# Patient Record
Sex: FEMALE | Race: BLACK OR AFRICAN AMERICAN | NOT HISPANIC OR LATINO | Employment: OTHER | URBAN - METROPOLITAN AREA
[De-identification: names, ages, dates, MRNs, and addresses within clinical notes are randomized per-mention and may not be internally consistent; named-entity substitution may affect disease eponyms.]

---

## 2017-02-07 ENCOUNTER — PATIENT MESSAGE (OUTPATIENT)
Dept: INTERNAL MEDICINE | Facility: CLINIC | Age: 62
End: 2017-02-07

## 2017-02-08 RX ORDER — PROMETHAZINE HYDROCHLORIDE AND DEXTROMETHORPHAN HYDROBROMIDE 6.25; 15 MG/5ML; MG/5ML
5 SYRUP ORAL EVERY 4 HOURS PRN
Qty: 240 ML | Refills: 0 | Status: SHIPPED | OUTPATIENT
Start: 2017-02-08 | End: 2017-02-18

## 2017-02-08 RX ORDER — DOXYCYCLINE 100 MG/1
100 CAPSULE ORAL EVERY 12 HOURS
Qty: 20 CAPSULE | Refills: 0 | Status: SHIPPED | OUTPATIENT
Start: 2017-02-08 | End: 2017-03-14

## 2017-03-02 ENCOUNTER — PATIENT MESSAGE (OUTPATIENT)
Dept: INTERNAL MEDICINE | Facility: CLINIC | Age: 62
End: 2017-03-02

## 2017-03-03 RX ORDER — LEVOFLOXACIN 500 MG/1
500 TABLET, FILM COATED ORAL DAILY
Qty: 10 TABLET | Refills: 0 | Status: SHIPPED | OUTPATIENT
Start: 2017-03-03 | End: 2017-03-13

## 2017-03-03 RX ORDER — METHYLPREDNISOLONE 4 MG/1
TABLET ORAL
Qty: 1 PACKAGE | Refills: 0 | Status: SHIPPED | OUTPATIENT
Start: 2017-03-03 | End: 2017-03-14 | Stop reason: ALTCHOICE

## 2017-03-03 RX ORDER — DEXTROMETHORPHAN HBR. AND GUAIFENESIN 10; 100 MG/5ML; MG/5ML
5 SOLUTION ORAL EVERY 6 HOURS PRN
Qty: 240 ML | Refills: 0 | Status: SHIPPED | OUTPATIENT
Start: 2017-03-03 | End: 2017-03-13

## 2017-03-14 ENCOUNTER — HOSPITAL ENCOUNTER (OUTPATIENT)
Dept: RADIOLOGY | Facility: HOSPITAL | Age: 62
Discharge: HOME | End: 2017-03-14
Attending: INTERNAL MEDICINE
Payer: COMMERCIAL

## 2017-03-14 ENCOUNTER — OFFICE VISIT (OUTPATIENT)
Dept: INTERNAL MEDICINE | Facility: CLINIC | Age: 62
End: 2017-03-14
Payer: COMMERCIAL

## 2017-03-14 ENCOUNTER — TELEPHONE (OUTPATIENT)
Dept: INTERNAL MEDICINE | Facility: CLINIC | Age: 62
End: 2017-03-14

## 2017-03-14 VITALS
DIASTOLIC BLOOD PRESSURE: 80 MMHG | HEIGHT: 66 IN | WEIGHT: 194.69 LBS | BODY MASS INDEX: 31.29 KG/M2 | SYSTOLIC BLOOD PRESSURE: 128 MMHG | HEART RATE: 65 BPM | RESPIRATION RATE: 18 BRPM | TEMPERATURE: 98 F

## 2017-03-14 DIAGNOSIS — J40 BRONCHITIS: ICD-10-CM

## 2017-03-14 DIAGNOSIS — J40 BRONCHITIS: Primary | ICD-10-CM

## 2017-03-14 PROCEDURE — 99214 OFFICE O/P EST MOD 30 MIN: CPT | Mod: 25,S$GLB,, | Performed by: INTERNAL MEDICINE

## 2017-03-14 PROCEDURE — 71020 XR CHEST PA AND LATERAL: CPT | Mod: TC,PO

## 2017-03-14 PROCEDURE — 3074F SYST BP LT 130 MM HG: CPT | Mod: S$GLB,,, | Performed by: INTERNAL MEDICINE

## 2017-03-14 PROCEDURE — 1160F RVW MEDS BY RX/DR IN RCRD: CPT | Mod: S$GLB,,, | Performed by: INTERNAL MEDICINE

## 2017-03-14 PROCEDURE — 94640 AIRWAY INHALATION TREATMENT: CPT | Mod: S$GLB,,, | Performed by: INTERNAL MEDICINE

## 2017-03-14 PROCEDURE — 3079F DIAST BP 80-89 MM HG: CPT | Mod: S$GLB,,, | Performed by: INTERNAL MEDICINE

## 2017-03-14 PROCEDURE — 99999 PR PBB SHADOW E&M-EST. PATIENT-LVL III: CPT | Mod: PBBFAC,,, | Performed by: INTERNAL MEDICINE

## 2017-03-14 PROCEDURE — 71020 XR CHEST PA AND LATERAL: CPT | Mod: 26,,, | Performed by: RADIOLOGY

## 2017-03-14 RX ORDER — IPRATROPIUM BROMIDE AND ALBUTEROL SULFATE 2.5; .5 MG/3ML; MG/3ML
3 SOLUTION RESPIRATORY (INHALATION)
Status: COMPLETED | OUTPATIENT
Start: 2017-03-14 | End: 2017-03-14

## 2017-03-14 RX ORDER — AMOXICILLIN AND CLAVULANATE POTASSIUM 875; 125 MG/1; MG/1
TABLET, FILM COATED ORAL
Refills: 0 | COMMUNITY
Start: 2017-03-03 | End: 2017-03-14 | Stop reason: ALTCHOICE

## 2017-03-14 RX ORDER — HYDROCODONE BITARTRATE AND HOMATROPINE METHYLBROMIDE ORAL SOLUTION 5; 1.5 MG/5ML; MG/5ML
5 LIQUID ORAL NIGHTLY PRN
Qty: 120 ML | Refills: 0 | Status: SHIPPED | OUTPATIENT
Start: 2017-03-14 | End: 2017-06-12

## 2017-03-14 RX ORDER — BENZONATATE 200 MG/1
CAPSULE ORAL
Refills: 0 | COMMUNITY
Start: 2017-03-03 | End: 2017-03-14 | Stop reason: ALTCHOICE

## 2017-03-14 RX ORDER — ALBUTEROL SULFATE 90 UG/1
2 AEROSOL, METERED RESPIRATORY (INHALATION) EVERY 6 HOURS PRN
Qty: 18 G | Refills: 0 | Status: SHIPPED | OUTPATIENT
Start: 2017-03-14 | End: 2017-05-11 | Stop reason: SDUPTHER

## 2017-03-14 RX ORDER — LEVOFLOXACIN 750 MG/1
750 TABLET ORAL DAILY
Qty: 10 TABLET | Refills: 0 | Status: SHIPPED | OUTPATIENT
Start: 2017-03-14 | End: 2017-03-24

## 2017-03-14 RX ADMIN — IPRATROPIUM BROMIDE AND ALBUTEROL SULFATE 3 ML: 2.5; .5 SOLUTION RESPIRATORY (INHALATION) at 02:03

## 2017-03-14 NOTE — TELEPHONE ENCOUNTER
----- Message from Minal Toribio MD sent at 3/14/2017  2:53 PM CDT -----  Please let patient know that her xray does not show pneumonia. She likely has a bronchitis but she should still follow up with us on Friday. Thanks!

## 2017-03-14 NOTE — PROGRESS NOTES
Subjective:       Patient ID: Diana Montenegro is a 61 y.o. female.    Chief Complaint: Cough (feels like it might be bronchitis; went to urgent care and had meds )    HPI     Patient is a 61 year old female here today for cough. She says her sx have been going on for about one month. She said initially she had a productive cough, called her PCP who sent her a Rx for Doxycycline but it never got better. She then went to  last week when coughing was very productive, reportedly CXR was normal and she was told she had bronchitis and given augmentin BID x 10 days, ended yesterday. Cough is worse now, intermittently productive, SOB, wheezing. No fever/chills. No sinus congestion, rhinorrhea, sore throat, PND. No upset stomach or diarrhea. She is a former smoking. Reportedly no asthma or COPD.     Review of Systems   Constitutional: Negative for chills, fatigue and fever.   HENT: Negative for congestion, ear pain, postnasal drip, rhinorrhea, sinus pressure and sore throat.    Eyes: Negative for itching and visual disturbance.   Respiratory: Positive for cough, shortness of breath and wheezing.    Cardiovascular: Negative for chest pain, palpitations and leg swelling.   Gastrointestinal: Negative for abdominal pain and nausea.   Genitourinary: Negative for dysuria.   Musculoskeletal: Negative for arthralgias and myalgias.   Skin: Negative for rash.   Neurological: Negative for weakness, light-headedness and headaches.       Objective:      Physical Exam   Constitutional: She is oriented to person, place, and time. She appears well-developed and well-nourished. No distress.   HENT:   Head: Normocephalic and atraumatic.   Right Ear: External ear normal.   Left Ear: External ear normal.     Posterior oropharyngeal erythema  No tonsillar exudate   Eyes: Conjunctivae and EOM are normal. Pupils are equal, round, and reactive to light.   Neck: Normal range of motion. Neck supple. No thyromegaly present.   Cardiovascular:  Normal rate, regular rhythm, normal heart sounds and intact distal pulses.    No murmur heard.  Pulmonary/Chest: She has wheezes.   Diffuse expiratory wheezing bilateral upper and lower lung zones  No use of accessory muscles of respiration    Musculoskeletal: She exhibits no edema.   Lymphadenopathy:     She has no cervical adenopathy.   Neurological: She is alert and oriented to person, place, and time.   Skin: Skin is warm and dry. She is not diaphoretic.   Nursing note and vitals reviewed.      Assessment:       1. Bronchitis        Plan:       She has bilateral wheezing upper and lower lung zones  Check Pulse ox in clinic today  Pulse ox now  duoneb treatment x 1 today  She received steroid injection last week  CBC, CMP, CXR today to rule out evidence of PNA  Rx for Levaquin 750 mg daily x 10 days  RTC on Friday for follow up  Rx for hycodan cough syrup nightly prn cough   Rx for albuterol inhaler Q6H prn wheezing/sob

## 2017-03-17 ENCOUNTER — OFFICE VISIT (OUTPATIENT)
Dept: INTERNAL MEDICINE | Facility: CLINIC | Age: 62
End: 2017-03-17
Payer: COMMERCIAL

## 2017-03-17 VITALS
SYSTOLIC BLOOD PRESSURE: 140 MMHG | TEMPERATURE: 98 F | WEIGHT: 196.19 LBS | HEIGHT: 66 IN | BODY MASS INDEX: 31.53 KG/M2 | HEART RATE: 68 BPM | DIASTOLIC BLOOD PRESSURE: 72 MMHG

## 2017-03-17 DIAGNOSIS — R06.2 WHEEZING: ICD-10-CM

## 2017-03-17 DIAGNOSIS — J40 BRONCHITIS: Primary | ICD-10-CM

## 2017-03-17 PROCEDURE — 3078F DIAST BP <80 MM HG: CPT | Mod: S$GLB,,, | Performed by: INTERNAL MEDICINE

## 2017-03-17 PROCEDURE — 1160F RVW MEDS BY RX/DR IN RCRD: CPT | Mod: S$GLB,,, | Performed by: INTERNAL MEDICINE

## 2017-03-17 PROCEDURE — 99999 PR PBB SHADOW E&M-EST. PATIENT-LVL III: CPT | Mod: PBBFAC,,, | Performed by: INTERNAL MEDICINE

## 2017-03-17 PROCEDURE — 3077F SYST BP >= 140 MM HG: CPT | Mod: S$GLB,,, | Performed by: INTERNAL MEDICINE

## 2017-03-17 PROCEDURE — 99213 OFFICE O/P EST LOW 20 MIN: CPT | Mod: S$GLB,,, | Performed by: INTERNAL MEDICINE

## 2017-03-17 RX ORDER — METHYLPREDNISOLONE 4 MG/1
TABLET ORAL
Qty: 1 PACKAGE | Refills: 0 | Status: SHIPPED | OUTPATIENT
Start: 2017-03-17 | End: 2017-04-07

## 2017-03-17 NOTE — PROGRESS NOTES
Subjective:       Patient ID: Diana Montenegro is a 61 y.o. female.    Chief Complaint: Cough (follow up bronchitis)    HPI     Patient is a 61 year old female here today for follow up of Bronchitis. Still coughing but feeling better. More energy. Less SOB. Still with wheezing. Still productive, at times has trouble getting it up. On Levaquin and has albuterol inhaler for coughing spells and it helps. No fever/chills. No diarrhea.     Review of Systems   Constitutional: Negative for chills, fatigue and fever.   HENT: Negative for congestion, ear pain, postnasal drip, rhinorrhea, sinus pressure and sore throat.    Eyes: Negative for itching and visual disturbance.   Respiratory: Positive for cough and wheezing. Negative for shortness of breath.    Cardiovascular: Negative for chest pain, palpitations and leg swelling.   Gastrointestinal: Negative for abdominal pain and nausea.   Genitourinary: Negative for dysuria.   Musculoskeletal: Negative for arthralgias and myalgias.   Skin: Negative for rash.   Neurological: Negative for weakness, light-headedness and headaches.       Objective:      Physical Exam   Constitutional: She is oriented to person, place, and time. She appears well-developed and well-nourished. No distress.   HENT:   Head: Normocephalic and atraumatic.   Right Ear: External ear normal.   Left Ear: External ear normal.     Posterior oropharyngeal erythema  No tonsillar exudate   Eyes: Conjunctivae and EOM are normal. Pupils are equal, round, and reactive to light.   Neck: Normal range of motion. Neck supple. No thyromegaly present.   Cardiovascular: Normal rate, regular rhythm, normal heart sounds and intact distal pulses.    No murmur heard.  Pulmonary/Chest: Effort normal.   Expiratory wheezing BL diffuse     Musculoskeletal: She exhibits no edema.   Lymphadenopathy:     She has no cervical adenopathy.   Neurological: She is alert and oriented to person, place, and time.   Skin: Skin is warm and  dry. She is not diaphoretic.   Nursing note and vitals reviewed.      Assessment:       1. Bronchitis    2. Wheezing        Plan:       Still with wheezing on exam today and significant rales  She looks better today, no use of accessory muscles  Continue Levaquin  Start Medrol Dose Pack  Please call us if sx do not continue to improve

## 2017-03-21 ENCOUNTER — NURSE TRIAGE (OUTPATIENT)
Dept: ADMINISTRATIVE | Facility: CLINIC | Age: 62
End: 2017-03-21

## 2017-03-21 NOTE — TELEPHONE ENCOUNTER
Reason for Disposition   Age > 60 years    Protocols used: ST HEART RATE AND HEARTBEAT [id number]  pt states she has a hx of a-fib/ states her HR has been fluctuating and having episodes daily of increased HR and elevated BP/ this afternoon  / now /77 w/ HR 91/ is being treated for bronchitis @ the time/ denies CP, SOB, weakness, dizziness, or feeling lightheaded/ states she feels fine overall/ is on eliquis and has noticed a bruise on her chest/ states her normal HR is 50-60's/ is very congested--wet sounding cough    Unable to access any appts in IM-after hours  Advised pt to seek care @ [facility] clinic in her area/ also advised pt that based on assessment she may be sent to ER--pt verbalized understanding    Nicole Trivedi RN

## 2017-03-22 ENCOUNTER — TELEPHONE (OUTPATIENT)
Dept: INTERNAL MEDICINE | Facility: CLINIC | Age: 62
End: 2017-03-22

## 2017-03-22 NOTE — TELEPHONE ENCOUNTER
Spoke with pt-she said that she saw a UC  Last pm,and they said she was having a anxiety attack.   She feels fine now.

## 2017-03-22 NOTE — TELEPHONE ENCOUNTER
----- Message from Kerline Ofelia sent at 3/21/2017  4:22 PM CDT -----  Contact: pt [phone]  Pt saw the Dr on 3- and was given steroids,the pt thinks they are making her heart rate go fast(91 beat),also she said her blood pressure is 167-76@ 4:20pm today,please advise pt on what to do.

## 2017-03-28 ENCOUNTER — TELEPHONE (OUTPATIENT)
Dept: ELECTROPHYSIOLOGY | Facility: CLINIC | Age: 62
End: 2017-03-28

## 2017-03-28 ENCOUNTER — TELEPHONE (OUTPATIENT)
Dept: CARDIOLOGY | Facility: CLINIC | Age: 62
End: 2017-03-28

## 2017-03-28 DIAGNOSIS — I48.0 PAF (PAROXYSMAL ATRIAL FIBRILLATION): Primary | ICD-10-CM

## 2017-03-28 DIAGNOSIS — R00.2 PALPITATION: Primary | ICD-10-CM

## 2017-03-28 NOTE — TELEPHONE ENCOUNTER
----- Message from Daniela Huizar sent at 3/28/2017 10:19 AM CDT -----  Contact: pt called  Pt called, requesting to speak with the M.A in regards to obtaining a yearly appointment and to discuss recalls. Ph [phone]. Thank you

## 2017-03-28 NOTE — TELEPHONE ENCOUNTER
----- Message from Daniela Huizar sent at 3/28/2017 10:22 AM CDT -----  Contact: pt called  Please add an EKG order. Thank you

## 2017-03-29 DIAGNOSIS — Z98.84 S/P LAPAROSCOPIC SLEEVE GASTRECTOMY: ICD-10-CM

## 2017-03-30 RX ORDER — APIXABAN 5 MG/1
TABLET, FILM COATED ORAL
Qty: 180 TABLET | Refills: 0 | Status: SHIPPED | OUTPATIENT
Start: 2017-03-30 | End: 2017-04-11 | Stop reason: SDUPTHER

## 2017-03-30 RX ORDER — OMEPRAZOLE 40 MG/1
CAPSULE, DELAYED RELEASE ORAL
Qty: 30 CAPSULE | Refills: 0 | Status: SHIPPED | OUTPATIENT
Start: 2017-03-30 | End: 2017-05-11 | Stop reason: SDUPTHER

## 2017-03-30 RX ORDER — LEVOTHYROXINE SODIUM 125 UG/1
125 TABLET ORAL DAILY
Qty: 30 TABLET | Refills: 2 | Status: SHIPPED | OUTPATIENT
Start: 2017-03-30 | End: 2017-05-11 | Stop reason: SDUPTHER

## 2017-03-30 RX ORDER — LORAZEPAM 1 MG/1
1 TABLET ORAL EVERY 12 HOURS PRN
Qty: 60 TABLET | Refills: 2 | Status: SHIPPED | OUTPATIENT
Start: 2017-03-30 | End: 2017-11-21 | Stop reason: SDUPTHER

## 2017-03-30 RX ORDER — GLIMEPIRIDE 4 MG/1
4 TABLET ORAL 2 TIMES DAILY
Qty: 60 TABLET | Refills: 2 | Status: SHIPPED | OUTPATIENT
Start: 2017-03-30 | End: 2017-05-11 | Stop reason: SDUPTHER

## 2017-03-30 RX ORDER — LEVOTHYROXINE SODIUM 125 UG/1
TABLET ORAL
Qty: 30 TABLET | Refills: 0 | Status: SHIPPED | OUTPATIENT
Start: 2017-03-30 | End: 2017-04-11 | Stop reason: SDUPTHER

## 2017-03-30 RX ORDER — LORAZEPAM 1 MG/1
TABLET ORAL
Qty: 60 TABLET | Refills: 0 | Status: SHIPPED | OUTPATIENT
Start: 2017-03-30 | End: 2017-04-11 | Stop reason: SDUPTHER

## 2017-03-30 RX ORDER — GLIMEPIRIDE 4 MG/1
TABLET ORAL
Qty: 60 TABLET | Refills: 0 | Status: SHIPPED | OUTPATIENT
Start: 2017-03-30 | End: 2017-04-11 | Stop reason: SDUPTHER

## 2017-03-30 RX ORDER — METFORMIN HYDROCHLORIDE 1000 MG/1
TABLET ORAL
Qty: 60 TABLET | Refills: 0 | Status: SHIPPED | OUTPATIENT
Start: 2017-03-30 | End: 2017-04-11 | Stop reason: SDUPTHER

## 2017-03-30 RX ORDER — OMEPRAZOLE 40 MG/1
40 CAPSULE, DELAYED RELEASE ORAL EVERY MORNING
Qty: 30 CAPSULE | Refills: 2 | Status: SHIPPED | OUTPATIENT
Start: 2017-03-30 | End: 2017-04-11 | Stop reason: SDUPTHER

## 2017-03-30 RX ORDER — METFORMIN HYDROCHLORIDE 1000 MG/1
1000 TABLET ORAL 2 TIMES DAILY WITH MEALS
Qty: 60 TABLET | Refills: 2 | Status: SHIPPED | OUTPATIENT
Start: 2017-03-30 | End: 2017-05-11 | Stop reason: SDUPTHER

## 2017-03-30 RX ORDER — INSULIN PUMP SYRINGE, 3 ML
EACH MISCELLANEOUS
Qty: 1 EACH | Refills: 0 | Status: SHIPPED | OUTPATIENT
Start: 2017-03-30 | End: 2021-11-02 | Stop reason: SDUPTHER

## 2017-03-31 ENCOUNTER — TELEPHONE (OUTPATIENT)
Dept: INTERNAL MEDICINE | Facility: CLINIC | Age: 62
End: 2017-03-31

## 2017-03-31 NOTE — TELEPHONE ENCOUNTER
----- Message from Nirmala Norwood sent at 3/30/2017  3:39 PM CDT -----  Contact: Vi/[facility]/[phone]  Calling in regards to VM left on pharmacy VM.Please advise.

## 2017-04-05 ENCOUNTER — OFFICE VISIT (OUTPATIENT)
Dept: VASCULAR SURGERY | Facility: CLINIC | Age: 62
End: 2017-04-05
Payer: COMMERCIAL

## 2017-04-05 ENCOUNTER — HOSPITAL ENCOUNTER (OUTPATIENT)
Dept: VASCULAR SURGERY | Facility: CLINIC | Age: 62
Discharge: HOME | End: 2017-04-05
Attending: SURGERY
Payer: COMMERCIAL

## 2017-04-05 VITALS
DIASTOLIC BLOOD PRESSURE: 59 MMHG | TEMPERATURE: 99 F | HEIGHT: 67 IN | BODY MASS INDEX: 30.45 KG/M2 | WEIGHT: 194 LBS | SYSTOLIC BLOOD PRESSURE: 108 MMHG | HEART RATE: 59 BPM

## 2017-04-05 DIAGNOSIS — I77.9 BILATERAL CAROTID ARTERY DISEASE: Primary | ICD-10-CM

## 2017-04-05 DIAGNOSIS — E78.5 HYPERLIPIDEMIA, UNSPECIFIED HYPERLIPIDEMIA TYPE: ICD-10-CM

## 2017-04-05 DIAGNOSIS — I65.21 STENOSIS OF RIGHT CAROTID ARTERY: Primary | ICD-10-CM

## 2017-04-05 DIAGNOSIS — I48.0 PAROXYSMAL ATRIAL FIBRILLATION: Chronic | ICD-10-CM

## 2017-04-05 DIAGNOSIS — I77.9 BILATERAL CAROTID ARTERY DISEASE: ICD-10-CM

## 2017-04-05 DIAGNOSIS — I10 ESSENTIAL HYPERTENSION: Chronic | ICD-10-CM

## 2017-04-05 PROCEDURE — 3078F DIAST BP <80 MM HG: CPT | Mod: S$GLB,,, | Performed by: SURGERY

## 2017-04-05 PROCEDURE — 93880 EXTRACRANIAL BILAT STUDY: CPT | Mod: S$GLB,,, | Performed by: SURGERY

## 2017-04-05 PROCEDURE — 99999 PR PBB SHADOW E&M-EST. PATIENT-LVL III: CPT | Mod: PBBFAC,,, | Performed by: SURGERY

## 2017-04-05 PROCEDURE — 99214 OFFICE O/P EST MOD 30 MIN: CPT | Mod: S$GLB,,, | Performed by: SURGERY

## 2017-04-05 PROCEDURE — 3074F SYST BP LT 130 MM HG: CPT | Mod: S$GLB,,, | Performed by: SURGERY

## 2017-04-05 PROCEDURE — 1160F RVW MEDS BY RX/DR IN RCRD: CPT | Mod: S$GLB,,, | Performed by: SURGERY

## 2017-04-05 NOTE — PROGRESS NOTES
Patient ID: Diana Montenegro is a 61 y.o. female.    I. HISTORY     Chief Complaint: Follow-up      HPI   Diana Montenegro is a 61 y.o.  With HTN, HLD, DM, Afib (on Eliquis), R CVA (July 2014 manifested by facial droop and slurred speech) who is here for f/u eval of right carotid artery stenosis. She is a former smoker with 30 pk-yr history.  She has recently quit.  She walks with a walker due to arthritic pain but otherwise is independent. She denies changes in vision, facial droop, difficulty speaking, or loss of motor function.     Review of Systems   Constitution: Negative for chills and fever.   HENT: Negative for congestion and ear pain.    Eyes: Negative for discharge and pain.   Cardiovascular: Negative for chest pain, claudication and dyspnea on exertion.   Respiratory: Negative for cough and shortness of breath.    Skin: Negative for dry skin and itching.   Musculoskeletal: Positive for arthritis. Negative for back pain and falls.   Gastrointestinal: Negative for bloating, abdominal pain and anorexia.   Genitourinary: Negative for dysuria, hematuria and hesitancy.   Neurological: Negative for aphonia and dizziness.   Psychiatric/Behavioral: Negative for hallucinations and hypervigilance.       II. PHYSICAL EXAM     Physical Exam   Constitutional: She is oriented to person, place, and time. She appears well-developed and well-nourished. No distress.   HENT:   Head: Normocephalic and atraumatic.   Neck: Normal range of motion. Neck supple.   Cardiovascular: Normal rate, regular rhythm, normal heart sounds and intact distal pulses.    Pulses:       Radial pulses are 2+ on the right side, and 2+ on the left side.   No carotid bruits   Pulmonary/Chest: Effort normal and breath sounds normal. No respiratory distress.   Abdominal: Soft. Bowel sounds are normal. She exhibits no distension.   Neurological: She is alert and oriented to person, place, and time.   Skin: Skin is warm and dry.   Psychiatric:  She has a normal mood and affect. Her behavior is normal. Thought content normal.   Vitals reviewed.      III. ASSESSMENT & PLAN (MEDICAL DECISION MAKING)     1. Stenosis of right carotid artery    2. Hyperlipidemia, unspecified hyperlipidemia type    3. Paroxysmal atrial fibrillation    4. Essential hypertension        Imaging Results:  Carotid Duplex:   R L   60-79%  Ratio: 5.3 1-39%     Assessment/Diagnosis and Plan:  58 y/o female with asymptomatic carotid stenosis.   Continue conservative management with asa, eliquis, and statin.     RADHA Rushing, APRN, FNP-BC  Nurse Practitioner  Vascular and Endovascular Surgery      Vascular Surgery Staff  I have seen and examined the patient and reviewed the residents note. I agree with their assessment and plan.  Diana Montenegro is a 61 y.o. female with stroke in July 2014. No symptoms since. She has right ICA stenosis ~ 70%.      Continue yearly follow up with repeat duplex.      Ofelia Downs MD FACS [facility]  Vascular & Endovascular Surgery

## 2017-04-06 RX ORDER — LORAZEPAM 1 MG/1
TABLET ORAL
Qty: 60 TABLET | Refills: 0 | Status: SHIPPED | OUTPATIENT
Start: 2017-04-06 | End: 2017-04-11 | Stop reason: SDUPTHER

## 2017-04-07 ENCOUNTER — PATIENT MESSAGE (OUTPATIENT)
Dept: INTERNAL MEDICINE | Facility: CLINIC | Age: 62
End: 2017-04-07

## 2017-04-10 RX ORDER — TEMAZEPAM 15 MG/1
15 CAPSULE ORAL NIGHTLY PRN
Qty: 30 CAPSULE | Refills: 2 | Status: SHIPPED | OUTPATIENT
Start: 2017-04-10 | End: 2017-05-10

## 2017-04-11 ENCOUNTER — HOSPITAL ENCOUNTER (OUTPATIENT)
Dept: CARDIOLOGY | Facility: CLINIC | Age: 62
Discharge: HOME | End: 2017-04-11
Payer: COMMERCIAL

## 2017-04-11 ENCOUNTER — OFFICE VISIT (OUTPATIENT)
Dept: ELECTROPHYSIOLOGY | Facility: CLINIC | Age: 62
End: 2017-04-11
Payer: COMMERCIAL

## 2017-04-11 ENCOUNTER — CLINICAL SUPPORT (OUTPATIENT)
Dept: ELECTROPHYSIOLOGY | Facility: CLINIC | Age: 62
End: 2017-04-11
Payer: COMMERCIAL

## 2017-04-11 VITALS
HEART RATE: 64 BPM | WEIGHT: 191.81 LBS | DIASTOLIC BLOOD PRESSURE: 70 MMHG | HEIGHT: 66 IN | BODY MASS INDEX: 30.82 KG/M2 | SYSTOLIC BLOOD PRESSURE: 120 MMHG

## 2017-04-11 DIAGNOSIS — G89.29 CHRONIC BILATERAL LOW BACK PAIN, WITH SCIATICA PRESENCE UNSPECIFIED: ICD-10-CM

## 2017-04-11 DIAGNOSIS — E11.40 TYPE 2 DIABETES MELLITUS WITH DIABETIC NEUROPATHY, WITHOUT LONG-TERM CURRENT USE OF INSULIN: ICD-10-CM

## 2017-04-11 DIAGNOSIS — I10 ESSENTIAL HYPERTENSION: Chronic | ICD-10-CM

## 2017-04-11 DIAGNOSIS — I48.0 PAF (PAROXYSMAL ATRIAL FIBRILLATION): ICD-10-CM

## 2017-04-11 DIAGNOSIS — Z79.01 CURRENT USE OF LONG TERM ANTICOAGULATION: ICD-10-CM

## 2017-04-11 DIAGNOSIS — M54.5 CHRONIC BILATERAL LOW BACK PAIN, WITH SCIATICA PRESENCE UNSPECIFIED: ICD-10-CM

## 2017-04-11 DIAGNOSIS — Z95.818 OTHER SPECIFIED CARDIAC DEVICE IN SITU: Primary | ICD-10-CM

## 2017-04-11 DIAGNOSIS — E03.9 HYPOTHYROIDISM, UNSPECIFIED TYPE: ICD-10-CM

## 2017-04-11 DIAGNOSIS — I87.2 VENOUS INSUFFICIENCY (CHRONIC) (PERIPHERAL): ICD-10-CM

## 2017-04-11 DIAGNOSIS — E66.9 OBESITY, CLASS I, BMI 30-34.9: ICD-10-CM

## 2017-04-11 DIAGNOSIS — Z95.818 STATUS POST PLACEMENT OF IMPLANTABLE LOOP RECORDER: ICD-10-CM

## 2017-04-11 DIAGNOSIS — I48.0 PAROXYSMAL ATRIAL FIBRILLATION: Chronic | ICD-10-CM

## 2017-04-11 DIAGNOSIS — E78.5 HYPERLIPIDEMIA, UNSPECIFIED HYPERLIPIDEMIA TYPE: ICD-10-CM

## 2017-04-11 DIAGNOSIS — I63.239 SYMPTOMATIC CAROTID ARTERY STENOSIS WITH INFARCTION: Primary | ICD-10-CM

## 2017-04-11 DIAGNOSIS — Z95.818 OTHER SPECIFIED CARDIAC DEVICE IN SITU: ICD-10-CM

## 2017-04-11 PROCEDURE — 99999 PR PBB SHADOW E&M-EST. PATIENT-LVL III: CPT | Mod: PBBFAC,,, | Performed by: NURSE PRACTITIONER

## 2017-04-11 PROCEDURE — 93285 PRGRMG DEV EVAL SCRMS IP: CPT | Mod: S$GLB,,, | Performed by: INTERNAL MEDICINE

## 2017-04-11 PROCEDURE — 3078F DIAST BP <80 MM HG: CPT | Mod: S$GLB,,, | Performed by: NURSE PRACTITIONER

## 2017-04-11 PROCEDURE — 3060F POS MICROALBUMINURIA REV: CPT | Mod: S$GLB,,, | Performed by: NURSE PRACTITIONER

## 2017-04-11 PROCEDURE — 99214 OFFICE O/P EST MOD 30 MIN: CPT | Mod: S$GLB,,, | Performed by: NURSE PRACTITIONER

## 2017-04-11 PROCEDURE — 93000 ELECTROCARDIOGRAM COMPLETE: CPT | Mod: S$GLB,,, | Performed by: INTERNAL MEDICINE

## 2017-04-11 PROCEDURE — 3045F PR MOST RECENT HEMOGLOBIN A1C LEVEL 7.0-9.0%: CPT | Mod: S$GLB,,, | Performed by: NURSE PRACTITIONER

## 2017-04-11 PROCEDURE — 3074F SYST BP LT 130 MM HG: CPT | Mod: S$GLB,,, | Performed by: NURSE PRACTITIONER

## 2017-04-11 PROCEDURE — 1160F RVW MEDS BY RX/DR IN RCRD: CPT | Mod: S$GLB,,, | Performed by: NURSE PRACTITIONER

## 2017-04-11 RX ORDER — METHYLPREDNISOLONE 4 MG/1
4 TABLET ORAL DAILY
COMMUNITY
End: 2017-04-12

## 2017-04-11 NOTE — MR AVS SNAPSHOT
[facility] - Arrhythmia  [address]  [facility] [address]  Phone: [phone]  Fax: [phone]                  Diana Montenegro   2017 2:30 PM   Office Visit    Description:  Female : 1955   Provider:  YAMILETH Mccoy   Department:  [facility] - Arrhythmia           Reason for Visit     Tachycardia     Shortness of Breath     Fatigue                To Do List           Future Appointments        Provider Department Dept Phone    5/3/2017 11:00 AM LAB, SAME DAY [facility] [phone]    2017 2:00 PM Rad Johnston MD [facility] - Internal Medicine [phone]    2017 2:30 PM EKG, APPT [facility] - [phone]    2017 3:30 PM Kaushik Mae MD [facility] - Cardiology [phone]    7/10/2017 12:00 PM Gladys Petersen PA-C [facility] - Bariatric Surgery [phone]      Goals (5 Years of Data)     None      Follow-Up and Disposition     Return in about 6 months (around 10/11/2017).      [facility]     [facility] Nurse Care [facility] -  Assistance  Unless otherwise directed by your provider, please contact [facility], our nurse care line that is available for  assistance.     Registered nurses in the [facility] provide: appointment scheduling, clinical advisement, health education, and other advisory services.  Call: [phone] (toll free)               Medications           Message regarding Medications     Verify the changes and/or additions to your medication regime listed below are the same as discussed with your clinician today.  If any of these changes or additions are incorrect, please notify your healthcare provider.             Verify that the below list of medications is an accurate representation of the medications you are currently taking.  If none reported, the list may be blank. If incorrect, please contact your healthcare provider. Carry this list with you in case of emergency.            Current Medications     albuterol 90 mcg/actuation inhaler Inhale 2 puffs into the lungs every 6 (six) hours as needed for Wheezing. Rescue    apixaban (ELIQUIS) 5 mg Tab Take 1 tablet (5 mg total) by mouth 2 (two) times daily.    aspirin (ECOTRIN) 81 MG EC tablet Take 1 tablet (81 mg total) by mouth once daily.    b complex vitamins tablet Take 1 tablet by mouth once daily.    CALCIUM CITRATE/VITAMIN D3 (CALCIUM CITRATE + D ORAL) Take 2 tablets by mouth 2 (two) times daily.    cyanocobalamin, vitamin B-12, 500 mcg Subl Place 1 tablet under the tongue once daily.    glimepiride (AMARYL) 4 MG tablet Take 1 tablet (4 mg total) by mouth 2 (two) times daily.    hydrocodone-homatropine 5-1.5 mg/5 ml (HYCODAN) 5-1.5 mg/5 mL Syrp Take 5 mLs by mouth nightly as needed (cough).    levothyroxine (SYNTHROID) 125 MCG tablet Take 1 tablet (125 mcg total) by mouth once daily.    lorazepam (ATIVAN) 1 MG tablet Take 1 tablet (1 mg total) by mouth every 12 (twelve) hours as needed for Anxiety.    metformin (GLUCOPHAGE) 1000 MG tablet Take 1 tablet (1,000 mg total) by mouth 2 (two) times daily with meals.    multivitamin capsule Take 1 capsule by mouth once daily.    omeprazole (PRILOSEC) 40 MG capsule TAKE ONE CAPSULE BY MOUTH IN THE MORNING    rosuvastatin (CRESTOR) 40 MG Tab Take 1 tablet (40 mg total) by mouth once daily.    RUTIN/HESP/BIOFLAV/C/HERB#196 (BIOFLEX ORAL) Take 2 tablets by mouth once daily.    senna (SENNA) 8.6 mg tablet Take 2 tablets by mouth nightly as needed for Constipation.    temazepam (RESTORIL) 15 mg Cap Take 1 capsule (15 mg total) by mouth nightly as needed.    urea (CARMOL) 40 % Crea Apply topically 2 (two) times daily.    blood pressure test kit-large Kit Use as directed    blood-glucose meter (ONETOUCH ULTRA SYSTEM KIT) kit Use as instructed    lancets (ONE TOUCH DELICA LANCETS) 33 gauge Misc 1 lancet by Misc.(Non-Drug; Combo Route) route 4 (four) times daily.    methylPREDNISolone (MEDROL  "DOSEPACK) 4 mg tablet Take 4 mg by mouth Daily. use as directed    ONETOUCH ULTRA TEST Strp USE ONE STRIP TO CHECK GLUCOSE 4 TIMES DAILY AS DIRECTED           Clinical Reference Information           Your Vitals Were     BP Pulse Height Weight Last Period BMI    120/70 64 5' 6" (1.676 m) 87 kg (191 lb 12.8 oz) (LMP Unknown) 30.96 kg/m2      Blood Pressure          Most Recent Value    BP  120/70      Allergies as of 4/11/2017     Medrol [Methylprednisolone]      Immunizations Administered on Date of Encounter - 4/11/2017     None      Language Assistance Services     ATTENTION: Language assistance services are available, free of charge. Please call [phone].      ATENCIÓN: Si habla radha, tiene a hanson disposición servicios gratuitos de asistencia lingüística. Llame al [phone].     CATRINA Ý: N?u b?n nói Ti?ng Vi?t, có các d?ch v? h? tr? ngôn ng? mi?n phí dành cho b?n. G?i s? [phone].         [facility] complies with applicable Federal civil rights laws and does not discriminate on the basis of race, color, national origin, age, disability, or sex.        "

## 2017-04-11 NOTE — PROGRESS NOTES
Subjective:    Patient ID:  Diana Montenegro is a 61 y.o. female who presents for follow-up of ILR Check.     Ms. Montenegro is a patient of Dr. Randolph.     HPI     Referring MD: Dr. Herman Quinones     Ms. Montenegro is a 61 y.o. female with symptomatic carotid artery stenosis with infarction (MCA stroke; 07/2014) s/p ILR, pAF, venous insufficiency, HTN, HLD, DM, hypothyroidism, obesity s/p recent gastric sleeve surgery, and chronic lower-back pain w/sciatica. Ms. Montenegro had been in her usual state of health until July of 2014 when she developed left-sided facial weakness. She was diagnosed with an acute right-sided MCA stroke; work-up included carotid dopplers showing 60% right ICA stenosis. An Echo at the time showed normal biventricular function with a mildly enlarged LA. She also underwent a NST that showed no ischemia. During her hospitalization, she had no AF on telemetry and underwent successful ILR placement (07/18/14) without complication.     Ms. Montenegro underwent gastric sleeve surgery (07/23/15) without complication. Despite this, on post-op day 2, she was noted to have paroxysms of AF on an ILR transmission (max episode duration 50 minutes); asymptomatic. In addition, Ms. Montenegro was noted to have sinus bradycardia with rates around ~40; asymptomatic.    Since her last office visit, Ms. Montenegro reports experiencing bronchitis, for which she was prescribed a medrol dose pack. Towards the end of the pack, she began to note palpitations as well as 1 short-lived episode of dizziness on the last day of the dose-pack. Since stopping the steroids, Ms. Montenegro reports feeling better without recurrence of palpitations or dizziness. Currently she reports occasional KING, wheezing, and sputum production secondary to bronchitis; she denies fever, chest pain, overt SOB, dizziness, palpitations, or syncope.     Recent cardiac studies:  Echo (07/17/14) revealed an EF of 60-65%, and mild LAE (MICHAEL measuring 32.29 cc/m2).   ILR  Interrogations (08/03/15 - 03/2017) reveal no AF; SR and occasional SB (30s-40s; during sleep times); asymptomatic.     I reviewed today's ECG which demonstrated NSR at 78 bpm; , , and QTc 556.    Review of Systems   Constitution: Positive for malaise/fatigue. Negative for diaphoresis.   HENT: Negative for headaches and nosebleeds.    Eyes: Negative for double vision.   Cardiovascular: Positive for dyspnea on exertion and palpitations. Negative for chest pain, irregular heartbeat, leg swelling, near-syncope and syncope.   Respiratory: Positive for cough, shortness of breath, sputum production and wheezing.    Skin: Negative.    Musculoskeletal: Positive for stiffness.   Gastrointestinal: Negative for abdominal pain, hematemesis and hematochezia.   Genitourinary: Negative for hematuria.   Neurological: Positive for dizziness and light-headedness.   Psychiatric/Behavioral: Negative for altered mental status.        Objective:    Physical Exam   Constitutional: She is oriented to person, place, and time. She appears well-developed and well-nourished.   HENT:   Head: Normocephalic and atraumatic.   Eyes: Pupils are equal, round, and reactive to light.   Neck: Normal range of motion.   Cardiovascular: Normal rate, regular rhythm, normal heart sounds and intact distal pulses.    Pulmonary/Chest: Effort normal. She has wheezes.   Abdominal: Soft.   Musculoskeletal:   Antalgic gait noted.    Neurological: She is alert and oriented to person, place, and time.   Skin: She is not diaphoretic.   Vitals reviewed.        Assessment:       1. Symptomatic carotid artery stenosis with infarction    2. Status post placement of implantable loop recorder    3. Paroxysmal atrial fibrillation    4. Current use of long term anticoagulation    5. Venous insufficiency (chronic) (peripheral)    6. Essential hypertension    7. Hyperlipidemia, unspecified hyperlipidemia type    8. Type 2 diabetes mellitus with diabetic neuropathy,  without long-term current use of insulin    9. Hypothyroidism, unspecified type    10. Obesity, Class I, BMI 30-34.9    11. Chronic bilateral low back pain, with sciatica presence unspecified         Plan:       In summary, Ms. Montenegro is a 61 y.o. female with symptomatic carotid artery stenosis with infarction (MCA stroke; 07/2014) s/p ILR, pAF, venous insufficiency, HTN, HLD, DM, hypothyroidism, obesity, and chronic lower-back pain w/sciatica. Ms. Montenegro is doing well from a rhythm perspective with stable lead and device function without arrhythmia noted; SB, per baseline (30s-40s; sleep hours; asymptomatic); anticoagulated on Eliquis.     Continue current medication regimen and device settings.   Follow up in device clinic as scheduled.   Follow up in EP clinic in 6 months w/Dr. Randolph, per pt request, sooner as needed.     RADHA Mccoy, APRN, FNP-C        (A copy of today's note was sent to Dr. Randolph).

## 2017-04-12 ENCOUNTER — TELEPHONE (OUTPATIENT)
Dept: INTERNAL MEDICINE | Facility: CLINIC | Age: 62
End: 2017-04-12

## 2017-04-12 ENCOUNTER — HOSPITAL ENCOUNTER (OUTPATIENT)
Dept: RADIOLOGY | Facility: HOSPITAL | Age: 62
Discharge: HOME | End: 2017-04-12
Attending: INTERNAL MEDICINE
Payer: COMMERCIAL

## 2017-04-12 ENCOUNTER — OFFICE VISIT (OUTPATIENT)
Dept: INTERNAL MEDICINE | Facility: CLINIC | Age: 62
End: 2017-04-12
Payer: COMMERCIAL

## 2017-04-12 VITALS
WEIGHT: 193.56 LBS | RESPIRATION RATE: 14 BRPM | SYSTOLIC BLOOD PRESSURE: 130 MMHG | HEIGHT: 66 IN | DIASTOLIC BLOOD PRESSURE: 80 MMHG | BODY MASS INDEX: 31.11 KG/M2 | HEART RATE: 60 BPM | TEMPERATURE: 98 F

## 2017-04-12 DIAGNOSIS — J01.41 ACUTE RECURRENT PANSINUSITIS: ICD-10-CM

## 2017-04-12 DIAGNOSIS — J42 CHRONIC BRONCHITIS, UNSPECIFIED CHRONIC BRONCHITIS TYPE: ICD-10-CM

## 2017-04-12 DIAGNOSIS — J42 CHRONIC BRONCHITIS, UNSPECIFIED CHRONIC BRONCHITIS TYPE: Primary | ICD-10-CM

## 2017-04-12 PROCEDURE — 71020 XR CHEST PA AND LATERAL: CPT | Mod: 26,,, | Performed by: RADIOLOGY

## 2017-04-12 PROCEDURE — 3075F SYST BP GE 130 - 139MM HG: CPT | Mod: S$GLB,,, | Performed by: INTERNAL MEDICINE

## 2017-04-12 PROCEDURE — 1160F RVW MEDS BY RX/DR IN RCRD: CPT | Mod: S$GLB,,, | Performed by: INTERNAL MEDICINE

## 2017-04-12 PROCEDURE — 99999 PR PBB SHADOW E&M-EST. PATIENT-LVL III: CPT | Mod: PBBFAC,,, | Performed by: INTERNAL MEDICINE

## 2017-04-12 PROCEDURE — 71020 XR CHEST PA AND LATERAL: CPT | Mod: TC,PO

## 2017-04-12 PROCEDURE — 99214 OFFICE O/P EST MOD 30 MIN: CPT | Mod: S$GLB,,, | Performed by: INTERNAL MEDICINE

## 2017-04-12 PROCEDURE — 3079F DIAST BP 80-89 MM HG: CPT | Mod: S$GLB,,, | Performed by: INTERNAL MEDICINE

## 2017-04-12 PROCEDURE — 94640 AIRWAY INHALATION TREATMENT: CPT | Mod: S$GLB,,, | Performed by: INTERNAL MEDICINE

## 2017-04-12 RX ORDER — IPRATROPIUM BROMIDE AND ALBUTEROL SULFATE 2.5; .5 MG/3ML; MG/3ML
3 SOLUTION RESPIRATORY (INHALATION) EVERY 6 HOURS PRN
Qty: 3 VIAL | Refills: 3 | Status: SHIPPED | OUTPATIENT
Start: 2017-04-12 | End: 2023-09-26

## 2017-04-12 RX ORDER — IPRATROPIUM BROMIDE AND ALBUTEROL SULFATE 2.5; .5 MG/3ML; MG/3ML
3 SOLUTION RESPIRATORY (INHALATION)
Status: COMPLETED | OUTPATIENT
Start: 2017-04-12 | End: 2017-04-12

## 2017-04-12 RX ORDER — FLUTICASONE PROPIONATE 50 MCG
1 SPRAY, SUSPENSION (ML) NASAL DAILY
Qty: 1 BOTTLE | Refills: 0 | Status: SHIPPED | OUTPATIENT
Start: 2017-04-12 | End: 2017-04-17 | Stop reason: SDUPTHER

## 2017-04-12 RX ADMIN — IPRATROPIUM BROMIDE AND ALBUTEROL SULFATE 3 ML: 2.5; .5 SOLUTION RESPIRATORY (INHALATION) at 11:04

## 2017-04-12 NOTE — TELEPHONE ENCOUNTER
----- Message from Minal Toribio MD sent at 4/12/2017  3:23 PM CDT -----  Please let patient know that her xray did not show any evidence of pneumonia or fluid collection in the lungs.

## 2017-04-12 NOTE — PROGRESS NOTES
"Subjective:       Patient ID: Diana Montenegro is a 61 y.o. female.    Chief Complaint: URI and Cough    HPI     Patient is a 61 year old female here today for cough and congestion. Sx began 4 days ago with clear sputum now becoming darker. Says most of her sx are in the nasal sinuses, feels congested, voice sounds different, can't breathe well out of her nose. No fever/chills. Also complaining of PND, intermittently productive cough. Says that she always has a cough, smoked for a long time and her lungs always sounded like "rattling". No sob but having wheezing. No hemoptysis.     Review of Systems   Constitutional: Negative for chills, fatigue and fever.   HENT: Positive for congestion, postnasal drip, rhinorrhea and sinus pressure. Negative for ear pain and sore throat.    Eyes: Negative for itching and visual disturbance.   Respiratory: Positive for cough and wheezing. Negative for shortness of breath.    Cardiovascular: Negative for chest pain, palpitations and leg swelling.   Gastrointestinal: Negative for abdominal pain and nausea.   Genitourinary: Negative for dysuria.   Musculoskeletal: Negative for arthralgias and myalgias.   Skin: Negative for rash.   Neurological: Negative for weakness, light-headedness and headaches.       Objective:      Physical Exam   Constitutional: She is oriented to person, place, and time. She appears well-developed and well-nourished. No distress.   HENT:   Head: Normocephalic and atraumatic.   Right Ear: External ear normal.   Left Ear: External ear normal.     Posterior oropharyngeal erythema  No tonsillar exudate   Eyes: Conjunctivae and EOM are normal. Pupils are equal, round, and reactive to light.   Neck: Normal range of motion. Neck supple. No thyromegaly present.   Cardiovascular: Normal rate, regular rhythm, normal heart sounds and intact distal pulses.    No murmur heard.  Pulmonary/Chest: Effort normal. No respiratory distress. She has wheezes. She has no rales. "   Diffuse wheezing and rales bilateral bases     Musculoskeletal: She exhibits no edema.   Lymphadenopathy:     She has no cervical adenopathy.   Neurological: She is alert and oriented to person, place, and time.   Skin: Skin is warm and dry. She is not diaphoretic.   Nursing note and vitals reviewed.      Assessment:       1. Chronic bronchitis, unspecified chronic bronchitis type    2. Acute recurrent pansinusitis        Plan:       Suspect URI at this time  CXR today  Does not tolerate steroids secondary to palpitations   ENT referral for chronic sinus congestion  She smoked for a long time, wheezing on exam today  Check PFT testing  Rx for nebulizer with solution for home use  Rx for Flonase ordered

## 2017-04-13 ENCOUNTER — TELEPHONE (OUTPATIENT)
Dept: INTERNAL MEDICINE | Facility: CLINIC | Age: 62
End: 2017-04-13

## 2017-04-13 ENCOUNTER — HOSPITAL ENCOUNTER (OUTPATIENT)
Dept: PULMONOLOGY | Facility: CLINIC | Age: 62
Discharge: HOME | End: 2017-04-13
Payer: COMMERCIAL

## 2017-04-13 DIAGNOSIS — J42 CHRONIC BRONCHITIS, UNSPECIFIED CHRONIC BRONCHITIS TYPE: ICD-10-CM

## 2017-04-13 LAB
POST FEV1 FVC: 0.71
POST FEV1: 2.12
POST FVC: 2.97
PRE FEV1 FVC: 70
PRE FEV1: 2.01
PRE FVC: 2.86
PREDICTED FEV1 FVC: 80
PREDICTED FEV1: 2.5
PREDICTED FVC: 3.13

## 2017-04-13 PROCEDURE — 94729 DIFFUSING CAPACITY: CPT | Mod: S$GLB,,, | Performed by: INTERNAL MEDICINE

## 2017-04-13 PROCEDURE — 94060 EVALUATION OF WHEEZING: CPT | Mod: S$GLB,,, | Performed by: INTERNAL MEDICINE

## 2017-04-13 NOTE — TELEPHONE ENCOUNTER
----- Message from Nirmala Norwood sent at 4/13/2017  9:45 AM CDT -----  Contact: Renee/[facility]/[phone]  Pharmacy is calling to clarify an RX.  RX name:  albuterol-ipratropium 2.5mg-0.5mg/3mL (DUO-NEB) 0.5 mg-3 mg(2.5 mg base)/3 mL nebulizer solution  What do they need to clarify:  quantity of script  Comments:

## 2017-04-13 NOTE — TELEPHONE ENCOUNTER
Spoke to Renee in the pharmacy. I clarified that the quantity for the duo-neb rx should be dispensed for a 30 day supply.

## 2017-04-16 ENCOUNTER — PATIENT MESSAGE (OUTPATIENT)
Dept: CARDIOLOGY | Facility: CLINIC | Age: 62
End: 2017-04-16

## 2017-04-17 ENCOUNTER — OFFICE VISIT (OUTPATIENT)
Dept: OTOLARYNGOLOGY | Facility: CLINIC | Age: 62
End: 2017-04-17
Payer: COMMERCIAL

## 2017-04-17 ENCOUNTER — HOSPITAL ENCOUNTER (OUTPATIENT)
Dept: RADIOLOGY | Facility: HOSPITAL | Age: 62
Discharge: HOME | End: 2017-04-17
Attending: OTOLARYNGOLOGY
Payer: COMMERCIAL

## 2017-04-17 VITALS
WEIGHT: 174.38 LBS | HEART RATE: 62 BPM | BODY MASS INDEX: 29.05 KG/M2 | DIASTOLIC BLOOD PRESSURE: 72 MMHG | SYSTOLIC BLOOD PRESSURE: 131 MMHG | HEIGHT: 65 IN

## 2017-04-17 DIAGNOSIS — J01.40 SUBACUTE PANSINUSITIS: Primary | ICD-10-CM

## 2017-04-17 DIAGNOSIS — J30.89 NON-SEASONAL ALLERGIC RHINITIS, UNSPECIFIED ALLERGIC RHINITIS TRIGGER: ICD-10-CM

## 2017-04-17 DIAGNOSIS — J34.89 NASAL OBSTRUCTION: ICD-10-CM

## 2017-04-17 DIAGNOSIS — J01.40 SUBACUTE PANSINUSITIS: ICD-10-CM

## 2017-04-17 PROCEDURE — 70486 CT MAXILLOFACIAL W/O DYE: CPT | Mod: TC

## 2017-04-17 PROCEDURE — 99244 OFF/OP CNSLTJ NEW/EST MOD 40: CPT | Mod: S$GLB,,, | Performed by: OTOLARYNGOLOGY

## 2017-04-17 PROCEDURE — 3075F SYST BP GE 130 - 139MM HG: CPT | Mod: S$GLB,,, | Performed by: OTOLARYNGOLOGY

## 2017-04-17 PROCEDURE — 3078F DIAST BP <80 MM HG: CPT | Mod: S$GLB,,, | Performed by: OTOLARYNGOLOGY

## 2017-04-17 PROCEDURE — 99999 PR PBB SHADOW E&M-EST. PATIENT-LVL III: CPT | Mod: PBBFAC,,, | Performed by: OTOLARYNGOLOGY

## 2017-04-17 PROCEDURE — 1160F RVW MEDS BY RX/DR IN RCRD: CPT | Mod: S$GLB,,, | Performed by: OTOLARYNGOLOGY

## 2017-04-17 PROCEDURE — 70486 CT MAXILLOFACIAL W/O DYE: CPT | Mod: 26,,, | Performed by: RADIOLOGY

## 2017-04-17 RX ORDER — FLUTICASONE PROPIONATE 50 MCG
2 SPRAY, SUSPENSION (ML) NASAL DAILY
Qty: 1 BOTTLE | Refills: 0 | Status: SHIPPED | OUTPATIENT
Start: 2017-04-17 | End: 2017-10-13 | Stop reason: SDUPTHER

## 2017-04-17 RX ORDER — LEVOCETIRIZINE DIHYDROCHLORIDE 5 MG/1
5 TABLET, FILM COATED ORAL NIGHTLY
Qty: 30 TABLET | Refills: 11 | Status: SHIPPED | OUTPATIENT
Start: 2017-04-17 | End: 2017-11-10 | Stop reason: SDUPTHER

## 2017-04-17 RX ORDER — AMOXICILLIN AND CLAVULANATE POTASSIUM 875; 125 MG/1; MG/1
1 TABLET, FILM COATED ORAL 2 TIMES DAILY
Qty: 20 TABLET | Refills: 0 | Status: SHIPPED | OUTPATIENT
Start: 2017-04-17 | End: 2017-04-27

## 2017-04-17 NOTE — LETTER
April 17, 2017      Minal Toribio MD  [address] [facility] [address]           [facility]  [address], [address]  [facility] [address]  Phone: [phone]  Fax: [phone]          Patient: Diana Montenegro   MR Number: [id number]   YOB: 1955   Date of Visit: 4/17/2017       Dear Dr. Minal Toribio:    Thank you for referring Diana Montenegro to me for evaluation. Attached you will find relevant portions of my assessment and plan of care.    If you have questions, please do not hesitate to call me. I look forward to following Diana Montenegro along with you.    Sincerely,    Elin Walden MD    Enclosure  CC:  No Recipients    If you would like to receive this communication electronically, please contact [email or web address] or [phone] to request more information on [employer] Link access.    For providers and/or their staff who would like to refer a patient to [facility], please contact us through our one-stop-shop provider referral line, [facility], at [phone].    If you feel you have received this communication in error or would no longer like to receive these types of communications, please e-mail [email or web address]

## 2017-04-17 NOTE — PATIENT INSTRUCTIONS
Get Juan Med Sinus Rinse from the pharmacy.  Use it twice daily according to the instructions.  It will help to cut down the inflammation in your nose and sinuses.

## 2017-04-17 NOTE — PROGRESS NOTES
Chief Complaint   Patient presents with    Sinusitis    Sinus Problem   .    HPI:     Diana Montenegro is a 61 y.o. female who is referred by Dr. Toribio for evaluation of acute recurrent sinusitis. She describes difficulty breathing at night. There is bilateral nasal obstruction. She does use sinus rinses or nasal sprays- Flonase. She denies midface pain and pressure.  She admits to rhinorrhea and postnasal drip. There is not maxillary tooth pain. She  denies headaches.  She has not had sinus or nasal surgery. There is no history of sinonasal trauma. She has been on 3 rounds of antibiotics including Levaquin x2 and Medrol dose pack.         Past Medical History:   Diagnosis Date    Arthritis     Atrial fibrillation, unspecified     hx of a fib prior to stroke.    Chronic back pain     CVA (cerebral infarction) 7/16/14    Degenerative disc disease     cervical; lumbar    Diabetes mellitus type II     Hyperlipidemia     Hypertension     Hypothyroidism     Obesity     Stroke july 2014    Venous insufficiency (chronic) (peripheral)      Social History     Social History    Marital status: Single     Spouse name: N/A    Number of children: N/A    Years of education: N/A     Occupational History    MOS  [employer]     Social History Main Topics    Smoking status: Former Smoker     Packs/day: 1.00     Years: 30.00     Types: Cigarettes     Quit date: 7/16/2014    Smokeless tobacco: Never Used    Alcohol use 0.0 oz/week     0 Standard drinks or equivalent per week      Comment: rarely    Drug use: No      Comment: thc at young age    Sexual activity: Not Currently     Partners: Male     Other Topics Concern    Not on file     Social History Narrative     Past Surgical History:   Procedure Laterality Date    GASTRECTOMY      HERNIA REPAIR      TONSILLECTOMY      TUBAL LIGATION       Family History   Problem Relation Age of Onset    No Known Problems Mother      Heart disease Father     Diabetes Maternal Grandfather     Obesity Maternal Grandfather     No Known Problems Sister     No Known Problems Sister     No Known Problems Maternal Grandmother     Stroke Paternal Grandmother     No Known Problems Paternal Grandfather     Heart attack Neg Hx            Review of Systems  General: negative for chills, fever or weight loss  Psychological: negative for mood changes or depression  Ophthalmic: negative for blurry vision, photophobia or eye pain  ENT: see HPI  Respiratory: no cough, shortness of breath, or wheezing  Cardiovascular: no chest pain or dyspnea on exertion  Gastrointestinal: no abdominal pain, change in bowel habits, or black/ bloody stools  Musculoskeletal: negative for gait disturbance or muscular weakness  Neurological: no syncope or seizures; no ataxia  Dermatological: negative for puritis,  rash and jaundice  Hematologic/lymphatic: no easy bruising, no new lumps or bumps      Physical Exam:    Vitals:    04/17/17 1125   BP: 131/72   Pulse: 62       Constitutional: Well appearing / communicating without difficutly.  NAD.  Eyes: EOM I Bilaterally  Head/Face: Normocephalic.  Negative paranasal sinus pressure/tenderness.  Salivary glands WNL.  House Brackmann I Bilaterally.    Right Ear: Auricle normal appearance. External Auditory Canal within normal limits,TM w/o masses/lesions/perforations. TM mobility noted.   Left Ear: Auricle normal appearance. External Auditory Canal WNL,TM w/o masses/lesions/perforations. TM mobility noted.  Nose: No gross nasal septal deviation. Inferior Turbinates 3+ bilaterally. No septal perforation. No masses/lesions. External nasal skin appears normal without masses/lesions.  Oral Cavity: Gingiva/lips within normal limits.  Dentition/gingiva healthy appearing. Mucus membranes moist. Floor of mouth soft, no masses palpated. Oral Tongue mobile. Hard Palate appears normal.    Oropharynx: Base of tongue appears normal. No  masses/lesions noted. Tonsillar fossa/pharyngeal wall without lesions. Posterior oropharynx WNL.  Soft palate without masses. Midline uvula.   Neck/Lymphatic: No LAD I-VI bilaterally.  No thyromegaly.  No masses noted on exam.    Mirror laryngoscopy/nasopharyngoscopy: Active gag reflex.  Unable to perform.    Neuro/Psychiatric: AOx3.  Normal mood and affect.   Cardiovascular: Normal carotid pulses bilaterally, no increasing jugular venous distention noted at cervical region bilaterally.    Respiratory: Normal respiratory effort, no stridor, no retractions noted.            Assessment:    ICD-10-CM ICD-9-CM    1. Acute bacterial sinusitis J01.90 461.9     B96.89     2. Non-seasonal allergic rhinitis, unspecified allergic rhinitis trigger J30.89 477.8    3. Nasal obstruction J34.89 478.19      The primary encounter diagnosis was Acute bacterial sinusitis. Diagnoses of Non-seasonal allergic rhinitis, unspecified allergic rhinitis trigger and Nasal obstruction were also pertinent to this visit.      Plan:    At this point, the patient has been on multiple rounds of antibiotics including Azithromycin and levaquin without significant or sustained improvement in their symptoms.  I would recommend a CT of the sinuses for further evaluation.  If the scan shows persistent sinus disease, she will then need a longer course of antibiotics, likely three to four weeks of Augmentin.  If the scan does not show persistent infection, the patient's symptoms are likely due to underlying allergies and would then maximize allergy therapy, possibly considering skin testing if needed.  The patient understands this treatment plan, and will follow up CT  results when available.    Elin Walden MD

## 2017-04-17 NOTE — MR AVS SNAPSHOT
[facility] [place] [facility]  [address] [place] [address], [address]  [place] [address]  Phone: [phone]  Fax: [phone]                  Diana Montenegro   2017 11:20 AM   Office Visit    Description:  Female : 1955   Provider:  Elin Waldne MD   Department:  [place] - Otorhinolaryngology           Reason for Visit     Sinusitis     Sinus Problem           Diagnoses this Visit        Comments    Subacute pansinusitis    -  Primary     Non-seasonal allergic rhinitis, unspecified allergic rhinitis trigger         Nasal obstruction                To Do List           Future Appointments        Provider Department Dept Phone    2017 11:00 AM [facility] CT2 LIMIT 400 LBS [facility] [phone]    2017 10:40 AM Elin Walden MD [facility] [place] [facility] [phone]    5/3/2017 8:00 AM HOME MONITOR DEVICE CHECK, [facility] - Arrhythmia [phone]    5/3/2017 11:00 AM LAB, [facility] DAY [facility] [phone]    2017 2:00 PM Rad Johnston MD [place] - Internal Medicine [phone]      Goals (5 Years of Data)     None      Follow-Up and Disposition     Return in about 2 weeks (around 2017).    Follow-up and Disposition History       These Medications        Disp Refills Start End    levocetirizine (XYZAL) 5 MG tablet 30 tablet 11 2017    Take 1 tablet (5 mg total) by mouth every evening. - Oral    Pharmacy: [facility] [address] [place] [address] [place] - [address] Ph #: [phone]       amoxicillin-clavulanate 875-125mg (AUGMENTIN) 875-125 mg per tablet 20 tablet 0 2017    Take 1 tablet by mouth 2 (two) times daily. - Oral    Pharmacy: [facility] [address] [place] [address] [place] - [address] Ph #: [phone]       fluticasone (FLONASE) 50 mcg/actuation nasal spray 1 Bottle 0 2017     2 sprays by Each Nare route once daily. - Each Nare    Pharmacy: [facility]  [address] [address] Ph #: [phone]         [facility]     [facility] Nurse Care Line - 24/7 Assistance  Unless otherwise directed by your provider, please contact [facility], our nurse care line that is available for 24/7 assistance.     Registered nurses in the [facility] Center provide: appointment scheduling, clinical advisement, health education, and other advisory services.  Call: [phone] (toll free)               Medications           Message regarding Medications     Verify the changes and/or additions to your medication regime listed below are the same as discussed with your clinician today.  If any of these changes or additions are incorrect, please notify your healthcare provider.        START taking these NEW medications        Refills    levocetirizine (XYZAL) 5 MG tablet 11    Sig: Take 1 tablet (5 mg total) by mouth every evening.    Class: Normal    Route: Oral    amoxicillin-clavulanate 875-125mg (AUGMENTIN) 875-125 mg per tablet 0    Sig: Take 1 tablet by mouth 2 (two) times daily.    Class: Normal    Route: Oral      CHANGE how you are taking these medications     Start Taking Instead of    fluticasone (FLONASE) 50 mcg/actuation nasal spray fluticasone (FLONASE) 50 mcg/actuation nasal spray    Dosage:  2 sprays by Each Nare route once daily. Dosage:  1 spray by Each Nare route once daily.    Reason for Change:  Reorder            Verify that the below list of medications is an accurate representation of the medications you are currently taking.  If none reported, the list may be blank. If incorrect, please contact your healthcare provider. Carry this list with you in case of emergency.           Current Medications     albuterol 90 mcg/actuation inhaler Inhale 2 puffs into the lungs every 6 (six) hours as needed for Wheezing. Rescue    albuterol-ipratropium 2.5mg-0.5mg/3mL (DUO-NEB) 0.5 mg-3 mg(2.5 mg base)/3 mL nebulizer solution Take 3 mLs by  nebulization every 6 (six) hours as needed for Wheezing. Rescue    amoxicillin-clavulanate 875-125mg (AUGMENTIN) 875-125 mg per tablet Take 1 tablet by mouth 2 (two) times daily.    apixaban (ELIQUIS) 5 mg Tab Take 1 tablet (5 mg total) by mouth 2 (two) times daily.    aspirin (ECOTRIN) 81 MG EC tablet Take 1 tablet (81 mg total) by mouth once daily.    b complex vitamins tablet Take 1 tablet by mouth once daily.    blood pressure test kit-large Kit Use as directed    blood-glucose meter (ONETOUCH ULTRA SYSTEM KIT) kit Use as instructed    CALCIUM CITRATE/VITAMIN D3 (CALCIUM CITRATE + D ORAL) Take 2 tablets by mouth 2 (two) times daily.    cyanocobalamin, vitamin B-12, 500 mcg Subl Place 1 tablet under the tongue once daily.    fluticasone (FLONASE) 50 mcg/actuation nasal spray 2 sprays by Each Nare route once daily.    glimepiride (AMARYL) 4 MG tablet Take 1 tablet (4 mg total) by mouth 2 (two) times daily.    hydrocodone-homatropine 5-1.5 mg/5 ml (HYCODAN) 5-1.5 mg/5 mL Syrp Take 5 mLs by mouth nightly as needed (cough).    lancets (ONE TOUCH DELICA LANCETS) 33 gauge Misc 1 lancet by Misc.(Non-Drug; Combo Route) route 4 (four) times daily.    levocetirizine (XYZAL) 5 MG tablet Take 1 tablet (5 mg total) by mouth every evening.    levothyroxine (SYNTHROID) 125 MCG tablet Take 1 tablet (125 mcg total) by mouth once daily.    lorazepam (ATIVAN) 1 MG tablet Take 1 tablet (1 mg total) by mouth every 12 (twelve) hours as needed for Anxiety.    metformin (GLUCOPHAGE) 1000 MG tablet Take 1 tablet (1,000 mg total) by mouth 2 (two) times daily with meals.    multivitamin capsule Take 1 capsule by mouth once daily.    omeprazole (PRILOSEC) 40 MG capsule TAKE ONE CAPSULE BY MOUTH IN THE MORNING    ONETOUCH ULTRA TEST Strp USE ONE STRIP TO CHECK GLUCOSE 4 TIMES DAILY AS DIRECTED    rosuvastatin (CRESTOR) 40 MG Tab Take 1 tablet (40 mg total) by mouth once daily.    RUTIN/HESP/BIOFLAV/C/HERB#196 (BIOFLEX ORAL) Take 2 tablets by  "mouth once daily.    senna (SENNA) 8.6 mg tablet Take 2 tablets by mouth nightly as needed for Constipation.    temazepam (RESTORIL) 15 mg Cap Take 1 capsule (15 mg total) by mouth nightly as needed.    urea (CARMOL) 40 % Crea Apply topically 2 (two) times daily.           Clinical Reference Information           Your Vitals Were     BP Pulse Height Weight Last Period BMI    131/72 (BP Location: Left arm, Patient Position: Sitting, BP Method: Automatic) 62 5' 5" (1.651 m) 79.1 kg (174 lb 6.1 oz) (LMP Unknown) 29.02 kg/m2      Blood Pressure          Most Recent Value    BP  131/72      Allergies as of 4/17/2017     Medrol [Methylprednisolone]      Immunizations Administered on Date of Encounter - 4/17/2017     None      Orders Placed During Today's Visit     Future Labs/Procedures Expected by Expires    CT [employer] Sinuses without  4/17/2017 4/17/2018      Instructions    Get Juan Med Sinus Rinse from the pharmacy.  Use it twice daily according to the instructions.  It will help to cut down the inflammation in your nose and sinuses.       Language Assistance Services     ATTENTION: Language assistance services are available, free of charge. Please call [phone].      ATENCIÓN: Si hervela radha, tiene a hanson disposición servicios gratuitos de asistencia lingüística. Llame al [phone].     CATRINA Ý: N?u b?n nói Ti?ng Vi?t, có các d?ch v? h? tr? ngôn ng? mi?n phí dành cho b?n. G?i s? [phone].         [facility] Otorhinol[facility] complies with applicable Federal civil rights laws and does not discriminate on the basis of race, color, national origin, age, disability, or sex.        "

## 2017-04-18 ENCOUNTER — TELEPHONE (OUTPATIENT)
Dept: INTERNAL MEDICINE | Facility: CLINIC | Age: 62
End: 2017-04-18

## 2017-04-18 ENCOUNTER — TELEPHONE (OUTPATIENT)
Dept: OTOLARYNGOLOGY | Facility: CLINIC | Age: 62
End: 2017-04-18

## 2017-04-18 NOTE — TELEPHONE ENCOUNTER
----- Message from Elin Walden MD sent at 4/18/2017  7:58 AM CDT -----  Please notify patient that her CT scan shows an active sinus infection. Please complete the antibiotics I have prescribed and follow up in 2 weeks. I have left a message for her in my chart as well.

## 2017-04-18 NOTE — TELEPHONE ENCOUNTER
----- Message from Minal Toribio MD sent at 4/18/2017  8:16 AM CDT -----  Please let patient know that her lung function test was normal.

## 2017-05-01 ENCOUNTER — OFFICE VISIT (OUTPATIENT)
Dept: OTOLARYNGOLOGY | Facility: CLINIC | Age: 62
End: 2017-05-01
Payer: COMMERCIAL

## 2017-05-01 VITALS
DIASTOLIC BLOOD PRESSURE: 61 MMHG | BODY MASS INDEX: 33.37 KG/M2 | WEIGHT: 200.5 LBS | TEMPERATURE: 98 F | SYSTOLIC BLOOD PRESSURE: 125 MMHG | HEART RATE: 61 BPM

## 2017-05-01 DIAGNOSIS — J34.89 NASAL OBSTRUCTION: ICD-10-CM

## 2017-05-01 DIAGNOSIS — J30.89 NON-SEASONAL ALLERGIC RHINITIS, UNSPECIFIED ALLERGIC RHINITIS TRIGGER: ICD-10-CM

## 2017-05-01 DIAGNOSIS — J32.4 CHRONIC PANSINUSITIS: Primary | ICD-10-CM

## 2017-05-01 PROCEDURE — 3078F DIAST BP <80 MM HG: CPT | Mod: S$GLB,,, | Performed by: OTOLARYNGOLOGY

## 2017-05-01 PROCEDURE — 1160F RVW MEDS BY RX/DR IN RCRD: CPT | Mod: S$GLB,,, | Performed by: OTOLARYNGOLOGY

## 2017-05-01 PROCEDURE — 99999 PR PBB SHADOW E&M-EST. PATIENT-LVL III: CPT | Mod: PBBFAC,,, | Performed by: OTOLARYNGOLOGY

## 2017-05-01 PROCEDURE — 3074F SYST BP LT 130 MM HG: CPT | Mod: S$GLB,,, | Performed by: OTOLARYNGOLOGY

## 2017-05-01 PROCEDURE — 99214 OFFICE O/P EST MOD 30 MIN: CPT | Mod: S$GLB,,, | Performed by: OTOLARYNGOLOGY

## 2017-05-01 NOTE — MR AVS SNAPSHOT
[facility] Otorhinolaryngology  [address] [place] [address], [address]  [place] [address]  Phone: [phone]  Fax: [phone]                  Diana Montenegro   2017 11:40 AM   Office Visit    Description:  Female : 1955   Provider:  Elin Walden MD   Department:  [place] - Otorhinolaryngology           Reason for Visit     Follow-up           Diagnoses this Visit        Comments    Chronic pansinusitis    -  Primary     Non-seasonal allergic rhinitis, unspecified allergic rhinitis trigger         Nasal obstruction                To Do List           Future Appointments        Provider Department Dept Phone    5/3/2017 8:00 AM HOME MONITOR DEVICE CHECK, [facility] - Arrhythmia [phone]    5/3/2017 11:00 AM LAB, SAME DAY [facility] [phone]    2017 2:00 PM Rad Johnston MD [place] - Internal Medicine [phone]    2017 11:20 AM Elin Walden MD [facility] Otorhinolaryngology [phone]    2017 3:30 PM Kaushik Mae MD [facility] - Cardiology [phone]      Goals (5 Years of Data)     None      Follow-Up and Disposition     Return in about 4 weeks (around 2017).    Follow-up and Disposition History      [facility]     [facility] Nurse Care Line - 24/ Assistance  Unless otherwise directed by your provider, please contact [facility], our nurse care line that is available for / assistance.     Registered nurses in the [facility] provide: appointment scheduling, clinical advisement, health education, and other advisory services.  Call: [phone] (toll free)               Medications           Message regarding Medications     Verify the changes and/or additions to your medication regime listed below are the same as discussed with your clinician today.  If any of these changes or additions are incorrect, please notify your healthcare provider.             Verify that the below list  of medications is an accurate representation of the medications you are currently taking.  If none reported, the list may be blank. If incorrect, please contact your healthcare provider. Carry this list with you in case of emergency.           Current Medications     albuterol 90 mcg/actuation inhaler Inhale 2 puffs into the lungs every 6 (six) hours as needed for Wheezing. Rescue    albuterol-ipratropium 2.5mg-0.5mg/3mL (DUO-NEB) 0.5 mg-3 mg(2.5 mg base)/3 mL nebulizer solution Take 3 mLs by nebulization every 6 (six) hours as needed for Wheezing. Rescue    apixaban (ELIQUIS) 5 mg Tab Take 1 tablet (5 mg total) by mouth 2 (two) times daily.    aspirin (ECOTRIN) 81 MG EC tablet Take 1 tablet (81 mg total) by mouth once daily.    b complex vitamins tablet Take 1 tablet by mouth once daily.    blood pressure test kit-large Kit Use as directed    blood-glucose meter (ONETOUCH ULTRA SYSTEM KIT) kit Use as instructed    CALCIUM CITRATE/VITAMIN D3 (CALCIUM CITRATE + D ORAL) Take 2 tablets by mouth 2 (two) times daily.    cyanocobalamin, vitamin B-12, 500 mcg Subl Place 1 tablet under the tongue once daily.    fluticasone (FLONASE) 50 mcg/actuation nasal spray 2 sprays by Each Nare route once daily.    glimepiride (AMARYL) 4 MG tablet Take 1 tablet (4 mg total) by mouth 2 (two) times daily.    hydrocodone-homatropine 5-1.5 mg/5 ml (HYCODAN) 5-1.5 mg/5 mL Syrp Take 5 mLs by mouth nightly as needed (cough).    lancets (ONE TOUCH DELICA LANCETS) 33 gauge Misc 1 lancet by Misc.(Non-Drug; Combo Route) route 4 (four) times daily.    levocetirizine (XYZAL) 5 MG tablet Take 1 tablet (5 mg total) by mouth every evening.    levothyroxine (SYNTHROID) 125 MCG tablet Take 1 tablet (125 mcg total) by mouth once daily.    lorazepam (ATIVAN) 1 MG tablet Take 1 tablet (1 mg total) by mouth every 12 (twelve) hours as needed for Anxiety.    metformin (GLUCOPHAGE) 1000 MG tablet Take 1 tablet (1,000 mg total) by mouth 2 (two) times daily with  meals.    multivitamin capsule Take 1 capsule by mouth once daily.    omeprazole (PRILOSEC) 40 MG capsule TAKE ONE CAPSULE BY MOUTH IN THE MORNING    ONETOUCH ULTRA TEST Strp USE ONE STRIP TO CHECK GLUCOSE 4 TIMES DAILY AS DIRECTED    rosuvastatin (CRESTOR) 40 MG Tab Take 1 tablet (40 mg total) by mouth once daily.    RUTIN/HESP/BIOFLAV/C/HERB#196 (BIOFLEX ORAL) Take 2 tablets by mouth once daily.    senna (SENNA) 8.6 mg tablet Take 2 tablets by mouth nightly as needed for Constipation.    temazepam (RESTORIL) 15 mg Cap Take 1 capsule (15 mg total) by mouth nightly as needed.    urea (CARMOL) 40 % Crea Apply topically 2 (two) times daily.           Clinical Reference Information           Your Vitals Were     BP Pulse Temp Weight Last Period BMI    125/61 (BP Location: Right arm, Patient Position: Sitting) 61 98.1 °F (36.7 °C) 90.9 kg (200 lb 8.1 oz) (LMP Unknown) 33.37 kg/m2      Blood Pressure          Most Recent Value    BP  125/61      Allergies as of 5/1/2017     Medrol [Methylprednisolone]      Immunizations Administered on Date of Encounter - 5/1/2017     None      Instructions    Get Juan Med Sinus Rinse from the pharmacy.  Use it twice daily according to the instructions.  It will help to cut down the inflammation in your nose and sinuses.       Language Assistance Services     ATTENTION: Language assistance services are available, free of charge. Please call [phone].      ATENCIÓN: Si habla radha, tiene a hanson disposición servicios gratuitos de asistencia lingüística. Llame al [phone].     [facility] Ý: N?u b?n nói Ti?ng Vi?t, có các d?ch v? h? tr? ngôn ng? mi?n phí dành cho b?n. G?i s? [phone].         [facility] - Otorhinolaryngology complies with applicable Federal civil rights laws and does not discriminate on the basis of race, color, national origin, age, disability, or sex.

## 2017-05-02 ENCOUNTER — PATIENT MESSAGE (OUTPATIENT)
Dept: OTOLARYNGOLOGY | Facility: CLINIC | Age: 62
End: 2017-05-02

## 2017-05-03 ENCOUNTER — LAB VISIT (OUTPATIENT)
Dept: LAB | Facility: HOSPITAL | Age: 62
End: 2017-05-03
Attending: INTERNAL MEDICINE
Payer: COMMERCIAL

## 2017-05-03 DIAGNOSIS — E11.40 TYPE 2 DIABETES MELLITUS WITH DIABETIC NEUROPATHY, WITHOUT LONG-TERM CURRENT USE OF INSULIN: ICD-10-CM

## 2017-05-03 DIAGNOSIS — E78.5 HYPERLIPIDEMIA, UNSPECIFIED HYPERLIPIDEMIA TYPE: ICD-10-CM

## 2017-05-03 LAB
ALBUMIN SERPL BCP-MCNC: 3.6 G/DL
ALP SERPL-CCNC: 86 U/L
ALT SERPL W/O P-5'-P-CCNC: 42 U/L
ANION GAP SERPL CALC-SCNC: 9 MMOL/L
AST SERPL-CCNC: 30 U/L
BILIRUB SERPL-MCNC: 0.4 MG/DL
BUN SERPL-MCNC: 34 MG/DL
CALCIUM SERPL-MCNC: 10 MG/DL
CHLORIDE SERPL-SCNC: 106 MMOL/L
CHOLEST/HDLC SERPL: 2.9 {RATIO}
CO2 SERPL-SCNC: 26 MMOL/L
CREAT SERPL-MCNC: 0.9 MG/DL
EST. GFR  (AFRICAN AMERICAN): >60 ML/MIN/1.73 M^2
EST. GFR  (NON AFRICAN AMERICAN): >60 ML/MIN/1.73 M^2
ESTIMATED AVG GLUCOSE: 140 MG/DL
GLUCOSE SERPL-MCNC: 80 MG/DL
HBA1C MFR BLD HPLC: 6.5 %
HDL/CHOLESTEROL RATIO: 34.5 %
HDLC SERPL-MCNC: 168 MG/DL
HDLC SERPL-MCNC: 58 MG/DL
LDLC SERPL CALC-MCNC: 99 MG/DL
NONHDLC SERPL-MCNC: 110 MG/DL
POTASSIUM SERPL-SCNC: 4.1 MMOL/L
PROT SERPL-MCNC: 7.4 G/DL
SODIUM SERPL-SCNC: 141 MMOL/L
TRIGL SERPL-MCNC: 55 MG/DL

## 2017-05-03 PROCEDURE — 80061 LIPID PANEL: CPT

## 2017-05-03 PROCEDURE — 83036 HEMOGLOBIN GLYCOSYLATED A1C: CPT

## 2017-05-03 PROCEDURE — 36415 COLL VENOUS BLD VENIPUNCTURE: CPT

## 2017-05-03 PROCEDURE — 80053 COMPREHEN METABOLIC PANEL: CPT

## 2017-05-11 ENCOUNTER — OFFICE VISIT (OUTPATIENT)
Dept: INTERNAL MEDICINE | Facility: CLINIC | Age: 62
End: 2017-05-11
Payer: COMMERCIAL

## 2017-05-11 VITALS
TEMPERATURE: 99 F | HEIGHT: 66 IN | SYSTOLIC BLOOD PRESSURE: 120 MMHG | WEIGHT: 196 LBS | DIASTOLIC BLOOD PRESSURE: 62 MMHG | BODY MASS INDEX: 31.5 KG/M2

## 2017-05-11 DIAGNOSIS — E78.5 HYPERLIPIDEMIA, UNSPECIFIED HYPERLIPIDEMIA TYPE: ICD-10-CM

## 2017-05-11 DIAGNOSIS — Z98.84 S/P LAPAROSCOPIC SLEEVE GASTRECTOMY: ICD-10-CM

## 2017-05-11 DIAGNOSIS — I48.0 PAROXYSMAL ATRIAL FIBRILLATION: Chronic | ICD-10-CM

## 2017-05-11 DIAGNOSIS — E03.9 HYPOTHYROIDISM, UNSPECIFIED TYPE: ICD-10-CM

## 2017-05-11 DIAGNOSIS — I10 ESSENTIAL HYPERTENSION: Chronic | ICD-10-CM

## 2017-05-11 DIAGNOSIS — E11.40 TYPE 2 DIABETES MELLITUS WITH DIABETIC NEUROPATHY, WITHOUT LONG-TERM CURRENT USE OF INSULIN: Primary | ICD-10-CM

## 2017-05-11 DIAGNOSIS — J32.9 CHRONIC SINUSITIS, UNSPECIFIED LOCATION: ICD-10-CM

## 2017-05-11 DIAGNOSIS — Z79.01 CURRENT USE OF LONG TERM ANTICOAGULATION: ICD-10-CM

## 2017-05-11 PROCEDURE — 3078F DIAST BP <80 MM HG: CPT | Mod: S$GLB,,, | Performed by: INTERNAL MEDICINE

## 2017-05-11 PROCEDURE — 99999 PR PBB SHADOW E&M-EST. PATIENT-LVL III: CPT | Mod: PBBFAC,,, | Performed by: INTERNAL MEDICINE

## 2017-05-11 PROCEDURE — 3060F POS MICROALBUMINURIA REV: CPT | Mod: 8P,S$GLB,, | Performed by: INTERNAL MEDICINE

## 2017-05-11 PROCEDURE — 3044F HG A1C LEVEL LT 7.0%: CPT | Mod: S$GLB,,, | Performed by: INTERNAL MEDICINE

## 2017-05-11 PROCEDURE — 1160F RVW MEDS BY RX/DR IN RCRD: CPT | Mod: S$GLB,,, | Performed by: INTERNAL MEDICINE

## 2017-05-11 PROCEDURE — 99214 OFFICE O/P EST MOD 30 MIN: CPT | Mod: S$GLB,,, | Performed by: INTERNAL MEDICINE

## 2017-05-11 PROCEDURE — 3074F SYST BP LT 130 MM HG: CPT | Mod: S$GLB,,, | Performed by: INTERNAL MEDICINE

## 2017-05-11 RX ORDER — METFORMIN HYDROCHLORIDE 1000 MG/1
1000 TABLET ORAL 2 TIMES DAILY WITH MEALS
Qty: 60 TABLET | Refills: 11 | Status: SHIPPED | OUTPATIENT
Start: 2017-05-11 | End: 2017-11-21 | Stop reason: SDUPTHER

## 2017-05-11 RX ORDER — ALBUTEROL SULFATE 90 UG/1
2 AEROSOL, METERED RESPIRATORY (INHALATION) EVERY 6 HOURS PRN
Qty: 18 G | Refills: 2 | Status: SHIPPED | OUTPATIENT
Start: 2017-05-11 | End: 2018-11-21 | Stop reason: SDUPTHER

## 2017-05-11 RX ORDER — LEVOTHYROXINE SODIUM 125 UG/1
125 TABLET ORAL DAILY
Qty: 30 TABLET | Refills: 11 | Status: SHIPPED | OUTPATIENT
Start: 2017-05-11 | End: 2017-11-21 | Stop reason: SDUPTHER

## 2017-05-11 RX ORDER — GLIMEPIRIDE 4 MG/1
4 TABLET ORAL 2 TIMES DAILY
Qty: 60 TABLET | Refills: 11 | Status: SHIPPED | OUTPATIENT
Start: 2017-05-11 | End: 2017-11-21 | Stop reason: SDUPTHER

## 2017-05-11 RX ORDER — OMEPRAZOLE 40 MG/1
40 CAPSULE, DELAYED RELEASE ORAL EVERY MORNING
Qty: 30 CAPSULE | Refills: 6 | Status: SHIPPED | OUTPATIENT
Start: 2017-05-11 | End: 2017-11-21 | Stop reason: SDUPTHER

## 2017-05-20 NOTE — PROGRESS NOTES
Subjective:       Patient ID: Diana Montenegro is a 61 y.o. female.    Chief Complaint: Follow-up (4 month follow up)    HPI   The patient presents for follow-up of medical conditions.  She has type 2 diabetes mellitus, hypertension, hyperlipidemia, paroxysmal atrial fibrillation, hypothyroidism, chronic peripheral venous insufficiency, chronic back pain, arthritis.  She remains on long-term anticoagulation therapy.  She is now feeling better.  Bronchitis symptoms have been present for several months.  The symptoms have finally improved.  She is seeing an ENT specialist for management of chronic sinusitis.  She has chronic sinus headaches.  She has not been able to use her CPAP machine due to sinusitis and bronchitis symptoms.  She has been experiencing some anxiety at times.  Her blood sugar levels have ranged from  fasting.  She is currently using glimepiride and metformin for management of her blood sugar.  She rarely notes hypoglycemic blood sugar values.  Her vision is stable.  No lower extremity paresthesias are noted.  Review of Systems   Constitutional: Negative for activity change, appetite change and unexpected weight change.   HENT: Positive for congestion, postnasal drip and sinus pressure.    Eyes: Negative for visual disturbance.   Respiratory: Positive for cough. Negative for shortness of breath and wheezing.    Cardiovascular: Negative for chest pain, palpitations and leg swelling.   Gastrointestinal: Negative for abdominal pain, blood in stool and diarrhea.   Genitourinary: Negative for dysuria, frequency, hematuria and urgency.   Musculoskeletal: Positive for arthralgias and back pain. Negative for joint swelling and neck pain.   Neurological: Negative for weakness, numbness and headaches.   Psychiatric/Behavioral: Negative for sleep disturbance.       Objective:      Physical Exam   Constitutional: She is oriented to person, place, and time. She appears well-developed and  well-nourished. No distress.   The patient's weight has remained stable since 10/24/16.   HENT:   Head: Normocephalic and atraumatic.   No frontal sinus tenderness is present at this time.   Eyes: Conjunctivae and EOM are normal. Pupils are equal, round, and reactive to light. No scleral icterus.   Neck: Normal range of motion. Neck supple. No JVD present. No thyromegaly present.   Cardiovascular: Normal rate, regular rhythm, normal heart sounds and intact distal pulses.  Exam reveals no gallop and no friction rub.    No murmur heard.  Pulmonary/Chest: Effort normal and breath sounds normal. No respiratory distress. She has no wheezes. She has no rales.   Abdominal: Soft. Bowel sounds are normal. She exhibits no mass. There is no tenderness.   Musculoskeletal: Normal range of motion. She exhibits no edema or tenderness.   Lymphadenopathy:     She has no cervical adenopathy.   Neurological: She is alert and oriented to person, place, and time. No cranial nerve deficit.   Sensory exam is intact in both feet on monofilament and vibration testing.   Skin: Skin is warm and dry. No rash noted.   No foot lesions are present.   Psychiatric: She has a normal mood and affect. Her behavior is normal.   Nursing note and vitals reviewed.      Results for orders placed or performed in visit on 05/03/17   Comprehensive metabolic panel   Result Value Ref Range    Sodium 141 136 - 145 mmol/L    Potassium 4.1 3.5 - 5.1 mmol/L    Chloride 106 95 - 110 mmol/L    CO2 26 23 - 29 mmol/L    Glucose 80 70 - 110 mg/dL    BUN, Bld 34 (H) 6 - 20 mg/dL    Creatinine 0.9 0.5 - 1.4 mg/dL    Calcium 10.0 8.7 - 10.5 mg/dL    Total Protein 7.4 6.0 - 8.4 g/dL    Albumin 3.6 3.5 - 5.2 g/dL    Total Bilirubin 0.4 0.1 - 1.0 mg/dL    Alkaline Phosphatase 86 55 - 135 U/L    AST 30 10 - 40 U/L    ALT 42 10 - 44 U/L    Anion Gap 9 8 - 16 mmol/L    eGFR if African American >60.0 >60 mL/min/1.73 m^2    eGFR if non African American >60.0 >60 mL/min/1.73 m^2    Lipid panel   Result Value Ref Range    Cholesterol 168 120 - 199 mg/dL    Triglycerides 55 30 - 150 mg/dL    HDL 58 40 - 75 mg/dL    LDL Cholesterol 99.0 63.0 - 159.0 mg/dL    HDL/Chol Ratio 34.5 20.0 - 50.0 %    Total Cholesterol/HDL Ratio 2.9 2.0 - 5.0    Non-HDL Cholesterol 110 mg/dL   Hemoglobin A1c   Result Value Ref Range    Hemoglobin A1C 6.5 (H) 4.5 - 6.2 %    Estimated Avg Glucose 140 (H) 68 - 131 mg/dL       Assessment:       1. Type 2 diabetes mellitus with diabetic neuropathy, without long-term current use of insulin    2. S/P laparoscopic sleeve gastrectomy    3. Essential hypertension    4. Paroxysmal atrial fibrillation    5. Hyperlipidemia, unspecified hyperlipidemia type    6. Hypothyroidism, unspecified type    7. Current use of long term anticoagulation    8. Chronic sinusitis, unspecified location        Plan:           Diana was seen today for follow-up.  Fasting blood tests will be repeated in 4 months along with a follow-up visit.  Current medication will be continued.    Diagnoses and all orders for this visit:    Type 2 diabetes mellitus with diabetic neuropathy, without long-term current use of insulin  -     Hemoglobin A1c; Future    S/P laparoscopic sleeve gastrectomy  -     omeprazole (PRILOSEC) 40 MG capsule; Take 1 capsule (40 mg total) by mouth every morning.    Essential hypertension  -     CBC auto differential; Future    Paroxysmal atrial fibrillation    Hyperlipidemia, unspecified hyperlipidemia type  -     Comprehensive metabolic panel; Future  -     Lipid panel; Future    Hypothyroidism, unspecified type    Current use of long term anticoagulation    Chronic sinusitis, unspecified location    Other orders  -     albuterol 90 mcg/actuation inhaler; Inhale 2 puffs into the lungs every 6 (six) hours as needed for Wheezing. Rescue  -     glimepiride (AMARYL) 4 MG tablet; Take 1 tablet (4 mg total) by mouth 2 (two) times daily.  -     levothyroxine (SYNTHROID) 125 MCG tablet;  Take 1 tablet (125 mcg total) by mouth once daily.  -     metformin (GLUCOPHAGE) 1000 MG tablet; Take 1 tablet (1,000 mg total) by mouth 2 (two) times daily with meals.  -     apixaban (ELIQUIS) 5 mg Tab; Take 1 tablet (5 mg total) by mouth 2 (two) times daily.

## 2017-05-30 ENCOUNTER — OFFICE VISIT (OUTPATIENT)
Dept: OTOLARYNGOLOGY | Facility: CLINIC | Age: 62
End: 2017-05-30
Payer: COMMERCIAL

## 2017-05-30 VITALS
BODY MASS INDEX: 32.24 KG/M2 | WEIGHT: 200.63 LBS | DIASTOLIC BLOOD PRESSURE: 74 MMHG | HEART RATE: 56 BPM | SYSTOLIC BLOOD PRESSURE: 121 MMHG | HEIGHT: 66 IN

## 2017-05-30 DIAGNOSIS — J30.89 NON-SEASONAL ALLERGIC RHINITIS, UNSPECIFIED ALLERGIC RHINITIS TRIGGER: ICD-10-CM

## 2017-05-30 DIAGNOSIS — J32.4 CHRONIC PANSINUSITIS: Primary | ICD-10-CM

## 2017-05-30 DIAGNOSIS — J34.89 NASAL OBSTRUCTION: ICD-10-CM

## 2017-05-30 PROCEDURE — 99999 PR PBB SHADOW E&M-EST. PATIENT-LVL III: CPT | Mod: PBBFAC,,, | Performed by: OTOLARYNGOLOGY

## 2017-05-30 PROCEDURE — 99213 OFFICE O/P EST LOW 20 MIN: CPT | Mod: S$GLB,,, | Performed by: OTOLARYNGOLOGY

## 2017-05-30 NOTE — PROGRESS NOTES
Chief Complaint   Patient presents with    Follow-up   .     HPI: Diana Montenegro is a 61 y.o. female who is referred by Dr. Toribio for evaluation of acute recurrent sinusitis. She describes difficulty breathing at night. There is bilateral nasal obstruction. She does use sinus rinses or nasal sprays- Flonase. She denies midface pain and pressure. She admits to rhinorrhea and postnasal drip. There is not maxillary tooth pain. She denies headaches. She has not had sinus or nasal surgery. There is no history of sinonasal trauma. She has been on 3 rounds of antibiotics including Levaquin x2 and Medrol dose pack.     Interval History: She feels she has improved with her current medication regimen since last visit. She denies maxillary facial pain and tenderness. She reports headaches in the frontal region.  She has been using flonase and xyzal intermittently. She has noted that she has had a mild productive cough that is improved with her duo-neb.      Past medical history, surgical history, family history, social history reviewed and unchanged from last visit.         Review of Systems  General: negative for chills, fever or weight loss  Psychological: negative for mood changes or depression  Ophthalmic: negative for blurry vision, photophobia or eye pain  ENT: see HPI  Respiratory: no cough, shortness of breath, or wheezing  Cardiovascular: no chest pain or dyspnea on exertion  Gastrointestinal: no abdominal pain, change in bowel habits, or black/ bloody stools  Musculoskeletal: negative for gait disturbance or muscular weakness  Neurological: no syncope or seizures; no ataxia  Dermatological: negative for puritis,  rash and jaundice  Hematologic/lymphatic: no easy bruising, no new lumps or bumps      Physical Exam:    Vitals:    05/30/17 1137   BP: 121/74   Pulse: (!) 56       Constitutional: Well appearing / communicating without difficutly.  NAD.  Eyes: EOM I Bilaterally  Head/Face: Normocephalic.  Negative  paranasal sinus pressure/tenderness.  Salivary glands WNL.  House Brackmann I Bilaterally.    Right Ear: Auricle normal appearance. External Auditory Canal within normal limits no lesions or masses,TM w/o masses/lesions/perforations. TM mobility noted.   Left Ear: Auricle normal appearance. External Auditory Canal within normal limits no lesions or masses,TM w/o masses/lesions/perforations. TM mobility noted.  Nose: No gross nasal septal deviation. Inferior Turbinates 3+ bilaterally. No septal perforation. No masses/lesions. External nasal skin appears normal without masses/lesions.  Oral Cavity: Gingiva/lips within normal limits.  Dentition/gingiva healthy appearing. Mucus membranes moist. Floor of mouth soft, no masses palpated. Oral Tongue mobile. Hard Palate appears normal.    Oropharynx: Base of tongue appears normal. No masses/lesions noted. Tonsillar fossa/pharyngeal wall without lesions. Posterior oropharynx WNL.  Soft palate without masses. Midline uvula.   Neck/Lymphatic: No LAD I-VI bilaterally.  No thyromegaly.  No masses noted on exam.    Mirror laryngoscopy/nasopharyngoscopy: Active gag reflex.  Unable to perform.    Neuro/Psychiatric: AOx3.  Normal mood and affect.   Cardiovascular: Normal carotid pulses bilaterally, no increasing jugular venous distention noted at cervical region bilaterally.    Respiratory: Normal respiratory effort, no stridor, no retractions noted.      CT scan findings were reviewed by me personally and discussed in detail with the patient.   Findings:  Compared with CT brain and MRI studies dating back 2014.  New sinusitis change with partial opacification ethmoid sinuses particularly posteriorly left.  New mucosal hypertrophy, air-fluid levels both sphenoid sinuses, new air-fluid level right maxillary sinus, hypertrophy maxillary sinus mucosa, appears in the interim.      Assessment:    ICD-10-CM ICD-9-CM    1. Chronic pansinusitis J32.4 473.8    2. Non-seasonal allergic  rhinitis, unspecified allergic rhinitis trigger J30.89 477.8    3. Nasal obstruction J34.89 478.19      The primary encounter diagnosis was Chronic pansinusitis. Diagnoses of Non-seasonal allergic rhinitis, unspecified allergic rhinitis trigger and Nasal obstruction were also pertinent to this visit.      Plan:  Overall, she has had a great response to medical management. I have asked her to continue her Flonase and Xyzal. We will continue to monitor periodically with semi-annual visits. She is aware that she should notify me sooner if problems.      Elin Walden MD

## 2017-06-12 ENCOUNTER — OFFICE VISIT (OUTPATIENT)
Dept: CARDIOLOGY | Facility: CLINIC | Age: 62
End: 2017-06-12
Payer: COMMERCIAL

## 2017-06-12 VITALS
WEIGHT: 198.88 LBS | HEART RATE: 54 BPM | HEIGHT: 66 IN | BODY MASS INDEX: 31.96 KG/M2 | DIASTOLIC BLOOD PRESSURE: 60 MMHG | SYSTOLIC BLOOD PRESSURE: 127 MMHG

## 2017-06-12 DIAGNOSIS — I63.239 SYMPTOMATIC CAROTID ARTERY STENOSIS WITH INFARCTION: ICD-10-CM

## 2017-06-12 DIAGNOSIS — E78.5 HYPERLIPIDEMIA, UNSPECIFIED HYPERLIPIDEMIA TYPE: ICD-10-CM

## 2017-06-12 DIAGNOSIS — I87.2 VENOUS INSUFFICIENCY (CHRONIC) (PERIPHERAL): ICD-10-CM

## 2017-06-12 DIAGNOSIS — I10 ESSENTIAL HYPERTENSION: Primary | Chronic | ICD-10-CM

## 2017-06-12 DIAGNOSIS — I77.9 BILATERAL CAROTID ARTERY DISEASE: ICD-10-CM

## 2017-06-12 DIAGNOSIS — I48.0 PAROXYSMAL ATRIAL FIBRILLATION: Chronic | ICD-10-CM

## 2017-06-12 DIAGNOSIS — E11.40 TYPE 2 DIABETES MELLITUS WITH DIABETIC NEUROPATHY, WITHOUT LONG-TERM CURRENT USE OF INSULIN: ICD-10-CM

## 2017-06-12 PROCEDURE — 3044F HG A1C LEVEL LT 7.0%: CPT | Mod: S$GLB,,, | Performed by: NURSE PRACTITIONER

## 2017-06-12 PROCEDURE — 99999 PR PBB SHADOW E&M-EST. PATIENT-LVL III: CPT | Mod: PBBFAC,,, | Performed by: NURSE PRACTITIONER

## 2017-06-12 PROCEDURE — 99214 OFFICE O/P EST MOD 30 MIN: CPT | Mod: S$GLB,,, | Performed by: NURSE PRACTITIONER

## 2017-06-12 NOTE — PROGRESS NOTES
Ms. Montenegro is a patient of Dr. Mae and was last seen in [facility] 5/12/2015.      Subjective:   Patient ID:  Diana Montenegro is a 61 y.o. female who presents for follow-up of Tachycardia  .   HPI:    Ms. Montenegro is a 60yo female with a PMHx of HTN, HLD, DM II, CVA (2104), paroxysmal atrial fibrillation (has ILR), bilateral carotid artery disease, and former smoker (quit 2014). Today she is recovering from bronchitis, which started in February. She had gastric sleeve surgery on 7/2015 and since the surgery has been off HTN meds and off insulin. She is down around 60lbs since last visit to the cardiology clinic. She is currently taking eliquis 5mg BID and ASA 81mg. She denies bleeding episodes. She is also on rosuvastatin 40mg. LDL is 99 on 5/3/2017. Prior to having bronchitis, she would go to the gym and use the elliptical for 30 minutes with no limitations in activity tolerance. Ms. Montenegro denies chest pain with exertion or at rest, palpitations, KING, dizziness, syncope, leg edema, claudication, PND, or orthopnea.     Recent Cardiac Tests:    EKG (4/11/2017):  Normal sinus rhythm  Left axis deviation    Carotid U/S (4/5/2017):  RIGHT SIDE:   60 - 79% right ICA stenosis.   Heterogeneous plaque in the right internal carotid artery.   No calcification on the right common carotid artery.   Antegrade flow in the right vertebral artery.   LEFT SIDE:  1 - 39% left ICA stenosis.   Heterogeneous plaque in the left internal carotid artery.   No calcification on the left common carotid artery.   Antegrade flow in the left vertebral artery.     Current Outpatient Prescriptions   Medication Sig    albuterol 90 mcg/actuation inhaler Inhale 2 puffs into the lungs every 6 (six) hours as needed for Wheezing. Rescue    albuterol-ipratropium 2.5mg-0.5mg/3mL (DUO-NEB) 0.5 mg-3 mg(2.5 mg base)/3 mL nebulizer solution Take 3 mLs by nebulization every 6 (six) hours as needed for Wheezing. Rescue    apixaban (ELIQUIS) 5 mg  Tab Take 1 tablet (5 mg total) by mouth 2 (two) times daily.    aspirin (ECOTRIN) 81 MG EC tablet Take 1 tablet (81 mg total) by mouth once daily.    b complex vitamins tablet Take 1 tablet by mouth once daily.    blood pressure test kit-large Kit Use as directed    blood-glucose meter (ONETOUCH ULTRA SYSTEM KIT) kit Use as instructed    CALCIUM CITRATE/VITAMIN D3 (CALCIUM CITRATE + D ORAL) Take 2 tablets by mouth 2 (two) times daily.    cyanocobalamin, vitamin B-12, 500 mcg Subl Place 1 tablet under the tongue once daily.    fluticasone (FLONASE) 50 mcg/actuation nasal spray 2 sprays by Each Nare route once daily.    glimepiride (AMARYL) 4 MG tablet Take 1 tablet (4 mg total) by mouth 2 (two) times daily.    lancets (ONE TOUCH DELICA LANCETS) 33 gauge Misc 1 lancet by Misc.(Non-Drug; Combo Route) route 4 (four) times daily.    levocetirizine (XYZAL) 5 MG tablet Take 1 tablet (5 mg total) by mouth every evening.    levothyroxine (SYNTHROID) 125 MCG tablet Take 1 tablet (125 mcg total) by mouth once daily.    lorazepam (ATIVAN) 1 MG tablet Take 1 tablet (1 mg total) by mouth every 12 (twelve) hours as needed for Anxiety.    metformin (GLUCOPHAGE) 1000 MG tablet Take 1 tablet (1,000 mg total) by mouth 2 (two) times daily with meals.    multivitamin capsule Take 1 capsule by mouth once daily.    omeprazole (PRILOSEC) 40 MG capsule Take 1 capsule (40 mg total) by mouth every morning.    ONETOUCH ULTRA TEST Strp USE ONE STRIP TO CHECK GLUCOSE 4 TIMES DAILY AS DIRECTED    rosuvastatin (CRESTOR) 40 MG Tab Take 1 tablet (40 mg total) by mouth once daily.    RUTIN/HESP/BIOFLAV/C/HERB#196 (BIOFLEX ORAL) Take 2 tablets by mouth once daily.    senna (SENNA) 8.6 mg tablet Take 2 tablets by mouth nightly as needed for Constipation.    urea (CARMOL) 40 % Crea Apply topically 2 (two) times daily.     No current facility-administered medications for this visit.      Review of Systems   Constitution: Negative for  "malaise/fatigue.   HENT: Negative for headaches.    Eyes: Negative for blurred vision.   Cardiovascular: Negative for chest pain, claudication, dyspnea on exertion, irregular heartbeat, leg swelling, orthopnea, palpitations, paroxysmal nocturnal dyspnea and syncope.   Respiratory: Positive for cough (bronchitis) and shortness of breath.    Hematologic/Lymphatic: Negative for bleeding problem.   Skin: Negative for rash.   Musculoskeletal: Positive for back pain. Negative for myalgias.        Uses walker for back spasms   Gastrointestinal: Negative for abdominal pain, constipation, diarrhea and nausea.   Genitourinary: Negative for hematuria.   Neurological: Negative for loss of balance and numbness.   Psychiatric/Behavioral: Negative for altered mental status.   Allergic/Immunologic: Negative for persistent infections.     Objective:     Right Arm BP - Sittin/60 (17 1604)  Left Arm BP - Sittin/60 (17 1604)    /60 (BP Location: Left arm, Patient Position: Sitting, BP Method: Automatic)   Pulse (!) 54   Ht 5' 6" (1.676 m)   Wt 90.2 kg (198 lb 13.7 oz)   LMP  (LMP Unknown)   BMI 32.10 kg/m²     Physical Exam   Constitutional: She is oriented to person, place, and time. She appears well-developed and well-nourished.   HENT:   Head: Normocephalic.   Nose: Nose normal.   Eyes: Pupils are equal, round, and reactive to light.   Neck: No JVD present. Carotid bruit is not present.   Cardiovascular: Regular rhythm, S1 normal and S2 normal.  Bradycardia present.  Exam reveals no gallop.    No murmur heard.  Pulses:       Carotid pulses are 2+ on the right side, and 2+ on the left side.       Radial pulses are 2+ on the right side, and 2+ on the left side.        Dorsalis pedis pulses are 2+ on the right side, and 2+ on the left side.   Pulmonary/Chest: No respiratory distress. She has wheezes.   Abdominal: Soft. Bowel sounds are normal. She exhibits no distension. There is no tenderness. "   Musculoskeletal: Normal range of motion. She exhibits no edema.   Neurological: She is alert and oriented to person, place, and time.   Skin: Skin is warm and dry. No erythema.   LLE stasis changes noted   Psychiatric: She has a normal mood and affect. Her speech is normal and behavior is normal.   Nursing note and vitals reviewed.    Lab Results   Component Value Date     05/03/2017    K 4.1 05/03/2017    MG 1.9 04/08/2016     05/03/2017    CO2 26 05/03/2017    BUN 34 (H) 05/03/2017    CREATININE 0.9 05/03/2017    GLU 80 05/03/2017    HGBA1C 6.5 (H) 05/03/2017    AST 30 05/03/2017    ALT 42 05/03/2017    ALBUMIN 3.6 05/03/2017    PROT 7.4 05/03/2017    BILITOT 0.4 05/03/2017    WBC 7.14 03/14/2017    HGB 12.5 03/14/2017    HCT 37.6 03/14/2017    MCV 89 03/14/2017     (L) 03/14/2017    TSH 1.121 04/07/2016    CHOL 168 05/03/2017    HDL 58 05/03/2017    LDLCALC 99.0 05/03/2017    TRIG 55 05/03/2017              Test(s) Reviewed  I have reviewed the following in detail:  [] Stress test   [] Angiography   [] Echocardiogram   [x] Labs   [] Other:         Assessment:         1. Essential hypertension. Controlled.      2. Hyperlipidemia, unspecified hyperlipidemia type. LDL 99 on high intensity statin.     3. Paroxysmal atrial fibrillation. Anticoagulated on eliquis. Loop recorder in place. Managed by EP.     4. Bilateral carotid artery disease. Ultrasound in 4/2017 demonstrates 60 - 79% right ICA stenosis and  1 - 39% left ICA stenosis.  On ASA and statin.     5. Type 2 diabetes mellitus with diabetic neuropathy, without long-term current use of insulin. No longer using insulin. HA1C 6.5.     6. Venous insufficiency (chronic) (peripheral).      7. Symptomatic carotid artery stenosis with infarction. CVA 2014. On ASA and statin.     Plan:     Diana was seen today for tachycardia.    Diagnoses and all orders for this visit:    Essential hypertension    Hyperlipidemia, unspecified hyperlipidemia  type    Paroxysmal atrial fibrillation    Bilateral carotid artery disease    Type 2 diabetes mellitus with diabetic neuropathy, without long-term current use of insulin    Venous insufficiency (chronic) (peripheral)    Symptomatic carotid artery stenosis with infarction      Continue current medications.  Patient has been encouraged to exercise a minimum of 30 minutes daily, 5 times per week.   Patient advised to consume a low salt, heart healthy diet. Mediterranean diet recommended.  Return to clinic in one year.    Return in about 1 year (around 6/12/2018).    ------------------------------------------------------------------    CHELITA Fuentes, NP-C  Consult Cardiology

## 2017-06-12 NOTE — PATIENT INSTRUCTIONS
Continue current medications.  Patient has been encouraged to exercise a minimum of 30 minutes daily, 5 times per week.   Patient advised to consume a low salt, heart healthy diet. Mediterranean diet recommended.  Return to clinic in one year.

## 2017-07-10 ENCOUNTER — OFFICE VISIT (OUTPATIENT)
Dept: INTERNAL MEDICINE | Facility: CLINIC | Age: 62
End: 2017-07-10
Payer: COMMERCIAL

## 2017-07-10 ENCOUNTER — OFFICE VISIT (OUTPATIENT)
Dept: PULMONOLOGY | Facility: CLINIC | Age: 62
End: 2017-07-10
Payer: COMMERCIAL

## 2017-07-10 ENCOUNTER — PATIENT MESSAGE (OUTPATIENT)
Dept: INTERNAL MEDICINE | Facility: CLINIC | Age: 62
End: 2017-07-10

## 2017-07-10 ENCOUNTER — HOSPITAL ENCOUNTER (OUTPATIENT)
Dept: RADIOLOGY | Facility: HOSPITAL | Age: 62
Discharge: HOME | End: 2017-07-10
Attending: INTERNAL MEDICINE
Payer: COMMERCIAL

## 2017-07-10 VITALS
SYSTOLIC BLOOD PRESSURE: 138 MMHG | OXYGEN SATURATION: 96 % | BODY MASS INDEX: 33.06 KG/M2 | DIASTOLIC BLOOD PRESSURE: 78 MMHG | HEIGHT: 66 IN | HEART RATE: 54 BPM | WEIGHT: 205.69 LBS

## 2017-07-10 VITALS
WEIGHT: 205.69 LBS | SYSTOLIC BLOOD PRESSURE: 150 MMHG | HEART RATE: 60 BPM | RESPIRATION RATE: 6 BRPM | TEMPERATURE: 99 F | DIASTOLIC BLOOD PRESSURE: 68 MMHG | BODY MASS INDEX: 33.06 KG/M2 | HEIGHT: 66 IN

## 2017-07-10 DIAGNOSIS — J42 CHRONIC BRONCHITIS, UNSPECIFIED CHRONIC BRONCHITIS TYPE: Primary | ICD-10-CM

## 2017-07-10 DIAGNOSIS — J30.9 ALLERGIC SINUSITIS: ICD-10-CM

## 2017-07-10 DIAGNOSIS — R05.9 COUGH: Primary | ICD-10-CM

## 2017-07-10 DIAGNOSIS — R09.82 POST-NASAL DRIP: ICD-10-CM

## 2017-07-10 DIAGNOSIS — J42 CHRONIC BRONCHITIS, UNSPECIFIED CHRONIC BRONCHITIS TYPE: ICD-10-CM

## 2017-07-10 DIAGNOSIS — B00.9 HSV-1 (HERPES SIMPLEX VIRUS 1) INFECTION: ICD-10-CM

## 2017-07-10 PROCEDURE — 99999 PR PBB SHADOW E&M-EST. PATIENT-LVL IV: CPT | Mod: PBBFAC,,, | Performed by: INTERNAL MEDICINE

## 2017-07-10 PROCEDURE — 71020 XR CHEST PA AND LATERAL: CPT | Mod: TC,PO

## 2017-07-10 PROCEDURE — 99244 OFF/OP CNSLTJ NEW/EST MOD 40: CPT | Mod: S$GLB,,, | Performed by: INTERNAL MEDICINE

## 2017-07-10 PROCEDURE — 99999 PR PBB SHADOW E&M-EST. PATIENT-LVL III: CPT | Mod: PBBFAC,,, | Performed by: INTERNAL MEDICINE

## 2017-07-10 PROCEDURE — 99214 OFFICE O/P EST MOD 30 MIN: CPT | Mod: S$GLB,,, | Performed by: INTERNAL MEDICINE

## 2017-07-10 PROCEDURE — 71020 XR CHEST PA AND LATERAL: CPT | Mod: 26,,, | Performed by: RADIOLOGY

## 2017-07-10 RX ORDER — ACYCLOVIR 400 MG/1
400 TABLET ORAL 2 TIMES DAILY
Qty: 20 TABLET | Refills: 0 | Status: SHIPPED | OUTPATIENT
Start: 2017-07-10 | End: 2017-10-10

## 2017-07-10 RX ORDER — DOCUSATE SODIUM 100 MG/1
100 CAPSULE, LIQUID FILLED ORAL DAILY
COMMUNITY

## 2017-07-10 RX ORDER — CODEINE PHOSPHATE AND GUAIFENESIN 10; 100 MG/5ML; MG/5ML
5 SOLUTION ORAL 3 TIMES DAILY PRN
Qty: 236 ML | Refills: 0 | Status: SHIPPED | OUTPATIENT
Start: 2017-07-10 | End: 2017-07-20

## 2017-07-10 NOTE — LETTER
July 12, 2017      Lemuel Christiansen, DO  [address] [facility] [place] [address]           [facility]  [address]  [place] [address]  Phone: [phone]          Patient: Diana Montenegro   MR Number: [id number]   YOB: 1955   Date of Visit: 7/10/2017       Dear Dr. Lemuel Christiansen:    Thank you for referring Diana Montenegro to me for evaluation. Attached you will find relevant portions of my assessment and plan of care.    If you have questions, please do not hesitate to call me. I look forward to following Diana Montenegro along with you.    Sincerely,    Luz Bernal MD    Enclosure  CC:  No Recipients    If you would like to receive this communication electronically, please contact [email or web address] or [phone] to request more information on [facility] access.    For providers and/or their staff who would like to refer a patient to [facility], please contact us through our one-stop-shop provider referral line, [facility], at [phone].    If you feel you have received this communication in error or would no longer like to receive these types of communications, please e-mail [email or web address]

## 2017-07-10 NOTE — PROGRESS NOTES
Subjective:       Patient ID: Diana Montenegro is a 62 y.o. female.  Consult placed by Dr. Johnston  Reason for consult: Cough  Chief Complaint: Cough    62 year female with A. Fib who presents with cough since February. Patient states that she has had a non productive cough and multiple bouts of sinusitis and bronchitis since February. Patient is currently on flonase and albuterol nebs PRN. She also has GERD and is currently on omeprazole. Patient denies hemoptysis, weight loss, fever or chills.   Patient is a former smoker who quit a couple of years ago. (30 pack/years).  Patient had normal PFTs in April with some bronchodilator but not significant improvement.       Review of Systems   Constitutional: Negative for fever, activity change and fatigue.   HENT: Positive for postnasal drip, rhinorrhea and congestion.    Respiratory: Positive for apnea, cough and dyspnea on extertion. Negative for sputum production, shortness of breath and wheezing.    Cardiovascular: Negative for chest pain.   Endocrine: Negative for cold intolerance and heat intolerance.    Musculoskeletal: Negative for arthralgias and myalgias.   Skin: Negative for rash.   Gastrointestinal: Negative for nausea and vomiting.   Neurological: Negative for syncope and weakness.   Psychiatric/Behavioral: Negative for confusion. The patient is not nervous/anxious.        Past Medical History:   Diagnosis Date    Arthritis     Atrial fibrillation, unspecified     hx of a fib prior to stroke.    Chronic back pain     CVA (cerebral infarction) 7/16/14    Degenerative disc disease     cervical; lumbar    Diabetes mellitus type II     Hyperlipidemia     Hypertension     Hypothyroidism     Obesity     Stroke july 2014    Venous insufficiency (chronic) (peripheral)      Past Surgical History:   Procedure Laterality Date    GASTRECTOMY      HERNIA REPAIR      TONSILLECTOMY      TUBAL LIGATION       Social History     Social History    Marital  "status: Single     Spouse name: N/A    Number of children: N/A    Years of education: N/A     Occupational History    MOS  [email or web address]     Social History Main Topics    Smoking status: Former Smoker     Packs/day: 1.00     Years: 30.00     Types: Cigarettes     Quit date: 7/16/2014    Smokeless tobacco: Never Used    Alcohol use 0.0 oz/week      Comment: rarely    Drug use: No      Comment: thc at young age    Sexual activity: Not Currently     Partners: Male     Other Topics Concern    Not on file     Social History Narrative    No narrative on file     Family History   Problem Relation Age of Onset    No Known Problems Mother     Heart disease Father     Diabetes Maternal Grandfather     Obesity Maternal Grandfather     No Known Problems Sister     No Known Problems Sister     No Known Problems Maternal Grandmother     Stroke Paternal Grandmother     No Known Problems Paternal Grandfather     Heart attack Neg Hx      Review of patient's allergies indicates:   Allergen Reactions    Medrol [methylprednisolone] Palpitations         Objective:       Vitals:    07/10/17 1535   BP: 138/78   Pulse: (!) 54   SpO2: 96%   Weight: 93.3 kg (205 lb 11 oz)   Height: 5' 6" (1.676 m)   PainSc: 0-No pain       Physical Exam   Constitutional: She is oriented to person, place, and time. She appears well-developed and well-nourished. No distress.   HENT:   Head: Normocephalic.   Nose: Nose normal.   Neck: Normal range of motion. Neck supple.   Cardiovascular: Normal rate.    No murmur heard.  Pulmonary/Chest: Normal expansion, effort normal and breath sounds normal. She has no wheezes. She has no rhonchi.   Abdominal: Soft. Bowel sounds are normal.   Musculoskeletal: Normal range of motion. She exhibits no edema.   Neurological: She is alert and oriented to person, place, and time.   Skin: Skin is warm and dry. No rash noted. She is not diaphoretic. No erythema.   Psychiatric: She has a " normal mood and affect. Her behavior is normal.   Nursing note and vitals reviewed.    Personal Diagnostic Review  CT of chest performed on 7/11/2017 without contrast revealed small micronodules.  No flowsheet data found.      Assessment:       1. Cough        Outpatient Encounter Prescriptions as of 7/10/2017   Medication Sig Dispense Refill    acyclovir (ZOVIRAX) 400 MG tablet Take 1 tablet (400 mg total) by mouth 2 (two) times daily. 20 tablet 0    albuterol 90 mcg/actuation inhaler Inhale 2 puffs into the lungs every 6 (six) hours as needed for Wheezing. Rescue 18 g 2    albuterol-ipratropium 2.5mg-0.5mg/3mL (DUO-NEB) 0.5 mg-3 mg(2.5 mg base)/3 mL nebulizer solution Take 3 mLs by nebulization every 6 (six) hours as needed for Wheezing. Rescue 3 vial 3    apixaban (ELIQUIS) 5 mg Tab Take 1 tablet (5 mg total) by mouth 2 (two) times daily. 60 tablet 6    aspirin (ECOTRIN) 81 MG EC tablet Take 1 tablet (81 mg total) by mouth once daily.      b complex vitamins tablet Take 1 tablet by mouth once daily.      blood pressure test kit-large Kit Use as directed 1 each 0    blood-glucose meter (ONETOUCH ULTRA SYSTEM KIT) kit Use as instructed 1 each 0    CALCIUM CITRATE/VITAMIN D3 (CALCIUM CITRATE + D ORAL) Take 2 tablets by mouth 2 (two) times daily.      cyanocobalamin, vitamin B-12, 500 mcg Subl Place 1 tablet under the tongue once daily.      docusate sodium (COLACE) 100 MG capsule Take 100 mg by mouth 2 (two) times daily.      fluticasone (FLONASE) 50 mcg/actuation nasal spray 2 sprays by Each Nare route once daily. 1 Bottle 0    glimepiride (AMARYL) 4 MG tablet Take 1 tablet (4 mg total) by mouth 2 (two) times daily. 60 tablet 11    lancets (ONE TOUCH DELICA LANCETS) 33 gauge Misc 1 lancet by Misc.(Non-Drug; Combo Route) route 4 (four) times daily. 150 each 8    levocetirizine (XYZAL) 5 MG tablet Take 1 tablet (5 mg total) by mouth every evening. 30 tablet 11    levothyroxine (SYNTHROID) 125 MCG  tablet Take 1 tablet (125 mcg total) by mouth once daily. 30 tablet 11    metformin (GLUCOPHAGE) 1000 MG tablet Take 1 tablet (1,000 mg total) by mouth 2 (two) times daily with meals. 60 tablet 11    multivitamin capsule Take 1 capsule by mouth once daily.      omeprazole (PRILOSEC) 40 MG capsule Take 1 capsule (40 mg total) by mouth every morning. 30 capsule 6    ONETOUCH ULTRA TEST Strp USE ONE STRIP TO CHECK GLUCOSE 4 TIMES DAILY AS DIRECTED 150 strip 0    rosuvastatin (CRESTOR) 40 MG Tab Take 1 tablet (40 mg total) by mouth once daily. 30 tablet 11    RUTIN/HESP/BIOFLAV/C/HERB#196 (BIOFLEX ORAL) Take 2 tablets by mouth once daily.      senna (SENNA) 8.6 mg tablet Take 2 tablets by mouth nightly as needed for Constipation. 100 tablet 3    urea (CARMOL) 40 % Crea Apply topically 2 (two) times daily. 85 g 2    guaifenesin-codeine 100-10 mg/5 ml (CHERATUSSIN AC)  mg/5 mL syrup Take 5 mLs by mouth 3 (three) times daily as needed for Cough or Congestion. 236 mL 0    lorazepam (ATIVAN) 1 MG tablet Take 1 tablet (1 mg total) by mouth every 12 (twelve) hours as needed for Anxiety. 60 tablet 2     No facility-administered encounter medications on file as of 7/10/2017.      Orders Placed This Encounter   Procedures    CT Chest Without Contrast     Standing Status:   Future     Number of Occurrences:   1     Standing Expiration Date:   7/10/2018     Order Specific Question:   May the Radiologist modify the order per protocol to meet the clinical needs of the patient?     Answer:   Yes     Plan:       62 year old female with     Cough- Patient has repeated issues with post nasal drip/sinusitis. I believe this is the most likely cause of patient's cough. She may have some reactive airways disease secondary to her sinus issues. CT scan was unrevealing and PFTs were essentially normal.   -Continue sinus treatment  -Albuterol PRN  -ENT evaluation of sinuses recommended.     Thank you for allowing me to  participate in Ms. Montenegro's care.    Follow up PRN.     Luz Bernal MD

## 2017-07-10 NOTE — PROGRESS NOTES
Subjective:       Patient ID: Diana Montenegro is a 62 y.o. female.    Chief Complaint: Cough    HPI   Pt here for evaluation of recurrent dry cough since February. Pt has had multiple rounds of Abx and steroids which helped temporarily. Associated symptoms of sinus congestion, post nasal drip. Some relief with Xyzal, Mucinex and Flonase.   Review of Systems   Constitutional: Negative for activity change, appetite change, chills, diaphoresis, fatigue, fever and unexpected weight change.   HENT: Positive for congestion, postnasal drip and sinus pressure. Negative for sneezing, sore throat, trouble swallowing and voice change.    Respiratory: Positive for cough. Negative for choking, shortness of breath and wheezing.    Cardiovascular: Negative for chest pain, palpitations and leg swelling.   Gastrointestinal: Negative for abdominal pain, blood in stool, constipation, diarrhea, nausea and vomiting.   Genitourinary: Negative for dysuria.   Musculoskeletal: Negative for arthralgias and myalgias.   Skin: Negative for rash and wound.   Allergic/Immunologic: Negative for environmental allergies and food allergies.   Hematological: Negative for adenopathy. Does not bruise/bleed easily.       Objective:      Physical Exam   Constitutional: She is oriented to person, place, and time. She appears well-developed and well-nourished. No distress.   HENT:   Head: Normocephalic and atraumatic.   Right Ear: External ear normal.   Left Ear: External ear normal.   Nose: Mucosal edema and rhinorrhea present.   Mouth/Throat: Oropharynx is clear and moist. No oropharyngeal exudate.   Eyes: Conjunctivae and EOM are normal. Pupils are equal, round, and reactive to light. Right eye exhibits no discharge. Left eye exhibits no discharge. No scleral icterus.   Neck: Neck supple. No JVD present.   Cardiovascular: Normal rate, regular rhythm, normal heart sounds and intact distal pulses.    Pulmonary/Chest: Effort normal and breath sounds  normal. No respiratory distress. She has no wheezes. She has no rales.   Abdominal: Soft. Bowel sounds are normal. There is no tenderness.   Musculoskeletal: She exhibits no edema.   Lymphadenopathy:     She has no cervical adenopathy.   Neurological: She is alert and oriented to person, place, and time.   Skin: Skin is warm and dry. No rash noted. She is not diaphoretic. No pallor.       Assessment:       1. Chronic bronchitis, unspecified chronic bronchitis type    2. Allergic sinusitis    3. Post-nasal drip    4. HSV-1 (herpes simplex virus 1) infection        Plan:    1. CXR, referral to Pulm        Rx Cheratussin AC TID PRN   2/3. Continue Xyzal/Flonase   4. Rx Acyclovir

## 2017-07-11 ENCOUNTER — HOSPITAL ENCOUNTER (OUTPATIENT)
Dept: RADIOLOGY | Facility: HOSPITAL | Age: 62
Discharge: HOME | End: 2017-07-11
Attending: INTERNAL MEDICINE
Payer: COMMERCIAL

## 2017-07-11 DIAGNOSIS — R05.9 COUGH: ICD-10-CM

## 2017-07-11 PROCEDURE — 71250 CT THORAX DX C-: CPT | Mod: 26,,, | Performed by: RADIOLOGY

## 2017-07-11 PROCEDURE — 71250 CT THORAX DX C-: CPT | Mod: TC

## 2017-07-11 RX ORDER — ACYCLOVIR 50 MG/G
OINTMENT TOPICAL
Qty: 5 G | Refills: 1 | Status: SHIPPED | OUTPATIENT
Start: 2017-07-11 | End: 2020-05-26 | Stop reason: SDUPTHER

## 2017-07-12 ENCOUNTER — CLINICAL SUPPORT (OUTPATIENT)
Dept: ELECTROPHYSIOLOGY | Facility: CLINIC | Age: 62
End: 2017-07-12
Payer: COMMERCIAL

## 2017-07-12 DIAGNOSIS — I63.9 CRYPTOGENIC STROKE: ICD-10-CM

## 2017-07-12 DIAGNOSIS — Z95.818 STATUS POST PLACEMENT OF IMPLANTABLE LOOP RECORDER: ICD-10-CM

## 2017-07-12 PROCEDURE — 93299 LOOP RECORDER REMOTE: CPT | Mod: S$GLB,,, | Performed by: INTERNAL MEDICINE

## 2017-07-12 PROCEDURE — 93297 REM INTERROG DEV EVAL ICPMS: CPT | Mod: S$GLB,,, | Performed by: INTERNAL MEDICINE

## 2017-07-22 ENCOUNTER — PATIENT MESSAGE (OUTPATIENT)
Dept: INTERNAL MEDICINE | Facility: CLINIC | Age: 62
End: 2017-07-22

## 2017-08-16 RX ORDER — BLOOD SUGAR DIAGNOSTIC
STRIP MISCELLANEOUS
Qty: 150 STRIP | Refills: 4 | Status: SHIPPED | OUTPATIENT
Start: 2017-08-16 | End: 2019-09-05

## 2017-08-28 ENCOUNTER — OFFICE VISIT (OUTPATIENT)
Dept: BARIATRICS | Facility: CLINIC | Age: 62
End: 2017-08-28
Payer: COMMERCIAL

## 2017-08-28 ENCOUNTER — DOCUMENTATION ONLY (OUTPATIENT)
Dept: BARIATRICS | Facility: CLINIC | Age: 62
End: 2017-08-28

## 2017-08-28 ENCOUNTER — LAB VISIT (OUTPATIENT)
Dept: LAB | Facility: HOSPITAL | Age: 62
End: 2017-08-28
Attending: PHYSICIAN ASSISTANT
Payer: COMMERCIAL

## 2017-08-28 VITALS
WEIGHT: 207 LBS | BODY MASS INDEX: 33.27 KG/M2 | DIASTOLIC BLOOD PRESSURE: 62 MMHG | HEART RATE: 66 BPM | SYSTOLIC BLOOD PRESSURE: 120 MMHG | HEIGHT: 66 IN

## 2017-08-28 DIAGNOSIS — E66.9 OBESITY, CLASS I, BMI 30-34.9: ICD-10-CM

## 2017-08-28 DIAGNOSIS — E11.40 TYPE 2 DIABETES MELLITUS WITH DIABETIC NEUROPATHY, WITHOUT LONG-TERM CURRENT USE OF INSULIN: ICD-10-CM

## 2017-08-28 DIAGNOSIS — E66.9 OBESITY (BMI 30-39.9): Primary | ICD-10-CM

## 2017-08-28 DIAGNOSIS — E78.5 HYPERLIPIDEMIA, UNSPECIFIED HYPERLIPIDEMIA TYPE: ICD-10-CM

## 2017-08-28 DIAGNOSIS — I48.0 PAROXYSMAL ATRIAL FIBRILLATION: Chronic | ICD-10-CM

## 2017-08-28 DIAGNOSIS — E66.9 OBESITY (BMI 30-39.9): ICD-10-CM

## 2017-08-28 LAB
25(OH)D3+25(OH)D2 SERPL-MCNC: 45 NG/ML
IRON SERPL-MCNC: 70 UG/DL
SATURATED IRON: 17 %
TOTAL IRON BINDING CAPACITY: 410 UG/DL
TRANSFERRIN SERPL-MCNC: 277 MG/DL
VIT B12 SERPL-MCNC: >2000 PG/ML

## 2017-08-28 PROCEDURE — 3074F SYST BP LT 130 MM HG: CPT | Mod: S$GLB,,, | Performed by: PHYSICIAN ASSISTANT

## 2017-08-28 PROCEDURE — 3044F HG A1C LEVEL LT 7.0%: CPT | Mod: S$GLB,,, | Performed by: PHYSICIAN ASSISTANT

## 2017-08-28 PROCEDURE — 82306 VITAMIN D 25 HYDROXY: CPT

## 2017-08-28 PROCEDURE — 3078F DIAST BP <80 MM HG: CPT | Mod: S$GLB,,, | Performed by: PHYSICIAN ASSISTANT

## 2017-08-28 PROCEDURE — 82607 VITAMIN B-12: CPT

## 2017-08-28 PROCEDURE — 84425 ASSAY OF VITAMIN B-1: CPT

## 2017-08-28 PROCEDURE — 99999 PR PBB SHADOW E&M-EST. PATIENT-LVL V: CPT | Mod: PBBFAC,,, | Performed by: PHYSICIAN ASSISTANT

## 2017-08-28 PROCEDURE — 99214 OFFICE O/P EST MOD 30 MIN: CPT | Mod: S$GLB,,, | Performed by: PHYSICIAN ASSISTANT

## 2017-08-28 PROCEDURE — 3008F BODY MASS INDEX DOCD: CPT | Mod: S$GLB,,, | Performed by: PHYSICIAN ASSISTANT

## 2017-08-28 PROCEDURE — 83540 ASSAY OF IRON: CPT

## 2017-08-28 NOTE — PROGRESS NOTES
BARIATRIC POST-OPERATIVE FOLLOW UP:    HISTORY OF PRESENT ILLNESS:  62 y.o. female  presents for a 2 year follow up s/p Sleeve Gastrectomy with Dr. Stacy on 7/23/15.  She is doing well and tolerating the diet without difficulty.  She has regained weight and is eating a lot of starch/sugar and her protein is low.  She is still off her blood pressure medications.  She is off all oral diabetes medications and only takes insulin as needed.  She has no complaints.  She has lost 58 lbs, approximately 55% of her excess weight.  She is still losing weight and wants to continue.    Denies: nausea, vomiting, abdominal pain, changes in bowel movement pattern, fever, chills, dysphagia, chest pain, and shortness of breath.    Review of Systems   Constitutional: Negative for chills, fever and malaise/fatigue.   Respiratory: Negative for cough, hemoptysis and shortness of breath.    Cardiovascular: Negative for chest pain, palpitations and leg swelling.   Gastrointestinal: Negative for abdominal pain, blood in stool, constipation, diarrhea, heartburn, melena, nausea and vomiting.   Genitourinary: Negative for dysuria and hematuria.   Skin: Negative for rash.   Neurological: Negative for weakness and headaches.   Psychiatric/Behavioral: Negative for depression.       EXERCISE & VITAMINS:  See Bariatric Assessment    MEDICATIONS/ALLERGIES:  Have been reviewed.    DIET:  Regular Bariatric Diet.  Diet Recall.  Br:  Yogurt & cereal & raisins(15 g), Juany:  Chicken salad with cucumber (15 g), Di:  Broccoli and meat (15 g), Sn: 2 shakes (60 g), ~90 grams daily protein.      Physical Exam   Constitutional: She is oriented to person, place, and time. She appears well-developed and well-nourished.   Ambulates with walker   HENT:   Head: Normocephalic and atraumatic.   Cardiovascular: Normal rate and regular rhythm.    Pulmonary/Chest: Effort normal and breath sounds normal.   Abdominal: Soft. Bowel sounds are normal. She exhibits no  distension and no mass. There is no tenderness. There is no rebound and no guarding. No hernia.   WHSS   Musculoskeletal: She exhibits no edema.   Neurological: She is alert and oriented to person, place, and time.   Skin: Skin is warm and dry. No rash noted. No erythema. No pallor.   Psychiatric: She has a normal mood and affect. Thought content normal.   Nursing note and vitals reviewed.      ASSESSMENT:  - Obesity s/p sleeve gastrectomy on 7/23/15.  - Co-morbidities: HTN (resolved off medications), HLD (stable on medication), DM2 (on reduced mediations), OA (stable).  - Good Weight loss, 45 lbs, 43% EWL  - Good Exercise regimen  - Good Vitamin Regimen  - Fair Diet, high sugar/starch, low protein  - Not at risk for fall or abuse    PLAN:  - Emphasized the importance of regular exercise and adherence to bariatric diet to achieve maximum weight loss.  - Encouraged patient to start regular exercise.  - Follow-up with dietician to reinforce diet.  - Continue daily vitamins and medications.  - Anti-Acid medication, Prilosec daily.  - Miralax daily for constipation, no fiber.  - No NSAIDs, Tylenol for pain.  - Can swallow whole pills.  - RTC in 1 year or sooner if needed.  - Call the office for any issues.  - Check labs today.    25 minute visit, over 50% of time spent counseling patient face to face on diet, exercise, and weight loss.

## 2017-08-28 NOTE — PROGRESS NOTES
Nutrition Note    Diet Recall:   B: turkey sausage + egg  Sn: quarter of a cup of fiber one cereal + raisins  L: chicken salad or shrimp salad + greens  Sn: vegetables  D: grilled chicken or steak or porkchop + spinach  Sn: prunes before bed    Patient reports difficulties with constipation and has been increasing her fiber intake. Discussed strategies to increase fiber intake without starch. Provided patient with snack ideas and meal options. Patient to call office for further questions. Patient verbalized understanding.

## 2017-08-28 NOTE — PATIENT INSTRUCTIONS
- Stick with current diet, switch from raisins to some berries  - get in fiber other than high sugar fruits and starches

## 2017-08-29 ENCOUNTER — CLINICAL SUPPORT (OUTPATIENT)
Dept: ELECTROPHYSIOLOGY | Facility: CLINIC | Age: 62
End: 2017-08-29
Payer: COMMERCIAL

## 2017-08-29 ENCOUNTER — TELEPHONE (OUTPATIENT)
Dept: ELECTROPHYSIOLOGY | Facility: CLINIC | Age: 62
End: 2017-08-29

## 2017-08-29 DIAGNOSIS — Z95.818 STATUS POST PLACEMENT OF IMPLANTABLE LOOP RECORDER: ICD-10-CM

## 2017-08-29 DIAGNOSIS — I63.9 CRYPTOGENIC STROKE: ICD-10-CM

## 2017-08-29 PROCEDURE — 93297 REM INTERROG DEV EVAL ICPMS: CPT | Mod: S$GLB,,, | Performed by: INTERNAL MEDICINE

## 2017-08-29 PROCEDURE — 93299 LOOP RECORDER REMOTE: CPT | Mod: S$GLB,,, | Performed by: INTERNAL MEDICINE

## 2017-08-29 NOTE — TELEPHONE ENCOUNTER
Called pt to send ILR transmission, pt stated she was out of town recently. Pt attempted to send transmission but kept getting an error message, we attempted to trouble shoot unsuccessfully. Pt states this is always happening. Directed pt to [facility] hotline for assistance.

## 2017-08-30 LAB — VIT B1 SERPL-MCNC: 93 UG/L (ref 38–122)

## 2017-10-02 ENCOUNTER — CLINICAL SUPPORT (OUTPATIENT)
Dept: ELECTROPHYSIOLOGY | Facility: CLINIC | Age: 62
End: 2017-10-02
Payer: COMMERCIAL

## 2017-10-02 DIAGNOSIS — Z95.818 STATUS POST PLACEMENT OF IMPLANTABLE LOOP RECORDER: ICD-10-CM

## 2017-10-02 DIAGNOSIS — I63.9 CRYPTOGENIC STROKE: ICD-10-CM

## 2017-10-02 PROCEDURE — 93298 REM INTERROG DEV EVAL SCRMS: CPT | Mod: S$GLB,,, | Performed by: INTERNAL MEDICINE

## 2017-10-02 PROCEDURE — 93299 LOOP RECORDER REMOTE: CPT | Mod: S$GLB,,, | Performed by: INTERNAL MEDICINE

## 2017-10-03 ENCOUNTER — LAB VISIT (OUTPATIENT)
Dept: LAB | Facility: HOSPITAL | Age: 62
End: 2017-10-03
Attending: INTERNAL MEDICINE
Payer: COMMERCIAL

## 2017-10-03 ENCOUNTER — DOCUMENTATION ONLY (OUTPATIENT)
Dept: ELECTROPHYSIOLOGY | Facility: CLINIC | Age: 62
End: 2017-10-03

## 2017-10-03 DIAGNOSIS — E11.40 TYPE 2 DIABETES MELLITUS WITH DIABETIC NEUROPATHY, WITHOUT LONG-TERM CURRENT USE OF INSULIN: ICD-10-CM

## 2017-10-03 DIAGNOSIS — E78.5 HYPERLIPIDEMIA, UNSPECIFIED HYPERLIPIDEMIA TYPE: ICD-10-CM

## 2017-10-03 DIAGNOSIS — I10 ESSENTIAL HYPERTENSION: Chronic | ICD-10-CM

## 2017-10-03 LAB
ALBUMIN SERPL BCP-MCNC: 3.4 G/DL
ALP SERPL-CCNC: 86 U/L
ALT SERPL W/O P-5'-P-CCNC: 28 U/L
ANION GAP SERPL CALC-SCNC: 8 MMOL/L
AST SERPL-CCNC: 26 U/L
BASOPHILS # BLD AUTO: 0.03 K/UL
BASOPHILS NFR BLD: 0.6 %
BILIRUB SERPL-MCNC: 0.3 MG/DL
BUN SERPL-MCNC: 37 MG/DL
CALCIUM SERPL-MCNC: 9.6 MG/DL
CHLORIDE SERPL-SCNC: 106 MMOL/L
CHOLEST SERPL-MCNC: 142 MG/DL
CHOLEST/HDLC SERPL: 2.7 {RATIO}
CO2 SERPL-SCNC: 27 MMOL/L
CREAT SERPL-MCNC: 0.9 MG/DL
DIFFERENTIAL METHOD: ABNORMAL
EOSINOPHIL # BLD AUTO: 0.1 K/UL
EOSINOPHIL NFR BLD: 2.1 %
ERYTHROCYTE [DISTWIDTH] IN BLOOD BY AUTOMATED COUNT: 13.5 %
EST. GFR  (AFRICAN AMERICAN): >60 ML/MIN/1.73 M^2
EST. GFR  (NON AFRICAN AMERICAN): >60 ML/MIN/1.73 M^2
ESTIMATED AVG GLUCOSE: 143 MG/DL
GLUCOSE SERPL-MCNC: 79 MG/DL
HBA1C MFR BLD HPLC: 6.6 %
HCT VFR BLD AUTO: 36.1 %
HDLC SERPL-MCNC: 53 MG/DL
HDLC SERPL: 37.3 %
HGB BLD-MCNC: 11.7 G/DL
LDLC SERPL CALC-MCNC: 78.8 MG/DL
LYMPHOCYTES # BLD AUTO: 1.2 K/UL
LYMPHOCYTES NFR BLD: 26.3 %
MCH RBC QN AUTO: 28.1 PG
MCHC RBC AUTO-ENTMCNC: 32.4 G/DL
MCV RBC AUTO: 87 FL
MONOCYTES # BLD AUTO: 0.3 K/UL
MONOCYTES NFR BLD: 6.1 %
NEUTROPHILS # BLD AUTO: 3.1 K/UL
NEUTROPHILS NFR BLD: 64.9 %
NONHDLC SERPL-MCNC: 89 MG/DL
PLATELET # BLD AUTO: 131 K/UL
PMV BLD AUTO: 9.7 FL
POTASSIUM SERPL-SCNC: 4.4 MMOL/L
PROT SERPL-MCNC: 7.1 G/DL
RBC # BLD AUTO: 4.17 M/UL
SODIUM SERPL-SCNC: 141 MMOL/L
TRIGL SERPL-MCNC: 51 MG/DL
WBC # BLD AUTO: 4.72 K/UL

## 2017-10-03 PROCEDURE — 85025 COMPLETE CBC W/AUTO DIFF WBC: CPT

## 2017-10-03 PROCEDURE — 83036 HEMOGLOBIN GLYCOSYLATED A1C: CPT

## 2017-10-03 PROCEDURE — 80053 COMPREHEN METABOLIC PANEL: CPT

## 2017-10-03 PROCEDURE — 36415 COLL VENOUS BLD VENIPUNCTURE: CPT

## 2017-10-03 PROCEDURE — 80061 LIPID PANEL: CPT

## 2017-10-03 NOTE — PROGRESS NOTES
FW: [facility] Event Report   Received: Today   Message Contents     Dory Thomas MA   10/3/17 7:47 AM          Could you please schedule patient to see either Dr. Randolph or Rosalie?  Thanks!     Dory    Previous Messages      ----- Message -----   From: Thomas Randolph MD   Sent: 10/2/2017   3:12 PM   To: Dory Murphy   Subject: RE: Reveal LINQ Event Report                     Please arrange for follow-up with myself or Rosalie.   Thanks   ----- Message -----   From: Dory Murphy   Sent: 10/2/2017   2:48 PM   To: Thomas Randolph MD, Katey Franklin   Subject: [facility]al [facility] Event Report                         Dr. Randolph,     ILR for Cryptogenic Stroke.     TACHY and AF AUTO trigger events, both c/w AF.  Longest 54 mins with median V rate 176 bpm (9/25/17).  Patient on Eliquis.  Hx of PAF last reported 7/2015 (max duration 50 mins).  Will place strips in your door to view.     Thanks,   Dory

## 2017-10-10 ENCOUNTER — LAB VISIT (OUTPATIENT)
Dept: LAB | Facility: HOSPITAL | Age: 62
End: 2017-10-10
Attending: INTERNAL MEDICINE
Payer: COMMERCIAL

## 2017-10-10 ENCOUNTER — OFFICE VISIT (OUTPATIENT)
Dept: INTERNAL MEDICINE | Facility: CLINIC | Age: 62
End: 2017-10-10
Payer: COMMERCIAL

## 2017-10-10 VITALS
HEIGHT: 67 IN | SYSTOLIC BLOOD PRESSURE: 110 MMHG | HEART RATE: 72 BPM | TEMPERATURE: 99 F | DIASTOLIC BLOOD PRESSURE: 68 MMHG | WEIGHT: 201.94 LBS | BODY MASS INDEX: 31.7 KG/M2

## 2017-10-10 DIAGNOSIS — M19.90 ARTHRITIS: ICD-10-CM

## 2017-10-10 DIAGNOSIS — E11.40 TYPE 2 DIABETES MELLITUS WITH DIABETIC NEUROPATHY, WITHOUT LONG-TERM CURRENT USE OF INSULIN: ICD-10-CM

## 2017-10-10 DIAGNOSIS — G89.29 CHRONIC BILATERAL LOW BACK PAIN, WITH SCIATICA PRESENCE UNSPECIFIED: ICD-10-CM

## 2017-10-10 DIAGNOSIS — Z13.820 SCREENING FOR OSTEOPOROSIS: ICD-10-CM

## 2017-10-10 DIAGNOSIS — E03.9 HYPOTHYROIDISM, UNSPECIFIED TYPE: ICD-10-CM

## 2017-10-10 DIAGNOSIS — I63.239 SYMPTOMATIC CAROTID ARTERY STENOSIS WITH INFARCTION: ICD-10-CM

## 2017-10-10 DIAGNOSIS — E78.5 HYPERLIPIDEMIA, UNSPECIFIED HYPERLIPIDEMIA TYPE: ICD-10-CM

## 2017-10-10 DIAGNOSIS — Z79.01 CURRENT USE OF LONG TERM ANTICOAGULATION: ICD-10-CM

## 2017-10-10 DIAGNOSIS — I48.0 PAROXYSMAL ATRIAL FIBRILLATION: Chronic | ICD-10-CM

## 2017-10-10 DIAGNOSIS — E11.40 TYPE 2 DIABETES MELLITUS WITH DIABETIC NEUROPATHY, WITHOUT LONG-TERM CURRENT USE OF INSULIN: Primary | ICD-10-CM

## 2017-10-10 DIAGNOSIS — M54.5 CHRONIC BILATERAL LOW BACK PAIN, WITH SCIATICA PRESENCE UNSPECIFIED: ICD-10-CM

## 2017-10-10 LAB

## 2017-10-10 PROCEDURE — 82570 ASSAY OF URINE CREATININE: CPT

## 2017-10-10 PROCEDURE — 99214 OFFICE O/P EST MOD 30 MIN: CPT | Mod: 25,S$GLB,, | Performed by: INTERNAL MEDICINE

## 2017-10-10 PROCEDURE — 81003 URINALYSIS AUTO W/O SCOPE: CPT

## 2017-10-10 PROCEDURE — 90471 IMMUNIZATION ADMIN: CPT | Mod: S$GLB,,, | Performed by: INTERNAL MEDICINE

## 2017-10-10 PROCEDURE — 99999 PR PBB SHADOW E&M-EST. PATIENT-LVL III: CPT | Mod: PBBFAC,,, | Performed by: INTERNAL MEDICINE

## 2017-10-10 PROCEDURE — 90686 IIV4 VACC NO PRSV 0.5 ML IM: CPT | Mod: S$GLB,,, | Performed by: INTERNAL MEDICINE

## 2017-10-10 NOTE — PROGRESS NOTES
Subjective:       Patient ID: Diana Montenegro is a 62 y.o. female.    Chief Complaint: Follow-up    HPI   The patient presents for follow-up of diabetes and other medical conditions.  She does not check her blood sugars regularly.  She recently returned from a trip to [place].  She plans to travel to [place] soon.  With prolonged walking she will use her walker for assisted ambulation.  Her vision is stable.  Chronic lower back pain is exacerbated by prolonged standing or walking.  She does not experience any pain while supine or seated.  There is no bowel or bladder sphincter dysfunction noted.   Review of Systems   Constitutional: Negative for activity change, appetite change and unexpected weight change.   Eyes: Negative for visual disturbance.   Respiratory: Negative for shortness of breath.    Cardiovascular: Negative for chest pain, palpitations and leg swelling.   Gastrointestinal: Negative for abdominal pain, blood in stool and diarrhea.   Genitourinary: Negative for dysuria, frequency, hematuria and urgency.   Musculoskeletal: Positive for back pain.   Neurological: Positive for numbness (intermittent foot pains noted.). Negative for weakness and headaches.   Psychiatric/Behavioral: Negative for sleep disturbance.       Objective:      Physical Exam   Constitutional: She is oriented to person, place, and time. She appears well-developed and well-nourished. No distress.   HENT:   Head: Normocephalic and atraumatic.   Eyes: Conjunctivae and EOM are normal. Pupils are equal, round, and reactive to light. No scleral icterus.   Neck: Normal range of motion. Neck supple. No JVD present. No thyromegaly present.   Cardiovascular: Normal rate, regular rhythm, normal heart sounds and intact distal pulses.  Exam reveals no gallop and no friction rub.    No murmur heard.  Pulmonary/Chest: Effort normal and breath sounds normal. No respiratory distress. She has no wheezes. She has no rales.   Abdominal: Soft.  Bowel sounds are normal. She exhibits no mass. There is no tenderness. A hernia (lower ventral hernia noted.) is present.   Musculoskeletal: Normal range of motion. She exhibits no edema or tenderness.   Lymphadenopathy:     She has no cervical adenopathy.   Neurological: She is alert and oriented to person, place, and time. No cranial nerve deficit.   Sensory exam is intact in both feet on monofilament and vibration testing.   Skin: Skin is warm and dry. No rash noted.   No foot lesions are present.   Psychiatric: She has a normal mood and affect. Her behavior is normal.   Nursing note and vitals reviewed.      Results for orders placed or performed in visit on 10/03/17   CBC auto differential   Result Value Ref Range    WBC 4.72 3.90 - 12.70 K/uL    RBC 4.17 4.00 - 5.40 M/uL    Hemoglobin 11.7 (L) 12.0 - 16.0 g/dL    Hematocrit 36.1 (L) 37.0 - 48.5 %    MCV 87 82 - 98 fL    MCH 28.1 27.0 - 31.0 pg    MCHC 32.4 32.0 - 36.0 g/dL    RDW 13.5 11.5 - 14.5 %    Platelets 131 (L) 150 - 350 K/uL    MPV 9.7 9.2 - 12.9 fL    Gran # 3.1 1.8 - 7.7 K/uL    Lymph # 1.2 1.0 - 4.8 K/uL    Mono # 0.3 0.3 - 1.0 K/uL    Eos # 0.1 0.0 - 0.5 K/uL    Baso # 0.03 0.00 - 0.20 K/uL    Gran% 64.9 38.0 - 73.0 %    Lymph% 26.3 18.0 - 48.0 %    Mono% 6.1 4.0 - 15.0 %    Eosinophil% 2.1 0.0 - 8.0 %    Basophil% 0.6 0.0 - 1.9 %    Differential Method Automated    Comprehensive metabolic panel   Result Value Ref Range    Sodium 141 136 - 145 mmol/L    Potassium 4.4 3.5 - 5.1 mmol/L    Chloride 106 95 - 110 mmol/L    CO2 27 23 - 29 mmol/L    Glucose 79 70 - 110 mg/dL    BUN, Bld 37 (H) 8 - 23 mg/dL    Creatinine 0.9 0.5 - 1.4 mg/dL    Calcium 9.6 8.7 - 10.5 mg/dL    Total Protein 7.1 6.0 - 8.4 g/dL    Albumin 3.4 (L) 3.5 - 5.2 g/dL    Total Bilirubin 0.3 0.1 - 1.0 mg/dL    Alkaline Phosphatase 86 55 - 135 U/L    AST 26 10 - 40 U/L    ALT 28 10 - 44 U/L    Anion Gap 8 8 - 16 mmol/L    eGFR if African American >60.0 >60 mL/min/1.73 m^2    eGFR if  non African American >60.0 >60 mL/min/1.73 m^2   Lipid panel   Result Value Ref Range    Cholesterol 142 120 - 199 mg/dL    Triglycerides 51 30 - 150 mg/dL    HDL 53 40 - 75 mg/dL    LDL Cholesterol 78.8 63.0 - 159.0 mg/dL    HDL/Chol Ratio 37.3 20.0 - 50.0 %    Total Cholesterol/HDL Ratio 2.7 2.0 - 5.0    Non-HDL Cholesterol 89 mg/dL   Hemoglobin A1c   Result Value Ref Range    Hemoglobin A1C 6.6 (H) 4.0 - 5.6 %    Estimated Avg Glucose 143 (H) 68 - 131 mg/dL       Assessment:       1. Type 2 diabetes mellitus with diabetic neuropathy, without long-term current use of insulin    2. Paroxysmal atrial fibrillation    3. Hyperlipidemia, unspecified hyperlipidemia type    4. Hypothyroidism, unspecified type    5. Current use of long term anticoagulation    6. Symptomatic carotid artery stenosis with infarction    7. Arthritis    8. Chronic bilateral low back pain, with sciatica presence unspecified    9. Screening for osteoporosis        Plan:           Diana was seen today for follow-up.  Influenza vaccine will be administered today.  The bone density study will be ordered.  Urine microalbumin and urinalysis will be obtained today.  Current therapy will be continued.  Fasting blood tests and a follow-up visit in 4 months will be obtained.    Diagnoses and all orders for this visit:    Type 2 diabetes mellitus with diabetic neuropathy, without long-term current use of insulin    Paroxysmal atrial fibrillation    Hyperlipidemia, unspecified hyperlipidemia type    Hypothyroidism, unspecified type    Current use of long term anticoagulation    Symptomatic carotid artery stenosis with infarction    Arthritis    Chronic bilateral low back pain, with sciatica presence unspecified

## 2017-10-11 LAB
CREAT UR-MCNC: 36 MG/DL
MICROALBUMIN UR DL<=1MG/L-MCNC: 51 UG/ML
MICROALBUMIN/CREATININE RATIO: 141.7 UG/MG

## 2017-10-12 RX ORDER — TEMAZEPAM 15 MG/1
CAPSULE ORAL
Qty: 30 CAPSULE | Refills: 1 | Status: SHIPPED | OUTPATIENT
Start: 2017-10-12 | End: 2018-10-08 | Stop reason: ALTCHOICE

## 2017-10-13 RX ORDER — FLUTICASONE PROPIONATE 50 MCG
2 SPRAY, SUSPENSION (ML) NASAL DAILY
Qty: 1 BOTTLE | Refills: 0 | Status: SHIPPED | OUTPATIENT
Start: 2017-10-13 | End: 2017-11-10 | Stop reason: SDUPTHER

## 2017-10-18 ENCOUNTER — TELEPHONE (OUTPATIENT)
Dept: ELECTROPHYSIOLOGY | Facility: CLINIC | Age: 62
End: 2017-10-18

## 2017-10-18 ENCOUNTER — HOSPITAL ENCOUNTER (OUTPATIENT)
Dept: CARDIOLOGY | Facility: CLINIC | Age: 62
Discharge: HOME | End: 2017-10-18
Payer: COMMERCIAL

## 2017-10-18 ENCOUNTER — OFFICE VISIT (OUTPATIENT)
Dept: ELECTROPHYSIOLOGY | Facility: CLINIC | Age: 62
End: 2017-10-18
Payer: COMMERCIAL

## 2017-10-18 VITALS
DIASTOLIC BLOOD PRESSURE: 70 MMHG | HEIGHT: 66 IN | HEART RATE: 60 BPM | BODY MASS INDEX: 32.85 KG/M2 | SYSTOLIC BLOOD PRESSURE: 144 MMHG | WEIGHT: 204.38 LBS

## 2017-10-18 DIAGNOSIS — I48.0 PAROXYSMAL ATRIAL FIBRILLATION: Primary | Chronic | ICD-10-CM

## 2017-10-18 DIAGNOSIS — E66.9 OBESITY, CLASS I, BMI 30-34.9: ICD-10-CM

## 2017-10-18 DIAGNOSIS — I48.0 PAF (PAROXYSMAL ATRIAL FIBRILLATION): ICD-10-CM

## 2017-10-18 DIAGNOSIS — E11.40 TYPE 2 DIABETES MELLITUS WITH DIABETIC NEUROPATHY, WITHOUT LONG-TERM CURRENT USE OF INSULIN: ICD-10-CM

## 2017-10-18 DIAGNOSIS — R00.1 BRADYCARDIA: ICD-10-CM

## 2017-10-18 DIAGNOSIS — E03.9 HYPOTHYROIDISM, UNSPECIFIED TYPE: ICD-10-CM

## 2017-10-18 DIAGNOSIS — I63.419 CEREBRAL INFARCTION DUE TO EMBOLISM OF MIDDLE CEREBRAL ARTERY, UNSPECIFIED BLOOD VESSEL LATERALITY: ICD-10-CM

## 2017-10-18 DIAGNOSIS — I10 ESSENTIAL HYPERTENSION: Chronic | ICD-10-CM

## 2017-10-18 PROCEDURE — 99999 PR PBB SHADOW E&M-EST. PATIENT-LVL III: CPT | Mod: PBBFAC,,, | Performed by: INTERNAL MEDICINE

## 2017-10-18 PROCEDURE — 93000 ELECTROCARDIOGRAM COMPLETE: CPT | Mod: S$GLB,,, | Performed by: INTERNAL MEDICINE

## 2017-10-18 PROCEDURE — 99214 OFFICE O/P EST MOD 30 MIN: CPT | Mod: S$GLB,,, | Performed by: INTERNAL MEDICINE

## 2017-10-18 NOTE — PROGRESS NOTES
Subjective:    Patient ID:  Diana Montenegro is a 62 y.o. female who presents for follow-up of Atrial Fibrillation      HPI 63 yo woman with  hypertension, hyperlipidemia, diabetes mellitus, obesity s/p gastric sleeve surgery, right MCA infarction July of 2014 s/p ILR implant, atrial fibrillation, and bradycardia.   7/14 had rt MCA stroke.   She was outside of the treatment window for thrombolytics and recovered completely.  Work-up included carotid dopplers showing 60% right ICA stenosis.  ILR implanted.  ILR monitoring has revealed rare paroxysmal atrial fibrillation, with episodes up to just less than an hour in duration, asymptomatic.  Eliquis 5 mg BID initiated.  Feeling well overall.  Denies syncope, palpitations, dizziness, shortness of breath.        Review of Systems   Constitution: Negative. Negative for weakness and malaise/fatigue.   Cardiovascular: Negative for chest pain, dyspnea on exertion, irregular heartbeat, leg swelling, near-syncope, orthopnea, palpitations, paroxysmal nocturnal dyspnea and syncope.   Respiratory: Negative for cough and shortness of breath.    Neurological: Negative for dizziness and light-headedness.        Objective:    Physical Exam   Constitutional: She is oriented to person, place, and time. She appears well-developed and well-nourished.   Eyes: Conjunctivae are normal. No scleral icterus.   Neck: No JVD present. No tracheal deviation present.   Cardiovascular: Normal rate and regular rhythm.  PMI is not displaced.    Pulmonary/Chest: Effort normal and breath sounds normal. No respiratory distress.   Abdominal: Soft. There is no hepatosplenomegaly. There is no tenderness.   Musculoskeletal: She exhibits no edema or tenderness.   Neurological: She is alert and oriented to person, place, and time.   Skin: Skin is warm and dry. No rash noted.   Psychiatric: She has a normal mood and affect. Her behavior is normal.         Assessment:       1. Paroxysmal atrial  fibrillation    2. Cerebral infarction due to embolism of middle cerebral artery, unspecified blood vessel laterality    3. Type 2 diabetes mellitus with diabetic neuropathy, without long-term current use of insulin    4. Hypothyroidism, unspecified type    5. Essential hypertension    6. Obesity, Class I, BMI 30-34.9         Plan:           Paroxysmal asymptomatic atrial fibrillation.  Doing well, minimal AF.  We discussed that her battery will likely reach TERESA prior to her next visit.  Options are replace, remove, or leave in-situ.  We have detected AF, and she is on anticoagulation.  Thus, it would be reasonable to defer replacement.  She prefers to leave in situ for now when it reaches TERESA.  F/u in one year with me.

## 2017-10-19 ENCOUNTER — APPOINTMENT (OUTPATIENT)
Dept: RADIOLOGY | Facility: CLINIC | Age: 62
End: 2017-10-19
Attending: INTERNAL MEDICINE
Payer: COMMERCIAL

## 2017-10-19 DIAGNOSIS — Z13.820 SCREENING FOR OSTEOPOROSIS: ICD-10-CM

## 2017-10-19 PROCEDURE — 77080 DXA BONE DENSITY AXIAL: CPT | Mod: TC

## 2017-10-19 PROCEDURE — 77080 DXA BONE DENSITY AXIAL: CPT | Mod: 26,,, | Performed by: INTERNAL MEDICINE

## 2017-10-31 ENCOUNTER — TELEPHONE (OUTPATIENT)
Dept: BARIATRICS | Facility: CLINIC | Age: 62
End: 2017-10-31

## 2017-11-06 ENCOUNTER — CLINICAL SUPPORT (OUTPATIENT)
Dept: ELECTROPHYSIOLOGY | Facility: CLINIC | Age: 62
End: 2017-11-06
Payer: COMMERCIAL

## 2017-11-06 DIAGNOSIS — I63.9 CRYPTOGENIC STROKE: ICD-10-CM

## 2017-11-06 DIAGNOSIS — Z95.818 STATUS POST PLACEMENT OF IMPLANTABLE LOOP RECORDER: ICD-10-CM

## 2017-11-06 PROCEDURE — 93298 REM INTERROG DEV EVAL SCRMS: CPT | Mod: S$GLB,,, | Performed by: INTERNAL MEDICINE

## 2017-11-06 PROCEDURE — 93299 LOOP RECORDER REMOTE: CPT | Mod: S$GLB,,, | Performed by: INTERNAL MEDICINE

## 2017-11-09 ENCOUNTER — PATIENT MESSAGE (OUTPATIENT)
Dept: INTERNAL MEDICINE | Facility: CLINIC | Age: 62
End: 2017-11-09

## 2017-11-10 ENCOUNTER — OFFICE VISIT (OUTPATIENT)
Dept: OTOLARYNGOLOGY | Facility: CLINIC | Age: 62
End: 2017-11-10
Payer: COMMERCIAL

## 2017-11-10 VITALS
SYSTOLIC BLOOD PRESSURE: 129 MMHG | HEIGHT: 66 IN | HEART RATE: 73 BPM | TEMPERATURE: 97 F | BODY MASS INDEX: 34.05 KG/M2 | WEIGHT: 211.88 LBS | DIASTOLIC BLOOD PRESSURE: 71 MMHG

## 2017-11-10 DIAGNOSIS — J34.89 NASAL OBSTRUCTION: ICD-10-CM

## 2017-11-10 DIAGNOSIS — J32.4 CHRONIC PANSINUSITIS: Primary | ICD-10-CM

## 2017-11-10 DIAGNOSIS — J30.89 CHRONIC NON-SEASONAL ALLERGIC RHINITIS, UNSPECIFIED TRIGGER: ICD-10-CM

## 2017-11-10 PROCEDURE — 99214 OFFICE O/P EST MOD 30 MIN: CPT | Mod: S$GLB,,, | Performed by: OTOLARYNGOLOGY

## 2017-11-10 PROCEDURE — 99999 PR PBB SHADOW E&M-EST. PATIENT-LVL IV: CPT | Mod: PBBFAC,,, | Performed by: OTOLARYNGOLOGY

## 2017-11-10 RX ORDER — LEVOCETIRIZINE DIHYDROCHLORIDE 5 MG/1
5 TABLET, FILM COATED ORAL NIGHTLY
Qty: 30 TABLET | Refills: 11 | Status: SHIPPED | OUTPATIENT
Start: 2017-11-10 | End: 2022-09-15

## 2017-11-10 RX ORDER — FLUTICASONE PROPIONATE 50 MCG
2 SPRAY, SUSPENSION (ML) NASAL DAILY
Qty: 1 BOTTLE | Refills: 0 | Status: SHIPPED | OUTPATIENT
Start: 2017-11-10 | End: 2018-11-21 | Stop reason: SDUPTHER

## 2017-11-10 NOTE — PROGRESS NOTES
Chief Complaint   Patient presents with    Follow-up     6 month follow-up   .     HPI: Diana Montenegro is a 61 y.o. female who is referred by Dr. Toribio for evaluation of acute recurrent sinusitis. She describes difficulty breathing at night. There is bilateral nasal obstruction. She does use sinus rinses or nasal sprays- Flonase. She denies midface pain and pressure. She admits to rhinorrhea and postnasal drip. There is not maxillary tooth pain. She denies headaches. She has not had sinus or nasal surgery. There is no history of sinonasal trauma. She has been on 3 rounds of antibiotics including Levaquin x2 and Medrol dose pack.     Interval History: She reports that she has had some nasal congestion since last visit. She states that she has been on the Xyzal and Flonase and feels this is helpful to her symptoms.  She states that she has not had any sinus infections since last visit.  She is requesting an allergy test to assess her allergy sensitivities.      Past medical history, surgical history, family history, social history reviewed and unchanged from last visit.         Review of Systems  General: negative for chills, fever or weight loss  Psychological: negative for mood changes or depression  Ophthalmic: negative for blurry vision, photophobia or eye pain  ENT: see HPI  Respiratory: no cough, shortness of breath, or wheezing  Cardiovascular: no chest pain or dyspnea on exertion  Gastrointestinal: no abdominal pain, change in bowel habits, or black/ bloody stools  Musculoskeletal: negative for gait disturbance or muscular weakness  Neurological: no syncope or seizures; no ataxia  Dermatological: negative for puritis,  rash and jaundice  Hematologic/lymphatic: no easy bruising, no new lumps or bumps      Physical Exam:    Vitals:    11/10/17 1040   BP: 129/71   Pulse: 73   Temp: 97.3 °F (36.3 °C)       Constitutional: Well appearing / communicating without difficutly.  NAD.  Eyes: EOM I  Bilaterally  Head/Face: Normocephalic.  Negative paranasal sinus pressure/tenderness.  Salivary glands WNL.  House Brackmann I Bilaterally.    Right Ear: Auricle normal appearance. External Auditory Canal within normal limits no lesions or masses,TM w/o masses/lesions/perforations. TM mobility noted.   Left Ear: Auricle normal appearance. External Auditory Canal within normal limits no lesions or masses,TM w/o masses/lesions/perforations. TM mobility noted.  Nose: No gross nasal septal deviation. Inferior Turbinates 3+ bilaterally. No septal perforation. No masses/lesions. External nasal skin appears normal without masses/lesions.  Oral Cavity: Gingiva/lips within normal limits.  Dentition/gingiva healthy appearing. Mucus membranes moist. Floor of mouth soft, no masses palpated. Oral Tongue mobile. Hard Palate appears normal.    Oropharynx: Base of tongue appears normal. No masses/lesions noted. Tonsillar fossa/pharyngeal wall without lesions. Posterior oropharynx WNL.  Soft palate without masses. Midline uvula.   Neck/Lymphatic: No LAD I-VI bilaterally.  No thyromegaly.  No masses noted on exam.    Mirror laryngoscopy/nasopharyngoscopy: Active gag reflex.  Unable to perform.    Neuro/Psychiatric: AOx3.  Normal mood and affect.   Cardiovascular: Normal carotid pulses bilaterally, no increasing jugular venous distention noted at cervical region bilaterally.    Respiratory: Normal respiratory effort, no stridor, no retractions noted.      CT scan findings were reviewed by me personally and discussed in detail with the patient.   Findings:  Compared with CT brain and MRI studies dating back 2014.  New sinusitis change with partial opacification ethmoid sinuses particularly posteriorly left.  New mucosal hypertrophy, air-fluid levels both sphenoid sinuses, new air-fluid level right maxillary sinus, hypertrophy maxillary sinus mucosa, appears in the interim.      Assessment:    ICD-10-CM ICD-9-CM    1. Chronic  pansinusitis J32.4 473.8    2. Nasal obstruction J34.89 478.19    3. Chronic non-seasonal allergic rhinitis, unspecified trigger J30.89 477.9      The primary encounter diagnosis was Chronic pansinusitis. Diagnoses of Nasal obstruction and Chronic non-seasonal allergic rhinitis, unspecified trigger were also pertinent to this visit.      Plan:  Overall, she is doing well on her current medication regimen. I have asked her to continue her Flonase and Xyzal. We will order a ENT RAST panel to further assess her allergies. We will continue to monitor periodically with semi-annual visits. She is aware that she should notify me sooner if problems.      Elin Walden MD

## 2017-11-13 ENCOUNTER — LAB VISIT (OUTPATIENT)
Dept: LAB | Facility: HOSPITAL | Age: 62
End: 2017-11-13
Attending: OTOLARYNGOLOGY
Payer: COMMERCIAL

## 2017-11-13 DIAGNOSIS — J30.89 CHRONIC NON-SEASONAL ALLERGIC RHINITIS, UNSPECIFIED TRIGGER: ICD-10-CM

## 2017-11-13 PROCEDURE — 86003 ALLG SPEC IGE CRUDE XTRC EA: CPT

## 2017-11-13 PROCEDURE — 86003 ALLG SPEC IGE CRUDE XTRC EA: CPT | Mod: 59

## 2017-11-13 PROCEDURE — 36415 COLL VENOUS BLD VENIPUNCTURE: CPT

## 2017-11-14 ENCOUNTER — TELEPHONE (OUTPATIENT)
Dept: ELECTROPHYSIOLOGY | Facility: CLINIC | Age: 62
End: 2017-11-14

## 2017-11-14 NOTE — TELEPHONE ENCOUNTER
Called patient in relation to [employer] LOOP recorder not being connected to the home monitor, no answer, left voicemail.

## 2017-11-15 LAB
A ALTERNATA IGE QN: <0.35 KU/L
A FUMIGATUS IGE QN: <0.35 KU/L
ALLERGEN BOXELDER MAPLE TREE IGE: <0.35 KU/L
ALLERGEN MAPLE (BOX ELDER) CLASS: NORMAL
ALLERGEN PENICILLIUM IGE: <0.35 KU/L
BALD CYPRESS IGE QN: <0.35 KU/L
BERMUDA GRASS IGE QN: <0.35 KU/L
C HERBARUM IGE QN: <0.35 KU/L
CAT DANDER IGE QN: <0.35 KU/L
CEDAR IGE QN: <0.35 KU/L
CMN PIGWEED IGE QN: <0.35 KU/L
COMMON RAGWEED IGE QN: <0.35 KU/L
D FARINAE IGE QN: <0.35 KU/L
D PTERONYSS IGE QN: <0.35 KU/L
DEPRECATED A ALTERNATA IGE RAST QL: NORMAL
DEPRECATED A FUMIGATUS IGE RAST QL: NORMAL
DEPRECATED BALD CYPRESS IGE RAST QL: NORMAL
DEPRECATED BERMUDA GRASS IGE RAST QL: NORMAL
DEPRECATED C HERBARUM IGE RAST QL: NORMAL
DEPRECATED CAT DANDER IGE RAST QL: NORMAL
DEPRECATED CEDAR IGE RAST QL: NORMAL
DEPRECATED COMMON PIGWEED IGE RAST QL: NORMAL
DEPRECATED COMMON RAGWEED IGE RAST QL: NORMAL
DEPRECATED D FARINAE IGE RAST QL: NORMAL
DEPRECATED D PTERONYSS IGE RAST QL: NORMAL
DEPRECATED DOG DANDER IGE RAST QL: NORMAL
DEPRECATED JOHNSON GRASS IGE RAST QL: NORMAL
DEPRECATED M RACEMOSUS IGE RAST QL: NORMAL
DEPRECATED MARSH ELDER IGE RAST QL: NORMAL
DEPRECATED ROACH IGE RAST QL: NORMAL
DEPRECATED TIMOTHY IGE RAST QL: NORMAL
DEPRECATED WHITE OAK IGE RAST QL: NORMAL
DOG DANDER IGE QN: <0.35 KU/L
ELM CEDAR CLASS: NORMAL
ELM CEDAR, IGE: <0.35 KU/L
JOHNSON GRASS IGE QN: <0.35 KU/L
M RACEMOSUS IGE QN: <0.35 KU/L
MARSH ELDER IGE QN: <0.35 KU/L
PENICILLIUM CLASS: NORMAL
ROACH IGE QN: <0.35 KU/L
TIMOTHY IGE QN: <0.35 KU/L
WHITE OAK IGE QN: <0.35 KU/L

## 2017-11-17 ENCOUNTER — PATIENT MESSAGE (OUTPATIENT)
Dept: OTOLARYNGOLOGY | Facility: CLINIC | Age: 62
End: 2017-11-17

## 2017-11-17 ENCOUNTER — TELEPHONE (OUTPATIENT)
Dept: ELECTROPHYSIOLOGY | Facility: CLINIC | Age: 62
End: 2017-11-17

## 2017-11-17 NOTE — TELEPHONE ENCOUNTER
Called patient in relation to [employer] LOOP recorder not being connected to the home monitor, patient states she is [place] until 1/5/18.

## 2017-11-21 DIAGNOSIS — Z98.84 S/P LAPAROSCOPIC SLEEVE GASTRECTOMY: ICD-10-CM

## 2017-11-21 DIAGNOSIS — E78.5 HYPERLIPIDEMIA, UNSPECIFIED HYPERLIPIDEMIA TYPE: ICD-10-CM

## 2017-11-21 RX ORDER — OMEPRAZOLE 40 MG/1
40 CAPSULE, DELAYED RELEASE ORAL EVERY MORNING
Qty: 30 CAPSULE | Refills: 6 | Status: SHIPPED | OUTPATIENT
Start: 2017-11-21 | End: 2018-04-12 | Stop reason: SDUPTHER

## 2017-11-21 RX ORDER — GLIMEPIRIDE 4 MG/1
4 TABLET ORAL 2 TIMES DAILY
Qty: 60 TABLET | Refills: 11 | Status: SHIPPED | OUTPATIENT
Start: 2017-11-21 | End: 2019-01-23 | Stop reason: SDUPTHER

## 2017-11-21 RX ORDER — ROSUVASTATIN CALCIUM 40 MG/1
40 TABLET, COATED ORAL DAILY
Qty: 30 TABLET | Refills: 11 | Status: SHIPPED | OUTPATIENT
Start: 2017-11-21 | End: 2018-07-17 | Stop reason: SDUPTHER

## 2017-11-21 RX ORDER — LORAZEPAM 1 MG/1
1 TABLET ORAL EVERY 12 HOURS PRN
Qty: 60 TABLET | Refills: 2 | Status: SHIPPED | OUTPATIENT
Start: 2017-11-21 | End: 2017-12-05 | Stop reason: SDUPTHER

## 2017-11-21 RX ORDER — METFORMIN HYDROCHLORIDE 1000 MG/1
1000 TABLET ORAL 2 TIMES DAILY WITH MEALS
Qty: 60 TABLET | Refills: 11 | Status: SHIPPED | OUTPATIENT
Start: 2017-11-21 | End: 2018-07-17 | Stop reason: SDUPTHER

## 2017-11-21 RX ORDER — LEVOTHYROXINE SODIUM 125 UG/1
125 TABLET ORAL DAILY
Qty: 30 TABLET | Refills: 11 | Status: SHIPPED | OUTPATIENT
Start: 2017-11-21 | End: 2018-07-17 | Stop reason: SDUPTHER

## 2017-11-28 DIAGNOSIS — Z95.818 STATUS POST PLACEMENT OF IMPLANTABLE LOOP RECORDER: Primary | ICD-10-CM

## 2017-11-28 DIAGNOSIS — I63.9 CRYPTOGENIC STROKE: ICD-10-CM

## 2017-12-05 RX ORDER — LEVOTHYROXINE SODIUM 125 UG/1
125 TABLET ORAL DAILY
Qty: 30 TABLET | Refills: 11 | OUTPATIENT
Start: 2017-12-05 | End: 2018-12-05

## 2017-12-05 RX ORDER — LORAZEPAM 1 MG/1
1 TABLET ORAL EVERY 12 HOURS PRN
Qty: 60 TABLET | Refills: 2 | Status: SHIPPED | OUTPATIENT
Start: 2017-12-05 | End: 2018-11-21 | Stop reason: SDUPTHER

## 2018-01-08 ENCOUNTER — CLINICAL SUPPORT (OUTPATIENT)
Dept: ELECTROPHYSIOLOGY | Facility: CLINIC | Age: 63
End: 2018-01-08
Attending: INTERNAL MEDICINE
Payer: COMMERCIAL

## 2018-01-08 DIAGNOSIS — I63.9 CRYPTOGENIC STROKE: ICD-10-CM

## 2018-01-08 DIAGNOSIS — Z95.818 STATUS POST PLACEMENT OF IMPLANTABLE LOOP RECORDER: ICD-10-CM

## 2018-02-09 ENCOUNTER — PATIENT MESSAGE (OUTPATIENT)
Dept: INTERNAL MEDICINE | Facility: CLINIC | Age: 63
End: 2018-02-09

## 2018-02-17 ENCOUNTER — TELEPHONE (OUTPATIENT)
Dept: ELECTROPHYSIOLOGY | Facility: CLINIC | Age: 63
End: 2018-02-17

## 2018-02-18 NOTE — TELEPHONE ENCOUNTER
LM ot PT VM. Spoke to Rashida Montenegro. She does not know if Ms Ortiz is back in town. I adv we have been unable to receive loop reports since October and have been trying to reach er to see if she wants us to monitor her moving forward. Rashida verbalized understanding

## 2018-02-23 DIAGNOSIS — E78.5 HYPERLIPIDEMIA, UNSPECIFIED HYPERLIPIDEMIA TYPE: ICD-10-CM

## 2018-02-23 RX ORDER — ROSUVASTATIN CALCIUM 40 MG/1
TABLET, COATED ORAL
Qty: 30 TABLET | Refills: 11 | Status: SHIPPED | OUTPATIENT
Start: 2018-02-23 | End: 2019-04-22

## 2018-03-06 ENCOUNTER — PATIENT MESSAGE (OUTPATIENT)
Dept: INTERNAL MEDICINE | Facility: CLINIC | Age: 63
End: 2018-03-06

## 2018-03-28 ENCOUNTER — TELEPHONE (OUTPATIENT)
Dept: ELECTROPHYSIOLOGY | Facility: CLINIC | Age: 63
End: 2018-03-28

## 2018-03-28 ENCOUNTER — CLINICAL SUPPORT (OUTPATIENT)
Dept: ELECTROPHYSIOLOGY | Facility: CLINIC | Age: 63
End: 2018-03-28
Attending: INTERNAL MEDICINE
Payer: COMMERCIAL

## 2018-03-28 DIAGNOSIS — I63.9 CRYPTOGENIC STROKE: ICD-10-CM

## 2018-03-28 DIAGNOSIS — Z95.818 STATUS POST PLACEMENT OF IMPLANTABLE LOOP RECORDER: ICD-10-CM

## 2018-03-28 PROCEDURE — 93299 LOOP RECORDER REMOTE: CPT | Mod: S$GLB,,, | Performed by: INTERNAL MEDICINE

## 2018-03-28 PROCEDURE — 93298 REM INTERROG DEV EVAL SCRMS: CPT | Mod: S$GLB,,, | Performed by: INTERNAL MEDICINE

## 2018-03-28 NOTE — TELEPHONE ENCOUNTER
Spoke with patient. She is aware that her ILR is nearing end of battery and wants to leave it in situ for now. If she decides to have it removed, she will call me to schedule removal. She understands that we will not be getting any information from the device once the battery is depleted.

## 2018-03-28 NOTE — TELEPHONE ENCOUNTER
----- Message from Thomas Randolph MD sent at 3/28/2018  2:33 PM CDT -----  We had discussed in clinic regarding options when she reached TERESA.  I did not recommend replacement.  She had indicated in clinic she would leave in-situ as opposed to remove.  Please confirm  ----- Message -----  From: Katey Franklin  Sent: 3/28/2018   1:57 PM  To: Thomas Randolph MD, Xiao Perdomo RN    Hi,  Ms Montenegro's loop battery is at EOS. Patient was out of town several months shortly after her last clinic visit with you. I reached her today and walked her through the manual transmission. The report and strips are in EPIC and CVIS for you to view.   Thanks  Garland

## 2018-04-11 ENCOUNTER — HOSPITAL ENCOUNTER (OUTPATIENT)
Dept: VASCULAR SURGERY | Facility: CLINIC | Age: 63
Discharge: HOME | End: 2018-04-11
Payer: COMMERCIAL

## 2018-04-11 ENCOUNTER — OFFICE VISIT (OUTPATIENT)
Dept: VASCULAR SURGERY | Facility: CLINIC | Age: 63
End: 2018-04-11
Payer: COMMERCIAL

## 2018-04-11 VITALS
SYSTOLIC BLOOD PRESSURE: 123 MMHG | DIASTOLIC BLOOD PRESSURE: 61 MMHG | BODY MASS INDEX: 36.49 KG/M2 | HEART RATE: 65 BPM | HEIGHT: 66 IN | TEMPERATURE: 98 F | WEIGHT: 227.06 LBS

## 2018-04-11 DIAGNOSIS — I65.29 STENOSIS OF CAROTID ARTERY, UNSPECIFIED LATERALITY: Primary | ICD-10-CM

## 2018-04-11 DIAGNOSIS — I65.23 CAROTID OCCLUSION, BILATERAL: ICD-10-CM

## 2018-04-11 DIAGNOSIS — I65.29 STENOSIS OF CAROTID ARTERY, UNSPECIFIED LATERALITY: ICD-10-CM

## 2018-04-11 DIAGNOSIS — I77.9 BILATERAL CAROTID ARTERY DISEASE: Primary | ICD-10-CM

## 2018-04-11 PROCEDURE — 99213 OFFICE O/P EST LOW 20 MIN: CPT | Mod: S$GLB,,, | Performed by: SURGERY

## 2018-04-11 PROCEDURE — 93880 EXTRACRANIAL BILAT STUDY: CPT | Mod: S$GLB,,, | Performed by: SURGERY

## 2018-04-11 PROCEDURE — 3078F DIAST BP <80 MM HG: CPT | Mod: CPTII,S$GLB,, | Performed by: SURGERY

## 2018-04-11 PROCEDURE — 3074F SYST BP LT 130 MM HG: CPT | Mod: CPTII,S$GLB,, | Performed by: SURGERY

## 2018-04-11 PROCEDURE — 99999 PR PBB SHADOW E&M-EST. PATIENT-LVL III: CPT | Mod: PBBFAC,,, | Performed by: SURGERY

## 2018-04-11 RX ORDER — ACETAMINOPHEN 500 MG
TABLET ORAL
Status: ON HOLD | COMMUNITY
Start: 2017-12-15 | End: 2018-08-30 | Stop reason: HOSPADM

## 2018-04-12 DIAGNOSIS — Z98.84 S/P LAPAROSCOPIC SLEEVE GASTRECTOMY: ICD-10-CM

## 2018-04-12 RX ORDER — OMEPRAZOLE 40 MG/1
40 CAPSULE, DELAYED RELEASE ORAL EVERY MORNING
Qty: 30 CAPSULE | Refills: 6 | Status: SHIPPED | OUTPATIENT
Start: 2018-04-12 | End: 2019-04-22

## 2018-04-12 NOTE — PROGRESS NOTES
Patient ID: Diana Montenegro is a 62 y.o. female.    I. HISTORY     Chief Complaint: Follow-up      HPI   Diana Montenegro is a 62 y.o.  With HTN, HLD, DM, Afib (on Eliquis), R CVA (2014 manifested by facial droop and slurred speech) who is here for f/u eval of right carotid artery stenosis. She is a former smoker with 30 pk-yr history.  She has recently quit.  She walks with a walker due to arthritic pain but otherwise is independent. She denies changes in vision, facial droop, difficulty speaking, or loss of motor function.     Presents for 1 year follow up of asymptomatic carotid stenosis. Denies any recent of arm or leg numbness or weakness, facial droop, vision changes or slurred speech.   No changes in medical history, hospitalizations or surgeries since our visit last year. No significant complaints today.    Review of Systems   Constitution: Negative for chills and fever.   HENT: Negative for congestion and ear pain.    Eyes: Negative for discharge and pain.   Cardiovascular: Negative for chest pain, claudication and dyspnea on exertion.   Respiratory: Negative for cough and shortness of breath.    Skin: Negative for dry skin and itching.   Musculoskeletal: Positive for arthritis. Negative for back pain and falls.   Gastrointestinal: Negative for bloating, abdominal pain and anorexia.   Genitourinary: Negative for dysuria, hematuria and hesitancy.   Neurological: Negative for aphonia and dizziness.   Psychiatric/Behavioral: Negative for hallucinations and hypervigilance.       II. PHYSICAL EXAM   Right Arm BP - Sittin/61 (18 1409)  Left Arm BP - Sittin/61 (18 1409)  Pulse: 65  Temp: 98 °F (36.7 °C)  Body mass index is 36.65 kg/m².      Physical Exam   Constitutional: She is oriented to person, place, and time. She appears well-developed and well-nourished. No distress.   HENT:   Head: Normocephalic and atraumatic.   Neck: Normal range of motion. Neck supple.    Cardiovascular: Normal rate, regular rhythm, normal heart sounds and intact distal pulses.    Pulses:       Radial pulses are 2+ on the right side, and 2+ on the left side.   No carotid bruits   Pulmonary/Chest: Effort normal and breath sounds normal. No respiratory distress.   Abdominal: Soft. Bowel sounds are normal. She exhibits no distension.   Neurological: She is alert and oriented to person, place, and time.   Skin: Skin is warm and dry.   Psychiatric: She has a normal mood and affect. Her behavior is normal. Thought content normal.   Vitals reviewed.      III. ASSESSMENT & PLAN (MEDICAL DECISION MAKING)     1. Bilateral carotid artery disease        Imaging Results:  Carotid Duplex:   R L   60-79%  Ratio: 5.3 1-39%     Assessment/Diagnosis and Plan:  Diana Montenegro is a 62 y.o. female with stroke in July 2014. No symptoms since. She has right ICA stenosis ~ 70%. Stable from last years visit.  No intervention indicated. Not severe enough for entry into CREST.    Continue yearly follow up with repeat duplex.  Medical therapy with Aspirin and Statin    Ofelia Downs MD FACS [facility]  Vascular & Endovascular Surgery

## 2018-07-02 RX ORDER — APIXABAN 5 MG/1
TABLET, FILM COATED ORAL
Qty: 60 TABLET | Refills: 6 | Status: ON HOLD | OUTPATIENT
Start: 2018-07-02 | End: 2018-08-30 | Stop reason: HOSPADM

## 2018-07-02 RX ORDER — METFORMIN HYDROCHLORIDE 1000 MG/1
TABLET ORAL
Qty: 60 TABLET | Refills: 11 | Status: SHIPPED | OUTPATIENT
Start: 2018-07-02 | End: 2019-04-22

## 2018-07-02 RX ORDER — LEVOTHYROXINE SODIUM 125 UG/1
TABLET ORAL
Qty: 30 TABLET | Refills: 11 | Status: SHIPPED | OUTPATIENT
Start: 2018-07-02 | End: 2019-04-22

## 2018-07-02 RX ORDER — GLIMEPIRIDE 4 MG/1
TABLET ORAL
Qty: 60 TABLET | Refills: 11 | Status: SHIPPED | OUTPATIENT
Start: 2018-07-02 | End: 2019-04-22

## 2018-07-10 ENCOUNTER — LAB VISIT (OUTPATIENT)
Dept: LAB | Facility: HOSPITAL | Age: 63
End: 2018-07-10
Attending: INTERNAL MEDICINE
Payer: COMMERCIAL

## 2018-07-10 ENCOUNTER — OFFICE VISIT (OUTPATIENT)
Dept: CARDIOLOGY | Facility: CLINIC | Age: 63
End: 2018-07-10
Payer: COMMERCIAL

## 2018-07-10 VITALS
HEART RATE: 63 BPM | BODY MASS INDEX: 37.38 KG/M2 | DIASTOLIC BLOOD PRESSURE: 79 MMHG | HEIGHT: 66 IN | WEIGHT: 232.56 LBS | SYSTOLIC BLOOD PRESSURE: 132 MMHG

## 2018-07-10 DIAGNOSIS — E78.5 HYPERLIPIDEMIA, UNSPECIFIED HYPERLIPIDEMIA TYPE: ICD-10-CM

## 2018-07-10 DIAGNOSIS — E03.9 HYPOTHYROIDISM, UNSPECIFIED TYPE: ICD-10-CM

## 2018-07-10 DIAGNOSIS — Z95.818 STATUS POST PLACEMENT OF IMPLANTABLE LOOP RECORDER: ICD-10-CM

## 2018-07-10 DIAGNOSIS — D69.6 THROMBOCYTOPENIA: ICD-10-CM

## 2018-07-10 DIAGNOSIS — I10 ESSENTIAL HYPERTENSION: Chronic | ICD-10-CM

## 2018-07-10 DIAGNOSIS — G47.33 OSA (OBSTRUCTIVE SLEEP APNEA): ICD-10-CM

## 2018-07-10 DIAGNOSIS — E11.40 TYPE 2 DIABETES MELLITUS WITH DIABETIC NEUROPATHY, WITHOUT LONG-TERM CURRENT USE OF INSULIN: ICD-10-CM

## 2018-07-10 DIAGNOSIS — I48.0 PAROXYSMAL ATRIAL FIBRILLATION: Primary | Chronic | ICD-10-CM

## 2018-07-10 DIAGNOSIS — I48.0 PAROXYSMAL ATRIAL FIBRILLATION: Chronic | ICD-10-CM

## 2018-07-10 DIAGNOSIS — Z79.01 CURRENT USE OF LONG TERM ANTICOAGULATION: ICD-10-CM

## 2018-07-10 DIAGNOSIS — E66.01 SEVERE OBESITY: ICD-10-CM

## 2018-07-10 LAB
ALBUMIN SERPL BCP-MCNC: 3.6 G/DL
ALP SERPL-CCNC: 107 U/L
ALT SERPL W/O P-5'-P-CCNC: 31 U/L
ANION GAP SERPL CALC-SCNC: 7 MMOL/L
AST SERPL-CCNC: 22 U/L
BILIRUB SERPL-MCNC: 0.3 MG/DL
BUN SERPL-MCNC: 27 MG/DL
CALCIUM SERPL-MCNC: 10.2 MG/DL
CHLORIDE SERPL-SCNC: 106 MMOL/L
CHOLEST SERPL-MCNC: 149 MG/DL
CHOLEST/HDLC SERPL: 2.9 {RATIO}
CO2 SERPL-SCNC: 27 MMOL/L
CREAT SERPL-MCNC: 0.9 MG/DL
EST. GFR  (AFRICAN AMERICAN): >60 ML/MIN/1.73 M^2
EST. GFR  (NON AFRICAN AMERICAN): >60 ML/MIN/1.73 M^2
ESTIMATED AVG GLUCOSE: 209 MG/DL
GLUCOSE SERPL-MCNC: 102 MG/DL
HBA1C MFR BLD HPLC: 8.9 %
HDLC SERPL-MCNC: 52 MG/DL
HDLC SERPL: 34.9 %
LDLC SERPL CALC-MCNC: 80 MG/DL
MAGNESIUM SERPL-MCNC: 1.6 MG/DL
NONHDLC SERPL-MCNC: 97 MG/DL
POTASSIUM SERPL-SCNC: 5 MMOL/L
PROT SERPL-MCNC: 7.1 G/DL
SODIUM SERPL-SCNC: 140 MMOL/L
TRIGL SERPL-MCNC: 85 MG/DL

## 2018-07-10 PROCEDURE — 80061 LIPID PANEL: CPT

## 2018-07-10 PROCEDURE — 80053 COMPREHEN METABOLIC PANEL: CPT

## 2018-07-10 PROCEDURE — 36415 COLL VENOUS BLD VENIPUNCTURE: CPT

## 2018-07-10 PROCEDURE — 3008F BODY MASS INDEX DOCD: CPT | Mod: CPTII,S$GLB,, | Performed by: NURSE PRACTITIONER

## 2018-07-10 PROCEDURE — 3078F DIAST BP <80 MM HG: CPT | Mod: CPTII,S$GLB,, | Performed by: NURSE PRACTITIONER

## 2018-07-10 PROCEDURE — 99999 PR PBB SHADOW E&M-EST. PATIENT-LVL III: CPT | Mod: PBBFAC,,, | Performed by: NURSE PRACTITIONER

## 2018-07-10 PROCEDURE — 83036 HEMOGLOBIN GLYCOSYLATED A1C: CPT

## 2018-07-10 PROCEDURE — 3044F HG A1C LEVEL LT 7.0%: CPT | Mod: CPTII,S$GLB,, | Performed by: NURSE PRACTITIONER

## 2018-07-10 PROCEDURE — 3075F SYST BP GE 130 - 139MM HG: CPT | Mod: CPTII,S$GLB,, | Performed by: NURSE PRACTITIONER

## 2018-07-10 PROCEDURE — 99214 OFFICE O/P EST MOD 30 MIN: CPT | Mod: S$GLB,,, | Performed by: NURSE PRACTITIONER

## 2018-07-10 PROCEDURE — 83735 ASSAY OF MAGNESIUM: CPT

## 2018-07-10 NOTE — PROGRESS NOTES
Ms. Montenegro is a patient of Dr. Mae and was last seen in [facility] Visit 6/12/17.    Subjective:   Patient ID:  Diana Montenegro is a 63 y.o. female who presents for follow-up of Hypertension (1 yr f/u )    Problems:  Right MCA infarction July of 2014 s/p ILR implant (Dr. Randolph)  Paroxysmal atrial fibrillation  HTN  HLD  Hypothyroidism  Obesity  COURTNEY, untreated  Venous insuffiency  30 pack year h/o smoking, quit in 2014    HPI  Ms. Montenegro is in clinic today for routine follow up.  Patient denies chest pain with exertion or at rest, palpitations, SOB, KING, dizziness, syncope, edema, orthopnea, PND, or claudication.  Reports routine exercise, water exercise and sit down elliptical.  Patient is taking apixaban 5mg BID for thromboembolic prophylaxis.  Denies bleeding gums, epistaxis, hematuria, and hematochezia.  Reports following a low salt diet. Home blood pressure readings are  120-130s.  She is treated with low dose ASA and high intensity statin.  Patient is taking rosuvastatin 40mg and LDL is 79.  Not using cpap because it makes her have a chronic cough.      Review of Systems   Constitution: Negative for decreased appetite, diaphoresis, weakness, malaise/fatigue, weight gain and weight loss.   Eyes: Negative for visual disturbance.   Cardiovascular: Negative for chest pain, claudication, dyspnea on exertion, irregular heartbeat, leg swelling, near-syncope, orthopnea, palpitations, paroxysmal nocturnal dyspnea and syncope.        Denies chest pressure   Respiratory: Negative for cough, hemoptysis, shortness of breath, sleep disturbances due to breathing and snoring.    Endocrine: Negative for cold intolerance and heat intolerance.   Hematologic/Lymphatic: Negative for bleeding problem. Does not bruise/bleed easily.   Musculoskeletal: Negative for myalgias.   Gastrointestinal: Negative for bloating, abdominal pain, anorexia, change in bowel habit, constipation, diarrhea, nausea and vomiting.    Neurological: Negative for difficulty with concentration, disturbances in coordination, excessive daytime sleepiness, dizziness, headaches, light-headedness, loss of balance and numbness.   Psychiatric/Behavioral: The patient does not have insomnia.        Allergies and current medications updated and reviewed:  Review of patient's allergies indicates:   Allergen Reactions    Medrol [methylprednisolone] Palpitations     Current Outpatient Prescriptions   Medication Sig    acetaminophen (TYLENOL) 500 MG tablet Take by mouth.    acyclovir 5% (ZOVIRAX) 5 % ointment Apply topically 6 (six) times daily.    albuterol 90 mcg/actuation inhaler Inhale 2 puffs into the lungs every 6 (six) hours as needed for Wheezing. Rescue    albuterol-ipratropium 2.5mg-0.5mg/3mL (DUO-NEB) 0.5 mg-3 mg(2.5 mg base)/3 mL nebulizer solution Take 3 mLs by nebulization every 6 (six) hours as needed for Wheezing. Rescue    apixaban (ELIQUIS) 5 mg Tab Take 1 tablet (5 mg total) by mouth 2 (two) times daily.    aspirin (ECOTRIN) 81 MG EC tablet Take 1 tablet (81 mg total) by mouth once daily.    b complex vitamins tablet Take 1 tablet by mouth once daily.    blood pressure test kit-large Kit Use as directed    blood-glucose meter (ONETOUCH ULTRA SYSTEM KIT) kit Use as instructed    CALCIUM CITRATE/VITAMIN D3 (CALCIUM CITRATE + D ORAL) Take 2 tablets by mouth 2 (two) times daily.    cyanocobalamin, vitamin B-12, 500 mcg Subl Place 1 tablet under the tongue once daily.    docusate sodium (COLACE) 100 MG capsule Take 100 mg by mouth 2 (two) times daily.    ELIQUIS 5 mg Tab TAKE ONE TABLET BY MOUTH TWICE DAILY    fluticasone (FLONASE) 50 mcg/actuation nasal spray 2 sprays by Each Nare route once daily.    glimepiride (AMARYL) 4 MG tablet Take 1 tablet (4 mg total) by mouth 2 (two) times daily.    glimepiride (AMARYL) 4 MG tablet TAKE ONE TABLET BY MOUTH TWICE DAILY    lancets (ONE TOUCH DELICA LANCETS) 33 gauge Misc 1 lancet by  "Misc.(Non-Drug; Combo Route) route 4 (four) times daily.    levocetirizine (XYZAL) 5 MG tablet Take 1 tablet (5 mg total) by mouth every evening.    levothyroxine (SYNTHROID) 125 MCG tablet Take 1 tablet (125 mcg total) by mouth once daily.    levothyroxine (SYNTHROID) 125 MCG tablet TAKE ONE TABLET BY MOUTH ONCE DAILY    LORazepam (ATIVAN) 1 MG tablet Take 1 tablet (1 mg total) by mouth every 12 (twelve) hours as needed for Anxiety.    metFORMIN (GLUCOPHAGE) 1000 MG tablet Take 1 tablet (1,000 mg total) by mouth 2 (two) times daily with meals.    metFORMIN (GLUCOPHAGE) 1000 MG tablet TAKE ONE TABLET BY MOUTH TWICE DAILY WITH MEALS    multivitamin capsule Take 1 capsule by mouth once daily.    omeprazole (PRILOSEC) 40 MG capsule Take 1 capsule (40 mg total) by mouth every morning.    ONETOUCH ULTRA TEST Strp USE ONE  STRIP TO CHECK GLUCOSE 4 TIMES DAILY AS DIRECTED    rosuvastatin (CRESTOR) 40 MG Tab Take 1 tablet (40 mg total) by mouth once daily.    rosuvastatin (CRESTOR) 40 MG Tab TAKE ONE TABLET BY MOUTH ONCE DAILY    RUTIN/HESP/BIOFLAV/C/HERB#196 (BIOFLEX ORAL) Take 2 tablets by mouth once daily.    senna (SENNA) 8.6 mg tablet Take 2 tablets by mouth nightly as needed for Constipation.    temazepam (RESTORIL) 15 mg Cap TAKE ONE CAPSULE BY MOUTH AT BEDTIME AS NEEDED    walker (ULTRA-LIGHT ROLLATOR) Misc Use walker w/ seat daily as directed.     No current facility-administered medications for this visit.        Objective:     Right Arm BP - Sittin/69 (07/10/18 1007)  Left Arm BP - Sittin/78 (07/10/18 1007)    /79 (BP Location: Left arm, Patient Position: Sitting, BP Method: Large (Automatic))   Pulse 63   Ht 5' 6" (1.676 m)   Wt 105.5 kg (232 lb 9.4 oz)   LMP  (LMP Unknown)   BMI 37.54 kg/m²     Physical Exam   Constitutional: She is oriented to person, place, and time. Vital signs are normal. She appears well-developed and well-nourished. She is active. No distress.   HENT: "   Head: Normocephalic and atraumatic.   Eyes: Conjunctivae and lids are normal. No scleral icterus.   Neck: Neck supple. Normal carotid pulses, no hepatojugular reflux and no JVD present. Carotid bruit is not present.   Cardiovascular: Normal rate, regular rhythm, S1 normal, S2 normal and intact distal pulses.  PMI is not displaced.  Exam reveals no gallop and no friction rub.    No murmur heard.  Pulses:       Carotid pulses are 2+ on the right side, and 2+ on the left side.       Radial pulses are 2+ on the right side, and 2+ on the left side.        Dorsalis pedis pulses are 2+ on the right side, and 2+ on the left side.        Posterior tibial pulses are 1+ on the right side, and 1+ on the left side.   Pulmonary/Chest: Effort normal and breath sounds normal. No respiratory distress. She has no decreased breath sounds. She has no wheezes. She has no rhonchi. She has no rales. She exhibits no tenderness.   Abdominal: Soft. Normal appearance and bowel sounds are normal. She exhibits no distension, no fluid wave, no abdominal bruit, no ascites and no pulsatile midline mass. There is no hepatosplenomegaly. There is no tenderness.   Musculoskeletal: She exhibits no edema.   Neurological: She is alert and oriented to person, place, and time. Gait normal.   Skin: Skin is warm, dry and intact. No rash noted. She is not diaphoretic. Nails show no clubbing.   Psychiatric: She has a normal mood and affect. Her speech is normal and behavior is normal. Judgment and thought content normal. Cognition and memory are normal.   Nursing note and vitals reviewed.      Chemistry        Component Value Date/Time     10/03/2017 1053    K 4.4 10/03/2017 1053     10/03/2017 1053    CO2 27 10/03/2017 1053    BUN 37 (H) 10/03/2017 1053    CREATININE 0.9 10/03/2017 1053    GLU 79 10/03/2017 1053        Component Value Date/Time    CALCIUM 9.6 10/03/2017 1053    ALKPHOS 86 10/03/2017 1053    AST 26 10/03/2017 1053    ALT 28  10/03/2017 1053    BILITOT 0.3 10/03/2017 1053    ESTGFRAFRICA >60.0 10/03/2017 1053    EGFRNONAA >60.0 10/03/2017 1053        Lab Results   Component Value Date    HGBA1C 6.6 (H) 10/03/2017       Recent Labs  Lab 04/07/16  1953  10/03/17  1053   WHITE BLOOD CELL COUNT 4.08  < > 4.72   HEMOGLOBIN 10.4 L  < > 11.7 L   HEMATOCRIT 33.9 L  < > 36.1 L   MCV 88  < > 87   PLATELETS 110 L  < > 131 L   TSH 1.121  --   --    CHOLESTEROL  --   < > 142   HDL  --   < > 53   LDL CHOLESTEROL  --   < > 78.8   TRIGLYCERIDES  --   < > 51   HDL/CHOLESTEROL RATIO  --   < > 37.3   < > = values in this interval not displayed.           Test(s) Reviewed  I have reviewed the following in detail:  [] Stress test   [] Angiography   [x] Echocardiogram   [x] Labs   [] Other:         Assessment/Plan:     Paroxysmal atrial fibrillation  Comments:  Asymptomatic. RRR. Continue anticoagulation   Orders:  -     Magnesium; Future; Expected date: 07/10/2018    Current use of long term anticoagulation  Comments:  Denies bleeding. Discussed warning sx to seek immediate medical attention. Continue apixaban 5mg BID    Status post placement of implantable loop recorder  Comments:  Interested in having ILR removed. Will contact Dr. Randolph    Essential hypertension  Comments:  BP at goal <130/80. No changes to current regimen.     Hyperlipidemia, unspecified hyperlipidemia type  Comments:  LDL at goal <100. No changes.  Orders:  -     Lipid panel; Future; Expected date: 07/10/2018    Severe obesity  Comments:  BMI 37.5 Encouraged CV exercise 30 minutes a day for 5 days a week.     Hypothyroidism, unspecified type  Comments:  Last TSH wnl in 2016. F/U with Dr. Johnston as planned.    Type 2 diabetes mellitus with diabetic neuropathy, without long-term current use of insulin  Comments:  A1C at goal <7. Missed labs on 7/3 for Dr. Johnston. Will have patient get blood drawn today.     Thrombocytopenia  Comments:  ,000    COURTNEY (obstructive sleep  apnea)  Comments:  Intermittent use of cpap. Reports fatigue and napping. Discussed importance of using cpap nightly. Encouraged f/u with sleep clinic for machine and mask check  Orders:  -     Ambulatory consult to Sleep Disorders        Follow-up in about 1 year (around 7/10/2019).

## 2018-07-10 NOTE — Clinical Note
Gabe Randolph, MsMercedes Lan would like to have her ILR removed.  She is not d/t see you until 10/2018.  Would you please facilitate her getting this removed? Thank you, Winifred

## 2018-07-12 ENCOUNTER — PATIENT MESSAGE (OUTPATIENT)
Dept: CARDIOLOGY | Facility: CLINIC | Age: 63
End: 2018-07-12

## 2018-07-12 ENCOUNTER — OFFICE VISIT (OUTPATIENT)
Dept: OBSTETRICS AND GYNECOLOGY | Facility: CLINIC | Age: 63
End: 2018-07-12
Payer: COMMERCIAL

## 2018-07-12 VITALS
WEIGHT: 233 LBS | DIASTOLIC BLOOD PRESSURE: 70 MMHG | HEIGHT: 66 IN | BODY MASS INDEX: 37.45 KG/M2 | SYSTOLIC BLOOD PRESSURE: 167 MMHG

## 2018-07-12 DIAGNOSIS — Z12.39 BREAST CANCER SCREENING: ICD-10-CM

## 2018-07-12 DIAGNOSIS — N84.1 CERVICAL POLYP: ICD-10-CM

## 2018-07-12 DIAGNOSIS — Z01.419 ENCOUNTER FOR GYNECOLOGICAL EXAMINATION WITHOUT ABNORMAL FINDING: Primary | ICD-10-CM

## 2018-07-12 PROCEDURE — 88305 TISSUE EXAM BY PATHOLOGIST: CPT | Performed by: PATHOLOGY

## 2018-07-12 PROCEDURE — 99386 PREV VISIT NEW AGE 40-64: CPT | Mod: 25,S$GLB,, | Performed by: OBSTETRICS & GYNECOLOGY

## 2018-07-12 PROCEDURE — 3078F DIAST BP <80 MM HG: CPT | Mod: CPTII,S$GLB,, | Performed by: OBSTETRICS & GYNECOLOGY

## 2018-07-12 PROCEDURE — 99999 PR PBB SHADOW E&M-EST. PATIENT-LVL IV: CPT | Mod: PBBFAC,,, | Performed by: OBSTETRICS & GYNECOLOGY

## 2018-07-12 PROCEDURE — 88175 CYTOPATH C/V AUTO FLUID REDO: CPT

## 2018-07-12 PROCEDURE — 3077F SYST BP >= 140 MM HG: CPT | Mod: CPTII,S$GLB,, | Performed by: OBSTETRICS & GYNECOLOGY

## 2018-07-12 PROCEDURE — 57500 BIOPSY OF CERVIX: CPT | Mod: S$GLB,,, | Performed by: OBSTETRICS & GYNECOLOGY

## 2018-07-12 NOTE — PROGRESS NOTES
"CC: Well woman exam    Diana Montenegro is a 63 y.o. female  presents for a well woman exam.  LMP: No LMP recorded (lmp unknown). Patient is postmenopausal..  No issues, problems, or complaints.  No PMB    Past Medical History:   Diagnosis Date    Arthritis     Atrial fibrillation, unspecified     hx of a fib prior to stroke.    Chronic back pain     CVA (cerebral infarction) 14    Degenerative disc disease     cervical; lumbar    Diabetes mellitus type II     Hyperlipidemia     Hypertension     Hypothyroidism     Obesity     Stroke 2014    Venous insufficiency (chronic) (peripheral)      Past Surgical History:   Procedure Laterality Date    GASTRECTOMY      HERNIA REPAIR      TONSILLECTOMY      TUBAL LIGATION       Social History     Social History    Marital status: Single     Spouse name: N/A    Number of children: N/A    Years of education: N/A     Occupational History    MOS  [employer]     Social History Main Topics    Smoking status: Former Smoker     Packs/day: 1.00     Years: 30.00     Types: Cigarettes     Quit date: 2014    Smokeless tobacco: Never Used    Alcohol use 0.0 oz/week      Comment: rarely    Drug use: No      Comment: thc at young age    Sexual activity: Not Currently     Partners: Male     Other Topics Concern    None     Social History Narrative    None     Family History   Problem Relation Age of Onset    No Known Problems Mother     Heart disease Father     Diabetes Maternal Grandfather     Obesity Maternal Grandfather     No Known Problems Sister     No Known Problems Sister     No Known Problems Maternal Grandmother     Stroke Paternal Grandmother     No Known Problems Paternal Grandfather     Heart attack Neg Hx      OB History      Para Term  AB Living    2 2       2    SAB TAB Ectopic Multiple Live Births            2          BP (!) 167/70   Ht 5' 6" (1.676 m)   Wt 105.7 kg (233 lb 0.4 " oz)   LMP  (LMP Unknown)   BMI 37.61 kg/m²       ROS:    ROS:  GENERAL: Denies weight gain or weight loss. Feeling well overall.   SKIN: Denies rash or lesions.   HEAD: Denies head injury or headache.   NODES: Denies enlarged lymph nodes.   CHEST: Denies chest pain or shortness of breath.   CARDIOVASCULAR: Denies palpitations or left sided chest pain.   ABDOMEN: No abdominal pain, constipation, diarrhea, nausea, vomiting or rectal bleeding.   URINARY: No frequency, dysuria, hematuria, or burning on urination.  REPRODUCTIVE: See HPI.   BREASTS: The patient performs breast self-examination and denies pain, lumps, or nipple discharge.   HEMATOLOGIC: No easy bruisability or excessive bleeding.   MUSCULOSKELETAL: Denies joint pain or swelling.   NEUROLOGIC: Denies syncope or weakness.   PSYCHIATRIC: Denies depression, anxiety or mood swings.    PHYSICAL EXAM:    APPEARANCE: Well nourished, well developed, in no acute distress.  AFFECT: WNL, alert and oriented x 3  SKIN: No acne or hirsutism  NECK: Neck symmetric without masses or thyromegaly  NODES: No inguinal, cervical, axillary, or femoral lymph node enlargement  CHEST: Good respiratory effect  ABDOMEN: Soft.  No tenderness or masses.  No hepatosplenomegaly.  No hernias.  BREASTS: Symmetrical, no skin changes or visible lesions.  No palpable masses, nipple discharge bilaterally.  PELVIC: Normal external genitalia without lesions.  Normal hair distribution.  Adequate perineal body, normal urethral meatus.  Vagina moist and well rugated without lesions or discharge.  Cervix pink, ENDOCERVICAL POLYP was present at the os, RING FORCEPS used to remove the polyp,  without lesions, discharge or tenderness.  No significant cystocele or rectocele.  Bimanual exam shows uterus to be normal size, regular, mobile and nontender.  Adnexa without masses or tenderness.    RECTAL: Rectovaginal exam confirms above with normal sphincter tone, no masses.  EXTREMITIES: No edema.       ICD-10-CM ICD-9-CM    1. Encounter for gynecological examination without abnormal finding Z01.419 V72.31 Liquid-based pap smear, screening   2. Breast cancer screening Z12.31 V76.10 Mammo Digital Screening Bilat with Tomos   3. Cervical polyp N84.1 622.7 Tissue Specimen To Pathology, Obstetrics/Gynecology      US Pelvis Comp with Transvag NON-OB (xpd       Follow up on the cervical polyps for possible extension into the uterine cavity    Patient was counseled today on A.C.S. Pap guidelines and recommendations for yearly pelvic exams, mammograms and monthly self breast exams; to see her PCP for other health maintenance.     Follow-up in about 1 year (around 7/12/2019), or if symptoms worsen or fail to improve.

## 2018-07-13 ENCOUNTER — HOSPITAL ENCOUNTER (OUTPATIENT)
Dept: RADIOLOGY | Facility: HOSPITAL | Age: 63
Discharge: HOME | End: 2018-07-13
Attending: OBSTETRICS & GYNECOLOGY
Payer: COMMERCIAL

## 2018-07-13 DIAGNOSIS — Z12.31 VISIT FOR SCREENING MAMMOGRAM: ICD-10-CM

## 2018-07-13 DIAGNOSIS — N84.1 CERVICAL POLYP: ICD-10-CM

## 2018-07-13 PROCEDURE — 77067 SCR MAMMO BI INCL CAD: CPT | Mod: 26,,, | Performed by: RADIOLOGY

## 2018-07-13 PROCEDURE — 77067 SCR MAMMO BI INCL CAD: CPT | Mod: TC

## 2018-07-13 PROCEDURE — 76856 US EXAM PELVIC COMPLETE: CPT | Mod: 26,,, | Performed by: RADIOLOGY

## 2018-07-13 PROCEDURE — 76856 US EXAM PELVIC COMPLETE: CPT | Mod: TC

## 2018-07-13 PROCEDURE — 77063 BREAST TOMOSYNTHESIS BI: CPT | Mod: 26,,, | Performed by: RADIOLOGY

## 2018-07-13 PROCEDURE — 76830 TRANSVAGINAL US NON-OB: CPT | Mod: 26,,, | Performed by: RADIOLOGY

## 2018-07-17 ENCOUNTER — OFFICE VISIT (OUTPATIENT)
Dept: INTERNAL MEDICINE | Facility: CLINIC | Age: 63
End: 2018-07-17
Payer: COMMERCIAL

## 2018-07-17 VITALS
RESPIRATION RATE: 16 BRPM | WEIGHT: 236.31 LBS | TEMPERATURE: 99 F | SYSTOLIC BLOOD PRESSURE: 142 MMHG | BODY MASS INDEX: 38.15 KG/M2 | DIASTOLIC BLOOD PRESSURE: 74 MMHG | HEART RATE: 72 BPM

## 2018-07-17 DIAGNOSIS — E11.40 TYPE 2 DIABETES MELLITUS WITH DIABETIC NEUROPATHY, WITHOUT LONG-TERM CURRENT USE OF INSULIN: Primary | ICD-10-CM

## 2018-07-17 DIAGNOSIS — M19.90 ARTHRITIS: ICD-10-CM

## 2018-07-17 DIAGNOSIS — I48.0 PAROXYSMAL ATRIAL FIBRILLATION: Chronic | ICD-10-CM

## 2018-07-17 DIAGNOSIS — Z12.11 ENCOUNTER FOR SCREENING COLONOSCOPY: ICD-10-CM

## 2018-07-17 DIAGNOSIS — E78.5 HYPERLIPIDEMIA, UNSPECIFIED HYPERLIPIDEMIA TYPE: ICD-10-CM

## 2018-07-17 DIAGNOSIS — K63.5 POLYP OF COLON, UNSPECIFIED PART OF COLON, UNSPECIFIED TYPE: ICD-10-CM

## 2018-07-17 DIAGNOSIS — E03.9 HYPOTHYROIDISM, UNSPECIFIED TYPE: ICD-10-CM

## 2018-07-17 DIAGNOSIS — I10 ESSENTIAL HYPERTENSION: Chronic | ICD-10-CM

## 2018-07-17 PROCEDURE — 3077F SYST BP >= 140 MM HG: CPT | Mod: CPTII,S$GLB,, | Performed by: INTERNAL MEDICINE

## 2018-07-17 PROCEDURE — 3008F BODY MASS INDEX DOCD: CPT | Mod: CPTII,S$GLB,, | Performed by: INTERNAL MEDICINE

## 2018-07-17 PROCEDURE — 99214 OFFICE O/P EST MOD 30 MIN: CPT | Mod: S$GLB,,, | Performed by: INTERNAL MEDICINE

## 2018-07-17 PROCEDURE — 3078F DIAST BP <80 MM HG: CPT | Mod: CPTII,S$GLB,, | Performed by: INTERNAL MEDICINE

## 2018-07-17 PROCEDURE — 99999 PR PBB SHADOW E&M-EST. PATIENT-LVL III: CPT | Mod: PBBFAC,,, | Performed by: INTERNAL MEDICINE

## 2018-07-17 PROCEDURE — 3045F PR MOST RECENT HEMOGLOBIN A1C LEVEL 7.0-9.0%: CPT | Mod: CPTII,S$GLB,, | Performed by: INTERNAL MEDICINE

## 2018-07-24 ENCOUNTER — TELEPHONE (OUTPATIENT)
Dept: ENDOSCOPY | Facility: HOSPITAL | Age: 63
End: 2018-07-24

## 2018-07-24 NOTE — TELEPHONE ENCOUNTER
Pt needs to be scheduled for a colonoscopy, not booked yet. She is on Eliquis and we need to know if she can hold med 2 days prior to procedure. Please message back with response for documentation.

## 2018-07-24 NOTE — TELEPHONE ENCOUNTER
Eliquis may be held starting 2 days prior to the colonoscopy.  It may be resumed 48 hr after the procedure.

## 2018-07-26 DIAGNOSIS — Z86.010 ENCOUNTER FOR COLONOSCOPY DUE TO HISTORY OF COLONIC POLYP: Primary | ICD-10-CM

## 2018-07-26 DIAGNOSIS — Z12.11 ENCOUNTER FOR COLONOSCOPY DUE TO HISTORY OF COLONIC POLYP: Primary | ICD-10-CM

## 2018-07-26 RX ORDER — SODIUM, POTASSIUM,MAG SULFATES 17.5-3.13G
1 SOLUTION, RECONSTITUTED, ORAL ORAL DAILY
Qty: 354 ML | Refills: 0 | Status: SHIPPED | OUTPATIENT
Start: 2018-07-26 | End: 2018-07-28

## 2018-07-26 NOTE — PROGRESS NOTES
Subjective:       Patient ID: Diana Montenegro is a 63 y.o. female.    Chief Complaint: Follow-up; Hypertension; and Hyperlipidemia    HPI    the patient presents for follow-up of diabetes, hypertension, and hyperlipidemia.  She has not been monitoring her blood sugar levels.  Dietary compliance has been fair.  She has been out of town for several months helping to care for her father who was ill.  She found it hard to follow her diet while out of town in [place].  Recently she has been performing pool exercises.  She saw her eye doctor 2 weeks ago; no acute eye changes were noted.  Review of Systems   Constitutional: Positive for activity change. Negative for appetite change and unexpected weight change.   HENT: Negative for hearing loss, rhinorrhea and trouble swallowing.    Eyes: Negative for discharge and visual disturbance.   Respiratory: Negative for shortness of breath and wheezing.    Cardiovascular: Negative for chest pain, palpitations and leg swelling.   Gastrointestinal: Negative for abdominal pain, blood in stool, constipation, diarrhea and vomiting.   Endocrine: Negative for polydipsia and polyuria.   Genitourinary: Negative for difficulty urinating, dysuria, frequency, hematuria, menstrual problem and urgency.   Musculoskeletal: Positive for arthralgias. Negative for neck pain.   Neurological: Negative for weakness, numbness and headaches.   Psychiatric/Behavioral: Negative for confusion, dysphoric mood and sleep disturbance.       Objective:      Physical Exam   Constitutional: She is oriented to person, place, and time. She appears well-developed and well-nourished. No distress.   The patient has gained 35 lb since 10/10/2017.    HENT:   Head: Normocephalic and atraumatic.   Eyes: Conjunctivae and EOM are normal. Pupils are equal, round, and reactive to light. No scleral icterus.   Neck: Normal range of motion. Neck supple. No JVD present. No thyromegaly present.   Cardiovascular: Normal rate,  regular rhythm, normal heart sounds and intact distal pulses.  Exam reveals no gallop and no friction rub.    No murmur heard.  Pulmonary/Chest: Effort normal and breath sounds normal. No respiratory distress. She has no wheezes. She has no rales.   Abdominal: Soft. Bowel sounds are normal. She exhibits no mass. There is no tenderness.   Musculoskeletal: Normal range of motion. She exhibits no edema or tenderness.   Lymphadenopathy:     She has no cervical adenopathy.   Neurological: She is alert and oriented to person, place, and time. No cranial nerve deficit.   Sensory exam is intact in both feet on monofilament and vibration testing.   Skin: Skin is warm and dry. No rash noted.   No foot lesions are present.   Psychiatric: She has a normal mood and affect. Her behavior is normal.   Nursing note and vitals reviewed.      Results for orders placed or performed in visit on 07/10/18   Comprehensive metabolic panel   Result Value Ref Range    Sodium 140 136 - 145 mmol/L    Potassium 5.0 3.5 - 5.1 mmol/L    Chloride 106 95 - 110 mmol/L    CO2 27 23 - 29 mmol/L    Glucose 102 70 - 110 mg/dL    BUN, Bld 27 (H) 8 - 23 mg/dL    Creatinine 0.9 0.5 - 1.4 mg/dL    Calcium 10.2 8.7 - 10.5 mg/dL    Total Protein 7.1 6.0 - 8.4 g/dL    Albumin 3.6 3.5 - 5.2 g/dL    Total Bilirubin 0.3 0.1 - 1.0 mg/dL    Alkaline Phosphatase 107 55 - 135 U/L    AST 22 10 - 40 U/L    ALT 31 10 - 44 U/L    Anion Gap 7 (L) 8 - 16 mmol/L    eGFR if African American >60.0 >60 mL/min/1.73 m^2    eGFR if non African American >60.0 >60 mL/min/1.73 m^2   Hemoglobin A1c   Result Value Ref Range    Hemoglobin A1C 8.9 (H) 4.0 - 5.6 %    Estimated Avg Glucose 209 (H) 68 - 131 mg/dL   Magnesium   Result Value Ref Range    Magnesium 1.6 1.6 - 2.6 mg/dL   Lipid panel   Result Value Ref Range    Cholesterol 149 120 - 199 mg/dL    Triglycerides 85 30 - 150 mg/dL    HDL 52 40 - 75 mg/dL    LDL Cholesterol 80.0 63.0 - 159.0 mg/dL    HDL/Chol Ratio 34.9 20.0 - 50.0 %     Total Cholesterol/HDL Ratio 2.9 2.0 - 5.0    Non-HDL Cholesterol 97 mg/dL        Assessment:       1. Type 2 diabetes mellitus with diabetic neuropathy, without long-term current use of insulin    2. Essential hypertension    3. Paroxysmal atrial fibrillation    4. Hyperlipidemia, unspecified hyperlipidemia type    5. Hypothyroidism, unspecified type    6. Arthritis    7. Polyp of colon, unspecified part of colon, unspecified type    8. Encounter for screening colonoscopy        Plan:           Diana was seen today for follow-up. Current therapy will be continued.  Screening colonoscopy will be ordered.  Fasting blood tests and follow-up visit in 4 months will be obtained.    Diagnoses and all orders for this visit:    Type 2 diabetes mellitus with diabetic neuropathy, without long-term current use of insulin  -     Hemoglobin A1c; Future    Essential hypertension    Paroxysmal atrial fibrillation    Hyperlipidemia, unspecified hyperlipidemia type  -     Comprehensive metabolic panel; Future    Hypothyroidism, unspecified type    Arthritis    Polyp of colon, unspecified part of colon, unspecified type  -     Case request GI: COLONOSCOPY    Encounter for screening colonoscopy  -     Case request GI: COLONOSCOPY

## 2018-08-08 ENCOUNTER — TELEPHONE (OUTPATIENT)
Dept: ELECTROPHYSIOLOGY | Facility: CLINIC | Age: 63
End: 2018-08-08

## 2018-08-08 DIAGNOSIS — I48.0 PAROXYSMAL ATRIAL FIBRILLATION: Primary | ICD-10-CM

## 2018-08-09 ENCOUNTER — TELEPHONE (OUTPATIENT)
Dept: ELECTROPHYSIOLOGY | Facility: CLINIC | Age: 63
End: 2018-08-09

## 2018-08-09 NOTE — TELEPHONE ENCOUNTER
----- Message from Thomas Randolph MD sent at 8/9/2018  6:31 AM CDT -----  2 days  ----- Message -----  From: Xiao Perdomo, RN  Sent: 8/8/2018   9:04 AM  To: Thomas Randolph MD    I scheduled ILR removal for 9/17/18. She is on Eliquis (Cryptogenic CVA). Do you want to hold? How long?  ----- Message -----  From: Thomas Randolph MD  Sent: 7/10/2018  10:58 PM  To: Winifred Bowman NP, Xiao Perdomo, RN    Thanks Winifred.  We had asked her before and she wanted to leave in place.  We will contact her to schedule removal.  ----- Message -----  From: Winifred Bowman NP  Sent: 7/10/2018  11:40 AM  To: Thomas Randolph MD    Hi Dr. Randolph,  Ms. Montenegro would like to have her ILR removed.  She is not d/t see you until 10/2018.  Would you please facilitate her getting this removed?  Thank you,  Winifred

## 2018-08-21 ENCOUNTER — PATIENT MESSAGE (OUTPATIENT)
Dept: ELECTROPHYSIOLOGY | Facility: CLINIC | Age: 63
End: 2018-08-21

## 2018-08-22 NOTE — PROGRESS NOTES
IMPLANTABLE LOOP RECORDER REMOVAL EDUCATION CHECKLIST    MEDICATIONS:  · HOLD Eliquis for 2 days prior to procedure. Last dose will be 9/14/2018  · HOLD Metformin and Glimepiride on the morning of procedure.  YOU MAY TAKE YOUR OTHER NORMAL MORNING MEDICATIONS WITH A SIP OF WATER      9/10/18 - Pre Op Lab Work @ 8:50am- [facility]  You do not need to fast for this lab work.     9/17/18 @ 9:30 AM  REPORT TO THE CARDIOLOGY WAITING AREA ON 3RD FLOOR OF THE HOSPITAL     WASH WITH HIBICLENS ANTIBACTERIAL SOAP (SUCH AS DIAL) ON THE NIGHT BEFORE AND THE MORNING OF YOUR PROCEDURE PRIOR TO ARRIVAL    DO NOT EAT OR DRINK ANYTHING AFTER 12MN THE NIGHT BEFORE THE PROCEDURE      DIRECTIONS TO CARDIOLOGY WAITING AREA  If you park in the Parking Garage:  Take elevators to the 2nd floor  Walk up ramp and turn right by Gold Elevators  Take elevator to the 3rd floor  Upon exiting the elevator, turn away from the clinic areas  Walk long pringle around to front of hospital to area with windows overlooking [facility]  Check in at Reception Desk  OR  If family is dropping you off:  Have them drop you off at the front of the Hospital  (Near the ER, where all the flags are hung).  Take the E elevators to the 3rd floor.  Check in at the Reception Desk in the waiting room.        YOU WILL GO HOME AFTER YOUR PROCEDURE.  YOU WILL NEED A RIDE HOME AFTER YOUR PROCEDURE.     YOUR PAIN WILL ME MONITORED BY THE SEDATION NURSE DURING YOUR PROCEDURE    THE ABOVE INSTRUCTIONS WERE GIVEN TO THE PATIENT VERBALLY AND THEY VERBALIZED UNDERSTANDING. THEY DO NOT REQUIRE ANY SPECIAL NEEDS AND DO NOT HAVE ANY LEARNING BARRIERS.     Any need to reschedule or cancel procedures, or any questions regarding your procedures should be addressed directly with the Arrhythmia Department Nurses at the following phone number: [phone]

## 2018-08-30 ENCOUNTER — ANESTHESIA (OUTPATIENT)
Dept: ENDOSCOPY | Facility: HOSPITAL | Age: 63
End: 2018-08-30
Payer: COMMERCIAL

## 2018-08-30 ENCOUNTER — ANESTHESIA EVENT (OUTPATIENT)
Dept: ENDOSCOPY | Facility: HOSPITAL | Age: 63
End: 2018-08-30
Payer: COMMERCIAL

## 2018-08-30 ENCOUNTER — HOSPITAL ENCOUNTER (OUTPATIENT)
Facility: HOSPITAL | Age: 63
Discharge: HOME | End: 2018-08-30
Attending: COLON & RECTAL SURGERY | Admitting: COLON & RECTAL SURGERY
Payer: COMMERCIAL

## 2018-08-30 VITALS
WEIGHT: 231 LBS | TEMPERATURE: 99 F | HEIGHT: 66 IN | HEART RATE: 52 BPM | RESPIRATION RATE: 18 BRPM | SYSTOLIC BLOOD PRESSURE: 139 MMHG | BODY MASS INDEX: 37.12 KG/M2 | OXYGEN SATURATION: 98 % | DIASTOLIC BLOOD PRESSURE: 69 MMHG

## 2018-08-30 DIAGNOSIS — Z86.010 HISTORY OF COLON POLYPS: Primary | ICD-10-CM

## 2018-08-30 DIAGNOSIS — Z12.11 SCREENING FOR COLON CANCER: ICD-10-CM

## 2018-08-30 LAB — POCT GLUCOSE: 136 MG/DL (ref 70–110)

## 2018-08-30 PROCEDURE — 37000009 HC ANESTHESIA EA ADD 15 MINS: Performed by: COLON & RECTAL SURGERY

## 2018-08-30 PROCEDURE — E9220 PRA ENDO ANESTHESIA: HCPCS | Mod: ,,, | Performed by: NURSE ANESTHETIST, CERTIFIED REGISTERED

## 2018-08-30 PROCEDURE — 25000003 PHARM REV CODE 250: Performed by: NURSE PRACTITIONER

## 2018-08-30 PROCEDURE — G0105 COLORECTAL SCRN; HI RISK IND: HCPCS | Performed by: COLON & RECTAL SURGERY

## 2018-08-30 PROCEDURE — 63600175 PHARM REV CODE 636 W HCPCS: Performed by: NURSE ANESTHETIST, CERTIFIED REGISTERED

## 2018-08-30 PROCEDURE — 82962 GLUCOSE BLOOD TEST: CPT | Performed by: COLON & RECTAL SURGERY

## 2018-08-30 PROCEDURE — 37000008 HC ANESTHESIA 1ST 15 MINUTES: Performed by: COLON & RECTAL SURGERY

## 2018-08-30 PROCEDURE — G0105 COLORECTAL SCRN; HI RISK IND: HCPCS | Mod: ,,, | Performed by: COLON & RECTAL SURGERY

## 2018-08-30 RX ORDER — PROPOFOL 10 MG/ML
VIAL (ML) INTRAVENOUS
Status: DISCONTINUED | OUTPATIENT
Start: 2018-08-30 | End: 2018-08-30

## 2018-08-30 RX ORDER — SODIUM CHLORIDE 0.9 % (FLUSH) 0.9 %
3 SYRINGE (ML) INJECTION
Status: DISCONTINUED | OUTPATIENT
Start: 2018-08-30 | End: 2018-08-30 | Stop reason: HOSPADM

## 2018-08-30 RX ORDER — SODIUM CHLORIDE 9 MG/ML
INJECTION, SOLUTION INTRAVENOUS CONTINUOUS
Status: DISCONTINUED | OUTPATIENT
Start: 2018-08-30 | End: 2018-08-30 | Stop reason: HOSPADM

## 2018-08-30 RX ORDER — LIDOCAINE HCL/PF 100 MG/5ML
SYRINGE (ML) INTRAVENOUS
Status: DISCONTINUED | OUTPATIENT
Start: 2018-08-30 | End: 2018-08-30

## 2018-08-30 RX ORDER — PROPOFOL 10 MG/ML
VIAL (ML) INTRAVENOUS CONTINUOUS PRN
Status: DISCONTINUED | OUTPATIENT
Start: 2018-08-30 | End: 2018-08-30

## 2018-08-30 RX ORDER — SODIUM CHLORIDE 0.9 % (FLUSH) 0.9 %
3 SYRINGE (ML) INJECTION
Status: CANCELLED | OUTPATIENT
Start: 2018-08-30

## 2018-08-30 RX ORDER — ONDANSETRON 2 MG/ML
4 INJECTION INTRAMUSCULAR; INTRAVENOUS ONCE AS NEEDED
Status: CANCELLED | OUTPATIENT
Start: 2018-08-30 | End: 2018-08-30

## 2018-08-30 RX ADMIN — SODIUM CHLORIDE: 0.9 INJECTION, SOLUTION INTRAVENOUS at 12:08

## 2018-08-30 RX ADMIN — PROPOFOL 150 MCG/KG/MIN: 10 INJECTION, EMULSION INTRAVENOUS at 12:08

## 2018-08-30 RX ADMIN — LIDOCAINE HYDROCHLORIDE 50 MG: 20 INJECTION, SOLUTION INTRAVENOUS at 12:08

## 2018-08-30 RX ADMIN — PROPOFOL 60 MG: 10 INJECTION, EMULSION INTRAVENOUS at 12:08

## 2018-08-30 NOTE — ANESTHESIA POSTPROCEDURE EVALUATION
"Anesthesia Post Evaluation    Patient: Diana Montenegro    Procedure(s) Performed: Procedure(s) (LRB):  COLONOSCOPY (N/A)    Final Anesthesia Type: general  Patient location during evaluation: PACU  Patient participation: Yes- Able to Participate  Level of consciousness: awake and alert and oriented  Post-procedure vital signs: reviewed and stable  Pain management: adequate  Airway patency: patent  PONV status at discharge: No PONV  Anesthetic complications: no      Cardiovascular status: blood pressure returned to baseline and hemodynamically stable  Respiratory status: unassisted, room air and spontaneous ventilation  Hydration status: euvolemic  Follow-up not needed.        Visit Vitals  /69 (BP Location: Left arm, Patient Position: Lying)   Pulse (!) 52   Temp 37 °C (98.6 °F) (Temporal)   Resp 18   Ht 5' 6" (1.676 m)   Wt 104.8 kg (231 lb)   LMP  (LMP Unknown)   SpO2 98%   Breastfeeding? No   BMI 37.28 kg/m²       Pain/Roger Score: Pain Assessment Performed: Yes (8/30/2018 12:40 PM)  Presence of Pain: denies (8/30/2018 12:40 PM)  Roger Score: 10 (8/30/2018 12:40 PM)        "

## 2018-08-30 NOTE — H&P
Endoscopy H&P    Procedure : Colonoscopy      asymptomatic screening exam, 5 yr follow-up, 2nd c-scope, reports no polyps or current symptoms, last dose eliquis on Monday evening       Past Medical History:   Diagnosis Date    Arthritis     Atrial fibrillation, unspecified     hx of a fib prior to stroke.    Chronic back pain     Colon polyp     CVA (cerebral infarction) 14    Degenerative disc disease     cervical; lumbar    Diabetes mellitus type II     Hyperlipidemia     Hypertension     Hypothyroidism     Obesity     Stroke 2014    Venous insufficiency (chronic) (peripheral)      Sedation Problems: NO  Family History   Problem Relation Age of Onset    No Known Problems Mother     Heart disease Father     Diabetes Maternal Grandfather     Obesity Maternal Grandfather     No Known Problems Sister     No Known Problems Sister     No Known Problems Maternal Grandmother     Stroke Paternal Grandmother     No Known Problems Paternal Grandfather     Heart attack Neg Hx      Fam Hx of Sedation Problems: NO  Social History     Socioeconomic History    Marital status: Single     Spouse name: Not on file    Number of children: Not on file    Years of education: Not on file    Highest education level: Not on file   Social Needs    Financial resource strain: Not on file    Food insecurity - worry: Not on file    Food insecurity - inability: Not on file    Transportation needs - medical: Not on file    Transportation needs - non-medical: Not on file   Occupational History    Occupation: MOS      Employer: [employer]   Tobacco Use    Smoking status: Former Smoker     Packs/day: 1.00     Years: 30.00     Pack years: 30.00     Types: Cigarettes     Last attempt to quit: 2014     Years since quittin.1    Smokeless tobacco: Never Used   Substance and Sexual Activity    Alcohol use:  Yes     Alcohol/week: 0.0 oz     Comment: rarely    Drug use: No     Comment: thc at young age    Sexual activity: Not Currently     Partners: Male   Other Topics Concern    Not on file   Social History Narrative    Not on file       Review of Systems - Negative     Respiratory ROS: negative  Cardiovascular ROS: negative  Gastrointestinal ROS: negative  Musculoskeletal ROS: negative  Neurological ROS: negative        Physical Exam:  General: no distress  Head: normocephalic  Airway:  normal oropharynx, airway normal  Neck: supple, symmetrical, trachea midline  Lungs:  normal respiratory effort  Heart: HDS  Abdomen: SNTND  Extremities: no cyanosis or edema, or clubbing       Deep Sedation: Mallampati Score per anesthesia     SedationPlan :Choice     ASA : III

## 2018-08-30 NOTE — ANESTHESIA PREPROCEDURE EVALUATION
08/30/2018  Diana Montenegro is a 63 y.o., female.  Past Medical History:   Diagnosis Date    Arthritis     Atrial fibrillation, unspecified     hx of a fib prior to stroke.    Chronic back pain     Colon polyp     CVA (cerebral infarction) 7/16/14    Degenerative disc disease     cervical; lumbar    Diabetes mellitus type II     Hyperlipidemia     Hypertension     Hypothyroidism     Obesity     Stroke july 2014    Venous insufficiency (chronic) (peripheral)      Past Surgical History:   Procedure Laterality Date    COLONOSCOPY W/ POLYPECTOMY      GASTRECTOMY      HERNIA REPAIR      TONSILLECTOMY      TUBAL LIGATION         Anesthesia Evaluation    I have reviewed the Patient Summary Reports.     I have reviewed the Medications.     Review of Systems  Anesthesia Hx:  Denies Hx of Anesthetic complications  Neg history of prior surgery. Denies Family Hx of Anesthesia complications.   Denies Personal Hx of Anesthesia complications.   Cardiovascular:   Exercise tolerance: good        Physical Exam  General:  Obesity    Airway/Jaw/Neck:  Airway Findings: Mouth Opening: Normal Tongue: Normal  General Airway Assessment: Adult  Mallampati: II  TM Distance: Normal, at least 6 cm         Dental:  DENTAL FINDINGS: Normal   Chest/Lungs:  Chest/Lungs Clear    Heart/Vascular:  Heart Findings: Normal       Mental Status:  Mental Status Findings:  Alert and Oriented         Anesthesia Plan  Type of Anesthesia, risks & benefits discussed:  Anesthesia Type:  general  Patient's Preference:   Intra-op Monitoring Plan: standard ASA monitors  Intra-op Monitoring Plan Comments:   Post Op Pain Control Plan:   Post Op Pain Control Plan Comments:   Induction:   IV  Beta Blocker:  Patient is not currently on a Beta-Blocker (No further documentation required).       Informed Consent: Patient understands risks and  agrees with Anesthesia plan.  Questions answered. Anesthesia consent signed with patient.  ASA Score: 3     Day of Surgery Review of History & Physical:    H&P update referred to the provider.         Ready For Surgery From Anesthesia Perspective.

## 2018-08-30 NOTE — [employer] PATIENT INSTRUCTIONS
Discharge Summary/Instructions after an Endoscopic Procedure  Patient Name: Diana Montenegro  Patient MRN: [id number]  Patient YOB: 1955 Thursday, August 30, 2018  William Clement MD  RESTRICTIONS:  During your procedure today, you received medications for sedation.  These   medications may affect your judgment, balance and coordination.  Therefore,   for 24 hours, you have the following restrictions:   - DO NOT drive a car, operate machinery, make legal/financial decisions,   sign important papers or drink alcohol.    ACTIVITY:  Today: no heavy lifting, straining or running due to procedural   sedation/anesthesia.  The following day: return to full activity including work.  DIET:  Eat and drink normally unless instructed otherwise.     TREATMENT FOR COMMON SIDE EFFECTS:  - Mild abdominal pain, nausea, belching, bloating or excessive gas:  rest,   eat lightly and use a heating pad.  - Sore Throat: treat with throat lozenges and/or gargle with warm salt   water.  - Because air was used during the procedure, expelling large amounts of air   from your rectum or belching is normal.  - If a bowel prep was taken, you may not have a bowel movement for 1-3 days.    This is normal.  SYMPTOMS TO WATCH FOR AND REPORT TO YOUR PHYSICIAN:  1. Abdominal pain or bloating, other than gas cramps.  2. Chest pain.  3. Back pain.  4. Signs of infection such as: chills or fever occurring within 24 hours   after the procedure.  5. Rectal bleeding, which would show as bright red, maroon, or black stools.   (A tablespoon of blood from the rectum is not serious, especially if   hemorrhoids are present.)  6. Vomiting.  7. Weakness or dizziness.  GO DIRECTLY TO THE NEAREST EMERGENCY ROOM IF YOU HAVE ANY OF THE FOLLOWING:      Difficulty breathing              Chills and/or fever over 101 F   Persistent vomiting and/or vomiting blood   Severe abdominal pain   Severe chest pain   Black, tarry stools   Bleeding- more than one  tablespoon   Any other symptom or condition that you feel may need urgent attention  Your doctor recommends these additional instructions:  If any biopsies were taken, your doctors clinic will contact you in 1 to 2   weeks with any results.  - Discharge patient to home (ambulatory).   - Repeat colonoscopy in 5 years for surveillance.   - Restart Elaquis at normal dose tommorrow  For questions, problems or results please call your physician - William Clement MD at Work:  [phone].  [facility], EMERGENCY ROOM PHONE NUMBER: [phone]  IF A COMPLICATION OR EMERGENCY SITUATION ARISES AND YOU ARE UNABLE TO REACH   YOUR PHYSICIAN - GO DIRECTLY TO THE EMERGENCY ROOM.  William Clement MD  8/30/2018 12:22:53 PM  This report has been verified and signed electronically.  [employer]

## 2018-08-30 NOTE — TRANSFER OF CARE
"Anesthesia Transfer of Care Note    Patient: Diana Montenegro    Procedure(s) Performed: Procedure(s) (LRB):  COLONOSCOPY (N/A)    Patient location: GI    Anesthesia Type: general    Transport from OR: Transported from OR on room air with adequate spontaneous ventilation    Post pain: adequate analgesia    Post assessment: no apparent anesthetic complications    Post vital signs: stable    Level of consciousness: awake    Nausea/Vomiting: no nausea/vomiting    Complications: none    Transfer of care protocol was followed      Last vitals:   Visit Vitals  BP (!) 174/73 (BP Location: Left arm, Patient Position: Lying)   Pulse (!) 55   Temp 36.4 °C (97.5 °F) (Temporal)   Resp 20   Ht 5' 6" (1.676 m)   Wt 104.8 kg (231 lb)   LMP  (LMP Unknown)   SpO2 97%   Breastfeeding? No   BMI 37.28 kg/m²     "

## 2018-09-06 ENCOUNTER — TELEPHONE (OUTPATIENT)
Dept: ENDOSCOPY | Facility: HOSPITAL | Age: 63
End: 2018-09-06

## 2018-09-10 ENCOUNTER — LAB VISIT (OUTPATIENT)
Dept: LAB | Facility: HOSPITAL | Age: 63
End: 2018-09-10
Attending: INTERNAL MEDICINE
Payer: COMMERCIAL

## 2018-09-10 DIAGNOSIS — I48.0 PAROXYSMAL ATRIAL FIBRILLATION: ICD-10-CM

## 2018-09-10 LAB
ANION GAP SERPL CALC-SCNC: 9 MMOL/L
APTT BLDCRRT: 29.2 SEC
BASOPHILS # BLD AUTO: 0.03 K/UL
BASOPHILS NFR BLD: 0.5 %
BUN SERPL-MCNC: 24 MG/DL
CALCIUM SERPL-MCNC: 9.6 MG/DL
CHLORIDE SERPL-SCNC: 106 MMOL/L
CO2 SERPL-SCNC: 26 MMOL/L
CREAT SERPL-MCNC: 0.8 MG/DL
DIFFERENTIAL METHOD: ABNORMAL
EOSINOPHIL # BLD AUTO: 0.1 K/UL
EOSINOPHIL NFR BLD: 2.4 %
ERYTHROCYTE [DISTWIDTH] IN BLOOD BY AUTOMATED COUNT: 13.4 %
EST. GFR  (AFRICAN AMERICAN): >60 ML/MIN/1.73 M^2
EST. GFR  (NON AFRICAN AMERICAN): >60 ML/MIN/1.73 M^2
GLUCOSE SERPL-MCNC: 81 MG/DL
HCT VFR BLD AUTO: 35.5 %
HGB BLD-MCNC: 10.9 G/DL
INR PPP: 1
LYMPHOCYTES # BLD AUTO: 1.6 K/UL
LYMPHOCYTES NFR BLD: 27.7 %
MCH RBC QN AUTO: 27.2 PG
MCHC RBC AUTO-ENTMCNC: 30.7 G/DL
MCV RBC AUTO: 89 FL
MONOCYTES # BLD AUTO: 0.4 K/UL
MONOCYTES NFR BLD: 6.2 %
NEUTROPHILS # BLD AUTO: 3.7 K/UL
NEUTROPHILS NFR BLD: 63 %
PLATELET # BLD AUTO: 146 K/UL
PMV BLD AUTO: 10.4 FL
POTASSIUM SERPL-SCNC: 4.2 MMOL/L
PROTHROMBIN TIME: 10.4 SEC
RBC # BLD AUTO: 4.01 M/UL
SODIUM SERPL-SCNC: 141 MMOL/L
WBC # BLD AUTO: 5.81 K/UL

## 2018-09-10 PROCEDURE — 85610 PROTHROMBIN TIME: CPT

## 2018-09-10 PROCEDURE — 85025 COMPLETE CBC W/AUTO DIFF WBC: CPT

## 2018-09-10 PROCEDURE — 36415 COLL VENOUS BLD VENIPUNCTURE: CPT

## 2018-09-10 PROCEDURE — 85730 THROMBOPLASTIN TIME PARTIAL: CPT

## 2018-09-10 PROCEDURE — 80048 BASIC METABOLIC PNL TOTAL CA: CPT

## 2018-09-14 ENCOUNTER — TELEPHONE (OUTPATIENT)
Dept: ELECTROPHYSIOLOGY | Facility: CLINIC | Age: 63
End: 2018-09-14

## 2018-09-14 NOTE — TELEPHONE ENCOUNTER
Spoke to patient      CONFIRMED procedure arrival time of 9am, 3rd Floor SSCU  Reiterated instructions including:  -Directions to check in desk  -NPO after midnight night prior to procedure  -High importance of HOLDING Eliquis for 2 days. Last dose will be her PM dose today.   -Instructed to hold diabetic meds on Monday morning  -Do not wear mascara day of procedure  -Bathe night prior and morning prior to procedure with Hibiclens solution or an antibacterial soap       Pt verbalizes understanding of above and appreciates call. She is aware that she cannot drive herself home due to potential sedation. She will call a cab.

## 2018-09-14 NOTE — TELEPHONE ENCOUNTER
Left message for patient to return call. Call back number left. Need to review pre-op instructions for ILR removal on 9/17/2018. Left message reminding her to hold Eliquis on Saturday and Sunday along with request for her to return call and confirm.

## 2018-09-14 NOTE — TELEPHONE ENCOUNTER
----- Message from America Willis MA sent at 9/14/2018 11:32 AM CDT -----  Contact: patient      ----- Message -----  From: Clair Camacho  Sent: 9/14/2018  11:13 AM  To: Tomasa Guzman Staff    The Pt is returing a call. Please call her back @ [phone]. Thanks, Clair

## 2018-09-17 ENCOUNTER — HOSPITAL ENCOUNTER (OUTPATIENT)
Facility: HOSPITAL | Age: 63
Discharge: HOME | End: 2018-09-17
Attending: INTERNAL MEDICINE | Admitting: INTERNAL MEDICINE
Payer: COMMERCIAL

## 2018-09-17 VITALS
RESPIRATION RATE: 18 BRPM | SYSTOLIC BLOOD PRESSURE: 154 MMHG | HEART RATE: 57 BPM | DIASTOLIC BLOOD PRESSURE: 67 MMHG | TEMPERATURE: 97 F | BODY MASS INDEX: 37.12 KG/M2 | WEIGHT: 231 LBS | HEIGHT: 66 IN | OXYGEN SATURATION: 99 %

## 2018-09-17 DIAGNOSIS — I63.9 CEREBRAL INFARCTION: ICD-10-CM

## 2018-09-17 DIAGNOSIS — Z45.09 ENCOUNTER FOR LOOP RECORDER AT END OF BATTERY LIFE: Primary | ICD-10-CM

## 2018-09-17 LAB — POCT GLUCOSE: 97 MG/DL (ref 70–110)

## 2018-09-17 PROCEDURE — 25000003 PHARM REV CODE 250: Performed by: NURSE PRACTITIONER

## 2018-09-17 PROCEDURE — 93010 ELECTROCARDIOGRAM REPORT: CPT | Mod: ,,, | Performed by: INTERNAL MEDICINE

## 2018-09-17 PROCEDURE — 33284 EP LAB PROCEDURE: CPT

## 2018-09-17 PROCEDURE — 93005 ELECTROCARDIOGRAM TRACING: CPT

## 2018-09-17 PROCEDURE — 33284 EP LAB PROCEDURE: CPT | Mod: ,,, | Performed by: INTERNAL MEDICINE

## 2018-09-17 PROCEDURE — 82962 GLUCOSE BLOOD TEST: CPT

## 2018-09-17 PROCEDURE — 63600175 PHARM REV CODE 636 W HCPCS

## 2018-09-17 PROCEDURE — 25000003 PHARM REV CODE 250

## 2018-09-17 RX ORDER — CEFAZOLIN SODIUM 1 G/3ML
2 INJECTION, POWDER, FOR SOLUTION INTRAMUSCULAR; INTRAVENOUS
Status: DISCONTINUED | OUTPATIENT
Start: 2018-09-17 | End: 2023-10-02

## 2018-09-17 RX ORDER — AMOXICILLIN 500 MG
1 CAPSULE ORAL 2 TIMES DAILY
COMMUNITY

## 2018-09-17 RX ORDER — SODIUM CHLORIDE 9 MG/ML
INJECTION, SOLUTION INTRAVENOUS CONTINUOUS
Status: ACTIVE | OUTPATIENT
Start: 2018-09-17

## 2018-09-17 RX ADMIN — SODIUM CHLORIDE 1000 ML: 0.9 INJECTION, SOLUTION INTRAVENOUS at 09:09

## 2018-09-17 NOTE — SUBJECTIVE & OBJECTIVE
Past Medical History:   Diagnosis Date    Arthritis     Atrial fibrillation, unspecified     hx of a fib prior to stroke.    Chronic back pain     Colon polyp     CVA (cerebral infarction) 7/16/14    Degenerative disc disease     cervical; lumbar    Diabetes mellitus type II     Hyperlipidemia     Hypertension     Hypothyroidism     Obesity     Sleep apnea     Stroke july 2014    Venous insufficiency (chronic) (peripheral)        Past Surgical History:   Procedure Laterality Date    COLONOSCOPY N/A 8/30/2018    Procedure: COLONOSCOPY;  Surgeon: William Clement MD;  Location: [facility] (4TH FLR);  Service: Endoscopy;  Laterality: N/A;  Tito/Dr Rad Johnston/OK to hold 2 days prior to colon/see telephone encounter dated 7/24/18/pt has loop recorder/svn    COLONOSCOPY N/A 8/30/2018    Performed by William Clement MD at [facility] (4TH FLR)    COLONOSCOPY N/A 5/24/2013    Performed by Gordon Brown MD at [facility] (4TH FLR)    COLONOSCOPY W/ POLYPECTOMY      GASTRECTOMY      GASTRECTOMY-SLEEVE-LAPAROSCOPIC-33269 with intraop EGD and hiatel hernia repair N/A 7/23/2015    Performed by Burak Stacy Jr., MD at [facility] OR 2ND FLR    HERNIA REPAIR      TONSILLECTOMY      TUBAL LIGATION         Review of patient's allergies indicates:   Allergen Reactions    Medrol [methylprednisolone] Palpitations       No current facility-administered medications on file prior to encounter.      Current Outpatient Medications on File Prior to Encounter   Medication Sig    aspirin (ECOTRIN) 81 MG EC tablet Take 1 tablet (81 mg total) by mouth once daily.    b complex vitamins tablet Take 1 tablet by mouth once daily.    CALCIUM CITRATE/VITAMIN D3 (CALCIUM CITRATE + D ORAL) Take 2 tablets by mouth 2 (two) times daily.    cinnamon bark (CINNAMON ORAL) Take by mouth 2 (two) times daily.    cyanocobalamin, vitamin B-12, 500 mcg Subl Place 1 tablet under the tongue once daily.    docusate sodium (COLACE) 100 MG  capsule Take 100 mg by mouth once daily.     fish oil-omega-3 fatty acids 300-1,000 mg capsule Take by mouth 2 (two) times daily.    fluticasone (FLONASE) 50 mcg/actuation nasal spray 2 sprays by Each Nare route once daily.    glimepiride (AMARYL) 4 MG tablet Take 1 tablet (4 mg total) by mouth 2 (two) times daily.    levocetirizine (XYZAL) 5 MG tablet Take 1 tablet (5 mg total) by mouth every evening.    levothyroxine (SYNTHROID) 125 MCG tablet TAKE ONE TABLET BY MOUTH ONCE DAILY    metFORMIN (GLUCOPHAGE) 1000 MG tablet TAKE ONE TABLET BY MOUTH TWICE DAILY WITH MEALS    multivitamin capsule Take 1 capsule by mouth once daily.    omeprazole (PRILOSEC) 40 MG capsule Take 1 capsule (40 mg total) by mouth every morning.    rosuvastatin (CRESTOR) 40 MG Tab TAKE ONE TABLET BY MOUTH ONCE DAILY    RUTIN/HESP/BIOFLAV/C/HERB#196 (BIOFLEX ORAL) Take 2 tablets by mouth once daily.    temazepam (RESTORIL) 15 mg Cap TAKE ONE CAPSULE BY MOUTH AT BEDTIME AS NEEDED    acyclovir 5% (ZOVIRAX) 5 % ointment Apply topically 6 (six) times daily.    albuterol 90 mcg/actuation inhaler Inhale 2 puffs into the lungs every 6 (six) hours as needed for Wheezing. Rescue    albuterol-ipratropium 2.5mg-0.5mg/3mL (DUO-NEB) 0.5 mg-3 mg(2.5 mg base)/3 mL nebulizer solution Take 3 mLs by nebulization every 6 (six) hours as needed for Wheezing. Rescue    apixaban (ELIQUIS) 5 mg Tab Take 1 tablet (5 mg total) by mouth 2 (two) times daily.    blood pressure test kit-large Kit Use as directed    blood-glucose meter (ONETOUCH ULTRA SYSTEM KIT) kit Use as instructed    glimepiride (AMARYL) 4 MG tablet TAKE ONE TABLET BY MOUTH TWICE DAILY    lancets (ONE TOUCH DELICA LANCETS) 33 gauge Misc 1 lancet by Misc.(Non-Drug; Combo Route) route 4 (four) times daily.    LORazepam (ATIVAN) 1 MG tablet Take 1 tablet (1 mg total) by mouth every 12 (twelve) hours as needed for Anxiety.    ONETOUCH ULTRA TEST Strp USE ONE  STRIP TO CHECK GLUCOSE 4  TIMES DAILY AS DIRECTED    senna (SENNA) 8.6 mg tablet Take 2 tablets by mouth nightly as needed for Constipation.    walker (ULTRA-LIGHT ROLLATOR) Misc Use walker w/ seat daily as directed.     Family History     Problem Relation (Age of Onset)    Diabetes Maternal Grandfather    Heart disease Father    No Known Problems Mother, Sister, Sister, Maternal Grandmother, Paternal Grandfather    Obesity Maternal Grandfather    Stroke Paternal Grandmother        Tobacco Use    Smoking status: Former Smoker     Packs/day: 1.00     Years: 30.00     Pack years: 30.00     Types: Cigarettes     Last attempt to quit: 2014     Years since quittin.1    Smokeless tobacco: Never Used   Substance and Sexual Activity    Alcohol use: Yes     Alcohol/week: 0.0 oz     Comment: rarely    Drug use: No     Comment: thc at young age    Sexual activity: Not Currently     Partners: Male     ROS     Constitution: Negative for fevers/chills.   Positive for easily gets    weak   HENT: Negative for ear pain and tinnitus.   Eyes: Negative for blurred vision.   Cardiovascular: Positive for occasional  palpitations. Negative for chest pain,  near-syncope, and syncope.   Respiratory:  Negative  for shortness of breath   Endocrine:  Negative for polyuria.   Hematologic/Lymphatic: Negative for bruise/bleed easily.   Skin:  Negative for rash.   Musculoskeletal: positive  for joint pain and muscle weakness. ( chronic )  Extremity: Negative for swelling in the lower extremities.   Gastrointestinal:  Negative for abdominal pain and change in bowel habit.   Genitourinary: Negative for frequency.   Neurological:  Negative for dizziness.   Psychiatric/Behavioral:  Negative for depression, anxiety     Objective:     Vital Signs (Most Recent):  Temp: 97.3 °F (36.3 °C) (18)  Pulse: 60 (18)  Resp: 18 (18)  BP: 130/62 (18 0947)  SpO2: 97 % (18) Vital Signs (24h Range):  Temp:  [97.3 °F (36.3 °C)]  97.3 °F (36.3 °C)  Pulse:  [60] 60  Resp:  [18] 18  SpO2:  [97 %] 97 %  BP: (121-130)/(58-62) 130/62       Weight: 104.8 kg (231 lb)  Body mass index is 37.28 kg/m².    SpO2: 97 %  O2 Device (Oxygen Therapy): room air    Physical Exam  General: Well developed, well nourished, No distress on room air   HEENT: Head is normocephalic, atraumatic;  Lungs: Clear to auscultation bilaterally.  No wheezing. Normal respiratory effort.  Cardiovascular:  S1 S2 Normal rate, regular rhythm and normal heart sounds.    Extremities: No LE edema. Pulses 2+ and symmetric.   Abdomen: Abdomen is soft, non-tender non-distended with normal bowel sounds.  Skin: Skin color, texture, turgor normal. No rashes.  Musculoskeletal: normal range of motion   Neurologic: Normal strength and tone. No focal numbness or weakness.   Psychiatric: normal mood and affect.  behavior is normal. Alert and oriented X 3.  Labs reviewed         Significant Imaging:  Chart reviewed

## 2018-09-17 NOTE — PLAN OF CARE
Problem: Patient Care Overview  Goal: Plan of Care Review  Outcome: Ongoing (interventions implemented as appropriate)  Report received from JAS Evans. Patient s/p loop removal. Incision cdi. No bleeding or hematoma noted. Vss. Call light in reach. No complaints from patient. Will monitor.

## 2018-09-17 NOTE — DISCHARGE SUMMARY
[facility]  Cardiac Electrophysiology  Discharge Summary      Patient Name: Diana Montenegro  MRN: [id number]  Admission Date: 9/17/2018  Hospital Length of Stay: 0 days  Discharge Date and Time:  09/17/2018 1:24 PM  Attending Physician: Thomas Randolph MD    Discharging Provider: Rosie Hughes NP  Primary Care Physician: Rad Johnston MD    HPI: 63 year old female with PMH hypertension, hyperlipidemia, diabetes mellitus, obesity s/p gastric sleeve surgery, right MCA infarction July of 2014 s/p ILR implant, atrial fibrillation, and bradycardia.   7/14 had rt MCA stroke.   She was outside of the treatment window for thrombolytics and recovered completely.  Work-up included carotid dopplers showing 60% right ICA stenosis.  ILR implanted and showed  paroxysmal atrial fibrillation asymptomatic.  Eliquis 5 mg BID initiated and has been tolerating.   ILR now at TERESA > presented to SSCU for removal. Pt held her eliquis > last dose was on Friday 9/14/18   Pt denies any rash, infections or other acute symptoms     Procedure(s) (LRB):  REMOVAL, IMPLANTABLE LOOP RECORDER (N/A)     Hospital Course:  Pt underwent ILR removal, tolerated procedure, no acute complications noted. Left chest site with mepilex CDI .Instructed to hold Eliquis and resume in two days post procedure. Pt to follow up with MD Thomas Randolph as needed.   Discharge plans/instructions discussed with patient and son Mr Lieberman who verbalized understanding and agreement of plans of care.  No further questions or concern voiced at this time.     Final Active Diagnoses:    Diagnosis Date Noted POA    PRINCIPAL PROBLEM:  Encounter for loop recorder at end of battery life [Z45.09] 09/17/2018 Not Applicable      Problems Resolved During this Admission:       Discharged Condition: good    Disposition: Home or Self Care    Follow Up:  Follow-up Information     Thomas Randolph MD.    Specialties:  Electrophysiology, Cardiology  Why:  As  needed  Contact information:  [address]  [facility] [address]  [phone]                 Patient Instructions:      Diet Cardiac     Notify your health care provider if you experience any of the following:   Scheduling Instructions: For any concerning medical symptoms.     Notify your health care provider if you experience any of the following:  increased confusion or weakness     Notify your health care provider if you experience any of the following:  persistent dizziness, light-headedness, or visual disturbances     Notify your health care provider if you experience any of the following:  worsening rash     Notify your health care provider if you experience any of the following:  severe persistent headache     Notify your health care provider if you experience any of the following:  difficulty breathing or increased cough     Notify your health care provider if you experience any of the following:  redness, tenderness, or signs of infection (pain, swelling, redness, odor or green/yellow discharge around incision site)     Notify your health care provider if you experience any of the following:  severe uncontrolled pain     Notify your health care provider if you experience any of the following:  persistent nausea and vomiting or diarrhea     Notify your health care provider if you experience any of the following:  temperature >100.4     Remove dressing in 48 hours     Other restrictions (specify):   Scheduling Instructions: MEDICATION-RESUME ELIQUIS IN TWO DAYS AFTER PROCEDURE.     Activity as tolerated     Medications:  Reconciled Home Medications:      Medication List      CONTINUE taking these medications    acyclovir 5% 5 % ointment  Commonly known as:  ZOVIRAX  Apply topically 6 (six) times daily.     albuterol 90 mcg/actuation inhaler  Commonly known as:  PROVENTIL/VENTOLIN HFA  Inhale 2 puffs into the lungs every 6 (six) hours as needed for Wheezing. Rescue     albuterol-ipratropium 2.5 mg-0.5  mg/3 mL nebulizer solution  Commonly known as:  DUO-NEB  Take 3 mLs by nebulization every 6 (six) hours as needed for Wheezing. Rescue     apixaban 5 mg Tab  Commonly known as:  ELIQUIS  Take 1 tablet (5 mg total) by mouth 2 (two) times daily.     aspirin 81 MG EC tablet  Commonly known as:  ECOTRIN  Take 1 tablet (81 mg total) by mouth once daily.     b complex vitamins tablet  Take 1 tablet by mouth once daily.     BIOFLEX ORAL  Take 2 tablets by mouth once daily.     blood pressure test kit-large Kit  Use as directed     blood-glucose meter kit  Commonly known as:  ONETOUCH ULTRA SYSTEM KIT  Use as instructed     CALCIUM CITRATE + D ORAL  Take 2 tablets by mouth 2 (two) times daily.     CINNAMON ORAL  Take by mouth 2 (two) times daily.     cyanocobalamin (vitamin B-12) 500 mcg Subl  Place 1 tablet under the tongue once daily.     docusate sodium 100 MG capsule  Commonly known as:  COLACE  Take 100 mg by mouth once daily.     fish oil-omega-3 fatty acids 300-1,000 mg capsule  Take by mouth 2 (two) times daily.     fluticasone 50 mcg/actuation nasal spray  Commonly known as:  FLONASE  2 sprays by Each Nare route once daily.     * glimepiride 4 MG tablet  Commonly known as:  AMARYL  Take 1 tablet (4 mg total) by mouth 2 (two) times daily.     * glimepiride 4 MG tablet  Commonly known as:  AMARYL  TAKE ONE TABLET BY MOUTH TWICE DAILY     lancets 33 gauge Misc  Commonly known as:  ONETOUCH DELICA LANCETS  1 lancet by Misc.(Non-Drug; Combo Route) route 4 (four) times daily.     levocetirizine 5 MG tablet  Commonly known as:  XYZAL  Take 1 tablet (5 mg total) by mouth every evening.     levothyroxine 125 MCG tablet  Commonly known as:  SYNTHROID  TAKE ONE TABLET BY MOUTH ONCE DAILY     LORazepam 1 MG tablet  Commonly known as:  ATIVAN  Take 1 tablet (1 mg total) by mouth every 12 (twelve) hours as needed for Anxiety.     metFORMIN 1000 MG tablet  Commonly known as:  GLUCOPHAGE  TAKE ONE TABLET BY MOUTH TWICE DAILY WITH  MEALS     multivitamin capsule  Take 1 capsule by mouth once daily.     omeprazole 40 MG capsule  Commonly known as:  PRILOSEC  Take 1 capsule (40 mg total) by mouth every morning.     ONETOUCH ULTRA TEST Strp  Generic drug:  blood sugar diagnostic  USE ONE  STRIP TO CHECK GLUCOSE 4 TIMES DAILY AS DIRECTED     rosuvastatin 40 MG Tab  Commonly known as:  CRESTOR  TAKE ONE TABLET BY MOUTH ONCE DAILY     senna 8.6 mg tablet  Commonly known as:  SENNA  Take 2 tablets by mouth nightly as needed for Constipation.     temazepam 15 mg Cap  Commonly known as:  RESTORIL  TAKE ONE CAPSULE BY MOUTH AT BEDTIME AS NEEDED     walker Misc  Commonly known as:  ULTRA-LIGHT ROLLATOR  Use walker w/ seat daily as directed.         * This list has 2 medication(s) that are the same as other medications prescribed for you. Read the directions carefully, and ask your doctor or other care provider to review them with you.              Rosie Hughes NP  Cardiac Electrophysiology  [facility]    STAFF MD Thomas Randolph

## 2018-09-17 NOTE — H&P
[facility]  Cardiac Electrophysiology  History and Physical     Admission Date: 9/17/2018  Code Status: Prior   Attending Provider: Thomas Randolph MD   Principal Problem:Encounter for loop recorder at end of battery life    Subjective:     Chief Complaint:  ILR removal     HPI:63 year old female with PMH hypertension, hyperlipidemia, diabetes mellitus, obesity s/p gastric sleeve surgery, right MCA infarction July of 2014 s/p ILR implant, atrial fibrillation, and bradycardia.   7/14 had rt MCA stroke.   She was outside of the treatment window for thrombolytics and recovered completely.  Work-up included carotid dopplers showing 60% right ICA stenosis.  ILR implanted and showed  paroxysmal atrial fibrillation asymptomatic.  Eliquis 5 mg BID initiated and has been tolerating.   ILR now at EOS > presented to SSCU for removal. Pt held her eliquis > last dose was on Friday 9/14/18   Pt denies any rash, infections or other acute symptoms      Past Medical History:   Diagnosis Date    Arthritis     Atrial fibrillation, unspecified     hx of a fib prior to stroke.    Chronic back pain     Colon polyp     CVA (cerebral infarction) 7/16/14    Degenerative disc disease     cervical; lumbar    Diabetes mellitus type II     Hyperlipidemia     Hypertension     Hypothyroidism     Obesity     Sleep apnea     Stroke july 2014    Venous insufficiency (chronic) (peripheral)        Past Surgical History:   Procedure Laterality Date    COLONOSCOPY N/A 8/30/2018    Procedure: COLONOSCOPY;  Surgeon: William Clement MD;  Location: [facility] ([address] [place] [address]);  Service: Endoscopy;  Laterality: N/A;  Eliaileen/Dr Rad Johnston/OK to hold 2 days prior to colon/see telephone encounter dated 7/24/18/pt has loop recorder/svn    COLONOSCOPY N/A 8/30/2018    Performed by William Clement MD at [facility] (4TH FLR)    COLONOSCOPY N/A 5/24/2013    Performed by Gordon Brown MD at [facility] (4TH FLR)    COLONOSCOPY W/ POLYPECTOMY       GASTRECTOMY      GASTRECTOMY-SLEEVE-LAPAROSCOPIC-78159 with intraop EGD and hiatel hernia repair N/A 7/23/2015    Performed by Burak Stacy Jr., MD at [facility] OR [address] [place] [address]    HERNIA REPAIR      TONSILLECTOMY      TUBAL LIGATION         Review of patient's allergies indicates:   Allergen Reactions    Medrol [methylprednisolone] Palpitations       No current facility-administered medications on file prior to encounter.      Current Outpatient Medications on File Prior to Encounter   Medication Sig    aspirin (ECOTRIN) 81 MG EC tablet Take 1 tablet (81 mg total) by mouth once daily.    b complex vitamins tablet Take 1 tablet by mouth once daily.    CALCIUM CITRATE/VITAMIN D3 (CALCIUM CITRATE + D ORAL) Take 2 tablets by mouth 2 (two) times daily.    cinnamon bark (CINNAMON ORAL) Take by mouth 2 (two) times daily.    cyanocobalamin, vitamin B-12, 500 mcg Subl Place 1 tablet under the tongue once daily.    docusate sodium (COLACE) 100 MG capsule Take 100 mg by mouth once daily.     fish oil-omega-3 fatty acids 300-1,000 mg capsule Take by mouth 2 (two) times daily.    fluticasone (FLONASE) 50 mcg/actuation nasal spray 2 sprays by Each Nare route once daily.    glimepiride (AMARYL) 4 MG tablet Take 1 tablet (4 mg total) by mouth 2 (two) times daily.    levocetirizine (XYZAL) 5 MG tablet Take 1 tablet (5 mg total) by mouth every evening.    levothyroxine (SYNTHROID) 125 MCG tablet TAKE ONE TABLET BY MOUTH ONCE DAILY    metFORMIN (GLUCOPHAGE) 1000 MG tablet TAKE ONE TABLET BY MOUTH TWICE DAILY WITH MEALS    multivitamin capsule Take 1 capsule by mouth once daily.    omeprazole (PRILOSEC) 40 MG capsule Take 1 capsule (40 mg total) by mouth every morning.    rosuvastatin (CRESTOR) 40 MG Tab TAKE ONE TABLET BY MOUTH ONCE DAILY    RUTIN/HESP/BIOFLAV/C/HERB#196 (BIOFLEX ORAL) Take 2 tablets by mouth once daily.    temazepam (RESTORIL) 15 mg Cap TAKE ONE CAPSULE BY MOUTH AT BEDTIME AS NEEDED     acyclovir 5% (ZOVIRAX) 5 % ointment Apply topically 6 (six) times daily.    albuterol 90 mcg/actuation inhaler Inhale 2 puffs into the lungs every 6 (six) hours as needed for Wheezing. Rescue    albuterol-ipratropium 2.5mg-0.5mg/3mL (DUO-NEB) 0.5 mg-3 mg(2.5 mg base)/3 mL nebulizer solution Take 3 mLs by nebulization every 6 (six) hours as needed for Wheezing. Rescue    apixaban (ELIQUIS) 5 mg Tab Take 1 tablet (5 mg total) by mouth 2 (two) times daily.    blood pressure test kit-large Kit Use as directed    blood-glucose meter (ONETOUCH ULTRA SYSTEM KIT) kit Use as instructed    glimepiride (AMARYL) 4 MG tablet TAKE ONE TABLET BY MOUTH TWICE DAILY    lancets (ONE TOUCH DELICA LANCETS) 33 gauge Misc 1 lancet by Misc.(Non-Drug; Combo Route) route 4 (four) times daily.    LORazepam (ATIVAN) 1 MG tablet Take 1 tablet (1 mg total) by mouth every 12 (twelve) hours as needed for Anxiety.    ONETOUCH ULTRA TEST Strp USE ONE  STRIP TO CHECK GLUCOSE 4 TIMES DAILY AS DIRECTED    senna (SENNA) 8.6 mg tablet Take 2 tablets by mouth nightly as needed for Constipation.    walker (ULTRA-LIGHT ROLLATOR) Misc Use walker w/ seat daily as directed.     Family History     Problem Relation (Age of Onset)    Diabetes Maternal Grandfather    Heart disease Father    No Known Problems Mother, Sister, Sister, Maternal Grandmother, Paternal Grandfather    Obesity Maternal Grandfather    Stroke Paternal Grandmother        Tobacco Use    Smoking status: Former Smoker     Packs/day: 1.00     Years: 30.00     Pack years: 30.00     Types: Cigarettes     Last attempt to quit: 2014     Years since quittin.1    Smokeless tobacco: Never Used   Substance and Sexual Activity    Alcohol use: Yes     Alcohol/week: 0.0 oz     Comment: rarely    Drug use: No     Comment: thc at young age    Sexual activity: Not Currently     Partners: Male     ROS     Constitution: Negative for fevers/chills.   Positive for easily gets    weak    HENT: Negative for ear pain and tinnitus.   Eyes: Negative for blurred vision.   Cardiovascular: Positive for occasional  palpitations. Negative for chest pain,  near-syncope, and syncope.   Respiratory:  Negative  for shortness of breath   Endocrine:  Negative for polyuria.   Hematologic/Lymphatic: Negative for bruise/bleed easily.   Skin:  Negative for rash.   Musculoskeletal: positive  for joint pain and muscle weakness. ( chronic )  Extremity: Negative for swelling in the lower extremities.   Gastrointestinal:  Negative for abdominal pain and change in bowel habit.   Genitourinary: Negative for frequency.   Neurological:  Negative for dizziness.   Psychiatric/Behavioral:  Negative for depression, anxiety     Objective:     Vital Signs (Most Recent):  Temp: 97.3 °F (36.3 °C) (09/17/18 0946)  Pulse: 60 (09/17/18 0946)  Resp: 18 (09/17/18 0946)  BP: 130/62 (09/17/18 0947)  SpO2: 97 % (09/17/18 0946) Vital Signs (24h Range):  Temp:  [97.3 °F (36.3 °C)] 97.3 °F (36.3 °C)  Pulse:  [60] 60  Resp:  [18] 18  SpO2:  [97 %] 97 %  BP: (121-130)/(58-62) 130/62       Weight: 104.8 kg (231 lb)  Body mass index is 37.28 kg/m².    SpO2: 97 %  O2 Device (Oxygen Therapy): room air    Physical Exam  General: Well developed, well nourished, No distress on room air   HEENT: Head is normocephalic, atraumatic;  Lungs: Clear to auscultation bilaterally.  No wheezing. Normal respiratory effort.  Cardiovascular:  S1 S2 Normal rate, regular rhythm and normal heart sounds.    Extremities: No LE edema. Pulses 2+ and symmetric.   Abdomen: Abdomen is soft, non-tender non-distended with normal bowel sounds.  Skin: Skin color, texture, turgor normal. No rashes.  Musculoskeletal: normal range of motion   Neurologic: Normal strength and tone. No focal numbness or weakness.   Psychiatric: normal mood and affect.  behavior is normal. Alert and oriented X 3.  Labs reviewed         Significant Imaging:  Chart reviewed     Assessment and Plan:     *  Encounter for loop recorder at end of battery life    ILR removal at EOS, eliquis held last dose on Friday 9/14/18   ASA class 3             Prior to procedure, extensive discussion with patient regarding risks and benefits of  ILR removal , and patient  would like to proceed.  The patient  voices understanding and all questions have been answered. No further questions/concerns voiced at this time.      BITA Hilario-BC  Cardiology Electrophysiology  NP   [facility]    Attending: MD Thomas Hughes, EDDI  Cardiac Electrophysiology  [facility]

## 2018-09-18 ENCOUNTER — NURSE TRIAGE (OUTPATIENT)
Dept: ADMINISTRATIVE | Facility: CLINIC | Age: 63
End: 2018-09-18

## 2018-09-18 NOTE — TELEPHONE ENCOUNTER
Reason for Disposition   Health Information question, no triage required and triager able to answer question    Protocols used: ST INFORMATION ONLY CALL-A-    Pt had loop recorder removed yesterday. Calling to find out when to restart eliquis and when to remove drsg. Per Dr Hughes notes both can be done in 48 hours, pt notified.

## 2018-10-02 PROBLEM — Z45.09 ENCOUNTER FOR LOOP RECORDER AT END OF BATTERY LIFE: Status: RESOLVED | Noted: 2018-09-17 | Resolved: 2018-10-02

## 2018-10-02 NOTE — PROGRESS NOTES
Ms. Montenegro is a patient of Dr. Randolph and was last seen in clinic 10/18/2017.      Subjective:   Patient ID:  Diana Montenegro is a 63 y.o. female who presents for follow-up of Wound Check  .     HPI:    Ms. Montenegro is a 63 y.o. female with hypertension, hyperlipidemia, diabetes mellitus, obesity s/p gastric sleeve surgery, right MCA infarction July of 2014 s/p ILR implant, atrial fibrillation, and bradycardia here for wound check.     Background:    History of hypertension, hyperlipidemia, diabetes mellitus, obesity s/p gastric sleeve surgery, right MCA infarction July of 2014 s/p ILR implant, atrial fibrillation, and bradycardia.   7/14 had rt MCA stroke.   She was outside of the treatment window for thrombolytics and recovered completely.  Work-up included carotid dopplers showing 60% right ICA stenosis.  ILR implanted.    ILR monitoring has revealed rare paroxysmal atrial fibrillation, with episodes up to just less than an hour in duration, asymptomatic. Eliquis 5 mg BID initiated.  Has felt well.     Update (10/03/2018):    She underwent successful ILR removal on 9/17/2018.    Today she says she has been feeling well. She denies redness, burning, itching, drainage, or swelling around her incision site. Denies fever or pain.    She is currently taking eliquis 5mg BID for stroke prophylaxis and denies significant bleeding episodes. Kidney function is stable, with a creatinine of 0.8 on 9/10/2018.    Recent Cardiac Tests:    2D Echo (7/17/2014):  CONCLUSIONS     1 - Normal left ventricular systolic function (EF 60-65%).     2 - Normal left ventricular diastolic function.     3 - Normal right ventricular systolic function .     4 - Mild left atrial enlargement.     Current Outpatient Medications   Medication Sig    acyclovir 5% (ZOVIRAX) 5 % ointment Apply topically 6 (six) times daily.    albuterol 90 mcg/actuation inhaler Inhale 2 puffs into the lungs every 6 (six) hours as needed for Wheezing. Rescue     albuterol-ipratropium 2.5mg-0.5mg/3mL (DUO-NEB) 0.5 mg-3 mg(2.5 mg base)/3 mL nebulizer solution Take 3 mLs by nebulization every 6 (six) hours as needed for Wheezing. Rescue    apixaban (ELIQUIS) 5 mg Tab Take 1 tablet (5 mg total) by mouth 2 (two) times daily.    aspirin (ECOTRIN) 81 MG EC tablet Take 1 tablet (81 mg total) by mouth once daily.    b complex vitamins tablet Take 1 tablet by mouth once daily.    blood pressure test kit-large Kit Use as directed    blood-glucose meter (ONETOUCH ULTRA SYSTEM KIT) kit Use as instructed    CALCIUM CITRATE/VITAMIN D3 (CALCIUM CITRATE + D ORAL) Take 2 tablets by mouth 2 (two) times daily.    cinnamon bark (CINNAMON ORAL) Take by mouth 2 (two) times daily.    cyanocobalamin, vitamin B-12, 500 mcg Subl Place 1 tablet under the tongue once daily.    docusate sodium (COLACE) 100 MG capsule Take 100 mg by mouth once daily.     fish oil-omega-3 fatty acids 300-1,000 mg capsule Take by mouth 2 (two) times daily.    fluticasone (FLONASE) 50 mcg/actuation nasal spray 2 sprays by Each Nare route once daily.    glimepiride (AMARYL) 4 MG tablet Take 1 tablet (4 mg total) by mouth 2 (two) times daily.    glimepiride (AMARYL) 4 MG tablet TAKE ONE TABLET BY MOUTH TWICE DAILY    lancets (ONE TOUCH DELICA LANCETS) 33 gauge Misc 1 lancet by Misc.(Non-Drug; Combo Route) route 4 (four) times daily.    levocetirizine (XYZAL) 5 MG tablet Take 1 tablet (5 mg total) by mouth every evening.    levothyroxine (SYNTHROID) 125 MCG tablet TAKE ONE TABLET BY MOUTH ONCE DAILY    LORazepam (ATIVAN) 1 MG tablet Take 1 tablet (1 mg total) by mouth every 12 (twelve) hours as needed for Anxiety.    metFORMIN (GLUCOPHAGE) 1000 MG tablet TAKE ONE TABLET BY MOUTH TWICE DAILY WITH MEALS    multivitamin capsule Take 1 capsule by mouth once daily.    omeprazole (PRILOSEC) 40 MG capsule Take 1 capsule (40 mg total) by mouth every morning.    ONETOUCH ULTRA TEST Strp USE ONE  STRIP TO CHECK  "GLUCOSE 4 TIMES DAILY AS DIRECTED    rosuvastatin (CRESTOR) 40 MG Tab TAKE ONE TABLET BY MOUTH ONCE DAILY    RUTIN/HESP/BIOFLAV/C/HERB#196 (BIOFLEX ORAL) Take 2 tablets by mouth once daily.    senna (SENNA) 8.6 mg tablet Take 2 tablets by mouth nightly as needed for Constipation.    temazepam (RESTORIL) 15 mg Cap TAKE ONE CAPSULE BY MOUTH AT BEDTIME AS NEEDED    walker (ULTRA-LIGHT ROLLATOR) Misc Use walker w/ seat daily as directed.     No current facility-administered medications for this visit.      Facility-Administered Medications Ordered in Other Visits   Medication    0.9%  NaCl infusion    ceFAZolin injection 2 g       Review of Systems   Constitution: Negative for malaise/fatigue.   Cardiovascular: Negative for chest pain, dyspnea on exertion, irregular heartbeat, leg swelling and palpitations.   Respiratory: Negative for shortness of breath.    Hematologic/Lymphatic: Negative for bleeding problem.   Skin: Negative for rash.   Musculoskeletal: Negative for myalgias.   Gastrointestinal: Negative for hematemesis, hematochezia and nausea.   Genitourinary: Negative for hematuria.   Neurological: Negative for light-headedness.   Psychiatric/Behavioral: Negative for altered mental status.   Allergic/Immunologic: Negative for persistent infections.     Objective:        BP (!) 134/50   Pulse (!) 57   Ht 5' 6" (1.676 m)   Wt 108.6 kg (239 lb 6.7 oz)   LMP  (LMP Unknown)   BMI 38.64 kg/m²     Physical Exam   Constitutional: She is oriented to person, place, and time. She appears well-developed and well-nourished.   HENT:   Head: Normocephalic.   Nose: Nose normal.   Eyes: Pupils are equal, round, and reactive to light.   Cardiovascular: Normal rate, regular rhythm, S1 normal and S2 normal.   No murmur heard.  Pulses:       Radial pulses are 2+ on the right side, and 2+ on the left side.   Pulmonary/Chest: Breath sounds normal. No respiratory distress.   Incision site closed with remnants of surgical glue " to site. No drainage or erythema.   Abdominal: Normal appearance.   Musculoskeletal: Normal range of motion. She exhibits no edema.   Neurological: She is alert and oriented to person, place, and time.   Skin: Skin is warm and dry. No erythema.   Psychiatric: She has a normal mood and affect. Her speech is normal and behavior is normal.   Nursing note and vitals reviewed.        Lab Results   Component Value Date     09/10/2018    K 4.2 09/10/2018    MG 1.6 07/10/2018    BUN 24 (H) 09/10/2018    CREATININE 0.8 09/10/2018    ALT 31 07/10/2018    AST 22 07/10/2018    HGB 10.9 (L) 09/10/2018    HCT 35.5 (L) 09/10/2018    TSH 1.121 04/07/2016    LDLCALC 80.0 07/10/2018       Recent Labs   Lab  09/10/18   0852   INR  1.0       Assessment:     1. Visit for wound check    2. Paroxysmal atrial fibrillation    3. Essential hypertension    4. Severe obesity    5. Status post placement of implantable loop recorder    6. Current use of long term anticoagulation      Plan:     In summary, Ms. Montenegro is a 63 y.o. female with hypertension, hyperlipidemia, diabetes mellitus, obesity s/p gastric sleeve surgery, right MCA infarction July of 2014 s/p ILR implant, atrial fibrillation, and bradycardia here for wound check.   Ms. Montenegro is doing well s/p ILR removal. No drainage, no signs or symptoms of infection. There are still remnants of glue to site - I used sterile scissors to remove excess that was peeling off and advised her that the remainder would come off on its own. Keep site clean and pat dry. Call clinic if she notices redness, drainage, pain, or swelling to site. She has infrequent asymptomatic AF. She remains on eliquis for CVA prophylaxis.     Continue eliquis.  RTC as needed. (Patient has f/u with Dr. Randolph - will confirm if necessary).    *A copy of this note has been sent to Dr. Randolph*    Follow-up if symptoms worsen or fail to  improve.    ------------------------------------------------------------------    CHELITA Fuentes, NP-C  [facility]

## 2018-10-03 ENCOUNTER — OFFICE VISIT (OUTPATIENT)
Dept: ELECTROPHYSIOLOGY | Facility: CLINIC | Age: 63
End: 2018-10-03
Payer: COMMERCIAL

## 2018-10-03 ENCOUNTER — PATIENT MESSAGE (OUTPATIENT)
Dept: ELECTROPHYSIOLOGY | Facility: CLINIC | Age: 63
End: 2018-10-03

## 2018-10-03 VITALS
HEIGHT: 66 IN | WEIGHT: 239.44 LBS | BODY MASS INDEX: 38.48 KG/M2 | HEART RATE: 57 BPM | SYSTOLIC BLOOD PRESSURE: 134 MMHG | DIASTOLIC BLOOD PRESSURE: 50 MMHG

## 2018-10-03 DIAGNOSIS — Z95.818 STATUS POST PLACEMENT OF IMPLANTABLE LOOP RECORDER: ICD-10-CM

## 2018-10-03 DIAGNOSIS — Z51.89 VISIT FOR WOUND CHECK: Primary | ICD-10-CM

## 2018-10-03 DIAGNOSIS — E66.01 SEVERE OBESITY: ICD-10-CM

## 2018-10-03 DIAGNOSIS — I10 ESSENTIAL HYPERTENSION: Chronic | ICD-10-CM

## 2018-10-03 DIAGNOSIS — Z79.01 CURRENT USE OF LONG TERM ANTICOAGULATION: ICD-10-CM

## 2018-10-03 DIAGNOSIS — I48.0 PAROXYSMAL ATRIAL FIBRILLATION: Chronic | ICD-10-CM

## 2018-10-03 PROCEDURE — 3075F SYST BP GE 130 - 139MM HG: CPT | Mod: CPTII,S$GLB,, | Performed by: NURSE PRACTITIONER

## 2018-10-03 PROCEDURE — 3008F BODY MASS INDEX DOCD: CPT | Mod: CPTII,S$GLB,, | Performed by: NURSE PRACTITIONER

## 2018-10-03 PROCEDURE — 99214 OFFICE O/P EST MOD 30 MIN: CPT | Mod: S$GLB,,, | Performed by: NURSE PRACTITIONER

## 2018-10-03 PROCEDURE — 99999 PR PBB SHADOW E&M-EST. PATIENT-LVL III: CPT | Mod: PBBFAC,,, | Performed by: NURSE PRACTITIONER

## 2018-10-03 PROCEDURE — 3078F DIAST BP <80 MM HG: CPT | Mod: CPTII,S$GLB,, | Performed by: NURSE PRACTITIONER

## 2018-10-03 NOTE — Clinical Note
She has an appt with you scheduled for mid-December but she says you told her you didn't need to see her again - I told her I would check with you if she needed to keep that appointment.Thanks,KHANH

## 2018-10-04 ENCOUNTER — PATIENT MESSAGE (OUTPATIENT)
Dept: ELECTROPHYSIOLOGY | Facility: CLINIC | Age: 63
End: 2018-10-04

## 2018-10-08 ENCOUNTER — OFFICE VISIT (OUTPATIENT)
Dept: SLEEP MEDICINE | Facility: CLINIC | Age: 63
End: 2018-10-08
Payer: COMMERCIAL

## 2018-10-08 VITALS
WEIGHT: 234.5 LBS | DIASTOLIC BLOOD PRESSURE: 66 MMHG | SYSTOLIC BLOOD PRESSURE: 133 MMHG | HEART RATE: 59 BPM | BODY MASS INDEX: 37.69 KG/M2 | HEIGHT: 66 IN

## 2018-10-08 DIAGNOSIS — G47.33 OSA (OBSTRUCTIVE SLEEP APNEA): ICD-10-CM

## 2018-10-08 DIAGNOSIS — G47.00 INSOMNIA, UNSPECIFIED TYPE: Primary | ICD-10-CM

## 2018-10-08 PROCEDURE — 3075F SYST BP GE 130 - 139MM HG: CPT | Mod: CPTII,S$GLB,, | Performed by: PSYCHIATRY & NEUROLOGY

## 2018-10-08 PROCEDURE — 3078F DIAST BP <80 MM HG: CPT | Mod: CPTII,S$GLB,, | Performed by: PSYCHIATRY & NEUROLOGY

## 2018-10-08 PROCEDURE — 99204 OFFICE O/P NEW MOD 45 MIN: CPT | Mod: S$GLB,,, | Performed by: PSYCHIATRY & NEUROLOGY

## 2018-10-08 PROCEDURE — 99999 PR PBB SHADOW E&M-EST. PATIENT-LVL III: CPT | Mod: PBBFAC,,, | Performed by: PSYCHIATRY & NEUROLOGY

## 2018-10-08 PROCEDURE — 3008F BODY MASS INDEX DOCD: CPT | Mod: CPTII,S$GLB,, | Performed by: PSYCHIATRY & NEUROLOGY

## 2018-10-08 RX ORDER — TRAZODONE HYDROCHLORIDE 50 MG/1
TABLET ORAL
Qty: 60 TABLET | Refills: 5 | Status: SHIPPED | OUTPATIENT
Start: 2018-10-08 | End: 2019-03-26 | Stop reason: SDUPTHER

## 2018-10-08 NOTE — LETTER
October 12, 2018      Winifred Bowman NP  [address]  [facility] [address]           [facility]  [address] [place] [address]  Phone: [phone]  Fax: [phone]          Patient: Diana Montenegro   MR Number: [id number]   YOB: 1955   Date of Visit: 10/8/2018       Dear Winifred Bowman:    Thank you for referring Diana Montenegro to me for evaluation. Attached you will find relevant portions of my assessment and plan of care.    If you have questions, please do not hesitate to call me. I look forward to following Diana Montenegro along with you.    Sincerely,    Gladis Jimenez MD    Enclosure  CC:  No Recipients    If you would like to receive this communication electronically, please contact [email or web address] or [phone] to request more information on [employer] Link access.    For providers and/or their staff who would like to refer a patient to [facility], please contact us through our one-stop-shop provider referral line, [facility] , at [phone].    If you feel you have received this communication in error or would no longer like to receive these types of communications, please e-mail [email or web address]

## 2018-10-08 NOTE — PROGRESS NOTES
Diana Montenegro  was seen at the request of  Winifred Bowman NP for sleep evaluation.     INITIAL HISTORY OF PRESENT ILLNESS:  Diana Montenegro is a 63 y.o. female is here to be evaluated for a sleep disorder.       CHIEF COMPLAINT:      The patient's complaints include excessive daytime sleepiness, excessive daytime fatigue, snoring and interrupted sleep since  2015.    She has originally seen Dr. Colunga.     Used  CPAP - years back  -last year she got sick and stopped using it.       Symptoms of daytime sleepiness are getting worse.    APAP 8-15 since 2015  90% was 9-11   Last time modem recorded since 3/17:    Had bariatric surgery 2015 - lost 30 lbs.    Therapy Event Summary Date Range: 1/31/2017 - 3/1/2017     She will upgrade the supplies.    Also difficulty falling and staying asleep (with or without the machine).    Took Ambien before;; taking Melatonin 10 mg.        Compliance Summary  Apnea Indices  Ventilator Statistics    Days with Device Usage:  27 days  Average AHI:  2.2  Average Breath Rate:  N/A    Percentage of Days >=4 Hours:  86.7%  Average OA Index:  0.6  Average % Patient Triggered Breaths:  N/A    Average Usage (Days Used):  6 hrs. 35 mins. 57 secs.  Average CA Index:  0.7  Average Tidal Volume:  N/A    Average Usage (All Days):  5 hrs. 56 mins. 21 secs.    Average Minute Vent:  N/A        Large Leak  Periodic Breathing     Average Time in Large Leak:  6 mins. 44 secs.  Average % of Night in PB:  0.2%     Average % of Night in Large Leak:  1.7%                    EPWORTH SLEEPINESS SCALE 10/8/2018   Sitting and reading 2   Watching TV 2   Sitting, inactive in a public place (e.g. a theatre or a meeting) 3   As a passenger in a car for an hour without a break 0   Lying down to rest in the afternoon when circumstances permit 3   Sitting and talking to someone 2   Sitting quietly after a lunch without alcohol 1   In a car, while stopped for a few minutes in traffic 2   Total score 15        SLEEP ROUTINE AND LIFESTYLE 10/08/2018 :  Sleep Clinic New Patient 10/3/2018   What time do you go to bed on a week day? (Give a range) Midnight   What time do you go to bed on a day off? (Give a range) Retired   How long does it take you to fall asleep? (Give a range) 5-10 min   On average, how many times per night do you wake up? 3   How long does it take you to fall back into sleep? (Give a range) Usually 5 min   What time do you wake up to start your day on a week day? (Give a range) 9am   What time do you wake up to start your day on a day off? (Give a range) Same   What time do you get out of bed? (Give a range) 9-9:30   On average, how many hours do you sleep? 4-6 hours   On average, how many naps do you take per day? None to one   Rate your sleep quality from 0 to 5 (0-poor, 5-great). 2   Have you experienced:  Weight gain?   How much weight have you lost or gained (in lbs.) in the last year? Gained 38 lbs   On average, how many times per night do you go to the bathroom?  3   Have you ever had a sleep study/CPAP machine/surgery for sleep apnea? Yes   Have you ever had a CPAP machine for sleep apnea? Yes   Have you ever had surgery for sleep apnea? No       Sleep Clinic ROS  10/3/2018   Difficulty breathing through the nose?  No   Sore throat or dry mouth in the morning? No   Irregular or very fast heart beat?  Sometimes   Shortness of breath?  No   Acid reflux? No   Body aches and pains?  Sometimes   Morning headaches? No   Dizziness? No   Mood changes?  No   Do you exercise?  Yes   Do you feel like moving your legs a lot?  Sometimes          Denies symptoms concerning for parasomnia        Social History:      Occupation:retired  Bed partner:   Exercise routine: yes  Caffeine:  Coffee  decaf     PREVIOUS SLEEP STUDIES:     PSG/ SPLIT night study  In 4/24/15 showed significant COURTNEY with the AHI of 16.5/hour and SaO2 minimum of 86%.       DME:       PAST MEDICAL HISTORY:    Active Ambulatory Problems      Diagnosis Date Noted    Diabetes mellitus, type 2     Hypertension     Hyperlipidemia     Hypothyroidism     Chronic back pain     Venous insufficiency (chronic) (peripheral)     Arthritis     Degenerative disc disease     Hypoglycemia secondary to sulfonylurea 07/17/2014    Left atrial enlargement 07/18/2014    Back pain 08/07/2014    Loss of coordination 08/15/2014    Bilateral carotid artery disease 04/30/2015    Paroxysmal atrial fibrillation 08/25/2015    Mass 03/08/2016    Stenosis of right carotid artery 03/28/2016    Severe obesity 08/15/2016    Status post placement of implantable loop recorder 04/11/2017    Current use of long term anticoagulation 04/11/2017    Chronic bronchitis     Chronic sinusitis 05/11/2017    Bradycardia 10/18/2017    Screening for colon cancer 08/30/2018     Resolved Ambulatory Problems     Diagnosis Date Noted    Obesity     Cerebral embolism with cerebral infarction 07/18/2014    Weakness 07/18/2014    Acute left-sided weakness 07/18/2014    Slurred speech 07/18/2014    Memory deficit 08/15/2014    Cerebral embolism without mention of cerebral infarction 04/07/2015    Symptomatic carotid artery stenosis with infarction 04/27/2015    Morbid obesity 07/23/2015    Obesity, Class II, BMI 35-39.9, with comorbidity 08/25/2015    Partial small bowel obstruction 04/06/2016    Carotid occlusion, bilateral     Encounter for loop recorder at end of battery life 09/17/2018     Past Medical History:   Diagnosis Date    Arthritis     Atrial fibrillation, unspecified     Chronic back pain     Colon polyp     CVA (cerebral infarction) 7/16/14    Degenerative disc disease     Diabetes mellitus type II     Encounter for loop recorder at end of battery life 9/17/2018    Hyperlipidemia     Hypertension     Hypothyroidism     Obesity     Sleep apnea     Stroke july 2014    Venous insufficiency (chronic) (peripheral)                 PAST SURGICAL  HISTORY:    Past Surgical History:   Procedure Laterality Date    COLONOSCOPY N/A 2018    Procedure: COLONOSCOPY;  Surgeon: William Clement MD;  Location: [facility] (4TH FLR);  Service: Endoscopy;  Laterality: N/A;  Tito/Dr Rad Johnston/OK to hold 2 days prior to colon/see telephone encounter dated 18/pt has loop recorder/svn    COLONOSCOPY N/A 2018    Performed by William Clement MD at [facility] (4TH FLR)    COLONOSCOPY N/A 2013    Performed by Gordon Brown MD at [facility] (4TH FLR)    COLONOSCOPY W/ POLYPECTOMY      GASTRECTOMY      GASTRECTOMY-SLEEVE-LAPAROSCOPIC-81654 with intraop EGD and hiatel hernia repair N/A 2015    Performed by Burak Stacy Jr., MD at [facility] OR 2ND FLR    HERNIA REPAIR      REMOVAL OF IMPLANTABLE LOOP RECORDER N/A 2018    Procedure: REMOVAL, IMPLANTABLE LOOP RECORDER;  Surgeon: Thomas Randolph MD;  Location: [facility] CATH LAB;  Service: Cardiology;  Laterality: N/A;  TERESA, ILR removal (no reimplant), STACY, RN Sedate, SK, 3 Prep *Reveal LINQ*    REMOVAL, IMPLANTABLE LOOP RECORDER N/A 2018    Performed by Thoams Randolph MD at [facility] CATH LAB    TONSILLECTOMY      TUBAL LIGATION           FAMILY HISTORY:                Family History   Problem Relation Age of Onset    No Known Problems Mother     Heart disease Father     Diabetes Maternal Grandfather     Obesity Maternal Grandfather     No Known Problems Sister     No Known Problems Sister     No Known Problems Maternal Grandmother     Stroke Paternal Grandmother     No Known Problems Paternal Grandfather     Heart attack Neg Hx        SOCIAL HISTORY:          Tobacco:   Social History     Tobacco Use   Smoking Status Former Smoker    Packs/day: 1.00    Years: 30.00    Pack years: 30.00    Types: Cigarettes    Last attempt to quit: 2014    Years since quittin.2   Smokeless Tobacco Never Used       alcohol use:    Social History     Substance and Sexual Activity   Alcohol Use Yes     Alcohol/week: 0.0 oz    Comment: rarely                   ALLERGIES:    Review of patient's allergies indicates:   Allergen Reactions    Medrol [methylprednisolone] Palpitations       CURRENT MEDICATIONS:    Current Outpatient Medications   Medication Sig Dispense Refill    acyclovir 5% (ZOVIRAX) 5 % ointment Apply topically 6 (six) times daily. 5 g 1    albuterol 90 mcg/actuation inhaler Inhale 2 puffs into the lungs every 6 (six) hours as needed for Wheezing. Rescue 18 g 2    albuterol-ipratropium 2.5mg-0.5mg/3mL (DUO-NEB) 0.5 mg-3 mg(2.5 mg base)/3 mL nebulizer solution Take 3 mLs by nebulization every 6 (six) hours as needed for Wheezing. Rescue 3 vial 3    apixaban (ELIQUIS) 5 mg Tab Take 1 tablet (5 mg total) by mouth 2 (two) times daily. 60 tablet 6    aspirin (ECOTRIN) 81 MG EC tablet Take 1 tablet (81 mg total) by mouth once daily.      b complex vitamins tablet Take 1 tablet by mouth once daily.      blood pressure test kit-large Kit Use as directed 1 each 0    blood-glucose meter (ONETOUCH ULTRA SYSTEM KIT) kit Use as instructed 1 each 0    CALCIUM CITRATE/VITAMIN D3 (CALCIUM CITRATE + D ORAL) Take 2 tablets by mouth 2 (two) times daily.      cinnamon bark (CINNAMON ORAL) Take by mouth 2 (two) times daily.      cyanocobalamin, vitamin B-12, 500 mcg Subl Place 1 tablet under the tongue once daily.      docusate sodium (COLACE) 100 MG capsule Take 100 mg by mouth once daily.       fish oil-omega-3 fatty acids 300-1,000 mg capsule Take by mouth 2 (two) times daily.      fluticasone (FLONASE) 50 mcg/actuation nasal spray 2 sprays by Each Nare route once daily. 1 Bottle 0    glimepiride (AMARYL) 4 MG tablet Take 1 tablet (4 mg total) by mouth 2 (two) times daily. 60 tablet 11    glimepiride (AMARYL) 4 MG tablet TAKE ONE TABLET BY MOUTH TWICE DAILY 60 tablet 11    lancets (ONE TOUCH DELICA LANCETS) 33 gauge Misc 1 lancet by Misc.(Non-Drug; Combo Route) route 4 (four) times daily. 150 each  "8    levocetirizine (XYZAL) 5 MG tablet Take 1 tablet (5 mg total) by mouth every evening. 30 tablet 11    levothyroxine (SYNTHROID) 125 MCG tablet TAKE ONE TABLET BY MOUTH ONCE DAILY 30 tablet 11    LORazepam (ATIVAN) 1 MG tablet Take 1 tablet (1 mg total) by mouth every 12 (twelve) hours as needed for Anxiety. 60 tablet 2    metFORMIN (GLUCOPHAGE) 1000 MG tablet TAKE ONE TABLET BY MOUTH TWICE DAILY WITH MEALS 60 tablet 11    multivitamin capsule Take 1 capsule by mouth once daily.      omeprazole (PRILOSEC) 40 MG capsule Take 1 capsule (40 mg total) by mouth every morning. 30 capsule 6    ONETOUCH ULTRA TEST Strp USE ONE  STRIP TO CHECK GLUCOSE 4 TIMES DAILY AS DIRECTED 150 strip 4    rosuvastatin (CRESTOR) 40 MG Tab TAKE ONE TABLET BY MOUTH ONCE DAILY 30 tablet 11    RUTIN/HESP/BIOFLAV/C/HERB#196 (BIOFLEX ORAL) Take 2 tablets by mouth once daily.      senna (SENNA) 8.6 mg tablet Take 2 tablets by mouth nightly as needed for Constipation. 100 tablet 3    temazepam (RESTORIL) 15 mg Cap TAKE ONE CAPSULE BY MOUTH AT BEDTIME AS NEEDED 30 capsule 1    walker (ULTRA-LIGHT ROLLATOR) Misc Use walker w/ seat daily as directed. 1 each 0     No current facility-administered medications for this visit.      Facility-Administered Medications Ordered in Other Visits   Medication Dose Route Frequency Provider Last Rate Last Dose    0.9%  NaCl infusion   Intravenous Continuous Khadijah Rivera NP   1,000 mL at 09/17/18 0939    ceFAZolin injection 2 g  2 g Intravenous On Call Procedure Khadijah Rivera NP                          REVIEW OF SYSTEMS:   Sleep related symptoms as per HPI    reports weight gain - 40 lbs weight loss      PHYSICAL EXAM:  /66 (BP Location: Left arm, Patient Position: Sitting, BP Method: Large (Automatic))   Pulse (!) 59   Ht 5' 5.5" (1.664 m)   Wt 106.4 kg (234 lb 8 oz)   LMP  (LMP Unknown)   BMI 38.43 kg/m²   GENERAL: Normal development, well groomed.  HEENT:   HEENT:  " "Conjunctivae are non-erythematous; Pupils equal, round, and reactive to light; Nose is symmetrical; Nasal mucosa is pink and moist; Septum is midline; Inferior turbinates are normal; Nasal airflow is normal; Posterior pharynx is pink; Modified Mallampati:IV; Posterior palate is low; Tonsils not visualized; Uvula is wide and elongated;Tongue is enlarged; Dentition is fair; No TMJ tenderness; Jaw opening and protrusion without click and without discomfort.  NECK: Supple. Neck circumference is 15 inches. No thyromegaly. No palpable nodes.     SKIN: On face and neck: No abrasions, no rashes, no lesions.  No subcutaneous nodules are palpable.  RESPIRATORY: Chest is clear to auscultation.  Normal chest expansion and non-labored breathing at rest.  CARDIOVASCULAR: Normal S1, S2.  No murmurs, gallops or rubs. No carotid bruits bilaterally.  No edema. No clubbing. No cyanosis.    NEURO: Oriented to time, place and person. Normal attention span and concentration. Gait normal.    PSYCH: Affect is full. Mood is normal  MUSCULOSKELETAL: Moves 4 extremities. Gait normal.         Using [facility]: yes      ASSESSMENT:    1. COURTNEY (obstructive sleep apnea). The patient symptomatically has  excessive daytime sleepiness, snoring, excessive daytime fatigue and interrupted sleep  with exam findings of "a crowded oral airway and elevated body mass index. This warrants treatment. APAP used to help COURTNEY; fell out of habit.          PLAN:    Continue  CPAP 8-15 cm with the mask of a patient's choice was given to the patient.  Will re-order supplies  Compliance was reinforced.      Education: During our discussion today, we talked about the etiology of obstructive sleep apnea as well as the potential ramifications of untreated sleep apnea, which could include daytime sleepiness, hypertension, heart disease and/or stroke.      We discussed potential treatment options, which could include weight loss, body positioning, continuous positive airway " pressure (CPAP), or referral for surgical consideration. The patient preferred CPAP option.     Discussed purpose of PAP therapy, health benefits of CPAP, as well as the potential ramifications of untreated sleep apnea, which could include daytime sleepiness, hypertension, heart disease and/or stroke. An AHI of 15 is associated with increased risk CVD.     The patient should avoid ETOH and sedatives at night, as it tends to aggravate COURTNEY. Regular replacement of CPAP mask, tubing and filter was recommended.    Precautions: The patient was advised to abstain from driving should he feel sleepy or drowsy.    Follow up: MD/NP after 1 month of PAP use.    Thank you for allowing me the opportunity to participate in the care of your patient.

## 2018-11-21 RX ORDER — ALBUTEROL SULFATE 90 UG/1
2 AEROSOL, METERED RESPIRATORY (INHALATION) EVERY 6 HOURS PRN
Qty: 18 G | Refills: 2 | Status: SHIPPED | OUTPATIENT
Start: 2018-11-21 | End: 2020-06-16 | Stop reason: SDUPTHER

## 2018-11-21 RX ORDER — FLUTICASONE PROPIONATE 50 MCG
2 SPRAY, SUSPENSION (ML) NASAL DAILY
Qty: 1 BOTTLE | Refills: 0 | Status: SHIPPED | OUTPATIENT
Start: 2018-11-21

## 2018-11-21 RX ORDER — LORAZEPAM 1 MG/1
1 TABLET ORAL EVERY 12 HOURS PRN
Qty: 60 TABLET | Refills: 2 | Status: SHIPPED | OUTPATIENT
Start: 2018-11-21 | End: 2019-09-05 | Stop reason: SDUPTHER

## 2018-11-26 ENCOUNTER — OFFICE VISIT (OUTPATIENT)
Dept: SLEEP MEDICINE | Facility: CLINIC | Age: 63
End: 2018-11-26
Payer: COMMERCIAL

## 2018-11-26 VITALS
DIASTOLIC BLOOD PRESSURE: 56 MMHG | HEART RATE: 52 BPM | BODY MASS INDEX: 36.32 KG/M2 | WEIGHT: 226 LBS | SYSTOLIC BLOOD PRESSURE: 131 MMHG | HEIGHT: 66 IN

## 2018-11-26 DIAGNOSIS — G47.33 OSA (OBSTRUCTIVE SLEEP APNEA): ICD-10-CM

## 2018-11-26 DIAGNOSIS — G47.00 INSOMNIA, UNSPECIFIED TYPE: Primary | ICD-10-CM

## 2018-11-26 PROCEDURE — 3078F DIAST BP <80 MM HG: CPT | Mod: CPTII,S$GLB,, | Performed by: PSYCHIATRY & NEUROLOGY

## 2018-11-26 PROCEDURE — 99999 PR PBB SHADOW E&M-EST. PATIENT-LVL III: CPT | Mod: PBBFAC,,, | Performed by: PSYCHIATRY & NEUROLOGY

## 2018-11-26 PROCEDURE — 99212 OFFICE O/P EST SF 10 MIN: CPT | Mod: S$GLB,,, | Performed by: PSYCHIATRY & NEUROLOGY

## 2018-11-26 PROCEDURE — 3075F SYST BP GE 130 - 139MM HG: CPT | Mod: CPTII,S$GLB,, | Performed by: PSYCHIATRY & NEUROLOGY

## 2018-11-26 PROCEDURE — 3008F BODY MASS INDEX DOCD: CPT | Mod: CPTII,S$GLB,, | Performed by: PSYCHIATRY & NEUROLOGY

## 2018-11-26 NOTE — PROGRESS NOTES
Diana Montenegro  was seen at the request of  No ref. provider found for sleep evaluation.     INITIAL HISTORY OF PRESENT ILLNESS:  Diana Montenegro is a 63 y.o. female is here to be evaluated for a sleep disorder.       CHIEF COMPLAINT:      The patient's complaints include excessive daytime sleepiness, excessive daytime fatigue, snoring and interrupted sleep since  2015.    She has originally seen Dr. Colunga.     Used  CPAP - years back  -last year she got sick and stopped using it.       Symptoms of daytime sleepiness are getting worse.    APAP 8-15 since 2015  90% was 9-11   Last time modem recorded since 3/17:    Had bariatric surgery 2015 - lost 30 lbs.    Therapy Event Summary Date Range: 1/31/2017 - 3/1/2017     She will upgrade the supplies.    Also difficulty falling and staying asleep (with or without the machine).    Took Ambien before;; taking Melatonin 10 mg.        Compliance Summary  Apnea Indices  Ventilator Statistics    Days with Device Usage:  27 days  Average AHI:  2.2  Average Breath Rate:  N/A    Percentage of Days >=4 Hours:  86.7%  Average OA Index:  0.6  Average % Patient Triggered Breaths:  N/A    Average Usage (Days Used):  6 hrs. 35 mins. 57 secs.  Average CA Index:  0.7  Average Tidal Volume:  N/A    Average Usage (All Days):  5 hrs. 56 mins. 21 secs.    Average Minute Vent:  N/A        Large Leak  Periodic Breathing     Average Time in Large Leak:  6 mins. 44 secs.  Average % of Night in PB:  0.2%     Average % of Night in Large Leak:  1.7%              11/26/2018  Has missed a couple weeks October- Nov for travel.   APAP 8-15 cm 90% 10 cm   Likes her mask - nasal  Taking 100 mg Trazodone    Sleepiness/fatigue is controlled. Not sure of breakthrough snoring.  Therapy Event Summary Date Range: 8/28/2018 - 11/25/2018        Compliance Summary  Apnea Indices  Ventilator Statistics    Days with Device Usage:  34 days  Average AHI:  3.6  Average Breath Rate:  N/A    Percentage of Days  >=4 Hours:  35.6%  Average OA Index:  0.6  Average % Patient Triggered Breaths:  N/A    Average Usage (Days Used):  6 hrs. 40 mins. 34 secs.  Average CA Index:  1.2  Average Tidal Volume:  N/A    Average Usage (All Days):  2 hrs. 31 mins. 19 secs.    Average Minute Vent:  N/A        Large Leak  Periodic Breathing     Average Time in Large Leak:  10 mins. 39 secs.  Average % of Night in PB:  0.5%     Average % of Night in Large Leak:  2.7%                    EPWORTH SLEEPINESS SCALE 10/8/2018   Sitting and reading 2   Watching TV 2   Sitting, inactive in a public place (e.g. a theatre or a meeting) 3   As a passenger in a car for an hour without a break 0   Lying down to rest in the afternoon when circumstances permit 3   Sitting and talking to someone 2   Sitting quietly after a lunch without alcohol 1   In a car, while stopped for a few minutes in traffic 2   Total score 15       SLEEP ROUTINE AND LIFESTYLE 11/26/2018 :  Sleep Clinic New Patient 10/3/2018   What time do you go to bed on a week day? (Give a range) Midnight   What time do you go to bed on a day off? (Give a range) Retired   How long does it take you to fall asleep? (Give a range) 5-10 min   On average, how many times per night do you wake up? 3   How long does it take you to fall back into sleep? (Give a range) Usually 5 min   What time do you wake up to start your day on a week day? (Give a range) 9am   What time do you wake up to start your day on a day off? (Give a range) Same   What time do you get out of bed? (Give a range) 9-9:30   On average, how many hours do you sleep? 4-6 hours   On average, how many naps do you take per day? None to one   Rate your sleep quality from 0 to 5 (0-poor, 5-great). 2   Have you experienced:  Weight gain?   How much weight have you lost or gained (in lbs.) in the last year? Gained 38 lbs   On average, how many times per night do you go to the bathroom?  3   Have you ever had a sleep study/CPAP machine/surgery for  sleep apnea? Yes   Have you ever had a CPAP machine for sleep apnea? Yes   Have you ever had surgery for sleep apnea? No       Sleep Clinic ROS  10/3/2018   Difficulty breathing through the nose?  No   Sore throat or dry mouth in the morning? No   Irregular or very fast heart beat?  Sometimes   Shortness of breath?  No   Acid reflux? No   Body aches and pains?  Sometimes   Morning headaches? No   Dizziness? No   Mood changes?  No   Do you exercise?  Yes   Do you feel like moving your legs a lot?  Sometimes          Denies symptoms concerning for parasomnia        Social History:      Occupation:retired  Bed partner:   Exercise routine: yes  Caffeine:  Coffee  decaf     PREVIOUS SLEEP STUDIES:     PSG/ SPLIT night study  In 4/24/15 showed significant COURTNEY with the AHI of 16.5/hour and SaO2 minimum of 86%.       DME:       PAST MEDICAL HISTORY:    Active Ambulatory Problems     Diagnosis Date Noted    Diabetes mellitus, type 2     Hypertension     Hyperlipidemia     Hypothyroidism     Chronic back pain     Venous insufficiency (chronic) (peripheral)     Arthritis     Degenerative disc disease     Hypoglycemia secondary to sulfonylurea 07/17/2014    Left atrial enlargement 07/18/2014    Back pain 08/07/2014    Loss of coordination 08/15/2014    Bilateral carotid artery disease 04/30/2015    Paroxysmal atrial fibrillation 08/25/2015    Mass 03/08/2016    Stenosis of right carotid artery 03/28/2016    Severe obesity 08/15/2016    Status post placement of implantable loop recorder 04/11/2017    Current use of long term anticoagulation 04/11/2017    Chronic bronchitis     Chronic sinusitis 05/11/2017    Bradycardia 10/18/2017    Screening for colon cancer 08/30/2018     Resolved Ambulatory Problems     Diagnosis Date Noted    Obesity     Cerebral embolism with cerebral infarction 07/18/2014    Weakness 07/18/2014    Acute left-sided weakness 07/18/2014    Slurred speech 07/18/2014    Memory  deficit 08/15/2014    Cerebral embolism without mention of cerebral infarction 04/07/2015    Symptomatic carotid artery stenosis with infarction 04/27/2015    Morbid obesity 07/23/2015    Obesity, Class II, BMI 35-39.9, with comorbidity 08/25/2015    Partial small bowel obstruction 04/06/2016    Carotid occlusion, bilateral     Encounter for loop recorder at end of battery life 09/17/2018     Past Medical History:   Diagnosis Date    Arthritis     Atrial fibrillation, unspecified     Chronic back pain     Colon polyp     CVA (cerebral infarction) 7/16/14    Degenerative disc disease     Diabetes mellitus type II     Encounter for loop recorder at end of battery life 9/17/2018    Hyperlipidemia     Hypertension     Hypothyroidism     Obesity     Sleep apnea     Stroke july 2014    Venous insufficiency (chronic) (peripheral)                 PAST SURGICAL HISTORY:    Past Surgical History:   Procedure Laterality Date    COLONOSCOPY N/A 8/30/2018    Procedure: COLONOSCOPY;  Surgeon: William Clement MD;  Location: [facility] (4TH FLR);  Service: Endoscopy;  Laterality: N/A;  Tito/Dr Rad Johnston/OK to hold 2 days prior to colon/see telephone encounter dated 7/24/18/pt has loop recorder/svn    COLONOSCOPY N/A 8/30/2018    Performed by William Clement MD at [facility] ENDO (4TH FLR)    COLONOSCOPY N/A 5/24/2013    Performed by Gordon Brown MD at [facility] END[facility] (4TH FLR)    COLONOSCOPY W/ POLYPECTOMY      GASTRECTOMY      GASTRECTOMY-SLEEVE-LAPAROSCOPIC-92021 with intraop EGD and hiatel hernia repair N/A 7/23/2015    Performed by Burak Stacy Jr., MD at [facility] OR 2ND FLR    HERNIA REPAIR      REMOVAL OF IMPLANTABLE LOOP RECORDER N/A 9/17/2018    Procedure: REMOVAL, IMPLANTABLE LOOP RECORDER;  Surgeon: Thomas Randolph MD;  Location: [facility] CATH LAB;  Service: Cardiology;  Laterality: N/A;  TERESA, ILR removal (no reimplant), MDT, RN Sedate, SK, 3 Prep *Reveal LINQ*    REMOVAL, IMPLANTABLE LOOP RECORDER N/A  2018    [facility] CATH LAB    TONSILLECTOMY      TUBAL LIGATION           FAMILY HISTORY:                Family History   Problem Relation Age of Onset    No Known Problems Mother     Heart disease Father     Diabetes Maternal Grandfather     Obesity Maternal Grandfather     No Known Problems Sister     No Known Problems Sister     No Known Problems Maternal Grandmother     Stroke Paternal Grandmother     No Known Problems Paternal Grandfather     Heart attack Neg Hx        SOCIAL HISTORY:          Tobacco:   Social History     Tobacco Use   Smoking Status Former Smoker    Packs/day: 1.00    Years: 30.00    Pack years: 30.00    Types: Cigarettes    Last attempt to quit: 2014    Years since quittin.3   Smokeless Tobacco Never Used       alcohol use:    Social History     Substance and Sexual Activity   Alcohol Use Yes    Alcohol/week: 0.0 oz    Comment: rarely                   ALLERGIES:    Review of patient's allergies indicates:   Allergen Reactions    Medrol [methylprednisolone] Palpitations       CURRENT MEDICATIONS:    Current Outpatient Medications   Medication Sig Dispense Refill    acyclovir 5% (ZOVIRAX) 5 % ointment Apply topically 6 (six) times daily. 5 g 1    albuterol (PROVENTIL/VENTOLIN HFA) 90 mcg/actuation inhaler Inhale 2 puffs into the lungs every 6 (six) hours as needed for Wheezing. Rescue 18 g 2    albuterol-ipratropium 2.5mg-0.5mg/3mL (DUO-NEB) 0.5 mg-3 mg(2.5 mg base)/3 mL nebulizer solution Take 3 mLs by nebulization every 6 (six) hours as needed for Wheezing. Rescue 3 vial 3    apixaban (ELIQUIS) 5 mg Tab Take 1 tablet (5 mg total) by mouth 2 (two) times daily. 60 tablet 6    aspirin (ECOTRIN) 81 MG EC tablet Take 1 tablet (81 mg total) by mouth once daily.      b complex vitamins tablet Take 1 tablet by mouth once daily.      blood pressure test kit-large Kit Use as directed 1 each 0    blood-glucose meter (ONETOUCH ULTRA  SYSTEM KIT) kit Use as instructed 1 each 0    CALCIUM CITRATE/VITAMIN D3 (CALCIUM CITRATE + D ORAL) Take 2 tablets by mouth 2 (two) times daily.      cinnamon bark (CINNAMON ORAL) Take by mouth 2 (two) times daily.      cyanocobalamin, vitamin B-12, 500 mcg Subl Place 1 tablet under the tongue once daily.      docusate sodium (COLACE) 100 MG capsule Take 100 mg by mouth once daily.       fish oil-omega-3 fatty acids 300-1,000 mg capsule Take by mouth 2 (two) times daily.      fluticasone (FLONASE) 50 mcg/actuation nasal spray 2 sprays (100 mcg total) by Each Nare route once daily. 1 Bottle 0    glimepiride (AMARYL) 4 MG tablet Take 1 tablet (4 mg total) by mouth 2 (two) times daily. 60 tablet 11    glimepiride (AMARYL) 4 MG tablet TAKE ONE TABLET BY MOUTH TWICE DAILY 60 tablet 11    lancets (ONE TOUCH DELICA LANCETS) 33 gauge Misc 1 lancet by Misc.(Non-Drug; Combo Route) route 4 (four) times daily. 150 each 8    levocetirizine (XYZAL) 5 MG tablet Take 1 tablet (5 mg total) by mouth every evening. 30 tablet 11    levothyroxine (SYNTHROID) 125 MCG tablet TAKE ONE TABLET BY MOUTH ONCE DAILY 30 tablet 11    LORazepam (ATIVAN) 1 MG tablet Take 1 tablet (1 mg total) by mouth every 12 (twelve) hours as needed for Anxiety. 60 tablet 2    metFORMIN (GLUCOPHAGE) 1000 MG tablet TAKE ONE TABLET BY MOUTH TWICE DAILY WITH MEALS 60 tablet 11    multivitamin capsule Take 1 capsule by mouth once daily.      omeprazole (PRILOSEC) 40 MG capsule Take 1 capsule (40 mg total) by mouth every morning. 30 capsule 6    ONETOUCH ULTRA TEST Strp USE ONE  STRIP TO CHECK GLUCOSE 4 TIMES DAILY AS DIRECTED 150 strip 4    rosuvastatin (CRESTOR) 40 MG Tab TAKE ONE TABLET BY MOUTH ONCE DAILY 30 tablet 11    RUTIN/HESP/BIOFLAV/C/HERB#196 (BIOFLEX ORAL) Take 2 tablets by mouth once daily.      senna (SENNA) 8.6 mg tablet Take 2 tablets by mouth nightly as needed for Constipation. 100 tablet 3    traZODone (DESYREL) 50 MG tablet 1  "pill PO PRN for insomnia at bedtime. OK to take 2nd pill in 30 minutes if still awake. 60 tablet 5    walker (ULTRA-LIGHT ROLLATOR) Misc Use walker w/ seat daily as directed. 1 each 0     No current facility-administered medications for this visit.      Facility-Administered Medications Ordered in Other Visits   Medication Dose Route Frequency Provider Last Rate Last Dose    0.9%  NaCl infusion   Intravenous Continuous Khadijah Rivera NP   1,000 mL at 09/17/18 0939    ceFAZolin injection 2 g  2 g Intravenous On Call Procedure Khadijah Rivera NP                          REVIEW OF SYSTEMS:   Sleep related symptoms as per HPI    reports weight gain - 40 lbs weight loss      PHYSICAL EXAM:  BP (!) 131/56   Pulse (!) 52   Ht 5' 5.5" (1.664 m)   Wt 102.5 kg (225 lb 15.5 oz)   LMP  (LMP Unknown)   BMI 37.03 kg/m²   GENERAL: Normal development, well groomed.  HEENT:   HEENT:  Conjunctivae are non-erythematous; Pupils equal, round, and reactive to light; Nose is symmetrical; Nasal mucosa is pink and moist; Septum is midline; Inferior turbinates are normal; Nasal airflow is normal; Posterior pharynx is pink; Modified Mallampati:IV; Posterior palate is low; Tonsils not visualized; Uvula is wide and elongated;Tongue is enlarged; Dentition is fair; No TMJ tenderness; Jaw opening and protrusion without click and without discomfort.  NECK: Supple. Neck circumference is 15 inches. No thyromegaly. No palpable nodes.     SKIN: On face and neck: No abrasions, no rashes, no lesions.  No subcutaneous nodules are palpable.  RESPIRATORY: Chest is clear to auscultation.  Normal chest expansion and non-labored breathing at rest.  CARDIOVASCULAR: Normal S1, S2.  No murmurs, gallops or rubs. No carotid bruits bilaterally.  No edema. No clubbing. No cyanosis.    NEURO: Oriented to time, place and person. Normal attention span and concentration. Gait normal.    PSYCH: Affect is full. Mood is normal  MUSCULOSKELETAL: Moves 4 " "extremities. Gait normal.         Using [facility]: yes      ASSESSMENT:    1. COURTNEY (obstructive sleep apnea). The patient symptomatically has  excessive daytime sleepiness, snoring, excessive daytime fatigue and interrupted sleep  with exam findings of "a crowded oral airway and elevated body mass index. This warrants treatment. Ongoing compliance.           PLAN:    Continue  CPAP 8-15 cm with the mask of a patient's choice was given to the patient.  Will re-order supplies  Compliance was reinforced.  4 months recall.       Education: During our discussion today, we talked about the etiology of obstructive sleep apnea as well as the potential ramifications of untreated sleep apnea, which could include daytime sleepiness, hypertension, heart disease and/or stroke.      We discussed potential treatment options, which could include weight loss, body positioning, continuous positive airway pressure (CPAP), or referral for surgical consideration. The patient preferred CPAP option.     Discussed purpose of PAP therapy, health benefits of CPAP, as well as the potential ramifications of untreated sleep apnea, which could include daytime sleepiness, hypertension, heart disease and/or stroke. An AHI of 15 is associated with increased risk CVD.     The patient should avoid ETOH and sedatives at night, as it tends to aggravate COURTNEY. Regular replacement of CPAP mask, tubing and filter was recommended.    Precautions: The patient was advised to abstain from driving should he feel sleepy or drowsy.    Follow up: MD/NP after 1 month of PAP use.    Thank you for allowing me the opportunity to participate in the care of your patient.  "

## 2018-12-21 DIAGNOSIS — E11.9 TYPE 2 DIABETES MELLITUS WITHOUT COMPLICATION: ICD-10-CM

## 2018-12-28 DIAGNOSIS — Z98.84 S/P LAPAROSCOPIC SLEEVE GASTRECTOMY: ICD-10-CM

## 2018-12-28 RX ORDER — OMEPRAZOLE 40 MG/1
CAPSULE, DELAYED RELEASE ORAL
Qty: 30 CAPSULE | Refills: 6 | Status: SHIPPED | OUTPATIENT
Start: 2018-12-28 | End: 2019-04-18

## 2019-01-23 DIAGNOSIS — E78.5 HYPERLIPIDEMIA, UNSPECIFIED HYPERLIPIDEMIA TYPE: ICD-10-CM

## 2019-01-23 NOTE — TELEPHONE ENCOUNTER
----- Message from Radha Paniagua sent at 1/23/2019  2:55 PM CST -----  Contact: Kiersten @ [facility] Direct# [phone],Fax# [phone]  RX request - refill or new RX.  Is this a refill or new RX:  Refill  RX name and strength: metFORMIN (GLUCOPHAGE) 1000 MG tablet  glimepiride (AMARYL) 4 MG tablet  apixaban (ELIQUIS) 5 mg Tab  levothyroxine (SYNTHROID) 125 MCG tablet  rosuvastatin (CRESTOR) 40 MG Tab  Directions:   Is this a 30 day or 90 day RX:  30  Local pharmacy or mail order pharmacy:  [facility] [address] [place] [address]  Pharmacy name and phone #[facility] Phone# [phone],Fax# [phone]

## 2019-01-24 NOTE — TELEPHONE ENCOUNTER
----- Message from Radha S Siva sent at 1/24/2019 11:45 AM CST -----  Contact: [facility] Direct# [phone], Fax# [phone]  RX request - refill or new RX.  Is this a refill or new RX:  Refill  RX name and strength: metFORMIN (GLUCOPHAGE) 1000 MG tablet  glimepiride (AMARYL) 4 MG tablet  apixaban (ELIQUIS) 5 mg Tab  rosuvastatin (CRESTOR) 40 MG Tab  levothyroxine (SYNTHROID) 125 MCG tablet  Directions:   Is this a 30 day or 90 day RX: 30   Local pharmacy or mail order pharmacy:  [facility] [address] [place] [address]  Pharmacy name and phone # [facility] Phone #[phone],Fax# [phone]

## 2019-01-26 RX ORDER — METFORMIN HYDROCHLORIDE 1000 MG/1
TABLET ORAL
Qty: 60 TABLET | Refills: 11 | Status: SHIPPED | OUTPATIENT
Start: 2019-01-26 | End: 2019-04-18

## 2019-01-26 RX ORDER — LEVOTHYROXINE SODIUM 125 UG/1
TABLET ORAL
Qty: 30 TABLET | Refills: 11 | Status: SHIPPED | OUTPATIENT
Start: 2019-01-26 | End: 2019-04-18

## 2019-01-26 RX ORDER — GLIMEPIRIDE 4 MG/1
TABLET ORAL
Qty: 60 TABLET | Refills: 11 | Status: SHIPPED | OUTPATIENT
Start: 2019-01-26 | End: 2019-04-18

## 2019-01-26 RX ORDER — APIXABAN 5 MG/1
TABLET, FILM COATED ORAL
Qty: 60 TABLET | Refills: 6 | Status: SHIPPED | OUTPATIENT
Start: 2019-01-26 | End: 2019-05-29 | Stop reason: SDUPTHER

## 2019-01-26 RX ORDER — ROSUVASTATIN CALCIUM 40 MG/1
TABLET, COATED ORAL
Qty: 30 TABLET | Refills: 11 | Status: SHIPPED | OUTPATIENT
Start: 2019-01-26 | End: 2019-04-18

## 2019-02-15 NOTE — HPI
63 year old female with PMH hypertension, hyperlipidemia, diabetes mellitus, obesity s/p gastric sleeve surgery, right MCA infarction July of 2014 s/p ILR implant, atrial fibrillation, and bradycardia.   7/14 had rt MCA stroke.   She was outside of the treatment window for thrombolytics and recovered completely.  Work-up included carotid dopplers showing 60% right ICA stenosis.  ILR implanted and showed  paroxysmal atrial fibrillation asymptomatic.  Eliquis 5 mg BID initiated and has been tolerating.   ILR now at TERESA > presented to [facility] for removal. Pt held her eliquis > last dose was on Friday 9/14/18   Pt denies any rash, infections or other acute symptoms           0

## 2019-03-26 DIAGNOSIS — G47.00 INSOMNIA, UNSPECIFIED TYPE: ICD-10-CM

## 2019-03-26 RX ORDER — TRAZODONE HYDROCHLORIDE 50 MG/1
TABLET ORAL
Qty: 60 TABLET | Refills: 5 | Status: SHIPPED | OUTPATIENT
Start: 2019-03-26 | End: 2019-05-30 | Stop reason: SDUPTHER

## 2019-03-26 NOTE — TELEPHONE ENCOUNTER
traZODone (DESYREL) 50 MG tablet   EditCancel Reorder       Summary: 1 pill PO PRN for insomnia at bedtime. OK to take 2nd pill in 30 minutes if still awake., Normal   Start: 10/8/2018  Ord/Sold: 10/8/2018 (O)  Report  Adh:   Long-term:   Pharmacy: [facility] [address] [place] [address] Dose History       Patient Si pill PO PRN for insomnia at bedtime. OK to take 2nd pill in 30 minutes if still awake.       Ordered on: 10/8/2018       Authorized by: GUILLERMINA YOON       Dispense: 60 tablet       Refills: 5 ordered       Admin Instructions: 1 pill PO PRN for insomnia at bedtime. OK to take 2nd pill in 30 minutes if still awake.        Last office visit:

## 2019-04-10 ENCOUNTER — PATIENT MESSAGE (OUTPATIENT)
Dept: INTERNAL MEDICINE | Facility: CLINIC | Age: 64
End: 2019-04-10

## 2019-04-10 DIAGNOSIS — M25.562 LEFT KNEE PAIN, UNSPECIFIED CHRONICITY: Primary | ICD-10-CM

## 2019-04-11 DIAGNOSIS — I48.0 PAF (PAROXYSMAL ATRIAL FIBRILLATION): Primary | ICD-10-CM

## 2019-04-11 NOTE — TELEPHONE ENCOUNTER
Yudelka Johnston:  I would like s referral for orthopedics. While in [place] I made a turn and my knee has been in pain ever since.  Thanks.

## 2019-04-12 ENCOUNTER — TELEPHONE (OUTPATIENT)
Dept: ELECTROPHYSIOLOGY | Facility: CLINIC | Age: 64
End: 2019-04-12

## 2019-04-12 DIAGNOSIS — R00.2 PALPITATIONS: Primary | ICD-10-CM

## 2019-04-12 DIAGNOSIS — I77.9 BILATERAL CAROTID ARTERY DISEASE, UNSPECIFIED TYPE: Primary | ICD-10-CM

## 2019-04-12 NOTE — TELEPHONE ENCOUNTER
Called pt & adv Dr Randolph/MARK would like pt to have holter monitor completed prior to seeing us so we can see when the heart is racing per pts comments.  MARK ordered 2day monitor- adv pt to call me back so I can get hotler apt scheduled & we can reschedule MARK apt to another day (per Ge request)

## 2019-04-17 DIAGNOSIS — M25.562 LEFT KNEE PAIN, UNSPECIFIED CHRONICITY: Primary | ICD-10-CM

## 2019-04-18 ENCOUNTER — HOSPITAL ENCOUNTER (OUTPATIENT)
Dept: RADIOLOGY | Facility: HOSPITAL | Age: 64
Discharge: HOME | End: 2019-04-18
Attending: ORTHOPAEDIC SURGERY
Payer: COMMERCIAL

## 2019-04-18 ENCOUNTER — OFFICE VISIT (OUTPATIENT)
Dept: ORTHOPEDICS | Facility: CLINIC | Age: 64
End: 2019-04-18
Payer: COMMERCIAL

## 2019-04-18 VITALS — HEIGHT: 65 IN | BODY MASS INDEX: 40.22 KG/M2 | WEIGHT: 241.38 LBS

## 2019-04-18 DIAGNOSIS — M25.562 LEFT KNEE PAIN, UNSPECIFIED CHRONICITY: ICD-10-CM

## 2019-04-18 DIAGNOSIS — M17.0 ARTHRITIS OF BOTH KNEES: ICD-10-CM

## 2019-04-18 DIAGNOSIS — M70.52 PES ANSERINUS BURSITIS OF BOTH KNEES: Primary | ICD-10-CM

## 2019-04-18 DIAGNOSIS — M70.51 PES ANSERINUS BURSITIS OF BOTH KNEES: Primary | ICD-10-CM

## 2019-04-18 PROCEDURE — 73562 XR KNEE ORTHO LEFT WITH FLEXION: ICD-10-PCS | Mod: 26,59,LT, | Performed by: RADIOLOGY

## 2019-04-18 PROCEDURE — 73562 X-RAY EXAM OF KNEE 3: CPT | Mod: 26,59,LT, | Performed by: RADIOLOGY

## 2019-04-18 PROCEDURE — 3008F BODY MASS INDEX DOCD: CPT | Mod: CPTII,S$GLB,, | Performed by: ORTHOPAEDIC SURGERY

## 2019-04-18 PROCEDURE — 99999 PR PBB SHADOW E&M-EST. PATIENT-LVL III: ICD-10-PCS | Mod: PBBFAC,,, | Performed by: ORTHOPAEDIC SURGERY

## 2019-04-18 PROCEDURE — 99999 PR PBB SHADOW E&M-EST. PATIENT-LVL III: CPT | Mod: PBBFAC,,, | Performed by: ORTHOPAEDIC SURGERY

## 2019-04-18 PROCEDURE — 73562 X-RAY EXAM OF KNEE 3: CPT | Mod: TC,LT

## 2019-04-18 PROCEDURE — 99203 PR OFFICE/OUTPT VISIT, NEW, LEVL III, 30-44 MIN: ICD-10-PCS | Mod: S$GLB,,, | Performed by: ORTHOPAEDIC SURGERY

## 2019-04-18 PROCEDURE — 99203 OFFICE O/P NEW LOW 30 MIN: CPT | Mod: S$GLB,,, | Performed by: ORTHOPAEDIC SURGERY

## 2019-04-18 PROCEDURE — 73564 X-RAY EXAM KNEE 4 OR MORE: CPT | Mod: 26,LT,, | Performed by: RADIOLOGY

## 2019-04-18 PROCEDURE — 73564 XR KNEE ORTHO LEFT WITH FLEXION: ICD-10-PCS | Mod: 26,LT,, | Performed by: RADIOLOGY

## 2019-04-18 PROCEDURE — 3008F PR BODY MASS INDEX (BMI) DOCUMENTED: ICD-10-PCS | Mod: CPTII,S$GLB,, | Performed by: ORTHOPAEDIC SURGERY

## 2019-04-18 NOTE — PROGRESS NOTES
Subjective:     HPI:   Diana Montenegro is a 63 y.o. female who presents for evaluation of bilateral knee pain    She says at baseline she has been diagnosed with arthritis in the past.  Historically the right side is worse than left.  However 3 4 weeks ago she twisted her left knee in her now her left sides been worse.  She complains of predominantly medial-sided activity-related knee pain worse with activities and relieved with rest.    She has been taking naproxen twice a day prescription that she had left over from her dad.  She says she saw Dr. Quevedo years ago and got Synvisc injections which did not help.  She goes to the gym pool at the gym and does a home exercise program.  She has tried some braces in the past.  She has been using a Rollator for a few years per both her knees and her back.  She would have difficulty walking more than a block.  Limitations include walking and dancing.    She lives alone she says all her family's in [facility] and she thinks that if she were to have surgery she would want to go there and go to the [facility] for special surgery    She has BMI of 41.  She says she is allergic to steroids they cause palpitations.  She has atrial fibrillation for which she is on Eliquis an 81 milligram aspirin.  She had carotid stenosis and history of a right-sided MCA stroke in 2014.  She has diabetes.  Last hemoglobin A1c from July 2018 was 8.9.  She has had a history of gastric sleeve.  She has sleep apnea uses CPAP.  She has anemia her last hemoglobin was 10.9.  She has chronic back pain       ROS:  The updated medical history is in the chart and has been reviewed. A review of systems is updated and there is no reported vision changes, ear/nose/mouth/throat complaints,  chest pain, shortness of breath, abdominal pain, urological complaints, fevers or chills, psychiatric complaints. Musculoskeletal and neurologcial symptoms are as documented. All other systems are negative.       Objective:   Exam:  There were no vitals filed for this visit.  Body mass index is 40.8 kg/m².    Physical examination included assessment of the patient's general appearance with particular attention to development, nutrition, body habitus, attention to grooming, and any evidence of distress.  Constitutional: The patient is a well-developed, well-nourished patient in no acute distress.   Cardiovascular: Vascular examination included warmth and capillary refill as well inspection for edema and assessment of pedal pulses. Pulses are palpable and regular.  Musculoskeletal: Gait was assessed as to whether it was steady, non-antalgic, and/or required the use of an assist device. The patient was also asked to walk independently and get onto the examination table.  Skin: The skin was examined for any obvious rashes or lesions in the extremity.  Neurologic: Sensation is intact to light touch in the extremity. The patient has good coordination without hyperreflexia and is alert and oriented to person, place and time and has normal mood and affect.     All of the above were examined and found to be within normal limits except for the following pertinent clinical findings:    Antalgic gait with a limp.  Both knees 5-120 degree knee range of motion neutral alignment knee stable to anterior-posterior varus and valgus stresses slight flexion contracture but no extensor lag.  She is really nontender to palpation at the medial lateral patellofemoral joint line she has no significant effusion.  She is particularly tender at the bilateral pes anserinus      Imaging:  Indication:  Left knee pain  Exam Ordered: Radiographs of the left knee include a standing anteroposterior view, a standing posterioanterior view, a lateral view in full flexion, and a sunrise view  Details of Examination: Todays exam shows evidence of joint space narrowing, osteophyte formation, and subchondral sclerosis, all consistent with degenerative arthritis of  the knee.  No other significant findings are noted.  Impression:  Degenerative Arthritis, Left Knee    Indication:  Right knee pain  Exam Ordered: Radiographs of the right knee include a standing anteroposterior view, a standing posterioanterior view, a lateral view in full flexion, and a sunrise view  Details of Examination: Todays exam shows evidence of joint space narrowing, osteophyte formation, and subchondral sclerosis, all consistent with degenerative arthritis of the knee.  No other significant findings are noted.  Impression:  Degenerative Arthritis, Right Knee        Assessment:     Pes anserinus bursitis of both knees [M70.51, M70.52]  Bilateral knee varus arthritis which appears minimally symptomatic, exam most localizes the pes bursitis/Tendinitis    Allergic to steroids  BMI 41  Atrial fibrillation on Eliquis and aspirin  History of stroke related carotid stenosis  Gastric sleeve  Sleep apnea  Chronic back pain  Anemia  Poorly controlled diabetes, 8.9     Plan:       The above findings were discussed with patient length. We discussed the risks of conservative versus surgical management knee arthritis. Conservative management consisting of anti-inflammatory medications, glucosamine/chondroitin sulfate, weight loss, physical therapy, activity modification, as well as injections (lubricant versus corticosteroid) was discussed at length. At this point considering the patient's level of activity, pain, and radiographic findings I recommend continued conservative management of the knee arthritis. Conservative management could involve treatment with anti-inflammatory medications.     I explained the potentially adverse gastric, cardiac, and renal effects of Voltaren and the NSAIDs and explained that if the patient wishes to take it for longer that the patient should discuss this with their primary care physician to determine if it is safe to do so.    In terms of the pes bursitis recommend NSAIDs and physical  therapy.  We discussed that she can take Tylenol but other in say she would need to be cleared from her cardiologist because she is on a blood thinner.  She says she cannot have steroids so unfortunately we can do injections.  We will get therapy ordered.    We discussed that her definitive solution would be total knee replacements if the knees becomes more symptomatic.  Criteria for knee replacement will be losing 10 pounds to get her BMI below 40, obtaining cardiac clearance, having hemoglobin A1c less than 8.  In the meantime she could go see Dr. Garvey for RFA, she says that she has [employer] but she has a supplemental that might help cover it.    Long-term she says if she would wants to have a knee replacement she would likely go to [facility] where her family lives    Orders Placed This Encounter   Procedures    Ambulatory Referral to Physical/Occupational Therapy     Referral Priority:   Routine     Referral Type:   Physical Medicine     Referral Reason:   Specialty Services Required     Requested Specialty:   Physical Therapy     Number of Visits Requested:   8       Past Medical History:   Diagnosis Date    Arthritis     Atrial fibrillation, unspecified     hx of a fib prior to stroke.    Chronic back pain     Colon polyp     CVA (cerebral infarction) 7/16/14    Degenerative disc disease     cervical; lumbar    Diabetes mellitus type II     Encounter for loop recorder at end of battery life 9/17/2018    Hyperlipidemia     Hypertension     Hypothyroidism     Obesity     Sleep apnea     Stroke july 2014    Venous insufficiency (chronic) (peripheral)        Past Surgical History:   Procedure Laterality Date    COLONOSCOPY N/A 8/30/2018    Performed by William [facility] at [facility] (4TH FLR)    COLONOSCOPY N/A 5/24/2013    Performed by Gordon Brown [facility] at [facility] (4TH FLR)    COLONOSCOPY W/ POLYPECTOMY      GASTRECTOMY      GASTRECTOMY-SLEEVE-LAPAROSCOPIC-[id number] with intraop EGD and  hiatel hernia repair N/A 2015    Per[facility] Burak [facility] Jr[facility] at [facility] OR 2ND FLR    HERNIA REPAIR      REMOVAL, IMPLANTABLE LOOP RECORDER N/A 2018    Per[facility] at [facility] CATH LAB    TONSILLECTOMY      TUBAL LIGATION         Family History   Problem Relation Age of Onset    No Known Problems Mother     Heart disease Father     Diabetes Maternal Grandfather     Obesity Maternal Grandfather     No Known Problems Sister     No Known Problems Sister     No Known Problems Maternal Grandmother     Stroke Paternal Grandmother     No Known Problems Paternal Grandfather     Heart attack Neg Hx        Social History     Socioeconomic History    Marital status: Single     Spouse name: Not on file    Number of children: Not on file    Years of education: Not on file    Highest education level: Not on file   Occupational History    Occupation: MOS      Employer: [employer]   Social Needs    Financial resource strain: Not on file    Food insecurity:     Worry: Not on file     Inability: Not on file    Transportation needs:     Medical: Not on file     Non-medical: Not on file   Tobacco Use    Smoking status: Former Smoker     Packs/day: 1.00     Years: 30.00     Pack years: 30.00     Types: Cigarettes     Last attempt to quit: 2014     Years since quittin.7    Smokeless tobacco: Never Used   Substance and Sexual Activity    Alcohol use: Yes     Alcohol/week: 0.0 oz     Comment: rarely    Drug use: No     Comment: thc at young age    Sexual activity: Not Currently     Partners: Male   Lifestyle    Physical activity:     Days per week: Not on file     Minutes per session: Not on file    Stress: Not on file   Relationships    Social connections:     Talks on phone: Not on file     Gets together: Not on file     Attends [employer] service: Not on file     Active member of club or organization: Not on file     Attends meetings of clubs  or organizations: Not on file     Relationship status: Not on file   Other Topics Concern    Not on file   Social History Narrative    Not on file

## 2019-04-22 ENCOUNTER — CLINICAL SUPPORT (OUTPATIENT)
Dept: CARDIOLOGY | Facility: HOSPITAL | Age: 64
End: 2019-04-22
Attending: NURSE PRACTITIONER
Payer: COMMERCIAL

## 2019-04-22 ENCOUNTER — OFFICE VISIT (OUTPATIENT)
Dept: VASCULAR SURGERY | Facility: CLINIC | Age: 64
End: 2019-04-22
Payer: COMMERCIAL

## 2019-04-22 ENCOUNTER — HOSPITAL ENCOUNTER (OUTPATIENT)
Dept: VASCULAR SURGERY | Facility: CLINIC | Age: 64
Discharge: HOME | End: 2019-04-22
Attending: SURGERY
Payer: COMMERCIAL

## 2019-04-22 VITALS
BODY MASS INDEX: 40.65 KG/M2 | DIASTOLIC BLOOD PRESSURE: 69 MMHG | SYSTOLIC BLOOD PRESSURE: 160 MMHG | HEART RATE: 61 BPM | TEMPERATURE: 99 F | HEIGHT: 64 IN | WEIGHT: 238.13 LBS

## 2019-04-22 DIAGNOSIS — I65.23 BILATERAL CAROTID ARTERY STENOSIS: Primary | ICD-10-CM

## 2019-04-22 DIAGNOSIS — I77.9 BILATERAL CAROTID ARTERY DISEASE, UNSPECIFIED TYPE: ICD-10-CM

## 2019-04-22 DIAGNOSIS — R00.2 PALPITATIONS: ICD-10-CM

## 2019-04-22 DIAGNOSIS — I65.23 BILATERAL CAROTID ARTERY STENOSIS: ICD-10-CM

## 2019-04-22 PROCEDURE — 93227 XTRNL ECG REC<48 HR R&I: CPT | Mod: ,,, | Performed by: INTERNAL MEDICINE

## 2019-04-22 PROCEDURE — 3077F PR MOST RECENT SYSTOLIC BLOOD PRESSURE >= 140 MM HG: ICD-10-PCS | Mod: CPTII,S$GLB,, | Performed by: SURGERY

## 2019-04-22 PROCEDURE — 99213 PR OFFICE/OUTPT VISIT, EST, LEVL III, 20-29 MIN: ICD-10-PCS | Mod: S$GLB,,, | Performed by: SURGERY

## 2019-04-22 PROCEDURE — 99999 PR PBB SHADOW E&M-EST. PATIENT-LVL III: CPT | Mod: PBBFAC,,, | Performed by: SURGERY

## 2019-04-22 PROCEDURE — 93227 HOLTER MONITOR - 48 HOUR (CUPID ONLY): ICD-10-PCS | Mod: ,,, | Performed by: INTERNAL MEDICINE

## 2019-04-22 PROCEDURE — 99213 OFFICE O/P EST LOW 20 MIN: CPT | Mod: S$GLB,,, | Performed by: SURGERY

## 2019-04-22 PROCEDURE — 93880 EXTRACRANIAL BILAT STUDY: CPT | Mod: S$GLB,,, | Performed by: SURGERY

## 2019-04-22 PROCEDURE — 3078F PR MOST RECENT DIASTOLIC BLOOD PRESSURE < 80 MM HG: ICD-10-PCS | Mod: CPTII,S$GLB,, | Performed by: SURGERY

## 2019-04-22 PROCEDURE — 93225 XTRNL ECG REC<48 HRS REC: CPT

## 2019-04-22 PROCEDURE — 3008F BODY MASS INDEX DOCD: CPT | Mod: CPTII,S$GLB,, | Performed by: SURGERY

## 2019-04-22 PROCEDURE — 99999 PR PBB SHADOW E&M-EST. PATIENT-LVL III: ICD-10-PCS | Mod: PBBFAC,,, | Performed by: SURGERY

## 2019-04-22 PROCEDURE — 3078F DIAST BP <80 MM HG: CPT | Mod: CPTII,S$GLB,, | Performed by: SURGERY

## 2019-04-22 PROCEDURE — 3077F SYST BP >= 140 MM HG: CPT | Mod: CPTII,S$GLB,, | Performed by: SURGERY

## 2019-04-22 PROCEDURE — 3008F PR BODY MASS INDEX (BMI) DOCUMENTED: ICD-10-PCS | Mod: CPTII,S$GLB,, | Performed by: SURGERY

## 2019-04-22 PROCEDURE — 93880 PR DUPLEX SCAN EXTRACRANIAL,BILAT: ICD-10-PCS | Mod: S$GLB,,, | Performed by: SURGERY

## 2019-04-22 NOTE — PROGRESS NOTES
Patient ID: Diana Montenegro is a 63 y.o. female.    I. HISTORY     Chief Complaint: Follow-up      HPI   Diana Montenegro is a 63 y.o.  With HTN, HLD, DM, Afib (on Eliquis), R CVA (2014 manifested by facial droop and slurred speech) who is here for f/u eval of right carotid artery stenosis. She is a former smoker with 30 pk-yr history. She walks with a walker due to arthritic pain but otherwise is independent. She denies changes in vision, facial droop, difficulty speaking, or loss of motor function.     Presents for follow up of asymptomatic carotid stenosis. Denies any recent of arm or leg numbness or weakness, facial droop, vision changes or slurred speech.   No changes in medical history, hospitalizations or surgeries since our visit last year.     Bothered today by progressive knee arthritis  Also has a fw episodes of heart p[alpitations. Seeing her PCP next week.     Review of Systems   Constitution: Negative for chills and fever.   HENT: Negative for congestion and ear pain.    Eyes: Negative for discharge and pain.   Cardiovascular: Negative for chest pain, claudication and dyspnea on exertion.   Respiratory: Negative for cough and shortness of breath.    Skin: Negative for dry skin and itching.   Musculoskeletal: Positive for arthritis. Negative for back pain and falls.   Gastrointestinal: Negative for bloating, abdominal pain and anorexia.   Genitourinary: Negative for dysuria, hematuria and hesitancy.   Neurological: Negative for aphonia and dizziness.   Psychiatric/Behavioral: Negative for hallucinations and hypervigilance.       II. PHYSICAL EXAM     Right Arm BP - Sitting: 160/69 (19 1320)  Left Arm BP - Sittin/72 (19 1320)  Pulse: 61  Temp: 98.5 °F (36.9 °C)  Body mass index is 40.87 kg/m².        Physical Exam   Constitutional: She is oriented to person, place, and time. She appears well-developed and well-nourished. No distress.   HENT:   Head: Normocephalic and  atraumatic.   Neck: Normal range of motion. Neck supple.   Cardiovascular: Normal rate, regular rhythm, normal heart sounds and intact distal pulses.   Pulses:       Radial pulses are 2+ on the right side, and 2+ on the left side.   No carotid bruits   Pulmonary/Chest: Effort normal and breath sounds normal. No respiratory distress.   Abdominal: Soft. Bowel sounds are normal. She exhibits no distension.   Neurological: She is alert and oriented to person, place, and time.   Skin: Skin is warm and dry.   Psychiatric: She has a normal mood and affect. Her behavior is normal. Thought content normal.   Vitals reviewed.      III. ASSESSMENT & PLAN (MEDICAL DECISION MAKING)     1. Bilateral carotid artery stenosis        Imaging Results:  Carotid Duplex:   R L   60-79%  EDV 60, Ratio: 4.3 1-39%  PSV 97     Assessment/Diagnosis and Plan:  Diana Montenegro is a 63 y.o. female with stroke in July 2014. No symptoms since. She has right ICA stenosis ~ 70%.   Stable from last years visit. No intervention indicated. Not severe enough for entry into [facility]ST.    Continue yearly follow up with repeat duplex.  Medical therapy with Aspirin and Statin    Ofelia Downs MD FACS [facility]  Vascular & Endovascular Surgery

## 2019-04-26 LAB
OHS CV EVENT MONITOR DAY: 0
OHS CV HOLTER LENGTH DECIMAL HOURS: 48
OHS CV HOLTER LENGTH HOURS: 48
OHS CV HOLTER LENGTH MINUTES: 0

## 2019-04-28 ENCOUNTER — PATIENT MESSAGE (OUTPATIENT)
Dept: INTERNAL MEDICINE | Facility: CLINIC | Age: 64
End: 2019-04-28

## 2019-04-29 ENCOUNTER — TELEPHONE (OUTPATIENT)
Dept: SLEEP MEDICINE | Facility: CLINIC | Age: 64
End: 2019-04-29

## 2019-04-29 DIAGNOSIS — E78.5 HYPERLIPIDEMIA, UNSPECIFIED HYPERLIPIDEMIA TYPE: ICD-10-CM

## 2019-04-29 DIAGNOSIS — Z98.84 S/P LAPAROSCOPIC SLEEVE GASTRECTOMY: ICD-10-CM

## 2019-04-29 RX ORDER — LEVOTHYROXINE SODIUM 125 UG/1
125 TABLET ORAL DAILY
Qty: 30 TABLET | Refills: 3 | Status: SHIPPED | OUTPATIENT
Start: 2019-04-29 | End: 2019-08-20 | Stop reason: SDUPTHER

## 2019-04-29 RX ORDER — OMEPRAZOLE 40 MG/1
40 CAPSULE, DELAYED RELEASE ORAL EVERY MORNING
Qty: 30 CAPSULE | Refills: 2 | Status: SHIPPED | OUTPATIENT
Start: 2019-04-29 | End: 2019-07-25 | Stop reason: SDUPTHER

## 2019-04-29 RX ORDER — METFORMIN HYDROCHLORIDE 1000 MG/1
1000 TABLET ORAL 2 TIMES DAILY WITH MEALS
Qty: 60 TABLET | Refills: 2 | Status: SHIPPED | OUTPATIENT
Start: 2019-04-29 | End: 2019-07-25 | Stop reason: SDUPTHER

## 2019-04-29 RX ORDER — ROSUVASTATIN CALCIUM 40 MG/1
40 TABLET, COATED ORAL DAILY
Qty: 30 TABLET | Refills: 2 | Status: SHIPPED | OUTPATIENT
Start: 2019-04-29 | End: 2019-07-25 | Stop reason: SDUPTHER

## 2019-04-29 NOTE — PROGRESS NOTES
Ms. Montenegro is a patient of Dr. Randolph and was last seen in clinic 10/3/2018.      Subjective:   Patient ID:  Diana Montenegro is a 63 y.o. female who presents for follow-up of Palpitations  .     HPI:    Ms. Montenegro is a 63 y.o. female with hypertension, hyperlipidemia, diabetes mellitus, obesity s/p gastric sleeve surgery, right MCA infarction July of 2014 s/p ILR implant, atrial fibrillation, and bradycardia here for follow up.    Background:    History of hypertension, hyperlipidemia, diabetes mellitus, obesity s/p gastric sleeve surgery, right MCA infarction July of 2014 s/p ILR implant, atrial fibrillation, and bradycardia.   7/14 had rt MCA stroke.   She was outside of the treatment window for thrombolytics and recovered completely.  Work-up included carotid dopplers showing 60% right ICA stenosis.  ILR implanted.  ILR monitoring has revealed rare paroxysmal atrial fibrillation, with episodes up to just less than an hour in duration, asymptomatic. Eliquis 5 mg BID initiated.  Has felt well.   She underwent successful ILR removal on 9/17/2018. Remains on eliquis.    Update (05/01/2019):    She called the clinic early April reporting palpitations - Holter monitor ordered. Holter monitor 4/22/2019 showed very rare ectopy and no arrhythmias.     She does report one event of heart racing when she was in [facility]. She was at rest, and noticed that her fitbit document with HRs up to 134. Episode lasted 30 min.  Today she says she feels well. Ms. Montenegro denies chest pain with exertion or at rest, SOB, KING, dizziness, or syncope.    She is currently taking eliquis 5mg BID for stroke prophylaxis and denies significant bleeding episodes. Kidney function is stable, with a creatinine of 0.8 on 9/10/2018.    I have personally reviewed the patient's EKG today, which shows sinus rhythm at 66bpm. MD interval is 180. QTc is 431.    Recent Cardiac Tests:    2D Echo (7/17/2014):  CONCLUSIONS     1 - Normal left ventricular  systolic function (EF 60-65%).     2 - Normal left ventricular diastolic function.     3 - Normal right ventricular systolic function .     4 - Mild left atrial enlargement.     Current Outpatient Medications   Medication Sig    acyclovir 5% (ZOVIRAX) 5 % ointment Apply topically 6 (six) times daily.    albuterol (PROVENTIL/VENTOLIN HFA) 90 mcg/actuation inhaler Inhale 2 puffs into the lungs every 6 (six) hours as needed for Wheezing. Rescue    albuterol-ipratropium 2.5mg-0.5mg/3mL (DUO-NEB) 0.5 mg-3 mg(2.5 mg base)/3 mL nebulizer solution Take 3 mLs by nebulization every 6 (six) hours as needed for Wheezing. Rescue    aspirin (ECOTRIN) 81 MG EC tablet Take 1 tablet (81 mg total) by mouth once daily.    b complex vitamins tablet Take 1 tablet by mouth once daily.    blood pressure test kit-large Kit Use as directed    blood-glucose meter (ONETOUCH ULTRA SYSTEM KIT) kit Use as instructed    CALCIUM CITRATE/VITAMIN D3 (CALCIUM CITRATE + D ORAL) Take 2 tablets by mouth 2 (two) times daily.    cinnamon bark (CINNAMON ORAL) Take by mouth 2 (two) times daily.    cyanocobalamin, vitamin B-12, 500 mcg Subl Place 1 tablet under the tongue once daily.    docusate sodium (COLACE) 100 MG capsule Take 100 mg by mouth once daily.     ELIQUIS 5 mg Tab TAKE ONE TABLET BY MOUTH TWICE DAILY    fish oil-omega-3 fatty acids 300-1,000 mg capsule Take by mouth 2 (two) times daily.    fluticasone (FLONASE) 50 mcg/actuation nasal spray 2 sprays (100 mcg total) by Each Nare route once daily.    lancets (ONE TOUCH DELICA LANCETS) 33 gauge Misc 1 lancet by Misc.(Non-Drug; Combo Route) route 4 (four) times daily.    levocetirizine (XYZAL) 5 MG tablet Take 1 tablet (5 mg total) by mouth every evening.    levothyroxine (SYNTHROID) 125 MCG tablet Take 1 tablet (125 mcg total) by mouth once daily.    LORazepam (ATIVAN) 1 MG tablet Take 1 tablet (1 mg total) by mouth every 12 (twelve) hours as needed for Anxiety.    metFORMIN  "(GLUCOPHAGE) 1000 MG tablet Take 1 tablet (1,000 mg total) by mouth 2 (two) times daily with meals.    multivitamin capsule Take 1 capsule by mouth once daily.    omeprazole (PRILOSEC) 40 MG capsule Take 1 capsule (40 mg total) by mouth every morning.    ONETOUCH ULTRA TEST Strp USE ONE  STRIP TO CHECK GLUCOSE 4 TIMES DAILY AS DIRECTED    rosuvastatin (CRESTOR) 40 MG Tab Take 1 tablet (40 mg total) by mouth once daily.    RUTIN/HESP/BIOFLAV/C/HERB#196 (BIOFLEX ORAL) Take 2 tablets by mouth once daily.    senna (SENNA) 8.6 mg tablet Take 2 tablets by mouth nightly as needed for Constipation.    traZODone (DESYREL) 50 MG tablet 1 pill PO PRN for insomnia at bedtime. OK to take 2nd pill in 30 minutes if still awake.    walker (ULTRA-LIGHT ROLLATOR) Misc Use walker w/ seat daily as directed.     No current facility-administered medications for this visit.      Facility-Administered Medications Ordered in Other Visits   Medication    0.9%  NaCl infusion    ceFAZolin injection 2 g       Review of Systems   Constitution: Negative for malaise/fatigue.   Cardiovascular: Positive for palpitations. Negative for chest pain, dyspnea on exertion, irregular heartbeat and leg swelling.   Respiratory: Negative for shortness of breath.    Hematologic/Lymphatic: Negative for bleeding problem.   Skin: Negative for rash.   Musculoskeletal: Negative for myalgias.   Gastrointestinal: Negative for hematemesis, hematochezia and nausea.   Genitourinary: Negative for hematuria.   Neurological: Negative for light-headedness.   Psychiatric/Behavioral: Negative for altered mental status.   Allergic/Immunologic: Negative for persistent infections.     Objective:        BP (!) 144/65   Pulse 66   Ht 5' 4" (1.626 m)   Wt 107.6 kg (237 lb 3.4 oz)   LMP  (LMP Unknown)   BMI 40.72 kg/m²     Physical Exam   Constitutional: She is oriented to person, place, and time. She appears well-developed and well-nourished.   HENT:   Head: " Normocephalic.   Nose: Nose normal.   Eyes: Pupils are equal, round, and reactive to light.   Cardiovascular: Normal rate, regular rhythm, S1 normal and S2 normal.   No murmur heard.  Pulses:       Radial pulses are 2+ on the right side, and 2+ on the left side.   Pulmonary/Chest: Breath sounds normal. No respiratory distress.   Abdominal: Normal appearance.   Musculoskeletal: Normal range of motion. She exhibits no edema.   Neurological: She is alert and oriented to person, place, and time.   Skin: Skin is warm and dry. No erythema.   Psychiatric: She has a normal mood and affect. Her speech is normal and behavior is normal.   Nursing note and vitals reviewed.        Lab Results   Component Value Date     09/10/2018    K 4.2 09/10/2018    MG 1.6 07/10/2018    BUN 24 (H) 09/10/2018    CREATININE 0.8 09/10/2018    ALT 31 07/10/2018    AST 22 07/10/2018    HGB 10.9 (L) 09/10/2018    HCT 35.5 (L) 09/10/2018    TSH 1.121 04/07/2016    LDLCALC 80.0 07/10/2018       Recent Labs   Lab 09/10/18  0852   INR 1.0       Assessment:     1. Paroxysmal atrial fibrillation    2. Bradycardia    3. Current use of long term anticoagulation    4. Status post placement of implantable loop recorder    5. Essential hypertension      Plan:     In summary, Ms. Montenegro is a 63 y.o. female with hypertension, hyperlipidemia, diabetes mellitus, obesity s/p gastric sleeve surgery, right MCA infarction July of 2014 s/p ILR implant, atrial fibrillation, and bradycardia here for follow up.  Overall she is doing well, with only one episode of palpitations in the past 6 months. No AF on Holter monitor. Her HRs were elevated into the 130s during this time. She previously was on metoprolol tartrate 25mg PRN for palpitations. Will add this back to her regimen. PRN only  - will not add a daily BB due to her history of bradycardia. She remains on eliquis for CVA prophylaxis. I instructed her to contact the clinic if her palpitations become more  frequent.    Metoprolol tartrate 25mg PRN for heart racing.  Continue other medications.  RTC in 6 months, sooner if needed.    *A copy of this note has been sent to Dr. Randolph*    Follow up in about 6 months (around 11/1/2019).    ------------------------------------------------------------------    CHELITA Fuentes, NP-C  Cardiac Electrophysiology

## 2019-05-01 ENCOUNTER — OFFICE VISIT (OUTPATIENT)
Dept: ELECTROPHYSIOLOGY | Facility: CLINIC | Age: 64
End: 2019-05-01
Payer: COMMERCIAL

## 2019-05-01 ENCOUNTER — HOSPITAL ENCOUNTER (OUTPATIENT)
Dept: CARDIOLOGY | Facility: CLINIC | Age: 64
Discharge: HOME | End: 2019-05-01
Payer: COMMERCIAL

## 2019-05-01 VITALS
HEIGHT: 64 IN | WEIGHT: 237.19 LBS | BODY MASS INDEX: 40.49 KG/M2 | DIASTOLIC BLOOD PRESSURE: 65 MMHG | SYSTOLIC BLOOD PRESSURE: 144 MMHG | HEART RATE: 66 BPM

## 2019-05-01 DIAGNOSIS — R00.1 BRADYCARDIA: ICD-10-CM

## 2019-05-01 DIAGNOSIS — Z95.818 STATUS POST PLACEMENT OF IMPLANTABLE LOOP RECORDER: ICD-10-CM

## 2019-05-01 DIAGNOSIS — I48.0 PAF (PAROXYSMAL ATRIAL FIBRILLATION): ICD-10-CM

## 2019-05-01 DIAGNOSIS — I10 ESSENTIAL HYPERTENSION: Chronic | ICD-10-CM

## 2019-05-01 DIAGNOSIS — I48.0 PAROXYSMAL ATRIAL FIBRILLATION: Primary | Chronic | ICD-10-CM

## 2019-05-01 DIAGNOSIS — Z79.01 CURRENT USE OF LONG TERM ANTICOAGULATION: ICD-10-CM

## 2019-05-01 PROCEDURE — 99999 PR PBB SHADOW E&M-EST. PATIENT-LVL III: CPT | Mod: PBBFAC,,, | Performed by: NURSE PRACTITIONER

## 2019-05-01 PROCEDURE — 99214 OFFICE O/P EST MOD 30 MIN: CPT | Mod: S$GLB,,, | Performed by: NURSE PRACTITIONER

## 2019-05-01 PROCEDURE — 3078F DIAST BP <80 MM HG: CPT | Mod: CPTII,S$GLB,, | Performed by: NURSE PRACTITIONER

## 2019-05-01 PROCEDURE — 3077F SYST BP >= 140 MM HG: CPT | Mod: CPTII,S$GLB,, | Performed by: NURSE PRACTITIONER

## 2019-05-01 PROCEDURE — 93005 ELECTROCARDIOGRAM TRACING: CPT | Mod: S$GLB,,, | Performed by: INTERNAL MEDICINE

## 2019-05-01 PROCEDURE — 93010 ELECTROCARDIOGRAM REPORT: CPT | Mod: S$GLB,,, | Performed by: INTERNAL MEDICINE

## 2019-05-01 PROCEDURE — 3078F PR MOST RECENT DIASTOLIC BLOOD PRESSURE < 80 MM HG: ICD-10-PCS | Mod: CPTII,S$GLB,, | Performed by: NURSE PRACTITIONER

## 2019-05-01 PROCEDURE — 93005 RHYTHM STRIP: ICD-10-PCS | Mod: S$GLB,,, | Performed by: INTERNAL MEDICINE

## 2019-05-01 PROCEDURE — 3077F PR MOST RECENT SYSTOLIC BLOOD PRESSURE >= 140 MM HG: ICD-10-PCS | Mod: CPTII,S$GLB,, | Performed by: NURSE PRACTITIONER

## 2019-05-01 PROCEDURE — 99999 PR PBB SHADOW E&M-EST. PATIENT-LVL III: ICD-10-PCS | Mod: PBBFAC,,, | Performed by: NURSE PRACTITIONER

## 2019-05-01 PROCEDURE — 93010 RHYTHM STRIP: ICD-10-PCS | Mod: S$GLB,,, | Performed by: INTERNAL MEDICINE

## 2019-05-01 PROCEDURE — 3008F BODY MASS INDEX DOCD: CPT | Mod: CPTII,S$GLB,, | Performed by: NURSE PRACTITIONER

## 2019-05-01 PROCEDURE — 99214 PR OFFICE/OUTPT VISIT, EST, LEVL IV, 30-39 MIN: ICD-10-PCS | Mod: S$GLB,,, | Performed by: NURSE PRACTITIONER

## 2019-05-01 PROCEDURE — 3008F PR BODY MASS INDEX (BMI) DOCUMENTED: ICD-10-PCS | Mod: CPTII,S$GLB,, | Performed by: NURSE PRACTITIONER

## 2019-05-01 RX ORDER — METOPROLOL TARTRATE 25 MG/1
25 TABLET, FILM COATED ORAL ONCE AS NEEDED
Qty: 30 TABLET | Refills: 11 | Status: SHIPPED | OUTPATIENT
Start: 2019-05-01 | End: 2020-06-16 | Stop reason: SDUPTHER

## 2019-05-02 ENCOUNTER — CLINICAL SUPPORT (OUTPATIENT)
Dept: REHABILITATION | Facility: HOSPITAL | Age: 64
End: 2019-05-02
Attending: ORTHOPAEDIC SURGERY
Payer: COMMERCIAL

## 2019-05-02 DIAGNOSIS — R29.898 DECREASED STRENGTH OF LOWER EXTREMITY: ICD-10-CM

## 2019-05-02 DIAGNOSIS — R26.89 IMPAIRED GAIT AND MOBILITY: ICD-10-CM

## 2019-05-02 DIAGNOSIS — M25.562 CHRONIC PAIN OF BOTH KNEES: ICD-10-CM

## 2019-05-02 DIAGNOSIS — M25.561 CHRONIC PAIN OF BOTH KNEES: ICD-10-CM

## 2019-05-02 DIAGNOSIS — G89.29 CHRONIC PAIN OF BOTH KNEES: ICD-10-CM

## 2019-05-02 PROCEDURE — 97110 THERAPEUTIC EXERCISES: CPT

## 2019-05-02 PROCEDURE — 97162 PT EVAL MOD COMPLEX 30 MIN: CPT

## 2019-05-02 NOTE — PLAN OF CARE
[facility]  Physical Therapy Initial Evaluation    Name: Diana Montenegro  Clinic Number: [id number]    Therapy Diagnosis:   Encounter Diagnoses   Name Primary?    Chronic pain of both knees     Decreased strength of lower extremity     Impaired gait and mobility      Physician: Mariano Sweeney III, *    Physician Orders: PT Eval and Treat: eval and treat with modalities: B knee arthritis and pes bursitis  Medical Diagnosis from Referral:   M17.0 (ICD-10-CM) - Arthritis of both knees   M70.51,M70.52 (ICD-10-CM) - Pes anserinus bursitis of both knees     Evaluation Date: 5/2/2019  Authorization Period Expiration: 12/31/19  Plan of Care Expiration: 6/28/19  Visit # / Visits authorized: 1/ 25    Time In: 11:12 am   Time Out: 11:57 am   Total Billable Time: 45 minutes    Precautions: Standard, Diabetes and HTN, Atrial Fibrillation, hx of CVA    Subjective   Date of onset: chronic (B) knee pain; increased (L) knee pain since April 2019  History of current condition - Kalegih reports: that she has been experiencing (B) knee pain for a long time. Pt reported that in April 2019 she was in [place] and she made a turn and felt a pop  and (L) knee has been bothering her more ever since then.  Pt stated that she needs a TKA and is considering it. Pt denies experiencing popping/clicking in (L) knee. Pt stated that her (B) knees do buckle occasionally. Pt denies past surgeries on her knees.      Medical History:   Past Medical History:   Diagnosis Date    Arthritis     Atrial fibrillation, unspecified     hx of a fib prior to stroke.    Chronic back pain     Colon polyp     CVA (cerebral infarction) 7/16/14    Degenerative disc disease     cervical; lumbar    Diabetes mellitus type II     Encounter for loop recorder at end of battery life 9/17/2018    Hyperlipidemia     Hypertension     Hypothyroidism     Obesity     Sleep apnea     Stroke july 2014    Venous insufficiency  (chronic) (peripheral)        Surgical History:   Diana Montenegro  has a past surgical history that includes Tubal ligation; Tonsillectomy; Hernia repair; Gastrectomy; Colonoscopy w/ polypectomy; Colonoscopy (N/A, 8/30/2018); and Removal of implantable loop recorder (N/A, 9/17/2018).    Medications:   Diana has a current medication list which includes the following prescription(s): acyclovir 5%, albuterol, albuterol-ipratropium, aspirin, b complex vitamins, blood pressure test kit-large, blood-glucose meter, calcium citrate/vitamin d3, cinnamon bark, cyanocobalamin (vitamin b-12), docusate sodium, eliquis, fish oil-omega-3 fatty acids, fluticasone propionate, lancets, levocetirizine, levothyroxine, lorazepam, metformin, metoprolol tartrate, multivitamin, omeprazole, onetouch ultra test, rosuvastatin, rutin/hesp/bioflav/c/[id number], senna, trazodone, and walker, and the following Facility-Administered Medications: sodium chloride 0.9% and cefazolin.    Allergies:   Review of patient's allergies indicates:   Allergen Reactions    Medrol [methylprednisolone] Palpitations        Imaging: xray - see imaging section in pt chart review    Prior Therapy: yes   Social History: Pt stated that she lives alone.   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     Objective     Hip Right  Left  Pain/Dysfunction with Movement    AROM MMT AROM MMT != pain    Flexion WFL 4/5 WFL 4/5    Extension NT NT NT NT Able to perform good bridge against gravity    Abduction WFL 4/5 WFL 4/5    External rotation WFL 4+/5 WFL 4/5! C/o pain in (L) knee     Knee  Right   Left  Pain/Dysfunction with Movement  "   AROM PROM MMT AROM PROM MMT != pain   Flexion 115 120 4+/5 114 117 4/5!    Extension 5 lacking 2 4+/5 3 lacking 0 4/5!      Ankle Right  Left  Pain/Dysfunction with Movement    AROM MMT AROM MMT    Plantarflexion WFL 4+/5 WFL 4+/5    Dorsiflexion WFL 5/5 WFL 5/5      Palpation: pt c/o tenderness to palpation along (B) pes anserinus region  Special Tests: (L) Karson test negative     CMS Impairment/Limitation/Restriction for FOTO knee Survey    Therapist reviewed FOTO scores for Diana Montenegro on 2019.   FOTO documents entered into [employer] - see Media section.    Limitation Score: 63%         TREATMENT   Treatment Time In: 11:37 am   Treatment Time Out: 11:57 am   Total Treatment time separate from Evaluation: 20 minutes    Kaleigh received therapeutic exercises to develop strength, ROM and flexibility for 20 minutes includin"x20 B   Supine HSS 3x30" w/strap B  gastroc stretch 3x30" w/strap  LAQ 2x10     Home Exercises and Patient Education Provided    Education provided:   - HEP    Written Home Exercises Provided: yes.  Exercises were reviewed and Kaleigh was able to demonstrate them prior to the end of the session.  Kaleigh demonstrated good  understanding of the education provided.     See EMR under Patient Instructions for exercises provided 2019.    Assessment   Diana is a 63 y.o. female referred to outpatient Physical Therapy with a medical diagnosis of arthritis of both knees and pes anserine bursitis of both knees. Pt presents with c/o (B) knee pain, decreased (B) LE strength/flexibility, decreased (B) knee flexion and extension AROM, impaired gait, and decreased functional mobility. Pt will benefit from skilled PT to address the limitations listed above in order to return pt to her highest level of function with decreased pain and limitation.     Pt prognosis is Fair.   Pt will benefit from skilled outpatient Physical Therapy to address the deficits stated above and in the chart below, provide " pt/family education, and to maximize pt's level of independence.     Plan of care discussed with patient: Yes  Pt's spiritual, cultural and educational needs considered and patient is agreeable to the plan of care and goals as stated below:     Anticipated Barriers for therapy: chronicity of pain, comorbidities    Medical Necessity is demonstrated by the following  History  Co-morbidities and personal factors that may impact the plan of care Co-morbidities:   diabetes, high BMI, history of CVA, HTN and Atrial fibrillation    Personal Factors:   no deficits     high   Examination  Body Structures and Functions, activity limitations and participation restrictions that may impact the plan of care Body Regions:   lower extremities    Body Systems:    ROM  strength  gait  transfers    Participation Restrictions:   None mentioned    Activity limitations:   Learning and applying knowledge  no deficits    General Tasks and Commands  no deficits    Communication  no deficits    Mobility  walking  standing    Self care  no deficits    Domestic Life  no deficits    Interactions/Relationships  no deficits    Life Areas  no deficits    Community and Social Life  no deficits         high   Clinical Presentation evolving clinical presentation with changing clinical characteristics moderate   Decision Making/ Complexity Score: moderate     Goals:  Short Term Goals: 4 weeks   1. Pt will be independent with HEP to supplement PT in improving functional mobility  2. Pt will improve impaired LE MMT by at least 1/2 grade in order to improve strength for functional tasks    Long Term Goals: 8 weeks   1. Pt will improve FOTO score to </= 54% limited to decrease perceived limitation with mobility  2. Pt will improve (B) knee AROM to 0-120 degrees in order to improve gait and ability to perform ADLs  3. Pt will improve impaired LE MMT by at least 1 full grade in order to improve strength for functional tasks  4. Pt will report (B) knee pain  </= 4/10 at worst when performing ADLs in order to be able to perform ADLs with less difficulty       Plan   Plan of care Certification: 5/2/2019 to 6/28/19.    Outpatient Physical Therapy 2 times weekly for 8 weeks to include the following interventions: Gait Training, Manual Therapy, Moist Heat/ Ice, Neuromuscular Re-ed, Patient Education, Self Care, Therapeutic Activites, Therapeutic Exercise and ASTYM, and modalities prn.     Dionna Ann, PT

## 2019-05-03 ENCOUNTER — PATIENT MESSAGE (OUTPATIENT)
Dept: ELECTROPHYSIOLOGY | Facility: CLINIC | Age: 64
End: 2019-05-03

## 2019-05-10 ENCOUNTER — PATIENT MESSAGE (OUTPATIENT)
Dept: SLEEP MEDICINE | Facility: CLINIC | Age: 64
End: 2019-05-10

## 2019-05-13 ENCOUNTER — OFFICE VISIT (OUTPATIENT)
Dept: SLEEP MEDICINE | Facility: CLINIC | Age: 64
End: 2019-05-13
Payer: COMMERCIAL

## 2019-05-13 VITALS
WEIGHT: 234.88 LBS | HEIGHT: 64 IN | HEART RATE: 74 BPM | SYSTOLIC BLOOD PRESSURE: 144 MMHG | BODY MASS INDEX: 40.1 KG/M2 | DIASTOLIC BLOOD PRESSURE: 62 MMHG

## 2019-05-13 DIAGNOSIS — G47.33 OSA (OBSTRUCTIVE SLEEP APNEA): Primary | ICD-10-CM

## 2019-05-13 PROCEDURE — 3008F BODY MASS INDEX DOCD: CPT | Mod: CPTII,S$GLB,, | Performed by: PSYCHIATRY & NEUROLOGY

## 2019-05-13 PROCEDURE — 99214 PR OFFICE/OUTPT VISIT, EST, LEVL IV, 30-39 MIN: ICD-10-PCS | Mod: S$GLB,,, | Performed by: PSYCHIATRY & NEUROLOGY

## 2019-05-13 PROCEDURE — 3078F PR MOST RECENT DIASTOLIC BLOOD PRESSURE < 80 MM HG: ICD-10-PCS | Mod: CPTII,S$GLB,, | Performed by: PSYCHIATRY & NEUROLOGY

## 2019-05-13 PROCEDURE — 3077F PR MOST RECENT SYSTOLIC BLOOD PRESSURE >= 140 MM HG: ICD-10-PCS | Mod: CPTII,S$GLB,, | Performed by: PSYCHIATRY & NEUROLOGY

## 2019-05-13 PROCEDURE — 3008F PR BODY MASS INDEX (BMI) DOCUMENTED: ICD-10-PCS | Mod: CPTII,S$GLB,, | Performed by: PSYCHIATRY & NEUROLOGY

## 2019-05-13 PROCEDURE — 3077F SYST BP >= 140 MM HG: CPT | Mod: CPTII,S$GLB,, | Performed by: PSYCHIATRY & NEUROLOGY

## 2019-05-13 PROCEDURE — 3078F DIAST BP <80 MM HG: CPT | Mod: CPTII,S$GLB,, | Performed by: PSYCHIATRY & NEUROLOGY

## 2019-05-13 PROCEDURE — 99214 OFFICE O/P EST MOD 30 MIN: CPT | Mod: S$GLB,,, | Performed by: PSYCHIATRY & NEUROLOGY

## 2019-05-13 PROCEDURE — 99999 PR PBB SHADOW E&M-EST. PATIENT-LVL III: ICD-10-PCS | Mod: PBBFAC,,, | Performed by: PSYCHIATRY & NEUROLOGY

## 2019-05-13 PROCEDURE — 99999 PR PBB SHADOW E&M-EST. PATIENT-LVL III: CPT | Mod: PBBFAC,,, | Performed by: PSYCHIATRY & NEUROLOGY

## 2019-05-13 NOTE — PROGRESS NOTES
Diana Montenegro  was seen at the request of  No ref. provider found for sleep evaluation.     INITIAL HISTORY OF PRESENT ILLNESS:  Diana Montenegro is a 63 y.o. female is here to be evaluated for a sleep disorder.       CHIEF COMPLAINT:      The patient's complaints include excessive daytime sleepiness, excessive daytime fatigue, snoring and interrupted sleep since  2015.    She has originally seen Dr. Colunga.     Used  CPAP - years back  -last year she got sick and stopped using it.       Symptoms of daytime sleepiness are getting worse.    APAP 8-15 since 2015  90% was 9-11   Last time modem recorded since 3/17:    Had bariatric surgery 2015 - lost 30 lbs.    Therapy Event Summary Date Range: 1/31/2017 - 3/1/2017     She will upgrade the supplies.    Also difficulty falling and staying asleep (with or without the machine).    Took Ambien before;; taking Melatonin 10 mg.        Compliance Summary  Apnea Indices  Ventilator Statistics    Days with Device Usage:  27 days  Average AHI:  2.2  Average Breath Rate:  N/A    Percentage of Days >=4 Hours:  86.7%  Average OA Index:  0.6  Average % Patient Triggered Breaths:  N/A    Average Usage (Days Used):  6 hrs. 35 mins. 57 secs.  Average CA Index:  0.7  Average Tidal Volume:  N/A    Average Usage (All Days):  5 hrs. 56 mins. 21 secs.    Average Minute Vent:  N/A        Large Leak  Periodic Breathing     Average Time in Large Leak:  6 mins. 44 secs.  Average % of Night in PB:  0.2%     Average % of Night in Large Leak:  1.7%              11/26/2018  Has missed a couple weeks October- Nov for travel.   APAP 8-15 cm 90% 10 cm   Likes her mask - nasal  Taking 100 mg Trazodone    Sleepiness/fatigue is controlled. Not sure of breakthrough snoring.  Therapy Event Summary Date Range: 8/28/2018 - 11/25/2018        Compliance Summary  Apnea Indices  Ventilator Statistics    Days with Device Usage:  34 days  Average AHI:  3.6  Average Breath Rate:  N/A    Percentage of Days  >=4 Hours:  35.6%  Average OA Index:  0.6  Average % Patient Triggered Breaths:  N/A    Average Usage (Days Used):  6 hrs. 40 mins. 34 secs.  Average CA Index:  1.2  Average Tidal Volume:  N/A    Average Usage (All Days):  2 hrs. 31 mins. 19 secs.    Average Minute Vent:  N/A        Large Leak  Periodic Breathing     Average Time in Large Leak:  10 mins. 39 secs.  Average % of Night in PB:  0.5%     Average % of Night in Large Leak:  2.7%              INTERVAL HISTORY:    05/13/2019:  The patient has not presented any new complaints since the previous visit.   Just returned from [place] (spent 4 months). Sleeping with CPAP 8-15 cm: 90% was 9 cm wuith3 bathroom breaks  The patient reports improved sleep continuity and daytime sleepiness on PAP. ESS today is 8/24.  Denies break through snoring. No dry mouth.  Therapy Event Summary Date Range: 4/13/2019 - 5/12/2019     Hide  APAP 8-15  90% - 9 cm H2O.     Compliance Summary  Apnea Indices  Ventilator Statistics    Days with Device Usage:  26 days  Average AHI:  4.1  Average Breath Rate:  N/A    Percentage of Days >=4 Hours:  86.7%  Average OA Index:  0.7  Average % Patient Triggered Breaths:  N/A    Average Usage (Days Used):  6 hrs. 23 mins. 14 secs.  Average CA Index:  1.2  Average Tidal Volume:  N/A    Average Usage (All Days):  5 hrs. 32 mins. 8 secs.    Average Minute Vent:  N/A        Large Leak  Periodic Breathing     Average Time in Large Leak:  43 mins. 35 secs.  Average % of Night in PB:  1.3%     Average % of Night in Large Leak:  11.4%                   EPWORTH SLEEPINESS SCALE 5/13/2019   Sitting and reading 1   Watching TV 1   Sitting, inactive in a public place (e.g. a theatre or a meeting) 1   As a passenger in a car for an hour without a break 1   Lying down to rest in the afternoon when circumstances permit 1   Sitting and talking to someone 1   Sitting quietly after a lunch without alcohol 1   In a car, while stopped for a few minutes in traffic 1    Total score 8       PHQ9 11/10/2015   Total Score 0     No flowsheet data found.        EPWORTH SLEEPINESS SCALE 10/8/2018   Sitting and reading 2   Watching TV 2   Sitting, inactive in a public place (e.g. a theatre or a meeting) 3   As a passenger in a car for an hour without a break 0   Lying down to rest in the afternoon when circumstances permit 3   Sitting and talking to someone 2   Sitting quietly after a lunch without alcohol 1   In a car, while stopped for a few minutes in traffic 2   Total score 15       SLEEP ROUTINE AND LIFESTYLE 05/13/2019 :  Sleep Clinic New Patient 10/3/2018   What time do you go to bed on a week day? (Give a range) Midnight   What time do you go to bed on a day off? (Give a range) Retired   How long does it take you to fall asleep? (Give a range) 5-10 min   On average, how many times per night do you wake up? 3   How long does it take you to fall back into sleep? (Give a range) Usually 5 min   What time do you wake up to start your day on a week day? (Give a range) 9am   What time do you wake up to start your day on a day off? (Give a range) Same   What time do you get out of bed? (Give a range) 9-9:30   On average, how many hours do you sleep? 4-6 hours   On average, how many naps do you take per day? None to one   Rate your sleep quality from 0 to 5 (0-poor, 5-great). 2   Have you experienced:  Weight gain?   How much weight have you lost or gained (in lbs.) in the last year? Gained 38 lbs   On average, how many times per night do you go to the bathroom?  3   Have you ever had a sleep study/CPAP machine/surgery for sleep apnea? Yes   Have you ever had a CPAP machine for sleep apnea? Yes   Have you ever had surgery for sleep apnea? No       Sleep Clinic ROS  10/3/2018   Difficulty breathing through the nose?  No   Sore throat or dry mouth in the morning? No   Irregular or very fast heart beat?  Sometimes   Shortness of breath?  No   Acid reflux? No   Body aches and pains?   Sometimes   Morning headaches? No   Dizziness? No   Mood changes?  No   Do you exercise?  Yes   Do you feel like moving your legs a lot?  Sometimes          Denies symptoms concerning for parasomnia        Social History:      Occupation:retired  Bed partner:   Exercise routine: yes  Caffeine:  Coffee  decaf     PREVIOUS SLEEP STUDIES:     PSG/ SPLIT night study  In 4/24/15 showed significant COURTNEY with the AHI of 16.5/hour and SaO2 minimum of 86%.       DME:       PAST MEDICAL HISTORY:    Active Ambulatory Problems     Diagnosis Date Noted    Diabetes mellitus, type 2     Hypertension     Hyperlipidemia     Hypothyroidism     Chronic back pain     Venous insufficiency (chronic) (peripheral)     Arthritis     Degenerative disc disease     Hypoglycemia secondary to sulfonylurea 07/17/2014    Left atrial enlargement 07/18/2014    Back pain 08/07/2014    Loss of coordination 08/15/2014    Bilateral carotid artery disease 04/30/2015    Paroxysmal atrial fibrillation 08/25/2015    Mass 03/08/2016    Stenosis of right carotid artery 03/28/2016    Severe obesity 08/15/2016    Status post placement of implantable loop recorder 04/11/2017    Current use of long term anticoagulation 04/11/2017    Chronic bronchitis     Chronic sinusitis 05/11/2017    Bradycardia 10/18/2017    Screening for colon cancer 08/30/2018    Chronic pain of both knees 05/02/2019    Decreased strength of lower extremity 05/02/2019    Impaired gait and mobility 05/02/2019     Resolved Ambulatory Problems     Diagnosis Date Noted    Obesity     Cerebral embolism with cerebral infarction 07/18/2014    Weakness 07/18/2014    Acute left-sided weakness 07/18/2014    Slurred speech 07/18/2014    Memory deficit 08/15/2014    Cerebral embolism without mention of cerebral infarction 04/07/2015    Symptomatic carotid artery stenosis with infarction 04/27/2015    Morbid obesity 07/23/2015    Obesity, Class II, BMI 35-39.9, with  comorbidity 2015    Partial small bowel obstruction 2016    Carotid occlusion, bilateral     Encounter for loop recorder at end of battery life 2018     Past Medical History:   Diagnosis Date    Arthritis     Atrial fibrillation, unspecified     Chronic back pain     Colon polyp     CVA (cerebral infarction) 14    Degenerative disc disease     Diabetes mellitus type II     Encounter for loop recorder at end of battery life 2018    Hyperlipidemia     Hypertension     Hypothyroidism     Obesity     Sleep apnea     Stroke 2014    Venous insufficiency (chronic) (peripheral)                 PAST SURGICAL HISTORY:    Past Surgical History:   Procedure Laterality Date    COLONOSCOPY N/A 2018    [facility] END[facility] (4TH FLR)    COLONOSCOPY N/A 2013    Performed by Gordon Brown [facility] at [facility] END[facility] (4TH FLR)    COLONOSCOPY W/ POLYPECTOMY      GASTRECTOMY      GASTRECTOMY-SLEEVE-LAPAROSCOPIC-[id number] with intraop EGD and hiatel hernia repair N/A 2015    Performed by Burak Stacy Jr., MD at [facility] OR 2ND FLR    HERNIA REPAIR      REMOVAL, IMPLANTABLE LOOP RECORDER N/A 2018    Per[facility] by Thomas Randolph [facility] at [facility] CATH LAB    TONSILLECTOMY      TUBAL LIGATION           FAMILY HISTORY:                Family History   Problem Relation Age of Onset    No Known Problems Mother     Heart disease Father     Diabetes Maternal Grandfather     Obesity Maternal Grandfather     No Known Problems Sister     No Known Problems Sister     No Known Problems Maternal Grandmother     Stroke Paternal Grandmother     No Known Problems Paternal Grandfather     Heart attack Neg Hx        SOCIAL HISTORY:          Tobacco:   Social History     Tobacco Use   Smoking Status Former Smoker    Packs/day: 1.00    Years: 30.00    Pack years: 30.00    Types: Cigarettes    Last attempt to quit: 2014    Years since quittin.8   Smokeless  Tobacco Never Used       alcohol use:    Social History     Substance and Sexual Activity   Alcohol Use Yes    Alcohol/week: 0.0 oz    Comment: rarely                   ALLERGIES:    Review of patient's allergies indicates:   Allergen Reactions    Medrol [methylprednisolone] Palpitations       CURRENT MEDICATIONS:    Current Outpatient Medications   Medication Sig Dispense Refill    acyclovir 5% (ZOVIRAX) 5 % ointment Apply topically 6 (six) times daily. 5 g 1    albuterol (PROVENTIL/VENTOLIN HFA) 90 mcg/actuation inhaler Inhale 2 puffs into the lungs every 6 (six) hours as needed for Wheezing. Rescue 18 g 2    aspirin (ECOTRIN) 81 MG EC tablet Take 1 tablet (81 mg total) by mouth once daily.      b complex vitamins tablet Take 1 tablet by mouth once daily.      blood pressure test kit-large Kit Use as directed 1 each 0    CALCIUM CITRATE/VITAMIN D3 (CALCIUM CITRATE + D ORAL) Take 2 tablets by mouth 2 (two) times daily.      cinnamon bark (CINNAMON ORAL) Take by mouth 2 (two) times daily.      cyanocobalamin, vitamin B-12, 500 mcg Subl Place 1 tablet under the tongue once daily.      docusate sodium (COLACE) 100 MG capsule Take 100 mg by mouth once daily.       ELIQUIS 5 mg Tab TAKE ONE TABLET BY MOUTH TWICE DAILY 60 tablet 6    fish oil-omega-3 fatty acids 300-1,000 mg capsule Take by mouth 2 (two) times daily.      fluticasone (FLONASE) 50 mcg/actuation nasal spray 2 sprays (100 mcg total) by Each Nare route once daily. (Patient taking differently: 2 sprays by Each Nare route daily as needed. ) 1 Bottle 0    lancets (ONE TOUCH DELICA LANCETS) 33 gauge Misc 1 lancet by Misc.(Non-Drug; Combo Route) route 4 (four) times daily. 150 each 8    levothyroxine (SYNTHROID) 125 MCG tablet Take 1 tablet (125 mcg total) by mouth once daily. 30 tablet 3    metFORMIN (GLUCOPHAGE) 1000 MG tablet Take 1 tablet (1,000 mg total) by mouth 2 (two) times daily with meals. 60 tablet 2    multivitamin capsule Take 1  capsule by mouth once daily.      omeprazole (PRILOSEC) 40 MG capsule Take 1 capsule (40 mg total) by mouth every morning. 30 capsule 2    ONETOUCH ULTRA TEST Strp USE ONE  STRIP TO CHECK GLUCOSE 4 TIMES DAILY AS DIRECTED 150 strip 4    rosuvastatin (CRESTOR) 40 MG Tab Take 1 tablet (40 mg total) by mouth once daily. 30 tablet 2    RUTIN/HESP/BIOFLAV/C/HERB#196 (BIOFLEX ORAL) Take 2 tablets by mouth once daily.      senna (SENNA) 8.6 mg tablet Take 2 tablets by mouth nightly as needed for Constipation. 100 tablet 3    traZODone (DESYREL) 50 MG tablet 1 pill PO PRN for insomnia at bedtime. OK to take 2nd pill in 30 minutes if still awake. 60 tablet 5    walker (ULTRA-LIGHT ROLLATOR) Misc Use walker w/ seat daily as directed. 1 each 0    albuterol-ipratropium 2.5mg-0.5mg/3mL (DUO-NEB) 0.5 mg-3 mg(2.5 mg base)/3 mL nebulizer solution Take 3 mLs by nebulization every 6 (six) hours as needed for Wheezing. Rescue 3 vial 3    blood-glucose meter (ONETOUCH ULTRA SYSTEM KIT) kit Use as instructed 1 each 0    levocetirizine (XYZAL) 5 MG tablet Take 1 tablet (5 mg total) by mouth every evening. 30 tablet 11    LORazepam (ATIVAN) 1 MG tablet Take 1 tablet (1 mg total) by mouth every 12 (twelve) hours as needed for Anxiety. 60 tablet 2    metoprolol tartrate (LOPRESSOR) 25 MG tablet Take 1 tablet (25 mg total) by mouth once as needed. For palpitations 30 tablet 11     No current facility-administered medications for this visit.      Facility-Administered Medications Ordered in Other Visits   Medication Dose Route Frequency Provider Last Rate Last Dose    0.9%  NaCl infusion   Intravenous Continuous Khadijah Rivera NP   1,000 mL at 09/17/18 0939    ceFAZolin injection 2 g  2 g Intravenous On Call Procedure Khadijah Rivera NP                          REVIEW OF SYSTEMS:   Sleep related symptoms as per HPI    reports weight gain - 40 lbs weight loss      PHYSICAL EXAM:  BP (!) 144/62 (BP Location: Left arm,  "Patient Position: Sitting, BP Method: Large (Manual))   Pulse 74   Ht 5' 4" (1.626 m)   Wt 106.5 kg (234 lb 14.4 oz)   LMP  (LMP Unknown)   BMI 40.32 kg/m²   GENERAL: Normal development, well groomed.  HEENT:   HEENT:  Conjunctivae are non-erythematous; Pupils equal, round, and reactive to light; Nose is symmetrical; Nasal mucosa is pink and moist; Septum is midline; Inferior turbinates are normal; Nasal airflow is normal; Posterior pharynx is pink; Modified Mallampati:IV; Posterior palate is low; Tonsils not visualized; Uvula is wide and elongated;Tongue is enlarged; Dentition is fair; No TMJ tenderness; Jaw opening and protrusion without click and without discomfort.  NECK: Supple. Neck circumference is 15 inches. No thyromegaly. No palpable nodes.     SKIN: On face and neck: No abrasions, no rashes, no lesions.  No subcutaneous nodules are palpable.  RESPIRATORY: Chest is clear to auscultation.  Normal chest expansion and non-labored breathing at rest.  CARDIOVASCULAR: Normal S1, S2.  No murmurs, gallops or rubs. No carotid bruits bilaterally.  No edema. No clubbing. No cyanosis.    NEURO: Oriented to time, place and person. Normal attention span and concentration. Gait normal.    PSYCH: Affect is full. Mood is normal  MUSCULOSKELETAL: Moves 4 extremities. Gait normal.         Using [facility]: yes      ASSESSMENT:    1. COURTNEY (obstructive sleep apnea). The patient symptomatically has  excessive daytime sleepiness, snoring, excessive daytime fatigue and interrupted sleep  with exam findings of "a crowded oral airway and elevated body mass index. This warrants treatment. Ongoing compliance.           PLAN:    Continue  CPAP 8-15 cm with the mask of a patient's choice was given to the patient.  Will re-order supplies  Compliance was reinforced.  4 months recall.       Education: During our discussion today, we talked about the etiology of obstructive sleep apnea as well as the potential ramifications of " untreated sleep apnea, which could include daytime sleepiness, hypertension, heart disease and/or stroke.      We discussed potential treatment options, which could include weight loss, body positioning, continuous positive airway pressure (CPAP), or referral for surgical consideration. The patient preferred CPAP option.     Discussed purpose of PAP therapy, health benefits of CPAP, as well as the potential ramifications of untreated sleep apnea, which could include daytime sleepiness, hypertension, heart disease and/or stroke. An AHI of 15 is associated with increased risk CVD.     The patient should avoid ETOH and sedatives at night, as it tends to aggravate COURTNEY. Regular replacement of CPAP mask, tubing and filter was recommended.    Precautions: The patient was advised to abstain from driving should he feel sleepy or drowsy.    Follow up: MD/NP after 1 month of PAP use.    Thank you for allowing me the opportunity to participate in the care of your patient.

## 2019-05-13 NOTE — PATIENT INSTRUCTIONS
Amie Deleon  At 11 AM    Please bring old and new masks, machine, power cord and the hose (everything)

## 2019-05-14 ENCOUNTER — CLINICAL SUPPORT (OUTPATIENT)
Dept: REHABILITATION | Facility: HOSPITAL | Age: 64
End: 2019-05-14
Attending: ORTHOPAEDIC SURGERY
Payer: COMMERCIAL

## 2019-05-14 DIAGNOSIS — M25.561 CHRONIC PAIN OF BOTH KNEES: ICD-10-CM

## 2019-05-14 DIAGNOSIS — R29.898 DECREASED STRENGTH OF LOWER EXTREMITY: ICD-10-CM

## 2019-05-14 DIAGNOSIS — M25.562 CHRONIC PAIN OF BOTH KNEES: ICD-10-CM

## 2019-05-14 DIAGNOSIS — G89.29 CHRONIC PAIN OF BOTH KNEES: ICD-10-CM

## 2019-05-14 DIAGNOSIS — R26.89 IMPAIRED GAIT AND MOBILITY: ICD-10-CM

## 2019-05-14 PROCEDURE — 97110 THERAPEUTIC EXERCISES: CPT

## 2019-05-14 NOTE — PROGRESS NOTES
"  Physical Therapy Daily Treatment Note     Name: Diana Montenegro  Clinic Number: [id number]    Therapy Diagnosis:   Encounter Diagnoses   Name Primary?    Chronic pain of both knees     Decreased strength of lower extremity     Impaired gait and mobility      Physician: Mariano Sweeney III, *    Visit Date: 5/14/2019    Physician Orders: PT Eval and Treat: eval and treat with modalities: B knee arthritis and pes bursitis  Medical Diagnosis from Referral:   M17.0 (ICD-10-CM) - Arthritis of both knees   M70.51,M70.52 (ICD-10-CM) - Pes anserinus bursitis of both knees      Evaluation Date: 5/2/2019  Authorization Period Expiration: 12/31/19  Plan of Care Expiration: 6/28/19  Visit # / Visits authorized: 2/ 25  FOTO: 2/5       Time In: 1:00 pm   Time Out: 1:45 pm   Total Billable Time: 40 minutes    Precautions: Standard, Diabetes and HTN, Atrial Fibrillation, hx of CVA    Subjective     Pt reports: that her knees have been feeling a little better since she has been doing HEP. Pt stated that her (L) knee was bothering her more yesterday, thinks because she didn't do her exercises.   She was compliant with home exercise program.  Response to previous treatment: last session was initial evaluation   Functional change: none    Pain: 6/10 (L) knee, 5/10 (R) knee  Location:     Objective     Kaleigh received therapeutic exercises to develop strength, ROM and flexibility for 40 minutes 1:1 with PT including below and bike x 5' supervised   Quad sets 5"x20 B   SAQ 2x10 2" hold R, held on L   Supine HSS 3x30" w/strap B  gastroc stretch 3x30" w/strap  SLR 2x10 B  Hip adduction ball squeezes 3"x15   SL hip abduction 2x10 B  LAQ 2x10 2" hold  Bike x 5'     Home Exercises Provided and Patient Education Provided     Education provided:   - Continue with HEP     Written Home Exercises Provided: Patient instructed to cont prior HEP.  Exercises were reviewed and Kaleigh was able to demonstrate them prior to the end of the session.  " Kaleigh demonstrated good  understanding of the education provided.     See EMR under Patient Instructions for exercises provided initial evaluation.      Assessment     SAQ therex held on (L) LE today due to c/o pain in (L) knee when attempting to perform. Pt was able to tolerate all other therex on (B) LE without c/o pain. Pt reported feeling tired, but no c/o increased pain in (B) knees at the end of therapy session.   Kaleigh is progressing well towards her goals.   Pt prognosis is Good.     Pt will continue to benefit from skilled outpatient physical therapy to address the deficits listed in the problem list box on initial evaluation, provide pt/family education and to maximize pt's level of independence in the home and community environment.     Pt's spiritual, cultural and educational needs considered and pt agreeable to plan of care and goals.    Anticipated barriers to physical therapy: chronicity of pain, comorbidities    Goals:     Short Term Goals: 4 weeks   1. Pt will be independent with HEP to supplement PT in improving functional mobility  2. Pt will improve impaired LE MMT by at least 1/2 grade in order to improve strength for functional tasks     Long Term Goals: 8 weeks   1. Pt will improve FOTO score to </= 54% limited to decrease perceived limitation with mobility  2. Pt will improve (B) knee AROM to 0-120 degrees in order to improve gait and ability to perform ADLs  3. Pt will improve impaired LE MMT by at least 1 full grade in order to improve strength for functional tasks  4. Pt will report (B) knee pain </= 4/10 at worst when performing ADLs in order to be able to perform ADLs with less difficulty       Plan     Continue per POC, progress as tolerated    Dionna Ann, PT

## 2019-05-15 ENCOUNTER — PATIENT MESSAGE (OUTPATIENT)
Dept: SLEEP MEDICINE | Facility: CLINIC | Age: 64
End: 2019-05-15

## 2019-05-15 ENCOUNTER — TELEPHONE (OUTPATIENT)
Dept: SLEEP MEDICINE | Facility: CLINIC | Age: 64
End: 2019-05-15

## 2019-05-15 NOTE — TELEPHONE ENCOUNTER
Dear Ms. Montenegro       I think you meant to reach to durable medical equipment (aka  CPAP company).  They are not on [facility] service, please call them directly at [facility]:  [phone] (option: live representative)        Sincerely,    Gladis Jimenez MD      ____________________________      Diana Montenegro  You 8 hours ago (8:32 AM)         Good morning. I just wanted you to cancel the person coming for me tomorrow about my wisp. I found out why it was leaking. I had the XL on it. If you'd like you can make an appointment in a month so you can check out it out. Sorry.

## 2019-05-16 ENCOUNTER — CLINICAL SUPPORT (OUTPATIENT)
Dept: REHABILITATION | Facility: HOSPITAL | Age: 64
End: 2019-05-16
Attending: ORTHOPAEDIC SURGERY
Payer: COMMERCIAL

## 2019-05-16 DIAGNOSIS — M25.561 CHRONIC PAIN OF BOTH KNEES: Primary | ICD-10-CM

## 2019-05-16 DIAGNOSIS — R26.89 IMPAIRED GAIT AND MOBILITY: ICD-10-CM

## 2019-05-16 DIAGNOSIS — R29.898 DECREASED STRENGTH OF LOWER EXTREMITY: ICD-10-CM

## 2019-05-16 DIAGNOSIS — M25.562 CHRONIC PAIN OF BOTH KNEES: Primary | ICD-10-CM

## 2019-05-16 DIAGNOSIS — G89.29 CHRONIC PAIN OF BOTH KNEES: Primary | ICD-10-CM

## 2019-05-16 PROCEDURE — 97110 THERAPEUTIC EXERCISES: CPT

## 2019-05-16 NOTE — PROGRESS NOTES
Physical Therapy Daily Note         Name: Diana Lieberman [facility] Number: [id number]  Diagnosis:   Encounter Diagnoses   Name Primary?    Chronic pain of both knees Yes    Decreased strength of lower extremity     Impaired gait and mobility      Physician: Mariano Sweeney III, *  Treatment Orders: PT Eval and Treat  Past Medical History:   Diagnosis Date    Arthritis     Atrial fibrillation, unspecified     hx of a fib prior to stroke.    Chronic back pain     Colon polyp     CVA (cerebral infarction) 7/16/14    Degenerative disc disease     cervical; lumbar    Diabetes mellitus type II     Encounter for loop recorder at end of battery life 9/17/2018    Hyperlipidemia     Hypertension     Hypothyroidism     Obesity     Sleep apnea     Stroke july 2014    Venous insufficiency (chronic) (peripheral)      Current Outpatient Medications   Medication Sig    acyclovir 5% (ZOVIRAX) 5 % ointment Apply topically 6 (six) times daily.    albuterol (PROVENTIL/VENTOLIN HFA) 90 mcg/actuation inhaler Inhale 2 puffs into the lungs every 6 (six) hours as needed for Wheezing. Rescue    albuterol-ipratropium 2.5mg-0.5mg/3mL (DUO-NEB) 0.5 mg-3 mg(2.5 mg base)/3 mL nebulizer solution Take 3 mLs by nebulization every 6 (six) hours as needed for Wheezing. Rescue    aspirin (ECOTRIN) 81 MG EC tablet Take 1 tablet (81 mg total) by mouth once daily.    b complex vitamins tablet Take 1 tablet by mouth once daily.    blood pressure test kit-large Kit Use as directed    blood-glucose meter (ONETOUCH ULTRA SYSTEM KIT) kit Use as instructed    CALCIUM CITRATE/VITAMIN D3 (CALCIUM CITRATE + D ORAL) Take 2 tablets by mouth 2 (two) times daily.    cinnamon bark (CINNAMON ORAL) Take by mouth 2 (two) times daily.    cyanocobalamin, vitamin B-12, 500 mcg Subl Place 1 tablet under the tongue once daily.    docusate sodium (COLACE) 100 MG capsule Take 100 mg by mouth  once daily.     ELIQUIS 5 mg Tab TAKE ONE TABLET BY MOUTH TWICE DAILY    fish oil-omega-3 fatty acids 300-1,000 mg capsule Take by mouth 2 (two) times daily.    fluticasone (FLONASE) 50 mcg/actuation nasal spray 2 sprays (100 mcg total) by Each Nare route once daily. (Patient taking differently: 2 sprays by Each Nare route daily as needed. )    lancets (ONE TOUCH DELICA LANCETS) 33 gauge Misc 1 lancet by Misc.(Non-Drug; Combo Route) route 4 (four) times daily.    levocetirizine (XYZAL) 5 MG tablet Take 1 tablet (5 mg total) by mouth every evening.    levothyroxine (SYNTHROID) 125 MCG tablet Take 1 tablet (125 mcg total) by mouth once daily.    LORazepam (ATIVAN) 1 MG tablet Take 1 tablet (1 mg total) by mouth every 12 (twelve) hours as needed for Anxiety.    metFORMIN (GLUCOPHAGE) 1000 MG tablet Take 1 tablet (1,000 mg total) by mouth 2 (two) times daily with meals.    metoprolol tartrate (LOPRESSOR) 25 MG tablet Take 1 tablet (25 mg total) by mouth once as needed. For palpitations    multivitamin capsule Take 1 capsule by mouth once daily.    omeprazole (PRILOSEC) 40 MG capsule Take 1 capsule (40 mg total) by mouth every morning.    ONETOUCH ULTRA TEST Strp USE ONE  STRIP TO CHECK GLUCOSE 4 TIMES DAILY AS DIRECTED    rosuvastatin (CRESTOR) 40 MG Tab Take 1 tablet (40 mg total) by mouth once daily.    RUTIN/HESP/BIOFLAV/C/HERB#196 (BIOFLEX ORAL) Take 2 tablets by mouth once daily.    senna (SENNA) 8.6 mg tablet Take 2 tablets by mouth nightly as needed for Constipation.    traZODone (DESYREL) 50 MG tablet 1 pill PO PRN for insomnia at bedtime. OK to take 2nd pill in 30 minutes if still awake.    walker (ULTRA-LIGHT ROLLATOR) Misc Use walker w/ seat daily as directed.     No current facility-administered medications for this visit.      Facility-Administered Medications Ordered in Other Visits   Medication    0.9%  NaCl infusion    ceFAZolin injection 2 g     Review of patient's allergies  "indicates:   Allergen Reactions    Medrol [methylprednisolone] Palpitations          Physician Orders: PT Eval and Treat: eval and treat with modalities: B knee arthritis and pes bursitis  Medical Diagnosis from Referral:   M17.0 (ICD-10-CM) - Arthritis of both knees   M70.51,M70.52 (ICD-10-CM) - Pes anserinus bursitis of both knees      Evaluation Date: 5/2/2019  Authorization Period Expiration: 12/31/19  Plan of Care Expiration: 6/28/19  Visit # / Visits authorized: 2/ 25  FOTO: 2/5         Time In: 2:35 pm   Time Out: 3:35 pm      Precautions: Standard, Diabetes and HTN, Atrial Fibrillation, hx of CVA    Subjective     Pt reports she has some knee pain since last session. She went swimming the next day and felt pain while she was swimming. She has been doing the exercises in the morning.  Pain Scale: Diana rates pain at B knees (L>R) on a scale of 0-10 to be 8 currently.    Objective     Diana received individual therapeutic exercises to develop strength, endurance, ROM, flexibility, posture and core stabilization for 50 minutes including:  bike x 5' supervised    Quad sets 5"x20 B   SAQ 2x10 2" hold R, held on L   Supine HSS 3x30" w/strap B  gastroc stretch 3x30" wedge  SLR 2x10 B   Hip adduction ball squeezes 3"x20   SL hip abduction 2x10 B  LAQ 2x10 2" hold     TKE 2x10  Step ups airex 2x10  Shuttle 3C 2x10    Cold pack provided for 10 min post tx     Home Exercises Provided and Patient Education Provided      Education provided:   - Continue with HEP      Written Home Exercises Provided: Patient instructed to cont prior HEP.  Exercises were reviewed and Kaleigh was able to demonstrate them prior to the end of the session.  Kaleigh demonstrated good  understanding of the education provided.      See EMR under Patient Instructions for exercises provided initial evaluation.      Assessment     Patient tolerated treatment well without adverse effects. Pt tolerated addition of new exercises with good response.  Pt " progressing well towards goals. This is a 63 y.o. female referred to outpatient physical therapy and presents with a medical diagnosis of arthritis of both knees and pes anserine bursitis of both knees and demonstrates limitations as described in the problem list. Pt prognosis is Good. Pt will continue to benefit from skilled outpatient physical therapy to address the deficits listed in the problem list, provide pt/family education and to maximize pt's level of independence in the home and community environment.     Pt's spiritual, cultural and educational needs considered and pt agreeable to plan of care and goals.     Anticipated barriers to physical therapy: chronicity of pain, comorbidities     Goals:      Short Term Goals: 4 weeks   1. Pt will be independent with HEP to supplement PT in improving functional mobility  2. Pt will improve impaired LE MMT by at least 1/2 grade in order to improve strength for functional tasks     Long Term Goals: 8 weeks   1. Pt will improve FOTO score to </= 54% limited to decrease perceived limitation with mobility  2. Pt will improve (B) knee AROM to 0-120 degrees in order to improve gait and ability to perform ADLs  3. Pt will improve impaired LE MMT by at least 1 full grade in order to improve strength for functional tasks  4. Pt will report (B) knee pain </= 4/10 at worst when performing ADLs in order to be able to perform ADLs with less difficulty        Plan     Continue with established Plan of Care towards PT goals.    Therapist: Laura Whitman, PT, DPT  5/16/2019

## 2019-05-24 ENCOUNTER — TELEPHONE (OUTPATIENT)
Dept: INTERNAL MEDICINE | Facility: CLINIC | Age: 64
End: 2019-05-24

## 2019-05-24 ENCOUNTER — CLINICAL SUPPORT (OUTPATIENT)
Dept: REHABILITATION | Facility: HOSPITAL | Age: 64
End: 2019-05-24
Attending: ORTHOPAEDIC SURGERY
Payer: COMMERCIAL

## 2019-05-24 DIAGNOSIS — M25.561 CHRONIC PAIN OF BOTH KNEES: ICD-10-CM

## 2019-05-24 DIAGNOSIS — R29.898 DECREASED STRENGTH OF LOWER EXTREMITY: ICD-10-CM

## 2019-05-24 DIAGNOSIS — G89.29 CHRONIC PAIN OF BOTH KNEES: ICD-10-CM

## 2019-05-24 DIAGNOSIS — M25.562 CHRONIC PAIN OF BOTH KNEES: ICD-10-CM

## 2019-05-24 DIAGNOSIS — R26.89 IMPAIRED GAIT AND MOBILITY: ICD-10-CM

## 2019-05-24 PROCEDURE — 97110 THERAPEUTIC EXERCISES: CPT

## 2019-05-24 NOTE — TELEPHONE ENCOUNTER
----- Message from Adelaida Preston sent at 5/24/2019 12:49 PM CDT -----  Contact: Mariama / [phone]  's office called yesterday for patient's A1c results and has not had a call back from our office. Please call them.

## 2019-05-24 NOTE — PROGRESS NOTES
"  Physical Therapy Daily Treatment Note     Name: Diana Lieberman Lan  Clinic Number: [id number]    Therapy Diagnosis:   Encounter Diagnoses   Name Primary?    Chronic pain of both knees     Decreased strength of lower extremity     Impaired gait and mobility      Physician: Mariano Sweeney III, *    Visit Date: 5/24/2019    Physician Orders: PT Eval and Treat: eval and treat with modalities: B knee arthritis and pes bursitis  Medical Diagnosis from Referral:   M17.0 (ICD-10-CM) - Arthritis of both knees   M70.51,M70.52 (ICD-10-CM) - Pes anserinus bursitis of both knees      Evaluation Date: 5/2/2019  Authorization Period Expiration: 12/31/19  Plan of Care Expiration: 6/28/19  Visit # / Visits authorized: 4/ 25  FOTO: 4/5     Time In: 11:00 am   Time Out: 12:00 pm   Total Billable Time: 45 minutes    Precautions: Standard, Diabetes and HTN, Atrial Fibrillation, hx of CVA    Subjective     Pt reports: that she was sitting last night and experienced pain in (L) knee. Pt stated that she was not currently experiencing pain in (B) knees.   She was compliant with home exercise program.  Response to previous treatment: no adverse effects  Functional change: none    Pain: 0/10  Location: bilateral knee      Objective     Diana received individual therapeutic exercises to develop strength, endurance, ROM, flexibility, posture and core stabilization for 45 minutes including:  bike x 5' supervised    Quad sets 5"x20 B  - not performed today  SAQ 2x10 2" hold 1# R, held on L   Supine HSS 3x30" w/strap B  gastroc stretch 3x30" wedge  SLR 2x15 B   Hip adduction ball squeezes 3"x20 w/bridge  SL hip abduction 2x15 B  SL clams 2x10 B   LAQ 2x10 2" hold 1# R    TKE 2x10 OTB   Step ups airex 2x10 - not performed today  Shuttle 3C 2x10    Cold pack provided for 10 min post tx to (L) knee     Home Exercises Provided and Patient Education Provided     Education provided:   - Continue with HEP    Written Home Exercises Provided: " Patient instructed to cont prior HEP.  Exercises were reviewed and Kaleigh was able to demonstrate them prior to the end of the session.  Kaleigh demonstrated good  understanding of the education provided.     See EMR under Patient Instructions for exercises provided initial evaluation.      Assessment     SAQ held on (L) LE today due to c/o pain when attempting to perform. Pt was able to tolerate all other therex including increased reps/resistance noted above without c/o increased pain.   Kaleigh is progressing well towards her goals.   Pt prognosis is Good.     Pt will continue to benefit from skilled outpatient physical therapy to address the deficits listed in the problem list box on initial evaluation, provide pt/family education and to maximize pt's level of independence in the home and community environment.     Pt's spiritual, cultural and educational needs considered and pt agreeable to plan of care and goals.    Anticipated barriers to physical therapy: chronicity of pain, comorbidities    Goals:     Short Term Goals: 4 weeks   1. Pt will be independent with HEP to supplement PT in improving functional mobility  2. Pt will improve impaired LE MMT by at least 1/2 grade in order to improve strength for functional tasks     Long Term Goals: 8 weeks   1. Pt will improve FOTO score to </= 54% limited to decrease perceived limitation with mobility  2. Pt will improve (B) knee AROM to 0-120 degrees in order to improve gait and ability to perform ADLs  3. Pt will improve impaired LE MMT by at least 1 full grade in order to improve strength for functional tasks  4. Pt will report (B) knee pain </= 4/10 at worst when performing ADLs in order to be able to perform ADLs with less difficulty     Plan     Continue per POC, progress as tolerated    Dionna Ann, PT

## 2019-05-27 ENCOUNTER — CLINICAL SUPPORT (OUTPATIENT)
Dept: REHABILITATION | Facility: HOSPITAL | Age: 64
End: 2019-05-27
Attending: ORTHOPAEDIC SURGERY
Payer: COMMERCIAL

## 2019-05-27 DIAGNOSIS — R29.898 DECREASED STRENGTH OF LOWER EXTREMITY: ICD-10-CM

## 2019-05-27 DIAGNOSIS — G89.29 CHRONIC PAIN OF BOTH KNEES: ICD-10-CM

## 2019-05-27 DIAGNOSIS — R26.89 IMPAIRED GAIT AND MOBILITY: ICD-10-CM

## 2019-05-27 DIAGNOSIS — M25.562 CHRONIC PAIN OF BOTH KNEES: ICD-10-CM

## 2019-05-27 DIAGNOSIS — M25.561 CHRONIC PAIN OF BOTH KNEES: ICD-10-CM

## 2019-05-27 PROCEDURE — 97110 THERAPEUTIC EXERCISES: CPT

## 2019-05-27 NOTE — PROGRESS NOTES
"  Physical Therapy Daily Treatment Note     Name: Diana Montenegro  Clinic Number: [id number]    Therapy Diagnosis:   Encounter Diagnoses   Name Primary?    Chronic pain of both knees     Decreased strength of lower extremity     Impaired gait and mobility      Physician: Mariano Sweeney III, *    Visit Date: 5/27/2019    Physician Orders: PT Eval and Treat: eval and treat with modalities: B knee arthritis and pes bursitis  Medical Diagnosis from Referral:   M17.0 (ICD-10-CM) - Arthritis of both knees   M70.51,M70.52 (ICD-10-CM) - Pes anserinus bursitis of both knees      Evaluation Date: 5/2/2019  Authorization Period Expiration: 12/31/19  Plan of Care Expiration: 6/28/19  Visit # / Visits authorized: 5/ 25  FOTO: 5/5     Time In: 1210   Time Out: 1300  Total Billable Time: 40 minutes    Precautions: Standard, Diabetes and HTN, Atrial Fibrillation, hx of CVA    Subjective     Pt reports: her left knee was hurting this morning, but not anymore. Pt requests shortened visit due to scheduling conflicts   Response to previous treatment: no adverse effects  Functional change: none    Pain: 0/10  Location: bilateral knee      Objective     Diana received individual therapeutic exercises to develop strength, endurance, ROM, flexibility, posture and core stabilization for 40 minutes including:    bike x 5' supervised    Quad sets 5"x20 B  - not performed today  SAQ 2x10 2" hold 1# R, held on L  NT  Supine HSS 3x30" w/strap B NT  gastroc stretch 3x30" wedge  SLR 2x15 B NT  Hip adduction ball squeezes 3"x20 w/bridge NP  SL hip abduction 2x15 B NP  SL clams 2x10 B  NP  LAQ 2x10 2" hold 1# R NP    TKE 3x10 OTB   Step ups airex on Lvl 2 step- 3x10   Shuttle 3C 3x10    Cold pack provided for 10 min post tx to (L) knee     Home Exercises Provided and Patient Education Provided     Education provided:   - Continue with HEP    Written Home Exercises Provided: Patient instructed to cont prior HEP.  Exercises were reviewed " and Kaleigh was able to demonstrate them prior to the end of the session.  Kaleigh demonstrated good  understanding of the education provided.     See EMR under Patient Instructions for exercises provided initial evaluation.      Assessment   Pt tolerated session well without adverse effects, required verbal & tactile cues for correct technique with TKE exercise. Pt progressing fxl step training today & demonstrated good knee/hip alignment with shuttle squats today. . Pt continues to demonstrate B trendelenburg gait with weakness at B hips/knees.   Kaleigh is progressing well towards her goals.   Pt prognosis is Good.     Pt will continue to benefit from skilled outpatient physical therapy to address the deficits listed in the problem list box on initial evaluation, provide pt/family education and to maximize pt's level of independence in the home and community environment.     Pt's spiritual, cultural and educational needs considered and pt agreeable to plan of care and goals.    Anticipated barriers to physical therapy: chronicity of pain, comorbidities    Goals:     Short Term Goals: 4 weeks   1. Pt will be independent with HEP to supplement PT in improving functional mobility (progressing, not met)   2. Pt will improve impaired LE MMT by at least 1/2 grade in order to improve strength for functional tasks (progressing, not met)      Long Term Goals: 8 weeks   1. Pt will improve FOTO score to </= 54% limited to decrease perceived limitation with mobility (progressing, not met)   2. Pt will improve (B) knee AROM to 0-120 degrees in order to improve gait and ability to perform ADLs (progressing, not met)   3. Pt will improve impaired LE MMT by at least 1 full grade in order to improve strength for functional tasks (progressing, not met)   4. Pt will report (B) knee pain </= 4/10 at worst when performing ADLs in order to be able to perform ADLs with less difficulty (progressing, not met)     Plan     Continue per POC,  progress as tolerated. Plan to complete FOTO assessment next visit     Sandhya Barraza PT

## 2019-05-28 NOTE — PROGRESS NOTES
"  Physical Therapy Daily Treatment Note     Name: Diana Montenegro  Clinic Number: [id number]    Therapy Diagnosis:   Encounter Diagnoses   Name Primary?    Chronic pain of both knees     Decreased strength of lower extremity     Impaired gait and mobility      Physician: Mariano Sweeney III, *    Visit Date: 5/29/2019    Physician Orders: PT Eval and Treat: eval and treat with modalities: B knee arthritis and pes bursitis  Medical Diagnosis from Referral:   M17.0 (ICD-10-CM) - Arthritis of both knees   M70.51,M70.52 (ICD-10-CM) - Pes anserinus bursitis of both knees      Evaluation Date: 5/2/2019  Authorization Period Expiration: 12/31/19  Plan of Care Expiration: 6/28/19  Visit # / Visits authorized: 6/ 25  FOTO: 5/5     Time In: 1100  Time Out: 1205  Total Billable Time: 55 minutes    Precautions: Standard, Diabetes and HTN, Atrial Fibrillation, hx of CVA    Subjective     Pt reports: reports knee pain upon waking this morning, no longer hurting now.   Response to previous treatment: no adverse effects  Functional change: none    Pain: 0/10  Location: bilateral knee      Objective     Diana received objective measures and individual therapeutic exercises to develop strength, endurance, ROM, flexibility, posture and core stabilization for 55 minutes including:    bike x 5' supervised    Quad sets 5"x20 B  - not performed today  SAQ 2x10 2" hold 1# R, held on L  NT  Supine HSS 3x30" w/strap B   gastroc stretch 3x30" wedge  SLR 2x15 B NT  Hip adduction ball squeezes 3" 2x10 w/bridge   SL hip abduction 2x15 B NP  SL clams 3x10 B    LAQ 2" hold (RLE 1# 3x10, LLE1# x10, LLE 2x10 without weight)   Supine strap assisted heel slides B 10 x5" hold     TKE 3x10 OTB  NP  Step ups airex on Lvl 2 step- 3x10  NP  Shuttle 3C 3x10  NP    Cold pack provided for 10 min post tx to (L) knee     Knee   Right     Left   Pain/Dysfunction with Movement     AROM PROM MMT AROM PROM MMT    Flexion 114 116 4+/5 110 115 4+/5  - "   Extension 0  4+/5 0  4+/5  -         Home Exercises Provided and Patient Education Provided     Education provided:   - Continue with HEP    Written Home Exercises Provided: Patient instructed to cont prior HEP.  Exercises were reviewed and Kaleigh was able to demonstrate them prior to the end of the session.  Kaleigh demonstrated good  understanding of the education provided.     See EMR under Patient Instructions for exercises provided initial evaluation.      Assessment   Pt improving B knee flex/ext  ROM and B quad/hamstring strength. Pt continues to c/o L knee pain throughtout exercises, no worse after. Pt tolerated session well without adverse effects. Plan to progress static balance & proprioceptive exercises as tolerated for reduced fall risk.   Kaleigh is progressing well towards her goals.   Pt prognosis is Good.     Pt will continue to benefit from skilled outpatient physical therapy to address the deficits listed in the problem list box on initial evaluation, provide pt/family education and to maximize pt's level of independence in the home and community environment.     Pt's spiritual, cultural and educational needs considered and pt agreeable to plan of care and goals.    Anticipated barriers to physical therapy: chronicity of pain, comorbidities    Goals:     Short Term Goals: 4 weeks   1. Pt will be independent with HEP to supplement PT in improving functional mobility (progressing, not met)   2. Pt will improve impaired LE MMT by at least 1/2 grade in order to improve strength for functional tasks (progressing, not met)      Long Term Goals: 8 weeks   1. Pt will improve FOTO score to </= 54% limited to decrease perceived limitation with mobility (progressing, not met)   2. Pt will improve (B) knee AROM to 0-120 degrees in order to improve gait and ability to perform ADLs (progressing, partially met 5/29/19)   3. Pt will improve impaired LE MMT by at least 1 full grade in order to improve strength for  functional tasks (progressing, not met)   4. Pt will report (B) knee pain </= 4/10 at worst when performing ADLs in order to be able to perform ADLs with less difficulty (progressing, not met)     Plan     Continue per POC, progress as tolerated. Plan to complete FOTO assessment next visit     Sandhya Barraza, PT

## 2019-05-29 ENCOUNTER — CLINICAL SUPPORT (OUTPATIENT)
Dept: REHABILITATION | Facility: HOSPITAL | Age: 64
End: 2019-05-29
Attending: ORTHOPAEDIC SURGERY
Payer: COMMERCIAL

## 2019-05-29 DIAGNOSIS — G89.29 CHRONIC PAIN OF BOTH KNEES: ICD-10-CM

## 2019-05-29 DIAGNOSIS — R29.898 DECREASED STRENGTH OF LOWER EXTREMITY: ICD-10-CM

## 2019-05-29 DIAGNOSIS — R26.89 IMPAIRED GAIT AND MOBILITY: ICD-10-CM

## 2019-05-29 DIAGNOSIS — M25.562 CHRONIC PAIN OF BOTH KNEES: ICD-10-CM

## 2019-05-29 DIAGNOSIS — M25.561 CHRONIC PAIN OF BOTH KNEES: ICD-10-CM

## 2019-05-29 PROCEDURE — 97110 THERAPEUTIC EXERCISES: CPT

## 2019-05-29 RX ORDER — APIXABAN 5 MG/1
TABLET, FILM COATED ORAL
Qty: 60 TABLET | Refills: 0 | Status: SHIPPED | OUTPATIENT
Start: 2019-05-29 | End: 2019-05-30 | Stop reason: SDUPTHER

## 2019-05-30 DIAGNOSIS — G47.00 INSOMNIA, UNSPECIFIED TYPE: ICD-10-CM

## 2019-05-30 RX ORDER — TRAZODONE HYDROCHLORIDE 50 MG/1
TABLET ORAL
Qty: 60 TABLET | Refills: 5 | Status: SHIPPED | OUTPATIENT
Start: 2019-05-30 | End: 2020-01-15 | Stop reason: SDUPTHER

## 2019-06-04 RX ORDER — LORAZEPAM 1 MG/1
TABLET ORAL
Qty: 60 TABLET | Refills: 2 | OUTPATIENT
Start: 2019-06-04

## 2019-06-05 ENCOUNTER — TELEPHONE (OUTPATIENT)
Dept: PAIN MEDICINE | Facility: CLINIC | Age: 64
End: 2019-06-05

## 2019-06-05 NOTE — TELEPHONE ENCOUNTER
----- Message from Verna Jaeger LPN sent at 6/5/2019  9:21 AM CDT -----  Regarding: new pt   New pt

## 2019-06-05 NOTE — TELEPHONE ENCOUNTER
Contacted and spoke to patient regarding the referral received for an appointment.      Ms. Montenegro preferred the [place] location. An appointment was scheduled with Dr. Kruse.

## 2019-06-11 ENCOUNTER — CLINICAL SUPPORT (OUTPATIENT)
Dept: REHABILITATION | Facility: HOSPITAL | Age: 64
End: 2019-06-11
Attending: ORTHOPAEDIC SURGERY
Payer: COMMERCIAL

## 2019-06-11 DIAGNOSIS — M25.561 CHRONIC PAIN OF BOTH KNEES: ICD-10-CM

## 2019-06-11 DIAGNOSIS — R29.898 DECREASED STRENGTH OF LOWER EXTREMITY: ICD-10-CM

## 2019-06-11 DIAGNOSIS — R26.89 IMPAIRED GAIT AND MOBILITY: ICD-10-CM

## 2019-06-11 DIAGNOSIS — M25.562 CHRONIC PAIN OF BOTH KNEES: ICD-10-CM

## 2019-06-11 DIAGNOSIS — G89.29 CHRONIC PAIN OF BOTH KNEES: ICD-10-CM

## 2019-06-11 PROCEDURE — 97110 THERAPEUTIC EXERCISES: CPT

## 2019-06-19 NOTE — PROGRESS NOTES
"  Physical Therapy Daily Treatment Note     Name: Diana Lieberman [place]  Clinic Number: [id number]    Therapy Diagnosis:   No diagnosis found.  Physician: Mariano Sweeney III, *    Visit Date: 6/20/2019    Physician Orders: PT Eval and Treat: eval and treat with modalities: B knee arthritis and pes bursitis  Medical Diagnosis from Referral:   M17.0 (ICD-10-CM) - Arthritis of both knees   M70.51,M70.52 (ICD-10-CM) - Pes anserinus bursitis of both knees      Evaluation Date: 5/2/2019  Authorization Period Expiration: 12/31/19  Plan of Care Expiration: 6/28/19  Visit # / Visits authorized: 8/ 25  FOTO: 8/10     Time In: 2:34PM  Time Out: 3:00PM***      Precautions: Standard, Diabetes and HTN, Atrial Fibrillation, hx of CVA    Subjective     Pt reports: that she has not been as compliant c/ HEP due to having grandkid the last few days.  Pt states that she has to leave early due to having to  her grandkids.  Response to previous treatment: no adverse effects   Functional change: none    Pain: 0/10  Location: bilateral knee      Objective     Diana received objective measures and individual therapeutic exercises to develop strength, endurance, ROM, flexibility, posture and core stabilization for 26 minutes including:    bike x 5' supervised- NT  Quad sets 5"x20 B  -   SAQ 2x10 2" hold 1# R, held on L  NT  Supine HSS 3x30" w/strap B   gastroc stretch 3x30" wedge-  SLR 2x15 B  Hip adduction ball squeezes 3" 2x10 w/bridge   SL hip abduction 2x15 B NP  SL clams 3x10 B  -NT  LAQ 2" hold (RLE 1# 3x10, LLE1# x10, LLE 2x10 without weight) -NT  Supine strap assisted heel slides B 10 x5" hold -NT    TKE 3x10 OTB  NP  Step ups airex on Lvl 2 step- 3x10  NP  Shuttle 3C 3x10  NP    Cold pack provided for 10 min post tx to (L) knee     Knee   Right     Left   Pain/Dysfunction with Movement     AROM PROM MMT AROM PROM MMT    Flexion 114 116 4+/5 110 115 4+/5  -   Extension 0  4+/5 0  4+/5  -         Home Exercises Provided and " Patient Education Provided     Education provided:   - Continue with HEP    Written Home Exercises Provided: Patient instructed to cont prior HEP.  Exercises were reviewed and Kaleigh was able to demonstrate them prior to the end of the session.  Kaleigh demonstrated good  understanding of the education provided.     See EMR under Patient Instructions for exercises provided initial evaluation.      Assessment     Pt not able to complete full session today due to having to abruptly leave to  grandkids.Pt improving B knee flex/ext  ROM and B quad/hamstring strength. Pt continues to c/o L knee pain throughtout exercises, no worse after. Pt tolerated session well without adverse effects. Plan to progress static balance & proprioceptive exercises as tolerated for reduced fall risk.   Kaleigh is progressing well towards her goals.   Pt prognosis is Good.     Pt will continue to benefit from skilled outpatient physical therapy to address the deficits listed in the problem list box on initial evaluation, provide pt/family education and to maximize pt's level of independence in the home and community environment.     Pt's spiritual, cultural and educational needs considered and pt agreeable to plan of care and goals.    Anticipated barriers to physical therapy: chronicity of pain, comorbidities    Goals:     Short Term Goals: 4 weeks   1. Pt will be independent with HEP to supplement PT in improving functional mobility (progressing, not met)   2. Pt will improve impaired LE MMT by at least 1/2 grade in order to improve strength for functional tasks (progressing, not met)      Long Term Goals: 8 weeks   1. Pt will improve FOTO score to </= 54% limited to decrease perceived limitation with mobility (progressing, not met)   2. Pt will improve (B) knee AROM to 0-120 degrees in order to improve gait and ability to perform ADLs (progressing, partially met 5/29/19)   3. Pt will improve impaired LE MMT by at least 1 full grade in  order to improve strength for functional tasks (progressing, not met)   4. Pt will report (B) knee pain </= 4/10 at worst when performing ADLs in order to be able to perform ADLs with less difficulty (progressing, not met)     Plan     Continue per POC, progress as tolerated.     Sandhya Barraza, PT,DPT  6/19/2019

## 2019-06-20 ENCOUNTER — CLINICAL SUPPORT (OUTPATIENT)
Dept: REHABILITATION | Facility: HOSPITAL | Age: 64
End: 2019-06-20
Attending: ORTHOPAEDIC SURGERY
Payer: COMMERCIAL

## 2019-06-20 DIAGNOSIS — G89.29 CHRONIC PAIN OF BOTH KNEES: ICD-10-CM

## 2019-06-20 DIAGNOSIS — M25.562 CHRONIC PAIN OF BOTH KNEES: ICD-10-CM

## 2019-06-20 DIAGNOSIS — M25.561 CHRONIC PAIN OF BOTH KNEES: ICD-10-CM

## 2019-06-20 DIAGNOSIS — R26.89 IMPAIRED GAIT AND MOBILITY: ICD-10-CM

## 2019-06-20 DIAGNOSIS — R29.898 DECREASED STRENGTH OF LOWER EXTREMITY: ICD-10-CM

## 2019-06-20 PROCEDURE — 97110 THERAPEUTIC EXERCISES: CPT

## 2019-06-20 NOTE — PROGRESS NOTES
"  Physical Therapy Daily Treatment Note     Name: Diana Montenegro  Clinic Number: [id number]    Therapy Diagnosis:   Encounter Diagnoses   Name Primary?    Chronic pain of both knees     Decreased strength of lower extremity     Impaired gait and mobility      Physician: Mariano Sweeney III, *    Visit Date: 6/20/2019    Physician Orders: PT Eval and Treat: eval and treat with modalities: B knee arthritis and pes bursitis  Medical Diagnosis from Referral:   M17.0 (ICD-10-CM) - Arthritis of both knees   M70.51,M70.52 (ICD-10-CM) - Pes anserinus bursitis of both knees      Evaluation Date: 5/2/2019  Authorization Period Expiration: 12/31/19  Plan of Care Expiration: 6/28/19  Visit # / Visits authorized: 8/ 25  FOTO: 7/10     Time In: 10:38 (pt arrived late)  Time Out: 11:35       Precautions: Standard, Diabetes and HTN, Atrial Fibrillation, hx of CVA    Subjective     Pt reports: that she has not been as compliant c/ HEP due to having grandkid the last few days.  Pt states that she has to leave early due to having to  her grandkids.  Response to previous treatment: no adverse effects   Functional change: none    Pain: 0/10  Location: bilateral knee      Objective     Diana received objective measures and individual therapeutic exercises to develop strength, endurance, ROM, flexibility, posture and core stabilization for 47 minutes including:      bike x 5' supervised (nustep for 5 minutes)  Quad sets 5"x20 B    SAQ 2x10 2" hold 1# R, held on L  NT  Supine HSS 3x30" w/strap B   gastroc stretch 3x30" wedge  SLR 2x15    Hip adduction ball squeezes 3" 2x10 w/bridge   SL hip abduction 2x15 B   SL clams 3x10 B    LAQ 2" hold (RLE 1# 3x10, LLE1# x10, LLE 2x10 without weight)   Supine strap assisted heel slides B 10 x5" hold -NT    TKE 3x10 OTB  NP  Step ups airex on Lvl 2 step- 3x10    Shuttle 3C 3x10      Cold pack provided for 10 min post tx to (L) knee     Home Exercises Provided and Patient Education " Provided     Education provided:   - Continue with HEP    Written Home Exercises Provided: Patient instructed to cont prior HEP.  Exercises were reviewed and Kaleigh was able to demonstrate them prior to the end of the session.  Kaleigh demonstrated good  understanding of the education provided.     See EMR under Patient Instructions for exercises provided initial evaluation.      Assessment     Pt not able to perform all exercises due to arriving late.Pt improving B knee flex/ext  ROM and B quad/hamstring strength. Pt continues to c/o L knee pain throughtout exercises, no worse after. Pt tolerated session well without adverse effects. Plan to progress static balance & proprioceptive exercises as tolerated for reduced fall risk.   Kaleigh is progressing well towards her goals.   Pt prognosis is Good.     Pt will continue to benefit from skilled outpatient physical therapy to address the deficits listed in the problem list box on initial evaluation, provide pt/family education and to maximize pt's level of independence in the home and community environment.     Pt's spiritual, cultural and educational needs considered and pt agreeable to plan of care and goals.    Anticipated barriers to physical therapy: chronicity of pain, comorbidities    Goals:     Short Term Goals: 4 weeks   1. Pt will be independent with HEP to supplement PT in improving functional mobility (progressing, not met)   2. Pt will improve impaired LE MMT by at least 1/2 grade in order to improve strength for functional tasks (progressing, not met)      Long Term Goals: 8 weeks   1. Pt will improve FOTO score to </= 54% limited to decrease perceived limitation with mobility (progressing, not met)   2. Pt will improve (B) knee AROM to 0-120 degrees in order to improve gait and ability to perform ADLs (progressing, partially met 5/29/19)   3. Pt will improve impaired LE MMT by at least 1 full grade in order to improve strength for functional tasks (progressing,  not met)   4. Pt will report (B) knee pain </= 4/10 at worst when performing ADLs in order to be able to perform ADLs with less difficulty (progressing, not met)     Plan     Continue per POC, progress as tolerated.     Jhonathan Grimm, PT,DPT  6/20/2019

## 2019-07-01 ENCOUNTER — CLINICAL SUPPORT (OUTPATIENT)
Dept: REHABILITATION | Facility: HOSPITAL | Age: 64
End: 2019-07-01
Attending: ORTHOPAEDIC SURGERY
Payer: COMMERCIAL

## 2019-07-01 DIAGNOSIS — G89.29 CHRONIC PAIN OF BOTH KNEES: ICD-10-CM

## 2019-07-01 DIAGNOSIS — R29.898 DECREASED STRENGTH OF LOWER EXTREMITY: ICD-10-CM

## 2019-07-01 DIAGNOSIS — M25.561 CHRONIC PAIN OF BOTH KNEES: ICD-10-CM

## 2019-07-01 DIAGNOSIS — R26.89 IMPAIRED GAIT AND MOBILITY: ICD-10-CM

## 2019-07-01 DIAGNOSIS — M25.562 CHRONIC PAIN OF BOTH KNEES: ICD-10-CM

## 2019-07-01 PROCEDURE — 97110 THERAPEUTIC EXERCISES: CPT

## 2019-07-01 NOTE — PROGRESS NOTES
"  Physical Therapy Daily Treatment Note/ Plan of Care     Name: Diana Montenegro  Clinic Number: [id number]    Therapy Diagnosis:   Encounter Diagnoses   Name Primary?    Chronic pain of both knees     Decreased strength of lower extremity     Impaired gait and mobility      Physician: Mariano Sweeney III, *    Visit Date: 7/1/2019    Physician Orders: PT Eval and Treat: eval and treat with modalities: B knee arthritis and pes bursitis  Medical Diagnosis from Referral:   M17.0 (ICD-10-CM) - Arthritis of both knees   M70.51,M70.52 (ICD-10-CM) - Pes anserinus bursitis of both knees      Evaluation Date: 5/2/2019  Authorization Period Expiration: 12/31/19  Plan of Care Expiration: 6/28/19, 10/1/19 or 6 visits  Visit # / Visits authorized: 9/ 25  FOTO: 8/10     Time In: 10:30 (pt arrived late)  Time Out: 11:00      Precautions: Standard, Diabetes and HTN, Atrial Fibrillation, hx of CVA    Subjective     Pt reports: that she has been compliant c/ HEP.  She just recently got her strap for stretching.    Response to previous treatment: no adverse effects   Functional change: none    Pain: 0/10  Location: bilateral knee      Objective       Hip Right   Left   Pain/Dysfunction with Movement     AROM MMT AROM MMT != pain    Flexion WFL 4+/5 WFL 4+/5     Extension NT NT NT NT Able to perform good bridge against gravity    Abduction WFL 4+/5 WFL 4+/5     External rotation WFL 4+/5 WFL 4+/5 C/o pain in (L) knee      Knee   Right     Left   Pain/Dysfunction with Movement     AROM PROM MMT AROM PROM MMT != pain   Flexion 120 120 4+/5 120 120 4+/5     Extension 0 0 4+/5 0 0 4+/5        Diana received objective measures and individual therapeutic exercises to develop strength, endurance, ROM, flexibility, posture and core stabilization for 30 minutes including:    bike x 5' supervised (nustep for 5 minutes)-NT  Quad sets 5"x20 B    SAQ 2x10 2" hold 1# R, held on L  NT  Supine HSS 3x30" w/strap B -NT  gastroc stretch 3x30" " "wedge  SLR 2x15    Hip adduction ball squeezes 3" 2x10 w/bridge   SL hip abduction 2x15 B   SL clams 3x10 B    LAQ 2" hold (RLE 1# 3x10, LLE1# x10, LLE 2x10 without weight)   Supine strap assisted heel slides B 10 x5" hold -NT    TKE 3x10 OTB  NP-NT  Step ups airex on Lvl 2 step- 3x10 -NT  Shuttle 3C 3x10 -    Cold pack provided for 10 min post tx to (L) knee - not today    Home Exercises Provided and Patient Education Provided     Education provided:   - Continue with HEP    Written Home Exercises Provided: Patient instructed to cont prior HEP.  Exercises were reviewed and Kaleigh was able to demonstrate them prior to the end of the session.  Kaleigh demonstrated good  understanding of the education provided.     See EMR under Patient Instructions for exercises provided initial evaluation.      Assessment     Pt has met all short term goals established by PT and is making progress on all long term goals. PT is extending POC for an additional 1x/wk for 6 visits. Pt tolerated session well without adverse effects. Plan to progress static balance & proprioceptive exercises as tolerated for reduced fall risk.   Kaleigh is progressing well towards her goals.   Pt prognosis is Good.     Pt will continue to benefit from skilled outpatient physical therapy to address the deficits listed in the problem list box on initial evaluation, provide pt/family education and to maximize pt's level of independence in the home and community environment.     Pt's spiritual, cultural and educational needs considered and pt agreeable to plan of care and goals.    Anticipated barriers to physical therapy: chronicity of pain, comorbidities    Goals:     Short Term Goals: 4 weeks   1. Pt will be independent with HEP to supplement PT in improving functional mobility (Met 7/1/19)  2. Pt will improve impaired LE MMT by at least 1/2 grade in order to improve strength for functional tasks. (Met 7/1/19)     Long Term Goals: 8 weeks   1. Pt will improve FOTO " score to </= 54% limited to decrease perceived limitation with mobility (progressing, not met)   2. Pt will improve (B) knee AROM to 0-120 degrees in order to improve gait and ability to perform ADLs (Met 7/1/19)  3. Pt will improve impaired LE MMT by at least 1 full grade in order to improve strength for functional tasks (progressing, not met)   4. Pt will report (B) knee pain </= 4/10 at worst when performing ADLs in order to be able to perform ADLs with less difficulty (progressing, not met)     Plan     Extend plan of care for an additional 1x/wk for 6 visits    Jhonathan Grimm, PT,DPT  7/1/2019

## 2019-07-12 DIAGNOSIS — E11.9 TYPE 2 DIABETES MELLITUS WITHOUT COMPLICATION: ICD-10-CM

## 2019-07-25 DIAGNOSIS — E78.5 HYPERLIPIDEMIA, UNSPECIFIED HYPERLIPIDEMIA TYPE: ICD-10-CM

## 2019-07-25 DIAGNOSIS — Z98.84 S/P LAPAROSCOPIC SLEEVE GASTRECTOMY: ICD-10-CM

## 2019-07-25 RX ORDER — APIXABAN 5 MG/1
TABLET, FILM COATED ORAL
Qty: 60 TABLET | Refills: 0 | Status: SHIPPED | OUTPATIENT
Start: 2019-07-25 | End: 2019-08-22 | Stop reason: SDUPTHER

## 2019-07-25 RX ORDER — GLIMEPIRIDE 4 MG/1
TABLET ORAL
Qty: 60 TABLET | Refills: 11 | Status: SHIPPED | OUTPATIENT
Start: 2019-07-25 | End: 2020-06-16 | Stop reason: SDUPTHER

## 2019-07-25 RX ORDER — METFORMIN HYDROCHLORIDE 1000 MG/1
TABLET ORAL
Qty: 60 TABLET | Refills: 2 | Status: SHIPPED | OUTPATIENT
Start: 2019-07-25 | End: 2019-10-24 | Stop reason: SDUPTHER

## 2019-07-25 RX ORDER — ROSUVASTATIN CALCIUM 40 MG/1
TABLET, COATED ORAL
Qty: 30 TABLET | Refills: 2 | Status: SHIPPED | OUTPATIENT
Start: 2019-07-25 | End: 2019-10-21 | Stop reason: SDUPTHER

## 2019-07-25 RX ORDER — OMEPRAZOLE 40 MG/1
CAPSULE, DELAYED RELEASE ORAL
Qty: 30 CAPSULE | Refills: 2 | Status: SHIPPED | OUTPATIENT
Start: 2019-07-25 | End: 2019-10-24 | Stop reason: SDUPTHER

## 2019-07-29 ENCOUNTER — TELEPHONE (OUTPATIENT)
Dept: INTERNAL MEDICINE | Facility: CLINIC | Age: 64
End: 2019-07-29

## 2019-07-29 DIAGNOSIS — I10 ESSENTIAL HYPERTENSION: ICD-10-CM

## 2019-07-29 DIAGNOSIS — E11.40 TYPE 2 DIABETES MELLITUS WITH DIABETIC NEUROPATHY, WITHOUT LONG-TERM CURRENT USE OF INSULIN: ICD-10-CM

## 2019-07-29 DIAGNOSIS — Z12.39 BREAST CANCER SCREENING: Primary | ICD-10-CM

## 2019-07-29 NOTE — TELEPHONE ENCOUNTER
----- Message from Adelfo Huang sent at 7/29/2019  1:18 PM CDT -----  Contact: Pt  Needs Advice    Reason for call:The Pt received her reminder letter to get her Mammo and needs an order/referral sent over.  Please contact the Pt so she will know the order/referral has been sent over and she can call us back.        Communication Preference:[phone]    Additional Information:

## 2019-07-30 ENCOUNTER — HOSPITAL ENCOUNTER (OUTPATIENT)
Dept: RADIOLOGY | Facility: HOSPITAL | Age: 64
Discharge: HOME | End: 2019-07-30
Attending: INTERNAL MEDICINE
Payer: COMMERCIAL

## 2019-07-30 DIAGNOSIS — Z12.39 BREAST CANCER SCREENING: ICD-10-CM

## 2019-07-30 PROCEDURE — 77067 SCR MAMMO BI INCL CAD: CPT | Mod: TC,PO

## 2019-07-30 PROCEDURE — 77067 MAMMO DIGITAL SCREENING BILAT WITH TOMOSYNTHESIS_CAD: ICD-10-PCS | Mod: 26,,, | Performed by: RADIOLOGY

## 2019-07-30 PROCEDURE — 77063 MAMMO DIGITAL SCREENING BILAT WITH TOMOSYNTHESIS_CAD: ICD-10-PCS | Mod: 26,,, | Performed by: RADIOLOGY

## 2019-07-30 PROCEDURE — 77063 BREAST TOMOSYNTHESIS BI: CPT | Mod: 26,,, | Performed by: RADIOLOGY

## 2019-07-30 PROCEDURE — 77067 SCR MAMMO BI INCL CAD: CPT | Mod: 26,,, | Performed by: RADIOLOGY

## 2019-07-31 ENCOUNTER — CLINICAL SUPPORT (OUTPATIENT)
Dept: REHABILITATION | Facility: HOSPITAL | Age: 64
End: 2019-07-31
Attending: ORTHOPAEDIC SURGERY
Payer: COMMERCIAL

## 2019-07-31 DIAGNOSIS — M25.561 CHRONIC PAIN OF BOTH KNEES: ICD-10-CM

## 2019-07-31 DIAGNOSIS — R29.898 DECREASED STRENGTH OF LOWER EXTREMITY: ICD-10-CM

## 2019-07-31 DIAGNOSIS — R26.89 IMPAIRED GAIT AND MOBILITY: ICD-10-CM

## 2019-07-31 DIAGNOSIS — M25.562 CHRONIC PAIN OF BOTH KNEES: ICD-10-CM

## 2019-07-31 DIAGNOSIS — G89.29 CHRONIC PAIN OF BOTH KNEES: ICD-10-CM

## 2019-07-31 PROCEDURE — 97110 THERAPEUTIC EXERCISES: CPT

## 2019-07-31 NOTE — PROGRESS NOTES
"  Physical Therapy Daily Treatment Note     Name: Diana Lieberman [place]  Clinic Number: [id number]    Therapy Diagnosis:   Encounter Diagnoses   Name Primary?    Chronic pain of both knees     Decreased strength of lower extremity     Impaired gait and mobility      Physician: Mariano Sweeney III, *    Visit Date: 7/31/2019    Physician Orders: PT Eval and Treat: eval and treat with modalities: B knee arthritis and pes bursitis  Medical Diagnosis from Referral:   M17.0 (ICD-10-CM) - Arthritis of both knees   M70.51,M70.52 (ICD-10-CM) - Pes anserinus bursitis of both knees      Evaluation Date: 5/2/2019  Authorization Period Expiration: 12/31/19  Plan of Care Expiration: 6/28/19, 10/1/19 or 6 visits  Visit # / Visits authorized: 10/ 25 (1/6)   FOTO: 10/10     Time In: 2:02 pm   Time Out: 2:53 pm   Total Billable Time: 28 minutes    Precautions: Standard, Diabetes and HTN, Atrial Fibrillation, hx of CVA    Subjective     Pt reports: that she has been out of town on a cruise. Pt stated that her (B) knees hurt off and on and she notices increased pain in (B) knees when she doesn't do her exercises. Pt stated that she was not experiencing pain in (B) knees prior to therapy session.    She was compliant with home exercise program.  Response to previous treatment: no adverse effects  Functional change: none    Pain: 0/10  Location: bilateral knee      Objective     Diana received  therapeutic exercises to develop strength, endurance, ROM, flexibility, posture and core stabilization for 51 minutes including:    Nustep x 5' supervised  Quad sets 5"x20 B    SAQ 2x10 2" hold 1# R, held on L  - not performed today  Supine HSS 3x30" w/strap B   gastroc stretch 3x30" wedge  SLR 2x15    Hip adduction ball squeezes 3" 2x10 w/bridge   SL hip abduction 2x10 B   SL clams 3x10 B    LAQ 2" hold   Supine strap assisted heel slides B 10 x5"     TKE 3x10 OTB - not performed today  Step ups  Lvl 2 step- 3x10 - not performed " today  Shuttle 2.5 C 3x10 -    Cold pack provided for 10 min post tx to (L) knee - not today    Home Exercises Provided and Patient Education Provided     Education provided:   - Continue with HEP     Written Home Exercises Provided: Patient instructed to cont prior HEP.  Exercises were reviewed and Kaleigh was able to demonstrate them prior to the end of the session.  Kaleigh demonstrated good  understanding of the education provided.     See EMR under Patient Instructions for exercises provided initial evaluation.      Assessment     Pt was able to tolerate all therex without c/o increased pain or adverse reactions. Pt with report of muscle fatigue/soreness at end of session but no c/o increased pain.   Kaleigh is progressing well towards her goals.   Pt prognosis is Good.     Pt will continue to benefit from skilled outpatient physical therapy to address the deficits listed in the problem list box on initial evaluation, provide pt/family education and to maximize pt's level of independence in the home and community environment.     Pt's spiritual, cultural and educational needs considered and pt agreeable to plan of care and goals.    Anticipated barriers to physical therapy: chronicity of pain, comorbidities    Goals:     Short Term Goals: 4 weeks   1. Pt will be independent with HEP to supplement PT in improving functional mobility (Met 7/1/19)  2. Pt will improve impaired LE MMT by at least 1/2 grade in order to improve strength for functional tasks. (Met 7/1/19)     Long Term Goals: 8 weeks   1. Pt will improve FOTO score to </= 54% limited to decrease perceived limitation with mobility (progressing, not met)   2. Pt will improve (B) knee AROM to 0-120 degrees in order to improve gait and ability to perform ADLs (Met 7/1/19)  3. Pt will improve impaired LE MMT by at least 1 full grade in order to improve strength for functional tasks (progressing, not met)   4. Pt will report (B) knee pain </= 4/10 at worst when  performing ADLs in order to be able to perform ADLs with less difficulty (progressing, not met)     Plan     Continue per POC, progress as tolerated    Dionna Ann, PT

## 2019-08-05 ENCOUNTER — PATIENT OUTREACH (OUTPATIENT)
Dept: ADMINISTRATIVE | Facility: OTHER | Age: 64
End: 2019-08-05

## 2019-08-12 ENCOUNTER — PATIENT OUTREACH (OUTPATIENT)
Dept: ADMINISTRATIVE | Facility: OTHER | Age: 64
End: 2019-08-12

## 2019-08-13 ENCOUNTER — CLINICAL SUPPORT (OUTPATIENT)
Dept: REHABILITATION | Facility: HOSPITAL | Age: 64
End: 2019-08-13
Attending: ORTHOPAEDIC SURGERY
Payer: COMMERCIAL

## 2019-08-13 DIAGNOSIS — G89.29 CHRONIC PAIN OF BOTH KNEES: ICD-10-CM

## 2019-08-13 DIAGNOSIS — R29.898 DECREASED STRENGTH OF LOWER EXTREMITY: ICD-10-CM

## 2019-08-13 DIAGNOSIS — M25.562 CHRONIC PAIN OF BOTH KNEES: ICD-10-CM

## 2019-08-13 DIAGNOSIS — R26.89 IMPAIRED GAIT AND MOBILITY: ICD-10-CM

## 2019-08-13 DIAGNOSIS — M25.561 CHRONIC PAIN OF BOTH KNEES: ICD-10-CM

## 2019-08-13 PROCEDURE — 97110 THERAPEUTIC EXERCISES: CPT

## 2019-08-13 NOTE — PROGRESS NOTES
"  Physical Therapy Daily Treatment Note     Name: Diana Montenegro  Clinic Number: [id number]    Therapy Diagnosis:   Encounter Diagnoses   Name Primary?    Chronic pain of both knees     Decreased strength of lower extremity     Impaired gait and mobility      Physician: Mariano Sweeney III, *    Visit Date: 8/13/2019    Physician Orders: PT Eval and Treat: eval and treat with modalities: B knee arthritis and pes bursitis  Medical Diagnosis from Referral:   M17.0 (ICD-10-CM) - Arthritis of both knees   M70.51,M70.52 (ICD-10-CM) - Pes anserinus bursitis of both knees      Evaluation Date: 5/2/2019  Authorization Period Expiration: 12/31/19  Plan of Care Expiration: 6/28/19, 10/1/19 or 6 visits  Visit # / Visits authorized: 11/ 25 (2/6)   FOTO: 11    Time In: 11:06 am   Time Out: 11:48 am   Total Billable Time: 37 minutes    Precautions: Standard, Diabetes and HTN, Atrial Fibrillation, hx of CVA    Subjective     Pt reports: that she experienced a little bit of knee pain when she got up this morning but stated that she was no longer experiencing knee pain prior to therapy session today.   She was compliant with home exercise program.  Response to previous treatment: no adverse effects  Functional change: none    Pain: 0/10  Location: bilateral knee      Objective     Diana received  therapeutic exercises to develop strength, endurance, ROM, flexibility, posture and core stabilization for 42 minutes including:    Nustep x 5' supervised  Quad sets 5"x20 B  - not performed today  SAQ 2x10 2" hold 1# R, held on L  - not performed today  Supine HSS 3x30" w/strap B   gastroc stretch 3x30" wedge  SLR 2x15    Hip adduction ball squeezes 3" 2x10 w/bridge   SL hip abduction 2x10 B - not performed today   SL clams 3x10 B  3" hold   LAQ 2" hold   Supine strap assisted heel slides B 10 x5" - not performed today     TKE 3x10 OTB - not performed today  Step ups  Lvl 2 step- 2x10 -   Shuttle 2.5 C 3x10 -  [place] 2x10 " B    Cold pack provided for 10 min post tx to (L) knee - not today        Home Exercises Provided and Patient Education Provided     Education provided:   - Continue with HEP    Written Home Exercises Provided: Patient instructed to cont prior HEP.  Exercises were reviewed and Kaleigh was able to demonstrate them prior to the end of the session.  Kaleigh demonstrated good  understanding of the education provided.     See EMR under Patient Instructions for exercises provided initial evaluation      Assessment     Pt was able to perform all therex noted above including addition of standing (B) hip strengthening therex without c/o increased pain. Pt reported feeling fatigued at end of session but no c/o increased pain at end of therapy session.   Kaleigh is progressing well towards her goals.   Pt prognosis is Good.     Pt will continue to benefit from skilled outpatient physical therapy to address the deficits listed in the problem list box on initial evaluation, provide pt/family education and to maximize pt's level of independence in the home and community environment.     Pt's spiritual, cultural and educational needs considered and pt agreeable to plan of care and goals.    Anticipated barriers to physical therapy: chronicity of pain, comorbidities     Goals:     Short Term Goals: 4 weeks   1. Pt will be independent with HEP to supplement PT in improving functional mobility (Met 7/1/19)  2. Pt will improve impaired LE MMT by at least 1/2 grade in order to improve strength for functional tasks. (Met 7/1/19)     Long Term Goals: 8 weeks   1. Pt will improve FOTO score to </= 54% limited to decrease perceived limitation with mobility (progressing, not met)   2. Pt will improve (B) knee AROM to 0-120 degrees in order to improve gait and ability to perform ADLs (Met 7/1/19)  3. Pt will improve impaired LE MMT by at least 1 full grade in order to improve strength for functional tasks (progressing, not met)   4. Pt will report  (B) knee pain </= 4/10 at worst when performing ADLs in order to be able to perform ADLs with less difficulty (progressing, not met)     Plan     Continue per POC, progress as tolerated    Dionna Ann, PT

## 2019-08-14 ENCOUNTER — OFFICE VISIT (OUTPATIENT)
Dept: PODIATRY | Facility: CLINIC | Age: 64
End: 2019-08-14
Payer: COMMERCIAL

## 2019-08-14 VITALS — WEIGHT: 234 LBS | HEIGHT: 64 IN | BODY MASS INDEX: 39.95 KG/M2

## 2019-08-14 DIAGNOSIS — M20.5X9 MALLET TOE, UNSPECIFIED LATERALITY: ICD-10-CM

## 2019-08-14 DIAGNOSIS — M20.42 HAMMER TOES OF BOTH FEET: ICD-10-CM

## 2019-08-14 DIAGNOSIS — B35.1 ONYCHOMYCOSIS DUE TO DERMATOPHYTE: ICD-10-CM

## 2019-08-14 DIAGNOSIS — M20.41 HAMMER TOES OF BOTH FEET: ICD-10-CM

## 2019-08-14 DIAGNOSIS — E11.9 TYPE 2 DIABETES MELLITUS WITHOUT COMPLICATION, WITHOUT LONG-TERM CURRENT USE OF INSULIN: Primary | ICD-10-CM

## 2019-08-14 PROCEDURE — 99499 UNLISTED E&M SERVICE: CPT | Mod: S$GLB,,, | Performed by: PODIATRIST

## 2019-08-14 PROCEDURE — 99999 PR PBB SHADOW E&M-EST. PATIENT-LVL II: CPT | Mod: PBBFAC,,, | Performed by: PODIATRIST

## 2019-08-14 PROCEDURE — 99499 NO LOS: ICD-10-PCS | Mod: S$GLB,,, | Performed by: PODIATRIST

## 2019-08-14 PROCEDURE — 11721 DEBRIDE NAIL 6 OR MORE: CPT | Mod: Q9,S$GLB,, | Performed by: PODIATRIST

## 2019-08-14 PROCEDURE — 11721 PR DEBRIDEMENT OF NAILS, 6 OR MORE: ICD-10-PCS | Mod: Q9,S$GLB,, | Performed by: PODIATRIST

## 2019-08-14 PROCEDURE — 99999 PR PBB SHADOW E&M-EST. PATIENT-LVL II: ICD-10-PCS | Mod: PBBFAC,,, | Performed by: PODIATRIST

## 2019-08-14 NOTE — PROGRESS NOTES
Subjective:      Patient ID: Diana Montenegro is a 64 y.o. female.    Chief Complaint: Diabetes Mellitus (Dr. Johnston 7/17/18 upcoming appt 9/5/19); Diabetic Foot Exam (toes curling ); and Routine Foot Care    Diana is a 64 y.o. female who presents to the clinic for evaluation and treatment of high risk feet. Diana has a past medical history of Arthritis, Atrial fibrillation, unspecified, Chronic back pain, Colon polyp, CVA (cerebral infarction) (7/16/14), Degenerative disc disease, Diabetes mellitus type II, Encounter for loop recorder at end of battery life (9/17/2018), Hyperlipidemia, Hypertension, Hypothyroidism, Mild nonproliferative diabetic retinopathy (08/12/2018), Obesity, Sleep apnea, Stroke (july 2014), and Venous insufficiency (chronic) (peripheral). The patient's chief complaint is long, thick toenails. This patient has documented high risk feet requiring routine maintenance secondary to diabetes mellitis and those secondary complications of diabetes, as mentioned..  Inquires different treatment options for hammertoe and mallet toe deformity of the feet bilaterally.     PCP: Rad Johnston MD    Date Last Seen by PCP:  In epic    Current shoe gear:  Affected Foot: Casual shoes     Unaffected Foot: Casual shoes    Hemoglobin A1C   Date Value Ref Range Status   07/10/2018 8.9 (H) 4.0 - 5.6 % Final     Comment:     ADA Screening Guidelines:  5.7-6.4%  Consistent with prediabetes  >or=6.5%  Consistent with diabetes  High levels of fetal hemoglobin interfere with the HbA1C  assay. Heterozygous hemoglobin variants (HbS, HgC, etc)do  not significantly interfere with this assay.   However, presence of multiple variants may affect accuracy.     10/03/2017 6.6 (H) 4.0 - 5.6 % Final     Comment:     According to ADA guidelines, hemoglobin A1c <7.0% represents  optimal control in non-pregnant diabetic patients. Different  metrics may apply to specific patient populations.   Standards of Medical Care in  Diabetes-2016.  For the purpose of screening for the presence of diabetes:  <5.7%     Consistent with the absence of diabetes  5.7-6.4%  Consistent with increasing risk for diabetes   (prediabetes)  >or=6.5%  Consistent with diabetes  Currently, no consensus exists for use of hemoglobin A1c  for diagnosis of diabetes for children.  This Hemoglobin A1c assay has significant interference with fetal   hemoglobin   (HbF). The results are invalid for patients with abnormal amounts of   HbF,   including those with known Hereditary Persistence   of Fetal Hemoglobin. Heterozygous hemoglobin variants (HbAS, HbAC,   HbAD, HbAE, HbA2) do not significantly interfere with this assay;   however, presence of multiple variants in a sample may impact the %   interference.     05/03/2017 6.5 (H) 4.5 - 6.2 % Final     Comment:     According to ADA guidelines, hemoglobin A1C <7.0% represents  optimal control in non-pregnant diabetic patients.  Different  metrics may apply to specific populations.   Standards of Medical Care in Diabetes - 2016.  For the purpose of screening for the presence of diabetes:  <5.7%     Consistent with the absence of diabetes  5.7-6.4%  Consistent with increasing risk for diabetes   (prediabetes)  >or=6.5%  Consistent with diabetes  Currently no consensus exists for use of hemoglobin A1C  for diagnosis of diabetes for children.         Review of Systems   Constitution: Negative for decreased appetite, fever and malaise/fatigue.   HENT: Negative for congestion.    Cardiovascular: Negative for chest pain and leg swelling.   Respiratory: Negative for cough and shortness of breath.    Skin: Positive for color change. Negative for nail changes and rash.   Musculoskeletal: Positive for arthritis and stiffness. Negative for joint pain, joint swelling and muscle weakness.   Gastrointestinal: Negative for bloating, abdominal pain, nausea and vomiting.   Neurological: Negative for headaches, numbness and weakness.    Psychiatric/Behavioral: Negative for altered mental status.             Past Medical History:   Diagnosis Date    Arthritis     Atrial fibrillation, unspecified     hx of a fib prior to stroke.    Chronic back pain     Colon polyp     CVA (cerebral infarction) 7/16/14    Degenerative disc disease     cervical; lumbar    Diabetes mellitus type II     Encounter for loop recorder at end of battery life 9/17/2018    Hyperlipidemia     Hypertension     Hypothyroidism     Mild nonproliferative diabetic retinopathy 08/12/2018    Obesity     Sleep apnea     Stroke july 2014    Venous insufficiency (chronic) (peripheral)        Past Surgical History:   Procedure Laterality Date    COLONOSCOPY N/A 8/30/2018    Performed by William Clement [facility] (4TH FLR)    COLONOSCOPY N/A 5/24/2013    Performed by Gordon Brown [facility] at [facility] (4TH FLR)    COLONOSCOPY W/ POLYPECTOMY      GASTRECTOMY      GASTRECTOMY-SLEEVE-LAPAROSCOPIC-73052 with intraop EGD and hiatel hernia repair N/A 7/23/2015    Performed by Burak Stacy Jr., MD at [facility] OR 2ND FLR    HERNIA REPAIR      REMOVAL, IMPLANTABLE LOOP RECORDER N/A 9/17/2018    Performed by Thomas Randolph MD at [facility] CATH LAB    TONSILLECTOMY      TUBAL LIGATION         Family History   Problem Relation Age of Onset    No Known Problems Mother     Heart disease Father     Diabetes Maternal Grandfather     Obesity Maternal Grandfather     No Known Problems Sister     No Known Problems Sister     No Known Problems Maternal Grandmother     Stroke Paternal Grandmother     No Known Problems Paternal Grandfather     Heart attack Neg Hx        Social History     Socioeconomic History    Marital status: Single     Spouse name: Not on file    Number of children: Not on file    Years of education: Not on file    Highest education level: Not on file   Occupational History    Occupation: MOS      Employer: [employer]   Social Needs     Financial resource strain: Not on file    Food insecurity:     Worry: Not on file     Inability: Not on file    Transportation needs:     Medical: Not on file     Non-medical: Not on file   Tobacco Use    Smoking status: Former Smoker     Packs/day: 1.00     Years: 30.00     Pack years: 30.00     Types: Cigarettes     Last attempt to quit: 2014     Years since quittin.0    Smokeless tobacco: Never Used   Substance and Sexual Activity    Alcohol use: Yes     Alcohol/week: 0.0 oz     Comment: rarely    Drug use: No     Comment: thc at young age    Sexual activity: Not Currently     Partners: Male   Lifestyle    Physical activity:     Days per week: Not on file     Minutes per session: Not on file    Stress: Not on file   Relationships    Social connections:     Talks on phone: Not on file     Gets together: Not on file     Attends Restoration service: Not on file     Active member of club or organization: Not on file     Attends meetings of clubs or organizations: Not on file     Relationship status: Not on file   Other Topics Concern    Not on file   Social History Narrative    Not on file       Current Outpatient Medications   Medication Sig Dispense Refill    acyclovir 5% (ZOVIRAX) 5 % ointment Apply topically 6 (six) times daily. 5 g 1    albuterol (PROVENTIL/VENTOLIN HFA) 90 mcg/actuation inhaler Inhale 2 puffs into the lungs every 6 (six) hours as needed for Wheezing. Rescue 18 g 2    aspirin (ECOTRIN) 81 MG EC tablet Take 1 tablet (81 mg total) by mouth once daily.      b complex vitamins tablet Take 1 tablet by mouth once daily.      blood pressure test kit-large Kit Use as directed 1 each 0    CALCIUM CITRATE/VITAMIN D3 (CALCIUM CITRATE + D ORAL) Take 2 tablets by mouth 2 (two) times daily.      cinnamon bark (CINNAMON ORAL) Take by mouth 2 (two) times daily.      cyanocobalamin, vitamin B-12, 500 mcg Subl Place 1 tablet under the tongue once daily.      docusate sodium  (COLACE) 100 MG capsule Take 100 mg by mouth once daily.       ELIQUIS 5 mg Tab TAKE 1 TABLET BY MOUTH TWICE DAILY 60 tablet 0    fish oil-omega-3 fatty acids 300-1,000 mg capsule Take by mouth 2 (two) times daily.      fluticasone (FLONASE) 50 mcg/actuation nasal spray 2 sprays (100 mcg total) by Each Nare route once daily. (Patient taking differently: 2 sprays by Each Nare route daily as needed. ) 1 Bottle 0    glimepiride (AMARYL) 4 MG tablet TAKE 1 TABLET BY MOUTH TWICE DAILY 60 tablet 11    lancets (ONE TOUCH DELICA LANCETS) 33 gauge Misc 1 lancet by Misc.(Non-Drug; Combo Route) route 4 (four) times daily. 150 each 8    levothyroxine (SYNTHROID) 125 MCG tablet Take 1 tablet (125 mcg total) by mouth once daily. 30 tablet 3    metFORMIN (GLUCOPHAGE) 1000 MG tablet TAKE 1 TABLET BY MOUTH TWICE DAILY WITH MEALS 60 tablet 2    multivitamin capsule Take 1 capsule by mouth once daily.      omeprazole (PRILOSEC) 40 MG capsule TAKE 1 CAPSULE BY MOUTH IN THE MORNING 30 capsule 2    ONETOUCH ULTRA TEST Strp USE ONE  STRIP TO CHECK GLUCOSE 4 TIMES DAILY AS DIRECTED 150 strip 4    rosuvastatin (CRESTOR) 40 MG Tab TAKE 1 TABLET BY MOUTH ONCE DAILY 30 tablet 2    RUTIN/HESP/BIOFLAV/C/HERB#196 (BIOFLEX ORAL) Take 2 tablets by mouth once daily.      senna (SENNA) 8.6 mg tablet Take 2 tablets by mouth nightly as needed for Constipation. 100 tablet 3    traZODone (DESYREL) 50 MG tablet 1 pill PO PRN for insomnia at bedtime. OK to take 2nd pill in 30 minutes if still awake. 60 tablet 5    walker (ULTRA-LIGHT ROLLATOR) Misc Use walker w/ seat daily as directed. 1 each 0    albuterol-ipratropium 2.5mg-0.5mg/3mL (DUO-NEB) 0.5 mg-3 mg(2.5 mg base)/3 mL nebulizer solution Take 3 mLs by nebulization every 6 (six) hours as needed for Wheezing. Rescue 3 vial 3    blood-glucose meter (ONETOUCH ULTRA SYSTEM KIT) kit Use as instructed 1 each 0    levocetirizine (XYZAL) 5 MG tablet Take 1 tablet (5 mg total) by mouth every  "evening. 30 tablet 11    LORazepam (ATIVAN) 1 MG tablet Take 1 tablet (1 mg total) by mouth every 12 (twelve) hours as needed for Anxiety. 60 tablet 2    metoprolol tartrate (LOPRESSOR) 25 MG tablet Take 1 tablet (25 mg total) by mouth once as needed. For palpitations 30 tablet 11     No current facility-administered medications for this visit.      Facility-Administered Medications Ordered in Other Visits   Medication Dose Route Frequency Provider Last Rate Last Dose    0.9%  NaCl infusion   Intravenous Continuous Khadijah Rivera NP   1,000 mL at 09/17/18 0939    ceFAZolin injection 2 g  2 g Intravenous On Call Procedure Khadijah Rivera NP           Review of patient's allergies indicates:   Allergen Reactions    Medrol [methylprednisolone] Palpitations       Vitals:    08/14/19 1124   Weight: 106.1 kg (234 lb)   Height: 5' 4" (1.626 m)       Objective:      Physical Exam    Vascular: Distal DP pulses palpable 1/4 and 0/4 PT. CRT < 3 sec to tips of toes. + vericosities noted to LEs. warm to touch LE, No edema noted to LE.    Dermatologic: No open lesions, lacerations or wounds. Interdigital spaces clean, dry and intact. No erythema, rubor, calor noted LE  Toenails 1-5 bilaterally are elongated by 2-3 mm, thickened by 2-3 mm, discolored/yellowed, dystrophic, brittle with subungual debris. No incurvation. Mild xerosis noted, bilat.     Musculoskeletal: MMT 5/5 in DF/PF/Inv/Ev resistance with no reproduction of pain in any direction. Passive range of motion of ankle and pedal joints is painless. No calf tenderness LE, Compartments soft/compressible.   B/l feet:  Rigid hammertoe 2 through 4, rigid mallet toe of the hallux bilaterally without pain.     Neurological: Light touch, proprioception, and sharp/dull sensation are all intact. Protective threshold with the [place] monofilament is intact. Vibratory sensation intact.         Assessment:       Encounter Diagnoses   Name Primary?    Type 2 " diabetes mellitus without complication, without long-term current use of insulin Yes    Hammer toes of both feet     Mallet toe, unspecified laterality     Onychomycosis due to dermatophyte          Plan:       Diana was seen today for diabetes mellitus, diabetic foot exam and routine foot care.    Diagnoses and all orders for this visit:    Type 2 diabetes mellitus without complication, without long-term current use of insulin    Hammer toes of both feet    Mallet toe, unspecified laterality    Onychomycosis due to dermatophyte      I counseled the patient on her conditions, their implications and medical management.    64 y.o. female with diabetic foot risk assessment with painful elongated nails times 10.    -nails times 10 sharply debrided with nail Nipper.  Tolerated well  -Shoe inspection. General Foot Education. Patient reminded of the importance of good nutrition. Patient instructed on proper foot hygeine. We discussed wearing proper shoe gear, daily foot inspections, never walking without protective shoe gear, caution putting sharp instruments to feet. Discussed general foot care:  Wear comfortable, proper fitting shoes. Wash feet daily. Dry well. After drying, apply moisturizer to feet (no lotion to webspaces). Inspect feet daily for skin breaks, blisters, swelling, or redness. Wear cotton socks (preferably white)  Change socks every day. Do NOT walk barefoot. Do NOT use heating pads or warm/hot water soaks   -It was discussed the importance of wearing shoes with adequate room in toe box to accommodate toe deformities. Recommended New Balance/[employer]ics shoe brands with adequate arch supports to alleviate abnormal pressure and improve stability of foot while walking. Avoid flat shoes and barefoot walking as these will exacerbate or worsen symptoms.   -Advised for optimal glucose control and maintenance per primary care physician. Patient was also educated on healthy diet that is naturally rich in nutrients  and low in fat and calories.   -The nature of the condition, options for management, as well as potential risks and complications were discussed in detail with patient. Patient was amenable to my recommendations and left my office fully informed and will follow up as instructed or sooner if necessary.    -Patient was advised of signs and symptoms of infection including redness, drainage, purulence, odor, streaking, fever, chills and I advised patient to seek medical attention (ER or urgent care) if these symptoms arise.   -f/u 6 months       Note dictated with voice recognition software, please excuse any grammatical errors.

## 2019-08-20 RX ORDER — LEVOTHYROXINE SODIUM 125 UG/1
TABLET ORAL
Qty: 30 TABLET | Refills: 0 | Status: SHIPPED | OUTPATIENT
Start: 2019-08-20 | End: 2019-09-23 | Stop reason: SDUPTHER

## 2019-08-21 ENCOUNTER — CLINICAL SUPPORT (OUTPATIENT)
Dept: REHABILITATION | Facility: HOSPITAL | Age: 64
End: 2019-08-21
Attending: ORTHOPAEDIC SURGERY
Payer: COMMERCIAL

## 2019-08-21 DIAGNOSIS — R29.898 DECREASED STRENGTH OF LOWER EXTREMITY: ICD-10-CM

## 2019-08-21 DIAGNOSIS — G89.29 CHRONIC PAIN OF BOTH KNEES: ICD-10-CM

## 2019-08-21 DIAGNOSIS — M25.561 CHRONIC PAIN OF BOTH KNEES: ICD-10-CM

## 2019-08-21 DIAGNOSIS — M25.562 CHRONIC PAIN OF BOTH KNEES: ICD-10-CM

## 2019-08-21 DIAGNOSIS — R26.89 IMPAIRED GAIT AND MOBILITY: ICD-10-CM

## 2019-08-21 PROCEDURE — 97110 THERAPEUTIC EXERCISES: CPT

## 2019-08-21 NOTE — PROGRESS NOTES
"  Physical Therapy Daily Treatment Note     Name: Diana Montenegro  Clinic Number: [id number]    Therapy Diagnosis:   Encounter Diagnoses   Name Primary?    Chronic pain of both knees     Decreased strength of lower extremity     Impaired gait and mobility      Physician: Mariano Sweeney III, *    Visit Date: 8/21/2019    Physician Orders: PT Eval and Treat: eval and treat with modalities: B knee arthritis and pes bursitis  Medical Diagnosis from Referral:   M17.0 (ICD-10-CM) - Arthritis of both knees   M70.51,M70.52 (ICD-10-CM) - Pes anserinus bursitis of both knees      Evaluation Date: 5/2/2019  Authorization Period Expiration: 12/31/19  Plan of Care Expiration: 6/28/19, 10/1/19 or 6 visits  Visit # / Visits authorized: 12/ 25 (3/6)   FOTO: 12    Time In: 2:06 pm   Time Out: 2:55 pm   Total Billable Time: 46 minutes    Precautions: Standard, Diabetes and HTN, Atrial Fibrillation, hx of CVA    Subjective     Pt reports: that she is doing well today and not having much pain.   She was compliant with home exercise program.  Response to previous treatment: no adverse effects  Functional change: none    Pain: 0/10  Location: bilateral knee      Objective     Diana received  therapeutic exercises to develop strength, endurance, ROM, flexibility, posture and core stabilization for 51 minutes including:    Nustep x 5' supervised  Quad sets 5"x20 B  SAQ 2x10 2" hold 1# R, held on L  - not performed today  Supine HSS 3x30" w/strap B   gastroc stretch 3x30" wedge  SLR 2x15    Hip adduction ball squeezes 3" 2x10 w/bridge   SL hip abduction 2x10 B - not performed today   SL clams 3x10 B  3" hold   LAQ 2" hold   Supine strap assisted heel slides B 10 x5" - not performed today     TKE 3x10 OTB - not performed today  Step ups  Lvl 2 step- 2x10 -   Shuttle 2.5 C 3x10 -  Steamboats 2x10 B    Cold pack provided for 10 min post tx to (L) knee - not today        Home Exercises Provided and Patient Education Provided "     Education provided:   - Continue with HEP    Written Home Exercises Provided: Patient instructed to cont prior HEP.  Exercises were reviewed and Kaleigh was able to demonstrate them prior to the end of the session.  Kaleigh demonstrated good  understanding of the education provided.     See EMR under Patient Instructions for exercises provided initial evaluation      Assessment     Pt was able to perform all therex noted above  without c/o increased pain. Pt reported feeling fatigued at end of session but no c/o increased pain at end of therapy session.   Kaleigh is progressing well towards her goals.   Pt prognosis is Good.     Pt will continue to benefit from skilled outpatient physical therapy to address the deficits listed in the problem list box on initial evaluation, provide pt/family education and to maximize pt's level of independence in the home and community environment.     Pt's spiritual, cultural and educational needs considered and pt agreeable to plan of care and goals.    Anticipated barriers to physical therapy: chronicity of pain, comorbidities     Goals:     Short Term Goals: 4 weeks   1. Pt will be independent with HEP to supplement PT in improving functional mobility (Met 7/1/19)  2. Pt will improve impaired LE MMT by at least 1/2 grade in order to improve strength for functional tasks. (Met 7/1/19)     Long Term Goals: 8 weeks   1. Pt will improve FOTO score to </= 54% limited to decrease perceived limitation with mobility (progressing, not met)   2. Pt will improve (B) knee AROM to 0-120 degrees in order to improve gait and ability to perform ADLs (Met 7/1/19)  3. Pt will improve impaired LE MMT by at least 1 full grade in order to improve strength for functional tasks (progressing, not met)   4. Pt will report (B) knee pain </= 4/10 at worst when performing ADLs in order to be able to perform ADLs with less difficulty (progressing, not met)     Plan     Continue per POC, progress as  tolerated    Jackie Jonhston, ESE

## 2019-08-23 RX ORDER — APIXABAN 5 MG/1
TABLET, FILM COATED ORAL
Qty: 60 TABLET | Refills: 0 | Status: SHIPPED | OUTPATIENT
Start: 2019-08-23 | End: 2019-09-23 | Stop reason: SDUPTHER

## 2019-08-28 ENCOUNTER — CLINICAL SUPPORT (OUTPATIENT)
Dept: REHABILITATION | Facility: HOSPITAL | Age: 64
End: 2019-08-28
Attending: ORTHOPAEDIC SURGERY
Payer: COMMERCIAL

## 2019-08-28 DIAGNOSIS — R26.89 IMPAIRED GAIT AND MOBILITY: ICD-10-CM

## 2019-08-28 DIAGNOSIS — R29.898 DECREASED STRENGTH OF LOWER EXTREMITY: ICD-10-CM

## 2019-08-28 DIAGNOSIS — G89.29 CHRONIC PAIN OF BOTH KNEES: ICD-10-CM

## 2019-08-28 DIAGNOSIS — M25.562 CHRONIC PAIN OF BOTH KNEES: ICD-10-CM

## 2019-08-28 DIAGNOSIS — M25.561 CHRONIC PAIN OF BOTH KNEES: ICD-10-CM

## 2019-08-28 PROCEDURE — 97110 THERAPEUTIC EXERCISES: CPT

## 2019-08-28 NOTE — PROGRESS NOTES
"  Physical Therapy Daily Treatment Note/Discharge Summary     Name: Diana Montenegro  Clinic Number: [id number]    Therapy Diagnosis:   Encounter Diagnoses   Name Primary?    Chronic pain of both knees     Decreased strength of lower extremity     Impaired gait and mobility      Physician: Mariano Sweeney III, *    Visit Date: 8/28/2019    Physician Orders: PT Eval and Treat: eval and treat with modalities: B knee arthritis and pes bursitis  Medical Diagnosis from Referral:   M17.0 (ICD-10-CM) - Arthritis of both knees   M70.51,M70.52 (ICD-10-CM) - Pes anserinus bursitis of both knees      Evaluation Date: 5/2/2019  Authorization Period Expiration: 12/31/19  Plan of Care Expiration: 6/28/19, 10/1/19 or 6 visits  Visit # / Visits authorized: 13/ 25 (4/6)   FOTO: 13    Time In: 1:14 pm   Time Out: 1:50 pm   Total Billable Time: 36 minutes    Precautions: Standard, Diabetes and HTN, Atrial Fibrillation, hx of CVA    Subjective     Pt reports: 60-70% improvement since SOC. Pt stated that she had some pain in her knees early this morning, but it worked its way out.   She was partially compliant with home exercise program.  Response to previous treatment: no adverse effects  Functional change: none    Pain: 0/10  Location: bilateral knee      Objective       Diana received assessment and therapeutic exercises to develop strength, endurance, ROM, flexibility, posture and core stabilization for 36 minutes including:    Nustep x 5' - not performed today  Quad sets 5"x20 B  SAQ 2x10 2" hold 1# R, held on L  - not performed today  Supine HSS 3x30" w/strap B - not performed today  gastroc stretch 3x30" wedge  SLR 2x15    Hip adduction ball squeezes 3" 2x10 w/bridge   SL hip abduction 2x10 B - not performed today   SL clams 3x10 B  3" hold   LAQ 2" hold   Supine strap assisted heel slides B 10 x5" - not performed today     TKE 3x10 OTB - not performed today  Step ups  Lvl 2 step- 2x10 - not performed today  Shuttle 2.5 " C 3x10 - - not performed today  Steamboats 2x10 B    Cold pack provided for 10 min post tx to (L) knee - not today      Hip Right   Left   Pain/Dysfunction with Movement     AROM MMT AROM MMT != pain    Flexion WFL 4+/5 WFL 4+/5     Extension NT NT NT NT Able to perform good bridge against gravity    Abduction WFL 4+/5 WFL 4+/5     External rotation WFL 4+/5 WFL 4+/5       Knee  Right    Left   Pain/Dysfunction with Movement     AROM MMT AROM MMT != pain   Flexion 122 5/5 122 5/5     Extension 2 lacking 4+/5 0 4+/5       Ankle Right   Left   Pain/Dysfunction with Movement     AROM MMT AROM MMT     Plantarflexion WFL 4+/5 WFL 4+/5     Dorsiflexion WFL 5/5 WFL 5/5         FOTO: 62% limited - not consistent with subjective and objective improvements     Home Exercises Provided and Patient Education Provided     Education provided:   - Continue with HEP. Pt stated that she knew her exercises and did not need additional handout on exercises.     Written Home Exercises Provided: Patient instructed to cont prior HEP.  Exercises were reviewed and Kaleigh was able to demonstrate them prior to the end of the session.  Kaleigh demonstrated good  understanding of the education provided.     See EMR under Patient Instructions for exercises provided initial evaluation.      Assessment     Since initial evaluation on 5/2/19 pt has attended 12 PT follow up sessions. Pt had made progress with her PT treatments as evident by subjective and objective improvements. Pt reports 60-70% improvement in her symptoms since SOC. Pt does demo improved (B) knee AROM and improvements in (B) LE strength compared to measurements taken on initial evaluation. Pt was agreeable to being discharged with HEP and continuing at the gym at this time. See objective measurements above.     Pt's spiritual, cultural and educational needs considered and pt agreeable to plan of care and goals.    Goals:     Short Term Goals: 4 weeks   1. Pt will be independent with  HEP to supplement PT in improving functional mobility - Met   2. Pt will improve impaired LE MMT by at least 1/2 grade in order to improve strength for functional tasks. Partially Met     Long Term Goals: 8 weeks   1. Pt will improve FOTO score to </= 54% limited to decrease perceived limitation with mobility - Not Met  2. Pt will improve (B) knee AROM to 0-120 degrees in order to improve gait and ability to perform ADLs - Partially Met  3. Pt will improve impaired LE MMT by at least 1 full grade in order to improve strength for functional tasks Not Met    4. Pt will report (B) knee pain </= 4/10 at worst when performing ADLs in order to be able to perform ADLs with less difficulty - Not Met    Plan     Discharge from PT    Dionna Ann, PT

## 2019-08-30 ENCOUNTER — LAB VISIT (OUTPATIENT)
Dept: LAB | Facility: HOSPITAL | Age: 64
End: 2019-08-30
Attending: INTERNAL MEDICINE
Payer: COMMERCIAL

## 2019-08-30 DIAGNOSIS — E11.40 TYPE 2 DIABETES MELLITUS WITH DIABETIC NEUROPATHY, WITHOUT LONG-TERM CURRENT USE OF INSULIN: ICD-10-CM

## 2019-08-30 DIAGNOSIS — I10 ESSENTIAL HYPERTENSION: ICD-10-CM

## 2019-08-30 LAB
ALBUMIN SERPL BCP-MCNC: 3.6 G/DL (ref 3.5–5.2)
ALP SERPL-CCNC: 85 U/L (ref 55–135)
ALT SERPL W/O P-5'-P-CCNC: 24 U/L (ref 10–44)
ANION GAP SERPL CALC-SCNC: 8 MMOL/L (ref 8–16)
AST SERPL-CCNC: 21 U/L (ref 10–40)
BASOPHILS # BLD AUTO: 0.03 K/UL (ref 0–0.2)
BASOPHILS NFR BLD: 0.5 % (ref 0–1.9)
BILIRUB SERPL-MCNC: 0.2 MG/DL (ref 0.1–1)
BUN SERPL-MCNC: 22 MG/DL (ref 8–23)
CALCIUM SERPL-MCNC: 9.6 MG/DL (ref 8.7–10.5)
CHLORIDE SERPL-SCNC: 107 MMOL/L (ref 95–110)
CHOLEST SERPL-MCNC: 138 MG/DL (ref 120–199)
CHOLEST/HDLC SERPL: 2.3 {RATIO} (ref 2–5)
CO2 SERPL-SCNC: 27 MMOL/L (ref 23–29)
CREAT SERPL-MCNC: 0.9 MG/DL (ref 0.5–1.4)
DIFFERENTIAL METHOD: ABNORMAL
EOSINOPHIL # BLD AUTO: 0.1 K/UL (ref 0–0.5)
EOSINOPHIL NFR BLD: 1.4 % (ref 0–8)
ERYTHROCYTE [DISTWIDTH] IN BLOOD BY AUTOMATED COUNT: 13.4 % (ref 11.5–14.5)
EST. GFR  (AFRICAN AMERICAN): >60 ML/MIN/1.73 M^2
EST. GFR  (NON AFRICAN AMERICAN): >60 ML/MIN/1.73 M^2
ESTIMATED AVG GLUCOSE: 174 MG/DL (ref 68–131)
GLUCOSE SERPL-MCNC: 95 MG/DL (ref 70–110)
HBA1C MFR BLD HPLC: 7.7 % (ref 4–5.6)
HCT VFR BLD AUTO: 35 % (ref 37–48.5)
HDLC SERPL-MCNC: 60 MG/DL (ref 40–75)
HDLC SERPL: 43.5 % (ref 20–50)
HGB BLD-MCNC: 10.6 G/DL (ref 12–16)
IMM GRANULOCYTES # BLD AUTO: 0.02 K/UL (ref 0–0.04)
IMM GRANULOCYTES NFR BLD AUTO: 0.4 % (ref 0–0.5)
LDLC SERPL CALC-MCNC: 63 MG/DL (ref 63–159)
LYMPHOCYTES # BLD AUTO: 1.3 K/UL (ref 1–4.8)
LYMPHOCYTES NFR BLD: 23.4 % (ref 18–48)
MCH RBC QN AUTO: 27.8 PG (ref 27–31)
MCHC RBC AUTO-ENTMCNC: 30.3 G/DL (ref 32–36)
MCV RBC AUTO: 92 FL (ref 82–98)
MONOCYTES # BLD AUTO: 0.4 K/UL (ref 0.3–1)
MONOCYTES NFR BLD: 6.4 % (ref 4–15)
NEUTROPHILS # BLD AUTO: 3.8 K/UL (ref 1.8–7.7)
NEUTROPHILS NFR BLD: 67.9 % (ref 38–73)
NONHDLC SERPL-MCNC: 78 MG/DL
NRBC BLD-RTO: 0 /100 WBC
PLATELET # BLD AUTO: 137 K/UL (ref 150–350)
PMV BLD AUTO: 10.9 FL (ref 9.2–12.9)
POTASSIUM SERPL-SCNC: 4.1 MMOL/L (ref 3.5–5.1)
PROT SERPL-MCNC: 7 G/DL (ref 6–8.4)
RBC # BLD AUTO: 3.81 M/UL (ref 4–5.4)
SODIUM SERPL-SCNC: 142 MMOL/L (ref 136–145)
TRIGL SERPL-MCNC: 75 MG/DL (ref 30–150)
TSH SERPL DL<=0.005 MIU/L-ACNC: 0.57 UIU/ML (ref 0.4–4)
WBC # BLD AUTO: 5.64 K/UL (ref 3.9–12.7)

## 2019-08-30 PROCEDURE — 85025 COMPLETE CBC W/AUTO DIFF WBC: CPT

## 2019-08-30 PROCEDURE — 80061 LIPID PANEL: CPT

## 2019-08-30 PROCEDURE — 83036 HEMOGLOBIN GLYCOSYLATED A1C: CPT

## 2019-08-30 PROCEDURE — 84443 ASSAY THYROID STIM HORMONE: CPT

## 2019-08-30 PROCEDURE — 80053 COMPREHEN METABOLIC PANEL: CPT

## 2019-08-30 PROCEDURE — 36415 COLL VENOUS BLD VENIPUNCTURE: CPT | Mod: PO

## 2019-09-05 ENCOUNTER — OFFICE VISIT (OUTPATIENT)
Dept: INTERNAL MEDICINE | Facility: CLINIC | Age: 64
End: 2019-09-05
Payer: COMMERCIAL

## 2019-09-05 VITALS
DIASTOLIC BLOOD PRESSURE: 70 MMHG | WEIGHT: 228.19 LBS | SYSTOLIC BLOOD PRESSURE: 168 MMHG | HEART RATE: 66 BPM | HEIGHT: 66 IN | RESPIRATION RATE: 16 BRPM | TEMPERATURE: 99 F | BODY MASS INDEX: 36.67 KG/M2

## 2019-09-05 DIAGNOSIS — E11.40 TYPE 2 DIABETES MELLITUS WITH DIABETIC NEUROPATHY, WITHOUT LONG-TERM CURRENT USE OF INSULIN: Primary | ICD-10-CM

## 2019-09-05 DIAGNOSIS — G89.29 CHRONIC BILATERAL LOW BACK PAIN, WITH SCIATICA PRESENCE UNSPECIFIED: ICD-10-CM

## 2019-09-05 DIAGNOSIS — M54.5 CHRONIC BILATERAL LOW BACK PAIN, WITH SCIATICA PRESENCE UNSPECIFIED: ICD-10-CM

## 2019-09-05 DIAGNOSIS — I10 ESSENTIAL HYPERTENSION: ICD-10-CM

## 2019-09-05 DIAGNOSIS — E03.9 ACQUIRED HYPOTHYROIDISM: ICD-10-CM

## 2019-09-05 DIAGNOSIS — Z79.01 CURRENT USE OF LONG TERM ANTICOAGULATION: ICD-10-CM

## 2019-09-05 PROCEDURE — 3077F SYST BP >= 140 MM HG: CPT | Mod: CPTII,S$GLB,, | Performed by: INTERNAL MEDICINE

## 2019-09-05 PROCEDURE — 3008F PR BODY MASS INDEX (BMI) DOCUMENTED: ICD-10-PCS | Mod: CPTII,S$GLB,, | Performed by: INTERNAL MEDICINE

## 2019-09-05 PROCEDURE — 3045F PR MOST RECENT HEMOGLOBIN A1C LEVEL 7.0-9.0%: CPT | Mod: CPTII,S$GLB,, | Performed by: INTERNAL MEDICINE

## 2019-09-05 PROCEDURE — 99214 PR OFFICE/OUTPT VISIT, EST, LEVL IV, 30-39 MIN: ICD-10-PCS | Mod: S$GLB,,, | Performed by: INTERNAL MEDICINE

## 2019-09-05 PROCEDURE — 99214 OFFICE O/P EST MOD 30 MIN: CPT | Mod: S$GLB,,, | Performed by: INTERNAL MEDICINE

## 2019-09-05 PROCEDURE — 3077F PR MOST RECENT SYSTOLIC BLOOD PRESSURE >= 140 MM HG: ICD-10-PCS | Mod: CPTII,S$GLB,, | Performed by: INTERNAL MEDICINE

## 2019-09-05 PROCEDURE — 99999 PR PBB SHADOW E&M-EST. PATIENT-LVL III: CPT | Mod: PBBFAC,,, | Performed by: INTERNAL MEDICINE

## 2019-09-05 PROCEDURE — 3078F PR MOST RECENT DIASTOLIC BLOOD PRESSURE < 80 MM HG: ICD-10-PCS | Mod: CPTII,S$GLB,, | Performed by: INTERNAL MEDICINE

## 2019-09-05 PROCEDURE — 3008F BODY MASS INDEX DOCD: CPT | Mod: CPTII,S$GLB,, | Performed by: INTERNAL MEDICINE

## 2019-09-05 PROCEDURE — 3078F DIAST BP <80 MM HG: CPT | Mod: CPTII,S$GLB,, | Performed by: INTERNAL MEDICINE

## 2019-09-05 PROCEDURE — 99999 PR PBB SHADOW E&M-EST. PATIENT-LVL III: ICD-10-PCS | Mod: PBBFAC,,, | Performed by: INTERNAL MEDICINE

## 2019-09-05 PROCEDURE — 3045F PR MOST RECENT HEMOGLOBIN A1C LEVEL 7.0-9.0%: ICD-10-PCS | Mod: CPTII,S$GLB,, | Performed by: INTERNAL MEDICINE

## 2019-09-05 RX ORDER — VALSARTAN AND HYDROCHLOROTHIAZIDE 160; 12.5 MG/1; MG/1
1 TABLET, FILM COATED ORAL DAILY
Qty: 30 TABLET | Refills: 5 | Status: SHIPPED | OUTPATIENT
Start: 2019-09-05 | End: 2020-01-29

## 2019-09-05 RX ORDER — LORAZEPAM 1 MG/1
1 TABLET ORAL EVERY 12 HOURS PRN
Qty: 60 TABLET | Refills: 2 | Status: SHIPPED | OUTPATIENT
Start: 2019-09-05 | End: 2020-01-20

## 2019-09-05 NOTE — PROGRESS NOTES
Subjective:       Patient ID: Diana Montenegro is a 64 y.o. female.    Chief Complaint: Medication Refill and Follow-up    HPI   The patient presents for follow-up of medical conditions which include type 2 diabetes mellitus, hypertension, hypothyroidism, diabetic neuropathy, micro albuminuria, chronic bilateral lower back pain with sciatica, and obstructive sleep apnea.  The patient states that she needs test strips.  She has not checked her blood sugars recently.    The patient feels her back pain is currently tolerable.  She does have intermittent back muscle spasms.  She uses her Rollator walker for periods of prolonged standing or walking.  She is due for a new prescription for a walker.    Osteoarthritis pain in the knees remains prominent.  We discussed obtaining knee injections with her orthopedic specialist.  There are some precautions regarding NSAID use in view of her diabetes, diabetic nephropathy cerebrovascular disease.    The patient intermittently uses CPAP for treatment of obstructive sleep apnea.  She recently returned from [place] where most of her family lives.  While there she admits to being noncompliant with diet, medication and CPAP therapy.  She did not bring her CPAP machine with her to [place].    The patient will be scheduling an eye examination with her ophthalmologist Dr. Henry Melgar for her diabetic eye examination.  She has mild nonproliferative diabetic retinopathy.      Review of Systems   Constitutional: Positive for activity change. Negative for appetite change and unexpected weight change.   Eyes: Negative for visual disturbance.   Respiratory: Negative for shortness of breath.    Cardiovascular: Negative for chest pain, palpitations and leg swelling.   Gastrointestinal: Negative for abdominal pain, blood in stool and diarrhea.   Genitourinary: Negative for dysuria, frequency, hematuria and urgency.   Musculoskeletal: Positive for arthralgias, back pain and joint  swelling.   Skin: Negative for rash.   Neurological: Negative for weakness, numbness and headaches.   Psychiatric/Behavioral: Positive for sleep disturbance. The patient is nervous/anxious.         Intermittent symptoms of anxiety due to domestic stress.       Objective:      Physical Exam   Constitutional: She is oriented to person, place, and time. She appears well-developed and well-nourished. No distress.   HENT:   Head: Normocephalic and atraumatic.   Eyes: Pupils are equal, round, and reactive to light. Conjunctivae and EOM are normal. No scleral icterus.   Neck: Normal range of motion. Neck supple. No JVD present. No thyromegaly present.   Cardiovascular: Normal rate, regular rhythm, normal heart sounds and intact distal pulses. Exam reveals no gallop and no friction rub.   No murmur heard.  Pulmonary/Chest: Effort normal and breath sounds normal. No respiratory distress. She has no wheezes. She has no rales.   Abdominal: Soft. Bowel sounds are normal. She exhibits no mass. There is no tenderness.   Musculoskeletal: Normal range of motion. She exhibits no edema or tenderness.   Lymphadenopathy:     She has no cervical adenopathy.   Neurological: She is alert and oriented to person, place, and time. No cranial nerve deficit.   Sensory exam is intact in both feet on monofilament  testing.   Skin: Skin is warm and dry. No rash noted.   No foot lesions are present.   Psychiatric: She has a normal mood and affect. Her behavior is normal.   Nursing note and vitals reviewed.      Lab Visit on 08/30/2019   Component Date Value Ref Range Status    WBC 08/30/2019 5.64  3.90 - 12.70 K/uL Final    RBC 08/30/2019 3.81* 4.00 - 5.40 M/uL Final    Hemoglobin 08/30/2019 10.6* 12.0 - 16.0 g/dL Final    Hematocrit 08/30/2019 35.0* 37.0 - 48.5 % Final    Mean Corpuscular Volume 08/30/2019 92  82 - 98 fL Final    Mean Corpuscular Hemoglobin 08/30/2019 27.8  27.0 - 31.0 pg Final    Mean Corpuscular Hemoglobin Conc  08/30/2019 30.3* 32.0 - 36.0 g/dL Final    RDW 08/30/2019 13.4  11.5 - 14.5 % Final    Platelets 08/30/2019 137* 150 - 350 K/uL Final    MPV 08/30/2019 10.9  9.2 - 12.9 fL Final    Immature Granulocytes 08/30/2019 0.4  0.0 - 0.5 % Final    Gran # (ANC) 08/30/2019 3.8  1.8 - 7.7 K/uL Final    Immature Grans (Abs) 08/30/2019 0.02  0.00 - 0.04 K/uL Final    Comment: Mild elevation in immature granulocytes is non specific and   can be seen in a variety of conditions including stress response,   acute inflammation, trauma and pregnancy. Correlation with other   laboratory and clinical findings is essential.      Lymph # 08/30/2019 1.3  1.0 - 4.8 K/uL Final    Mono # 08/30/2019 0.4  0.3 - 1.0 K/uL Final    Eos # 08/30/2019 0.1  0.0 - 0.5 K/uL Final    Baso # 08/30/2019 0.03  0.00 - 0.20 K/uL Final    nRBC 08/30/2019 0  0 /100 WBC Final    Gran% 08/30/2019 67.9  38.0 - 73.0 % Final    Lymph% 08/30/2019 23.4  18.0 - 48.0 % Final    Mono% 08/30/2019 6.4  4.0 - 15.0 % Final    Eosinophil% 08/30/2019 1.4  0.0 - 8.0 % Final    Basophil% 08/30/2019 0.5  0.0 - 1.9 % Final    Differential Method 08/30/2019 Automated   Final    Sodium 08/30/2019 142  136 - 145 mmol/L Final    Potassium 08/30/2019 4.1  3.5 - 5.1 mmol/L Final    Chloride 08/30/2019 107  95 - 110 mmol/L Final    CO2 08/30/2019 27  23 - 29 mmol/L Final    Glucose 08/30/2019 95  70 - 110 mg/dL Final    BUN, Bld 08/30/2019 22  8 - 23 mg/dL Final    Creatinine 08/30/2019 0.9  0.5 - 1.4 mg/dL Final    Calcium 08/30/2019 9.6  8.7 - 10.5 mg/dL Final    Total Protein 08/30/2019 7.0  6.0 - 8.4 g/dL Final    Albumin 08/30/2019 3.6  3.5 - 5.2 g/dL Final    Total Bilirubin 08/30/2019 0.2  0.1 - 1.0 mg/dL Final    Comment: For infants and newborns, interpretation of results should be based  on gestational age, weight and in agreement with clinical  observations.  Premature Infant recommended reference ranges:  Up to 24 hours.............<8.0 mg/dL  Up  to 48 hours............<12.0 mg/dL  3-5 days..................<15.0 mg/dL  6-29 days.................<15.0 mg/dL      Alkaline Phosphatase 08/30/2019 85  55 - 135 U/L Final    AST 08/30/2019 21  10 - 40 U/L Final    ALT 08/30/2019 24  10 - 44 U/L Final    Anion Gap 08/30/2019 8  8 - 16 mmol/L Final    eGFR if African American 08/30/2019 >60.0  >60 mL/min/1.73 m^2 Final    eGFR if non African American 08/30/2019 >60.0  >60 mL/min/1.73 m^2 Final    Comment: Calculation used to obtain the estimated glomerular filtration  rate (eGFR) is the CKD-EPI equation.       Cholesterol 08/30/2019 138  120 - 199 mg/dL Final    Comment: The National Cholesterol Education Program (NCEP) has set the  following guidelines (reference ranges) for Cholesterol:  Optimal.....................<200 mg/dL  Borderline High.............200-239 mg/dL  High........................> or = 240 mg/dL      Triglycerides 08/30/2019 75  30 - 150 mg/dL Final    Comment: The National Cholesterol Education Program (NCEP) has set the  following guidelines (reference values) for triglycerides:  Normal......................<150 mg/dL  Borderline High.............150-199 mg/dL  High........................200-499 mg/dL      HDL 08/30/2019 60  40 - 75 mg/dL Final    Comment: The National Cholesterol Education Program (NCEP) has set the  following guidelines (reference values) for HDL Cholesterol:  Low...............<40 mg/dL  Optimal...........>60 mg/dL      LDL Cholesterol 08/30/2019 63.0  63.0 - 159.0 mg/dL Final    Comment: The National Cholesterol Education Program (NCEP) has set the  following guidelines (reference values) for LDL Cholesterol:  Optimal.......................<130 mg/dL  Borderline High...............130-159 mg/dL  High..........................160-189 mg/dL  Very High.....................>190 mg/dL      Hdl/Cholesterol Ratio 08/30/2019 43.5  20.0 - 50.0 % Final    Total Cholesterol/HDL Ratio 08/30/2019 2.3  2.0 - 5.0 Final     Non-HDL Cholesterol 08/30/2019 78  mg/dL Final    Comment: Risk category and Non-HDL cholesterol goals:  Coronary heart disease (CHD)or equivalent (10-year risk of CHD >20%):  Non-HDL cholesterol goal     <130 mg/dL  Two or more CHD risk factors and 10-year risk of CHD <= 20%:  Non-HDL cholesterol goal     <160 mg/dL  0 to 1 CHD risk factor:  Non-HDL cholesterol goal     <190 mg/dL      TSH 08/30/2019 0.566  0.400 - 4.000 uIU/mL Final    Hemoglobin A1C 08/30/2019 7.7* 4.0 - 5.6 % Final    Comment: ADA Screening Guidelines:  5.7-6.4%  Consistent with prediabetes  >or=6.5%  Consistent with diabetes  High levels of fetal hemoglobin interfere with the HbA1C  assay. Heterozygous hemoglobin variants (HbS, HgC, etc)do  not significantly interfere with this assay.   However, presence of multiple variants may affect accuracy.      Estimated Avg Glucose 08/30/2019 174* 68 - 131 mg/dL Final   Lab Visit on 08/30/2019   Component Date Value Ref Range Status    Specimen UA 08/30/2019 Urine, Clean Catch   Final    Color, UA 08/30/2019 Yellow  Yellow, Straw, [place] Final    Appearance, UA 08/30/2019 Clear  Clear Final    pH, UA 08/30/2019 5.0  5.0 - 8.0 Final    Specific Gravity, UA 08/30/2019 1.020  1.005 - 1.030 Final    Protein, UA 08/30/2019 1+* Negative Final    Comment: Recommend a 24 hour urine protein or a urine   protein/creatinine ratio if globulin induced proteinuria is  clinically suspected.      Glucose, UA 08/30/2019 Negative  Negative Final    Ketones, UA 08/30/2019 Negative  Negative Final    Bilirubin (UA) 08/30/2019 Negative  Negative Final    Occult Blood UA 08/30/2019 Negative  Negative Final    Nitrite, UA 08/30/2019 Negative  Negative Final    Leukocytes, UA 08/30/2019 Negative  Negative Final    Microalbum.,U,Random 08/30/2019 250.0  ug/mL Final    Creatinine, Random Ur 08/30/2019 103.0  15.0 - 325.0 mg/dL Final    Comment: The random urine reference ranges provided were established   for 24  hour urine collections.  No reference ranges exist for  random urine specimens.  Correlate clinically.      Microalb Creat Ratio 08/30/2019 242.7* 0.0 - 30.0 ug/mg Final    RBC, UA 08/30/2019 0  0 - 4 /hpf Final    WBC, UA 08/30/2019 0  0 - 5 /hpf Final    Bacteria 08/30/2019 None  None-Occ /hpf Final    Squam Epithel, UA 08/30/2019 0  /hpf Final    Hyaline Casts, UA 08/30/2019 0  0-1/lpf /lpf Final    Microscopic Comment 08/30/2019 SEE COMMENT   Final    Comment: Other formed elements not mentioned in the report are not   present in the microscopic examination.          Assessment:       1. Type 2 diabetes mellitus with diabetic neuropathy, without long-term current use of insulin    2. Essential hypertension    3. Acquired hypothyroidism    4. Chronic bilateral low back pain, with sciatica presence unspecified    5. Current use of long term anticoagulation        Plan:       Diana was seen today for follow-up of medical conditions and medication refills.  Diet therapy was discussed.  Current medications will be continued.  Valsartan/HCTZ will be restarted for treatment of hypertension.  Glucometer test strips will be renewed.  A prescription for lorazepam will be renewed.  The patient is to return to clinic in 2 months for follow-up of blood pressure.  A hemoglobin A1c and CMP will also be rechecked in 2 months.    Diagnoses and all orders for this visit:    Type 2 diabetes mellitus with diabetic neuropathy, without long-term current use of insulin    Essential hypertension    Acquired hypothyroidism    Chronic bilateral low back pain, with sciatica presence unspecified    Current use of long term anticoagulation    Other orders  -     blood sugar diagnostic Strp; Test blood sugar once daily  -     LORazepam (ATIVAN) 1 MG tablet; Take 1 tablet (1 mg total) by mouth every 12 (twelve) hours as needed for Anxiety.  -     valsartan-hydrochlorothiazide (DIOVAN-HCT) 160-12.5 mg per tablet; Take 1 tablet by  mouth once daily.  -     walker (ULTRA-LIGHT ROLLATOR) Misc; Use walker w/ seat daily as directed.

## 2019-09-25 RX ORDER — LEVOTHYROXINE SODIUM 125 UG/1
TABLET ORAL
Qty: 90 TABLET | Refills: 3 | Status: SHIPPED | OUTPATIENT
Start: 2019-09-25 | End: 2020-06-16 | Stop reason: SDUPTHER

## 2019-09-25 RX ORDER — APIXABAN 5 MG/1
TABLET, FILM COATED ORAL
Qty: 60 TABLET | Refills: 6 | Status: SHIPPED | OUTPATIENT
Start: 2019-09-25 | End: 2020-05-24

## 2019-10-21 DIAGNOSIS — E78.5 HYPERLIPIDEMIA, UNSPECIFIED HYPERLIPIDEMIA TYPE: ICD-10-CM

## 2019-10-22 RX ORDER — ROSUVASTATIN CALCIUM 40 MG/1
TABLET, COATED ORAL
Qty: 30 TABLET | Refills: 2 | Status: SHIPPED | OUTPATIENT
Start: 2019-10-22 | End: 2020-01-29

## 2019-10-24 DIAGNOSIS — Z98.84 S/P LAPAROSCOPIC SLEEVE GASTRECTOMY: ICD-10-CM

## 2019-10-28 ENCOUNTER — TELEPHONE (OUTPATIENT)
Dept: ELECTROPHYSIOLOGY | Facility: CLINIC | Age: 64
End: 2019-10-28

## 2019-10-28 RX ORDER — OMEPRAZOLE 40 MG/1
CAPSULE, DELAYED RELEASE ORAL
Qty: 30 CAPSULE | Refills: 2 | Status: SHIPPED | OUTPATIENT
Start: 2019-10-28 | End: 2020-01-31

## 2019-10-28 RX ORDER — METFORMIN HYDROCHLORIDE 1000 MG/1
TABLET ORAL
Qty: 60 TABLET | Refills: 2 | Status: SHIPPED | OUTPATIENT
Start: 2019-10-28 | End: 2020-01-31

## 2019-10-30 ENCOUNTER — PATIENT OUTREACH (OUTPATIENT)
Dept: ADMINISTRATIVE | Facility: OTHER | Age: 64
End: 2019-10-30

## 2019-11-01 ENCOUNTER — OFFICE VISIT (OUTPATIENT)
Dept: ELECTROPHYSIOLOGY | Facility: CLINIC | Age: 64
End: 2019-11-01
Payer: COMMERCIAL

## 2019-11-01 ENCOUNTER — HOSPITAL ENCOUNTER (OUTPATIENT)
Dept: CARDIOLOGY | Facility: CLINIC | Age: 64
Discharge: HOME | End: 2019-11-01
Payer: COMMERCIAL

## 2019-11-01 VITALS
SYSTOLIC BLOOD PRESSURE: 124 MMHG | WEIGHT: 224.44 LBS | HEIGHT: 64 IN | BODY MASS INDEX: 38.32 KG/M2 | HEART RATE: 64 BPM | DIASTOLIC BLOOD PRESSURE: 58 MMHG

## 2019-11-01 DIAGNOSIS — I48.0 PAROXYSMAL ATRIAL FIBRILLATION: Chronic | ICD-10-CM

## 2019-11-01 DIAGNOSIS — I10 ESSENTIAL HYPERTENSION: Primary | ICD-10-CM

## 2019-11-01 DIAGNOSIS — Z95.818 STATUS POST PLACEMENT OF IMPLANTABLE LOOP RECORDER: ICD-10-CM

## 2019-11-01 DIAGNOSIS — I48.0 PAF (PAROXYSMAL ATRIAL FIBRILLATION): ICD-10-CM

## 2019-11-01 DIAGNOSIS — Z79.01 CURRENT USE OF LONG TERM ANTICOAGULATION: ICD-10-CM

## 2019-11-01 PROCEDURE — 99214 PR OFFICE/OUTPT VISIT, EST, LEVL IV, 30-39 MIN: ICD-10-PCS | Mod: S$GLB,,, | Performed by: NURSE PRACTITIONER

## 2019-11-01 PROCEDURE — 3074F SYST BP LT 130 MM HG: CPT | Mod: CPTII,S$GLB,, | Performed by: NURSE PRACTITIONER

## 2019-11-01 PROCEDURE — 99214 OFFICE O/P EST MOD 30 MIN: CPT | Mod: S$GLB,,, | Performed by: NURSE PRACTITIONER

## 2019-11-01 PROCEDURE — 99999 PR PBB SHADOW E&M-EST. PATIENT-LVL III: CPT | Mod: PBBFAC,,, | Performed by: NURSE PRACTITIONER

## 2019-11-01 PROCEDURE — 93010 RHYTHM STRIP: ICD-10-PCS | Mod: S$GLB,,, | Performed by: INTERNAL MEDICINE

## 2019-11-01 PROCEDURE — 93005 RHYTHM STRIP: ICD-10-PCS | Mod: S$GLB,,, | Performed by: INTERNAL MEDICINE

## 2019-11-01 PROCEDURE — 3008F BODY MASS INDEX DOCD: CPT | Mod: CPTII,S$GLB,, | Performed by: NURSE PRACTITIONER

## 2019-11-01 PROCEDURE — 3078F DIAST BP <80 MM HG: CPT | Mod: CPTII,S$GLB,, | Performed by: NURSE PRACTITIONER

## 2019-11-01 PROCEDURE — 3008F PR BODY MASS INDEX (BMI) DOCUMENTED: ICD-10-PCS | Mod: CPTII,S$GLB,, | Performed by: NURSE PRACTITIONER

## 2019-11-01 PROCEDURE — 99999 PR PBB SHADOW E&M-EST. PATIENT-LVL III: ICD-10-PCS | Mod: PBBFAC,,, | Performed by: NURSE PRACTITIONER

## 2019-11-01 PROCEDURE — 3078F PR MOST RECENT DIASTOLIC BLOOD PRESSURE < 80 MM HG: ICD-10-PCS | Mod: CPTII,S$GLB,, | Performed by: NURSE PRACTITIONER

## 2019-11-01 PROCEDURE — 3074F PR MOST RECENT SYSTOLIC BLOOD PRESSURE < 130 MM HG: ICD-10-PCS | Mod: CPTII,S$GLB,, | Performed by: NURSE PRACTITIONER

## 2019-11-01 PROCEDURE — 93010 ELECTROCARDIOGRAM REPORT: CPT | Mod: S$GLB,,, | Performed by: INTERNAL MEDICINE

## 2019-11-01 PROCEDURE — 93005 ELECTROCARDIOGRAM TRACING: CPT | Mod: S$GLB,,, | Performed by: INTERNAL MEDICINE

## 2019-11-01 NOTE — PROGRESS NOTES
Ms. Montenegro is a patient of Dr. Randolph and was last seen in clinic 5/1/2019.      Subjective:   Patient ID:  Diana Montenegro is a 64 y.o. female who presents for follow-up of Atrial Fibrillation  .     HPI:    Ms. Montenegro is a 64 y.o. female with hypertension, hyperlipidemia, diabetes mellitus, obesity s/p gastric sleeve surgery, right MCA infarction July of 2014 s/p ILR implant (now removed), atrial fibrillation, and bradycardia here for follow up.    Background:    History of hypertension, hyperlipidemia, diabetes mellitus, obesity s/p gastric sleeve surgery, right MCA infarction July of 2014 s/p ILR implant, atrial fibrillation, and bradycardia.   7/14 had rt MCA stroke.   She was outside of the treatment window for thrombolytics and recovered completely.  Work-up included carotid dopplers showing 60% right ICA stenosis.  ILR implanted.  ILR monitoring has revealed rare paroxysmal atrial fibrillation, with episodes up to just less than an hour in duration, asymptomatic. Eliquis 5 mg BID initiated.  Has felt well.   She underwent successful ILR removal on 9/17/2018. Remains on eliquis.  Reported palpitations 4/2019. Holter monitor 4/22/2019 showed very rare ectopy and no arrhythmias.   At clinic visit 5/2019 she was prescribed BB PRN for palps.    Update (11/01/2019):    Today she says she has been feeling well. No recent palpitations and has not needed her metoprolol although she keeps this with her. Ms. Montenegro denies chest pain with exertion or at rest, SOB, KING, dizziness, or syncope.    She is currently taking eliquis 5mg BID for stroke prophylaxis and denies significant bleeding episodes. Kidney function is stable, with a creatinine of 0.9 on 8/30/2019.    I have personally reviewed the patient's EKG today, which shows sinus rhythm at 64bpm. NV interval is 180. QRS is 88. QTc is 406.    Recent Cardiac Tests:    2D Echo (7/17/2014):  CONCLUSIONS     1 - Normal left ventricular systolic function (EF  60-65%).     2 - Normal left ventricular diastolic function.     3 - Normal right ventricular systolic function .     4 - Mild left atrial enlargement.     Current Outpatient Medications   Medication Sig    acyclovir 5% (ZOVIRAX) 5 % ointment Apply topically 6 (six) times daily.    albuterol (PROVENTIL/VENTOLIN HFA) 90 mcg/actuation inhaler Inhale 2 puffs into the lungs every 6 (six) hours as needed for Wheezing. Rescue    aspirin (ECOTRIN) 81 MG EC tablet Take 1 tablet (81 mg total) by mouth once daily.    b complex vitamins tablet Take 1 tablet by mouth once daily.    blood pressure test kit-large Kit Use as directed    blood sugar diagnostic Strp Test blood sugar once daily    CALCIUM CITRATE/VITAMIN D3 (CALCIUM CITRATE + D ORAL) Take 2 tablets by mouth 2 (two) times daily.    cinnamon bark (CINNAMON ORAL) Take by mouth 2 (two) times daily.    cyanocobalamin, vitamin B-12, 500 mcg Subl Place 1 tablet under the tongue once daily.    docusate sodium (COLACE) 100 MG capsule Take 100 mg by mouth once daily.     ELIQUIS 5 mg Tab TAKE 1 TABLET BY MOUTH TWICE DAILY    fish oil-omega-3 fatty acids 300-1,000 mg capsule Take by mouth 2 (two) times daily.    fluticasone (FLONASE) 50 mcg/actuation nasal spray 2 sprays (100 mcg total) by Each Nare route once daily. (Patient taking differently: 2 sprays by Each Nare route daily as needed. )    glimepiride (AMARYL) 4 MG tablet TAKE 1 TABLET BY MOUTH TWICE DAILY    lancets (ONE TOUCH DELICA LANCETS) 33 gauge Misc 1 lancet by Misc.(Non-Drug; Combo Route) route 4 (four) times daily.    levocetirizine (XYZAL) 5 MG tablet Take 1 tablet (5 mg total) by mouth every evening.    levothyroxine (SYNTHROID) 125 MCG tablet TAKE 1 TABLET BY MOUTH ONCE DAILY    LORazepam (ATIVAN) 1 MG tablet Take 1 tablet (1 mg total) by mouth every 12 (twelve) hours as needed for Anxiety.    metFORMIN (GLUCOPHAGE) 1000 MG tablet TAKE 1 TABLET BY MOUTH TWICE DAILY WITH MEALS     multivitamin capsule Take 1 capsule by mouth once daily.    omeprazole (PRILOSEC) 40 MG capsule TAKE 1 CAPSULE BY MOUTH IN THE MORNING    rosuvastatin (CRESTOR) 40 MG Tab TAKE 1 TABLET BY MOUTH ONCE DAILY    RUTIN/HESP/BIOFLAV/C/HERB#196 (BIOFLEX ORAL) Take 2 tablets by mouth once daily.    senna (SENNA) 8.6 mg tablet Take 2 tablets by mouth nightly as needed for Constipation.    traZODone (DESYREL) 50 MG tablet 1 pill PO PRN for insomnia at bedtime. OK to take 2nd pill in 30 minutes if still awake.    valsartan-hydrochlorothiazide (DIOVAN-HCT) 160-12.5 mg per tablet Take 1 tablet by mouth once daily.    walker (ULTRA-LIGHT ROLLATOR) Misc Use walker w/ seat daily as directed.    albuterol-ipratropium 2.5mg-0.5mg/3mL (DUO-NEB) 0.5 mg-3 mg(2.5 mg base)/3 mL nebulizer solution Take 3 mLs by nebulization every 6 (six) hours as needed for Wheezing. Rescue    blood-glucose meter (ONETOUCH ULTRA SYSTEM KIT) kit Use as instructed    metoprolol tartrate (LOPRESSOR) 25 MG tablet Take 1 tablet (25 mg total) by mouth once as needed. For palpitations     No current facility-administered medications for this visit.      Facility-Administered Medications Ordered in Other Visits   Medication    0.9%  NaCl infusion    ceFAZolin injection 2 g       Review of Systems   Constitution: Negative for malaise/fatigue.   Cardiovascular: Negative for chest pain, dyspnea on exertion, irregular heartbeat, leg swelling and palpitations.   Respiratory: Negative for shortness of breath.    Hematologic/Lymphatic: Negative for bleeding problem.   Skin: Negative for rash.   Musculoskeletal: Negative for myalgias.   Gastrointestinal: Negative for hematemesis, hematochezia and nausea.   Genitourinary: Negative for hematuria.   Neurological: Negative for light-headedness.   Psychiatric/Behavioral: Negative for altered mental status.   Allergic/Immunologic: Negative for persistent infections.     Objective:        BP (!) 124/58   Pulse 64    "Ht 5' 4" (1.626 m)   Wt 101.8 kg (224 lb 6.9 oz)   LMP  (LMP Unknown)   BMI 38.52 kg/m²     Physical Exam   Constitutional: She is oriented to person, place, and time. She appears well-developed and well-nourished.   HENT:   Head: Normocephalic.   Nose: Nose normal.   Eyes: Pupils are equal, round, and reactive to light.   Cardiovascular: Normal rate, regular rhythm, S1 normal and S2 normal.   No murmur heard.  Pulses:       Radial pulses are 2+ on the right side, and 2+ on the left side.   Pulmonary/Chest: Breath sounds normal. No respiratory distress.   Abdominal: Normal appearance.   Musculoskeletal: Normal range of motion. She exhibits no edema.   Neurological: She is alert and oriented to person, place, and time.   Skin: Skin is warm and dry. No erythema.   Psychiatric: She has a normal mood and affect. Her speech is normal and behavior is normal.   Nursing note and vitals reviewed.    Lab Results   Component Value Date     08/30/2019    K 4.1 08/30/2019    MG 1.6 07/10/2018    BUN 22 08/30/2019    CREATININE 0.9 08/30/2019    ALT 24 08/30/2019    AST 21 08/30/2019    HGB 10.6 (L) 08/30/2019    HCT 35.0 (L) 08/30/2019    TSH 0.566 08/30/2019    LDLCALC 63.0 08/30/2019       Recent Labs   Lab 09/10/18  0852   INR 1.0         Assessment:     1. Essential hypertension    2. Paroxysmal atrial fibrillation    3. Current use of long term anticoagulation    4. Status post placement of implantable loop recorder      Plan:     In summary, Ms. Montenegro is a 64 y.o. female with hypertension, hyperlipidemia, diabetes mellitus, obesity s/p gastric sleeve surgery, right MCA infarction July of 2014 s/p ILR implant (now removed), atrial fibrillation, and bradycardia here for follow up.  She is doing well from a rhythm standpoint, with no recent palpitations. CHADSVASc is 6 and she remains on eliquis for CVA prophylaxis. Metoprolol 25 PRN for palpitations.    Continue current medications.  RTC 9 months, sooner if " needed.    *A copy of this note has been sent to Dr. Randolph*    Follow up in about 9 months (around 8/1/2020).    ------------------------------------------------------------------    CHELITA Fuentes, NP-C  Cardiac Electrophysiology

## 2019-11-08 ENCOUNTER — LAB VISIT (OUTPATIENT)
Dept: LAB | Facility: HOSPITAL | Age: 64
End: 2019-11-08
Attending: INTERNAL MEDICINE
Payer: COMMERCIAL

## 2019-11-08 DIAGNOSIS — E11.40 TYPE 2 DIABETES MELLITUS WITH DIABETIC NEUROPATHY, WITHOUT LONG-TERM CURRENT USE OF INSULIN: ICD-10-CM

## 2019-11-08 LAB
ALBUMIN SERPL BCP-MCNC: 3.5 G/DL (ref 3.5–5.2)
ALP SERPL-CCNC: 80 U/L (ref 55–135)
ALT SERPL W/O P-5'-P-CCNC: 21 U/L (ref 10–44)
ANION GAP SERPL CALC-SCNC: 9 MMOL/L (ref 8–16)
AST SERPL-CCNC: 19 U/L (ref 10–40)
BILIRUB SERPL-MCNC: 0.2 MG/DL (ref 0.1–1)
BUN SERPL-MCNC: 32 MG/DL (ref 8–23)
CALCIUM SERPL-MCNC: 10.2 MG/DL (ref 8.7–10.5)
CHLORIDE SERPL-SCNC: 109 MMOL/L (ref 95–110)
CO2 SERPL-SCNC: 25 MMOL/L (ref 23–29)
CREAT SERPL-MCNC: 1 MG/DL (ref 0.5–1.4)
EST. GFR  (AFRICAN AMERICAN): >60 ML/MIN/1.73 M^2
EST. GFR  (NON AFRICAN AMERICAN): >60 ML/MIN/1.73 M^2
ESTIMATED AVG GLUCOSE: 148 MG/DL (ref 68–131)
GLUCOSE SERPL-MCNC: 91 MG/DL (ref 70–110)
HBA1C MFR BLD HPLC: 6.8 % (ref 4–5.6)
POTASSIUM SERPL-SCNC: 4.9 MMOL/L (ref 3.5–5.1)
PROT SERPL-MCNC: 7 G/DL (ref 6–8.4)
SODIUM SERPL-SCNC: 143 MMOL/L (ref 136–145)

## 2019-11-08 PROCEDURE — 80053 COMPREHEN METABOLIC PANEL: CPT

## 2019-11-08 PROCEDURE — 36415 COLL VENOUS BLD VENIPUNCTURE: CPT | Mod: PO

## 2019-11-08 PROCEDURE — 83036 HEMOGLOBIN GLYCOSYLATED A1C: CPT

## 2019-11-11 ENCOUNTER — OFFICE VISIT (OUTPATIENT)
Dept: INTERNAL MEDICINE | Facility: CLINIC | Age: 64
End: 2019-11-11
Payer: COMMERCIAL

## 2019-11-11 VITALS
SYSTOLIC BLOOD PRESSURE: 132 MMHG | BODY MASS INDEX: 38.22 KG/M2 | HEART RATE: 68 BPM | WEIGHT: 222.69 LBS | RESPIRATION RATE: 15 BRPM | OXYGEN SATURATION: 96 % | DIASTOLIC BLOOD PRESSURE: 80 MMHG | TEMPERATURE: 99 F

## 2019-11-11 DIAGNOSIS — M70.52 BURSITIS OF LEFT KNEE, UNSPECIFIED BURSA: ICD-10-CM

## 2019-11-11 DIAGNOSIS — E78.5 HYPERLIPIDEMIA, UNSPECIFIED HYPERLIPIDEMIA TYPE: ICD-10-CM

## 2019-11-11 DIAGNOSIS — I10 ESSENTIAL HYPERTENSION: ICD-10-CM

## 2019-11-11 DIAGNOSIS — E11.40 TYPE 2 DIABETES MELLITUS WITH DIABETIC NEUROPATHY, WITHOUT LONG-TERM CURRENT USE OF INSULIN: Primary | ICD-10-CM

## 2019-11-11 DIAGNOSIS — E03.9 ACQUIRED HYPOTHYROIDISM: ICD-10-CM

## 2019-11-11 PROCEDURE — 99213 PR OFFICE/OUTPT VISIT, EST, LEVL III, 20-29 MIN: ICD-10-PCS | Mod: 25,S$GLB,, | Performed by: INTERNAL MEDICINE

## 2019-11-11 PROCEDURE — 99213 OFFICE O/P EST LOW 20 MIN: CPT | Mod: 25,S$GLB,, | Performed by: INTERNAL MEDICINE

## 2019-11-11 PROCEDURE — 90686 IIV4 VACC NO PRSV 0.5 ML IM: CPT | Mod: S$GLB,,, | Performed by: INTERNAL MEDICINE

## 2019-11-11 PROCEDURE — 3075F PR MOST RECENT SYSTOLIC BLOOD PRESS GE 130-139MM HG: ICD-10-PCS | Mod: CPTII,S$GLB,, | Performed by: INTERNAL MEDICINE

## 2019-11-11 PROCEDURE — 3008F BODY MASS INDEX DOCD: CPT | Mod: CPTII,S$GLB,, | Performed by: INTERNAL MEDICINE

## 2019-11-11 PROCEDURE — 3075F SYST BP GE 130 - 139MM HG: CPT | Mod: CPTII,S$GLB,, | Performed by: INTERNAL MEDICINE

## 2019-11-11 PROCEDURE — 99999 PR PBB SHADOW E&M-EST. PATIENT-LVL III: ICD-10-PCS | Mod: PBBFAC,,, | Performed by: INTERNAL MEDICINE

## 2019-11-11 PROCEDURE — 3008F PR BODY MASS INDEX (BMI) DOCUMENTED: ICD-10-PCS | Mod: CPTII,S$GLB,, | Performed by: INTERNAL MEDICINE

## 2019-11-11 PROCEDURE — 3079F PR MOST RECENT DIASTOLIC BLOOD PRESSURE 80-89 MM HG: ICD-10-PCS | Mod: CPTII,S$GLB,, | Performed by: INTERNAL MEDICINE

## 2019-11-11 PROCEDURE — 90471 FLU VACCINE (QUAD) GREATER THAN OR EQUAL TO 3YO PRESERVATIVE FREE IM: ICD-10-PCS | Mod: S$GLB,,, | Performed by: INTERNAL MEDICINE

## 2019-11-11 PROCEDURE — 3044F HG A1C LEVEL LT 7.0%: CPT | Mod: CPTII,S$GLB,, | Performed by: INTERNAL MEDICINE

## 2019-11-11 PROCEDURE — 3079F DIAST BP 80-89 MM HG: CPT | Mod: CPTII,S$GLB,, | Performed by: INTERNAL MEDICINE

## 2019-11-11 PROCEDURE — 90471 IMMUNIZATION ADMIN: CPT | Mod: S$GLB,,, | Performed by: INTERNAL MEDICINE

## 2019-11-11 PROCEDURE — 90686 FLU VACCINE (QUAD) GREATER THAN OR EQUAL TO 3YO PRESERVATIVE FREE IM: ICD-10-PCS | Mod: S$GLB,,, | Performed by: INTERNAL MEDICINE

## 2019-11-11 PROCEDURE — 99999 PR PBB SHADOW E&M-EST. PATIENT-LVL III: CPT | Mod: PBBFAC,,, | Performed by: INTERNAL MEDICINE

## 2019-11-11 PROCEDURE — 3044F PR MOST RECENT HEMOGLOBIN A1C LEVEL <7.0%: ICD-10-PCS | Mod: CPTII,S$GLB,, | Performed by: INTERNAL MEDICINE

## 2019-11-24 NOTE — PROGRESS NOTES
Subjective:       Patient ID: Diana Montenegro is a 64 y.o. female.    Chief Complaint: Follow-up; Hyperlipidemia; and Hypertension    HPI   The patient presents for follow-up of medical conditions.  She states she is doing well overall.  She was recently diagnosed to have bursitis of the left knee.    Active medical conditions include type 2 diabetes mellitus, hypertension, diabetic neuropathy, hypothyroidism, micro albuminuria, chronic bilateral lower back pain with sciatica, and obstructive sleep apnea.  She has osteoarthritis of the knees.    She reports blood sugar levels have been good.  No hypoglycemia has been noted. She intermittently uses her CPAP for treatment of sleep apnea.  She does not monitor blood pressures.    Review of Systems   Musculoskeletal: Positive for arthralgias and back pain.   Psychiatric/Behavioral: Positive for sleep disturbance. The patient is nervous/anxious.        Objective:      Physical Exam   Constitutional: She is oriented to person, place, and time. She appears well-developed and well-nourished. No distress.   The patient has lost 6 lb since 09/05/2019.   HENT:   Head: Normocephalic and atraumatic.   Eyes: Pupils are equal, round, and reactive to light. Conjunctivae and EOM are normal. No scleral icterus.   Neck: Normal range of motion. Neck supple. No JVD present. No thyromegaly present.   Cardiovascular: Normal rate, regular rhythm, normal heart sounds and intact distal pulses. Exam reveals no gallop and no friction rub.   No murmur heard.  Pulmonary/Chest: Effort normal and breath sounds normal. No respiratory distress. She has no wheezes. She has no rales.   Abdominal: Soft. Bowel sounds are normal. She exhibits no mass. There is no tenderness.   Musculoskeletal: Normal range of motion. She exhibits no edema.   Tenderness of the left knee is noted on range-of-motion testing.   Lymphadenopathy:     She has no cervical adenopathy.   Neurological: She is alert and  oriented to person, place, and time. No cranial nerve deficit.   Sensory exam is intact in both feet on monofilament and vibration testing.   Skin: Skin is warm and dry. No rash noted.   No foot lesions are present.   Psychiatric: She has a normal mood and affect. Her behavior is normal.   Nursing note and vitals reviewed.      Results for orders placed or performed in visit on 11/08/19   Comprehensive metabolic panel   Result Value Ref Range    Sodium 143 136 - 145 mmol/L    Potassium 4.9 3.5 - 5.1 mmol/L    Chloride 109 95 - 110 mmol/L    CO2 25 23 - 29 mmol/L    Glucose 91 70 - 110 mg/dL    BUN, Bld 32 (H) 8 - 23 mg/dL    Creatinine 1.0 0.5 - 1.4 mg/dL    Calcium 10.2 8.7 - 10.5 mg/dL    Total Protein 7.0 6.0 - 8.4 g/dL    Albumin 3.5 3.5 - 5.2 g/dL    Total Bilirubin 0.2 0.1 - 1.0 mg/dL    Alkaline Phosphatase 80 55 - 135 U/L    AST 19 10 - 40 U/L    ALT 21 10 - 44 U/L    Anion Gap 9 8 - 16 mmol/L    eGFR if African American >60.0 >60 mL/min/1.73 m^2    eGFR if non African American >60.0 >60 mL/min/1.73 m^2   Hemoglobin A1c   Result Value Ref Range    Hemoglobin A1C 6.8 (H) 4.0 - 5.6 %    Estimated Avg Glucose 148 (H) 68 - 131 mg/dL       Assessment:       1. Type 2 diabetes mellitus with diabetic neuropathy, without long-term current use of insulin    2. Essential hypertension    3. Acquired hypothyroidism    4. Hyperlipidemia, unspecified hyperlipidemia type    5. Bursitis of left knee, unspecified bursa        Plan:     Diana was seen today for follow-up, hyperlipidemia and hypertension.  A prescription for the shingles vaccine was given to the patient to take to her pharmacy.  Current therapy will be continued.  We discussed water aerobic exercises as an option for the patient.  Blood tests and a follow-up visit in 4 months will be obtained.  The patient was encouraged to lose weight.    Diagnoses and all orders for this visit:    Type 2 diabetes mellitus with diabetic neuropathy, without long-term current  use of insulin  -     Hemoglobin A1c; Future    Essential hypertension  -     CBC auto differential; Future    Acquired hypothyroidism    Hyperlipidemia, unspecified hyperlipidemia type  -     Comprehensive metabolic panel; Future  -     Lipid panel; Future    Bursitis of left knee, unspecified bursa    Other orders  -     Influenza - Quadrivalent (PF)  -     varicella-zoster [id number], PF, (SHINGRIX, PF,) 50 mcg/0.5 mL injection; Inject 0.5 mLs into the muscle once. for 1 dose

## 2020-01-15 ENCOUNTER — PATIENT OUTREACH (OUTPATIENT)
Dept: ADMINISTRATIVE | Facility: OTHER | Age: 65
End: 2020-01-15

## 2020-01-15 DIAGNOSIS — G47.00 INSOMNIA, UNSPECIFIED TYPE: ICD-10-CM

## 2020-01-15 RX ORDER — TRAZODONE HYDROCHLORIDE 50 MG/1
TABLET ORAL
Qty: 60 TABLET | Refills: 5 | Status: SHIPPED | OUTPATIENT
Start: 2020-01-15 | End: 2021-07-02 | Stop reason: SDUPTHER

## 2020-01-15 NOTE — TELEPHONE ENCOUNTER
LOV 05/13/19    Last filled traZODone (DESYREL) 50 MG tablet 60 tablet w/ 5 refills on 5/30/2019.

## 2020-01-16 LAB
LEFT EYE DM RETINOPATHY: POSITIVE
RIGHT EYE DM RETINOPATHY: POSITIVE

## 2020-01-20 ENCOUNTER — OFFICE VISIT (OUTPATIENT)
Dept: PODIATRY | Facility: CLINIC | Age: 65
End: 2020-01-20
Payer: COMMERCIAL

## 2020-01-20 VITALS
HEART RATE: 56 BPM | DIASTOLIC BLOOD PRESSURE: 69 MMHG | BODY MASS INDEX: 37.9 KG/M2 | WEIGHT: 222 LBS | SYSTOLIC BLOOD PRESSURE: 131 MMHG | HEIGHT: 64 IN | TEMPERATURE: 99 F

## 2020-01-20 DIAGNOSIS — B35.1 ONYCHOMYCOSIS DUE TO DERMATOPHYTE: ICD-10-CM

## 2020-01-20 DIAGNOSIS — M20.41 HAMMER TOES OF BOTH FEET: ICD-10-CM

## 2020-01-20 DIAGNOSIS — E11.42 DIABETIC POLYNEUROPATHY ASSOCIATED WITH TYPE 2 DIABETES MELLITUS: Primary | ICD-10-CM

## 2020-01-20 DIAGNOSIS — M20.42 HAMMER TOES OF BOTH FEET: ICD-10-CM

## 2020-01-20 DIAGNOSIS — E11.9 TYPE 2 DIABETES MELLITUS WITHOUT COMPLICATION, WITHOUT LONG-TERM CURRENT USE OF INSULIN: ICD-10-CM

## 2020-01-20 DIAGNOSIS — M20.5X9 MALLET TOE, UNSPECIFIED LATERALITY: ICD-10-CM

## 2020-01-20 PROCEDURE — 3044F HG A1C LEVEL LT 7.0%: CPT | Mod: CPTII,S$GLB,, | Performed by: STUDENT IN AN ORGANIZED HEALTH CARE EDUCATION/TRAINING PROGRAM

## 2020-01-20 PROCEDURE — 3075F SYST BP GE 130 - 139MM HG: CPT | Mod: CPTII,S$GLB,, | Performed by: STUDENT IN AN ORGANIZED HEALTH CARE EDUCATION/TRAINING PROGRAM

## 2020-01-20 PROCEDURE — 11721 PR DEBRIDEMENT OF NAILS, 6 OR MORE: ICD-10-PCS | Mod: S$GLB,,, | Performed by: STUDENT IN AN ORGANIZED HEALTH CARE EDUCATION/TRAINING PROGRAM

## 2020-01-20 PROCEDURE — 99213 OFFICE O/P EST LOW 20 MIN: CPT | Mod: 25,S$GLB,, | Performed by: STUDENT IN AN ORGANIZED HEALTH CARE EDUCATION/TRAINING PROGRAM

## 2020-01-20 PROCEDURE — 99213 PR OFFICE/OUTPT VISIT, EST, LEVL III, 20-29 MIN: ICD-10-PCS | Mod: 25,S$GLB,, | Performed by: STUDENT IN AN ORGANIZED HEALTH CARE EDUCATION/TRAINING PROGRAM

## 2020-01-20 PROCEDURE — 3078F PR MOST RECENT DIASTOLIC BLOOD PRESSURE < 80 MM HG: ICD-10-PCS | Mod: CPTII,S$GLB,, | Performed by: STUDENT IN AN ORGANIZED HEALTH CARE EDUCATION/TRAINING PROGRAM

## 2020-01-20 PROCEDURE — 3078F DIAST BP <80 MM HG: CPT | Mod: CPTII,S$GLB,, | Performed by: STUDENT IN AN ORGANIZED HEALTH CARE EDUCATION/TRAINING PROGRAM

## 2020-01-20 PROCEDURE — 99999 PR PBB SHADOW E&M-EST. PATIENT-LVL III: CPT | Mod: PBBFAC,,, | Performed by: STUDENT IN AN ORGANIZED HEALTH CARE EDUCATION/TRAINING PROGRAM

## 2020-01-20 PROCEDURE — 3044F PR MOST RECENT HEMOGLOBIN A1C LEVEL <7.0%: ICD-10-PCS | Mod: CPTII,S$GLB,, | Performed by: STUDENT IN AN ORGANIZED HEALTH CARE EDUCATION/TRAINING PROGRAM

## 2020-01-20 PROCEDURE — 11721 DEBRIDE NAIL 6 OR MORE: CPT | Mod: S$GLB,,, | Performed by: STUDENT IN AN ORGANIZED HEALTH CARE EDUCATION/TRAINING PROGRAM

## 2020-01-20 PROCEDURE — 99999 PR PBB SHADOW E&M-EST. PATIENT-LVL III: ICD-10-PCS | Mod: PBBFAC,,, | Performed by: STUDENT IN AN ORGANIZED HEALTH CARE EDUCATION/TRAINING PROGRAM

## 2020-01-20 PROCEDURE — 3075F PR MOST RECENT SYSTOLIC BLOOD PRESS GE 130-139MM HG: ICD-10-PCS | Mod: CPTII,S$GLB,, | Performed by: STUDENT IN AN ORGANIZED HEALTH CARE EDUCATION/TRAINING PROGRAM

## 2020-01-20 PROCEDURE — 3008F BODY MASS INDEX DOCD: CPT | Mod: CPTII,S$GLB,, | Performed by: STUDENT IN AN ORGANIZED HEALTH CARE EDUCATION/TRAINING PROGRAM

## 2020-01-20 PROCEDURE — 3008F PR BODY MASS INDEX (BMI) DOCUMENTED: ICD-10-PCS | Mod: CPTII,S$GLB,, | Performed by: STUDENT IN AN ORGANIZED HEALTH CARE EDUCATION/TRAINING PROGRAM

## 2020-01-20 RX ORDER — CETIRIZINE HYDROCHLORIDE 5 MG/1
5 TABLET ORAL DAILY
COMMUNITY

## 2020-01-20 NOTE — PROCEDURES
Routine Foot Care  Date/Time: 1/20/2020 2:00 PM  Performed by: Nicole Guerrero DPM  Authorized by: Nicole Guerrero DPM     Consent Done?:  Yes (Verbal)    Nail Care Type:  Debride  Location(s): All  (Left 1st Toe, Left 3rd Toe, Left 2nd Toe, Left 4th Toe, Left 5th Toe, Right 1st Toe, Right 2nd Toe, Right 3rd Toe, Right 4th Toe and Right 5th Toe)  Patient tolerance:  Patient tolerated the procedure well with no immediate complications

## 2020-01-20 NOTE — PROGRESS NOTES
Subjective:      Patient ID: Diana Montenegro is a 64 y.o. female.    Chief Complaint: Consult and Diabetic Foot Exam    Diana is a 64 y.o. female who presents to the clinic for evaluation and treatment of high risk feet. Diana has a past medical history of Arthritis, Atrial fibrillation, unspecified, Chronic back pain, Colon polyp, CVA (cerebral infarction) (7/16/14), Degenerative disc disease, Diabetes mellitus type II, Encounter for loop recorder at end of battery life (9/17/2018), Hyperlipidemia, Hypertension, Hypothyroidism, Mild nonproliferative diabetic retinopathy (08/12/2018), Obesity, Sleep apnea, Stroke (july 2014), and Venous insufficiency (chronic) (peripheral). The patient's chief complaint is long, thick toenails. This patient has documented high risk feet requiring routine maintenance secondary to diabetes mellitis and those secondary complications of diabetes, as mentioned..      PCP: Rad Johnston MD    Date Last Seen by PCP:  In epic    Current shoe gear:  Affected Foot: Casual shoes     Unaffected Foot: Casual shoes    Hemoglobin A1C   Date Value Ref Range Status   11/08/2019 6.8 (H) 4.0 - 5.6 % Final     Comment:     ADA Screening Guidelines:  5.7-6.4%  Consistent with prediabetes  >or=6.5%  Consistent with diabetes  High levels of fetal hemoglobin interfere with the HbA1C  assay. Heterozygous hemoglobin variants (HbS, HgC, etc)do  not significantly interfere with this assay.   However, presence of multiple variants may affect accuracy.     08/30/2019 7.7 (H) 4.0 - 5.6 % Final     Comment:     ADA Screening Guidelines:  5.7-6.4%  Consistent with prediabetes  >or=6.5%  Consistent with diabetes  High levels of fetal hemoglobin interfere with the HbA1C  assay. Heterozygous hemoglobin variants (HbS, HgC, etc)do  not significantly interfere with this assay.   However, presence of multiple variants may affect accuracy.     07/10/2018 8.9 (H) 4.0 - 5.6 % Final     Comment:     ADA Screening  Guidelines:  5.7-6.4%  Consistent with prediabetes  >or=6.5%  Consistent with diabetes  High levels of fetal hemoglobin interfere with the HbA1C  assay. Heterozygous hemoglobin variants (HbS, HgC, etc)do  not significantly interfere with this assay.   However, presence of multiple variants may affect accuracy.         Review of Systems   Constitution: Negative for decreased appetite, fever and malaise/fatigue.   HENT: Negative for congestion.    Cardiovascular: Negative for chest pain and leg swelling.   Respiratory: Negative for cough and shortness of breath.    Skin: Positive for color change. Negative for nail changes and rash.   Musculoskeletal: Positive for arthritis and stiffness. Negative for joint pain, joint swelling and muscle weakness.   Gastrointestinal: Negative for bloating, abdominal pain, nausea and vomiting.   Neurological: Negative for headaches, numbness and weakness.   Psychiatric/Behavioral: Negative for altered mental status.             Past Medical History:   Diagnosis Date    Arthritis     Atrial fibrillation, unspecified     hx of a fib prior to stroke.    Chronic back pain     Colon polyp     CVA (cerebral infarction) 7/16/14    Degenerative disc disease     cervical; lumbar    Diabetes mellitus type II     Encounter for loop recorder at end of battery life 9/17/2018    Hyperlipidemia     Hypertension     Hypothyroidism     Mild nonproliferative diabetic retinopathy 08/12/2018    Obesity     Sleep apnea     Stroke july 2014    Venous insufficiency (chronic) (peripheral)        Past Surgical History:   Procedure Laterality Date    COLONOSCOPY N/A 8/30/2018    Procedure: COLONOSCOPY;  Surgeon: William Clement MD;  Location: [facility] ([address] [place] [address]);  Service: Endoscopy;  Laterality: N/A;  Tito/Dr Rad Johnston/OK to hold 2 days prior to colon/see telephone encounter dated 7/24/18/pt has loop recorder/svn    COLONOSCOPY W/ POLYPECTOMY      GASTRECTOMY      HERNIA REPAIR       REMOVAL OF IMPLANTABLE LOOP RECORDER N/A 2018    Procedure: REMOVAL, IMPLANTABLE LOOP RECORDER;  Surgeon: Thomas Randolph MD;  Location: [facility] CATH LAB;  Service: Cardiology;  Laterality: N/A;  TERESA, ILR removal (no reimplant), STACY, RN Inocencio, SK, 3 Prep *Reveal LINQ*    TONSILLECTOMY      TUBAL LIGATION         Family History   Problem Relation Age of Onset    No Known Problems Mother     Heart disease Father     Diabetes Maternal Grandfather     Obesity Maternal Grandfather     No Known Problems Sister     No Known Problems Sister     No Known Problems Maternal Grandmother     Stroke Paternal Grandmother     No Known Problems Paternal Grandfather     Heart attack Neg Hx        Social History     Socioeconomic History    Marital status: Single     Spouse name: Not on file    Number of children: Not on file    Years of education: Not on file    Highest education level: Not on file   Occupational History    Occupation: MOS      Employer: [employer]   Social Needs    Financial resource strain: Not hard at all    Food insecurity:     Worry: Never true     Inability: Never true    Transportation needs:     Medical: No     Non-medical: No   Tobacco Use    Smoking status: Former Smoker     Packs/day: 1.00     Years: 30.00     Pack years: 30.00     Types: Cigarettes     Last attempt to quit: 2014     Years since quittin.5    Smokeless tobacco: Never Used   Substance and Sexual Activity    Alcohol use: Yes     Alcohol/week: 0.0 standard drinks     Frequency: Monthly or less     Drinks per session: 1 or 2     Binge frequency: Never     Comment: rarely    Drug use: No     Comment: thc at young age    Sexual activity: Not Currently     Partners: Male   Lifestyle    Physical activity:     Days per week: 3 days     Minutes per session: 60 min    Stress: To some extent   Relationships    Social connections:     Talks on phone: More than three times a week     Gets  together: Three times a week     Attends Anabaptist service: Not on file     Active member of club or organization: No     Attends meetings of clubs or organizations: Not on file     Relationship status:    Other Topics Concern    Not on file   Social History Narrative    Not on file       Current Outpatient Medications   Medication Sig Dispense Refill    aspirin (ECOTRIN) 81 MG EC tablet Take 1 tablet (81 mg total) by mouth once daily.      b complex vitamins tablet Take 1 tablet by mouth once daily.      blood pressure test kit-large Kit Use as directed 1 each 0    blood sugar diagnostic Strp Test blood sugar once daily 100 strip 3    CALCIUM CITRATE/VITAMIN D3 (CALCIUM CITRATE + D ORAL) Take 2 tablets by mouth 2 (two) times daily.      cetirizine (ZYRTEC) 5 MG tablet Take 5 mg by mouth once daily.      cinnamon bark (CINNAMON ORAL) Take by mouth 2 (two) times daily.      cyanocobalamin, vitamin B-12, 500 mcg Subl Place 1 tablet under the tongue once daily.      ELIQUIS 5 mg Tab TAKE 1 TABLET BY MOUTH TWICE DAILY 60 tablet 6    fish oil-omega-3 fatty acids 300-1,000 mg capsule Take by mouth 2 (two) times daily.      fluticasone (FLONASE) 50 mcg/actuation nasal spray 2 sprays (100 mcg total) by Each Nare route once daily. (Patient taking differently: 2 sprays by Each Nare route daily as needed. ) 1 Bottle 0    glimepiride (AMARYL) 4 MG tablet TAKE 1 TABLET BY MOUTH TWICE DAILY 60 tablet 11    lancets (ONE TOUCH DELICA LANCETS) 33 gauge Misc 1 lancet by Misc.(Non-Drug; Combo Route) route 4 (four) times daily. 150 each 8    levothyroxine (SYNTHROID) 125 MCG tablet TAKE 1 TABLET BY MOUTH ONCE DAILY 90 tablet 3    LORazepam (ATIVAN) 1 MG tablet Take 1 tablet (1 mg total) by mouth every 12 (twelve) hours as needed for Anxiety. 60 tablet 2    metFORMIN (GLUCOPHAGE) 1000 MG tablet TAKE 1 TABLET BY MOUTH TWICE DAILY WITH MEALS 60 tablet 2    multivitamin capsule Take 1 capsule by mouth once daily.       omeprazole (PRILOSEC) 40 MG capsule TAKE 1 CAPSULE BY MOUTH IN THE MORNING 30 capsule 2    rosuvastatin (CRESTOR) 40 MG Tab TAKE 1 TABLET BY MOUTH ONCE DAILY 30 tablet 2    RUTIN/HESP/BIOFLAV/C/HERB#196 (BIOFLEX ORAL) Take 2 tablets by mouth once daily.      traZODone (DESYREL) 50 MG tablet 1 pill PO PRN for insomnia at bedtime. OK to take 2nd pill in 30 minutes if still awake. 60 tablet 5    valsartan-hydrochlorothiazide (DIOVAN-HCT) 160-12.5 mg per tablet Take 1 tablet by mouth once daily. 30 tablet 5    acyclovir 5% (ZOVIRAX) 5 % ointment Apply topically 6 (six) times daily. (Patient not taking: Reported on 1/20/2020) 5 g 1    albuterol (PROVENTIL/VENTOLIN HFA) 90 mcg/actuation inhaler Inhale 2 puffs into the lungs every 6 (six) hours as needed for Wheezing. Rescue 18 g 2    albuterol-ipratropium 2.5mg-0.5mg/3mL (DUO-NEB) 0.5 mg-3 mg(2.5 mg base)/3 mL nebulizer solution Take 3 mLs by nebulization every 6 (six) hours as needed for Wheezing. Rescue 3 vial 3    blood-glucose meter (ONETOUCH ULTRA SYSTEM KIT) kit Use as instructed 1 each 0    docusate sodium (COLACE) 100 MG capsule Take 100 mg by mouth once daily.       levocetirizine (XYZAL) 5 MG tablet Take 1 tablet (5 mg total) by mouth every evening. (Patient not taking: Reported on 1/20/2020) 30 tablet 11    metoprolol tartrate (LOPRESSOR) 25 MG tablet Take 1 tablet (25 mg total) by mouth once as needed. For palpitations 30 tablet 11    senna (SENNA) 8.6 mg tablet Take 2 tablets by mouth nightly as needed for Constipation. (Patient not taking: Reported on 1/20/2020) 100 tablet 3    walker (ULTRA-LIGHT ROLLATOR) Misc Use walker w/ seat daily as directed. (Patient not taking: Reported on 1/20/2020) 1 each 0     No current facility-administered medications for this visit.      Facility-Administered Medications Ordered in Other Visits   Medication Dose Route Frequency Provider Last Rate Last Dose    0.9%  NaCl infusion   Intravenous Continuous  "Khadijah Rivera NP   1,000 mL at 09/17/18 0939    ceFAZolin injection 2 g  2 g Intravenous On Call Procedure Khadijah Rivera NP           Review of patient's allergies indicates:   Allergen Reactions    Medrol [methylprednisolone] Palpitations       Vitals:    01/20/20 1316   BP: 131/69   Pulse: (!) 56   Temp: 98.6 °F (37 °C)   TempSrc: Oral   Weight: 100.7 kg (222 lb)   Height: 5' 4" (1.626 m)   PainSc: 0-No pain       Objective:      Physical Exam   Constitutional: She is oriented to person, place, and time. She appears well-developed and well-nourished. No distress.   Cardiovascular: Intact distal pulses.   Pulses:       Dorsalis pedis pulses are 1+ on the right side, and 1+ on the left side.        Posterior tibial pulses are 0 on the right side, and 0 on the left side.   Pedal pulses diminished, stefany. Skin temperature is within normal limits. Toes are cool to touch and feet are warm proximally. Hair growth is diminished. Skin is mildly atrophic and with hyperpigmentation. Mild edema noted,stefany.   Musculoskeletal: She exhibits deformity. She exhibits no edema or tenderness.        Right foot: There is decreased range of motion and deformity.        Left foot: There is decreased range of motion and deformity.   Adequate joint range of motion without pain, limitation, nor crepitation to bilateral feet and ankle joints. Muscle strength is 5/5 in all groups bilaterally.    Rigid hammertoe 2 through 4, rigid mallet toe of the hallux bilaterally without pain.     Feet:   Right Foot:   Protective Sensation: 10 sites tested. 6 sites sensed.   Skin Integrity: Positive for callus. Negative for ulcer, blister, skin breakdown, erythema, warmth or dry skin.   Left Foot:   Protective Sensation: 10 sites tested. 6 sites sensed.   Skin Integrity: Negative for ulcer, blister, skin breakdown, erythema, warmth or dry skin.   Neurological: She is alert and oriented to person, place, and time. A sensory deficit is present. "   [place]-Florian 5.07 monofilamant testing is diminished, vibratory sensation is diminished. Light touch within normal limits bilaterally.       Skin: Skin is warm and dry. Capillary refill takes 2 to 3 seconds. No bruising, no ecchymosis, no laceration, no lesion, no petechiae and no rash noted. She is not diaphoretic. No erythema. No pallor.   Skin is warm and dry bilaterally, no acute SOI noted, bilaterally, appears stable.     Nails 1-5 stefany are elongated and thickened with dystrophic changes and discoloration noted    No open wounds or acute SOI noted, stefany, skin is stable.    Psychiatric: She has a normal mood and affect. Her behavior is normal. Judgment and thought content normal.   Nursing note and vitals reviewed.            Assessment:       Encounter Diagnoses   Name Primary?    Diabetic polyneuropathy associated with type 2 diabetes mellitus Yes    Onychomycosis due to dermatophyte     Hammer toes of both feet     Mallet toe, unspecified laterality     Type 2 diabetes mellitus without complication, without long-term current use of insulin          Plan:       Diana was seen today for consult and diabetic foot exam.    Diagnoses and all orders for this visit:    Diabetic polyneuropathy associated with type 2 diabetes mellitus  -     Routine Foot Care    Onychomycosis due to dermatophyte    Hammer toes of both feet    Mallet toe, unspecified laterality    Type 2 diabetes mellitus without complication, without long-term current use of insulin      I counseled the patient on her conditions, their implications and medical management.    -Nails debrided, see procedure note.     -Shoe inspection. General Foot Education. Patient reminded of the importance of good nutrition. Patient instructed on proper foot hygeine. We discussed wearing proper shoe gear, daily foot inspections, never walking without protective shoe gear, caution putting sharp instruments to feet. Discussed general foot care:  Wear  comfortable, proper fitting shoes. Wash feet daily. Dry well. After drying, apply moisturizer to feet (no lotion to webspaces). Inspect feet daily for skin breaks, blisters, swelling, or redness. Wear cotton socks (preferably white)  Change socks every day. Do NOT walk barefoot. Do NOT use heating pads or warm/hot water soaks     -It was discussed the importance of wearing shoes with adequate room in toe box to accommodate toe deformities. Recommended New Balance/Asics shoe brands with adequate arch supports to alleviate abnormal pressure and improve stability of foot while walking. Avoid flat shoes and barefoot walking as these will exacerbate or worsen symptoms.    -Advised for optimal glucose control and maintenance per primary care physician. Patient was also educated on healthy diet that is naturally rich in nutrients and low in fat and calories.     -The nature of the condition, options for management, as well as potential risks and complications were discussed in detail with patient. Patient was amenable to my recommendations and left my office fully informed and will follow up as instructed or sooner if necessary.      -Patient was advised of signs and symptoms of infection including redness, drainage, purulence, odor, streaking, fever, chills and I advised patient to seek medical attention (ER or urgent care) if these symptoms arise.     -f/u in 3 mo or sooner PRN

## 2020-01-22 ENCOUNTER — PATIENT OUTREACH (OUTPATIENT)
Dept: ADMINISTRATIVE | Facility: HOSPITAL | Age: 65
End: 2020-01-22

## 2020-01-28 DIAGNOSIS — E78.5 HYPERLIPIDEMIA, UNSPECIFIED HYPERLIPIDEMIA TYPE: ICD-10-CM

## 2020-01-29 RX ORDER — ROSUVASTATIN CALCIUM 40 MG/1
TABLET, COATED ORAL
Qty: 30 TABLET | Refills: 6 | Status: SHIPPED | OUTPATIENT
Start: 2020-01-29 | End: 2020-06-16 | Stop reason: SDUPTHER

## 2020-01-29 RX ORDER — VALSARTAN AND HYDROCHLOROTHIAZIDE 160; 12.5 MG/1; MG/1
1 TABLET, FILM COATED ORAL DAILY
Qty: 30 TABLET | Refills: 6 | Status: SHIPPED | OUTPATIENT
Start: 2020-01-29 | End: 2020-06-16 | Stop reason: SDUPTHER

## 2020-01-31 DIAGNOSIS — Z98.84 S/P LAPAROSCOPIC SLEEVE GASTRECTOMY: ICD-10-CM

## 2020-01-31 RX ORDER — METFORMIN HYDROCHLORIDE 1000 MG/1
TABLET ORAL
Qty: 60 TABLET | Refills: 3 | Status: SHIPPED | OUTPATIENT
Start: 2020-01-31 | End: 2020-05-24

## 2020-01-31 RX ORDER — OMEPRAZOLE 40 MG/1
CAPSULE, DELAYED RELEASE ORAL
Qty: 30 CAPSULE | Refills: 2 | Status: SHIPPED | OUTPATIENT
Start: 2020-01-31 | End: 2020-05-24

## 2020-03-02 ENCOUNTER — PATIENT MESSAGE (OUTPATIENT)
Dept: ELECTROPHYSIOLOGY | Facility: CLINIC | Age: 65
End: 2020-03-02

## 2020-04-17 ENCOUNTER — PATIENT OUTREACH (OUTPATIENT)
Dept: ADMINISTRATIVE | Facility: OTHER | Age: 65
End: 2020-04-17

## 2020-04-21 ENCOUNTER — TELEPHONE (OUTPATIENT)
Dept: PODIATRY | Facility: CLINIC | Age: 65
End: 2020-04-21

## 2020-04-21 NOTE — TELEPHONE ENCOUNTER
----- Message from Micaela Javed sent at 4/21/2020  2:48 PM CDT -----  Contact: Patient  Patient just wanted to inform office she  Will not make appointment today 4/21 and she will reschedule on my chart

## 2020-05-04 ENCOUNTER — PATIENT OUTREACH (OUTPATIENT)
Dept: ADMINISTRATIVE | Facility: OTHER | Age: 65
End: 2020-05-04

## 2020-05-05 ENCOUNTER — PATIENT MESSAGE (OUTPATIENT)
Dept: INTERNAL MEDICINE | Facility: CLINIC | Age: 65
End: 2020-05-05

## 2020-05-06 ENCOUNTER — OFFICE VISIT (OUTPATIENT)
Dept: PODIATRY | Facility: CLINIC | Age: 65
End: 2020-05-06
Payer: COMMERCIAL

## 2020-05-06 VITALS — HEIGHT: 67 IN | WEIGHT: 222 LBS | BODY MASS INDEX: 34.84 KG/M2 | TEMPERATURE: 98 F

## 2020-05-06 DIAGNOSIS — E11.42 DIABETIC POLYNEUROPATHY ASSOCIATED WITH TYPE 2 DIABETES MELLITUS: ICD-10-CM

## 2020-05-06 DIAGNOSIS — B35.1 ONYCHOMYCOSIS DUE TO DERMATOPHYTE: Primary | ICD-10-CM

## 2020-05-06 PROCEDURE — 99214 PR OFFICE/OUTPT VISIT, EST, LEVL IV, 30-39 MIN: ICD-10-PCS | Mod: 25,S$GLB,, | Performed by: STUDENT IN AN ORGANIZED HEALTH CARE EDUCATION/TRAINING PROGRAM

## 2020-05-06 PROCEDURE — 99999 PR PBB SHADOW E&M-EST. PATIENT-LVL IV: CPT | Mod: PBBFAC,,, | Performed by: STUDENT IN AN ORGANIZED HEALTH CARE EDUCATION/TRAINING PROGRAM

## 2020-05-06 PROCEDURE — 99999 PR PBB SHADOW E&M-EST. PATIENT-LVL IV: ICD-10-PCS | Mod: PBBFAC,,, | Performed by: STUDENT IN AN ORGANIZED HEALTH CARE EDUCATION/TRAINING PROGRAM

## 2020-05-06 PROCEDURE — 11721 ROUTINE FOOT CARE: ICD-10-PCS | Mod: S$GLB,,, | Performed by: STUDENT IN AN ORGANIZED HEALTH CARE EDUCATION/TRAINING PROGRAM

## 2020-05-06 PROCEDURE — 3008F BODY MASS INDEX DOCD: CPT | Mod: CPTII,S$GLB,, | Performed by: STUDENT IN AN ORGANIZED HEALTH CARE EDUCATION/TRAINING PROGRAM

## 2020-05-06 PROCEDURE — 11721 DEBRIDE NAIL 6 OR MORE: CPT | Mod: S$GLB,,, | Performed by: STUDENT IN AN ORGANIZED HEALTH CARE EDUCATION/TRAINING PROGRAM

## 2020-05-06 PROCEDURE — 3008F PR BODY MASS INDEX (BMI) DOCUMENTED: ICD-10-PCS | Mod: CPTII,S$GLB,, | Performed by: STUDENT IN AN ORGANIZED HEALTH CARE EDUCATION/TRAINING PROGRAM

## 2020-05-06 PROCEDURE — 3044F HG A1C LEVEL LT 7.0%: CPT | Mod: CPTII,S$GLB,, | Performed by: STUDENT IN AN ORGANIZED HEALTH CARE EDUCATION/TRAINING PROGRAM

## 2020-05-06 PROCEDURE — 99214 OFFICE O/P EST MOD 30 MIN: CPT | Mod: 25,S$GLB,, | Performed by: STUDENT IN AN ORGANIZED HEALTH CARE EDUCATION/TRAINING PROGRAM

## 2020-05-06 PROCEDURE — 3044F PR MOST RECENT HEMOGLOBIN A1C LEVEL <7.0%: ICD-10-PCS | Mod: CPTII,S$GLB,, | Performed by: STUDENT IN AN ORGANIZED HEALTH CARE EDUCATION/TRAINING PROGRAM

## 2020-05-06 RX ORDER — LORAZEPAM 1 MG/1
TABLET ORAL
COMMUNITY
Start: 2020-01-28 | End: 2021-09-20 | Stop reason: SDUPTHER

## 2020-05-06 NOTE — PROCEDURES
Routine Foot Care  Date/Time: 5/6/2020 1:45 PM  Performed by: Nicole Guerrero DPM  Authorized by: Nicole Guerrero DPM     Consent Done?:  Yes (Verbal)    Nail Care Type:  Debride  Location(s): All  (Left 1st Toe, Left 3rd Toe, Left 2nd Toe, Left 4th Toe, Left 5th Toe, Right 1st Toe, Right 2nd Toe, Right 3rd Toe, Right 4th Toe and Right 5th Toe)  Patient tolerance:  Patient tolerated the procedure well with no immediate complications

## 2020-05-06 NOTE — PROGRESS NOTES
Subjective:      Patient ID: Diana Montenegro is a 64 y.o. female.    Chief Complaint: Routine Foot Care (diabetic foot care)    Diana is a 64 y.o. female who presents to the clinic for evaluation and treatment of high risk feet. Diana has a past medical history of Arthritis, Atrial fibrillation, unspecified, Chronic back pain, Colon polyp, CVA (cerebral infarction) (7/16/14), Degenerative disc disease, Diabetes mellitus type II, Encounter for loop recorder at end of battery life (9/17/2018), Hyperlipidemia, Hypertension, Hypothyroidism, Mild nonproliferative diabetic retinopathy (08/12/2018), Obesity, Sleep apnea, Stroke (july 2014), and Venous insufficiency (chronic) (peripheral). The patient's chief complaint is long, thick toenails. This patient has documented high risk feet requiring routine maintenance secondary to diabetes mellitis and those secondary complications of diabetes, as mentioned..      PCP: Rad Johnston MD    Date Last Seen by PCP:  In epic    Current shoe gear:  Affected Foot: Casual shoes     Unaffected Foot: Casual shoes    Hemoglobin A1C   Date Value Ref Range Status   11/08/2019 6.8 (H) 4.0 - 5.6 % Final     Comment:     ADA Screening Guidelines:  5.7-6.4%  Consistent with prediabetes  >or=6.5%  Consistent with diabetes  High levels of fetal hemoglobin interfere with the HbA1C  assay. Heterozygous hemoglobin variants (HbS, HgC, etc)do  not significantly interfere with this assay.   However, presence of multiple variants may affect accuracy.     08/30/2019 7.7 (H) 4.0 - 5.6 % Final     Comment:     ADA Screening Guidelines:  5.7-6.4%  Consistent with prediabetes  >or=6.5%  Consistent with diabetes  High levels of fetal hemoglobin interfere with the HbA1C  assay. Heterozygous hemoglobin variants (HbS, HgC, etc)do  not significantly interfere with this assay.   However, presence of multiple variants may affect accuracy.     07/10/2018 8.9 (H) 4.0 - 5.6 % Final     Comment:     ADA  Screening Guidelines:  5.7-6.4%  Consistent with prediabetes  >or=6.5%  Consistent with diabetes  High levels of fetal hemoglobin interfere with the HbA1C  assay. Heterozygous hemoglobin variants (HbS, HgC, etc)do  not significantly interfere with this assay.   However, presence of multiple variants may affect accuracy.         Review of Systems   Constitution: Negative for decreased appetite, fever and malaise/fatigue.   HENT: Negative for congestion.    Cardiovascular: Negative for chest pain and leg swelling.   Respiratory: Negative for cough and shortness of breath.    Skin: Positive for color change. Negative for nail changes and rash.   Musculoskeletal: Positive for arthritis and stiffness. Negative for joint pain, joint swelling and muscle weakness.   Gastrointestinal: Negative for bloating, abdominal pain, nausea and vomiting.   Neurological: Negative for headaches, numbness and weakness.   Psychiatric/Behavioral: Negative for altered mental status.             Past Medical History:   Diagnosis Date    Arthritis     Atrial fibrillation, unspecified     hx of a fib prior to stroke.    Chronic back pain     Colon polyp     CVA (cerebral infarction) 7/16/14    Degenerative disc disease     cervical; lumbar    Diabetes mellitus type II     Encounter for loop recorder at end of battery life 9/17/2018    Hyperlipidemia     Hypertension     Hypothyroidism     Mild nonproliferative diabetic retinopathy 08/12/2018    Obesity     Sleep apnea     Stroke july 2014    Venous insufficiency (chronic) (peripheral)        Past Surgical History:   Procedure Laterality Date    COLONOSCOPY N/A 8/30/2018    Procedure: COLONOSCOPY;  Surgeon: William Clement MD;  Location: [facility] ([address] [place] [address]);  Service: Endoscopy;  Laterality: N/A;  Tito/Dr Rad Johnston/OK to hold 2 days prior to colon/see telephone encounter dated 7/24/18/pt has loop recorder/svn    COLONOSCOPY W/ POLYPECTOMY      GASTRECTOMY      HERNIA  REPAIR      REMOVAL OF IMPLANTABLE LOOP RECORDER N/A 2018    Procedure: REMOVAL, IMPLANTABLE LOOP RECORDER;  Surgeon: Thomas Randolph MD;  Location: [facility] CATH LAB;  Service: Cardiology;  Laterality: N/A;  TERESA, ILR removal (no reimplant), STACY, RN Inocencio, SK, 3 Prep *Reveal LINQ*    TONSILLECTOMY      TUBAL LIGATION         Family History   Problem Relation Age of Onset    No Known Problems Mother     Heart disease Father     Diabetes Maternal Grandfather     Obesity Maternal Grandfather     No Known Problems Sister     No Known Problems Sister     No Known Problems Maternal Grandmother     Stroke Paternal Grandmother     No Known Problems Paternal Grandfather     Heart attack Neg Hx        Social History     Socioeconomic History    Marital status: Single     Spouse name: Not on file    Number of children: Not on file    Years of education: Not on file    Highest education level: Not on file   Occupational History    Occupation: MOS      Employer: [employer]   Social Needs    Financial resource strain: Not hard at all    Food insecurity:     Worry: Never true     Inability: Never true    Transportation needs:     Medical: No     Non-medical: No   Tobacco Use    Smoking status: Former Smoker     Packs/day: 1.00     Years: 30.00     Pack years: 30.00     Types: Cigarettes     Last attempt to quit: 2014     Years since quittin.8    Smokeless tobacco: Never Used   Substance and Sexual Activity    Alcohol use: Yes     Alcohol/week: 0.0 standard drinks     Frequency: Monthly or less     Drinks per session: 1 or 2     Binge frequency: Never     Comment: rarely    Drug use: No     Comment: thc at young age    Sexual activity: Not Currently     Partners: Male   Lifestyle    Physical activity:     Days per week: 3 days     Minutes per session: 60 min    Stress: To some extent   Relationships    Social connections:     Talks on phone: More than three times a week      Gets together: Three times a week     Attends Yazidism service: Not on file     Active member of club or organization: No     Attends meetings of clubs or organizations: Not on file     Relationship status:    Other Topics Concern    Not on file   Social History Narrative    Not on file       Current Outpatient Medications   Medication Sig Dispense Refill    aspirin (ECOTRIN) 81 MG EC tablet Take 1 tablet (81 mg total) by mouth once daily.      b complex vitamins tablet Take 1 tablet by mouth once daily.      blood pressure test kit-large Kit Use as directed 1 each 0    blood sugar diagnostic Strp Test blood sugar once daily 100 strip 3    CALCIUM CITRATE/VITAMIN D3 (CALCIUM CITRATE + D ORAL) Take 2 tablets by mouth 2 (two) times daily.      cetirizine (ZYRTEC) 5 MG tablet Take 5 mg by mouth once daily.      cinnamon bark (CINNAMON ORAL) Take by mouth 2 (two) times daily.      cyanocobalamin, vitamin B-12, 500 mcg Subl Place 1 tablet under the tongue once daily.      docusate sodium (COLACE) 100 MG capsule Take 100 mg by mouth once daily.       ELIQUIS 5 mg Tab TAKE 1 TABLET BY MOUTH TWICE DAILY 60 tablet 6    fish oil-omega-3 fatty acids 300-1,000 mg capsule Take by mouth 2 (two) times daily.      fluticasone (FLONASE) 50 mcg/actuation nasal spray 2 sprays (100 mcg total) by Each Nare route once daily. (Patient taking differently: 2 sprays by Each Nare route daily as needed. ) 1 Bottle 0    glimepiride (AMARYL) 4 MG tablet TAKE 1 TABLET BY MOUTH TWICE DAILY 60 tablet 11    lancets (ONE TOUCH DELICA LANCETS) 33 gauge Misc 1 lancet by Misc.(Non-Drug; Combo Route) route 4 (four) times daily. 150 each 8    levothyroxine (SYNTHROID) 125 MCG tablet TAKE 1 TABLET BY MOUTH ONCE DAILY 90 tablet 3    LORazepam (ATIVAN) 1 MG tablet       metFORMIN (GLUCOPHAGE) 1000 MG tablet TAKE 1 TABLET BY MOUTH TWICE DAILY WITH MEALS 60 tablet 3    omeprazole (PRILOSEC) 40 MG capsule TAKE 1 CAPSULE BY MOUTH  IN THE MORNING 30 capsule 2    rosuvastatin (CRESTOR) 40 MG Tab TAKE 1 TABLET BY MOUTH ONCE DAILY 30 tablet 6    RUTIN/HESP/BIOFLAV/C/HERB#196 (BIOFLEX ORAL) Take 2 tablets by mouth once daily.      traZODone (DESYREL) 50 MG tablet 1 pill PO PRN for insomnia at bedtime. OK to take 2nd pill in 30 minutes if still awake. 60 tablet 5    valsartan-hydrochlorothiazide (DIOVAN-HCT) 160-12.5 mg per tablet TAKE 1 TABLET BY MOUTH ONCE DAILY 30 tablet 6    acyclovir 5% (ZOVIRAX) 5 % ointment Apply topically 6 (six) times daily. (Patient not taking: Reported on 1/20/2020) 5 g 1    albuterol (PROVENTIL/VENTOLIN HFA) 90 mcg/actuation inhaler Inhale 2 puffs into the lungs every 6 (six) hours as needed for Wheezing. Rescue 18 g 2    albuterol-ipratropium 2.5mg-0.5mg/3mL (DUO-NEB) 0.5 mg-3 mg(2.5 mg base)/3 mL nebulizer solution Take 3 mLs by nebulization every 6 (six) hours as needed for Wheezing. Rescue 3 vial 3    blood-glucose meter (ONETOUCH ULTRA SYSTEM KIT) kit Use as instructed 1 each 0    levocetirizine (XYZAL) 5 MG tablet Take 1 tablet (5 mg total) by mouth every evening. (Patient not taking: Reported on 1/20/2020) 30 tablet 11    metoprolol tartrate (LOPRESSOR) 25 MG tablet Take 1 tablet (25 mg total) by mouth once as needed. For palpitations 30 tablet 11    multivitamin capsule Take 1 capsule by mouth once daily.      senna (SENNA) 8.6 mg tablet Take 2 tablets by mouth nightly as needed for Constipation. (Patient not taking: Reported on 1/20/2020) 100 tablet 3    walker (ULTRA-LIGHT ROLLATOR) Misc Use walker w/ seat daily as directed. (Patient not taking: Reported on 1/20/2020) 1 each 0     No current facility-administered medications for this visit.      Facility-Administered Medications Ordered in Other Visits   Medication Dose Route Frequency Provider Last Rate Last Dose    0.9%  NaCl infusion   Intravenous Continuous Khadijah Rivera NP   1,000 mL at 09/17/18 0939    ceFAZolin injection 2 g  2 g  "Intravenous On Call Procedure Khadijah Rivera NP           Review of patient's allergies indicates:   Allergen Reactions    Medrol [methylprednisolone] Palpitations       Vitals:    05/06/20 1342   Temp: 98 °F (36.7 °C)   Weight: 100.7 kg (222 lb 0.1 oz)   Height: 5' 7" (1.702 m)       Objective:      Physical Exam   Constitutional: She is oriented to person, place, and time. She appears well-developed and well-nourished. No distress.   Cardiovascular: Intact distal pulses.   Pulses:       Dorsalis pedis pulses are 1+ on the right side, and 1+ on the left side.        Posterior tibial pulses are 0 on the right side, and 0 on the left side.   Pedal pulses diminished, stefany. Skin temperature is within normal limits. Toes are cool to touch and feet are warm proximally. Hair growth is diminished. Skin is mildly atrophic and with hyperpigmentation. Mild edema noted,stefany.   Musculoskeletal: She exhibits deformity. She exhibits no edema or tenderness.        Right foot: There is decreased range of motion and deformity.        Left foot: There is decreased range of motion and deformity.   Adequate joint range of motion without pain, limitation, nor crepitation to bilateral feet and ankle joints. Muscle strength is 5/5 in all groups bilaterally.    Rigid hammertoe 2 through 4, rigid mallet toe of the hallux bilaterally without pain.     Feet:   Right Foot:   Protective Sensation: 10 sites tested. 6 sites sensed.   Skin Integrity: Positive for callus. Negative for ulcer, blister, skin breakdown, erythema, warmth or dry skin.   Left Foot:   Protective Sensation: 10 sites tested. 6 sites sensed.   Skin Integrity: Negative for ulcer, blister, skin breakdown, erythema, warmth or dry skin.   Neurological: She is alert and oriented to person, place, and time. A sensory deficit is present.   [place] 5.07 monofilamant testing is diminished, vibratory sensation is diminished. Light touch within normal limits " bilaterally.       Skin: Skin is warm and dry. Capillary refill takes 2 to 3 seconds. No bruising, no ecchymosis, no laceration, no lesion, no petechiae and no rash noted. She is not diaphoretic. No erythema. No pallor.   Skin is warm and dry bilaterally, no acute SOI noted, bilaterally, appears stable.     Nails 1-5 stefany are elongated and thickened with dystrophic changes and discoloration noted    No open wounds or acute SOI noted, stefany, skin is stable.    Psychiatric: She has a normal mood and affect. Her behavior is normal. Judgment and thought content normal.   Nursing note and vitals reviewed.            Assessment:       Encounter Diagnoses   Name Primary?    Onychomycosis due to dermatophyte Yes    Diabetic polyneuropathy associated with type 2 diabetes mellitus          Plan:       Diana was seen today for routine foot care.    Diagnoses and all orders for this visit:    Onychomycosis due to dermatophyte  -     Routine Foot Care    Diabetic polyneuropathy associated with type 2 diabetes mellitus  -     Routine Foot Care      I counseled the patient on her conditions, their implications and medical management.    -Nails debrided, see procedure note.        -Shoe inspection. Diabetic Foot Education. Patient reminded of the importance of good nutrition and blood sugar control to help prevent podiatric complications of diabetes. Patient instructed on proper foot hygeine. We discussed wearing proper shoe gear, daily foot inspections, never walking without protective shoe gear, never putting sharp instruments to feet, routine podiatric nail visits      -Discussed general foot care:  Wear comfortable, proper fitting shoes. Wash feet daily. Dry well. After drying, apply moisturizer to feet (no lotion to webspaces). Inspect feet daily for skin breaks, blisters, swelling, or redness. Wear cotton socks (preferably white)  Change socks every day. Do NOT walk barefoot. Do NOT use heating pads or warm/hot water soaks.      -I discussed with the  patient  signs and symptoms of infection including redness, drainage, purulence, odor, pain, elevated BS, streaking, fever, chills, etc . Patient is to seek medical attention (ER or urgent care) if these symptoms occur       RTC in 3 mo, sooner PRN

## 2020-05-19 ENCOUNTER — TELEPHONE (OUTPATIENT)
Dept: INTERNAL MEDICINE | Facility: CLINIC | Age: 65
End: 2020-05-19

## 2020-05-19 DIAGNOSIS — E11.40 TYPE 2 DIABETES MELLITUS WITH DIABETIC NEUROPATHY, WITHOUT LONG-TERM CURRENT USE OF INSULIN: Primary | ICD-10-CM

## 2020-05-19 DIAGNOSIS — E03.9 ACQUIRED HYPOTHYROIDISM: ICD-10-CM

## 2020-05-19 DIAGNOSIS — E78.5 HYPERLIPIDEMIA, UNSPECIFIED HYPERLIPIDEMIA TYPE: ICD-10-CM

## 2020-05-19 DIAGNOSIS — Z20.822 EXPOSURE TO COVID-19 VIRUS: ICD-10-CM

## 2020-05-19 DIAGNOSIS — Z79.01 CURRENT USE OF LONG TERM ANTICOAGULATION: ICD-10-CM

## 2020-05-19 DIAGNOSIS — I10 ESSENTIAL HYPERTENSION: ICD-10-CM

## 2020-05-19 NOTE — TELEPHONE ENCOUNTER
----- Message from Deborah Ward sent at 5/19/2020  4:54 PM CDT -----  Contact: Self   Pt is requesting for orders for antibody test be added to lab appt on 6/8.

## 2020-05-22 DIAGNOSIS — Z98.84 S/P LAPAROSCOPIC SLEEVE GASTRECTOMY: ICD-10-CM

## 2020-05-24 RX ORDER — APIXABAN 5 MG/1
TABLET, FILM COATED ORAL
Qty: 60 TABLET | Refills: 3 | Status: SHIPPED | OUTPATIENT
Start: 2020-05-24 | End: 2020-06-16 | Stop reason: SDUPTHER

## 2020-05-24 RX ORDER — METFORMIN HYDROCHLORIDE 1000 MG/1
TABLET ORAL
Qty: 60 TABLET | Refills: 3 | Status: SHIPPED | OUTPATIENT
Start: 2020-05-24 | End: 2020-10-05 | Stop reason: SDUPTHER

## 2020-05-24 RX ORDER — OMEPRAZOLE 40 MG/1
CAPSULE, DELAYED RELEASE ORAL
Qty: 30 CAPSULE | Refills: 3 | Status: SHIPPED | OUTPATIENT
Start: 2020-05-24 | End: 2020-06-16 | Stop reason: SDUPTHER

## 2020-05-26 ENCOUNTER — PATIENT MESSAGE (OUTPATIENT)
Dept: INTERNAL MEDICINE | Facility: CLINIC | Age: 65
End: 2020-05-26

## 2020-05-26 RX ORDER — ACYCLOVIR 50 MG/G
OINTMENT TOPICAL
Qty: 5 G | Refills: 1 | Status: SHIPPED | OUTPATIENT
Start: 2020-05-26 | End: 2022-07-15 | Stop reason: SDUPTHER

## 2020-05-28 LAB
LEFT EYE DM RETINOPATHY: POSITIVE
RIGHT EYE DM RETINOPATHY: POSITIVE

## 2020-06-02 ENCOUNTER — PATIENT OUTREACH (OUTPATIENT)
Dept: ADMINISTRATIVE | Facility: HOSPITAL | Age: 65
End: 2020-06-02

## 2020-06-09 ENCOUNTER — LAB VISIT (OUTPATIENT)
Dept: LAB | Facility: HOSPITAL | Age: 65
End: 2020-06-09
Attending: INTERNAL MEDICINE
Payer: COMMERCIAL

## 2020-06-09 DIAGNOSIS — I10 ESSENTIAL HYPERTENSION: ICD-10-CM

## 2020-06-09 DIAGNOSIS — E11.40 TYPE 2 DIABETES MELLITUS WITH DIABETIC NEUROPATHY, WITHOUT LONG-TERM CURRENT USE OF INSULIN: ICD-10-CM

## 2020-06-09 DIAGNOSIS — E78.5 HYPERLIPIDEMIA, UNSPECIFIED HYPERLIPIDEMIA TYPE: ICD-10-CM

## 2020-06-09 DIAGNOSIS — Z20.822 EXPOSURE TO COVID-19 VIRUS: ICD-10-CM

## 2020-06-09 LAB
ALBUMIN SERPL BCP-MCNC: 3.6 G/DL (ref 3.5–5.2)
ALP SERPL-CCNC: 82 U/L (ref 55–135)
ALT SERPL W/O P-5'-P-CCNC: 23 U/L (ref 10–44)
ANION GAP SERPL CALC-SCNC: 6 MMOL/L (ref 8–16)
AST SERPL-CCNC: 24 U/L (ref 10–40)
BASOPHILS # BLD AUTO: 0.04 K/UL (ref 0–0.2)
BASOPHILS NFR BLD: 0.7 % (ref 0–1.9)
BILIRUB SERPL-MCNC: 0.3 MG/DL (ref 0.1–1)
BUN SERPL-MCNC: 36 MG/DL (ref 8–23)
CALCIUM SERPL-MCNC: 10.4 MG/DL (ref 8.7–10.5)
CHLORIDE SERPL-SCNC: 107 MMOL/L (ref 95–110)
CHOLEST SERPL-MCNC: 151 MG/DL (ref 120–199)
CHOLEST/HDLC SERPL: 2.4 {RATIO} (ref 2–5)
CO2 SERPL-SCNC: 28 MMOL/L (ref 23–29)
CREAT SERPL-MCNC: 1.1 MG/DL (ref 0.5–1.4)
DIFFERENTIAL METHOD: ABNORMAL
EOSINOPHIL # BLD AUTO: 0.1 K/UL (ref 0–0.5)
EOSINOPHIL NFR BLD: 2.5 % (ref 0–8)
ERYTHROCYTE [DISTWIDTH] IN BLOOD BY AUTOMATED COUNT: 13.3 % (ref 11.5–14.5)
EST. GFR  (AFRICAN AMERICAN): >60 ML/MIN/1.73 M^2
EST. GFR  (NON AFRICAN AMERICAN): 53.6 ML/MIN/1.73 M^2
GLUCOSE SERPL-MCNC: 68 MG/DL (ref 70–110)
HCT VFR BLD AUTO: 35.5 % (ref 37–48.5)
HDLC SERPL-MCNC: 63 MG/DL (ref 40–75)
HDLC SERPL: 41.7 % (ref 20–50)
HGB BLD-MCNC: 10.6 G/DL (ref 12–16)
IMM GRANULOCYTES # BLD AUTO: 0.01 K/UL (ref 0–0.04)
IMM GRANULOCYTES NFR BLD AUTO: 0.2 % (ref 0–0.5)
LDLC SERPL CALC-MCNC: 74.8 MG/DL (ref 63–159)
LYMPHOCYTES # BLD AUTO: 1.7 K/UL (ref 1–4.8)
LYMPHOCYTES NFR BLD: 29.9 % (ref 18–48)
MCH RBC QN AUTO: 27.4 PG (ref 27–31)
MCHC RBC AUTO-ENTMCNC: 29.9 G/DL (ref 32–36)
MCV RBC AUTO: 92 FL (ref 82–98)
MONOCYTES # BLD AUTO: 0.4 K/UL (ref 0.3–1)
MONOCYTES NFR BLD: 6.7 % (ref 4–15)
NEUTROPHILS # BLD AUTO: 3.4 K/UL (ref 1.8–7.7)
NEUTROPHILS NFR BLD: 60 % (ref 38–73)
NONHDLC SERPL-MCNC: 88 MG/DL
NRBC BLD-RTO: 0 /100 WBC
PLATELET # BLD AUTO: 139 K/UL (ref 150–350)
PMV BLD AUTO: 11 FL (ref 9.2–12.9)
POTASSIUM SERPL-SCNC: 4.9 MMOL/L (ref 3.5–5.1)
PROT SERPL-MCNC: 7.2 G/DL (ref 6–8.4)
RBC # BLD AUTO: 3.87 M/UL (ref 4–5.4)
SARS-COV-2 IGG SERPLBLD QL IA.RAPID: NEGATIVE
SODIUM SERPL-SCNC: 141 MMOL/L (ref 136–145)
TRIGL SERPL-MCNC: 66 MG/DL (ref 30–150)
WBC # BLD AUTO: 5.68 K/UL (ref 3.9–12.7)

## 2020-06-09 PROCEDURE — 83036 HEMOGLOBIN GLYCOSYLATED A1C: CPT

## 2020-06-09 PROCEDURE — 86769 SARS-COV-2 COVID-19 ANTIBODY: CPT

## 2020-06-09 PROCEDURE — 85025 COMPLETE CBC W/AUTO DIFF WBC: CPT

## 2020-06-09 PROCEDURE — 80061 LIPID PANEL: CPT

## 2020-06-09 PROCEDURE — 36415 COLL VENOUS BLD VENIPUNCTURE: CPT | Mod: PO

## 2020-06-09 PROCEDURE — 80053 COMPREHEN METABOLIC PANEL: CPT

## 2020-06-10 LAB
ESTIMATED AVG GLUCOSE: 148 MG/DL (ref 68–131)
HBA1C MFR BLD HPLC: 6.8 % (ref 4–5.6)

## 2020-06-16 ENCOUNTER — OFFICE VISIT (OUTPATIENT)
Dept: INTERNAL MEDICINE | Facility: CLINIC | Age: 65
End: 2020-06-16
Payer: COMMERCIAL

## 2020-06-16 VITALS
OXYGEN SATURATION: 99 % | TEMPERATURE: 98 F | BODY MASS INDEX: 35.78 KG/M2 | SYSTOLIC BLOOD PRESSURE: 128 MMHG | HEIGHT: 67 IN | HEART RATE: 65 BPM | RESPIRATION RATE: 18 BRPM | DIASTOLIC BLOOD PRESSURE: 60 MMHG | WEIGHT: 227.94 LBS

## 2020-06-16 DIAGNOSIS — Z98.84 S/P LAPAROSCOPIC SLEEVE GASTRECTOMY: ICD-10-CM

## 2020-06-16 DIAGNOSIS — E03.9 ACQUIRED HYPOTHYROIDISM: ICD-10-CM

## 2020-06-16 DIAGNOSIS — I10 ESSENTIAL HYPERTENSION: ICD-10-CM

## 2020-06-16 DIAGNOSIS — E78.5 HYPERLIPIDEMIA, UNSPECIFIED HYPERLIPIDEMIA TYPE: ICD-10-CM

## 2020-06-16 DIAGNOSIS — E11.40 TYPE 2 DIABETES MELLITUS WITH DIABETIC NEUROPATHY, WITHOUT LONG-TERM CURRENT USE OF INSULIN: Primary | ICD-10-CM

## 2020-06-16 DIAGNOSIS — I48.0 PAROXYSMAL ATRIAL FIBRILLATION: Chronic | ICD-10-CM

## 2020-06-16 PROCEDURE — 99214 PR OFFICE/OUTPT VISIT, EST, LEVL IV, 30-39 MIN: ICD-10-PCS | Mod: S$GLB,,, | Performed by: INTERNAL MEDICINE

## 2020-06-16 PROCEDURE — 3008F PR BODY MASS INDEX (BMI) DOCUMENTED: ICD-10-PCS | Mod: CPTII,S$GLB,, | Performed by: INTERNAL MEDICINE

## 2020-06-16 PROCEDURE — 3078F PR MOST RECENT DIASTOLIC BLOOD PRESSURE < 80 MM HG: ICD-10-PCS | Mod: CPTII,S$GLB,, | Performed by: INTERNAL MEDICINE

## 2020-06-16 PROCEDURE — 99999 PR PBB SHADOW E&M-EST. PATIENT-LVL V: CPT | Mod: PBBFAC,,, | Performed by: INTERNAL MEDICINE

## 2020-06-16 PROCEDURE — 3044F PR MOST RECENT HEMOGLOBIN A1C LEVEL <7.0%: ICD-10-PCS | Mod: CPTII,S$GLB,, | Performed by: INTERNAL MEDICINE

## 2020-06-16 PROCEDURE — 3078F DIAST BP <80 MM HG: CPT | Mod: CPTII,S$GLB,, | Performed by: INTERNAL MEDICINE

## 2020-06-16 PROCEDURE — 3074F SYST BP LT 130 MM HG: CPT | Mod: CPTII,S$GLB,, | Performed by: INTERNAL MEDICINE

## 2020-06-16 PROCEDURE — 99214 OFFICE O/P EST MOD 30 MIN: CPT | Mod: S$GLB,,, | Performed by: INTERNAL MEDICINE

## 2020-06-16 PROCEDURE — 99999 PR PBB SHADOW E&M-EST. PATIENT-LVL V: ICD-10-PCS | Mod: PBBFAC,,, | Performed by: INTERNAL MEDICINE

## 2020-06-16 PROCEDURE — 3074F PR MOST RECENT SYSTOLIC BLOOD PRESSURE < 130 MM HG: ICD-10-PCS | Mod: CPTII,S$GLB,, | Performed by: INTERNAL MEDICINE

## 2020-06-16 PROCEDURE — 3008F BODY MASS INDEX DOCD: CPT | Mod: CPTII,S$GLB,, | Performed by: INTERNAL MEDICINE

## 2020-06-16 PROCEDURE — 3044F HG A1C LEVEL LT 7.0%: CPT | Mod: CPTII,S$GLB,, | Performed by: INTERNAL MEDICINE

## 2020-06-16 RX ORDER — GLIMEPIRIDE 4 MG/1
TABLET ORAL
Qty: 180 TABLET | Refills: 3 | Status: SHIPPED | OUTPATIENT
Start: 2020-06-16 | End: 2020-11-17 | Stop reason: ALTCHOICE

## 2020-06-16 RX ORDER — VALSARTAN AND HYDROCHLOROTHIAZIDE 160; 12.5 MG/1; MG/1
1 TABLET, FILM COATED ORAL DAILY
Qty: 90 TABLET | Refills: 3 | Status: SHIPPED | OUTPATIENT
Start: 2020-06-16 | End: 2020-09-17

## 2020-06-16 RX ORDER — ALBUTEROL SULFATE 90 UG/1
2 AEROSOL, METERED RESPIRATORY (INHALATION) EVERY 6 HOURS PRN
Qty: 18 G | Refills: 2 | Status: SHIPPED | OUTPATIENT
Start: 2020-06-16 | End: 2021-09-23 | Stop reason: SDUPTHER

## 2020-06-16 RX ORDER — OMEPRAZOLE 40 MG/1
40 CAPSULE, DELAYED RELEASE ORAL EVERY MORNING
Qty: 90 CAPSULE | Refills: 3 | Status: SHIPPED | OUTPATIENT
Start: 2020-06-16 | End: 2021-05-30

## 2020-06-16 RX ORDER — LEVOTHYROXINE SODIUM 125 UG/1
125 TABLET ORAL DAILY
Qty: 90 TABLET | Refills: 3 | Status: SHIPPED | OUTPATIENT
Start: 2020-06-16 | End: 2021-02-18

## 2020-06-16 RX ORDER — ROSUVASTATIN CALCIUM 40 MG/1
40 TABLET, COATED ORAL DAILY
Qty: 90 TABLET | Refills: 3 | Status: SHIPPED | OUTPATIENT
Start: 2020-06-16 | End: 2021-07-02 | Stop reason: SDUPTHER

## 2020-06-16 RX ORDER — IBUPROFEN 600 MG/1
600 TABLET ORAL EVERY 6 HOURS PRN
Qty: 40 TABLET | Refills: 0 | Status: SHIPPED | OUTPATIENT
Start: 2020-06-16 | End: 2020-06-26

## 2020-06-16 RX ORDER — METOPROLOL TARTRATE 25 MG/1
25 TABLET, FILM COATED ORAL ONCE AS NEEDED
Qty: 30 TABLET | Refills: 11 | Status: SHIPPED | OUTPATIENT
Start: 2020-06-16 | End: 2022-08-09 | Stop reason: SDUPTHER

## 2020-06-16 NOTE — PROGRESS NOTES
Subjective:       Patient ID: Diana Montenegro is a 64 y.o. female.    Chief Complaint: Follow-up (4 month), Knee Pain (both), and Lung Mass (back of left calf)    HPI   The patient presents for follow-up of medical conditions which include type 2 diabetes mellitus, hypertension, hyperlipidemia, and left knee pain.    The patient states she fell 2 weeks ago outside of her home injuring both legs, arm, face.  She bruised the left calf area.  The injuries are resolving.  She has been notes noting left knee pain but no swelling.  There is no history of any head injury or loss of consciousness.  In patient slipped and fell.    She has not been monitoring blood sugars regularly.  No hypoglycemia has been noted.  She does not monitor blood pressure levels.  She has been tolerating her medication well.    Review of Systems   Constitutional: Negative for activity change, appetite change and unexpected weight change.   Eyes: Negative for visual disturbance.   Respiratory: Negative for shortness of breath.    Cardiovascular: Negative for chest pain, palpitations and leg swelling.   Gastrointestinal: Negative for abdominal pain, blood in stool and diarrhea.   Genitourinary: Negative for dysuria, frequency, hematuria and urgency.   Musculoskeletal: Positive for arthralgias.   Neurological: Negative for weakness, numbness and headaches.   Psychiatric/Behavioral: Negative for sleep disturbance.         Objective:      Physical Exam  Vitals signs and nursing note reviewed.   Constitutional:       General: She is not in acute distress.     Appearance: She is well-developed.      Comments: The patient has gained 5 lb since 11/11/2019.   HENT:      Head: Normocephalic and atraumatic.   Eyes:      General: No scleral icterus.     Conjunctiva/sclera: Conjunctivae normal.      Pupils: Pupils are equal, round, and reactive to light.   Neck:      Musculoskeletal: Normal range of motion and neck supple.      Thyroid: No thyromegaly.       Vascular: No JVD.   Cardiovascular:      Rate and Rhythm: Normal rate and regular rhythm.      Heart sounds: Normal heart sounds. No murmur. No friction rub. No gallop.    Pulmonary:      Effort: Pulmonary effort is normal. No respiratory distress.      Breath sounds: Normal breath sounds. No wheezing or rales.   Abdominal:      General: Bowel sounds are normal.      Palpations: Abdomen is soft. There is no mass.      Tenderness: There is no abdominal tenderness.   Musculoskeletal: Normal range of motion.         General: No tenderness.      Comments: Left knee range of motion is intact.  No effusion is appreciated.   Lymphadenopathy:      Cervical: No cervical adenopathy.   Skin:     General: Skin is warm and dry.      Findings: Bruising present. No rash.      Comments: No foot lesions are present.    Ecchymoses are noted of the right forearm and left posterior calf.  Bruises are nontender to palpation.   Neurological:      Mental Status: She is alert and oriented to person, place, and time.      Cranial Nerves: No cranial nerve deficit.      Comments: Sensory exam is intact in both feet on monofilament and vibration testing.   Psychiatric:         Behavior: Behavior normal.         Results for orders placed or performed in visit on 06/09/20   CBC auto differential   Result Value Ref Range    WBC 5.68 3.90 - 12.70 K/uL    RBC 3.87 (L) 4.00 - 5.40 M/uL    Hemoglobin 10.6 (L) 12.0 - 16.0 g/dL    Hematocrit 35.5 (L) 37.0 - 48.5 %    Mean Corpuscular Volume 92 82 - 98 fL    Mean Corpuscular Hemoglobin 27.4 27.0 - 31.0 pg    Mean Corpuscular Hemoglobin Conc 29.9 (L) 32.0 - 36.0 g/dL    RDW 13.3 11.5 - 14.5 %    Platelets 139 (L) 150 - 350 K/uL    MPV 11.0 9.2 - 12.9 fL    Immature Granulocytes 0.2 0.0 - 0.5 %    Gran # (ANC) 3.4 1.8 - 7.7 K/uL    Immature Grans (Abs) 0.01 0.00 - 0.04 K/uL    Lymph # 1.7 1.0 - 4.8 K/uL    Mono # 0.4 0.3 - 1.0 K/uL    Eos # 0.1 0.0 - 0.5 K/uL    Baso # 0.04 0.00 - 0.20 K/uL    nRBC 0  0 /100 WBC    Gran% 60.0 38.0 - 73.0 %    Lymph% 29.9 18.0 - 48.0 %    Mono% 6.7 4.0 - 15.0 %    Eosinophil% 2.5 0.0 - 8.0 %    Basophil% 0.7 0.0 - 1.9 %    Differential Method Automated    Comprehensive metabolic panel   Result Value Ref Range    Sodium 141 136 - 145 mmol/L    Potassium 4.9 3.5 - 5.1 mmol/L    Chloride 107 95 - 110 mmol/L    CO2 28 23 - 29 mmol/L    Glucose 68 (L) 70 - 110 mg/dL    BUN, Bld 36 (H) 8 - 23 mg/dL    Creatinine 1.1 0.5 - 1.4 mg/dL    Calcium 10.4 8.7 - 10.5 mg/dL    Total Protein 7.2 6.0 - 8.4 g/dL    Albumin 3.6 3.5 - 5.2 g/dL    Total Bilirubin 0.3 0.1 - 1.0 mg/dL    Alkaline Phosphatase 82 55 - 135 U/L    AST 24 10 - 40 U/L    ALT 23 10 - 44 U/L    Anion Gap 6 (L) 8 - 16 mmol/L    eGFR if African American >60.0 >60 mL/min/1.73 m^2    eGFR if non  53.6 (A) >60 mL/min/1.73 m^2   Lipid panel   Result Value Ref Range    Cholesterol 151 120 - 199 mg/dL    Triglycerides 66 30 - 150 mg/dL    HDL 63 40 - 75 mg/dL    LDL Cholesterol 74.8 63.0 - 159.0 mg/dL    Hdl/Cholesterol Ratio 41.7 20.0 - 50.0 %    Total Cholesterol/HDL Ratio 2.4 2.0 - 5.0    Non-HDL Cholesterol 88 mg/dL   Hemoglobin A1c   Result Value Ref Range    Hemoglobin A1C 6.8 (H) 4.0 - 5.6 %    Estimated Avg Glucose 148 (H) 68 - 131 mg/dL   COVID-19 (SARS CoV-2) IgG Antibody   Result Value Ref Range    COVID-19 (SARS CoV-2) IgG Ab Negative Negative       Assessment:       1. Type 2 diabetes mellitus with diabetic neuropathy, without long-term current use of insulin    2. Essential hypertension    3. Acquired hypothyroidism    4. Hyperlipidemia, unspecified hyperlipidemia type        Plan:     Diana was seen today for follow-up.  Ibuprofen will be ordered to use as needed for knee pain.  Current therapy will be continued.  Blood tests and follow-up visit in 4 months will be obtained.     Diagnoses and all orders for this visit:    Type 2 diabetes mellitus with diabetic neuropathy, without long-term current use of  insulin  -     Comprehensive metabolic panel; Future  -     Hemoglobin A1C; Future    Essential hypertension  -     Comprehensive metabolic panel; Future  -     Hemoglobin A1C; Future    Acquired hypothyroidism  -     Comprehensive metabolic panel; Future  -     Hemoglobin A1C; Future    Hyperlipidemia, unspecified hyperlipidemia type  -     rosuvastatin (CRESTOR) 40 MG Tab; Take 1 tablet (40 mg total) by mouth once daily.  -     Comprehensive metabolic panel; Future  -     Hemoglobin A1C; Future    S/P laparoscopic sleeve gastrectomy  -     omeprazole (PRILOSEC) 40 MG capsule; Take 1 capsule (40 mg total) by mouth every morning.    Paroxysmal atrial fibrillation  -     metoprolol tartrate (LOPRESSOR) 25 MG tablet; Take 1 tablet (25 mg total) by mouth once as needed. For palpitations    Other orders  -     valsartan-hydrochlorothiazide (DIOVAN-HCT) 160-12.5 mg per tablet; Take 1 tablet by mouth once daily.  -     levothyroxine (SYNTHROID) 125 MCG tablet; Take 1 tablet (125 mcg total) by mouth once daily.  -     glimepiride (AMARYL) 4 MG tablet; TAKE 1 TABLET BY MOUTH TWICE DAILY  -     apixaban (ELIQUIS) 5 mg Tab; Take 1 tablet (5 mg total) by mouth 2 (two) times daily.  -     albuterol (PROVENTIL/VENTOLIN HFA) 90 mcg/actuation inhaler; Inhale 2 puffs into the lungs every 6 (six) hours as needed for Wheezing. Rescue  -     ibuprofen (ADVIL,MOTRIN) 600 MG tablet; Take 1 tablet (600 mg total) by mouth every 6 (six) hours as needed for Pain.

## 2020-07-28 DIAGNOSIS — I49.8 OTHER SPECIFIED CARDIAC ARRHYTHMIAS: Primary | ICD-10-CM

## 2020-08-05 NOTE — PROGRESS NOTES
Ms. Montenegro is a patient of Dr. Randolph and was last seen in clinic 11/1/2019.      Subjective:   Patient ID:  Diana Montenegro is a 65 y.o. female who presents for follow-up of Atrial Fibrillation  .     HPI:    Ms. Montenegro is a 65 y.o. female with hypertension, hyperlipidemia, diabetes mellitus, obesity s/p gastric sleeve surgery, right MCA infarction July of 2014 s/p ILR implant (now removed), atrial fibrillation, and bradycardia here for follow up.    Background:    History of hypertension, hyperlipidemia, diabetes mellitus, obesity s/p gastric sleeve surgery, right MCA infarction July of 2014 s/p ILR implant, atrial fibrillation, and bradycardia.   7/14 had rt MCA stroke.   She was outside of the treatment window for thrombolytics and recovered completely.  Work-up included carotid dopplers showing 60% right ICA stenosis.  ILR implanted.  ILR monitoring has revealed rare paroxysmal atrial fibrillation, with episodes up to just less than an hour in duration, asymptomatic. Eliquis 5 mg BID initiated.  Has felt well.   She underwent successful ILR removal on 9/17/2018. Remains on eliquis.  Reported palpitations 4/2019. Holter monitor 4/22/2019 showed very rare ectopy and no arrhythmias.   At clinic visit 11/2019 she was prescribed BB PRN for palps.    Update (08/10/2020):    Today she says she feels well. No cardiac complaints. Ms. Montenegro denies chest pain with exertion or at rest, palpitations, SOB, KING, dizziness, or syncope. She has not needed to use PRN metoprolol.     She is currently taking eliquis 5mg BID for stroke prophylaxis and denies significant bleeding episodes. Kidney function is stable, with a creatinine of 1.1 on 6/9/2020.    I have personally reviewed the patient's EKG today, which shows sinus rhythm at 61bpm. VT interval is 172. QRS is 90. QTc is 402.    Recent Cardiac Tests:    2D Echo (7/17/2014):  CONCLUSIONS     1 - Normal left ventricular systolic function (EF 60-65%).     2 - Normal left  ventricular diastolic function.     3 - Normal right ventricular systolic function .     4 - Mild left atrial enlargement.       Current Outpatient Medications   Medication Sig    acyclovir 5% (ZOVIRAX) 5 % ointment Apply topically 6 (six) times daily.    albuterol (PROVENTIL/VENTOLIN HFA) 90 mcg/actuation inhaler Inhale 2 puffs into the lungs every 6 (six) hours as needed for Wheezing. Rescue    apixaban (ELIQUIS) 5 mg Tab Take 1 tablet (5 mg total) by mouth 2 (two) times daily.    aspirin (ECOTRIN) 81 MG EC tablet Take 1 tablet (81 mg total) by mouth once daily.    b complex vitamins tablet Take 1 tablet by mouth once daily.    blood pressure test kit-large Kit Use as directed    blood sugar diagnostic Strp Test blood sugar once daily    CALCIUM CITRATE/VITAMIN D3 (CALCIUM CITRATE + D ORAL) Take 2 tablets by mouth 2 (two) times daily.    cetirizine (ZYRTEC) 5 MG tablet Take 5 mg by mouth once daily.    cinnamon bark (CINNAMON ORAL) Take by mouth 2 (two) times daily.    cyanocobalamin, vitamin B-12, 500 mcg Subl Place 1 tablet under the tongue once daily.    docusate sodium (COLACE) 100 MG capsule Take 100 mg by mouth once daily.     fish oil-omega-3 fatty acids 300-1,000 mg capsule Take by mouth 2 (two) times daily.    fluticasone (FLONASE) 50 mcg/actuation nasal spray 2 sprays (100 mcg total) by Each Nare route once daily. (Patient taking differently: 2 sprays by Each Nare route daily as needed. )    glimepiride (AMARYL) 4 MG tablet TAKE 1 TABLET BY MOUTH TWICE DAILY    lancets (ONE TOUCH DELICA LANCETS) 33 gauge Misc 1 lancet by Misc.(Non-Drug; Combo Route) route 4 (four) times daily.    levothyroxine (SYNTHROID) 125 MCG tablet Take 1 tablet (125 mcg total) by mouth once daily.    LORazepam (ATIVAN) 1 MG tablet     metFORMIN (GLUCOPHAGE) 1000 MG tablet TAKE 1 TABLET BY MOUTH TWICE DAILY WITH MEALS    multivitamin capsule Take 1 capsule by mouth once daily.    omeprazole (PRILOSEC) 40 MG  capsule Take 1 capsule (40 mg total) by mouth every morning.    rosuvastatin (CRESTOR) 40 MG Tab Take 1 tablet (40 mg total) by mouth once daily.    RUTIN/HESP/BIOFLAV/C/HERB#196 (BIOFLEX ORAL) Take 2 tablets by mouth once daily.    senna (SENNA) 8.6 mg tablet Take 2 tablets by mouth nightly as needed for Constipation.    traZODone (DESYREL) 50 MG tablet 1 pill PO PRN for insomnia at bedtime. OK to take 2nd pill in 30 minutes if still awake.    valsartan-hydrochlorothiazide (DIOVAN-HCT) 160-12.5 mg per tablet Take 1 tablet by mouth once daily.    albuterol-ipratropium 2.5mg-0.5mg/3mL (DUO-NEB) 0.5 mg-3 mg(2.5 mg base)/3 mL nebulizer solution Take 3 mLs by nebulization every 6 (six) hours as needed for Wheezing. Rescue    blood-glucose meter (ONETOUCH ULTRA SYSTEM KIT) kit Use as instructed    levocetirizine (XYZAL) 5 MG tablet Take 1 tablet (5 mg total) by mouth every evening. (Patient not taking: Reported on 1/20/2020)    metoprolol tartrate (LOPRESSOR) 25 MG tablet Take 1 tablet (25 mg total) by mouth once as needed. For palpitations    walker (ULTRA-LIGHT ROLLATOR) Misc Use walker w/ seat daily as directed. (Patient not taking: Reported on 8/10/2020)     No current facility-administered medications for this visit.      Facility-Administered Medications Ordered in Other Visits   Medication    0.9%  NaCl infusion    ceFAZolin injection 2 g       Review of Systems   Constitution: Negative for malaise/fatigue.   Cardiovascular: Negative for chest pain, dyspnea on exertion, irregular heartbeat, leg swelling and palpitations.   Respiratory: Negative for shortness of breath.    Hematologic/Lymphatic: Negative for bleeding problem.   Skin: Negative for rash.   Musculoskeletal: Negative for myalgias.   Gastrointestinal: Negative for hematemesis, hematochezia and nausea.   Genitourinary: Negative for hematuria.   Neurological: Negative for light-headedness.   Psychiatric/Behavioral: Negative for altered mental  "status.   Allergic/Immunologic: Negative for persistent infections.         Objective:          BP (!) 116/59   Pulse 61   Ht 5' 5" (1.651 m)   Wt 100.5 kg (221 lb 9 oz)   LMP  (LMP Unknown)   BMI 36.87 kg/m²     Physical Exam   Constitutional: She is oriented to person, place, and time. She appears well-developed and well-nourished.   HENT:   Head: Normocephalic.   Nose: Nose normal.   Eyes: Pupils are equal, round, and reactive to light.   Cardiovascular: Normal rate, regular rhythm, S1 normal and S2 normal.   No murmur heard.  Pulses:       Radial pulses are 2+ on the right side and 2+ on the left side.   Pulmonary/Chest: Breath sounds normal. No respiratory distress.   Abdominal: Normal appearance.   Musculoskeletal: Normal range of motion.         General: No edema.   Neurological: She is alert and oriented to person, place, and time.   Skin: Skin is warm and dry. No erythema.   Psychiatric: She has a normal mood and affect. Her speech is normal and behavior is normal.   Nursing note and vitals reviewed.    Lab Results   Component Value Date     06/09/2020    K 4.9 06/09/2020    MG 1.6 07/10/2018    BUN 36 (H) 06/09/2020    CREATININE 1.1 06/09/2020    ALT 23 06/09/2020    AST 24 06/09/2020    HGB 10.6 (L) 06/09/2020    HCT 35.5 (L) 06/09/2020    TSH 0.566 08/30/2019    LDLCALC 74.8 06/09/2020       Recent Labs   Lab 09/10/18  0852   INR 1.0        Assessment:     1. Paroxysmal atrial fibrillation    2. Essential hypertension    3. Current use of long term anticoagulation    4. Status post placement of implantable loop recorder      Plan:     In summary, Ms. Montenegro is a 65 y.o. female with hypertension, hyperlipidemia, diabetes mellitus, obesity s/p gastric sleeve surgery, right MCA infarction July of 2014 s/p ILR implant (now removed), atrial fibrillation, and bradycardia here for follow up.  She is doing well from a rhythm standpoint, with no documented or symptomatic AF episodes since last clinic " visit. Has BB PRN for palps but has not needed it. CHADSVASc 3 and remains on eliquis for CVA prophylaxis.    Continue current medications.  RTC 1 yr, sooner if needed.    *A copy of this note has been sent to Dr. Randolph*    Follow up in about 1 year (around 8/10/2021).    ------------------------------------------------------------------    CHELITA Fuentes, NP-C  Cardiac Electrophysiology

## 2020-08-07 ENCOUNTER — PATIENT OUTREACH (OUTPATIENT)
Dept: ADMINISTRATIVE | Facility: OTHER | Age: 65
End: 2020-08-07

## 2020-08-07 NOTE — PROGRESS NOTES
Chart reviewed.   Immunizations: updated  Orders placed: n/a  Upcoming appts to satisfy JOCELYN topics: Hemoglobin A1c 10/16

## 2020-08-10 ENCOUNTER — TELEPHONE (OUTPATIENT)
Dept: CARDIOTHORACIC SURGERY | Facility: CLINIC | Age: 65
End: 2020-08-10

## 2020-08-10 ENCOUNTER — OFFICE VISIT (OUTPATIENT)
Dept: ELECTROPHYSIOLOGY | Facility: CLINIC | Age: 65
End: 2020-08-10
Payer: COMMERCIAL

## 2020-08-10 ENCOUNTER — HOSPITAL ENCOUNTER (OUTPATIENT)
Dept: CARDIOLOGY | Facility: CLINIC | Age: 65
Discharge: HOME | End: 2020-08-10
Payer: COMMERCIAL

## 2020-08-10 ENCOUNTER — TELEPHONE (OUTPATIENT)
Dept: VASCULAR SURGERY | Facility: CLINIC | Age: 65
End: 2020-08-10

## 2020-08-10 VITALS
BODY MASS INDEX: 36.91 KG/M2 | HEIGHT: 65 IN | SYSTOLIC BLOOD PRESSURE: 116 MMHG | WEIGHT: 221.56 LBS | HEART RATE: 61 BPM | DIASTOLIC BLOOD PRESSURE: 59 MMHG

## 2020-08-10 DIAGNOSIS — I10 ESSENTIAL HYPERTENSION: ICD-10-CM

## 2020-08-10 DIAGNOSIS — Z79.01 CURRENT USE OF LONG TERM ANTICOAGULATION: ICD-10-CM

## 2020-08-10 DIAGNOSIS — I49.8 OTHER SPECIFIED CARDIAC ARRHYTHMIAS: ICD-10-CM

## 2020-08-10 DIAGNOSIS — I65.23 BILATERAL CAROTID ARTERY STENOSIS: Primary | ICD-10-CM

## 2020-08-10 DIAGNOSIS — I48.0 PAROXYSMAL ATRIAL FIBRILLATION: Primary | Chronic | ICD-10-CM

## 2020-08-10 DIAGNOSIS — Z95.818 STATUS POST PLACEMENT OF IMPLANTABLE LOOP RECORDER: ICD-10-CM

## 2020-08-10 PROCEDURE — 3078F PR MOST RECENT DIASTOLIC BLOOD PRESSURE < 80 MM HG: ICD-10-PCS | Mod: CPTII,S$GLB,, | Performed by: NURSE PRACTITIONER

## 2020-08-10 PROCEDURE — 99214 PR OFFICE/OUTPT VISIT, EST, LEVL IV, 30-39 MIN: ICD-10-PCS | Mod: S$GLB,,, | Performed by: NURSE PRACTITIONER

## 2020-08-10 PROCEDURE — 3008F BODY MASS INDEX DOCD: CPT | Mod: CPTII,S$GLB,, | Performed by: NURSE PRACTITIONER

## 2020-08-10 PROCEDURE — 93005 RHYTHM STRIP: ICD-10-PCS | Mod: S$GLB,,, | Performed by: INTERNAL MEDICINE

## 2020-08-10 PROCEDURE — 3074F SYST BP LT 130 MM HG: CPT | Mod: CPTII,S$GLB,, | Performed by: NURSE PRACTITIONER

## 2020-08-10 PROCEDURE — 93010 RHYTHM STRIP: ICD-10-PCS | Mod: S$GLB,,, | Performed by: INTERNAL MEDICINE

## 2020-08-10 PROCEDURE — 99214 OFFICE O/P EST MOD 30 MIN: CPT | Mod: S$GLB,,, | Performed by: NURSE PRACTITIONER

## 2020-08-10 PROCEDURE — 93010 ELECTROCARDIOGRAM REPORT: CPT | Mod: S$GLB,,, | Performed by: INTERNAL MEDICINE

## 2020-08-10 PROCEDURE — 93005 ELECTROCARDIOGRAM TRACING: CPT | Mod: S$GLB,,, | Performed by: INTERNAL MEDICINE

## 2020-08-10 PROCEDURE — 99999 PR PBB SHADOW E&M-EST. PATIENT-LVL V: ICD-10-PCS | Mod: PBBFAC,,, | Performed by: NURSE PRACTITIONER

## 2020-08-10 PROCEDURE — 99999 PR PBB SHADOW E&M-EST. PATIENT-LVL V: CPT | Mod: PBBFAC,,, | Performed by: NURSE PRACTITIONER

## 2020-08-10 PROCEDURE — 3078F DIAST BP <80 MM HG: CPT | Mod: CPTII,S$GLB,, | Performed by: NURSE PRACTITIONER

## 2020-08-10 PROCEDURE — 3074F PR MOST RECENT SYSTOLIC BLOOD PRESSURE < 130 MM HG: ICD-10-PCS | Mod: CPTII,S$GLB,, | Performed by: NURSE PRACTITIONER

## 2020-08-10 PROCEDURE — 3008F PR BODY MASS INDEX (BMI) DOCUMENTED: ICD-10-PCS | Mod: CPTII,S$GLB,, | Performed by: NURSE PRACTITIONER

## 2020-08-10 NOTE — TELEPHONE ENCOUNTER
Sent the message below to Edgar Watts.          Dr Somers is a cardiovascular (aka cardiothoracic) surgeon.  You scheduled this pt to see us for carotid stenosis... he doesn't see carotid stenosis pts, I believe vascular or vascular surgery would see a pt with carotid stenosis.     I canceled the appointment you made for this patient.     Xiao Pollard RN for Dr Somers.

## 2020-08-10 NOTE — TELEPHONE ENCOUNTER
----- Message from Edgar Watts sent at 8/10/2020  3:49 PM CDT -----  Contact: Patient @[phone]  This patient is a former patient of Dr Ofelia Downs, she called to schedule a f/u appt, I scheduled her incorrectly with Dr Somers. Patient called schedule a f/u appt will you be able to assist, if so can you contact the patient as to I can't make outside calls i'm working remotely, tx ( Edgar GARAY -Patient access )

## 2020-08-10 NOTE — TELEPHONE ENCOUNTER
----- Message from Edgar Watts sent at 8/10/2020  2:53 PM CDT -----  Contact: Patient @ [phone]  Patient is schedule on 9-3rd if any labs are needed patient is expecting an update ( former patient of Dr Ofelia Downs  ) pls advise

## 2020-08-12 ENCOUNTER — HOSPITAL ENCOUNTER (OUTPATIENT)
Dept: RADIOLOGY | Facility: HOSPITAL | Age: 65
Discharge: HOME | End: 2020-08-12
Attending: INTERNAL MEDICINE
Payer: COMMERCIAL

## 2020-08-12 DIAGNOSIS — Z12.31 BREAST CANCER SCREENING BY MAMMOGRAM: ICD-10-CM

## 2020-08-12 PROCEDURE — 77067 MAMMO DIGITAL SCREENING BILAT WITH TOMOSYNTHESIS_CAD: ICD-10-PCS | Mod: 26,,, | Performed by: RADIOLOGY

## 2020-08-12 PROCEDURE — 77067 SCR MAMMO BI INCL CAD: CPT | Mod: TC,PO

## 2020-08-12 PROCEDURE — 77067 SCR MAMMO BI INCL CAD: CPT | Mod: 26,,, | Performed by: RADIOLOGY

## 2020-08-12 PROCEDURE — 77063 BREAST TOMOSYNTHESIS BI: CPT | Mod: 26,,, | Performed by: RADIOLOGY

## 2020-08-12 PROCEDURE — 77063 MAMMO DIGITAL SCREENING BILAT WITH TOMOSYNTHESIS_CAD: ICD-10-PCS | Mod: 26,,, | Performed by: RADIOLOGY

## 2020-08-22 ENCOUNTER — OFFICE VISIT (OUTPATIENT)
Dept: URGENT CARE | Facility: CLINIC | Age: 65
End: 2020-08-22
Payer: COMMERCIAL

## 2020-08-22 VITALS
DIASTOLIC BLOOD PRESSURE: 63 MMHG | RESPIRATION RATE: 18 BRPM | SYSTOLIC BLOOD PRESSURE: 125 MMHG | HEIGHT: 65 IN | HEART RATE: 64 BPM | OXYGEN SATURATION: 96 % | TEMPERATURE: 98 F | BODY MASS INDEX: 36.82 KG/M2 | WEIGHT: 221 LBS

## 2020-08-22 DIAGNOSIS — H60.91 OTITIS EXTERNA OF RIGHT EAR, UNSPECIFIED CHRONICITY, UNSPECIFIED TYPE: Primary | ICD-10-CM

## 2020-08-22 PROCEDURE — 99214 PR OFFICE/OUTPT VISIT, EST, LEVL IV, 30-39 MIN: ICD-10-PCS | Mod: S$GLB,,, | Performed by: FAMILY MEDICINE

## 2020-08-22 PROCEDURE — 99214 OFFICE O/P EST MOD 30 MIN: CPT | Mod: S$GLB,,, | Performed by: FAMILY MEDICINE

## 2020-08-22 RX ORDER — CIPROFLOXACIN 0.5 MG/.25ML
4 SOLUTION/ DROPS AURICULAR (OTIC) 2 TIMES DAILY
Qty: 20 EACH | Refills: 0 | Status: SHIPPED | OUTPATIENT
Start: 2020-08-22 | End: 2020-09-01

## 2020-08-22 NOTE — PROGRESS NOTES
"Subjective:       Patient ID: Diana Montenegro is a 65 y.o. female.    Vitals:  height is 5' 5" (1.651 m) and weight is 100.2 kg (221 lb). Her temperature is 98.2 °F (36.8 °C). Her blood pressure is 125/63 and her pulse is 64. Her respiration is 18 and oxygen saturation is 96%.     Chief Complaint: Otalgia    This is a 65 y.o. female who presents today with a chief complaint of   Right ear pain and sore throat. Took some flonase and she put vicks on a cotton bal    Otalgia   There is pain in the right ear. This is a new problem. The current episode started yesterday. The problem occurs constantly. The problem has been unchanged. There has been no fever. The pain is at a severity of 5/10. The pain is moderate. Associated symptoms include a sore throat. Pertinent negatives include no coughing, rash or vomiting. Treatments tried: flonase. The treatment provided no relief.       Constitution: Negative for chills, sweating, fatigue and fever.   HENT: Positive for ear pain, congestion, postnasal drip and sore throat. Negative for sinus pain, sinus pressure and voice change.    Neck: Negative for painful lymph nodes.   Eyes: Negative for eye redness.   Respiratory: Negative for chest tightness, cough, sputum production, bloody sputum, COPD, shortness of breath, stridor, wheezing and asthma.    Gastrointestinal: Negative for nausea and vomiting.   Musculoskeletal: Negative for muscle ache.   Skin: Negative for rash.   Allergic/Immunologic: Negative for seasonal allergies and asthma.   Hematologic/Lymphatic: Negative for swollen lymph nodes.       Objective:      Physical Exam   HENT:   Head: Normocephalic and atraumatic.   Ears:   Right Ear: Hearing and tympanic membrane normal. There is swelling (ear canal) and tenderness (movement of pinna). No drainage.   Left Ear: Hearing, tympanic membrane and ear canal normal.   Nose: Nose normal.   Mouth/Throat: Mucous membranes are moist.   Abdominal: Normal appearance. "   Neurological: She is alert.   Nursing note and vitals reviewed.        Assessment:       1. Otitis externa of right ear, unspecified chronicity, unspecified type        Plan:         Otitis externa of right ear, unspecified chronicity, unspecified type  -     ciprofloxacin HCl 0.2 % otic solution; Place 4 drops into the right ear 2 (two) times daily. for 10 days  Dispense: 20 each; Refill: 0

## 2020-08-22 NOTE — PATIENT INSTRUCTIONS

## 2020-08-27 ENCOUNTER — TELEPHONE (OUTPATIENT)
Dept: VASCULAR SURGERY | Facility: CLINIC | Age: 65
End: 2020-08-27

## 2020-08-27 NOTE — TELEPHONE ENCOUNTER
----- Message from Curry Kan sent at 8/27/2020 12:34 PM CDT -----  Patient called to speak w/ someone regarding rescheduling her appt scheduled for 8/28, requesting callback [phone]

## 2020-09-04 ENCOUNTER — OFFICE VISIT (OUTPATIENT)
Dept: VASCULAR SURGERY | Facility: CLINIC | Age: 65
End: 2020-09-04
Attending: SURGERY
Payer: COMMERCIAL

## 2020-09-04 ENCOUNTER — HOSPITAL ENCOUNTER (OUTPATIENT)
Dept: VASCULAR SURGERY | Facility: CLINIC | Age: 65
Discharge: HOME | End: 2020-09-04
Attending: SURGERY
Payer: COMMERCIAL

## 2020-09-04 VITALS
SYSTOLIC BLOOD PRESSURE: 131 MMHG | HEART RATE: 58 BPM | TEMPERATURE: 99 F | HEIGHT: 65 IN | BODY MASS INDEX: 37.9 KG/M2 | WEIGHT: 227.5 LBS | DIASTOLIC BLOOD PRESSURE: 66 MMHG

## 2020-09-04 DIAGNOSIS — I65.23 BILATERAL CAROTID ARTERY STENOSIS: ICD-10-CM

## 2020-09-04 DIAGNOSIS — I65.21 ASYMPTOMATIC STENOSIS OF RIGHT CAROTID ARTERY: Primary | ICD-10-CM

## 2020-09-04 PROCEDURE — 93880 EXTRACRANIAL BILAT STUDY: CPT | Mod: S$GLB,,, | Performed by: SURGERY

## 2020-09-04 PROCEDURE — 3008F BODY MASS INDEX DOCD: CPT | Mod: CPTII,S$GLB,, | Performed by: SURGERY

## 2020-09-04 PROCEDURE — 99213 OFFICE O/P EST LOW 20 MIN: CPT | Mod: S$GLB,,, | Performed by: SURGERY

## 2020-09-04 PROCEDURE — 3078F PR MOST RECENT DIASTOLIC BLOOD PRESSURE < 80 MM HG: ICD-10-PCS | Mod: CPTII,S$GLB,, | Performed by: SURGERY

## 2020-09-04 PROCEDURE — 3075F SYST BP GE 130 - 139MM HG: CPT | Mod: CPTII,S$GLB,, | Performed by: SURGERY

## 2020-09-04 PROCEDURE — 99999 PR PBB SHADOW E&M-EST. PATIENT-LVL III: ICD-10-PCS | Mod: PBBFAC,,, | Performed by: SURGERY

## 2020-09-04 PROCEDURE — 93880 PR DUPLEX SCAN EXTRACRANIAL,BILAT: ICD-10-PCS | Mod: S$GLB,,, | Performed by: SURGERY

## 2020-09-04 PROCEDURE — 99213 PR OFFICE/OUTPT VISIT, EST, LEVL III, 20-29 MIN: ICD-10-PCS | Mod: S$GLB,,, | Performed by: SURGERY

## 2020-09-04 PROCEDURE — 3075F PR MOST RECENT SYSTOLIC BLOOD PRESS GE 130-139MM HG: ICD-10-PCS | Mod: CPTII,S$GLB,, | Performed by: SURGERY

## 2020-09-04 PROCEDURE — 3008F PR BODY MASS INDEX (BMI) DOCUMENTED: ICD-10-PCS | Mod: CPTII,S$GLB,, | Performed by: SURGERY

## 2020-09-04 PROCEDURE — 3078F DIAST BP <80 MM HG: CPT | Mod: CPTII,S$GLB,, | Performed by: SURGERY

## 2020-09-04 PROCEDURE — 99999 PR PBB SHADOW E&M-EST. PATIENT-LVL III: CPT | Mod: PBBFAC,,, | Performed by: SURGERY

## 2020-09-04 NOTE — PROGRESS NOTES
VASCULAR SURGERY NOTE    Patient ID: Diana Montenegro is a 65 y.o. female.    I. HISTORY     Chief Complaint:  Follow up carotid stenosis    HPI: Diana Montenegro is a 65 y.o. female who is here today for established patient appointment.  She was previously followed by Dr. Downs for carotid stenosis.  She has a history of stroke (2014) which was attributed to thromboembolism from atrial fibrillation.  Her symptoms at that time were facial droop and slurred speech.  She does not have any permanent deficits.  She takes anticoagulation for thromboembolism prevention now.  She denies any stroke/TIA symptoms since her last appointment with Dr. Downs.  She is a previous smoker but quit many years ago after her stroke.    Family History: Reviewed. No history of aneurysm. + diabetes, + heart disease    Past Medical History:   Diagnosis Date    Arthritis     Atrial fibrillation, unspecified     hx of a fib prior to stroke.    Chronic back pain     Colon polyp     CVA (cerebral infarction) 7/16/14    Degenerative disc disease     cervical; lumbar    Diabetes mellitus type II     Encounter for loop recorder at end of battery life 9/17/2018    Hyperlipidemia     Hypertension     Hypothyroidism     Mild nonproliferative diabetic retinopathy 08/12/2018    Obesity     Sleep apnea     Stroke july 2014    Venous insufficiency (chronic) (peripheral)         Past Surgical History:   Procedure Laterality Date    COLONOSCOPY N/A 8/30/2018    Procedure: COLONOSCOPY;  Surgeon: William Clement MD;  [facility] [facility] ([address] [place] [address]);  Service: Endoscopy;  Laterality: N/A;  Tito/Dr Rad Johnston/OK to hold 2 days prior to colon/see telephone encounter dated 7/24/18/pt has loop recorder/svn    COLONOSCOPY W/ POLYPECTOMY      GASTRECTOMY      HERNIA REPAIR      REMOVAL OF IMPLANTABLE LOOP RECORDER N/A 9/17/2018    Procedure: REMOVAL, IMPLANTABLE LOOP RECORDER;  Surgeon: Thomas Randolph MD;  Location[facility];   Service: Cardiology;  Laterality: N/A;  TERESA, ILR removal (no reimplant), MDT, RN Sedate, SK, 3 Prep *Reveal LINQ*    TONSILLECTOMY      TUBAL LIGATION         Social History     Tobacco Use   Smoking Status Former Smoker    Packs/day: 1.00    Years: 30.00    Pack years: 30.00    Types: Cigarettes    Quit date: 2014    Years since quittin.1   Smokeless Tobacco Never Used        Review of Systems   Constitution: Negative for chills and fever.   HENT: Negative for ear pain and hoarse voice.    Eyes: Negative for discharge and pain.   Cardiovascular: Negative for chest pain and palpitations.   Respiratory: Negative for cough, hemoptysis and shortness of breath.    Endocrine: Positive for cold intolerance. Negative for polydipsia and polyuria.   Skin: Negative for dry skin and rash.   Musculoskeletal: Positive for back pain. Negative for neck pain.   Gastrointestinal: Positive for diarrhea. Negative for abdominal pain, nausea and vomiting.   Genitourinary: Negative for dysuria and hematuria.   Neurological: Negative for dizziness and paresthesias.         II. PHYSICAL EXAM     Physical Exam   GEN: Alert/oriented, normal affect, normal speech  HEENT: atraumatic, normocephalic  CHEST: normal respiratory effort, symmetrical chest rise  CARD: Regular rate, regular rhythm  SKIN:  Normal texture, no rash, no wounds  EXT: Warm, no cyanosis/edema  VASC:  2+ radial and brachial pulses in bilateral upper extremities  NEURO:  5/5 flexor/extensor strength in bilateral upper extremities, 5/5 hand  strength bilaterally      III. ASSESSMENT & PLAN (MEDICAL DECISION MAKING)     1. Asymptomatic stenosis of right carotid artery        Imaging Results:   Carotid Duplex 20: Images reviewed by me. Results reviewed with patient.  R L   50-75% stenosis ICA PSV: 203cm/s   EDV: 45cm/s 1-49% ICA PSV: 110cm/s  EDV: 35cm/s       Assessment/Diagnosis and Plan:  65 y.o. female with asymptomatic moderate right carotid  stenosis.  Recommend continued best medical therapy for carotid stenosis with aspirin and received a statin with blood pressure control for goal BP less than 140/90 and A1C goal <7.0.    -continue home ASA 81 mg  -continue home Eliquis for AFib thromboembolism prevention  -continue home receive a statin  -Continue current home BP meds for goal BP less than 140/90  -f/u 1 yr for surveillance carotid duplex    JUNIE Vega II, MD, RPVI  Vascular Surgeon  [facility]

## 2020-09-28 ENCOUNTER — PATIENT OUTREACH (OUTPATIENT)
Dept: ADMINISTRATIVE | Facility: OTHER | Age: 65
End: 2020-09-28

## 2020-09-28 NOTE — PROGRESS NOTES
Health Maintenance Due   Topic Date Due    Hepatitis C Screening  1955    HIV Screening  06/25/1971    Pneumococcal Vaccine (Medium Risk) (1 of 1 - PPSV23) 06/25/1975    Shingles Vaccine (1 of 2) 06/25/2006    LDCT Lung Screen  07/11/2018    Influenza Vaccine (1) 08/01/2020     Updates were requested from care everywhere.  Chart was reviewed for overdue Proactive [facility] Encounters (JOCELYN) topics (CRS, Breast Cancer Screening, Eye exam)  Health Maintenance has been updated.  LINKS immunization registry triggered.  Immunizations were reconciled.

## 2020-09-29 ENCOUNTER — OFFICE VISIT (OUTPATIENT)
Dept: PODIATRY | Facility: CLINIC | Age: 65
End: 2020-09-29
Payer: COMMERCIAL

## 2020-09-29 VITALS
SYSTOLIC BLOOD PRESSURE: 190 MMHG | HEIGHT: 65 IN | WEIGHT: 227.5 LBS | DIASTOLIC BLOOD PRESSURE: 90 MMHG | HEART RATE: 72 BPM | BODY MASS INDEX: 37.9 KG/M2

## 2020-09-29 DIAGNOSIS — B35.3 TINEA PEDIS OF BOTH FEET: ICD-10-CM

## 2020-09-29 DIAGNOSIS — E11.9 TYPE 2 DIABETES MELLITUS WITHOUT COMPLICATION, WITHOUT LONG-TERM CURRENT USE OF INSULIN: ICD-10-CM

## 2020-09-29 DIAGNOSIS — B35.1 ONYCHOMYCOSIS DUE TO DERMATOPHYTE: Primary | ICD-10-CM

## 2020-09-29 DIAGNOSIS — E11.42 DIABETIC POLYNEUROPATHY ASSOCIATED WITH TYPE 2 DIABETES MELLITUS: ICD-10-CM

## 2020-09-29 PROCEDURE — 99214 PR OFFICE/OUTPT VISIT, EST, LEVL IV, 30-39 MIN: ICD-10-PCS | Mod: 25,S$GLB,, | Performed by: STUDENT IN AN ORGANIZED HEALTH CARE EDUCATION/TRAINING PROGRAM

## 2020-09-29 PROCEDURE — 11721 DEBRIDE NAIL 6 OR MORE: CPT | Mod: Q9,S$GLB,, | Performed by: STUDENT IN AN ORGANIZED HEALTH CARE EDUCATION/TRAINING PROGRAM

## 2020-09-29 PROCEDURE — 99999 PR PBB SHADOW E&M-EST. PATIENT-LVL IV: CPT | Mod: PBBFAC,,, | Performed by: STUDENT IN AN ORGANIZED HEALTH CARE EDUCATION/TRAINING PROGRAM

## 2020-09-29 PROCEDURE — 3008F BODY MASS INDEX DOCD: CPT | Mod: CPTII,S$GLB,, | Performed by: STUDENT IN AN ORGANIZED HEALTH CARE EDUCATION/TRAINING PROGRAM

## 2020-09-29 PROCEDURE — 3080F PR MOST RECENT DIASTOLIC BLOOD PRESSURE >= 90 MM HG: ICD-10-PCS | Mod: CPTII,S$GLB,, | Performed by: STUDENT IN AN ORGANIZED HEALTH CARE EDUCATION/TRAINING PROGRAM

## 2020-09-29 PROCEDURE — 3044F HG A1C LEVEL LT 7.0%: CPT | Mod: CPTII,S$GLB,, | Performed by: STUDENT IN AN ORGANIZED HEALTH CARE EDUCATION/TRAINING PROGRAM

## 2020-09-29 PROCEDURE — 99999 PR PBB SHADOW E&M-EST. PATIENT-LVL IV: ICD-10-PCS | Mod: PBBFAC,,, | Performed by: STUDENT IN AN ORGANIZED HEALTH CARE EDUCATION/TRAINING PROGRAM

## 2020-09-29 PROCEDURE — 11721 ROUTINE FOOT CARE: ICD-10-PCS | Mod: Q9,S$GLB,, | Performed by: STUDENT IN AN ORGANIZED HEALTH CARE EDUCATION/TRAINING PROGRAM

## 2020-09-29 PROCEDURE — 3008F PR BODY MASS INDEX (BMI) DOCUMENTED: ICD-10-PCS | Mod: CPTII,S$GLB,, | Performed by: STUDENT IN AN ORGANIZED HEALTH CARE EDUCATION/TRAINING PROGRAM

## 2020-09-29 PROCEDURE — 99214 OFFICE O/P EST MOD 30 MIN: CPT | Mod: 25,S$GLB,, | Performed by: STUDENT IN AN ORGANIZED HEALTH CARE EDUCATION/TRAINING PROGRAM

## 2020-09-29 PROCEDURE — 3044F PR MOST RECENT HEMOGLOBIN A1C LEVEL <7.0%: ICD-10-PCS | Mod: CPTII,S$GLB,, | Performed by: STUDENT IN AN ORGANIZED HEALTH CARE EDUCATION/TRAINING PROGRAM

## 2020-09-29 PROCEDURE — 3080F DIAST BP >= 90 MM HG: CPT | Mod: CPTII,S$GLB,, | Performed by: STUDENT IN AN ORGANIZED HEALTH CARE EDUCATION/TRAINING PROGRAM

## 2020-09-29 PROCEDURE — 3077F PR MOST RECENT SYSTOLIC BLOOD PRESSURE >= 140 MM HG: ICD-10-PCS | Mod: CPTII,S$GLB,, | Performed by: STUDENT IN AN ORGANIZED HEALTH CARE EDUCATION/TRAINING PROGRAM

## 2020-09-29 PROCEDURE — 3077F SYST BP >= 140 MM HG: CPT | Mod: CPTII,S$GLB,, | Performed by: STUDENT IN AN ORGANIZED HEALTH CARE EDUCATION/TRAINING PROGRAM

## 2020-09-29 RX ORDER — CLOTRIMAZOLE AND BETAMETHASONE DIPROPIONATE 10; .64 MG/G; MG/G
CREAM TOPICAL 2 TIMES DAILY
Qty: 1 TUBE | Refills: 2 | Status: SHIPPED | OUTPATIENT
Start: 2020-09-29 | End: 2023-10-02

## 2020-09-29 NOTE — PROCEDURES
"Routine Foot Care    Date/Time: 9/29/2020 1:00 PM  Performed by: Nicole Guerrero DPM  Authorized by: Nicole Guerrero DPM     Time out: Immediately prior to procedure a "time out" was called to verify the correct patient, procedure, equipment, support staff and site/side marked as required.    Consent Done?:  Yes (Verbal)  Hyperkeratotic Skin Lesions?: No      Nail Care Type:  Debride  Location(s): All  (Left 1st Toe, Left 3rd Toe, Left 2nd Toe, Left 4th Toe, Left 5th Toe, Right 1st Toe, Right 2nd Toe, Right 3rd Toe, Right 4th Toe and Right 5th Toe)  Patient tolerance:  Patient tolerated the procedure well with no immediate complications      "

## 2020-09-29 NOTE — LETTER
September 29, 2020      Rad Johnston MD  [address] [facility]  [place] [address]           [facility]  [address]  [place] [address]  Phone: [phone]  Fax: [phone]          Patient: Diana Montenegro   MR Number: [id number]   YOB: 1955   Date of Visit: 9/29/2020       Dear Dr. Rad Johnston:    Thank you for referring Diana Montenegro to me for evaluation. Attached you will find relevant portions of my assessment and plan of care.    If you have questions, please do not hesitate to call me. I look forward to following Diana Montenegro along with you.    Sincerely,    Nicole Guerrero, COLBY    Enclosure  CC:  No Recipients    If you would like to receive this communication electronically, please contact [email or web address] or [phone] to request more information on [facility] access.    For providers and/or their staff who would like to refer a patient to [facility], please contact us through our one-stop-shop provider referral line, [facility], at [phone].    If you feel you have received this communication in error or would no longer like to receive these types of communications, please e-mail [email or web address]

## 2020-10-05 ENCOUNTER — PATIENT MESSAGE (OUTPATIENT)
Dept: INTERNAL MEDICINE | Facility: CLINIC | Age: 65
End: 2020-10-05

## 2020-10-05 RX ORDER — METFORMIN HYDROCHLORIDE 1000 MG/1
1000 TABLET ORAL 2 TIMES DAILY WITH MEALS
Qty: 180 TABLET | Refills: 1 | Status: SHIPPED | OUTPATIENT
Start: 2020-10-05 | End: 2021-02-23 | Stop reason: SDUPTHER

## 2020-10-14 ENCOUNTER — PATIENT MESSAGE (OUTPATIENT)
Dept: INTERNAL MEDICINE | Facility: CLINIC | Age: 65
End: 2020-10-14

## 2020-10-16 ENCOUNTER — LAB VISIT (OUTPATIENT)
Dept: LAB | Facility: HOSPITAL | Age: 65
End: 2020-10-16
Attending: INTERNAL MEDICINE
Payer: COMMERCIAL

## 2020-10-16 DIAGNOSIS — E78.5 HYPERLIPIDEMIA, UNSPECIFIED HYPERLIPIDEMIA TYPE: ICD-10-CM

## 2020-10-16 DIAGNOSIS — E03.9 ACQUIRED HYPOTHYROIDISM: ICD-10-CM

## 2020-10-16 DIAGNOSIS — I10 ESSENTIAL HYPERTENSION: ICD-10-CM

## 2020-10-16 DIAGNOSIS — E11.40 TYPE 2 DIABETES MELLITUS WITH DIABETIC NEUROPATHY, WITHOUT LONG-TERM CURRENT USE OF INSULIN: ICD-10-CM

## 2020-10-16 LAB
ALBUMIN SERPL BCP-MCNC: 3.7 G/DL (ref 3.5–5.2)
ALP SERPL-CCNC: 84 U/L (ref 55–135)
ALT SERPL W/O P-5'-P-CCNC: 24 U/L (ref 10–44)
ANION GAP SERPL CALC-SCNC: 11 MMOL/L (ref 8–16)
AST SERPL-CCNC: 23 U/L (ref 10–40)
BILIRUB SERPL-MCNC: 0.2 MG/DL (ref 0.1–1)
BUN SERPL-MCNC: 32 MG/DL (ref 8–23)
CALCIUM SERPL-MCNC: 9.7 MG/DL (ref 8.7–10.5)
CHLORIDE SERPL-SCNC: 105 MMOL/L (ref 95–110)
CO2 SERPL-SCNC: 25 MMOL/L (ref 23–29)
CREAT SERPL-MCNC: 1 MG/DL (ref 0.5–1.4)
EST. GFR  (AFRICAN AMERICAN): >60 ML/MIN/1.73 M^2
EST. GFR  (NON AFRICAN AMERICAN): 59.7 ML/MIN/1.73 M^2
ESTIMATED AVG GLUCOSE: 174 MG/DL (ref 68–131)
GLUCOSE SERPL-MCNC: 92 MG/DL (ref 70–110)
HBA1C MFR BLD HPLC: 7.7 % (ref 4–5.6)
POTASSIUM SERPL-SCNC: 4.2 MMOL/L (ref 3.5–5.1)
PROT SERPL-MCNC: 7.2 G/DL (ref 6–8.4)
SODIUM SERPL-SCNC: 141 MMOL/L (ref 136–145)

## 2020-10-16 PROCEDURE — 80053 COMPREHEN METABOLIC PANEL: CPT

## 2020-10-16 PROCEDURE — 83036 HEMOGLOBIN GLYCOSYLATED A1C: CPT

## 2020-10-16 PROCEDURE — 36415 COLL VENOUS BLD VENIPUNCTURE: CPT | Mod: PO

## 2020-10-20 ENCOUNTER — OFFICE VISIT (OUTPATIENT)
Dept: INTERNAL MEDICINE | Facility: CLINIC | Age: 65
End: 2020-10-20
Payer: COMMERCIAL

## 2020-10-20 VITALS
SYSTOLIC BLOOD PRESSURE: 128 MMHG | HEIGHT: 65 IN | BODY MASS INDEX: 38.2 KG/M2 | WEIGHT: 229.25 LBS | TEMPERATURE: 98 F | HEART RATE: 64 BPM | RESPIRATION RATE: 16 BRPM | DIASTOLIC BLOOD PRESSURE: 60 MMHG

## 2020-10-20 DIAGNOSIS — E78.5 HYPERLIPIDEMIA, UNSPECIFIED HYPERLIPIDEMIA TYPE: ICD-10-CM

## 2020-10-20 DIAGNOSIS — E03.9 ACQUIRED HYPOTHYROIDISM: ICD-10-CM

## 2020-10-20 DIAGNOSIS — I10 ESSENTIAL HYPERTENSION: ICD-10-CM

## 2020-10-20 DIAGNOSIS — E11.40 TYPE 2 DIABETES MELLITUS WITH DIABETIC NEUROPATHY, WITHOUT LONG-TERM CURRENT USE OF INSULIN: Primary | ICD-10-CM

## 2020-10-20 PROCEDURE — 90472 PNEUMOCOCCAL POLYSACCHARIDE VACCINE 23-VALENT =>2YO SQ IM: ICD-10-PCS | Mod: S$GLB,,, | Performed by: INTERNAL MEDICINE

## 2020-10-20 PROCEDURE — 3008F PR BODY MASS INDEX (BMI) DOCUMENTED: ICD-10-PCS | Mod: CPTII,S$GLB,, | Performed by: INTERNAL MEDICINE

## 2020-10-20 PROCEDURE — 3078F PR MOST RECENT DIASTOLIC BLOOD PRESSURE < 80 MM HG: ICD-10-PCS | Mod: CPTII,S$GLB,, | Performed by: INTERNAL MEDICINE

## 2020-10-20 PROCEDURE — 3078F DIAST BP <80 MM HG: CPT | Mod: CPTII,S$GLB,, | Performed by: INTERNAL MEDICINE

## 2020-10-20 PROCEDURE — 90732 PNEUMOCOCCAL POLYSACCHARIDE VACCINE 23-VALENT =>2YO SQ IM: ICD-10-PCS | Mod: S$GLB,,, | Performed by: INTERNAL MEDICINE

## 2020-10-20 PROCEDURE — 3008F BODY MASS INDEX DOCD: CPT | Mod: CPTII,S$GLB,, | Performed by: INTERNAL MEDICINE

## 2020-10-20 PROCEDURE — 3074F PR MOST RECENT SYSTOLIC BLOOD PRESSURE < 130 MM HG: ICD-10-PCS | Mod: CPTII,S$GLB,, | Performed by: INTERNAL MEDICINE

## 2020-10-20 PROCEDURE — 90472 IMMUNIZATION ADMIN EACH ADD: CPT | Mod: S$GLB,,, | Performed by: INTERNAL MEDICINE

## 2020-10-20 PROCEDURE — 90732 PPSV23 VACC 2 YRS+ SUBQ/IM: CPT | Mod: S$GLB,,, | Performed by: INTERNAL MEDICINE

## 2020-10-20 PROCEDURE — 3051F PR MOST RECENT HEMOGLOBIN A1C LEVEL 7.0 - < 8.0%: ICD-10-PCS | Mod: CPTII,S$GLB,, | Performed by: INTERNAL MEDICINE

## 2020-10-20 PROCEDURE — 3074F SYST BP LT 130 MM HG: CPT | Mod: CPTII,S$GLB,, | Performed by: INTERNAL MEDICINE

## 2020-10-20 PROCEDURE — 90686 IIV4 VACC NO PRSV 0.5 ML IM: CPT | Mod: S$GLB,,, | Performed by: INTERNAL MEDICINE

## 2020-10-20 PROCEDURE — 90471 FLU VACCINE (QUAD) GREATER THAN OR EQUAL TO 3YO PRESERVATIVE FREE IM: ICD-10-PCS | Mod: S$GLB,,, | Performed by: INTERNAL MEDICINE

## 2020-10-20 PROCEDURE — 99999 PR PBB SHADOW E&M-EST. PATIENT-LVL V: ICD-10-PCS | Mod: PBBFAC,,, | Performed by: INTERNAL MEDICINE

## 2020-10-20 PROCEDURE — 90686 FLU VACCINE (QUAD) GREATER THAN OR EQUAL TO 3YO PRESERVATIVE FREE IM: ICD-10-PCS | Mod: S$GLB,,, | Performed by: INTERNAL MEDICINE

## 2020-10-20 PROCEDURE — 99999 PR PBB SHADOW E&M-EST. PATIENT-LVL V: CPT | Mod: PBBFAC,,, | Performed by: INTERNAL MEDICINE

## 2020-10-20 PROCEDURE — 90471 IMMUNIZATION ADMIN: CPT | Mod: S$GLB,,, | Performed by: INTERNAL MEDICINE

## 2020-10-20 PROCEDURE — 99214 PR OFFICE/OUTPT VISIT, EST, LEVL IV, 30-39 MIN: ICD-10-PCS | Mod: 25,S$GLB,, | Performed by: INTERNAL MEDICINE

## 2020-10-20 PROCEDURE — 3051F HG A1C>EQUAL 7.0%<8.0%: CPT | Mod: CPTII,S$GLB,, | Performed by: INTERNAL MEDICINE

## 2020-10-20 PROCEDURE — 99214 OFFICE O/P EST MOD 30 MIN: CPT | Mod: 25,S$GLB,, | Performed by: INTERNAL MEDICINE

## 2020-10-20 RX ORDER — ZOSTER VACCINE RECOMBINANT, ADJUVANTED 50 MCG/0.5
0.5 KIT INTRAMUSCULAR ONCE
Qty: 1 EACH | Refills: 1 | Status: SHIPPED | OUTPATIENT
Start: 2020-10-20 | End: 2020-10-20

## 2020-10-28 NOTE — PROGRESS NOTES
Subjective:       Patient ID: Diana Montenegro is a 65 y.o. female.    Chief Complaint: Diabetes (4 mos w/labs  , pain hips and knees yesterday.)    HPI   The patient presents for follow-up of medical conditions which include type 2 diabetes mellitus, hypertension, hyperlipidemia, and paroxysmal atrial fibrillation.  The patient is also followed for obesity and chronic anticoagulation.  She has not experienced any adverse bruising or other bleeding episodes.  She reports that her diet is variable.  She has not experienced any hypoglycemic episodes.  She also has GERD and is maintained with use of omeprazole as needed.  She does not monitor her blood pressure levels.        Review of Systems   Constitutional: Positive for appetite change. Negative for activity change and unexpected weight change.   Eyes: Negative for visual disturbance.   Respiratory: Negative for shortness of breath.    Cardiovascular: Negative for chest pain, palpitations and leg swelling.   Gastrointestinal: Negative for abdominal pain, blood in stool and diarrhea.   Genitourinary: Negative for dysuria, frequency, hematuria and urgency.   Neurological: Negative for weakness, numbness and headaches.   Hematological: Does not bruise/bleed easily.   Psychiatric/Behavioral: Negative for sleep disturbance.         Objective:      Physical Exam  Vitals signs and nursing note reviewed.   Constitutional:       General: She is not in acute distress.     Appearance: She is well-developed.      Comments: The patient's weight has remained stable since 06/16/2020.   HENT:      Head: Normocephalic and atraumatic.   Eyes:      General: No scleral icterus.     Conjunctiva/sclera: Conjunctivae normal.      Pupils: Pupils are equal, round, and reactive to light.   Neck:      Musculoskeletal: Normal range of motion and neck supple.      Thyroid: No thyromegaly.      Vascular: No JVD.   Cardiovascular:      Rate and Rhythm: Normal rate and regular rhythm.       Heart sounds: Normal heart sounds. No murmur. No friction rub. No gallop.    Pulmonary:      Effort: Pulmonary effort is normal. No respiratory distress.      Breath sounds: Normal breath sounds. No wheezing or rales.   Abdominal:      General: Bowel sounds are normal.      Palpations: Abdomen is soft. There is no mass.      Tenderness: There is no abdominal tenderness.   Musculoskeletal: Normal range of motion.         General: Deformity present. No tenderness.      Comments: A hammertoe deformities noted of the right great toe.  No other foot deformities are present except for mild bunion formation bilaterally.   Lymphadenopathy:      Cervical: No cervical adenopathy.   Skin:     General: Skin is warm and dry.      Findings: No rash.      Comments: No foot lesions are present.   Neurological:      Mental Status: She is alert and oriented to person, place, and time.      Cranial Nerves: No cranial nerve deficit.      Comments: Sensory exam is intact in both feet on monofilament testing.   Psychiatric:         Behavior: Behavior normal.         Results for orders placed or performed in visit on 10/16/20   Comprehensive metabolic panel   Result Value Ref Range    Sodium 141 136 - 145 mmol/L    Potassium 4.2 3.5 - 5.1 mmol/L    Chloride 105 95 - 110 mmol/L    CO2 25 23 - 29 mmol/L    Glucose 92 70 - 110 mg/dL    BUN 32 (H) 8 - 23 mg/dL    Creatinine 1.0 0.5 - 1.4 mg/dL    Calcium 9.7 8.7 - 10.5 mg/dL    Total Protein 7.2 6.0 - 8.4 g/dL    Albumin 3.7 3.5 - 5.2 g/dL    Total Bilirubin 0.2 0.1 - 1.0 mg/dL    Alkaline Phosphatase 84 55 - 135 U/L    AST 23 10 - 40 U/L    ALT 24 10 - 44 U/L    Anion Gap 11 8 - 16 mmol/L    eGFR if African American >60.0 >60 mL/min/1.73 m^2    eGFR if non  59.7 (A) >60 mL/min/1.73 m^2   Hemoglobin A1C   Result Value Ref Range    Hemoglobin A1C 7.7 (H) 4.0 - 5.6 %    Estimated Avg Glucose 174 (H) 68 - 131 mg/dL       Assessment:       1. Type 2 diabetes mellitus with diabetic  neuropathy, without long-term current use of insulin    2. Essential hypertension    3. Acquired hypothyroidism    4. Hyperlipidemia, unspecified hyperlipidemia type        Plan:     Diana was seen today for diabetes.  The patient will be returning for eye examination is with her ophthalmologist Dr. Henry Melgar every 6 months as recommended.  Influenza and Pneumovax vaccines will be given today.  A prescription for the Shingrix vaccine was given to the patient to take to her pharmacy.  Fasting blood tests and a follow-up visit in 4 months will be obtained.    Diagnoses and all orders for this visit:    Type 2 diabetes mellitus with diabetic neuropathy, without long-term current use of insulin  -     Comprehensive Metabolic Panel; Future  -     Lipid Panel; Future  -     Hemoglobin A1C; Future    Essential hypertension  -     Comprehensive Metabolic Panel; Future  -     Lipid Panel; Future  -     Hemoglobin A1C; Future    Acquired hypothyroidism  -     Comprehensive Metabolic Panel; Future  -     Lipid Panel; Future  -     Hemoglobin A1C; Future    Hyperlipidemia, unspecified hyperlipidemia type  -     Comprehensive Metabolic Panel; Future  -     Lipid Panel; Future  -     Hemoglobin A1C; Future    Other orders  -     (In Office Administered) Pneumococcal Polysaccharide Vaccine (23 Valent) (SQ/IM)  -     varicella-zoster [id number], PF, (SHINGRIX, PF,) 50 mcg/0.5 mL injection; Inject 0.5 mLs into the muscle once. for 1 dose  -     Influenza - Quadrivalent (PF)

## 2020-11-12 ENCOUNTER — PATIENT MESSAGE (OUTPATIENT)
Dept: INTERNAL MEDICINE | Facility: CLINIC | Age: 65
End: 2020-11-12

## 2020-11-12 NOTE — TELEPHONE ENCOUNTER
See email.  Should we recommend wyatt salazar? She saw you last 10/20 and has appt in February to see you next.

## 2020-11-13 ENCOUNTER — PATIENT MESSAGE (OUTPATIENT)
Dept: INTERNAL MEDICINE | Facility: CLINIC | Age: 65
End: 2020-11-13

## 2020-11-17 ENCOUNTER — OFFICE VISIT (OUTPATIENT)
Dept: INTERNAL MEDICINE | Facility: CLINIC | Age: 65
End: 2020-11-17
Payer: COMMERCIAL

## 2020-11-17 VITALS
SYSTOLIC BLOOD PRESSURE: 118 MMHG | DIASTOLIC BLOOD PRESSURE: 68 MMHG | HEIGHT: 64 IN | BODY MASS INDEX: 38.84 KG/M2 | OXYGEN SATURATION: 97 % | WEIGHT: 227.5 LBS | HEART RATE: 60 BPM | RESPIRATION RATE: 18 BRPM | TEMPERATURE: 97 F

## 2020-11-17 DIAGNOSIS — I15.2 HYPERTENSION ASSOCIATED WITH DIABETES: ICD-10-CM

## 2020-11-17 DIAGNOSIS — E66.01 SEVERE OBESITY: ICD-10-CM

## 2020-11-17 DIAGNOSIS — E11.59 HYPERTENSION ASSOCIATED WITH DIABETES: ICD-10-CM

## 2020-11-17 DIAGNOSIS — E11.69 HYPERLIPIDEMIA ASSOCIATED WITH TYPE 2 DIABETES MELLITUS: ICD-10-CM

## 2020-11-17 DIAGNOSIS — E11.40 TYPE 2 DIABETES MELLITUS WITH DIABETIC NEUROPATHY, WITHOUT LONG-TERM CURRENT USE OF INSULIN: ICD-10-CM

## 2020-11-17 DIAGNOSIS — E78.5 HYPERLIPIDEMIA ASSOCIATED WITH TYPE 2 DIABETES MELLITUS: ICD-10-CM

## 2020-11-17 DIAGNOSIS — I65.21 STENOSIS OF RIGHT CAROTID ARTERY: ICD-10-CM

## 2020-11-17 DIAGNOSIS — E03.9 ACQUIRED HYPOTHYROIDISM: Primary | ICD-10-CM

## 2020-11-17 DIAGNOSIS — I10 ESSENTIAL HYPERTENSION: ICD-10-CM

## 2020-11-17 DIAGNOSIS — I48.0 PAROXYSMAL ATRIAL FIBRILLATION: Chronic | ICD-10-CM

## 2020-11-17 PROCEDURE — 99215 OFFICE O/P EST HI 40 MIN: CPT | Mod: S$GLB,,, | Performed by: NURSE PRACTITIONER

## 2020-11-17 PROCEDURE — 1126F PR PAIN SEVERITY QUANTIFIED, NO PAIN PRESENT: ICD-10-PCS | Mod: S$GLB,,, | Performed by: NURSE PRACTITIONER

## 2020-11-17 PROCEDURE — 3008F PR BODY MASS INDEX (BMI) DOCUMENTED: ICD-10-PCS | Mod: CPTII,S$GLB,, | Performed by: NURSE PRACTITIONER

## 2020-11-17 PROCEDURE — 1126F AMNT PAIN NOTED NONE PRSNT: CPT | Mod: S$GLB,,, | Performed by: NURSE PRACTITIONER

## 2020-11-17 PROCEDURE — 3074F PR MOST RECENT SYSTOLIC BLOOD PRESSURE < 130 MM HG: ICD-10-PCS | Mod: CPTII,S$GLB,, | Performed by: NURSE PRACTITIONER

## 2020-11-17 PROCEDURE — 99999 PR PBB SHADOW E&M-EST. PATIENT-LVL V: CPT | Mod: PBBFAC,,, | Performed by: NURSE PRACTITIONER

## 2020-11-17 PROCEDURE — 3078F DIAST BP <80 MM HG: CPT | Mod: CPTII,S$GLB,, | Performed by: NURSE PRACTITIONER

## 2020-11-17 PROCEDURE — 3074F SYST BP LT 130 MM HG: CPT | Mod: CPTII,S$GLB,, | Performed by: NURSE PRACTITIONER

## 2020-11-17 PROCEDURE — 3078F PR MOST RECENT DIASTOLIC BLOOD PRESSURE < 80 MM HG: ICD-10-PCS | Mod: CPTII,S$GLB,, | Performed by: NURSE PRACTITIONER

## 2020-11-17 PROCEDURE — 3051F PR MOST RECENT HEMOGLOBIN A1C LEVEL 7.0 - < 8.0%: ICD-10-PCS | Mod: CPTII,S$GLB,, | Performed by: NURSE PRACTITIONER

## 2020-11-17 PROCEDURE — 3051F HG A1C>EQUAL 7.0%<8.0%: CPT | Mod: CPTII,S$GLB,, | Performed by: NURSE PRACTITIONER

## 2020-11-17 PROCEDURE — 99999 PR PBB SHADOW E&M-EST. PATIENT-LVL V: ICD-10-PCS | Mod: PBBFAC,,, | Performed by: NURSE PRACTITIONER

## 2020-11-17 PROCEDURE — 99215 PR OFFICE/OUTPT VISIT, EST, LEVL V, 40-54 MIN: ICD-10-PCS | Mod: S$GLB,,, | Performed by: NURSE PRACTITIONER

## 2020-11-17 PROCEDURE — 3008F BODY MASS INDEX DOCD: CPT | Mod: CPTII,S$GLB,, | Performed by: NURSE PRACTITIONER

## 2020-11-17 RX ORDER — SEMAGLUTIDE 1.34 MG/ML
0.5 INJECTION, SOLUTION SUBCUTANEOUS
Qty: 1.5 ML | Refills: 3 | Status: SHIPPED | OUTPATIENT
Start: 2020-11-17 | End: 2021-02-24 | Stop reason: SDUPTHER

## 2020-11-17 NOTE — PATIENT INSTRUCTIONS
"Continue metformin at 1000 mg twice per day.  Stop Amaryl tablet twice per day.  Switched to ozempic once weekly.     You will give yourself an injection every week.  Start at 0.25 mg per week dose for the 1st 2 weeks, then increase your dose to 0.5 mg every week.  Stay at 0.5 mg every week.  Take the injection on the same day every week.    Eat small, frequent meals.  Remember to walk and movement exercise as you can.  Follow diabetic diet.  You will see a list below I have provided for you to help you.  Limit high sugar foods, such as pasta, rice, bread, sweets, soft rate.    Check your blood sugar fasting in the morning every day and keep noted that/keep a log.  You can bring that with you to next visit.    If you become constipated, make sure you drinking plenty of water.  You may take Colace 100 mg capsule every day to prevent constipation.  You can find that over-the-counter at the pharmacy.    Low Carb Snacks & Diabetes friendly foods   Aim for 30 - 40 grams of carbs for 3 meals per day (or 2 meals and  1 - 2 snacks - however you prefer)  Snacks can be 15 - 20 grams of carbs.     Eating small, frequent meals will help you to feel more satisfied, and more full so that you don't over eat or eat the wrong foods later.   Also, diana meals/snacks allow you to spread carbohydrates evenly, which may stabilize blood sugars.   Below you will find a list of ideas of foods that I like/prefer.   Feel free to give me your ideas and tips if you find good ones too!   Overall - please remember to limit refined sugars such as soft drinks, juices, rices, pastas, breads, cakes.   Look at the back of the label - look at the amount of "carbohydrates", then look at the amount of "fiber" - subtract the amount of fiber from the amount of carbs - this is your "net carb intake".  The more fiber a food has, the better generally, as you get to subtract this from the net carbs.     0-5 gm carb   Crystal Light flavoring (I like fruit " "punch flavor!)   Vitamin Water Zero   Gina antioxidant drinks.    Sparkling ice (long skinny flavored water bottles for $1 that are sugar free).    Miguel A water, WaterLoo - carbonated flavored caballero, various flavors.   Diet coke, diet barqs, sprite zero.   Diet sunkist (orange drink)   Sugar free powerade   Herbal tea, unsweetened   2 tsp peanut butter on celery or carrots   Maggie's original Candies - (the Sugar Free ones!)   1/2 cup sugar-free jell-o   1 sugar-free popsicle   ¼ cup blueberries or strawberries   8oz Blue Radha unsweetened almond milk (or there is sugar free vanilla flavored as well).   5 baby carrots & celery sticks, cucumbers, bell peppers dipped in ¼ cup salsa, 2Tbsp light ranch dressing or 2Tbsp plain Greek yogurt   10 Goldfish crackers   ½ oz low-fat cheese or string cheese   1 closed handful of nuts, unsalted (example-->almonds, pistachios, cashews, peanuts, etc).   1 Tbsp of sunflower seeds, unsalted   1 cup Smart Pop popcorn   1 whole grain brown rice cake    "Think Thin" protein bars - my personal favorite is Creamy Peanut butter, chocolate brownie, or oreo flavors.   "Chin" bars  - can be found at [employer] grocery store - they have 5 grams of net carbohydrates.   Quest bars (my favorite is birthday cake, and also cinnamon roll is good melted for 10 seconds in the microwave - please remove the wrapper first!)   Premier protein shakes - sold at [employer], or other brand alternatives - usually 1 - 2 grams of carbs (strawberry, vanilla, chocolate flavors) Coffee flavor is my new morning favorite!    Scrambled eggs! Or a fried egg or boiled eggs - add sliced tomatoes, cilantro or some chopped green onions.    Eggs are good with any and all veggies! You can even eat them for dinner or in a low carb tortilla, or served with your favorite grilled meat/sausage or culp is my favorite.    Bryan armijo - zucchini - can buy in the frozen foods section. Birds eye " "brand is usually cheap. Tip - saute with oil/onions or italian seasoning and use this as a pasta substitution.   Milk - is usually high in carbs/sugar - use FIA Formula E milk brand instead - it is "filtered" milk - with half the amount of carbs of regular milk. (next to the milk in milk aisle).    Smuckers sugar free Breakfast syrup (or 1 tablespoon of low sugar breakfast syrup) instead of regular syrup.        15 gm carb   1 small piece of fruit or ½ banana or 1/2 cup lite canned fruit   3 panchito cracker squares   3 cups Smart Pop popcorn, top spray butter, [place] lite salt or cinnamon and Truvia   5 Vanilla Wafers   ½ cup low fat, no added sugar ice cream or frozen yogurt (Blue bell, Blue Bunny, Weight Watchers, Skinny Cow)   1/2 - 1 cup Light n' fit Vanilla yogurt (has added protein in it to make you feel full).    ½ turkey, ham, or chicken sandwich   ½ c fruit with ½ c Cottage cheese   4-6 unsalted wheat crackers with 1 oz low fat cheese or 1 tbsp peanut butter    30-45 goldfish crackers (depending on flavor)    7-8 Catholic mini brown rice cakes (caramel, apple cinnamon, chocolate)    12 Catholic mini brown rice cakes (cheddar, bbq, ranch)    1/3 cup hummus dip with raw veg   1/2 whole wheat rut, 1Tbsp hummus   Mini Pizza (1/2 whole wheat English muffin, low-fat  cheese, tomato sauce)   100 calorie snack pack (Oreo, Chips Ahoy, Ritz Mix, Baked Cheetos)   4-6 oz. light or Greek Style yogurt (Chobani, Yoplait, Okios, [place])   ½ cup sugar-free pudding     6 in. wheat tortilla or rut oven toasted chips (topped with spray butter flavoring, cinnamon, Truvia OR spray butter, garlic powder, chili powder)    18 BBQ Popchips (available at [employer], [employer] Foods, [employer])   Mini bagel (small size) toasted - add fat free cream cheese or avocado and sliced tomato on top - yum!    1/2 cup Halo top icecream - birthday cake is my go-to flavor.    Truth Bars - can be found at [employer] - Net carbs " "is 11 - 12 grams depending on the flavor.    Kind bars = mostly nuts and dried berries - find at most local groceries stores, drug stores, whole foods, fresh market. Net carbs is around 10 grams.     Smoothie Fernando - I'm often asked "what smoothie is healthy for me".   Do not be decieved to think that all smoothies are "healthy". In fact, most are loaded with hidden sugars.   Here are some from the menu that you are allowed to have being diabetic:   The gladiator - any flavor.   Keto champ (berry, chocolate or coffee - all have 10 grams of carbs).   The Lean 1 smoothies all have 15 - 20 grams of carbs, so this is slightly more. But would be ok for a meal substitute or snack.   The Shredder in Vanilla or chocolate only. (the strawberry has more sugar considerably)    Tips and Tricks:     Eat an extra vegetable once per day - green veggies (such as broccoli, cauliflower, green beans) - make you feel full and are low in sugar.     My favorite "secret" seasoning to make things extra yummy is cinnamon for sweet taste   For an added secret "salty" taste - add "everything but the bagel" seasoning (you can get on amazon.com or at  joes. I have seen at local groceries such as [employer] too!).     Dark colored fruits are your friend -- blueberries, strawberries, raspberries, blackberries. They have a lower sugar content. So will be the best choice for you.   Light colored fruits are NOT your friend - they tend to be higher in sugar and are not the best options for an ADA diet - examples are oranges, bananas, peaches, watermelon, -- you can eat these, but carefully and in moderation.     WATER. I cannot emphasize drinking water enough - it will hydrate you, make you full. Your body needs it - it's a natural appetite suppressant.   Sometimes hunger and thirst can feel the same. Try drinking some water first, then eat if you are still hungry.     Other snack choices    Celery with peanut butter   Celery with tuna " "salad   Dill pickles and cheddar cheese (no kidding, it's a great combo)   Nuts (keep raw ones in the freezer if you think you'll overeat them)   Sunflower seeds (get them in the shell so it will take longer to eat them)   Other seeds (How to Toast Pumpkin or Squash Seeds)    Pistachios or almonds - will fill you up and are tasty!    Low-Carb Trail Mix   Jerky (beef or turkey -- try to find low-sugar varieties)   Salami slices (you can find in the deli section)   Cheese sticks, such as string cheese   Sugar-free Jello, alone or with cottage cheese and a sprinkling of nuts. Make sugar-free lime Jello with part coconut milk -- For a large package, dissolve the powder in a cup of boiling water, add a can of coconut milk, and then add the rest of the water. Stir well.   Pepperoni "chips" -- Zap the slices in the microwave   Cheese with a few apple slices   4-ounce plain or sugar-free yogurt with berries and flax seed meal   Smoked salmon and cream cheese on cucumber slices   Lettuce Roll-ups -- Roll luncheon meat, egg salad, tuna or other filling and veggies in lettuce leaves   Lunch Meat Roll-ups -- Roll cheese or veggies in lunch meat (read the labels for carbs on the lunch meat)   Spread bean dip, spinach dip, or other low-carb dip or spread on the lunch meat or lettuce and then roll it up   Raw veggies and spinach dip, or other low-carb dip   Pork rinds (Ch[place]arrón), with or without dip   Ricotta cheese with fruit and/or nuts and/or flax seed meal   Mushrooms with cheese spread inside (or other spreads or dips)   Low-carb snack bars (watch out for sugar alcohols, especially maltitol)   Product Review: Atkins Advantage Bars   Pepperoni Chips -- Microwave pepperoni slices until crisp. Great with cheeses and dips   Garlic Parmesan Flax Seed Crackers   Parmesan Crisps -- Good when you want a crunchy snack.   Peanut Butter Protein Balls      "

## 2020-11-17 NOTE — PROGRESS NOTES
HPI: Diana Montenegro is a 65 y.o.  female c/I for visit to address Diabetes Type 2  This is the first time I am seeing them for care, follows with Dr. Rad Johnston MD for primary care needs.   Has not seen endocrinology or diabetes specialist in the past.     was diagnosed with T2DM in 1993. Diagnosed with T2dm during preganancy - gestational since that time.   Started on metformin 1000mg twice daily and taking glimepiride 4mg twice daily- irregardless of meals.   Has never been hospitalized r/t DM.  Denies missing doses of DM medication.   States took lantus insulin several years ago - but sugars were going low, so stopped taking.     S/p stroke in 2014 - also diagnosed with afib at that time.   Left sided upper extremity weakness following, no longer has residual activity.   Following this, she focused on her health and lost weight --->   Weight loss from 330 lbs down to 198 - s/p gastric sleeve in 2015.   Has gradually started gaining weight back. 20 - 25 lbs estimated.   Is interested in learning how to lose weight and manage her diabetes more effectively.   Current a1 c has gone up from 6.8% ----> to 7.7%.     Past medical History:   Past Medical History:   Diagnosis Date    Arthritis     Atrial fibrillation, unspecified     hx of a fib prior to stroke.    Chronic back pain     Colon polyp     CVA (cerebral infarction) 7/16/14    Degenerative disc disease     cervical; lumbar    Diabetes mellitus type II     Encounter for loop recorder at end of battery life 9/17/2018    Hyperlipidemia     Hypertension     Hypothyroidism     Mild nonproliferative diabetic retinopathy 08/12/2018    Obesity     Sleep apnea     Stroke july 2014    Venous insufficiency (chronic) (peripheral)       Family hx:   Family History   Problem Relation Age of Onset    No Known Problems Mother     Heart disease Father     Diabetes Maternal Grandfather     Obesity Maternal Grandfather     No Known Problems Sister      No Known Problems Sister     No Known Problems Maternal Grandmother     Stroke Paternal Grandmother     No Known Problems Paternal Grandfather     Heart attack Neg Hx       Current meds:   Current Outpatient Medications:     acyclovir 5% (ZOVIRAX) 5 % ointment, Apply topically 6 (six) times daily., Disp: 5 g, Rfl: 1    albuterol (PROVENTIL/VENTOLIN HFA) 90 mcg/actuation inhaler, Inhale 2 puffs into the lungs every 6 (six) hours as needed for Wheezing. Rescue, Disp: 18 g, Rfl: 2    albuterol-ipratropium 2.5mg-0.5mg/3mL (DUO-NEB) 0.5 mg-3 mg(2.5 mg base)/3 mL nebulizer solution, Take 3 mLs by nebulization every 6 (six) hours as needed for Wheezing. Rescue, Disp: 3 vial, Rfl: 3    apixaban (ELIQUIS) 5 mg Tab, Take 1 tablet (5 mg total) by mouth 2 (two) times daily., Disp: 180 tablet, Rfl: 3    aspirin (ECOTRIN) 81 MG EC tablet, Take 1 tablet (81 mg total) by mouth once daily., Disp: , Rfl:     b complex vitamins tablet, Take 1 tablet by mouth once daily., Disp: , Rfl:     blood pressure test kit-large Kit, Use as directed, Disp: 1 each, Rfl: 0    blood sugar diagnostic Strp, Test blood sugar once daily, Disp: 100 strip, Rfl: 3    blood sugar diagnostic Strp, Needs one touch test strips to use three times a day., Disp: 300 strip, Rfl: 3    CALCIUM CITRATE/VITAMIN D3 (CALCIUM CITRATE + D ORAL), Take 2 tablets by mouth 2 (two) times daily., Disp: , Rfl:     cetirizine (ZYRTEC) 5 MG tablet, Take 5 mg by mouth once daily., Disp: , Rfl:     cinnamon bark (CINNAMON ORAL), Take by mouth 2 (two) times daily., Disp: , Rfl:     clotrimazole-betamethasone 1-0.05% (LOTRISONE) cream, Apply topically 2 (two) times daily., Disp: 1 Tube, Rfl: 2    cyanocobalamin, vitamin B-12, 500 mcg Subl, Place 1 tablet under the tongue once daily., Disp: , Rfl:     docusate sodium (COLACE) 100 MG capsule, Take 100 mg by mouth once daily. , Disp: , Rfl:     fish oil-omega-3 fatty acids 300-1,000 mg capsule, Take by mouth 2  (two) times daily., Disp: , Rfl:     fluticasone (FLONASE) 50 mcg/actuation nasal spray, 2 sprays (100 mcg total) by Each Nare route once daily. (Patient taking differently: 2 sprays by Each Nare route daily as needed. ), Disp: 1 Bottle, Rfl: 0    lancets (ONE TOUCH DELICA LANCETS) 33 gauge Misc, 1 lancet by Misc.(Non-Drug; Combo Route) route 4 (four) times daily., Disp: 150 each, Rfl: 8    levothyroxine (SYNTHROID) 125 MCG tablet, Take 1 tablet (125 mcg total) by mouth once daily., Disp: 90 tablet, Rfl: 3    LORazepam (ATIVAN) 1 MG tablet, , Disp: , Rfl:     metFORMIN (GLUCOPHAGE) 1000 MG tablet, Take 1 tablet (1,000 mg total) by mouth 2 (two) times daily with meals., Disp: 180 tablet, Rfl: 1    metoprolol tartrate (LOPRESSOR) 25 MG tablet, Take 1 tablet (25 mg total) by mouth once as needed. For palpitations, Disp: 30 tablet, Rfl: 11    multivitamin capsule, Take 1 capsule by mouth once daily., Disp: , Rfl:     omeprazole (PRILOSEC) 40 MG capsule, Take 1 capsule (40 mg total) by mouth every morning., Disp: 90 capsule, Rfl: 3    rosuvastatin (CRESTOR) 40 MG Tab, Take 1 tablet (40 mg total) by mouth once daily., Disp: 90 tablet, Rfl: 3    RUTIN/HESP/BIOFLAV/C/HERB#196 (BIOFLEX ORAL), Take 2 tablets by mouth once daily., Disp: , Rfl:     senna (SENNA) 8.6 mg tablet, Take 2 tablets by mouth nightly as needed for Constipation., Disp: 100 tablet, Rfl: 3    traZODone (DESYREL) 50 MG tablet, 1 pill PO PRN for insomnia at bedtime. OK to take 2nd pill in 30 minutes if still awake., Disp: 60 tablet, Rfl: 5    valsartan-hydrochlorothiazide (DIOVAN-HCT) 160-12.5 mg per tablet, TAKE 1 TABLET BY MOUTH DAILY, Disp: 30 tablet, Rfl: 6    blood-glucose meter (ONETOUCH ULTRA SYSTEM KIT) kit, Use as instructed, Disp: 1 each, Rfl: 0    levocetirizine (XYZAL) 5 MG tablet, Take 1 tablet (5 mg total) by mouth every evening. (Patient not taking: Reported on 1/20/2020), Disp: 30 tablet, Rfl: 11    OZEMPIC 0.25 mg or 0.5 mg(2  mg/1.5 mL) PnIj, Inject  0.25mg into thje skin every week x 2 weeks. Then increase to 0.5mg into the skin every week thereafter., Disp: 1.5 mL, Rfl: 3  No current facility-administered medications for this visit.     Facility-Administered Medications Ordered in Other Visits:     0.9%  NaCl infusion, , Intravenous, Continuous, Khadijah Rivera NP, 1,000 mL at 18 0939    ceFAZolin injection 2 g, 2 g, Intravenous, On Call Procedure, Khadijah Rivera NP     Current Diabetes medications:   Metformin 1000 bid.   amaryl 4mg twice daily.     Medications Tried and Failed:   Lantus    Review of Pertinent co-morbidities/risk factors:   CV: Denies history of MI. History of stroke  Hx. Afib. On eliquis and aspirin daily.   CAD: known right carotid artery stenosis.   BP: has history of HTN  Statin: Taking  ACE/ARB: Taking    Social History     Tobacco Use   Smoking Status Former Smoker    Packs/day: 1.00    Years: 30.00    Pack years: 30.00    Types: Cigarettes    Quit date: 2014    Years since quittin.3   Smokeless Tobacco Never Used      Social:   Lives at home by herself.   Diet: following ADA diet   Meals: 2 - 3 per day and snacks.        Breakfast - culp and tomato sandwich on 2 pieces of toast, yogurt.        Lunch - turkey burgers, steak, some bread/sugar free bread recently.        Dinner - meat and vegetable        Snacks - popcorn, protein bars, nuts, chips, cracker        Drinks - water and sugar free diet cranberry juice, coke zero or sprite zero.   Exercise: gym daily - swimming daily. Prohibited from High impact activities due to pain in knees.   Activities: retired from post office in 2016.     Glucose Monitoring:   Checking sugars 1x/day by fingerstick in morning.   Variable 130 - 200's.    Glucometer : one touch Ultra  Gets supplies from :  [employer].     Standards of care:   Eyes: .: 2020  Foot exam: : 2020   Diabetes education: None.    Vital Signs  /68 (BP  "Location: Right arm, Patient Position: Sitting, BP Method: Medium (Manual))   Pulse 60   Temp 97.2 °F (36.2 °C) (Temporal)   Resp 18   Ht 5' 4" (1.626 m)   Wt 103.2 kg (227 lb 8.2 oz)   LMP  (LMP Unknown)   SpO2 97%   BMI 39.05 kg/m²     Pertinent Labs:   Hgba1c   Lab Results   Component Value Date    HGBA1C 7.7 (H) 10/16/2020    HGBA1C 6.8 (H) 06/09/2020    HGBA1C 6.8 (H) 11/08/2019     Lipid panel   Lab Results   Component Value Date    CHOL 151 06/09/2020    CHOL 138 08/30/2019    CHOL 149 07/10/2018     Lab Results   Component Value Date    HDL 63 06/09/2020    HDL 60 08/30/2019    HDL 52 07/10/2018     Lab Results   Component Value Date    LDLCALC 74.8 06/09/2020    LDLCALC 63.0 08/30/2019    LDLCALC 80.0 07/10/2018     Lab Results   Component Value Date    TRIG 66 06/09/2020    TRIG 75 08/30/2019    TRIG 85 07/10/2018     Lab Results   Component Value Date    CHOLHDL 41.7 06/09/2020    CHOLHDL 43.5 08/30/2019    CHOLHDL 34.9 07/10/2018      CMP  Glucose   Date Value Ref Range Status   10/16/2020 92 70 - 110 mg/dL Final     BUN   Date Value Ref Range Status   10/16/2020 32 (H) 8 - 23 mg/dL Final     Creatinine   Date Value Ref Range Status   10/16/2020 1.0 0.5 - 1.4 mg/dL Final     eGFR if    Date Value Ref Range Status   10/16/2020 >60.0 >60 mL/min/1.73 m^2 Final     eGFR if non    Date Value Ref Range Status   10/16/2020 59.7 (A) >60 mL/min/1.73 m^2 Final     Comment:     Calculation used to obtain the estimated glomerular filtration  rate (eGFR) is the CKD-EPI equation.        AST   Date Value Ref Range Status   10/16/2020 23 10 - 40 U/L Final     ALT   Date Value Ref Range Status   10/16/2020 24 10 - 44 U/L Final     Microalbumin creatinine ratio:   Lab Results   Component Value Date    MICALBCREAT 242.7 (H) 08/30/2019     Physical Exam  Review Of Systems:   Gen: Appetite good, weight gain 25+ lbs this year, denies fatigue and weakness. Denies polydipsia.  Skin: Skin " is intact and heals well, denies any rashes or hair changes.   EENT: Denies any acute visual disturbances, nor blurred vision. Denies nasal congestion, no compressive symptoms.   Resp: Denies SOB or Dyspnea on exertion, denies cough.   Cardiac: Denies chest pain, palpitations, or swelling.   GI: Denies abdominal pain, nausea or vomiting, diarrhea, or constipation.   /GYN: Denies nocturia, nor burning, frequency or pain on urination.  MS/Neuro: Denies numbness/ tingling in BLE; Gait steady, speech clear  Psych: Denies drug/ETOH abuse, no hx of depression.  Other systems: negative.    Physical Exam:   GENERAL: Well developed, well nourished in appearance.   PSYCH: AAOx3, appropriate mood and affect, pleasant expression, conversant, appears relaxed, well groomed.   EYES: PERRL, Conjunctiva and corneas clear  NECK: Soft and Supple, trachea midline, No thyroid enlargement noted.  CHEST: Even, regular, and unlabored respirations  ABDOMEN: Soft, non-tender, non-distended. Normal bowel sounds.   VASCULAR: pedal pulses palpable bilaterally, no edema.  NEURO:  cranial nerves II - XII intact   MUSCULOSKELETAL: Good ROM, equal strength, equal hand grasp, steady gait.   SKIN: Skin warm, dry, and intact     Assessment and Plan of Care:     Diana was seen today for diabetes.    Diagnoses and all orders for this visit:    Acquired hypothyroidism  -     T4, Free; Future  -     TSH; Future    Essential hypertension  -     Microalbumin/Creatinine Ratio, Urine; Future    Other orders  -     OZEMPIC 0.25 mg or 0.5 mg(2 mg/1.5 mL) PnIj; Inject  0.25mg into thje skin every week x 2 weeks. Then increase to 0.5mg into the skin every week thereafter.     1. T2DM with hyperglycemia- Hgba1c goal is 7.5% or less without hypoglycemia - 6.6%--> 7.7%.   Continue metformin 1000 bid.   Stop glimiperide.   Switch to once weekly ozempic.  Start Ozempic 0.25 mg SC once weekly x 2 weeks, at week 3 increase to 0.5 mg weekly and stay at this dose.    No known history of pancreatitis or medullary thyroid cancer.   If you experience severe abdominal pain, nausea, or diarrhea - please call me or notify my office immediately.   discussed DM, progression of disease, long term complications, CV risk factors and tx options.   Advise compliance with ADA diet and encourage exercise   Instructed to monitor Blood glucose 2 x/day before meals and bring meter/ log to every clinic visit.     2. HTN-  controlled, continue meds as previously prescribed and monitor.   Urine mac 242.77 (from 2019) - get new with next lab draw.   diovan-hctz.   lopressor    3. Lipids- LDL goal < 100. At goal.   Currently on statin therapy- continue crestor 40mg every HS- to continue.     4. Weight - BMI Body mass index is 39.05 kg/m².   Encourage Ada diet and exercise.     5. Renal Function - stable trends. No known renal disease.     6. Hypothyroidism - continues on synthroid 125mcg tablet daily.   No changes. Last tft's in 8/2019 were wnl. Due for repeat. Will order on next lab draw.     7. Afib history - on aspirin daily and eliquis daily.   History of CVA in 2014. No deficits currently.     8. Sleep apnea - occasionally wears cpap machine to help with rest.   History of smoking. Quit in 2014 after her stroke.   Weight loss will help with this.        Follow up in 6 weeks with OV and bg logs.

## 2020-11-18 PROBLEM — E78.5 HYPERLIPIDEMIA ASSOCIATED WITH TYPE 2 DIABETES MELLITUS: Status: ACTIVE | Noted: 2020-11-18

## 2020-11-18 PROBLEM — I15.2 HYPERTENSION ASSOCIATED WITH DIABETES: Status: ACTIVE | Noted: 2020-11-18

## 2020-11-18 PROBLEM — E11.59 HYPERTENSION ASSOCIATED WITH DIABETES: Status: ACTIVE | Noted: 2020-11-18

## 2020-11-18 PROBLEM — E11.69 HYPERLIPIDEMIA ASSOCIATED WITH TYPE 2 DIABETES MELLITUS: Status: ACTIVE | Noted: 2020-11-18

## 2020-11-27 ENCOUNTER — PATIENT MESSAGE (OUTPATIENT)
Dept: INTERNAL MEDICINE | Facility: CLINIC | Age: 65
End: 2020-11-27

## 2020-11-27 NOTE — TELEPHONE ENCOUNTER
Called and s/w patient.     Reviewed her glucose log. Running 220 - 280's.   However, states her sugar was 150 this morning fasting.   Advised to continue metformin 1000 bid.   Continue the ozempic. 2nd dose is due today.   Give the 0.25mg today.   Then, next Friday, increase to 0.5mg.   For now, can restart the glimiperide daily In morning - with breakfast or lunch - whichever is biggest meal of the day.     Patient verbalized understanding.     Thanks,  wyatt

## 2020-12-29 ENCOUNTER — OFFICE VISIT (OUTPATIENT)
Dept: INTERNAL MEDICINE | Facility: CLINIC | Age: 65
End: 2020-12-29
Payer: COMMERCIAL

## 2020-12-29 VITALS
TEMPERATURE: 98 F | HEIGHT: 64 IN | BODY MASS INDEX: 36.96 KG/M2 | DIASTOLIC BLOOD PRESSURE: 80 MMHG | WEIGHT: 216.5 LBS | RESPIRATION RATE: 16 BRPM | HEART RATE: 72 BPM | SYSTOLIC BLOOD PRESSURE: 130 MMHG

## 2020-12-29 DIAGNOSIS — E11.59 HYPERTENSION ASSOCIATED WITH DIABETES: Primary | ICD-10-CM

## 2020-12-29 DIAGNOSIS — E11.69 HYPERLIPIDEMIA ASSOCIATED WITH TYPE 2 DIABETES MELLITUS: ICD-10-CM

## 2020-12-29 DIAGNOSIS — E03.9 ACQUIRED HYPOTHYROIDISM: ICD-10-CM

## 2020-12-29 DIAGNOSIS — I15.2 HYPERTENSION ASSOCIATED WITH DIABETES: Primary | ICD-10-CM

## 2020-12-29 DIAGNOSIS — E78.5 HYPERLIPIDEMIA ASSOCIATED WITH TYPE 2 DIABETES MELLITUS: ICD-10-CM

## 2020-12-29 DIAGNOSIS — E11.40 TYPE 2 DIABETES MELLITUS WITH DIABETIC NEUROPATHY, WITHOUT LONG-TERM CURRENT USE OF INSULIN: ICD-10-CM

## 2020-12-29 DIAGNOSIS — E66.01 SEVERE OBESITY: ICD-10-CM

## 2020-12-29 PROCEDURE — 3079F PR MOST RECENT DIASTOLIC BLOOD PRESSURE 80-89 MM HG: ICD-10-PCS | Mod: CPTII,S$GLB,, | Performed by: NURSE PRACTITIONER

## 2020-12-29 PROCEDURE — 99999 PR PBB SHADOW E&M-EST. PATIENT-LVL V: ICD-10-PCS | Mod: PBBFAC,,, | Performed by: NURSE PRACTITIONER

## 2020-12-29 PROCEDURE — 99214 PR OFFICE/OUTPT VISIT, EST, LEVL IV, 30-39 MIN: ICD-10-PCS | Mod: S$GLB,,, | Performed by: NURSE PRACTITIONER

## 2020-12-29 PROCEDURE — 3051F PR MOST RECENT HEMOGLOBIN A1C LEVEL 7.0 - < 8.0%: ICD-10-PCS | Mod: CPTII,S$GLB,, | Performed by: NURSE PRACTITIONER

## 2020-12-29 PROCEDURE — 1126F PR PAIN SEVERITY QUANTIFIED, NO PAIN PRESENT: ICD-10-PCS | Mod: S$GLB,,, | Performed by: NURSE PRACTITIONER

## 2020-12-29 PROCEDURE — 3079F DIAST BP 80-89 MM HG: CPT | Mod: CPTII,S$GLB,, | Performed by: NURSE PRACTITIONER

## 2020-12-29 PROCEDURE — 99999 PR PBB SHADOW E&M-EST. PATIENT-LVL V: CPT | Mod: PBBFAC,,, | Performed by: NURSE PRACTITIONER

## 2020-12-29 PROCEDURE — 3075F SYST BP GE 130 - 139MM HG: CPT | Mod: CPTII,S$GLB,, | Performed by: NURSE PRACTITIONER

## 2020-12-29 PROCEDURE — 3051F HG A1C>EQUAL 7.0%<8.0%: CPT | Mod: CPTII,S$GLB,, | Performed by: NURSE PRACTITIONER

## 2020-12-29 PROCEDURE — 1126F AMNT PAIN NOTED NONE PRSNT: CPT | Mod: S$GLB,,, | Performed by: NURSE PRACTITIONER

## 2020-12-29 PROCEDURE — 99214 OFFICE O/P EST MOD 30 MIN: CPT | Mod: S$GLB,,, | Performed by: NURSE PRACTITIONER

## 2020-12-29 PROCEDURE — 3075F PR MOST RECENT SYSTOLIC BLOOD PRESS GE 130-139MM HG: ICD-10-PCS | Mod: CPTII,S$GLB,, | Performed by: NURSE PRACTITIONER

## 2020-12-29 PROCEDURE — 3008F BODY MASS INDEX DOCD: CPT | Mod: CPTII,S$GLB,, | Performed by: NURSE PRACTITIONER

## 2020-12-29 PROCEDURE — 3008F PR BODY MASS INDEX (BMI) DOCUMENTED: ICD-10-PCS | Mod: CPTII,S$GLB,, | Performed by: NURSE PRACTITIONER

## 2020-12-29 NOTE — PATIENT INSTRUCTIONS
Continue ozempic 0.5mg sc weekly.   Continue metformin 1000 twice daily.   Don't take glimepiride - it is causing your sugars to go too low, which then can cause rebound hyperglycemia (high blood sugars later on) -   The ozempic should control the high blood sugars.     Remember to eat small frequent meals.   Follow diabetic diet.   Check sugars 1 - 2 times daily  Keep log.     Follow up in 2 - 3 months with labs prior.

## 2020-12-29 NOTE — PROGRESS NOTES
65 y.o. female, here for 6 week follow up for T2dm management.   Last seen 11/17/2020 - those notes are below.     Her a1c had gone up a bit to 7.7% and she was motivated to eat better and lose weight.   Gastric sleeve in the past.   I have continued her on metformin 1000mg twice daily.   I have cut down on her glimepiride - she was taking 4mg tablets - 2 of those daily.   Isntead, switched her to once weekly ozempic.   She is taking 0.5mg sc weekly. - tolerating well, eating less and smaller portions.   Feeling full.   Has lost 11 lbs in 6 weeks time.   Following diabetic diet.     Checking sugars 2 times daily.   Sugars on average range. 90 - 130's.   She occasionally has high glucose reading in the morning - we had talked on the phone - and I advised her to try just a 1/2 of a glimeperide tablet as needed -   She tried 1/2 of a tablet (2mg) - which did bring her sugars down, but almost too low - 60 - 70's.   She did feel shaky and ate, and sugar came back up - see logs below.     BP is slightly elevated today - but took her bp meds this morning.   States just nervous coming to the doctor.   Feeling well, and denies any elevated home bp's.                 Last visit notes from 11/17/2020 as follows:   HPI: Diana Montenegro is a 65 y.o.  female c/I for visit to address Diabetes Type 2  This is the first time I am seeing them for care, follows with Dr. Rad Johnston MD for primary care needs.   Has not seen endocrinology or diabetes specialist in the past.     was diagnosed with T2DM in 1993. Diagnosed with T2dm during preganancy - gestational since that time.   Started on metformin 1000mg twice daily and taking glimepiride 4mg twice daily- irregardless of meals.   Has never been hospitalized r/t DM.  Denies missing doses of DM medication.   States took lantus insulin several years ago - but sugars were going low, so stopped taking.     S/p stroke in 2014 - also diagnosed with afib at that time.   Left sided  upper extremity weakness following, no longer has residual activity.   Following this, she focused on her health and lost weight --->   Weight loss from 330 lbs down to 198 - s/p gastric sleeve in 2015.   Has gradually started gaining weight back. 20 - 25 lbs estimated.   Is interested in learning how to lose weight and manage her diabetes more effectively.   Current a1 c has gone up from 6.8% ----> to 7.7%.     Past medical History:   Past Medical History:   Diagnosis Date    Arthritis     Atrial fibrillation, unspecified     hx of a fib prior to stroke.    Chronic back pain     Colon polyp     CVA (cerebral infarction) 7/16/14    Degenerative disc disease     cervical; lumbar    Diabetes mellitus type II     Encounter for loop recorder at end of battery life 9/17/2018    Hyperlipidemia     Hypertension     Hypothyroidism     Mild nonproliferative diabetic retinopathy 08/12/2018    Obesity     Sleep apnea     Stroke july 2014    Venous insufficiency (chronic) (peripheral)       Family hx:   Family History   Problem Relation Age of Onset    No Known Problems Mother     Heart disease Father     Diabetes Maternal Grandfather     Obesity Maternal Grandfather     No Known Problems Sister     No Known Problems Sister     No Known Problems Maternal Grandmother     Stroke Paternal Grandmother     No Known Problems Paternal Grandfather     Heart attack Neg Hx       Current meds:   Current Outpatient Medications:     acyclovir 5% (ZOVIRAX) 5 % ointment, Apply topically 6 (six) times daily., Disp: 5 g, Rfl: 1    albuterol (PROVENTIL/VENTOLIN HFA) 90 mcg/actuation inhaler, Inhale 2 puffs into the lungs every 6 (six) hours as needed for Wheezing. Rescue, Disp: 18 g, Rfl: 2    apixaban (ELIQUIS) 5 mg Tab, Take 1 tablet (5 mg total) by mouth 2 (two) times daily., Disp: 180 tablet, Rfl: 3    aspirin (ECOTRIN) 81 MG EC tablet, Take 1 tablet (81 mg total) by mouth once daily., Disp: , Rfl:     b  complex vitamins tablet, Take 1 tablet by mouth once daily., Disp: , Rfl:     blood pressure test kit-large Kit, Use as directed, Disp: 1 each, Rfl: 0    blood sugar diagnostic Strp, Test blood sugar once daily, Disp: 100 strip, Rfl: 3    blood sugar diagnostic Strp, Needs one touch test strips to use three times a day., Disp: 300 strip, Rfl: 3    CALCIUM CITRATE/VITAMIN D3 (CALCIUM CITRATE + D ORAL), Take 2 tablets by mouth 2 (two) times daily., Disp: , Rfl:     cetirizine (ZYRTEC) 5 MG tablet, Take 5 mg by mouth once daily., Disp: , Rfl:     cinnamon bark (CINNAMON ORAL), Take by mouth 2 (two) times daily., Disp: , Rfl:     clotrimazole-betamethasone 1-0.05% (LOTRISONE) cream, Apply topically 2 (two) times daily., Disp: 1 Tube, Rfl: 2    cyanocobalamin, vitamin B-12, 500 mcg Subl, Place 1 tablet under the tongue once daily., Disp: , Rfl:     docusate sodium (COLACE) 100 MG capsule, Take 100 mg by mouth once daily. , Disp: , Rfl:     fish oil-omega-3 fatty acids 300-1,000 mg capsule, Take by mouth 2 (two) times daily., Disp: , Rfl:     fluticasone (FLONASE) 50 mcg/actuation nasal spray, 2 sprays (100 mcg total) by Each Nare route once daily. (Patient taking differently: 2 sprays by Each Nare route daily as needed. ), Disp: 1 Bottle, Rfl: 0    lancets (ONE TOUCH DELICA LANCETS) 33 gauge Misc, 1 lancet by Misc.(Non-Drug; Combo Route) route 4 (four) times daily., Disp: 150 each, Rfl: 8    levothyroxine (SYNTHROID) 125 MCG tablet, Take 1 tablet (125 mcg total) by mouth once daily., Disp: 90 tablet, Rfl: 3    LORazepam (ATIVAN) 1 MG tablet, , Disp: , Rfl:     metFORMIN (GLUCOPHAGE) 1000 MG tablet, Take 1 tablet (1,000 mg total) by mouth 2 (two) times daily with meals., Disp: 180 tablet, Rfl: 1    metoprolol tartrate (LOPRESSOR) 25 MG tablet, Take 1 tablet (25 mg total) by mouth once as needed. For palpitations, Disp: 30 tablet, Rfl: 11    multivitamin capsule, Take 1 capsule by mouth once daily., Disp:  , Rfl:     omeprazole (PRILOSEC) 40 MG capsule, Take 1 capsule (40 mg total) by mouth every morning., Disp: 90 capsule, Rfl: 3    OZEMPIC 0.25 mg or 0.5 mg(2 mg/1.5 mL) PnIj, Inject  0.25mg into thje skin every week x 2 weeks. Then increase to 0.5mg into the skin every week thereafter., Disp: 1.5 mL, Rfl: 3    rosuvastatin (CRESTOR) 40 MG Tab, Take 1 tablet (40 mg total) by mouth once daily., Disp: 90 tablet, Rfl: 3    RUTIN/HESP/BIOFLAV/C/HERB#196 (BIOFLEX ORAL), Take 2 tablets by mouth once daily., Disp: , Rfl:     senna (SENNA) 8.6 mg tablet, Take 2 tablets by mouth nightly as needed for Constipation., Disp: 100 tablet, Rfl: 3    traZODone (DESYREL) 50 MG tablet, 1 pill PO PRN for insomnia at bedtime. OK to take 2nd pill in 30 minutes if still awake., Disp: 60 tablet, Rfl: 5    valsartan-hydrochlorothiazide (DIOVAN-HCT) 160-12.5 mg per tablet, TAKE 1 TABLET BY MOUTH DAILY, Disp: 30 tablet, Rfl: 6    albuterol-ipratropium 2.5mg-0.5mg/3mL (DUO-NEB) 0.5 mg-3 mg(2.5 mg base)/3 mL nebulizer solution, Take 3 mLs by nebulization every 6 (six) hours as needed for Wheezing. Rescue, Disp: 3 vial, Rfl: 3    blood-glucose meter (ONETOUCH ULTRA SYSTEM KIT) kit, Use as instructed, Disp: 1 each, Rfl: 0    levocetirizine (XYZAL) 5 MG tablet, Take 1 tablet (5 mg total) by mouth every evening. (Patient not taking: Reported on 1/20/2020), Disp: 30 tablet, Rfl: 11  No current facility-administered medications for this visit.     Facility-Administered Medications Ordered in Other Visits:     0.9%  NaCl infusion, , Intravenous, Continuous, Khadijah Rivera NP, 1,000 mL at 09/17/18 0939    ceFAZolin injection 2 g, 2 g, Intravenous, On Call Procedure, Khadijah Rivera NP     Current Diabetes medications:   Metformin 1000 bid.    ozempic 0.5mg sc weekl.   Occasional 1/2 tablet of amaryl (2mg) every few days if bg is greater than 150's.    Medications Tried and Failed:   Lantus  amaryl 4mg twice daily.    Review of  "Pertinent co-morbidities/risk factors:   CV: Denies history of MI. History of stroke  Hx. Afib. On eliquis and aspirin daily.   CAD: known right carotid artery stenosis.   BP: has history of HTN  Statin: Taking  ACE/ARB: Taking    Social History     Tobacco Use   Smoking Status Former Smoker    Packs/day: 1.00    Years: 30.00    Pack years: 30.00    Types: Cigarettes    Quit date: 2014    Years since quittin.4   Smokeless Tobacco Never Used      Social:   Lives at home by herself.   Diet: following ADA diet   Meals: 2 - 3 per day and snacks.        Breakfast - culp and tomato sandwich on 2 pieces of toast, yogurt.        Lunch - turkey burgers, steak, some bread/sugar free bread recently.        Dinner - meat and vegetable        Snacks - popcorn, protein bars, nuts, chips, cracker        Drinks - water and sugar free diet cranberry juice, coke zero or sprite zero.   Exercise: gym daily - swimming daily. Prohibited from High impact activities due to pain in knees.   Activities: retired from post office in 2016.     Glucose Monitoring:   Checking sugars 1x/day by fingerstick in morning.   Variable 90 - 130's. Some low glucose readings (after taking glimepiride generally).    Glucometer : one touch Ultra  Gets supplies from :  [employer].     Standards of care:   Eyes: .: 2020  Foot exam: : 2020   Diabetes education: None.    Vital Signs  /80   Pulse 72   Temp 98 °F (36.7 °C) (Temporal)   Resp 16   Ht 5' 4" (1.626 m)   Wt 98.2 kg (216 lb 7.9 oz)   LMP  (LMP Unknown)   BMI 37.16 kg/m²     Pertinent Labs:   Hgba1c   Lab Results   Component Value Date    HGBA1C 7.7 (H) 10/16/2020    HGBA1C 6.8 (H) 2020    HGBA1C 6.8 (H) 2019     Lipid panel   Lab Results   Component Value Date    CHOL 151 2020    CHOL 138 2019    CHOL 149 07/10/2018     Lab Results   Component Value Date    HDL 63 2020    HDL 60 2019    HDL 52 07/10/2018     Lab Results   Component " Value Date    LDLCALC 74.8 06/09/2020    LDLCALC 63.0 08/30/2019    LDLCALC 80.0 07/10/2018     Lab Results   Component Value Date    TRIG 66 06/09/2020    TRIG 75 08/30/2019    TRIG 85 07/10/2018     Lab Results   Component Value Date    CHOLHDL 41.7 06/09/2020    CHOLHDL 43.5 08/30/2019    CHOLHDL 34.9 07/10/2018      CMP  Glucose   Date Value Ref Range Status   10/16/2020 92 70 - 110 mg/dL Final     BUN   Date Value Ref Range Status   10/16/2020 32 (H) 8 - 23 mg/dL Final     Creatinine   Date Value Ref Range Status   10/16/2020 1.0 0.5 - 1.4 mg/dL Final     eGFR if    Date Value Ref Range Status   10/16/2020 >60.0 >60 mL/min/1.73 m^2 Final     eGFR if non    Date Value Ref Range Status   10/16/2020 59.7 (A) >60 mL/min/1.73 m^2 Final     Comment:     Calculation used to obtain the estimated glomerular filtration  rate (eGFR) is the CKD-EPI equation.        AST   Date Value Ref Range Status   10/16/2020 23 10 - 40 U/L Final     ALT   Date Value Ref Range Status   10/16/2020 24 10 - 44 U/L Final     Microalbumin creatinine ratio:   Lab Results   Component Value Date    MICALBCREAT 242.7 (H) 08/30/2019     Physical Exam  Review Of Systems:   Gen: Appetite good, weight loss 11 lbs, denies fatigue and weakness. Denies polydipsia.  Skin: Skin is intact and heals well, denies any rashes or hair changes.   EENT: Denies any acute visual disturbances, nor blurred vision. Denies nasal congestion, no compressive symptoms.   Resp: Denies SOB or Dyspnea on exertion, denies cough.   Cardiac: Denies chest pain, palpitations, or swelling.   GI: Denies abdominal pain, nausea or vomiting, diarrhea, or constipation.   /GYN: Denies nocturia, nor burning, frequency or pain on urination.  MS/Neuro: Denies numbness/ tingling in BLE; Gait steady, speech clear  Psych: Denies drug/ETOH abuse, no hx of depression.  Other systems: negative.    Physical Exam:   GENERAL: Well developed, well nourished in  appearance.   PSYCH: AAOx3, appropriate mood and affect, pleasant expression, conversant, appears relaxed, well groomed.   EYES: PERRL, Conjunctiva and corneas clear  NECK: Soft and Supple, trachea midline, No thyroid enlargement noted.  CHEST: Even, regular, and unlabored respirations  ABDOMEN: Soft, non-tender, non-distended. Normal bowel sounds.   VASCULAR: pedal pulses palpable bilaterally, no edema.  NEURO:  cranial nerves II - XII intact   MUSCULOSKELETAL: Good ROM, steady gait.   SKIN: Skin warm, dry, and intact     Assessment and Plan of Care:     Diana was seen today for diabetes.    Diagnoses and all orders for this visit:    Hypertension associated with diabetes    Hyperlipidemia associated with type 2 diabetes mellitus    Type 2 diabetes mellitus with diabetic neuropathy, without long-term current use of insulin    Severe obesity    Acquired hypothyroidism     1. T2DM with hyperglycemia- Hgba1c goal is 7.5% or less without hypoglycemia - 6.6%--> 7.7%.   Continue metformin 1000 bid.   Stop glimiperide. I think even doing the half dose is bringing sugars down too low.   Continue Ozempic 0.5 mg weekly  - consider increase to mg dose in future.   No known history of pancreatitis or medullary thyroid cancer.   If you experience severe abdominal pain, nausea, or diarrhea - please call me or notify my office immediately.   discussed DM, progression of disease, long term complications, CV risk factors and tx options.   Advise compliance with ADA diet and encourage exercise   Instructed to monitor Blood glucose 2 x/day before meals and bring meter/ log to every clinic visit.     2. HTN-  controlled, continue meds as previously prescribed and monitor.   Urine mac 242.77 (from 2019) - get new with next lab draw.   diovan-hctz.   lopressor    3. Lipids- LDL goal < 100. At goal.   Currently on statin therapy- continue crestor 40mg every HS- to continue.     4. Weight - BMI Body mass index is 37.16 kg/m².    Encourage Ada diet and exercise.     5. Renal Function - stable trends. No known renal disease.     6. Hypothyroidism - continues on synthroid 125mcg tablet daily.   No changes. Last tft's in 8/2019 were wnl. Due for repeat. Will order on next lab draw.     7. Afib history - on aspirin daily and eliquis daily.   History of CVA in 2014. No deficits currently.     8. Sleep apnea - occasionally wears cpap machine to help with rest.   History of smoking. Quit in 2014 after her stroke.   Weight loss will help with this.          Follow up in 2 - 3 months with ov and labs prior.

## 2021-01-07 ENCOUNTER — OFFICE VISIT (OUTPATIENT)
Dept: URGENT CARE | Facility: CLINIC | Age: 66
End: 2021-01-07
Payer: COMMERCIAL

## 2021-01-07 VITALS
HEIGHT: 65 IN | HEART RATE: 75 BPM | BODY MASS INDEX: 35.99 KG/M2 | DIASTOLIC BLOOD PRESSURE: 63 MMHG | TEMPERATURE: 98 F | SYSTOLIC BLOOD PRESSURE: 137 MMHG | OXYGEN SATURATION: 97 % | WEIGHT: 216 LBS | RESPIRATION RATE: 17 BRPM

## 2021-01-07 DIAGNOSIS — S46.811A TRAPEZIUS MUSCLE STRAIN, RIGHT, INITIAL ENCOUNTER: Primary | ICD-10-CM

## 2021-01-07 PROCEDURE — 3008F BODY MASS INDEX DOCD: CPT | Mod: CPTII,S$GLB,, | Performed by: PHYSICIAN ASSISTANT

## 2021-01-07 PROCEDURE — 99213 OFFICE O/P EST LOW 20 MIN: CPT | Mod: 25,S$GLB,, | Performed by: PHYSICIAN ASSISTANT

## 2021-01-07 PROCEDURE — 3008F PR BODY MASS INDEX (BMI) DOCUMENTED: ICD-10-PCS | Mod: CPTII,S$GLB,, | Performed by: PHYSICIAN ASSISTANT

## 2021-01-07 PROCEDURE — 99213 PR OFFICE/OUTPT VISIT, EST, LEVL III, 20-29 MIN: ICD-10-PCS | Mod: 25,S$GLB,, | Performed by: PHYSICIAN ASSISTANT

## 2021-01-07 RX ORDER — CYCLOBENZAPRINE HCL 10 MG
10 TABLET ORAL 3 TIMES DAILY PRN
Qty: 15 TABLET | Refills: 0 | Status: SHIPPED | OUTPATIENT
Start: 2021-01-07 | End: 2021-01-12 | Stop reason: SDUPTHER

## 2021-01-07 RX ORDER — DICLOFENAC SODIUM 10 MG/G
2 GEL TOPICAL 3 TIMES DAILY
Qty: 100 G | Refills: 0 | Status: SHIPPED | OUTPATIENT
Start: 2021-01-07 | End: 2023-06-21 | Stop reason: SDUPTHER

## 2021-01-12 ENCOUNTER — PATIENT MESSAGE (OUTPATIENT)
Dept: INTERNAL MEDICINE | Facility: CLINIC | Age: 66
End: 2021-01-12

## 2021-01-12 RX ORDER — CYCLOBENZAPRINE HCL 10 MG
10 TABLET ORAL 3 TIMES DAILY PRN
Qty: 30 TABLET | Refills: 1 | Status: SHIPPED | OUTPATIENT
Start: 2021-01-12 | End: 2021-01-17

## 2021-02-17 ENCOUNTER — PATIENT MESSAGE (OUTPATIENT)
Dept: INTERNAL MEDICINE | Facility: CLINIC | Age: 66
End: 2021-02-17

## 2021-02-17 ENCOUNTER — LAB VISIT (OUTPATIENT)
Dept: LAB | Facility: HOSPITAL | Age: 66
End: 2021-02-17
Attending: INTERNAL MEDICINE
Payer: COMMERCIAL

## 2021-02-17 DIAGNOSIS — E78.5 HYPERLIPIDEMIA, UNSPECIFIED HYPERLIPIDEMIA TYPE: ICD-10-CM

## 2021-02-17 DIAGNOSIS — I10 ESSENTIAL HYPERTENSION: ICD-10-CM

## 2021-02-17 DIAGNOSIS — E03.9 ACQUIRED HYPOTHYROIDISM: ICD-10-CM

## 2021-02-17 DIAGNOSIS — E11.40 TYPE 2 DIABETES MELLITUS WITH DIABETIC NEUROPATHY, WITHOUT LONG-TERM CURRENT USE OF INSULIN: ICD-10-CM

## 2021-02-17 LAB
ALBUMIN SERPL BCP-MCNC: 3.8 G/DL (ref 3.5–5.2)
ALP SERPL-CCNC: 87 U/L (ref 55–135)
ALT SERPL W/O P-5'-P-CCNC: 39 U/L (ref 10–44)
ANION GAP SERPL CALC-SCNC: 12 MMOL/L (ref 8–16)
AST SERPL-CCNC: 34 U/L (ref 10–40)
BILIRUB SERPL-MCNC: 0.3 MG/DL (ref 0.1–1)
BUN SERPL-MCNC: 28 MG/DL (ref 8–23)
CALCIUM SERPL-MCNC: 9.7 MG/DL (ref 8.7–10.5)
CHLORIDE SERPL-SCNC: 107 MMOL/L (ref 95–110)
CHOLEST SERPL-MCNC: 148 MG/DL (ref 120–199)
CHOLEST/HDLC SERPL: 2.7 {RATIO} (ref 2–5)
CO2 SERPL-SCNC: 23 MMOL/L (ref 23–29)
CREAT SERPL-MCNC: 1 MG/DL (ref 0.5–1.4)
EST. GFR  (AFRICAN AMERICAN): >60 ML/MIN/1.73 M^2
EST. GFR  (NON AFRICAN AMERICAN): 59.7 ML/MIN/1.73 M^2
ESTIMATED AVG GLUCOSE: 140 MG/DL (ref 68–131)
GLUCOSE SERPL-MCNC: 105 MG/DL (ref 70–110)
HBA1C MFR BLD: 6.5 % (ref 4–5.6)
HDLC SERPL-MCNC: 55 MG/DL (ref 40–75)
HDLC SERPL: 37.2 % (ref 20–50)
LDLC SERPL CALC-MCNC: 75.8 MG/DL (ref 63–159)
NONHDLC SERPL-MCNC: 93 MG/DL
POTASSIUM SERPL-SCNC: 4.5 MMOL/L (ref 3.5–5.1)
PROT SERPL-MCNC: 7.3 G/DL (ref 6–8.4)
SODIUM SERPL-SCNC: 142 MMOL/L (ref 136–145)
T4 FREE SERPL-MCNC: 1.18 NG/DL (ref 0.71–1.51)
TRIGL SERPL-MCNC: 86 MG/DL (ref 30–150)
TSH SERPL DL<=0.005 MIU/L-ACNC: 0.32 UIU/ML (ref 0.4–4)

## 2021-02-17 PROCEDURE — 36415 COLL VENOUS BLD VENIPUNCTURE: CPT | Mod: PO

## 2021-02-17 PROCEDURE — 84443 ASSAY THYROID STIM HORMONE: CPT

## 2021-02-17 PROCEDURE — 83036 HEMOGLOBIN GLYCOSYLATED A1C: CPT

## 2021-02-17 PROCEDURE — 80053 COMPREHEN METABOLIC PANEL: CPT

## 2021-02-17 PROCEDURE — 84439 ASSAY OF FREE THYROXINE: CPT

## 2021-02-17 PROCEDURE — 80061 LIPID PANEL: CPT

## 2021-02-18 RX ORDER — LEVOTHYROXINE SODIUM 112 UG/1
112 TABLET ORAL
Qty: 90 TABLET | Refills: 3 | Status: SHIPPED | OUTPATIENT
Start: 2021-02-18 | End: 2022-02-14 | Stop reason: SDUPTHER

## 2021-02-23 ENCOUNTER — OFFICE VISIT (OUTPATIENT)
Dept: INTERNAL MEDICINE | Facility: CLINIC | Age: 66
End: 2021-02-23
Payer: COMMERCIAL

## 2021-02-23 VITALS
DIASTOLIC BLOOD PRESSURE: 80 MMHG | BODY MASS INDEX: 35.32 KG/M2 | HEIGHT: 65 IN | HEART RATE: 70 BPM | SYSTOLIC BLOOD PRESSURE: 132 MMHG | WEIGHT: 212 LBS | OXYGEN SATURATION: 98 % | RESPIRATION RATE: 16 BRPM | TEMPERATURE: 98 F

## 2021-02-23 VITALS
TEMPERATURE: 98 F | BODY MASS INDEX: 35.33 KG/M2 | SYSTOLIC BLOOD PRESSURE: 128 MMHG | WEIGHT: 212.06 LBS | RESPIRATION RATE: 18 BRPM | DIASTOLIC BLOOD PRESSURE: 70 MMHG | HEART RATE: 66 BPM | OXYGEN SATURATION: 98 % | HEIGHT: 65 IN

## 2021-02-23 DIAGNOSIS — E78.5 HYPERLIPIDEMIA ASSOCIATED WITH TYPE 2 DIABETES MELLITUS: ICD-10-CM

## 2021-02-23 DIAGNOSIS — E11.69 HYPERLIPIDEMIA ASSOCIATED WITH TYPE 2 DIABETES MELLITUS: ICD-10-CM

## 2021-02-23 DIAGNOSIS — E03.9 ACQUIRED HYPOTHYROIDISM: ICD-10-CM

## 2021-02-23 DIAGNOSIS — E16.0 HYPOGLYCEMIA SECONDARY TO SULFONYLUREA, ACCIDENTAL OR UNINTENTIONAL, SEQUELA: ICD-10-CM

## 2021-02-23 DIAGNOSIS — I15.2 HYPERTENSION ASSOCIATED WITH DIABETES: ICD-10-CM

## 2021-02-23 DIAGNOSIS — E11.59 HYPERTENSION ASSOCIATED WITH DIABETES: ICD-10-CM

## 2021-02-23 DIAGNOSIS — I48.0 PAROXYSMAL ATRIAL FIBRILLATION: Primary | Chronic | ICD-10-CM

## 2021-02-23 DIAGNOSIS — E66.01 SEVERE OBESITY: ICD-10-CM

## 2021-02-23 DIAGNOSIS — T38.3X1S HYPOGLYCEMIA SECONDARY TO SULFONYLUREA, ACCIDENTAL OR UNINTENTIONAL, SEQUELA: ICD-10-CM

## 2021-02-23 DIAGNOSIS — E11.40 TYPE 2 DIABETES MELLITUS WITH DIABETIC NEUROPATHY, WITHOUT LONG-TERM CURRENT USE OF INSULIN: ICD-10-CM

## 2021-02-23 DIAGNOSIS — F17.200 TOBACCO USE DISORDER: ICD-10-CM

## 2021-02-23 DIAGNOSIS — Z12.2 ENCOUNTER FOR SCREENING FOR MALIGNANT NEOPLASM OF RESPIRATORY ORGANS: ICD-10-CM

## 2021-02-23 DIAGNOSIS — E11.40 TYPE 2 DIABETES MELLITUS WITH DIABETIC NEUROPATHY, WITHOUT LONG-TERM CURRENT USE OF INSULIN: Primary | ICD-10-CM

## 2021-02-23 PROCEDURE — 3044F HG A1C LEVEL LT 7.0%: CPT | Mod: CPTII,S$GLB,, | Performed by: NURSE PRACTITIONER

## 2021-02-23 PROCEDURE — 3008F BODY MASS INDEX DOCD: CPT | Mod: CPTII,S$GLB,, | Performed by: NURSE PRACTITIONER

## 2021-02-23 PROCEDURE — 3008F PR BODY MASS INDEX (BMI) DOCUMENTED: ICD-10-PCS | Mod: CPTII,S$GLB,, | Performed by: NURSE PRACTITIONER

## 2021-02-23 PROCEDURE — 3074F PR MOST RECENT SYSTOLIC BLOOD PRESSURE < 130 MM HG: ICD-10-PCS | Mod: CPTII,S$GLB,, | Performed by: INTERNAL MEDICINE

## 2021-02-23 PROCEDURE — 1126F PR PAIN SEVERITY QUANTIFIED, NO PAIN PRESENT: ICD-10-PCS | Mod: S$GLB,,, | Performed by: NURSE PRACTITIONER

## 2021-02-23 PROCEDURE — 99999 PR PBB SHADOW E&M-EST. PATIENT-LVL V: CPT | Mod: PBBFAC,,, | Performed by: NURSE PRACTITIONER

## 2021-02-23 PROCEDURE — 99214 OFFICE O/P EST MOD 30 MIN: CPT | Mod: S$GLB,,, | Performed by: INTERNAL MEDICINE

## 2021-02-23 PROCEDURE — 3075F PR MOST RECENT SYSTOLIC BLOOD PRESS GE 130-139MM HG: ICD-10-PCS | Mod: CPTII,S$GLB,, | Performed by: NURSE PRACTITIONER

## 2021-02-23 PROCEDURE — 3078F PR MOST RECENT DIASTOLIC BLOOD PRESSURE < 80 MM HG: ICD-10-PCS | Mod: CPTII,S$GLB,, | Performed by: INTERNAL MEDICINE

## 2021-02-23 PROCEDURE — 3044F PR MOST RECENT HEMOGLOBIN A1C LEVEL <7.0%: ICD-10-PCS | Mod: CPTII,S$GLB,, | Performed by: NURSE PRACTITIONER

## 2021-02-23 PROCEDURE — 1126F AMNT PAIN NOTED NONE PRSNT: CPT | Mod: S$GLB,,, | Performed by: NURSE PRACTITIONER

## 2021-02-23 PROCEDURE — 3074F SYST BP LT 130 MM HG: CPT | Mod: CPTII,S$GLB,, | Performed by: INTERNAL MEDICINE

## 2021-02-23 PROCEDURE — 3079F PR MOST RECENT DIASTOLIC BLOOD PRESSURE 80-89 MM HG: ICD-10-PCS | Mod: CPTII,S$GLB,, | Performed by: NURSE PRACTITIONER

## 2021-02-23 PROCEDURE — 99215 PR OFFICE/OUTPT VISIT, EST, LEVL V, 40-54 MIN: ICD-10-PCS | Mod: S$GLB,,, | Performed by: NURSE PRACTITIONER

## 2021-02-23 PROCEDURE — 1126F AMNT PAIN NOTED NONE PRSNT: CPT | Mod: S$GLB,,, | Performed by: INTERNAL MEDICINE

## 2021-02-23 PROCEDURE — 99214 PR OFFICE/OUTPT VISIT, EST, LEVL IV, 30-39 MIN: ICD-10-PCS | Mod: S$GLB,,, | Performed by: INTERNAL MEDICINE

## 2021-02-23 PROCEDURE — 1126F PR PAIN SEVERITY QUANTIFIED, NO PAIN PRESENT: ICD-10-PCS | Mod: S$GLB,,, | Performed by: INTERNAL MEDICINE

## 2021-02-23 PROCEDURE — 3008F PR BODY MASS INDEX (BMI) DOCUMENTED: ICD-10-PCS | Mod: CPTII,S$GLB,, | Performed by: INTERNAL MEDICINE

## 2021-02-23 PROCEDURE — 99999 PR PBB SHADOW E&M-EST. PATIENT-LVL V: ICD-10-PCS | Mod: PBBFAC,,, | Performed by: NURSE PRACTITIONER

## 2021-02-23 PROCEDURE — 3075F SYST BP GE 130 - 139MM HG: CPT | Mod: CPTII,S$GLB,, | Performed by: NURSE PRACTITIONER

## 2021-02-23 PROCEDURE — 3044F HG A1C LEVEL LT 7.0%: CPT | Mod: CPTII,S$GLB,, | Performed by: INTERNAL MEDICINE

## 2021-02-23 PROCEDURE — 3008F BODY MASS INDEX DOCD: CPT | Mod: CPTII,S$GLB,, | Performed by: INTERNAL MEDICINE

## 2021-02-23 PROCEDURE — 3078F DIAST BP <80 MM HG: CPT | Mod: CPTII,S$GLB,, | Performed by: INTERNAL MEDICINE

## 2021-02-23 PROCEDURE — 3044F PR MOST RECENT HEMOGLOBIN A1C LEVEL <7.0%: ICD-10-PCS | Mod: CPTII,S$GLB,, | Performed by: INTERNAL MEDICINE

## 2021-02-23 PROCEDURE — 3079F DIAST BP 80-89 MM HG: CPT | Mod: CPTII,S$GLB,, | Performed by: NURSE PRACTITIONER

## 2021-02-23 PROCEDURE — 99215 OFFICE O/P EST HI 40 MIN: CPT | Mod: S$GLB,,, | Performed by: NURSE PRACTITIONER

## 2021-02-23 PROCEDURE — 99999 PR PBB SHADOW E&M-EST. PATIENT-LVL V: ICD-10-PCS | Mod: PBBFAC,,, | Performed by: INTERNAL MEDICINE

## 2021-02-23 PROCEDURE — 99999 PR PBB SHADOW E&M-EST. PATIENT-LVL V: CPT | Mod: PBBFAC,,, | Performed by: INTERNAL MEDICINE

## 2021-02-23 RX ORDER — METFORMIN HYDROCHLORIDE 1000 MG/1
1000 TABLET ORAL 2 TIMES DAILY WITH MEALS
Qty: 180 TABLET | Refills: 3 | Status: SHIPPED | OUTPATIENT
Start: 2021-02-23 | End: 2021-04-05

## 2021-02-24 ENCOUNTER — PATIENT MESSAGE (OUTPATIENT)
Dept: INTERNAL MEDICINE | Facility: CLINIC | Age: 66
End: 2021-02-24

## 2021-02-24 RX ORDER — SEMAGLUTIDE 1.34 MG/ML
0.5 INJECTION, SOLUTION SUBCUTANEOUS
Qty: 12 SYRINGE | Refills: 1 | Status: SHIPPED | OUTPATIENT
Start: 2021-02-24 | End: 2021-08-17

## 2021-02-26 ENCOUNTER — HOSPITAL ENCOUNTER (OUTPATIENT)
Dept: RADIOLOGY | Facility: HOSPITAL | Age: 66
Discharge: HOME | End: 2021-02-26
Attending: INTERNAL MEDICINE
Payer: COMMERCIAL

## 2021-02-26 DIAGNOSIS — F17.200 TOBACCO USE DISORDER: ICD-10-CM

## 2021-02-26 DIAGNOSIS — Z12.2 ENCOUNTER FOR SCREENING FOR MALIGNANT NEOPLASM OF RESPIRATORY ORGANS: ICD-10-CM

## 2021-02-26 PROCEDURE — 71271 CT CHEST LUNG SCREENING LOW DOSE: ICD-10-PCS | Mod: 26,,, | Performed by: RADIOLOGY

## 2021-02-26 PROCEDURE — 71271 CT THORAX LUNG CANCER SCR C-: CPT | Mod: TC

## 2021-02-26 PROCEDURE — 71271 CT THORAX LUNG CANCER SCR C-: CPT | Mod: 26,,, | Performed by: RADIOLOGY

## 2021-03-31 ENCOUNTER — PATIENT MESSAGE (OUTPATIENT)
Dept: INTERNAL MEDICINE | Facility: CLINIC | Age: 66
End: 2021-03-31

## 2021-03-31 RX ORDER — GLIMEPIRIDE 2 MG/1
2 TABLET ORAL DAILY PRN
Qty: 90 TABLET | Refills: 1 | Status: SHIPPED | OUTPATIENT
Start: 2021-03-31 | End: 2021-08-17

## 2021-07-02 ENCOUNTER — PATIENT MESSAGE (OUTPATIENT)
Dept: INTERNAL MEDICINE | Facility: CLINIC | Age: 66
End: 2021-07-02

## 2021-07-02 DIAGNOSIS — G47.00 INSOMNIA, UNSPECIFIED TYPE: ICD-10-CM

## 2021-07-02 DIAGNOSIS — E78.5 HYPERLIPIDEMIA, UNSPECIFIED HYPERLIPIDEMIA TYPE: ICD-10-CM

## 2021-07-02 RX ORDER — TRAZODONE HYDROCHLORIDE 50 MG/1
TABLET ORAL
Qty: 60 TABLET | Refills: 5 | Status: SHIPPED | OUTPATIENT
Start: 2021-07-02 | End: 2022-04-26

## 2021-07-02 RX ORDER — ROSUVASTATIN CALCIUM 40 MG/1
40 TABLET, COATED ORAL DAILY
Qty: 90 TABLET | Refills: 3 | Status: SHIPPED | OUTPATIENT
Start: 2021-07-02 | End: 2022-05-31

## 2021-07-15 ENCOUNTER — TELEPHONE (OUTPATIENT)
Dept: INTERNAL MEDICINE | Facility: CLINIC | Age: 66
End: 2021-07-15

## 2021-07-28 LAB
LEFT EYE DM RETINOPATHY: POSITIVE
RIGHT EYE DM RETINOPATHY: POSITIVE

## 2021-07-29 ENCOUNTER — CLINICAL SUPPORT (OUTPATIENT)
Dept: URGENT CARE | Facility: CLINIC | Age: 66
End: 2021-07-29
Payer: MEDICARE

## 2021-07-29 DIAGNOSIS — Z11.52 ENCOUNTER FOR SCREENING FOR COVID-19: Primary | ICD-10-CM

## 2021-07-29 LAB
CTP QC/QA: YES
SARS-COV-2 RDRP RESP QL NAA+PROBE: NEGATIVE

## 2021-07-29 PROCEDURE — U0002 COVID-19 LAB TEST NON-CDC: HCPCS | Mod: QW,S$GLB,, | Performed by: FAMILY MEDICINE

## 2021-07-29 PROCEDURE — U0002: ICD-10-PCS | Mod: QW,S$GLB,, | Performed by: FAMILY MEDICINE

## 2021-08-02 DIAGNOSIS — I65.21 ASYMPTOMATIC STENOSIS OF RIGHT CAROTID ARTERY: Primary | ICD-10-CM

## 2021-08-05 ENCOUNTER — PATIENT OUTREACH (OUTPATIENT)
Dept: ADMINISTRATIVE | Facility: OTHER | Age: 66
End: 2021-08-05

## 2021-08-05 DIAGNOSIS — Z12.31 BREAST CANCER SCREENING BY MAMMOGRAM: Primary | ICD-10-CM

## 2021-08-09 ENCOUNTER — OFFICE VISIT (OUTPATIENT)
Dept: VASCULAR SURGERY | Facility: CLINIC | Age: 66
End: 2021-08-09
Attending: SURGERY
Payer: MEDICARE

## 2021-08-09 ENCOUNTER — HOSPITAL ENCOUNTER (OUTPATIENT)
Dept: VASCULAR SURGERY | Facility: CLINIC | Age: 66
Discharge: HOME | End: 2021-08-09
Attending: SURGERY
Payer: MEDICARE

## 2021-08-09 VITALS — HEART RATE: 65 BPM | TEMPERATURE: 99 F | HEIGHT: 65 IN | WEIGHT: 212 LBS | BODY MASS INDEX: 35.32 KG/M2

## 2021-08-09 DIAGNOSIS — I65.21 ASYMPTOMATIC STENOSIS OF RIGHT CAROTID ARTERY: ICD-10-CM

## 2021-08-09 DIAGNOSIS — I65.23 BILATERAL CAROTID ARTERY STENOSIS: ICD-10-CM

## 2021-08-09 DIAGNOSIS — I65.21 ASYMPTOMATIC STENOSIS OF RIGHT CAROTID ARTERY: Primary | ICD-10-CM

## 2021-08-09 PROCEDURE — 3008F PR BODY MASS INDEX (BMI) DOCUMENTED: ICD-10-PCS | Mod: S$GLB,,, | Performed by: SURGERY

## 2021-08-09 PROCEDURE — 3044F HG A1C LEVEL LT 7.0%: CPT | Mod: S$GLB,,, | Performed by: SURGERY

## 2021-08-09 PROCEDURE — 99212 PR OFFICE/OUTPT VISIT, EST, LEVL II, 10-19 MIN: ICD-10-PCS | Mod: S$GLB,,, | Performed by: SURGERY

## 2021-08-09 PROCEDURE — 1126F PR PAIN SEVERITY QUANTIFIED, NO PAIN PRESENT: ICD-10-PCS | Mod: S$GLB,,, | Performed by: SURGERY

## 2021-08-09 PROCEDURE — 3044F PR MOST RECENT HEMOGLOBIN A1C LEVEL <7.0%: ICD-10-PCS | Mod: S$GLB,,, | Performed by: SURGERY

## 2021-08-09 PROCEDURE — 93880 EXTRACRANIAL BILAT STUDY: CPT | Mod: S$GLB,,, | Performed by: SURGERY

## 2021-08-09 PROCEDURE — 99212 OFFICE O/P EST SF 10 MIN: CPT | Mod: S$GLB,,, | Performed by: SURGERY

## 2021-08-09 PROCEDURE — 93880 PR DUPLEX SCAN EXTRACRANIAL,BILAT: ICD-10-PCS | Mod: S$GLB,,, | Performed by: SURGERY

## 2021-08-09 PROCEDURE — 3008F BODY MASS INDEX DOCD: CPT | Mod: S$GLB,,, | Performed by: SURGERY

## 2021-08-09 PROCEDURE — 1126F AMNT PAIN NOTED NONE PRSNT: CPT | Mod: S$GLB,,, | Performed by: SURGERY

## 2021-08-09 PROCEDURE — 99999 PR PBB SHADOW E&M-EST. PATIENT-LVL II: CPT | Mod: PBBFAC,,, | Performed by: SURGERY

## 2021-08-09 PROCEDURE — 99999 PR PBB SHADOW E&M-EST. PATIENT-LVL II: ICD-10-PCS | Mod: PBBFAC,,, | Performed by: SURGERY

## 2021-08-10 ENCOUNTER — LAB VISIT (OUTPATIENT)
Dept: LAB | Facility: HOSPITAL | Age: 66
End: 2021-08-10
Attending: INTERNAL MEDICINE
Payer: MEDICARE

## 2021-08-10 DIAGNOSIS — E11.59 HYPERTENSION ASSOCIATED WITH DIABETES: ICD-10-CM

## 2021-08-10 DIAGNOSIS — E11.69 HYPERLIPIDEMIA ASSOCIATED WITH TYPE 2 DIABETES MELLITUS: ICD-10-CM

## 2021-08-10 DIAGNOSIS — E11.40 TYPE 2 DIABETES MELLITUS WITH DIABETIC NEUROPATHY, WITHOUT LONG-TERM CURRENT USE OF INSULIN: ICD-10-CM

## 2021-08-10 DIAGNOSIS — I15.2 HYPERTENSION ASSOCIATED WITH DIABETES: ICD-10-CM

## 2021-08-10 DIAGNOSIS — E78.5 HYPERLIPIDEMIA ASSOCIATED WITH TYPE 2 DIABETES MELLITUS: ICD-10-CM

## 2021-08-10 DIAGNOSIS — E03.9 ACQUIRED HYPOTHYROIDISM: ICD-10-CM

## 2021-08-10 LAB
ALBUMIN SERPL BCP-MCNC: 3.7 G/DL (ref 3.5–5.2)
ALP SERPL-CCNC: 74 U/L (ref 55–135)
ALT SERPL W/O P-5'-P-CCNC: 44 U/L (ref 10–44)
ANION GAP SERPL CALC-SCNC: 11 MMOL/L (ref 8–16)
AST SERPL-CCNC: 28 U/L (ref 10–40)
BILIRUB SERPL-MCNC: 0.3 MG/DL (ref 0.1–1)
BUN SERPL-MCNC: 37 MG/DL (ref 8–23)
CALCIUM SERPL-MCNC: 10.7 MG/DL (ref 8.7–10.5)
CHLORIDE SERPL-SCNC: 107 MMOL/L (ref 95–110)
CO2 SERPL-SCNC: 19 MMOL/L (ref 23–29)
CREAT SERPL-MCNC: 1.1 MG/DL (ref 0.5–1.4)
EST. GFR  (AFRICAN AMERICAN): >60 ML/MIN/1.73 M^2
EST. GFR  (NON AFRICAN AMERICAN): 52.8 ML/MIN/1.73 M^2
ESTIMATED AVG GLUCOSE: 157 MG/DL (ref 68–131)
GLUCOSE SERPL-MCNC: 125 MG/DL (ref 70–110)
HBA1C MFR BLD: 7.1 % (ref 4–5.6)
POTASSIUM SERPL-SCNC: 5.2 MMOL/L (ref 3.5–5.1)
PROT SERPL-MCNC: 7.1 G/DL (ref 6–8.4)
SODIUM SERPL-SCNC: 137 MMOL/L (ref 136–145)
T4 FREE SERPL-MCNC: 1 NG/DL (ref 0.71–1.51)
TSH SERPL DL<=0.005 MIU/L-ACNC: 1.33 UIU/ML (ref 0.4–4)

## 2021-08-10 PROCEDURE — 83036 HEMOGLOBIN GLYCOSYLATED A1C: CPT | Performed by: INTERNAL MEDICINE

## 2021-08-10 PROCEDURE — 87389 HIV-1 AG W/HIV-1&-2 AB AG IA: CPT | Performed by: INTERNAL MEDICINE

## 2021-08-10 PROCEDURE — 84439 ASSAY OF FREE THYROXINE: CPT | Performed by: INTERNAL MEDICINE

## 2021-08-10 PROCEDURE — 84443 ASSAY THYROID STIM HORMONE: CPT | Performed by: INTERNAL MEDICINE

## 2021-08-10 PROCEDURE — 80053 COMPREHEN METABOLIC PANEL: CPT | Performed by: INTERNAL MEDICINE

## 2021-08-10 PROCEDURE — 36415 COLL VENOUS BLD VENIPUNCTURE: CPT | Mod: PO | Performed by: INTERNAL MEDICINE

## 2021-08-10 PROCEDURE — 86803 HEPATITIS C AB TEST: CPT | Performed by: INTERNAL MEDICINE

## 2021-08-11 LAB
HCV AB SERPL QL IA: NEGATIVE
HIV 1+2 AB+HIV1 P24 AG SERPL QL IA: NEGATIVE

## 2021-08-13 DIAGNOSIS — I49.8 OTHER SPECIFIED CARDIAC ARRHYTHMIAS: Primary | ICD-10-CM

## 2021-08-17 ENCOUNTER — OFFICE VISIT (OUTPATIENT)
Dept: INTERNAL MEDICINE | Facility: CLINIC | Age: 66
End: 2021-08-17
Payer: MEDICARE

## 2021-08-17 VITALS
RESPIRATION RATE: 16 BRPM | WEIGHT: 210.56 LBS | HEIGHT: 65 IN | BODY MASS INDEX: 34.99 KG/M2 | HEART RATE: 88 BPM | DIASTOLIC BLOOD PRESSURE: 70 MMHG | TEMPERATURE: 97 F | HEIGHT: 65 IN | BODY MASS INDEX: 35.08 KG/M2 | DIASTOLIC BLOOD PRESSURE: 66 MMHG | OXYGEN SATURATION: 97 % | TEMPERATURE: 97 F | WEIGHT: 210 LBS | HEART RATE: 88 BPM | RESPIRATION RATE: 16 BRPM | SYSTOLIC BLOOD PRESSURE: 120 MMHG | OXYGEN SATURATION: 97 % | SYSTOLIC BLOOD PRESSURE: 102 MMHG

## 2021-08-17 DIAGNOSIS — I15.2 HYPERTENSION ASSOCIATED WITH DIABETES: ICD-10-CM

## 2021-08-17 DIAGNOSIS — E66.01 SEVERE OBESITY: ICD-10-CM

## 2021-08-17 DIAGNOSIS — Z78.0 POSTMENOPAUSAL: ICD-10-CM

## 2021-08-17 DIAGNOSIS — E11.59 HYPERTENSION ASSOCIATED WITH DIABETES: ICD-10-CM

## 2021-08-17 DIAGNOSIS — I15.2 HYPERTENSION ASSOCIATED WITH DIABETES: Primary | ICD-10-CM

## 2021-08-17 DIAGNOSIS — E03.9 ACQUIRED HYPOTHYROIDISM: ICD-10-CM

## 2021-08-17 DIAGNOSIS — R41.3 MEMORY IMPAIRMENT: ICD-10-CM

## 2021-08-17 DIAGNOSIS — E11.69 HYPERLIPIDEMIA ASSOCIATED WITH TYPE 2 DIABETES MELLITUS: ICD-10-CM

## 2021-08-17 DIAGNOSIS — E16.0 HYPOGLYCEMIA SECONDARY TO SULFONYLUREA, ACCIDENTAL OR UNINTENTIONAL, SEQUELA: ICD-10-CM

## 2021-08-17 DIAGNOSIS — E78.5 HYPERLIPIDEMIA ASSOCIATED WITH TYPE 2 DIABETES MELLITUS: ICD-10-CM

## 2021-08-17 DIAGNOSIS — E11.40 TYPE 2 DIABETES MELLITUS WITH DIABETIC NEUROPATHY, WITHOUT LONG-TERM CURRENT USE OF INSULIN: ICD-10-CM

## 2021-08-17 DIAGNOSIS — E11.40 TYPE 2 DIABETES MELLITUS WITH DIABETIC NEUROPATHY, WITHOUT LONG-TERM CURRENT USE OF INSULIN: Primary | ICD-10-CM

## 2021-08-17 DIAGNOSIS — T38.3X1S HYPOGLYCEMIA SECONDARY TO SULFONYLUREA, ACCIDENTAL OR UNINTENTIONAL, SEQUELA: ICD-10-CM

## 2021-08-17 DIAGNOSIS — E11.59 HYPERTENSION ASSOCIATED WITH DIABETES: Primary | ICD-10-CM

## 2021-08-17 PROCEDURE — 3078F PR MOST RECENT DIASTOLIC BLOOD PRESSURE < 80 MM HG: ICD-10-PCS | Mod: S$GLB,,, | Performed by: NURSE PRACTITIONER

## 2021-08-17 PROCEDURE — 3074F PR MOST RECENT SYSTOLIC BLOOD PRESSURE < 130 MM HG: ICD-10-PCS | Mod: S$GLB,,, | Performed by: NURSE PRACTITIONER

## 2021-08-17 PROCEDURE — 1101F PT FALLS ASSESS-DOCD LE1/YR: CPT | Mod: S$GLB,,, | Performed by: NURSE PRACTITIONER

## 2021-08-17 PROCEDURE — 1101F PR PT FALLS ASSESS DOC 0-1 FALLS W/OUT INJ PAST YR: ICD-10-PCS | Mod: S$GLB,,, | Performed by: NURSE PRACTITIONER

## 2021-08-17 PROCEDURE — 1125F AMNT PAIN NOTED PAIN PRSNT: CPT | Mod: S$GLB,,, | Performed by: NURSE PRACTITIONER

## 2021-08-17 PROCEDURE — 3051F HG A1C>EQUAL 7.0%<8.0%: CPT | Mod: S$GLB,,, | Performed by: NURSE PRACTITIONER

## 2021-08-17 PROCEDURE — 3074F SYST BP LT 130 MM HG: CPT | Mod: S$GLB,,, | Performed by: INTERNAL MEDICINE

## 2021-08-17 PROCEDURE — 3288F PR FALLS RISK ASSESSMENT DOCUMENTED: ICD-10-PCS | Mod: S$GLB,,, | Performed by: INTERNAL MEDICINE

## 2021-08-17 PROCEDURE — 99214 OFFICE O/P EST MOD 30 MIN: CPT | Mod: S$GLB,,, | Performed by: INTERNAL MEDICINE

## 2021-08-17 PROCEDURE — 1125F AMNT PAIN NOTED PAIN PRSNT: CPT | Mod: S$GLB,,, | Performed by: INTERNAL MEDICINE

## 2021-08-17 PROCEDURE — 3074F SYST BP LT 130 MM HG: CPT | Mod: S$GLB,,, | Performed by: NURSE PRACTITIONER

## 2021-08-17 PROCEDURE — 3008F PR BODY MASS INDEX (BMI) DOCUMENTED: ICD-10-PCS | Mod: S$GLB,,, | Performed by: NURSE PRACTITIONER

## 2021-08-17 PROCEDURE — 3288F PR FALLS RISK ASSESSMENT DOCUMENTED: ICD-10-PCS | Mod: S$GLB,,, | Performed by: NURSE PRACTITIONER

## 2021-08-17 PROCEDURE — 99999 PR PBB SHADOW E&M-EST. PATIENT-LVL V: CPT | Mod: PBBFAC,,, | Performed by: INTERNAL MEDICINE

## 2021-08-17 PROCEDURE — 99214 PR OFFICE/OUTPT VISIT, EST, LEVL IV, 30-39 MIN: ICD-10-PCS | Mod: S$GLB,,, | Performed by: INTERNAL MEDICINE

## 2021-08-17 PROCEDURE — 3078F DIAST BP <80 MM HG: CPT | Mod: S$GLB,,, | Performed by: INTERNAL MEDICINE

## 2021-08-17 PROCEDURE — 3008F PR BODY MASS INDEX (BMI) DOCUMENTED: ICD-10-PCS | Mod: S$GLB,,, | Performed by: INTERNAL MEDICINE

## 2021-08-17 PROCEDURE — 3288F FALL RISK ASSESSMENT DOCD: CPT | Mod: S$GLB,,, | Performed by: NURSE PRACTITIONER

## 2021-08-17 PROCEDURE — 3051F PR MOST RECENT HEMOGLOBIN A1C LEVEL 7.0 - < 8.0%: ICD-10-PCS | Mod: S$GLB,,, | Performed by: INTERNAL MEDICINE

## 2021-08-17 PROCEDURE — 99499 UNLISTED E&M SERVICE: CPT | Mod: S$GLB,,, | Performed by: NURSE PRACTITIONER

## 2021-08-17 PROCEDURE — 99999 PR PBB SHADOW E&M-EST. PATIENT-LVL V: ICD-10-PCS | Mod: PBBFAC,,, | Performed by: NURSE PRACTITIONER

## 2021-08-17 PROCEDURE — 3288F FALL RISK ASSESSMENT DOCD: CPT | Mod: S$GLB,,, | Performed by: INTERNAL MEDICINE

## 2021-08-17 PROCEDURE — 99999 PR PBB SHADOW E&M-EST. PATIENT-LVL V: CPT | Mod: PBBFAC,,, | Performed by: NURSE PRACTITIONER

## 2021-08-17 PROCEDURE — 3078F PR MOST RECENT DIASTOLIC BLOOD PRESSURE < 80 MM HG: ICD-10-PCS | Mod: S$GLB,,, | Performed by: INTERNAL MEDICINE

## 2021-08-17 PROCEDURE — 1159F PR MEDICATION LIST DOCUMENTED IN MEDICAL RECORD: ICD-10-PCS | Mod: S$GLB,,, | Performed by: NURSE PRACTITIONER

## 2021-08-17 PROCEDURE — 3051F PR MOST RECENT HEMOGLOBIN A1C LEVEL 7.0 - < 8.0%: ICD-10-PCS | Mod: S$GLB,,, | Performed by: NURSE PRACTITIONER

## 2021-08-17 PROCEDURE — 1125F PR PAIN SEVERITY QUANTIFIED, PAIN PRESENT: ICD-10-PCS | Mod: S$GLB,,, | Performed by: INTERNAL MEDICINE

## 2021-08-17 PROCEDURE — 3078F DIAST BP <80 MM HG: CPT | Mod: S$GLB,,, | Performed by: NURSE PRACTITIONER

## 2021-08-17 PROCEDURE — 99499 NO LOS: ICD-10-PCS | Mod: S$GLB,,, | Performed by: NURSE PRACTITIONER

## 2021-08-17 PROCEDURE — 1160F RVW MEDS BY RX/DR IN RCRD: CPT | Mod: S$GLB,,, | Performed by: NURSE PRACTITIONER

## 2021-08-17 PROCEDURE — 3008F BODY MASS INDEX DOCD: CPT | Mod: S$GLB,,, | Performed by: INTERNAL MEDICINE

## 2021-08-17 PROCEDURE — 99999 PR PBB SHADOW E&M-EST. PATIENT-LVL V: ICD-10-PCS | Mod: PBBFAC,,, | Performed by: INTERNAL MEDICINE

## 2021-08-17 PROCEDURE — 3051F HG A1C>EQUAL 7.0%<8.0%: CPT | Mod: S$GLB,,, | Performed by: INTERNAL MEDICINE

## 2021-08-17 PROCEDURE — 3008F BODY MASS INDEX DOCD: CPT | Mod: S$GLB,,, | Performed by: NURSE PRACTITIONER

## 2021-08-17 PROCEDURE — 1101F PR PT FALLS ASSESS DOC 0-1 FALLS W/OUT INJ PAST YR: ICD-10-PCS | Mod: S$GLB,,, | Performed by: INTERNAL MEDICINE

## 2021-08-17 PROCEDURE — 1160F RVW MEDS BY RX/DR IN RCRD: CPT | Mod: S$GLB,,, | Performed by: INTERNAL MEDICINE

## 2021-08-17 PROCEDURE — 1159F PR MEDICATION LIST DOCUMENTED IN MEDICAL RECORD: ICD-10-PCS | Mod: S$GLB,,, | Performed by: INTERNAL MEDICINE

## 2021-08-17 PROCEDURE — 1159F MED LIST DOCD IN RCRD: CPT | Mod: S$GLB,,, | Performed by: NURSE PRACTITIONER

## 2021-08-17 PROCEDURE — 1159F MED LIST DOCD IN RCRD: CPT | Mod: S$GLB,,, | Performed by: INTERNAL MEDICINE

## 2021-08-17 PROCEDURE — 1160F PR REVIEW ALL MEDS BY PRESCRIBER/CLIN PHARMACIST DOCUMENTED: ICD-10-PCS | Mod: S$GLB,,, | Performed by: INTERNAL MEDICINE

## 2021-08-17 PROCEDURE — 1125F PR PAIN SEVERITY QUANTIFIED, PAIN PRESENT: ICD-10-PCS | Mod: S$GLB,,, | Performed by: NURSE PRACTITIONER

## 2021-08-17 PROCEDURE — 3074F PR MOST RECENT SYSTOLIC BLOOD PRESSURE < 130 MM HG: ICD-10-PCS | Mod: S$GLB,,, | Performed by: INTERNAL MEDICINE

## 2021-08-17 PROCEDURE — 1160F PR REVIEW ALL MEDS BY PRESCRIBER/CLIN PHARMACIST DOCUMENTED: ICD-10-PCS | Mod: S$GLB,,, | Performed by: NURSE PRACTITIONER

## 2021-08-17 PROCEDURE — 1101F PT FALLS ASSESS-DOCD LE1/YR: CPT | Mod: S$GLB,,, | Performed by: INTERNAL MEDICINE

## 2021-08-19 ENCOUNTER — OFFICE VISIT (OUTPATIENT)
Dept: ELECTROPHYSIOLOGY | Facility: CLINIC | Age: 66
End: 2021-08-19
Payer: MEDICARE

## 2021-08-19 ENCOUNTER — HOSPITAL ENCOUNTER (OUTPATIENT)
Dept: CARDIOLOGY | Facility: CLINIC | Age: 66
Discharge: HOME | End: 2021-08-19
Payer: MEDICARE

## 2021-08-19 VITALS
SYSTOLIC BLOOD PRESSURE: 120 MMHG | HEIGHT: 64 IN | DIASTOLIC BLOOD PRESSURE: 70 MMHG | WEIGHT: 211.19 LBS | HEART RATE: 65 BPM | BODY MASS INDEX: 36.05 KG/M2

## 2021-08-19 DIAGNOSIS — E66.01 SEVERE OBESITY: ICD-10-CM

## 2021-08-19 DIAGNOSIS — I15.2 HYPERTENSION ASSOCIATED WITH DIABETES: ICD-10-CM

## 2021-08-19 DIAGNOSIS — E11.59 HYPERTENSION ASSOCIATED WITH DIABETES: ICD-10-CM

## 2021-08-19 DIAGNOSIS — I48.0 PAROXYSMAL ATRIAL FIBRILLATION: Primary | Chronic | ICD-10-CM

## 2021-08-19 DIAGNOSIS — I49.8 OTHER SPECIFIED CARDIAC ARRHYTHMIAS: ICD-10-CM

## 2021-08-19 DIAGNOSIS — Z95.818 STATUS POST PLACEMENT OF IMPLANTABLE LOOP RECORDER: ICD-10-CM

## 2021-08-19 DIAGNOSIS — Z79.01 CURRENT USE OF LONG TERM ANTICOAGULATION: ICD-10-CM

## 2021-08-19 PROCEDURE — 1126F AMNT PAIN NOTED NONE PRSNT: CPT | Mod: S$GLB,,, | Performed by: NURSE PRACTITIONER

## 2021-08-19 PROCEDURE — 3074F PR MOST RECENT SYSTOLIC BLOOD PRESSURE < 130 MM HG: ICD-10-PCS | Mod: S$GLB,,, | Performed by: NURSE PRACTITIONER

## 2021-08-19 PROCEDURE — 3078F PR MOST RECENT DIASTOLIC BLOOD PRESSURE < 80 MM HG: ICD-10-PCS | Mod: S$GLB,,, | Performed by: NURSE PRACTITIONER

## 2021-08-19 PROCEDURE — 1126F PR PAIN SEVERITY QUANTIFIED, NO PAIN PRESENT: ICD-10-PCS | Mod: S$GLB,,, | Performed by: NURSE PRACTITIONER

## 2021-08-19 PROCEDURE — 3288F PR FALLS RISK ASSESSMENT DOCUMENTED: ICD-10-PCS | Mod: S$GLB,,, | Performed by: NURSE PRACTITIONER

## 2021-08-19 PROCEDURE — 1159F MED LIST DOCD IN RCRD: CPT | Mod: S$GLB,,, | Performed by: NURSE PRACTITIONER

## 2021-08-19 PROCEDURE — 3008F BODY MASS INDEX DOCD: CPT | Mod: S$GLB,,, | Performed by: NURSE PRACTITIONER

## 2021-08-19 PROCEDURE — 3051F PR MOST RECENT HEMOGLOBIN A1C LEVEL 7.0 - < 8.0%: ICD-10-PCS | Mod: S$GLB,,, | Performed by: NURSE PRACTITIONER

## 2021-08-19 PROCEDURE — 3051F HG A1C>EQUAL 7.0%<8.0%: CPT | Mod: S$GLB,,, | Performed by: NURSE PRACTITIONER

## 2021-08-19 PROCEDURE — 99204 PR OFFICE/OUTPT VISIT, NEW, LEVL IV, 45-59 MIN: ICD-10-PCS | Mod: S$GLB,,, | Performed by: NURSE PRACTITIONER

## 2021-08-19 PROCEDURE — 93005 RHYTHM STRIP: ICD-10-PCS | Mod: S$GLB,,, | Performed by: INTERNAL MEDICINE

## 2021-08-19 PROCEDURE — 1101F PT FALLS ASSESS-DOCD LE1/YR: CPT | Mod: S$GLB,,, | Performed by: NURSE PRACTITIONER

## 2021-08-19 PROCEDURE — 1101F PR PT FALLS ASSESS DOC 0-1 FALLS W/OUT INJ PAST YR: ICD-10-PCS | Mod: S$GLB,,, | Performed by: NURSE PRACTITIONER

## 2021-08-19 PROCEDURE — 99999 PR PBB SHADOW E&M-EST. PATIENT-LVL V: ICD-10-PCS | Mod: PBBFAC,,, | Performed by: NURSE PRACTITIONER

## 2021-08-19 PROCEDURE — 99204 OFFICE O/P NEW MOD 45 MIN: CPT | Mod: S$GLB,,, | Performed by: NURSE PRACTITIONER

## 2021-08-19 PROCEDURE — 3078F DIAST BP <80 MM HG: CPT | Mod: S$GLB,,, | Performed by: NURSE PRACTITIONER

## 2021-08-19 PROCEDURE — 93010 RHYTHM STRIP: ICD-10-PCS | Mod: S$GLB,,, | Performed by: INTERNAL MEDICINE

## 2021-08-19 PROCEDURE — 3008F PR BODY MASS INDEX (BMI) DOCUMENTED: ICD-10-PCS | Mod: S$GLB,,, | Performed by: NURSE PRACTITIONER

## 2021-08-19 PROCEDURE — 3074F SYST BP LT 130 MM HG: CPT | Mod: S$GLB,,, | Performed by: NURSE PRACTITIONER

## 2021-08-19 PROCEDURE — 93005 ELECTROCARDIOGRAM TRACING: CPT | Mod: S$GLB,,, | Performed by: INTERNAL MEDICINE

## 2021-08-19 PROCEDURE — 3288F FALL RISK ASSESSMENT DOCD: CPT | Mod: S$GLB,,, | Performed by: NURSE PRACTITIONER

## 2021-08-19 PROCEDURE — 1159F PR MEDICATION LIST DOCUMENTED IN MEDICAL RECORD: ICD-10-PCS | Mod: S$GLB,,, | Performed by: NURSE PRACTITIONER

## 2021-08-19 PROCEDURE — 93010 ELECTROCARDIOGRAM REPORT: CPT | Mod: S$GLB,,, | Performed by: INTERNAL MEDICINE

## 2021-08-19 PROCEDURE — 99999 PR PBB SHADOW E&M-EST. PATIENT-LVL V: CPT | Mod: PBBFAC,,, | Performed by: NURSE PRACTITIONER

## 2021-08-20 ENCOUNTER — HOSPITAL ENCOUNTER (OUTPATIENT)
Dept: RADIOLOGY | Facility: HOSPITAL | Age: 66
Discharge: HOME | End: 2021-08-20
Attending: INTERNAL MEDICINE
Payer: MEDICARE

## 2021-08-20 DIAGNOSIS — Z12.31 BREAST CANCER SCREENING BY MAMMOGRAM: ICD-10-CM

## 2021-08-20 PROCEDURE — 77063 MAMMO DIGITAL SCREENING BILAT WITH TOMO: ICD-10-PCS | Mod: 26,,, | Performed by: RADIOLOGY

## 2021-08-20 PROCEDURE — 77067 SCR MAMMO BI INCL CAD: CPT | Mod: 26,,, | Performed by: RADIOLOGY

## 2021-08-20 PROCEDURE — 77063 BREAST TOMOSYNTHESIS BI: CPT | Mod: 26,,, | Performed by: RADIOLOGY

## 2021-08-20 PROCEDURE — 77067 MAMMO DIGITAL SCREENING BILAT WITH TOMO: ICD-10-PCS | Mod: 26,,, | Performed by: RADIOLOGY

## 2021-08-20 PROCEDURE — 77067 SCR MAMMO BI INCL CAD: CPT | Mod: TC,PO

## 2021-08-26 ENCOUNTER — LAB VISIT (OUTPATIENT)
Dept: LAB | Facility: HOSPITAL | Age: 66
End: 2021-08-26
Attending: NURSE PRACTITIONER
Payer: MEDICARE

## 2021-08-26 DIAGNOSIS — E11.40 TYPE 2 DIABETES MELLITUS WITH DIABETIC NEUROPATHY, WITHOUT LONG-TERM CURRENT USE OF INSULIN: ICD-10-CM

## 2021-08-26 LAB
ANION GAP SERPL CALC-SCNC: 7 MMOL/L (ref 8–16)
BUN SERPL-MCNC: 37 MG/DL (ref 8–23)
CALCIUM SERPL-MCNC: 9.4 MG/DL (ref 8.7–10.5)
CHLORIDE SERPL-SCNC: 105 MMOL/L (ref 95–110)
CO2 SERPL-SCNC: 23 MMOL/L (ref 23–29)
CREAT SERPL-MCNC: 1 MG/DL (ref 0.5–1.4)
EST. GFR  (AFRICAN AMERICAN): >60 ML/MIN/1.73 M^2
EST. GFR  (NON AFRICAN AMERICAN): 59.3 ML/MIN/1.73 M^2
GLUCOSE SERPL-MCNC: 140 MG/DL (ref 70–110)
POTASSIUM SERPL-SCNC: 4.9 MMOL/L (ref 3.5–5.1)
SODIUM SERPL-SCNC: 135 MMOL/L (ref 136–145)

## 2021-08-26 PROCEDURE — 36415 COLL VENOUS BLD VENIPUNCTURE: CPT | Mod: PO | Performed by: NURSE PRACTITIONER

## 2021-08-26 PROCEDURE — 80048 BASIC METABOLIC PNL TOTAL CA: CPT | Performed by: NURSE PRACTITIONER

## 2021-09-08 ENCOUNTER — TELEPHONE (OUTPATIENT)
Dept: PODIATRY | Facility: CLINIC | Age: 66
End: 2021-09-08

## 2021-09-17 ENCOUNTER — HOSPITAL ENCOUNTER (OUTPATIENT)
Dept: RADIOLOGY | Facility: CLINIC | Age: 66
Discharge: HOME | End: 2021-09-17
Attending: INTERNAL MEDICINE
Payer: MEDICARE

## 2021-09-17 DIAGNOSIS — Z78.0 POSTMENOPAUSAL: ICD-10-CM

## 2021-09-17 PROCEDURE — 77080 DEXA BONE DENSITY SPINE HIP: ICD-10-PCS | Mod: 26,,, | Performed by: INTERNAL MEDICINE

## 2021-09-17 PROCEDURE — 77080 DXA BONE DENSITY AXIAL: CPT | Mod: 26,,, | Performed by: INTERNAL MEDICINE

## 2021-09-17 PROCEDURE — 77080 DXA BONE DENSITY AXIAL: CPT | Mod: TC

## 2021-09-20 ENCOUNTER — PATIENT MESSAGE (OUTPATIENT)
Dept: INTERNAL MEDICINE | Facility: CLINIC | Age: 66
End: 2021-09-20

## 2021-09-21 RX ORDER — LORAZEPAM 1 MG/1
1 TABLET ORAL 2 TIMES DAILY PRN
Qty: 60 TABLET | Refills: 0 | Status: SHIPPED | OUTPATIENT
Start: 2021-09-21 | End: 2023-01-17 | Stop reason: SDUPTHER

## 2021-09-22 ENCOUNTER — PATIENT OUTREACH (OUTPATIENT)
Dept: ADMINISTRATIVE | Facility: HOSPITAL | Age: 66
End: 2021-09-22

## 2021-09-23 ENCOUNTER — PATIENT MESSAGE (OUTPATIENT)
Dept: INTERNAL MEDICINE | Facility: CLINIC | Age: 66
End: 2021-09-23

## 2021-09-23 ENCOUNTER — TELEPHONE (OUTPATIENT)
Dept: INTERNAL MEDICINE | Facility: CLINIC | Age: 66
End: 2021-09-23

## 2021-09-23 RX ORDER — ALBUTEROL SULFATE 90 UG/1
2 AEROSOL, METERED RESPIRATORY (INHALATION) EVERY 6 HOURS PRN
Qty: 18 G | Refills: 5 | Status: SHIPPED | OUTPATIENT
Start: 2021-09-23 | End: 2022-08-26

## 2021-09-23 RX ORDER — AMOXICILLIN AND CLAVULANATE POTASSIUM 875; 125 MG/1; MG/1
1 TABLET, FILM COATED ORAL 2 TIMES DAILY
Qty: 20 TABLET | Refills: 0 | Status: SHIPPED | OUTPATIENT
Start: 2021-09-23 | End: 2021-10-22

## 2021-09-24 ENCOUNTER — PATIENT MESSAGE (OUTPATIENT)
Dept: INTERNAL MEDICINE | Facility: CLINIC | Age: 66
End: 2021-09-24

## 2021-10-21 ENCOUNTER — PATIENT OUTREACH (OUTPATIENT)
Dept: ADMINISTRATIVE | Facility: OTHER | Age: 66
End: 2021-10-21

## 2021-10-21 ENCOUNTER — TELEPHONE (OUTPATIENT)
Dept: NEUROLOGY | Facility: CLINIC | Age: 66
End: 2021-10-21

## 2021-10-22 ENCOUNTER — OFFICE VISIT (OUTPATIENT)
Dept: NEUROLOGY | Facility: CLINIC | Age: 66
End: 2021-10-22
Payer: MEDICARE

## 2021-10-22 VITALS
WEIGHT: 210.75 LBS | DIASTOLIC BLOOD PRESSURE: 72 MMHG | HEART RATE: 67 BPM | HEIGHT: 64 IN | SYSTOLIC BLOOD PRESSURE: 126 MMHG | BODY MASS INDEX: 35.98 KG/M2

## 2021-10-22 DIAGNOSIS — Z86.73 HX OF TIA (TRANSIENT ISCHEMIC ATTACK) AND STROKE: ICD-10-CM

## 2021-10-22 DIAGNOSIS — R41.3 MEMORY IMPAIRMENT: ICD-10-CM

## 2021-10-22 PROCEDURE — 99999 PR PBB SHADOW E&M-EST. PATIENT-LVL V: CPT | Mod: PBBFAC,,, | Performed by: PSYCHIATRY & NEUROLOGY

## 2021-10-22 PROCEDURE — 99205 OFFICE O/P NEW HI 60 MIN: CPT | Mod: S$PBB,,, | Performed by: PSYCHIATRY & NEUROLOGY

## 2021-10-22 PROCEDURE — 99999 PR PBB SHADOW E&M-EST. PATIENT-LVL V: ICD-10-PCS | Mod: PBBFAC,,, | Performed by: PSYCHIATRY & NEUROLOGY

## 2021-10-22 PROCEDURE — 99205 PR OFFICE/OUTPT VISIT, NEW, LEVL V, 60-74 MIN: ICD-10-PCS | Mod: S$PBB,,, | Performed by: PSYCHIATRY & NEUROLOGY

## 2021-10-27 ENCOUNTER — TELEPHONE (OUTPATIENT)
Dept: INTERNAL MEDICINE | Facility: CLINIC | Age: 66
End: 2021-10-27
Payer: MEDICARE

## 2021-11-03 RX ORDER — LANCETS
EACH MISCELLANEOUS
Qty: 200 EACH | Refills: 3 | Status: SHIPPED | OUTPATIENT
Start: 2021-11-03

## 2021-11-03 RX ORDER — DEXTROSE 4 G
TABLET,CHEWABLE ORAL
Qty: 1 EACH | Refills: 0 | Status: SHIPPED | OUTPATIENT
Start: 2021-11-03

## 2021-11-04 ENCOUNTER — TELEPHONE (OUTPATIENT)
Dept: INTERNAL MEDICINE | Facility: CLINIC | Age: 66
End: 2021-11-04
Payer: MEDICARE

## 2021-12-03 ENCOUNTER — CLINICAL SUPPORT (OUTPATIENT)
Dept: URGENT CARE | Facility: CLINIC | Age: 66
End: 2021-12-03
Payer: MEDICARE

## 2021-12-03 DIAGNOSIS — Z78.9 NO KNOWN HEALTH PROBLEMS: Primary | ICD-10-CM

## 2021-12-03 LAB
CTP QC/QA: YES
SARS-COV-2 RDRP RESP QL NAA+PROBE: NEGATIVE

## 2021-12-03 PROCEDURE — U0002 COVID-19 LAB TEST NON-CDC: HCPCS | Mod: QW,S$GLB,, | Performed by: PHYSICIAN ASSISTANT

## 2021-12-03 PROCEDURE — 99211 PR OFFICE/OUTPT VISIT, EST, LEVL I: ICD-10-PCS | Mod: S$GLB,,, | Performed by: PHYSICIAN ASSISTANT

## 2021-12-03 PROCEDURE — U0002: ICD-10-PCS | Mod: QW,S$GLB,, | Performed by: PHYSICIAN ASSISTANT

## 2021-12-03 PROCEDURE — 99211 OFF/OP EST MAY X REQ PHY/QHP: CPT | Mod: S$GLB,,, | Performed by: PHYSICIAN ASSISTANT

## 2021-12-12 DIAGNOSIS — Z98.84 S/P LAPAROSCOPIC SLEEVE GASTRECTOMY: ICD-10-CM

## 2021-12-12 RX ORDER — VALSARTAN AND HYDROCHLOROTHIAZIDE 160; 12.5 MG/1; MG/1
TABLET, FILM COATED ORAL
Qty: 90 TABLET | Refills: 2 | Status: SHIPPED | OUTPATIENT
Start: 2021-12-12 | End: 2022-03-16

## 2021-12-13 RX ORDER — OMEPRAZOLE 40 MG/1
CAPSULE, DELAYED RELEASE ORAL
Qty: 90 CAPSULE | Refills: 2 | Status: SHIPPED | OUTPATIENT
Start: 2021-12-13 | End: 2022-09-12

## 2022-01-11 ENCOUNTER — PATIENT OUTREACH (OUTPATIENT)
Dept: ADMINISTRATIVE | Facility: OTHER | Age: 67
End: 2022-01-11
Payer: MEDICARE

## 2022-01-11 NOTE — PROGRESS NOTES
Health Maintenance Due   Topic Date Due    Influenza Vaccine (1) 09/01/2021    Diabetes Urine Screening  02/17/2022     Updates were requested from care everywhere.  Chart was reviewed for overdue Proactive [facility] Encounters (JOCELYN) topics (CRS, Breast Cancer Screening, Eye exam)  Health Maintenance has been updated.  LINKS immunization registry triggered.  Immunizations were reconciled.

## 2022-01-12 ENCOUNTER — OFFICE VISIT (OUTPATIENT)
Dept: PODIATRY | Facility: CLINIC | Age: 67
End: 2022-01-12
Payer: MEDICARE

## 2022-01-12 VITALS
HEART RATE: 69 BPM | HEIGHT: 64 IN | DIASTOLIC BLOOD PRESSURE: 73 MMHG | BODY MASS INDEX: 35.85 KG/M2 | SYSTOLIC BLOOD PRESSURE: 117 MMHG | WEIGHT: 210 LBS

## 2022-01-12 DIAGNOSIS — E11.42 DIABETIC POLYNEUROPATHY ASSOCIATED WITH TYPE 2 DIABETES MELLITUS: Primary | ICD-10-CM

## 2022-01-12 DIAGNOSIS — B35.1 ONYCHOMYCOSIS DUE TO DERMATOPHYTE: ICD-10-CM

## 2022-01-12 DIAGNOSIS — E11.9 ENCOUNTER FOR DIABETIC FOOT EXAM: ICD-10-CM

## 2022-01-12 PROCEDURE — 3008F BODY MASS INDEX DOCD: CPT | Mod: CPTII,S$GLB,, | Performed by: STUDENT IN AN ORGANIZED HEALTH CARE EDUCATION/TRAINING PROGRAM

## 2022-01-12 PROCEDURE — 3288F PR FALLS RISK ASSESSMENT DOCUMENTED: ICD-10-PCS | Mod: CPTII,S$GLB,, | Performed by: STUDENT IN AN ORGANIZED HEALTH CARE EDUCATION/TRAINING PROGRAM

## 2022-01-12 PROCEDURE — 3074F SYST BP LT 130 MM HG: CPT | Mod: CPTII,S$GLB,, | Performed by: STUDENT IN AN ORGANIZED HEALTH CARE EDUCATION/TRAINING PROGRAM

## 2022-01-12 PROCEDURE — 99999 PR PBB SHADOW E&M-EST. PATIENT-LVL IV: ICD-10-PCS | Mod: PBBFAC,,, | Performed by: STUDENT IN AN ORGANIZED HEALTH CARE EDUCATION/TRAINING PROGRAM

## 2022-01-12 PROCEDURE — 3078F PR MOST RECENT DIASTOLIC BLOOD PRESSURE < 80 MM HG: ICD-10-PCS | Mod: CPTII,S$GLB,, | Performed by: STUDENT IN AN ORGANIZED HEALTH CARE EDUCATION/TRAINING PROGRAM

## 2022-01-12 PROCEDURE — 1126F AMNT PAIN NOTED NONE PRSNT: CPT | Mod: CPTII,S$GLB,, | Performed by: STUDENT IN AN ORGANIZED HEALTH CARE EDUCATION/TRAINING PROGRAM

## 2022-01-12 PROCEDURE — 3051F HG A1C>EQUAL 7.0%<8.0%: CPT | Mod: CPTII,S$GLB,, | Performed by: STUDENT IN AN ORGANIZED HEALTH CARE EDUCATION/TRAINING PROGRAM

## 2022-01-12 PROCEDURE — 99214 PR OFFICE/OUTPT VISIT, EST, LEVL IV, 30-39 MIN: ICD-10-PCS | Mod: 25,S$GLB,, | Performed by: STUDENT IN AN ORGANIZED HEALTH CARE EDUCATION/TRAINING PROGRAM

## 2022-01-12 PROCEDURE — 1101F PT FALLS ASSESS-DOCD LE1/YR: CPT | Mod: CPTII,S$GLB,, | Performed by: STUDENT IN AN ORGANIZED HEALTH CARE EDUCATION/TRAINING PROGRAM

## 2022-01-12 PROCEDURE — 11721 DEBRIDE NAIL 6 OR MORE: CPT | Mod: S$GLB,,, | Performed by: STUDENT IN AN ORGANIZED HEALTH CARE EDUCATION/TRAINING PROGRAM

## 2022-01-12 PROCEDURE — 3074F PR MOST RECENT SYSTOLIC BLOOD PRESSURE < 130 MM HG: ICD-10-PCS | Mod: CPTII,S$GLB,, | Performed by: STUDENT IN AN ORGANIZED HEALTH CARE EDUCATION/TRAINING PROGRAM

## 2022-01-12 PROCEDURE — 3288F FALL RISK ASSESSMENT DOCD: CPT | Mod: CPTII,S$GLB,, | Performed by: STUDENT IN AN ORGANIZED HEALTH CARE EDUCATION/TRAINING PROGRAM

## 2022-01-12 PROCEDURE — 99999 PR PBB SHADOW E&M-EST. PATIENT-LVL IV: CPT | Mod: PBBFAC,,, | Performed by: STUDENT IN AN ORGANIZED HEALTH CARE EDUCATION/TRAINING PROGRAM

## 2022-01-12 PROCEDURE — 1159F MED LIST DOCD IN RCRD: CPT | Mod: CPTII,S$GLB,, | Performed by: STUDENT IN AN ORGANIZED HEALTH CARE EDUCATION/TRAINING PROGRAM

## 2022-01-12 PROCEDURE — 3008F PR BODY MASS INDEX (BMI) DOCUMENTED: ICD-10-PCS | Mod: CPTII,S$GLB,, | Performed by: STUDENT IN AN ORGANIZED HEALTH CARE EDUCATION/TRAINING PROGRAM

## 2022-01-12 PROCEDURE — 1159F PR MEDICATION LIST DOCUMENTED IN MEDICAL RECORD: ICD-10-PCS | Mod: CPTII,S$GLB,, | Performed by: STUDENT IN AN ORGANIZED HEALTH CARE EDUCATION/TRAINING PROGRAM

## 2022-01-12 PROCEDURE — 99214 OFFICE O/P EST MOD 30 MIN: CPT | Mod: 25,S$GLB,, | Performed by: STUDENT IN AN ORGANIZED HEALTH CARE EDUCATION/TRAINING PROGRAM

## 2022-01-12 PROCEDURE — 1126F PR PAIN SEVERITY QUANTIFIED, NO PAIN PRESENT: ICD-10-PCS | Mod: CPTII,S$GLB,, | Performed by: STUDENT IN AN ORGANIZED HEALTH CARE EDUCATION/TRAINING PROGRAM

## 2022-01-12 PROCEDURE — 3078F DIAST BP <80 MM HG: CPT | Mod: CPTII,S$GLB,, | Performed by: STUDENT IN AN ORGANIZED HEALTH CARE EDUCATION/TRAINING PROGRAM

## 2022-01-12 PROCEDURE — 3051F PR MOST RECENT HEMOGLOBIN A1C LEVEL 7.0 - < 8.0%: ICD-10-PCS | Mod: CPTII,S$GLB,, | Performed by: STUDENT IN AN ORGANIZED HEALTH CARE EDUCATION/TRAINING PROGRAM

## 2022-01-12 PROCEDURE — 1101F PR PT FALLS ASSESS DOC 0-1 FALLS W/OUT INJ PAST YR: ICD-10-PCS | Mod: CPTII,S$GLB,, | Performed by: STUDENT IN AN ORGANIZED HEALTH CARE EDUCATION/TRAINING PROGRAM

## 2022-01-12 PROCEDURE — 11721 ROUTINE FOOT CARE: ICD-10-PCS | Mod: S$GLB,,, | Performed by: STUDENT IN AN ORGANIZED HEALTH CARE EDUCATION/TRAINING PROGRAM

## 2022-01-12 NOTE — PROGRESS NOTES
Subjective:      Patient ID: Diana Montenegro is a 66 y.o. female.    Chief Complaint: Follow-up (Diabetic Foot Exam) and Diabetic Foot Exam    Diana is a 66 y.o. female who presents to the clinic for evaluation and treatment of high risk feet. Diana has a past medical history of Arthritis, Atrial fibrillation, unspecified, Chronic back pain, Colon polyp, CVA (cerebral infarction) (7/16/14), Degenerative disc disease, Diabetes mellitus type II, Encounter for loop recorder at end of battery life (9/17/2018), Hyperlipidemia, Hypertension, Hypothyroidism, Mild nonproliferative diabetic retinopathy (08/12/2018), Obesity, Sleep apnea, Stroke (july 2014), and Venous insufficiency (chronic) (peripheral). The patient's chief complaint is long, thick toenails. Pt is also complaining of itchy feet. This patient has documented high risk feet requiring routine maintenance secondary to diabetes mellitis and those secondary complications of diabetes, as mentioned..  .    1/12/22: Pt seen today for diabetic foot exam. No new pedal complaints.     PCP: Rad Johnston MD    Date Last Seen by PCP:  8/17/21  Current shoe gear:  Affected Foot: Casual shoes     Unaffected Foot: Casual shoes    Hemoglobin A1C   Date Value Ref Range Status   08/10/2021 7.1 (H) 4.0 - 5.6 % Final     Comment:     ADA Screening Guidelines:  5.7-6.4%  Consistent with prediabetes  >or=6.5%  Consistent with diabetes    High levels of fetal hemoglobin interfere with the HbA1C  assay. Heterozygous hemoglobin variants (HbS, HgC, etc)do  not significantly interfere with this assay.   However, presence of multiple variants may affect accuracy.     02/17/2021 6.5 (H) 4.0 - 5.6 % Final     Comment:     ADA Screening Guidelines:  5.7-6.4%  Consistent with prediabetes  >or=6.5%  Consistent with diabetes    High levels of fetal hemoglobin interfere with the HbA1C  assay. Heterozygous hemoglobin variants (HbS, HgC, etc)do  not significantly interfere with this  assay.   However, presence of multiple variants may affect accuracy.     10/16/2020 7.7 (H) 4.0 - 5.6 % Final     Comment:     ADA Screening Guidelines:  5.7-6.4%  Consistent with prediabetes  >or=6.5%  Consistent with diabetes  High levels of fetal hemoglobin interfere with the HbA1C  assay. Heterozygous hemoglobin variants (HbS, HgC, etc)do  not significantly interfere with this assay.   However, presence of multiple variants may affect accuracy.         Review of Systems   Constitutional: Negative for decreased appetite, fever and malaise/fatigue.   HENT: Negative for congestion.    Cardiovascular: Negative for chest pain and leg swelling.   Respiratory: Negative for cough and shortness of breath.    Skin: Positive for dry skin and nail changes. Negative for itching and rash.   Musculoskeletal: Positive for arthritis and stiffness. Negative for joint pain, joint swelling and muscle weakness.   Gastrointestinal: Negative for bloating, abdominal pain, nausea and vomiting.   Neurological: Negative for headaches, numbness and weakness.   Psychiatric/Behavioral: Negative for altered mental status.             Past Medical History:   Diagnosis Date    Arthritis     Atrial fibrillation, unspecified     hx of a fib prior to stroke.    Chronic back pain     Colon polyp     CVA (cerebral infarction) 7/16/14    Degenerative disc disease     cervical; lumbar    Diabetes mellitus type II     Encounter for loop recorder at end of battery life 9/17/2018    Hyperlipidemia     Hypertension     Hypothyroidism     Mild nonproliferative diabetic retinopathy 08/12/2018    Obesity     Sleep apnea     Stroke july 2014    Venous insufficiency (chronic) (peripheral)        Past Surgical History:   Procedure Laterality Date    COLONOSCOPY N/A 8/30/2018    Procedure: COLONOSCOPY;  Surgeon: William Clement MD;  Location: [address]);  Service: Endoscopy;  Laterality: N/A;  Tito/Dr Rad Johnston/OK to hold 2 days  prior to colon/see telephone encounter dated 18/pt has loop recorder/svn    COLONOSCOPY W/ POLYPECTOMY      GASTRECTOMY      HERNIA REPAIR      REMOVAL OF IMPLANTABLE LOOP RECORDER N/A 2018    Procedure: REMOVAL, IMPLANTABLE LOOP RECORDER;  Surgeon: Thomas Randolph MD;  Location: [facility] CATH LAB;  Service: Cardiology;  Laterality: N/A;  TERESA, ILR removal (no reimplant), MDT, RN Sedate, SK, 3 Prep *Reveal LINQ*    TONSILLECTOMY      TUBAL LIGATION         Family History   Problem Relation Age of Onset    No Known Problems Mother     Heart disease Father     Diabetes Maternal Grandfather     Obesity Maternal Grandfather     No Known Problems Sister     No Known Problems Sister     No Known Problems Maternal Grandmother     Stroke Paternal Grandmother     No Known Problems Paternal Grandfather     Heart attack Neg Hx        Social History     Socioeconomic History    Marital status: Single   Occupational History    Occupation: MOS      Employer: [employer]   Tobacco Use    Smoking status: Former Smoker     Packs/day: 1.00     Years: 30.00     Pack years: 30.00     Types: Cigarettes     Quit date: 2014     Years since quittin.5    Smokeless tobacco: Never Used   Substance and Sexual Activity    Alcohol use: Yes     Alcohol/week: 0.0 standard drinks     Comment: rarely    Drug use: No     Comment: thc at young age    Sexual activity: Not Currently     Partners: Male       Current Outpatient Medications   Medication Sig Dispense Refill    acyclovir 5% (ZOVIRAX) 5 % ointment Apply topically 6 (six) times daily. 5 g 1    albuterol (PROVENTIL/VENTOLIN HFA) 90 mcg/actuation inhaler Inhale 2 puffs into the lungs every 6 (six) hours as needed for Wheezing. Rescue 18 g 5    apixaban (ELIQUIS) 5 mg Tab Take 1 tablet (5 mg total) by mouth 2 (two) times daily. 180 tablet 3    aspirin (ECOTRIN) 81 MG EC tablet Take 1 tablet (81 mg total) by mouth once daily.      b  complex vitamins tablet Take 1 tablet by mouth once daily.      blood pressure test kit-large Kit Use as directed 1 each 0    blood sugar diagnostic Strp Needs test strips to ck glucose twice daily. Strips that are covered by insurance paln 200 strip 3    blood-glucose meter Misc One touch verio flex or tone touch verio reflect or freestyle freedom lite meter (all covered by insurance) 1 each 0    CALCIUM CITRATE/VITAMIN D3 (CALCIUM CITRATE + D ORAL) Take 2 tablets by mouth every morning.       cetirizine (ZYRTEC) 5 MG tablet Take 5 mg by mouth once daily.      cinnamon bark (CINNAMON ORAL) Take by mouth 2 (two) times daily.      clotrimazole-betamethasone 1-0.05% (LOTRISONE) cream Apply topically 2 (two) times daily. (Patient taking differently: Apply topically 2 (two) times daily. prn) 1 Tube 2    cyanocobalamin, vitamin B-12, 500 mcg Subl Place 1 tablet under the tongue once daily.      docusate sodium (COLACE) 100 MG capsule Take 100 mg by mouth once daily.       fish oil-omega-3 fatty acids 300-1,000 mg capsule Take by mouth 2 (two) times daily.      fluticasone (FLONASE) 50 mcg/actuation nasal spray 2 sprays (100 mcg total) by Each Nare route once daily. (Patient taking differently: 2 sprays by Each Nostril route daily as needed.) 1 Bottle 0    lancets Misc Needs lancets for testing twice daily.  To go with meter covered by insurance. 200 each 3    levothyroxine (SYNTHROID) 112 MCG tablet Take 1 tablet (112 mcg total) by mouth before breakfast. 90 tablet 3    LORazepam (ATIVAN) 1 MG tablet Take 1 tablet (1 mg total) by mouth 2 (two) times daily as needed for Anxiety. 60 tablet 0    metFORMIN (GLUCOPHAGE) 1000 MG tablet TAKE 1 TABLET BY MOUTH TWICE DAILY WITH MEALS 180 tablet 3    metoprolol tartrate (LOPRESSOR) 25 MG tablet Take 1 tablet (25 mg total) by mouth once as needed. For palpitations 30 tablet 11    multivitamin capsule Take 1 capsule by mouth once daily.      omeprazole (PRILOSEC)  40 MG capsule Take 1 capsule by mouth in the morning 90 capsule 2    rosuvastatin (CRESTOR) 40 MG Tab Take 1 tablet (40 mg total) by mouth once daily. 90 tablet 3    RUTIN/HESP/BIOFLAV/C/HERB#196 (BIOFLEX ORAL) Take 2 tablets by mouth once daily.      semaglutide (OZEMPIC) 1 mg/dose (2 mg/1.5 mL) PnIj Inject 1 mg into the skin every 7 days. Give one 1mg injection every week. Please give 1 pen that has 4mg total in pen. - 4 doses per pen to last 1 month. Ok for 90 day supply (3 pens). 3 pen 1    senna (SENNA) 8.6 mg tablet Take 2 tablets by mouth nightly as needed for Constipation. 100 tablet 3    traZODone (DESYREL) 50 MG tablet 1 pill PO PRN for insomnia at bedtime. OK to take 2nd pill in 30 minutes if still awake. 60 tablet 5    valsartan-hydrochlorothiazide (DIOVAN-HCT) 160-12.5 mg per tablet Take 1 tablet by mouth once daily 90 tablet 2    albuterol-ipratropium 2.5mg-0.5mg/3mL (DUO-NEB) 0.5 mg-3 mg(2.5 mg base)/3 mL nebulizer solution Take 3 mLs by nebulization every 6 (six) hours as needed for Wheezing. Rescue (Patient taking differently: Take 3 mLs by nebulization every 6 (six) hours as needed for Wheezing. Rescue prn) 3 vial 3    diclofenac sodium (VOLTAREN) 1 % Gel Apply 2 g topically 3 (three) times daily. for 5 days (Patient taking differently: Apply 2 g topically 3 (three) times daily. prn) 100 g 0    levocetirizine (XYZAL) 5 MG tablet Take 1 tablet (5 mg total) by mouth every evening. 30 tablet 11     No current facility-administered medications for this visit.     Facility-Administered Medications Ordered in Other Visits   Medication Dose Route Frequency Provider Last Rate Last Admin    0.9%  NaCl infusion   Intravenous Continuous Khadijah Rivera NP   1,000 mL at 09/17/18 0939    ceFAZolin injection 2 g  2 g Intravenous On Call Procedure Khadijah Rivera NP           Review of patient's allergies indicates:   Allergen Reactions    Medrol [methylprednisolone] Palpitations  "      Vitals:    01/12/22 1120   BP: 117/73   Pulse: 69   Weight: 95.3 kg (210 lb)   Height: 5' 4" (1.626 m)   PainSc: 0-No pain       Objective:      Physical Exam  Vitals and nursing note reviewed.   Constitutional:       General: She is not in acute distress.     Appearance: She is well-developed. She is not diaphoretic.   Cardiovascular:      Pulses:           Dorsalis pedis pulses are 1+ on the right side and 1+ on the left side.        Posterior tibial pulses are 0 on the right side and 0 on the left side.      Comments: Pedal pulses diminished, stefany. Skin temperature is within normal limits. Toes are cool to touch and feet are warm proximally. Hair growth is diminished. Skin is normotrophic and with hyperpigmentation. Mild edema noted,stefany.  Musculoskeletal:         General: Deformity present. No tenderness.      Right foot: Decreased range of motion. Deformity present.      Left foot: Decreased range of motion. Deformity present.      Comments: Adequate joint range of motion without pain, limitation, nor crepitation to bilateral feet and ankle joints. Muscle strength is 5/5 in all groups bilaterally.    Rigid hammertoe 2 through 4, rigid mallet toe of the hallux bilaterally without pain.     Feet:      Right foot:      Protective Sensation: 10 sites tested. 4 sites sensed.      Skin integrity: No ulcer, blister, skin breakdown, erythema, warmth or dry skin.      Left foot:      Protective Sensation: 10 sites tested. 7 sites sensed.      Skin integrity: No ulcer, blister, skin breakdown, erythema, warmth or dry skin.   Skin:     General: Skin is warm and dry.      Capillary Refill: Capillary refill takes 2 to 3 seconds.      Coloration: Skin is not pale.      Findings: No bruising, ecchymosis, erythema, laceration, lesion, petechiae or rash.      Comments: Skin is warm and dry bilaterally, no acute signs of infection noted, bilaterally, appears stable.     Nails 1-5 stefany are elongated and thickened with " dystrophic changes and discoloration noted     Neurological:      Mental Status: She is alert and oriented to person, place, and time.      Sensory: Sensory deficit present.      Comments: Kensett-Florian 5.07 monofilamant testing is diminished, vibratory sensation is diminished. Light touch within normal limits bilaterally.       Psychiatric:         Behavior: Behavior normal.         Thought Content: Thought content normal.         Judgment: Judgment normal.               Assessment:       Encounter Diagnoses   Name Primary?    Diabetic polyneuropathy associated with type 2 diabetes mellitus Yes    Onychomycosis due to dermatophyte     Encounter for diabetic foot exam          Plan:       Diana was seen today for follow-up and diabetic foot exam.    Diagnoses and all orders for this visit:    Diabetic polyneuropathy associated with type 2 diabetes mellitus  -     Routine Foot Care    Onychomycosis due to dermatophyte  -     Routine Foot Care    Encounter for diabetic foot exam      I counseled the patient on her conditions, their implications and medical management.    -Routine foot care per attached note     -Shoe inspection. Diabetic Foot Education. Patient reminded of the importance of good nutrition and blood sugar control to help prevent podiatric complications of diabetes. Patient instructed on proper foot hygeine. We discussed wearing proper shoe gear, daily foot inspections, never walking without protective shoe gear, never putting sharp instruments to feet, routine podiatric nail visits      RTC in 3-6 mo, sooner PRN

## 2022-01-12 NOTE — PROCEDURES
"Routine Foot Care    Date/Time: 1/12/2022 11:00 AM  Performed by: Nicole Guerrero DPM  Authorized by: Nicole Guerrero DPM     Time out: Immediately prior to procedure a "time out" was called to verify the correct patient, procedure, equipment, support staff and site/side marked as required.    Consent Done?:  Yes (Verbal)  Hyperkeratotic Skin Lesions?: No      Nail Care Type:  Debride  Location(s): All  (Left 1st Toe, Left 3rd Toe, Left 2nd Toe, Left 4th Toe, Left 5th Toe, Right 1st Toe, Right 2nd Toe, Right 3rd Toe, Right 4th Toe and Right 5th Toe)  Patient tolerance:  Patient tolerated the procedure well with no immediate complications      "

## 2022-02-10 ENCOUNTER — LAB VISIT (OUTPATIENT)
Dept: LAB | Facility: HOSPITAL | Age: 67
End: 2022-02-10
Attending: NURSE PRACTITIONER
Payer: MEDICARE

## 2022-02-10 DIAGNOSIS — E03.9 ACQUIRED HYPOTHYROIDISM: ICD-10-CM

## 2022-02-10 DIAGNOSIS — E11.40 TYPE 2 DIABETES MELLITUS WITH DIABETIC NEUROPATHY, WITHOUT LONG-TERM CURRENT USE OF INSULIN: ICD-10-CM

## 2022-02-10 DIAGNOSIS — E11.69 HYPERLIPIDEMIA ASSOCIATED WITH TYPE 2 DIABETES MELLITUS: ICD-10-CM

## 2022-02-10 DIAGNOSIS — I15.2 HYPERTENSION ASSOCIATED WITH DIABETES: ICD-10-CM

## 2022-02-10 DIAGNOSIS — E78.5 HYPERLIPIDEMIA ASSOCIATED WITH TYPE 2 DIABETES MELLITUS: ICD-10-CM

## 2022-02-10 DIAGNOSIS — E11.59 HYPERTENSION ASSOCIATED WITH DIABETES: ICD-10-CM

## 2022-02-10 LAB
ALBUMIN SERPL BCP-MCNC: 3.6 G/DL (ref 3.5–5.2)
ALP SERPL-CCNC: 107 U/L (ref 55–135)
ALT SERPL W/O P-5'-P-CCNC: 65 U/L (ref 10–44)
ANION GAP SERPL CALC-SCNC: 4 MMOL/L (ref 8–16)
AST SERPL-CCNC: 34 U/L (ref 10–40)
BASOPHILS # BLD AUTO: 0.04 K/UL (ref 0–0.2)
BASOPHILS NFR BLD: 0.8 % (ref 0–1.9)
BILIRUB SERPL-MCNC: 0.3 MG/DL (ref 0.1–1)
BUN SERPL-MCNC: 24 MG/DL (ref 8–23)
CALCIUM SERPL-MCNC: 10.2 MG/DL (ref 8.7–10.5)
CHLORIDE SERPL-SCNC: 108 MMOL/L (ref 95–110)
CHOLEST SERPL-MCNC: 141 MG/DL (ref 120–199)
CHOLEST/HDLC SERPL: 2.3 {RATIO} (ref 2–5)
CO2 SERPL-SCNC: 26 MMOL/L (ref 23–29)
CREAT SERPL-MCNC: 1 MG/DL (ref 0.5–1.4)
DIFFERENTIAL METHOD: ABNORMAL
EOSINOPHIL # BLD AUTO: 0.1 K/UL (ref 0–0.5)
EOSINOPHIL NFR BLD: 2 % (ref 0–8)
ERYTHROCYTE [DISTWIDTH] IN BLOOD BY AUTOMATED COUNT: 13.5 % (ref 11.5–14.5)
EST. GFR  (AFRICAN AMERICAN): >60 ML/MIN/1.73 M^2
EST. GFR  (NON AFRICAN AMERICAN): 59.3 ML/MIN/1.73 M^2
ESTIMATED AVG GLUCOSE: 140 MG/DL (ref 68–131)
GLUCOSE SERPL-MCNC: 108 MG/DL (ref 70–110)
HBA1C MFR BLD: 6.5 % (ref 4–5.6)
HCT VFR BLD AUTO: 34.3 % (ref 37–48.5)
HDLC SERPL-MCNC: 61 MG/DL (ref 40–75)
HDLC SERPL: 43.3 % (ref 20–50)
HGB BLD-MCNC: 10.2 G/DL (ref 12–16)
IMM GRANULOCYTES # BLD AUTO: 0.01 K/UL (ref 0–0.04)
IMM GRANULOCYTES NFR BLD AUTO: 0.2 % (ref 0–0.5)
LDLC SERPL CALC-MCNC: 65 MG/DL (ref 63–159)
LYMPHOCYTES # BLD AUTO: 1.5 K/UL (ref 1–4.8)
LYMPHOCYTES NFR BLD: 29.5 % (ref 18–48)
MCH RBC QN AUTO: 27.3 PG (ref 27–31)
MCHC RBC AUTO-ENTMCNC: 29.7 G/DL (ref 32–36)
MCV RBC AUTO: 92 FL (ref 82–98)
MONOCYTES # BLD AUTO: 0.3 K/UL (ref 0.3–1)
MONOCYTES NFR BLD: 5.5 % (ref 4–15)
NEUTROPHILS # BLD AUTO: 3.2 K/UL (ref 1.8–7.7)
NEUTROPHILS NFR BLD: 62 % (ref 38–73)
NONHDLC SERPL-MCNC: 80 MG/DL
NRBC BLD-RTO: 0 /100 WBC
PLATELET # BLD AUTO: 158 K/UL (ref 150–450)
PMV BLD AUTO: 10.6 FL (ref 9.2–12.9)
POTASSIUM SERPL-SCNC: 4.9 MMOL/L (ref 3.5–5.1)
PROT SERPL-MCNC: 7 G/DL (ref 6–8.4)
RBC # BLD AUTO: 3.73 M/UL (ref 4–5.4)
SODIUM SERPL-SCNC: 138 MMOL/L (ref 136–145)
TRIGL SERPL-MCNC: 75 MG/DL (ref 30–150)
WBC # BLD AUTO: 5.09 K/UL (ref 3.9–12.7)

## 2022-02-10 PROCEDURE — 36415 COLL VENOUS BLD VENIPUNCTURE: CPT | Mod: PO | Performed by: INTERNAL MEDICINE

## 2022-02-10 PROCEDURE — 80061 LIPID PANEL: CPT | Performed by: INTERNAL MEDICINE

## 2022-02-10 PROCEDURE — 83036 HEMOGLOBIN GLYCOSYLATED A1C: CPT | Performed by: INTERNAL MEDICINE

## 2022-02-10 PROCEDURE — 80053 COMPREHEN METABOLIC PANEL: CPT | Performed by: INTERNAL MEDICINE

## 2022-02-10 PROCEDURE — 85025 COMPLETE CBC W/AUTO DIFF WBC: CPT | Performed by: INTERNAL MEDICINE

## 2022-02-14 ENCOUNTER — PATIENT MESSAGE (OUTPATIENT)
Dept: INTERNAL MEDICINE | Facility: CLINIC | Age: 67
End: 2022-02-14
Payer: MEDICARE

## 2022-02-14 RX ORDER — LEVOTHYROXINE SODIUM 112 UG/1
112 TABLET ORAL
Qty: 90 TABLET | Refills: 3 | Status: SHIPPED | OUTPATIENT
Start: 2022-02-14 | End: 2022-09-14

## 2022-02-14 NOTE — TELEPHONE ENCOUNTER
No new care gaps identified.  Powered by [employer]. Reference number: [id number].   2/14/2022 10:08:05 AM CST

## 2022-02-16 ENCOUNTER — PATIENT MESSAGE (OUTPATIENT)
Dept: INTERNAL MEDICINE | Facility: CLINIC | Age: 67
End: 2022-02-16
Payer: MEDICARE

## 2022-03-16 ENCOUNTER — OFFICE VISIT (OUTPATIENT)
Dept: INTERNAL MEDICINE | Facility: CLINIC | Age: 67
End: 2022-03-16
Payer: MEDICARE

## 2022-03-16 VITALS
SYSTOLIC BLOOD PRESSURE: 100 MMHG | OXYGEN SATURATION: 98 % | RESPIRATION RATE: 18 BRPM | TEMPERATURE: 97 F | HEIGHT: 65 IN | BODY MASS INDEX: 32.91 KG/M2 | WEIGHT: 197.56 LBS | HEART RATE: 104 BPM | DIASTOLIC BLOOD PRESSURE: 56 MMHG

## 2022-03-16 DIAGNOSIS — E03.9 ACQUIRED HYPOTHYROIDISM: ICD-10-CM

## 2022-03-16 DIAGNOSIS — E11.69 HYPERLIPIDEMIA ASSOCIATED WITH TYPE 2 DIABETES MELLITUS: ICD-10-CM

## 2022-03-16 DIAGNOSIS — I15.2 HYPERTENSION ASSOCIATED WITH DIABETES: ICD-10-CM

## 2022-03-16 DIAGNOSIS — E66.01 SEVERE OBESITY: ICD-10-CM

## 2022-03-16 DIAGNOSIS — E11.40 TYPE 2 DIABETES MELLITUS WITH DIABETIC NEUROPATHY, WITHOUT LONG-TERM CURRENT USE OF INSULIN: Primary | ICD-10-CM

## 2022-03-16 DIAGNOSIS — E11.59 HYPERTENSION ASSOCIATED WITH DIABETES: ICD-10-CM

## 2022-03-16 DIAGNOSIS — E78.5 HYPERLIPIDEMIA ASSOCIATED WITH TYPE 2 DIABETES MELLITUS: ICD-10-CM

## 2022-03-16 DIAGNOSIS — I48.0 PAROXYSMAL ATRIAL FIBRILLATION: Chronic | ICD-10-CM

## 2022-03-16 PROCEDURE — 1101F PR PT FALLS ASSESS DOC 0-1 FALLS W/OUT INJ PAST YR: ICD-10-PCS | Mod: CPTII,S$GLB,, | Performed by: NURSE PRACTITIONER

## 2022-03-16 PROCEDURE — 1160F RVW MEDS BY RX/DR IN RCRD: CPT | Mod: CPTII,S$GLB,, | Performed by: NURSE PRACTITIONER

## 2022-03-16 PROCEDURE — 3066F PR DOCUMENTATION OF TREATMENT FOR NEPHROPATHY: ICD-10-PCS | Mod: CPTII,S$GLB,, | Performed by: NURSE PRACTITIONER

## 2022-03-16 PROCEDURE — 3008F PR BODY MASS INDEX (BMI) DOCUMENTED: ICD-10-PCS | Mod: CPTII,S$GLB,, | Performed by: NURSE PRACTITIONER

## 2022-03-16 PROCEDURE — 3060F PR POS MICROALBUMINURIA RESULT DOCUMENTED/REVIEW: ICD-10-PCS | Mod: CPTII,S$GLB,, | Performed by: NURSE PRACTITIONER

## 2022-03-16 PROCEDURE — 3074F PR MOST RECENT SYSTOLIC BLOOD PRESSURE < 130 MM HG: ICD-10-PCS | Mod: CPTII,S$GLB,, | Performed by: NURSE PRACTITIONER

## 2022-03-16 PROCEDURE — 3288F FALL RISK ASSESSMENT DOCD: CPT | Mod: CPTII,S$GLB,, | Performed by: NURSE PRACTITIONER

## 2022-03-16 PROCEDURE — 3066F NEPHROPATHY DOC TX: CPT | Mod: CPTII,S$GLB,, | Performed by: NURSE PRACTITIONER

## 2022-03-16 PROCEDURE — 99999 PR PBB SHADOW E&M-EST. PATIENT-LVL V: CPT | Mod: PBBFAC,,, | Performed by: NURSE PRACTITIONER

## 2022-03-16 PROCEDURE — 3044F HG A1C LEVEL LT 7.0%: CPT | Mod: CPTII,S$GLB,, | Performed by: NURSE PRACTITIONER

## 2022-03-16 PROCEDURE — 3078F DIAST BP <80 MM HG: CPT | Mod: CPTII,S$GLB,, | Performed by: NURSE PRACTITIONER

## 2022-03-16 PROCEDURE — 3060F POS MICROALBUMINURIA REV: CPT | Mod: CPTII,S$GLB,, | Performed by: NURSE PRACTITIONER

## 2022-03-16 PROCEDURE — 3288F PR FALLS RISK ASSESSMENT DOCUMENTED: ICD-10-PCS | Mod: CPTII,S$GLB,, | Performed by: NURSE PRACTITIONER

## 2022-03-16 PROCEDURE — 3008F BODY MASS INDEX DOCD: CPT | Mod: CPTII,S$GLB,, | Performed by: NURSE PRACTITIONER

## 2022-03-16 PROCEDURE — 1101F PT FALLS ASSESS-DOCD LE1/YR: CPT | Mod: CPTII,S$GLB,, | Performed by: NURSE PRACTITIONER

## 2022-03-16 PROCEDURE — 99214 OFFICE O/P EST MOD 30 MIN: CPT | Mod: S$GLB,,, | Performed by: NURSE PRACTITIONER

## 2022-03-16 PROCEDURE — 1126F PR PAIN SEVERITY QUANTIFIED, NO PAIN PRESENT: ICD-10-PCS | Mod: CPTII,S$GLB,, | Performed by: NURSE PRACTITIONER

## 2022-03-16 PROCEDURE — 3044F PR MOST RECENT HEMOGLOBIN A1C LEVEL <7.0%: ICD-10-PCS | Mod: CPTII,S$GLB,, | Performed by: NURSE PRACTITIONER

## 2022-03-16 PROCEDURE — 1126F AMNT PAIN NOTED NONE PRSNT: CPT | Mod: CPTII,S$GLB,, | Performed by: NURSE PRACTITIONER

## 2022-03-16 PROCEDURE — 3078F PR MOST RECENT DIASTOLIC BLOOD PRESSURE < 80 MM HG: ICD-10-PCS | Mod: CPTII,S$GLB,, | Performed by: NURSE PRACTITIONER

## 2022-03-16 PROCEDURE — 1160F PR REVIEW ALL MEDS BY PRESCRIBER/CLIN PHARMACIST DOCUMENTED: ICD-10-PCS | Mod: CPTII,S$GLB,, | Performed by: NURSE PRACTITIONER

## 2022-03-16 PROCEDURE — 1159F MED LIST DOCD IN RCRD: CPT | Mod: CPTII,S$GLB,, | Performed by: NURSE PRACTITIONER

## 2022-03-16 PROCEDURE — 1159F PR MEDICATION LIST DOCUMENTED IN MEDICAL RECORD: ICD-10-PCS | Mod: CPTII,S$GLB,, | Performed by: NURSE PRACTITIONER

## 2022-03-16 PROCEDURE — 99999 PR PBB SHADOW E&M-EST. PATIENT-LVL V: ICD-10-PCS | Mod: PBBFAC,,, | Performed by: NURSE PRACTITIONER

## 2022-03-16 PROCEDURE — 3074F SYST BP LT 130 MM HG: CPT | Mod: CPTII,S$GLB,, | Performed by: NURSE PRACTITIONER

## 2022-03-16 PROCEDURE — 99214 PR OFFICE/OUTPT VISIT, EST, LEVL IV, 30-39 MIN: ICD-10-PCS | Mod: S$GLB,,, | Performed by: NURSE PRACTITIONER

## 2022-03-16 RX ORDER — AFLIBERCEPT 40 MG/ML
INJECTION, SOLUTION INTRAVITREAL
COMMUNITY
Start: 2021-12-13

## 2022-03-16 RX ORDER — VALSARTAN AND HYDROCHLOROTHIAZIDE 80; 12.5 MG/1; MG/1
1 TABLET, FILM COATED ORAL DAILY
Qty: 90 TABLET | Refills: 3 | Status: SHIPPED | OUTPATIENT
Start: 2022-03-16 | End: 2023-03-06

## 2022-03-16 RX ORDER — INFLUENZA VACCINE, ADJUVANTED 15; 15; 15; 15 UG/.5ML; UG/.5ML; UG/.5ML; UG/.5ML
INJECTION, SUSPENSION INTRAMUSCULAR
COMMUNITY
Start: 2021-10-28 | End: 2022-09-15 | Stop reason: SDUPTHER

## 2022-03-16 NOTE — PATIENT INSTRUCTIONS
"Continue metformin 1000mg twice per day.   Continue Ozempic 1mg every week.   Rotate injection sites.   Eat small frequent meals.     Follow diabetic diet.     Check glucose daily in morning or before a meal - normal glucose levels should range 80 - 120's.     Low Carb Snacks & Diabetes friendly foods   Aim for 30 - 40 grams of carbs for 3 meals per day (or 2 meals and  1 - 2 snacks - however you prefer)  Snacks can be 15 - 20 grams of carbs.     Eating small, frequent meals will help you to feel more satisfied, and more full so that you don't over eat or eat the wrong foods later.   Also, diana meals/snacks allow you to spread carbohydrates evenly, which may stabilize blood sugars.   Below you will find a list of ideas of foods that I like/prefer.   Feel free to give me your ideas and tips if you find good ones too!   Overall - please remember to limit refined sugars such as soft drinks, juices, rices, pastas, breads, cakes.   Look at the back of the label - look at the amount of "carbohydrates", then look at the amount of "fiber" - subtract the amount of fiber from the amount of carbs - this is your "net carb intake".  The more fiber a food has, the better generally, as you get to subtract this from the net carbs.     0-5 gm carb  Crystal Light flavoring (I like fruit punch flavor!)  Vitamin Water Zero  Gina antioxidant drinks.   Sparkling ice (long skinny flavored water bottles for $1 that are sugar free).   Miguel A water, WaterLoo - carbonated flavored caballero, various flavors.  Diet coke, diet barqs, sprite zero.  Diet sunkist (orange drink)  Sugar free powerade  Herbal tea, unsweetened  2 tsp peanut butter on celery or carrots  Maggie's original Candies - (the Sugar Free ones!)  1/2 cup sugar-free jell-o  1 sugar-free popsicle  ¼ cup blueberries or strawberries  8oz Blue Radha unsweetened almond milk (or there is sugar free vanilla flavored as well).  5 baby carrots & celery sticks, cucumbers, bell peppers " "dipped in ¼ cup salsa, 2Tbsp light ranch dressing or 2Tbsp plain Greek yogurt  10 Goldfish crackers  ½ oz low-fat cheese or string cheese  1 closed handful of nuts, unsalted (example-->almonds, pistachios, cashews, peanuts, etc).  1 Tbsp of sunflower seeds, unsalted  1 cup Smart Pop popcorn  1 whole grain brown rice cake   "Think Thin" protein bars - my personal favorite is Creamy Peanut butter, chocolate brownie, or oreo flavors.  "Chin" bars  - can be found at [employer] grocery store - they have 5 grams of net carbohydrates.  Quest bars (my favorite is birthday cake, and also cinnamon roll is good melted for 10 seconds in the microwave - please remove the wrapper first!)  Premier protein shakes - sold at [employer], or other brand alternatives - usually 1 - 2 grams of carbs (strawberry, vanilla, chocolate flavors) Coffee flavor is my new morning favorite!   Scrambled eggs! Or a fried egg or boiled eggs - add sliced tomatoes, cilantro or some chopped green onions.   Eggs are good with any and all veggies! You can even eat them for dinner or in a low carb tortilla, or served with your favorite grilled meat/sausage or culp is my favorite.   Check out pre-made egg scrambles in the egg grocery store section - Ore Terra "just crack an egg" is premixed cheese, culp and small amount of pototes, small cup size portions for your breakfast - very convenient!   Bryan armijo - zucchini - can buy in the frozen foods section. Birds eye brand is usually cheap. Tip - saute with oil/onions or italian seasoning and use this as a pasta substitution.  Milk - is usually high in carbs/sugar - use [employer] Property milk brand instead - it is "filtered" milk - with half the amount of carbs of regular milk. (next to the milk in milk aisle).   Smuckers sugar free Breakfast syrup (or 1 tablespoon of low sugar breakfast syrup) instead of regular syrup.        15 gm carb  1 small piece of fruit or ½ banana or 1/2 cup lite canned fruit  3 panchito " "cracker squares  3 cups Smart Pop popcorn, top spray butter, Westfall lite salt or cinnamon and Truvia  5 Vanilla Wafers  ½ cup low fat, no added sugar ice cream or frozen yogurt (Blue bell, Blue Bunny, Weight Watchers, Skinny Cow)  1/2 - 1 cup Light n' fit Vanilla yogurt (has added protein in it to make you feel full).   ½ turkey, ham, or chicken sandwich  ½ c fruit with ½ c Cottage cheese  4-6 unsalted wheat crackers with 1 oz low fat cheese or 1 tbsp peanut butter   30-45 goldfish crackers (depending on flavor)   7-8 Scientologist mini brown rice cakes (caramel, apple cinnamon, chocolate)   12 Scientologist mini brown rice cakes (cheddar, bbq, ranch)   1/3 cup hummus dip with raw veg  1/2 whole wheat rut, 1Tbsp hummus  Mini Pizza (1/2 whole wheat English muffin, low-fat  cheese, tomato sauce)  100 calorie snack pack (Oreo, Chips Ahoy, Ritz Mix, Baked Cheetos)  4-6 oz. light or Greek Style yogurt (Chobani, Yoplait, Okios, [place]oneyfield)  ½ cup sugar-free pudding    6 in. wheat tortilla or rut oven toasted chips (topped with spray butter flavoring, cinnamon, Truvia OR spray butter, garlic powder, chili powder)   18 BBQ Popchips (available at [employer], Whole Foods, [employer])  Mini bagel (small size) toasted - add fat free cream cheese or avocado and sliced tomato on top - yum!   1/2 cup Halo top icecream - birthday cake is my go-to flavor.   Truth Bars - can be found at [employer] - Net carbs is 11 - 12 grams depending on the flavor.   Kind bars = mostly nuts and dried berries - find at most local groceries stores, drug stores, whole foods, fresh market. Net carbs is around 10 grams.     Smoothie Fernando - I'm often asked "what smoothie is healthy for me". Get the 20 oz. Size.   Do not be decieved to think that all smoothies are "healthy". In fact, most are loaded with hidden sugars.   Here are some from the menu that you are allowed to have being diabetic:   The gladiator - any flavor. This is the lowest in carbs (3 - 5 grams " "only)  Keto champ (berry, chocolate or coffee - all have 14 - 19 grams of carbs in 20 oz size).   The Lean 1 smoothies - vanilla, strawberry and chocolate have under 30 grams of carbs, so this is slightly more. But would be ok for a meal substitute or snack.   The Shredder in Vanilla or chocolate only.  17 grams of carbs - (the strawberry has more sugar considerably)    Tips and Tricks:     Eat an extra vegetable once per day - green veggies (such as broccoli, cauliflower, green beans) - make you feel full and are low in sugar.     My favorite "secret" seasoning to make things extra yummy is cinnamon for sweet taste   For an added secret "salty" taste - add "everything but the bagel" seasoning (you can get on amazon.com or at  joes. I have seen at local groceries such as [employer] too!).     Dark colored fruits are your friend -- blueberries, strawberries, raspberries, blackberries. They have a lower sugar content. So will be the best choice for you.   Light colored fruits are NOT your friend - they tend to be higher in sugar and are not the best options for an ADA diet - examples are oranges, bananas, peaches, watermelon, -- you can eat these, but carefully and in moderation.     WATER. I cannot emphasize drinking water enough - it will hydrate you, make you full. Your body needs it - it's a natural appetite suppressant.   Sometimes hunger and thirst can feel the same. Try drinking some water first, then eat if you are still hungry.     Other snack choices   Celery with peanut butter  Celery with tuna salad  Dill pickles and cheddar cheese (no kidding, it's a great combo)  Nuts (keep raw ones in the freezer if you think you'll overeat them)  Sunflower seeds (get them in the shell so it will take longer to eat them)  Other seeds (How to Toast Pumpkin or Squash Seeds)   Pistachios or almonds - will fill you up and are tasty!   Low-Carb Trail Mix  Jerky (beef or turkey -- try to find low-sugar varieties)  Salami " "slices (you can find in the deli section)  Cheese sticks, such as string cheese  Sugar-free Jello, alone or with cottage cheese and a sprinkling of nuts. Make sugar-free lime Jello with part coconut milk -- For a large package, dissolve the powder in a cup of boiling water, add a can of coconut milk, and then add the rest of the water. Stir well.  Pepperoni "chips" -- Zap the slices in the microwave  Cheese with a few apple slices  4-ounce plain or sugar-free yogurt with berries and flax seed meal  Smoked salmon and cream cheese on cucumber slices  Lettuce Roll-ups -- Roll luncheon meat, egg salad, tuna or other filling and veggies in lettuce leaves  Lunch Meat Roll-ups -- Roll cheese or veggies in lunch meat (read the labels for carbs on the lunch meat)  Spread bean dip, spinach dip, or other low-carb dip or spread on the lunch meat or lettuce and then roll it up  Raw veggies and spinach dip, or other low-carb dip  Pork rinds (Chicharrón), with or without dip  Ricotta cheese with fruit and/or nuts and/or flax seed meal  Mushrooms with cheese spread inside (or other spreads or dips)  Low-carb snack bars (watch out for sugar alcohols, especially maltitol)  Product Review: Atkins Advantage Bars  Pepperoni Chips -- Microwave pepperoni slices until crisp. Great with cheeses and dips  Garlic Parmesan Flax Seed Crackers  Parmesan Crisps -- Good when you want a crunchy snack.  Peanut Butter Protein Balls     "

## 2022-03-16 NOTE — PROGRESS NOTES
66 y.o. female here for routine follow up for managemetn of T2dm -   Last seen in august 2021 - those notes below.     a1c reduced again and stable from 7.1% to 6.5%.   Has lost another 13 lbs since last visit last year.   Continues on ozempic 1mg every week - tolerating well without side effects.   Continues on metformin 1000 mg twice per day.   Checking sugars daily in the morning time - running 90 - 120's.   Following ADA diet still.   Does c/o cost of ozempic - she switched from commerical health insurance plan last year to medicare.   Is not sure if she can continue to afford - however does not want to go back on insulin.   Has lost weight and feeling great, reports has lost even more weight than she did since her gastric sleeve in 2015 -       Last visit notes from August 2021:   66 y.o. female, here for 6 mo f/u for management of T2dm -   Last seen in February 2021 - those notes are below.     a1c remains stable @ 7.1%.   Continues on metformin 1000mg twice per day.   Also on ozempic 0.5mg every week - has had constipation in past prior to starting this medication, but no changes in bowel routine since addition of the glp1.   Following ADA diet for most part.   Admits eats lots of sugar free chocolates daily.   Noted had elevated K level on labs at level of 5.2, also elevated calcium level at 10.5 -   She's had gastric sleeve surgery in 2015 -  so had been recommended Calcium+ Vitamin D supplement - she takes 2 times per day.   Taking fiber supplement - metamucil and also dulcolax daily.   No acute complaints today's visit.     Last notes from February 2021 as follows:   Pleasant 65 y.o. female, here for 2 month follow up -   a1c remains stable @ 6.5%.   Taking ozempic 0.5mg sc every week - denies any side effects from medications.   Has lost 17 lbs total   No longer needing glimepiride  Continues on metformin 1000mg twice per day.    Checking sugars 1 - 2 times per day.   Running 90 - 100's on average.      Last visit notes as follows:   65 y.o. female, here for 6 week follow up for T2dm management.   Last seen 11/17/2020 - those notes are below.     Her a1c had gone up a bit to 7.7% and she was motivated to eat better and lose weight.   Gastric sleeve in the past.   I have continued her on metformin 1000mg twice daily.   I have cut down on her glimepiride - she was taking 4mg tablets - 2 of those daily.   Isntead, switched her to once weekly ozempic.   She is taking 0.5mg sc weekly. - tolerating well, eating less and smaller portions.   Feeling full.   Has lost 11 lbs in 6 weeks time.   Following diabetic diet.     Checking sugars 2 times daily.   Sugars on average range. 90 - 130's.   She occasionally has high glucose reading in the morning - we had talked on the phone - and I advised her to try just a 1/2 of a glimeperide tablet as needed -   She tried 1/2 of a tablet (2mg) - which did bring her sugars down, but almost too low - 60 - 70's.   She did feel shaky and ate, and sugar came back up - see logs below.     BP is slightly elevated today - but took her bp meds this morning.   States just nervous coming to the doctor.   Feeling well, and denies any elevated home bp's.                 Last visit notes from 11/17/2020 as follows:   HPI: Diana Montenegro is a 66 y.o.  female c/I for visit to address Diabetes Type 2  This is the first time I am seeing them for care, follows with Dr. Rad Johnston MD for primary care needs.   Has not seen endocrinology or diabetes specialist in the past.     was diagnosed with T2DM in 1993. Diagnosed with T2dm during preganancy - gestational since that time.   Started on metformin 1000mg twice daily and taking glimepiride 4mg twice daily- irregardless of meals.   Has never been hospitalized r/t DM.  Denies missing doses of DM medication.   States took lantus insulin several years ago - but sugars were going low, so stopped taking.     S/p stroke in 2014 - also diagnosed  with afib at that time.   Left sided upper extremity weakness following, no longer has residual activity.   Following this, she focused on her health and lost weight --->   Weight loss from 330 lbs down to 198 - s/p gastric sleeve in 2015.   Has gradually started gaining weight back. 20 - 25 lbs estimated.   Is interested in learning how to lose weight and manage her diabetes more effectively.   Current a1 c has gone up from 6.8% ----> to 7.7%.     Past medical History:   Past Medical History:   Diagnosis Date    Arthritis     Atrial fibrillation, unspecified     hx of a fib prior to stroke.    Chronic back pain     Colon polyp     CVA (cerebral infarction) 7/16/14    Degenerative disc disease     cervical; lumbar    Diabetes mellitus type II     Encounter for loop recorder at end of battery life 9/17/2018    Hyperlipidemia     Hypertension     Hypothyroidism     Mild nonproliferative diabetic retinopathy 08/12/2018    Obesity     Sleep apnea     Stroke july 2014    Venous insufficiency (chronic) (peripheral)       Family hx:   Family History   Problem Relation Age of Onset    No Known Problems Mother     Heart disease Father     Diabetes Maternal Grandfather     Obesity Maternal Grandfather     No Known Problems Sister     No Known Problems Sister     No Known Problems Maternal Grandmother     Stroke Paternal Grandmother     No Known Problems Paternal Grandfather     Heart attack Neg Hx       Current meds:   Current Outpatient Medications:     acyclovir 5% (ZOVIRAX) 5 % ointment, Apply topically 6 (six) times daily., Disp: 5 g, Rfl: 1    albuterol (PROVENTIL/VENTOLIN HFA) 90 mcg/actuation inhaler, Inhale 2 puffs into the lungs every 6 (six) hours as needed for Wheezing. Rescue, Disp: 18 g, Rfl: 5    apixaban (ELIQUIS) 5 mg Tab, Take 1 tablet (5 mg total) by mouth 2 (two) times daily., Disp: 180 tablet, Rfl: 3    aspirin (ECOTRIN) 81 MG EC tablet, Take 1 tablet (81 mg total) by mouth  once daily., Disp: , Rfl:     b complex vitamins tablet, Take 1 tablet by mouth once daily., Disp: , Rfl:     blood pressure test kit-large Kit, Use as directed, Disp: 1 each, Rfl: 0    blood sugar diagnostic Strp, Needs test strips to ck glucose twice daily. Strips that are covered by insurance paln, Disp: 200 strip, Rfl: 3    blood-glucose meter Misc, One touch verio flex or tone touch verio reflect or freestyle freedom lite meter (all covered by insurance), Disp: 1 each, Rfl: 0    CALCIUM CITRATE/VITAMIN D3 (CALCIUM CITRATE + D ORAL), Take 2 tablets by mouth every morning. , Disp: , Rfl:     cetirizine (ZYRTEC) 5 MG tablet, Take 5 mg by mouth once daily., Disp: , Rfl:     cinnamon bark (CINNAMON ORAL), Take by mouth 2 (two) times daily., Disp: , Rfl:     clotrimazole-betamethasone 1-0.05% (LOTRISONE) cream, Apply topically 2 (two) times daily. (Patient taking differently: Apply topically 2 (two) times daily. prn), Disp: 1 Tube, Rfl: 2    cyanocobalamin, vitamin B-12, 500 mcg Subl, Place 1 tablet under the tongue once daily., Disp: , Rfl:     docusate sodium (COLACE) 100 MG capsule, Take 100 mg by mouth once daily. , Disp: , Rfl:     EYLEA 2 mg/0.05 mL Syrg, , Disp: , Rfl:     fish oil-omega-3 fatty acids 300-1,000 mg capsule, Take by mouth 2 (two) times daily., Disp: , Rfl:     FLUAD QUAD 2021-22,65Y UP,,PF, 60 mcg (15 mcg x 4)/0.5 mL Syrg, , Disp: , Rfl:     fluticasone (FLONASE) 50 mcg/actuation nasal spray, 2 sprays (100 mcg total) by Each Nare route once daily. (Patient taking differently: 2 sprays by Each Nostril route daily as needed.), Disp: 1 Bottle, Rfl: 0    lancets Misc, Needs lancets for testing twice daily.  To go with meter covered by insurance., Disp: 200 each, Rfl: 3    levothyroxine (SYNTHROID) 112 MCG tablet, Take 1 tablet (112 mcg total) by mouth before breakfast., Disp: 90 tablet, Rfl: 3    LORazepam (ATIVAN) 1 MG tablet, Take 1 tablet (1 mg total) by mouth 2 (two) times daily  as needed for Anxiety., Disp: 60 tablet, Rfl: 0    metFORMIN (GLUCOPHAGE) 1000 MG tablet, TAKE 1 TABLET BY MOUTH TWICE DAILY WITH MEALS, Disp: 180 tablet, Rfl: 3    metoprolol tartrate (LOPRESSOR) 25 MG tablet, Take 1 tablet (25 mg total) by mouth once as needed. For palpitations, Disp: 30 tablet, Rfl: 11    multivitamin capsule, Take 1 capsule by mouth once daily., Disp: , Rfl:     omeprazole (PRILOSEC) 40 MG capsule, Take 1 capsule by mouth in the morning, Disp: 90 capsule, Rfl: 2    rosuvastatin (CRESTOR) 40 MG Tab, Take 1 tablet (40 mg total) by mouth once daily., Disp: 90 tablet, Rfl: 3    RUTIN/HESP/BIOFLAV/C/HERB#196 (BIOFLEX ORAL), Take 2 tablets by mouth once daily., Disp: , Rfl:     senna (SENNA) 8.6 mg tablet, Take 2 tablets by mouth nightly as needed for Constipation., Disp: 100 tablet, Rfl: 3    traZODone (DESYREL) 50 MG tablet, 1 pill PO PRN for insomnia at bedtime. OK to take 2nd pill in 30 minutes if still awake., Disp: 60 tablet, Rfl: 5    albuterol-ipratropium 2.5mg-0.5mg/3mL (DUO-NEB) 0.5 mg-3 mg(2.5 mg base)/3 mL nebulizer solution, Take 3 mLs by nebulization every 6 (six) hours as needed for Wheezing. Rescue (Patient taking differently: Take 3 mLs by nebulization every 6 (six) hours as needed for Wheezing. Rescue prn), Disp: 3 vial, Rfl: 3    diclofenac sodium (VOLTAREN) 1 % Gel, Apply 2 g topically 3 (three) times daily. for 5 days (Patient taking differently: Apply 2 g topically 3 (three) times daily. prn), Disp: 100 g, Rfl: 0    levocetirizine (XYZAL) 5 MG tablet, Take 1 tablet (5 mg total) by mouth every evening., Disp: 30 tablet, Rfl: 11    semaglutide (OZEMPIC) 1 mg/dose (2 mg/1.5 mL) PnIj, Inject 1 mg into the skin every 7 days. Give one 1mg injection every week. Please give 1 pen that has 4mg total in pen. - 4 doses per pen to last 1 month. Ok for 90 day supply., Disp: 3 pen, Rfl: 3    valsartan-hydrochlorothiazide (DIOVAN-HCT) 80-12.5 mg per tablet, Take 1 tablet by mouth  once daily., Disp: 90 tablet, Rfl: 3  No current facility-administered medications for this visit.    Facility-Administered Medications Ordered in Other Visits:     0.9%  NaCl infusion, , Intravenous, Continuous, Khadijah iRvera NP, 1,000 mL at 18 0939    ceFAZolin injection 2 g, 2 g, Intravenous, On Call Procedure, Khadijah Rivera NP     Current Diabetes medications:   Metformin 1000 bid.    ozempic 1 mg sc week    Medications Tried and Failed:   Lantus  amaryl 4mg twice daily.    Review of Pertinent co-morbidities/risk factors:   CV: Denies history of MI. History of stroke  Hx. Afib. On eliquis and aspirin daily.   CAD: known right carotid artery stenosis.   BP: has history of HTN  Statin: Taking  ACE/ARB: Taking    Social History     Tobacco Use   Smoking Status Former Smoker    Packs/day: 1.00    Years: 30.00    Pack years: 30.00    Types: Cigarettes    Quit date: 2014    Years since quittin.6   Smokeless Tobacco Never Used      Social:   Lives at home by herself.   Diet: following ADA diet   Meals: 2 - 3 per day and snacks.        Breakfast - culp and tomato sandwich on 2 pieces of toast, yogurt.        Lunch - turkey burgers, steak, some bread/sugar free bread recently.        Dinner - meat and vegetable        Snacks - popcorn, protein bars, nuts, chips, cracker, sugar free chocolates.        Drinks - water and sugar free diet cranberry juice, coke zero or sprite zero.   Exercise: gym daily - swimming daily. Prohibited from High impact activities due to pain in knees.   Activities: retired from post office in 2016.     Glucose Monitoring:   Checking sugars 1x/day by fingerstick in morning.   Variable 90 - 130's. Some low glucose readings (after taking glimepiride generally).    Glucometer : one touch Ultra  Gets supplies from :  [employer].     Standards of care:   Eyes: .: 2021  Foot exam: : 2022   Diabetes education: None.    Vital Signs  BP (!) 100/56 (BP  "Location: Right arm, Patient Position: Sitting, BP Method: Large (Manual))   Pulse 104   Temp 97.3 °F (36.3 °C) (Temporal)   Resp 18   Ht 5' 5" (1.651 m)   Wt 89.6 kg (197 lb 8.5 oz)   LMP  (LMP Unknown)   SpO2 98%   BMI 32.87 kg/m²     Pertinent Labs:   Hgba1c   Lab Results   Component Value Date    HGBA1C 6.5 (H) 02/10/2022    HGBA1C 7.1 (H) 08/10/2021    HGBA1C 6.5 (H) 02/17/2021     Lipid panel   Lab Results   Component Value Date    CHOL 141 02/10/2022    CHOL 148 02/17/2021    CHOL 151 06/09/2020     Lab Results   Component Value Date    HDL 61 02/10/2022    HDL 55 02/17/2021    HDL 63 06/09/2020     Lab Results   Component Value Date    LDLCALC 65.0 02/10/2022    LDLCALC 75.8 02/17/2021    LDLCALC 74.8 06/09/2020     Lab Results   Component Value Date    TRIG 75 02/10/2022    TRIG 86 02/17/2021    TRIG 66 06/09/2020     Lab Results   Component Value Date    CHOLHDL 43.3 02/10/2022    CHOLHDL 37.2 02/17/2021    CHOLHDL 41.7 06/09/2020      CMP  Glucose   Date Value Ref Range Status   02/10/2022 108 70 - 110 mg/dL Final     BUN   Date Value Ref Range Status   02/10/2022 24 (H) 8 - 23 mg/dL Final     Creatinine   Date Value Ref Range Status   02/10/2022 1.0 0.5 - 1.4 mg/dL Final     eGFR if    Date Value Ref Range Status   02/10/2022 >60.0 >60 mL/min/1.73 m^2 Final     eGFR if non    Date Value Ref Range Status   02/10/2022 59.3 (A) >60 mL/min/1.73 m^2 Final     Comment:     Calculation used to obtain the estimated glomerular filtration  rate (eGFR) is the CKD-EPI equation.        AST   Date Value Ref Range Status   02/10/2022 34 10 - 40 U/L Final     ALT   Date Value Ref Range Status   02/10/2022 65 (H) 10 - 44 U/L Final     Microalbumin creatinine ratio:   Lab Results   Component Value Date    MICALBCREAT 153.3 (H) 02/10/2022     Physical Exam  Review Of Systems:   Gen: Appetite good, weight loss 17 lbs+ additional 13 lbs , denies fatigue and weakness. Denies " polydipsia.  Skin: Skin is intact and heals well, denies any rashes or hair changes.   EENT: Denies any acute visual disturbances, nor blurred vision.   Resp: Denies SOB or Dyspnea on exertion, denies cough.   Cardiac: Denies chest pain, palpitations, or swelling.   GI: Denies abdominal pain, nausea or vomiting, diarrhea, or constipation.   /GYN: Denies nocturia, nor burning, frequency or pain on urination.  MS/Neuro: Denies numbness/ tingling in BLE; Gait steady, speech clear  Psych: Denies drug/ETOH abuse, no hx of depression.  Other systems: negative.    Physical Exam:   GENERAL: Well developed, well nourished in appearance.   PSYCH: AAOx3, appropriate mood and affect, pleasant expression, conversant, appears relaxed, well groomed.   EYES: PERRL, Conjunctiva and corneas clear  NECK: Soft and Supple, trachea midline, No thyroid enlargement noted.  CHEST: Even, regular, and unlabored respirations  ABDOMEN: Soft, non-tender, non-distended. Normal bowel sounds.   VASCULAR: pedal pulses palpable bilaterally, no edema.  NEURO:  cranial nerves II - XII intact   MUSCULOSKELETAL: Good ROM, steady gait.   SKIN: Skin warm, dry, and intact     Assessment and Plan of Care:     Diana was seen today for diabetes mellitus.    Diagnoses and all orders for this visit:    Type 2 diabetes mellitus with diabetic neuropathy, without long-term current use of insulin  -     semaglutide (OZEMPIC) 1 mg/dose (2 mg/1.5 mL) PnIj; Inject 1 mg into the skin every 7 days. Give one 1mg injection every week. Please give 1 pen that has 4mg total in pen. - 4 doses per pen to last 1 month. Ok for 90 day supply.  -     Hemoglobin A1C; Future  -     Microalbumin/Creatinine Ratio, Urine; Future  -     Comprehensive Metabolic Panel; Future    Hyperlipidemia associated with type 2 diabetes mellitus  -     Lipid Panel; Future    Hypertension associated with diabetes  -     TSH; Future  -     T4, Free; Future    Paroxysmal atrial fibrillation    Severe  obesity    Acquired hypothyroidism    Other orders  -     valsartan-hydrochlorothiazide (DIOVAN-HCT) 80-12.5 mg per tablet; Take 1 tablet by mouth once daily.       1. T2DM with hyperglycemia- Hgba1c goal is 7.5% or less without hypoglycemia - 6.6%--> 7.7%--> 6.5%--> 7.1% --. 6.5% current.   Continue metformin 1000 bid.   Continue Ozempic - 1 mg every week.   Has had 17 lbs weight last visit, now additional 13 lbs - no longer insulin -   Cost is high now that she is on medicare - will try to get pharmacy patient assistance to help her with novonordisk application.   No known history of pancreatitis or medullary thyroid cancer.   If you experience severe abdominal pain, nausea, or diarrhea - please call me or notify my office immediately.   discussed DM, progression of disease, long term complications, CV risk factors and tx options.   Advise compliance with ADA diet and encourage exercise   Instructed to monitor Blood glucose 2 x/day before meals and bring meter/ log to every clinic visit.     2. HTN-  controlled, continue meds as previously prescribed and monitor.   Urine mac 242.77 (from 2019) ---> 100's ---> 153    now improving.   Will continue to monitor.  get new with next lab draw.   diovan-hctz. - decreased dose as her blood pressure is on lower end. She may benefit from jardiance - however not sure if cost would be an issues   Lopressor 25 prn.     3. Lipids- LDL goal < 100. At goal.   Currently on statin therapy- continue crestor 40mg every HS- to continue.     4. Weight - BMI Body mass index is 32.87 kg/m².   Encourage Ada diet and exercise.   Continue glp1.     5. Renal Function - stable trends. No known renal disease.     6. Hypothyroidism - continues on synthroid - her tsh was somewhat suppressed last labs.   So I decreased her from 125 to 112mcg tablet daily.    Had spoken with her on the phone last week in that regard - as she has lost weight on diabetes medications, she is needing less thyroid  hormone.   tft's now in normal range.     7. Afib history - on aspirin daily and eliquis daily.   History of CVA in 2014. No deficits currently.     8. Sleep apnea - occasionally wears cpap machine to help with rest.   History of smoking. Quit in 2014 after her stroke.   Weight loss will help with this.     9. electrolyte abnormalities - k and ca levels elevated.   Advised hydration - cut down chocolate intake, cut calcium/vitamin D supplement down to 1 tablet per day. (has been on 2) -   Recheck BMP in 1 week to re-eval. Is normal now.       Follow up in 6 months with OV and labs.

## 2022-03-24 ENCOUNTER — PATIENT MESSAGE (OUTPATIENT)
Dept: INTERNAL MEDICINE | Facility: CLINIC | Age: 67
End: 2022-03-24
Payer: MEDICARE

## 2022-03-24 RX ORDER — PROMETHAZINE HYDROCHLORIDE AND CODEINE PHOSPHATE 6.25; 1 MG/5ML; MG/5ML
5 SOLUTION ORAL EVERY 4 HOURS PRN
Qty: 200 ML | Refills: 0 | Status: SHIPPED | OUTPATIENT
Start: 2022-03-24 | End: 2022-04-03

## 2022-03-24 RX ORDER — AMOXICILLIN AND CLAVULANATE POTASSIUM 875; 125 MG/1; MG/1
1 TABLET, FILM COATED ORAL EVERY 12 HOURS
Qty: 20 TABLET | Refills: 0 | Status: SHIPPED | OUTPATIENT
Start: 2022-03-24 | End: 2022-08-26

## 2022-03-24 NOTE — TELEPHONE ENCOUNTER
lov 8/17/21    Home covid test is negative.    See her email with symptoms.  You ok with rx to [facility] pharmacy?  She is asking for antib and cough syrup.

## 2022-03-29 ENCOUNTER — TELEPHONE (OUTPATIENT)
Dept: PHARMACY | Facility: CLINIC | Age: 67
End: 2022-03-29
Payer: MEDICARE

## 2022-03-31 ENCOUNTER — TELEPHONE (OUTPATIENT)
Dept: PHARMACY | Facility: CLINIC | Age: 67
End: 2022-03-31
Payer: MEDICARE

## 2022-04-08 ENCOUNTER — OFFICE VISIT (OUTPATIENT)
Dept: INTERNAL MEDICINE | Facility: CLINIC | Age: 67
End: 2022-04-08
Payer: MEDICARE

## 2022-04-08 VITALS
HEART RATE: 68 BPM | OXYGEN SATURATION: 99 % | TEMPERATURE: 97 F | SYSTOLIC BLOOD PRESSURE: 126 MMHG | RESPIRATION RATE: 18 BRPM | BODY MASS INDEX: 33.16 KG/M2 | HEIGHT: 65 IN | DIASTOLIC BLOOD PRESSURE: 68 MMHG | WEIGHT: 199.06 LBS

## 2022-04-08 DIAGNOSIS — R79.89 LFT ELEVATION: ICD-10-CM

## 2022-04-08 DIAGNOSIS — F17.211 NICOTINE DEPENDENCE, CIGARETTES, IN REMISSION: ICD-10-CM

## 2022-04-08 DIAGNOSIS — E78.5 HYPERLIPIDEMIA ASSOCIATED WITH TYPE 2 DIABETES MELLITUS: ICD-10-CM

## 2022-04-08 DIAGNOSIS — I15.2 HYPERTENSION ASSOCIATED WITH DIABETES: ICD-10-CM

## 2022-04-08 DIAGNOSIS — E11.40 TYPE 2 DIABETES MELLITUS WITH DIABETIC NEUROPATHY, WITHOUT LONG-TERM CURRENT USE OF INSULIN: ICD-10-CM

## 2022-04-08 DIAGNOSIS — Z00.00 WELL ADULT EXAM: Primary | ICD-10-CM

## 2022-04-08 DIAGNOSIS — E11.69 HYPERLIPIDEMIA ASSOCIATED WITH TYPE 2 DIABETES MELLITUS: ICD-10-CM

## 2022-04-08 DIAGNOSIS — F17.200 TOBACCO USE DISORDER: ICD-10-CM

## 2022-04-08 DIAGNOSIS — E66.9 CLASS 1 OBESITY WITH SERIOUS COMORBIDITY AND BODY MASS INDEX (BMI) OF 33.0 TO 33.9 IN ADULT, UNSPECIFIED OBESITY TYPE: ICD-10-CM

## 2022-04-08 DIAGNOSIS — K43.9 VENTRAL HERNIA WITHOUT OBSTRUCTION OR GANGRENE: ICD-10-CM

## 2022-04-08 DIAGNOSIS — R91.8 OTHER NONSPECIFIC ABNORMAL FINDING OF LUNG FIELD: ICD-10-CM

## 2022-04-08 DIAGNOSIS — R79.9 ABNORMAL FINDING OF BLOOD CHEMISTRY, UNSPECIFIED: ICD-10-CM

## 2022-04-08 DIAGNOSIS — E03.9 ACQUIRED HYPOTHYROIDISM: ICD-10-CM

## 2022-04-08 DIAGNOSIS — R91.8 MULTIPLE PULMONARY NODULES DETERMINED BY COMPUTED TOMOGRAPHY OF LUNG: ICD-10-CM

## 2022-04-08 DIAGNOSIS — H61.22 IMPACTED CERUMEN OF LEFT EAR: ICD-10-CM

## 2022-04-08 DIAGNOSIS — E11.59 HYPERTENSION ASSOCIATED WITH DIABETES: ICD-10-CM

## 2022-04-08 PROCEDURE — 3288F FALL RISK ASSESSMENT DOCD: CPT | Mod: CPTII,S$GLB,, | Performed by: INTERNAL MEDICINE

## 2022-04-08 PROCEDURE — 3078F PR MOST RECENT DIASTOLIC BLOOD PRESSURE < 80 MM HG: ICD-10-PCS | Mod: CPTII,S$GLB,, | Performed by: INTERNAL MEDICINE

## 2022-04-08 PROCEDURE — 3060F POS MICROALBUMINURIA REV: CPT | Mod: CPTII,S$GLB,, | Performed by: INTERNAL MEDICINE

## 2022-04-08 PROCEDURE — 99214 OFFICE O/P EST MOD 30 MIN: CPT | Mod: S$GLB,,, | Performed by: INTERNAL MEDICINE

## 2022-04-08 PROCEDURE — 99214 PR OFFICE/OUTPT VISIT, EST, LEVL IV, 30-39 MIN: ICD-10-PCS | Mod: S$GLB,,, | Performed by: INTERNAL MEDICINE

## 2022-04-08 PROCEDURE — 3008F PR BODY MASS INDEX (BMI) DOCUMENTED: ICD-10-PCS | Mod: CPTII,S$GLB,, | Performed by: INTERNAL MEDICINE

## 2022-04-08 PROCEDURE — 1101F PT FALLS ASSESS-DOCD LE1/YR: CPT | Mod: CPTII,S$GLB,, | Performed by: INTERNAL MEDICINE

## 2022-04-08 PROCEDURE — 1159F PR MEDICATION LIST DOCUMENTED IN MEDICAL RECORD: ICD-10-PCS | Mod: CPTII,S$GLB,, | Performed by: INTERNAL MEDICINE

## 2022-04-08 PROCEDURE — 1126F PR PAIN SEVERITY QUANTIFIED, NO PAIN PRESENT: ICD-10-PCS | Mod: CPTII,S$GLB,, | Performed by: INTERNAL MEDICINE

## 2022-04-08 PROCEDURE — 3066F NEPHROPATHY DOC TX: CPT | Mod: CPTII,S$GLB,, | Performed by: INTERNAL MEDICINE

## 2022-04-08 PROCEDURE — 1160F RVW MEDS BY RX/DR IN RCRD: CPT | Mod: CPTII,S$GLB,, | Performed by: INTERNAL MEDICINE

## 2022-04-08 PROCEDURE — 3288F PR FALLS RISK ASSESSMENT DOCUMENTED: ICD-10-PCS | Mod: CPTII,S$GLB,, | Performed by: INTERNAL MEDICINE

## 2022-04-08 PROCEDURE — 99999 PR PBB SHADOW E&M-EST. PATIENT-LVL V: ICD-10-PCS | Mod: PBBFAC,,, | Performed by: INTERNAL MEDICINE

## 2022-04-08 PROCEDURE — 3074F SYST BP LT 130 MM HG: CPT | Mod: CPTII,S$GLB,, | Performed by: INTERNAL MEDICINE

## 2022-04-08 PROCEDURE — 3044F PR MOST RECENT HEMOGLOBIN A1C LEVEL <7.0%: ICD-10-PCS | Mod: CPTII,S$GLB,, | Performed by: INTERNAL MEDICINE

## 2022-04-08 PROCEDURE — 99999 PR PBB SHADOW E&M-EST. PATIENT-LVL V: CPT | Mod: PBBFAC,,, | Performed by: INTERNAL MEDICINE

## 2022-04-08 PROCEDURE — 3060F PR POS MICROALBUMINURIA RESULT DOCUMENTED/REVIEW: ICD-10-PCS | Mod: CPTII,S$GLB,, | Performed by: INTERNAL MEDICINE

## 2022-04-08 PROCEDURE — 3008F BODY MASS INDEX DOCD: CPT | Mod: CPTII,S$GLB,, | Performed by: INTERNAL MEDICINE

## 2022-04-08 PROCEDURE — 1101F PR PT FALLS ASSESS DOC 0-1 FALLS W/OUT INJ PAST YR: ICD-10-PCS | Mod: CPTII,S$GLB,, | Performed by: INTERNAL MEDICINE

## 2022-04-08 PROCEDURE — 3078F DIAST BP <80 MM HG: CPT | Mod: CPTII,S$GLB,, | Performed by: INTERNAL MEDICINE

## 2022-04-08 PROCEDURE — 1159F MED LIST DOCD IN RCRD: CPT | Mod: CPTII,S$GLB,, | Performed by: INTERNAL MEDICINE

## 2022-04-08 PROCEDURE — 3044F HG A1C LEVEL LT 7.0%: CPT | Mod: CPTII,S$GLB,, | Performed by: INTERNAL MEDICINE

## 2022-04-08 PROCEDURE — 3066F PR DOCUMENTATION OF TREATMENT FOR NEPHROPATHY: ICD-10-PCS | Mod: CPTII,S$GLB,, | Performed by: INTERNAL MEDICINE

## 2022-04-08 PROCEDURE — 3074F PR MOST RECENT SYSTOLIC BLOOD PRESSURE < 130 MM HG: ICD-10-PCS | Mod: CPTII,S$GLB,, | Performed by: INTERNAL MEDICINE

## 2022-04-08 PROCEDURE — 1160F PR REVIEW ALL MEDS BY PRESCRIBER/CLIN PHARMACIST DOCUMENTED: ICD-10-PCS | Mod: CPTII,S$GLB,, | Performed by: INTERNAL MEDICINE

## 2022-04-08 PROCEDURE — 1126F AMNT PAIN NOTED NONE PRSNT: CPT | Mod: CPTII,S$GLB,, | Performed by: INTERNAL MEDICINE

## 2022-04-13 ENCOUNTER — HOSPITAL ENCOUNTER (OUTPATIENT)
Dept: RADIOLOGY | Facility: HOSPITAL | Age: 67
Discharge: HOME | End: 2022-04-13
Attending: INTERNAL MEDICINE
Payer: MEDICARE

## 2022-04-13 DIAGNOSIS — R79.89 LFT ELEVATION: ICD-10-CM

## 2022-04-13 PROCEDURE — 76700 US EXAM ABDOM COMPLETE: CPT | Mod: 26,,, | Performed by: INTERNAL MEDICINE

## 2022-04-13 PROCEDURE — 76700 US ABDOMEN COMPLETE: ICD-10-PCS | Mod: 26,,, | Performed by: INTERNAL MEDICINE

## 2022-04-13 PROCEDURE — 76700 US EXAM ABDOM COMPLETE: CPT | Mod: TC

## 2022-04-13 NOTE — PROGRESS NOTES
Mild changes of fatty liver were noted on the abdominal ultrasound.  This likely accounts for the patient's elevated liver enzyme levels on blood testing.   Exercise, weight loss, and good control of diabetes are recommended measures for management of this condition.

## 2022-04-13 NOTE — PROGRESS NOTES
Subjective:       Patient ID: Diana Montenegro is a 66 y.o. female.    Chief Complaint: Annual Exam    HPI   The patient presents for annual physical examination and follow-up of medical conditions.  The patient reports she is doing well.  She is currently seen the diabetes nurse practitioner for diabetes management.  She has seen her ophthalmologist Dr. Henry Melgar for treatment of her diabetic retinopathy.  She has receiving injections in the right eye.  She has bilateral cataracts that are being monitored.  She reports fasting blood sugar levels have been less than 150. She rarely experiences hypoglycemic episodes.  She does not monitor blood pressure.    She does not exercise regularly.    The patient is a former 2 pack per day cigarette smoker most of her adult life.  She quit in 2014. She does have small pulmonary nodules that are being followed by CT imaging according to protocol.    She has a ventral abdominal hernia.  Progressive enlargement has been noted.  No obstructive symptoms however have been experienced.  The patient is interested in obtaining surgical consultation.    Review of Systems   Constitutional: Negative for activity change, appetite change, fatigue, fever and unexpected weight change.   HENT: Positive for ear pain (  The left ear feels congested.). Negative for nasal congestion, postnasal drip, rhinorrhea and sore throat.    Eyes: Positive for visual disturbance.   Respiratory: Negative for cough, chest tightness, shortness of breath and wheezing.    Cardiovascular: Negative for chest pain, palpitations and leg swelling.   Gastrointestinal: Negative for abdominal pain, blood in stool and diarrhea.   Genitourinary: Negative for dysuria, frequency, hematuria and urgency.   Musculoskeletal: Negative for arthralgias, back pain, joint swelling and myalgias.   Integumentary:  Negative for rash.   Neurological: Negative for dizziness, syncope, weakness, numbness and headaches.    Psychiatric/Behavioral: Negative for sleep disturbance. The patient is not nervous/anxious.             Physical Exam  Vitals and nursing note reviewed.   Constitutional:       General: She is not in acute distress.     Appearance: She is well-developed.      Comments: The patient has lost 11 lb since 08/17/2021.   HENT:      Head: Normocephalic and atraumatic.      Right Ear: External ear normal.      Left Ear: External ear normal.      Nose: Nose normal.      Mouth/Throat:      Pharynx: No oropharyngeal exudate.   Eyes:      General: No scleral icterus.     Conjunctiva/sclera: Conjunctivae normal.      Pupils: Pupils are equal, round, and reactive to light.   Neck:      Thyroid: No thyromegaly.      Vascular: No carotid bruit or JVD.   Cardiovascular:      Rate and Rhythm: Normal rate and regular rhythm.      Pulses: Normal pulses.      Heart sounds: Normal heart sounds. No murmur heard.    No friction rub. No gallop.   Pulmonary:      Effort: Pulmonary effort is normal. No respiratory distress.      Breath sounds: Normal breath sounds. No wheezing or rales.   Chest:   Breasts:      Right: No supraclavicular adenopathy.      Left: No supraclavicular adenopathy.       Abdominal:      General: Bowel sounds are normal. There is no abdominal bruit.      Palpations: Abdomen is soft. There is no hepatomegaly, splenomegaly or mass.      Tenderness: There is no abdominal tenderness.      Hernia: A hernia ( a large ventral abdominal hernia is present.) is present.   Musculoskeletal:         General: No tenderness. Normal range of motion.      Right shoulder: No deformity or effusion.      Cervical back: Normal range of motion and neck supple.      Right lower leg: No edema.      Left lower leg: No edema.   Lymphadenopathy:      Cervical: No cervical adenopathy.      Upper Body:      Right upper body: No supraclavicular adenopathy.      Left upper body: No supraclavicular adenopathy.   Skin:     General: Skin is warm and  dry.      Findings: No rash.      Comments: No foot lesions are present.   Neurological:      Mental Status: She is alert and oriented to person, place, and time.      Cranial Nerves: No cranial nerve deficit.      Comments: Sensory exam is intact in both feet on monofilament  testing.   Psychiatric:         Mood and Affect: Mood normal.         Speech: Speech normal.         Behavior: Behavior normal.         Thought Content: Thought content normal.         Protective Sensation (w/ 10 gram monofilament):  Right: Intact  Left: Intact    Visual Inspection:  Normal -  Bilateral    Pedal Pulses:   Right: Present  Left: Present    Posterior tibialis:   Right:Present  Left: Present        Lab Visit on 02/10/2022   Component Date Value Ref Range Status    Microalbumin, Urine 02/10/2022 161.0  ug/mL Final    Creatinine, Urine 02/10/2022 105.0  15.0 - 325.0 mg/dL Final    Microalb/Creat Ratio 02/10/2022 153.3 (A) 0.0 - 30.0 ug/mg Final   Lab Visit on 02/10/2022   Component Date Value Ref Range Status    Sodium 02/10/2022 138  136 - 145 mmol/L Final    Potassium 02/10/2022 4.9  3.5 - 5.1 mmol/L Final    Chloride 02/10/2022 108  95 - 110 mmol/L Final    CO2 02/10/2022 26  23 - 29 mmol/L Final    Glucose 02/10/2022 108  70 - 110 mg/dL Final    BUN 02/10/2022 24 (A) 8 - 23 mg/dL Final    Creatinine 02/10/2022 1.0  0.5 - 1.4 mg/dL Final    Calcium 02/10/2022 10.2  8.7 - 10.5 mg/dL Final    Total Protein 02/10/2022 7.0  6.0 - 8.4 g/dL Final    Albumin 02/10/2022 3.6  3.5 - 5.2 g/dL Final    Total Bilirubin 02/10/2022 0.3  0.1 - 1.0 mg/dL Final    Comment: For infants and newborns, interpretation of results should be based  on gestational age, weight and in agreement with clinical  observations.    Premature Infant recommended reference ranges:  Up to 24 hours.............<8.0 mg/dL  Up to 48 hours............<12.0 mg/dL  3-5 days..................<15.0 mg/dL  6-29 days.................<15.0 mg/dL      Alkaline  Phosphatase 02/10/2022 107  55 - 135 U/L Final    AST 02/10/2022 34  10 - 40 U/L Final    ALT 02/10/2022 65 (A) 10 - 44 U/L Final    Anion Gap 02/10/2022 4 (A) 8 - 16 mmol/L Final    eGFR if African American 02/10/2022 >60.0  >60 mL/min/1.73 m^2 Final    eGFR if non African American 02/10/2022 59.3 (A) >60 mL/min/1.73 m^2 Final    Comment: Calculation used to obtain the estimated glomerular filtration  rate (eGFR) is the CKD-EPI equation.       Cholesterol 02/10/2022 141  120 - 199 mg/dL Final    Comment: The National Cholesterol Education Program (NCEP) has set the  following guidelines (reference ranges) for Cholesterol:  Optimal.....................<200 mg/dL  Borderline High.............200-239 mg/dL  High........................> or = 240 mg/dL      Triglycerides 02/10/2022 75  30 - 150 mg/dL Final    Comment: The National Cholesterol Education Program (NCEP) has set the  following guidelines (reference values) for triglycerides:  Normal......................<150 mg/dL  Borderline High.............150-199 mg/dL  High........................200-499 mg/dL      HDL 02/10/2022 61  40 - 75 mg/dL Final    Comment: The National Cholesterol Education Program (NCEP) has set the  following guidelines (reference values) for HDL Cholesterol:  Low...............<40 mg/dL  Optimal...........>60 mg/dL      LDL Cholesterol 02/10/2022 65.0  63.0 - 159.0 mg/dL Final    Comment: The National Cholesterol Education Program (NCEP) has set the  following guidelines (reference values) for LDL Cholesterol:  Optimal.......................<130 mg/dL  Borderline High...............130-159 mg/dL  High..........................160-189 mg/dL  Very High.....................>190 mg/dL      HDL/Cholesterol Ratio 02/10/2022 43.3  20.0 - 50.0 % Final    Total Cholesterol/HDL Ratio 02/10/2022 2.3  2.0 - 5.0 Final    Non-HDL Cholesterol 02/10/2022 80  mg/dL Final    Comment: Risk category and Non-HDL cholesterol goals:  Coronary heart  disease (CHD)or equivalent (10-year risk of CHD >20%):  Non-HDL cholesterol goal     <130 mg/dL  Two or more CHD risk factors and 10-year risk of CHD <= 20%:  Non-HDL cholesterol goal     <160 mg/dL  0 to 1 CHD risk factor:  Non-HDL cholesterol goal     <190 mg/dL      WBC 02/10/2022 5.09  3.90 - 12.70 K/uL Final    RBC 02/10/2022 3.73 (A) 4.00 - 5.40 M/uL Final    Hemoglobin 02/10/2022 10.2 (A) 12.0 - 16.0 g/dL Final    Hematocrit 02/10/2022 34.3 (A) 37.0 - 48.5 % Final    MCV 02/10/2022 92  82 - 98 fL Final    MCH 02/10/2022 27.3  27.0 - 31.0 pg Final    MCHC 02/10/2022 29.7 (A) 32.0 - 36.0 g/dL Final    RDW 02/10/2022 13.5  11.5 - 14.5 % Final    Platelets 02/10/2022 158  150 - 450 K/uL Final    MPV 02/10/2022 10.6  9.2 - 12.9 fL Final    Immature Granulocytes 02/10/2022 0.2  0.0 - 0.5 % Final    Gran # (ANC) 02/10/2022 3.2  1.8 - 7.7 K/uL Final    Immature Grans (Abs) 02/10/2022 0.01  0.00 - 0.04 K/uL Final    Comment: Mild elevation in immature granulocytes is non specific and   can be seen in a variety of conditions including stress response,   acute inflammation, trauma and pregnancy. Correlation with other   laboratory and clinical findings is essential.      Lymph # 02/10/2022 1.5  1.0 - 4.8 K/uL Final    Mono # 02/10/2022 0.3  0.3 - 1.0 K/uL Final    Eos # 02/10/2022 0.1  0.0 - 0.5 K/uL Final    Baso # 02/10/2022 0.04  0.00 - 0.20 K/uL Final    nRBC 02/10/2022 0  0 /100 WBC Final    Gran % 02/10/2022 62.0  38.0 - 73.0 % Final    Lymph % 02/10/2022 29.5  18.0 - 48.0 % Final    Mono % 02/10/2022 5.5  4.0 - 15.0 % Final    Eosinophil % 02/10/2022 2.0  0.0 - 8.0 % Final    Basophil % 02/10/2022 0.8  0.0 - 1.9 % Final    Differential Method 02/10/2022 Automated   Final    Hemoglobin A1C 02/10/2022 6.5 (A) 4.0 - 5.6 % Final    Comment: ADA Screening Guidelines:  5.7-6.4%  Consistent with prediabetes  >or=6.5%  Consistent with diabetes    High levels of fetal hemoglobin interfere with the  HbA1C  assay. Heterozygous hemoglobin variants (HbS, HgC, etc)do  not significantly interfere with this assay.   However, presence of multiple variants may affect accuracy.      Estimated Avg Glucose 02/10/2022 140 (A) 68 - 131 mg/dL Final       Assessment & Plan:      Diana was seen today for annual exam. Current medications will be continued.  Fasting blood tests will be obtained soon.  The patient has been encouraged to  Increase her exercise level and to lose weight.    Diagnoses and all orders for this visit:    Well adult exam  -     Comprehensive Metabolic Panel; Future  -     Lipid Panel; Future  -     CBC Auto Differential; Future  -     Hemoglobin A1C; Future  -     Microalbumin/Creatinine Ratio, Urine; Future    Type 2 diabetes mellitus with diabetic neuropathy, without long-term current use of insulin  -     Comprehensive Metabolic Panel; Future  -     Lipid Panel; Future  -     CBC Auto Differential; Future  -     Hemoglobin A1C; Future  -     Microalbumin/Creatinine Ratio, Urine; Future    Hypertension associated with diabetes  -     Comprehensive Metabolic Panel; Future  -     Lipid Panel; Future  -     CBC Auto Differential; Future  -     Hemoglobin A1C; Future  -     Microalbumin/Creatinine Ratio, Urine; Future    Acquired hypothyroidism    LFT elevation  -     US Abdomen Complete; Future    Tobacco use disorder    Multiple pulmonary nodules determined by computed tomography of lung  -     CT Chest Lung Screening Low Dose; Future    Other nonspecific abnormal finding of lung field  -     CT Chest Lung Screening Low Dose; Future    Nicotine dependence, cigarettes, in remission   -     CT Chest Lung Screening Low Dose; Future    Ventral hernia without obstruction or gangrene  -     Ambulatory referral/consult to General Surgery; Future    Impacted cerumen of left ear  -     Ambulatory referral/consult to ENT; Future    Abnormal finding of blood chemistry, unspecified   -     Lipid Panel;  Future    Class 1 obesity with serious comorbidity and body mass index (BMI) of 33.0 to 33.9 in adult, unspecified obesity type    Hyperlipidemia         Follow up in about 4 months (around 8/8/2022).     Rad Johnston MD

## 2022-04-14 ENCOUNTER — TELEPHONE (OUTPATIENT)
Dept: INTERNAL MEDICINE | Facility: CLINIC | Age: 67
End: 2022-04-14
Payer: MEDICARE

## 2022-04-19 ENCOUNTER — HOSPITAL ENCOUNTER (OUTPATIENT)
Dept: RADIOLOGY | Facility: HOSPITAL | Age: 67
Discharge: HOME | End: 2022-04-19
Attending: INTERNAL MEDICINE
Payer: MEDICARE

## 2022-04-19 DIAGNOSIS — F17.211 NICOTINE DEPENDENCE, CIGARETTES, IN REMISSION: ICD-10-CM

## 2022-04-19 DIAGNOSIS — R91.8 MULTIPLE PULMONARY NODULES DETERMINED BY COMPUTED TOMOGRAPHY OF LUNG: ICD-10-CM

## 2022-04-19 DIAGNOSIS — R91.8 OTHER NONSPECIFIC ABNORMAL FINDING OF LUNG FIELD: ICD-10-CM

## 2022-04-19 PROCEDURE — 71271 CT THORAX LUNG CANCER SCR C-: CPT | Mod: TC

## 2022-04-19 PROCEDURE — 71271 CT THORAX LUNG CANCER SCR C-: CPT | Mod: 26,,, | Performed by: RADIOLOGY

## 2022-04-19 PROCEDURE — 71271 CT CHEST LUNG SCREENING LOW DOSE: ICD-10-PCS | Mod: 26,,, | Performed by: RADIOLOGY

## 2022-04-23 DIAGNOSIS — G47.00 INSOMNIA, UNSPECIFIED TYPE: ICD-10-CM

## 2022-04-24 NOTE — TELEPHONE ENCOUNTER
Refill Routing Note   Medication(s) are not appropriate for processing by [facility] for the following reason(s):      - Indication is outside of scope for OR    [facility] action(s):  Route       Medication Therapy Plan: Insomnia outside of [facility] scope  Medication reconciliation completed: No     Appointments  past 12m or future 3m with PCP    Date Provider   Last Visit   4/8/2022 Rad Johnston MD   Next Visit   9/15/2022 Rad Johnston MD   ED visits in past 90 days: 0        Note composed:5:17 PM 04/24/2022

## 2022-04-26 RX ORDER — TRAZODONE HYDROCHLORIDE 50 MG/1
TABLET ORAL
Qty: 180 TABLET | Refills: 0 | Status: SHIPPED | OUTPATIENT
Start: 2022-04-26 | End: 2022-12-19

## 2022-04-28 ENCOUNTER — OFFICE VISIT (OUTPATIENT)
Dept: URGENT CARE | Facility: CLINIC | Age: 67
End: 2022-04-28
Payer: MEDICARE

## 2022-04-28 VITALS
RESPIRATION RATE: 16 BRPM | BODY MASS INDEX: 33.15 KG/M2 | OXYGEN SATURATION: 98 % | HEART RATE: 71 BPM | WEIGHT: 199 LBS | TEMPERATURE: 98 F | DIASTOLIC BLOOD PRESSURE: 68 MMHG | HEIGHT: 65 IN | SYSTOLIC BLOOD PRESSURE: 134 MMHG

## 2022-04-28 DIAGNOSIS — L03.116 CELLULITIS OF LEFT LEG: Primary | ICD-10-CM

## 2022-04-28 PROCEDURE — 3066F NEPHROPATHY DOC TX: CPT | Mod: CPTII,S$GLB,,

## 2022-04-28 PROCEDURE — 3078F DIAST BP <80 MM HG: CPT | Mod: CPTII,S$GLB,,

## 2022-04-28 PROCEDURE — 99213 OFFICE O/P EST LOW 20 MIN: CPT | Mod: S$GLB,,,

## 2022-04-28 PROCEDURE — 3066F PR DOCUMENTATION OF TREATMENT FOR NEPHROPATHY: ICD-10-PCS | Mod: CPTII,S$GLB,,

## 2022-04-28 PROCEDURE — 3060F POS MICROALBUMINURIA REV: CPT | Mod: CPTII,S$GLB,,

## 2022-04-28 PROCEDURE — 3075F PR MOST RECENT SYSTOLIC BLOOD PRESS GE 130-139MM HG: ICD-10-PCS | Mod: CPTII,S$GLB,,

## 2022-04-28 PROCEDURE — 1125F PR PAIN SEVERITY QUANTIFIED, PAIN PRESENT: ICD-10-PCS | Mod: CPTII,S$GLB,,

## 2022-04-28 PROCEDURE — 1159F PR MEDICATION LIST DOCUMENTED IN MEDICAL RECORD: ICD-10-PCS | Mod: CPTII,S$GLB,,

## 2022-04-28 PROCEDURE — 3008F PR BODY MASS INDEX (BMI) DOCUMENTED: ICD-10-PCS | Mod: CPTII,S$GLB,,

## 2022-04-28 PROCEDURE — 1160F RVW MEDS BY RX/DR IN RCRD: CPT | Mod: CPTII,S$GLB,,

## 2022-04-28 PROCEDURE — 3078F PR MOST RECENT DIASTOLIC BLOOD PRESSURE < 80 MM HG: ICD-10-PCS | Mod: CPTII,S$GLB,,

## 2022-04-28 PROCEDURE — 99213 PR OFFICE/OUTPT VISIT, EST, LEVL III, 20-29 MIN: ICD-10-PCS | Mod: S$GLB,,,

## 2022-04-28 PROCEDURE — 1125F AMNT PAIN NOTED PAIN PRSNT: CPT | Mod: CPTII,S$GLB,,

## 2022-04-28 PROCEDURE — 3051F PR MOST RECENT HEMOGLOBIN A1C LEVEL 7.0 - < 8.0%: ICD-10-PCS | Mod: CPTII,S$GLB,,

## 2022-04-28 PROCEDURE — 3051F HG A1C>EQUAL 7.0%<8.0%: CPT | Mod: CPTII,S$GLB,,

## 2022-04-28 PROCEDURE — 3008F BODY MASS INDEX DOCD: CPT | Mod: CPTII,S$GLB,,

## 2022-04-28 PROCEDURE — 1160F PR REVIEW ALL MEDS BY PRESCRIBER/CLIN PHARMACIST DOCUMENTED: ICD-10-PCS | Mod: CPTII,S$GLB,,

## 2022-04-28 PROCEDURE — 3060F PR POS MICROALBUMINURIA RESULT DOCUMENTED/REVIEW: ICD-10-PCS | Mod: CPTII,S$GLB,,

## 2022-04-28 PROCEDURE — 3075F SYST BP GE 130 - 139MM HG: CPT | Mod: CPTII,S$GLB,,

## 2022-04-28 PROCEDURE — 1159F MED LIST DOCD IN RCRD: CPT | Mod: CPTII,S$GLB,,

## 2022-04-28 RX ORDER — CEPHALEXIN 500 MG/1
500 CAPSULE ORAL 4 TIMES DAILY
Qty: 20 CAPSULE | Refills: 0 | Status: SHIPPED | OUTPATIENT
Start: 2022-04-28 | End: 2022-05-03

## 2022-04-28 RX ORDER — LEVOFLOXACIN 750 MG/1
750 TABLET ORAL DAILY
Qty: 5 TABLET | Refills: 0 | Status: SHIPPED | OUTPATIENT
Start: 2022-04-28 | End: 2022-05-03

## 2022-04-28 RX ORDER — MUPIROCIN 20 MG/G
OINTMENT TOPICAL 2 TIMES DAILY
Qty: 15 G | Refills: 0 | Status: SHIPPED | OUTPATIENT
Start: 2022-04-28 | End: 2022-05-05

## 2022-04-28 NOTE — PATIENT INSTRUCTIONS
PLEASE READ ALL DISCHARGE INSTRUCTIONS                                               Cellulitis   If your condition worsens or fails to improve we recommend that you receive another evaluation at the ER immediately or contact your PCP to discuss your concerns or return here. You must understand that you've received an urgent care treatment only and that you may be released before all your medical problems are known or treated. You the patient will arrange for follow-up care as instructed.     Clean the wound twice a day and put the antibiotic ointment on it if prescribed.     If you were prescribed antibiotics, please take them to completion. If you are female and on BCP use additional methods to prevent pregnancy while on antibiotics and for one cycle after.     Tylenol or ibuprofen can also be used as directed for pain unless you have an allergy to them or medical condition such as stomach ulcers, kidney or liver disease or blood thinners etc for which you should not be taking these type of medications.     You should seek ER treatment if fever, increase pain, swelling or other signs of increasing infection.     Please follow up with your primary care doctor or specialist as needed.

## 2022-05-31 DIAGNOSIS — E78.5 HYPERLIPIDEMIA, UNSPECIFIED HYPERLIPIDEMIA TYPE: ICD-10-CM

## 2022-05-31 RX ORDER — METFORMIN HYDROCHLORIDE 1000 MG/1
TABLET ORAL
Qty: 180 TABLET | Refills: 1 | Status: SHIPPED | OUTPATIENT
Start: 2022-05-31 | End: 2023-01-02

## 2022-05-31 RX ORDER — ROSUVASTATIN CALCIUM 40 MG/1
TABLET, COATED ORAL
Qty: 90 TABLET | Refills: 3 | Status: SHIPPED | OUTPATIENT
Start: 2022-05-31 | End: 2023-04-02

## 2022-05-31 NOTE — TELEPHONE ENCOUNTER
Care Due:                  Date            Visit Type   Department     Provider  --------------------------------------------------------------------------------                                EP -                              PRIMARY      [facility] INTERNAL  Last Visit: 04-      [facility] ([facility])   MEDICINE       Radtarah Johnston                              EP -                              PRIMARY      [facility] INTERNAL  Next Visit: 09-      [facility] ([facility])   MEDICINE       Radkelle Johnston                                                            Last  Test          Frequency    Reason                     Performed    Due Date  --------------------------------------------------------------------------------    TSH.........  12 months..  levothyroxine............  08- 08-    [employer] Embedded Care Gaps. Reference number: [id number]. 5/31/2022   11:13:06 AM CDT

## 2022-05-31 NOTE — TELEPHONE ENCOUNTER
Refill Authorization Note   Diana Montenegro  is requesting a refill authorization.  Brief Assessment and Rationale for Refill:  Approve    -Medication-Related Problems Identified: Requires labs  Medication Therapy Plan:       Medication Reconciliation Completed: No   Comments:     Provider Staff:     Action is required for this patient.   Please see care gap opportunities below in Care Due Message.     Thanks!  [facility]     Appointments      Date Provider   Last Visit   4/8/2022 Rad Johnston MD   Next Visit   9/15/2022 Rad Johnston MD     Note composed:4:06 PM 05/31/2022           Note composed:4:05 PM 05/31/2022

## 2022-07-02 ENCOUNTER — OFFICE VISIT (OUTPATIENT)
Dept: URGENT CARE | Facility: CLINIC | Age: 67
End: 2022-07-02
Payer: MEDICARE

## 2022-07-02 VITALS
DIASTOLIC BLOOD PRESSURE: 82 MMHG | TEMPERATURE: 98 F | WEIGHT: 199 LBS | HEART RATE: 66 BPM | OXYGEN SATURATION: 97 % | HEIGHT: 65 IN | BODY MASS INDEX: 33.15 KG/M2 | RESPIRATION RATE: 16 BRPM | SYSTOLIC BLOOD PRESSURE: 138 MMHG

## 2022-07-02 DIAGNOSIS — J06.9 URI, ACUTE: ICD-10-CM

## 2022-07-02 DIAGNOSIS — R19.7 DIARRHEA, UNSPECIFIED TYPE: ICD-10-CM

## 2022-07-02 DIAGNOSIS — U07.1 LAB TEST POSITIVE FOR DETECTION OF COVID-19 VIRUS: ICD-10-CM

## 2022-07-02 DIAGNOSIS — R68.83 CHILLS: Primary | ICD-10-CM

## 2022-07-02 DIAGNOSIS — U07.1 COVID-19 VIRUS DETECTED: ICD-10-CM

## 2022-07-02 LAB
CTP QC/QA: YES
SARS-COV-2 RDRP RESP QL NAA+PROBE: POSITIVE

## 2022-07-02 PROCEDURE — 3079F PR MOST RECENT DIASTOLIC BLOOD PRESSURE 80-89 MM HG: ICD-10-PCS | Mod: CPTII,S$GLB,, | Performed by: FAMILY MEDICINE

## 2022-07-02 PROCEDURE — 3051F PR MOST RECENT HEMOGLOBIN A1C LEVEL 7.0 - < 8.0%: ICD-10-PCS | Mod: CPTII,S$GLB,, | Performed by: FAMILY MEDICINE

## 2022-07-02 PROCEDURE — 3008F PR BODY MASS INDEX (BMI) DOCUMENTED: ICD-10-PCS | Mod: CPTII,S$GLB,, | Performed by: FAMILY MEDICINE

## 2022-07-02 PROCEDURE — U0002: ICD-10-PCS | Mod: QW,S$GLB,, | Performed by: FAMILY MEDICINE

## 2022-07-02 PROCEDURE — 1159F MED LIST DOCD IN RCRD: CPT | Mod: CPTII,S$GLB,, | Performed by: FAMILY MEDICINE

## 2022-07-02 PROCEDURE — 3075F SYST BP GE 130 - 139MM HG: CPT | Mod: CPTII,S$GLB,, | Performed by: FAMILY MEDICINE

## 2022-07-02 PROCEDURE — 99213 OFFICE O/P EST LOW 20 MIN: CPT | Mod: S$GLB,,, | Performed by: FAMILY MEDICINE

## 2022-07-02 PROCEDURE — 1125F AMNT PAIN NOTED PAIN PRSNT: CPT | Mod: CPTII,S$GLB,, | Performed by: FAMILY MEDICINE

## 2022-07-02 PROCEDURE — 3008F BODY MASS INDEX DOCD: CPT | Mod: CPTII,S$GLB,, | Performed by: FAMILY MEDICINE

## 2022-07-02 PROCEDURE — 3066F NEPHROPATHY DOC TX: CPT | Mod: CPTII,S$GLB,, | Performed by: FAMILY MEDICINE

## 2022-07-02 PROCEDURE — 99213 PR OFFICE/OUTPT VISIT, EST, LEVL III, 20-29 MIN: ICD-10-PCS | Mod: S$GLB,,, | Performed by: FAMILY MEDICINE

## 2022-07-02 PROCEDURE — 3060F PR POS MICROALBUMINURIA RESULT DOCUMENTED/REVIEW: ICD-10-PCS | Mod: CPTII,S$GLB,, | Performed by: FAMILY MEDICINE

## 2022-07-02 PROCEDURE — 3060F POS MICROALBUMINURIA REV: CPT | Mod: CPTII,S$GLB,, | Performed by: FAMILY MEDICINE

## 2022-07-02 PROCEDURE — 3075F PR MOST RECENT SYSTOLIC BLOOD PRESS GE 130-139MM HG: ICD-10-PCS | Mod: CPTII,S$GLB,, | Performed by: FAMILY MEDICINE

## 2022-07-02 PROCEDURE — U0002 COVID-19 LAB TEST NON-CDC: HCPCS | Mod: QW,S$GLB,, | Performed by: FAMILY MEDICINE

## 2022-07-02 PROCEDURE — 3066F PR DOCUMENTATION OF TREATMENT FOR NEPHROPATHY: ICD-10-PCS | Mod: CPTII,S$GLB,, | Performed by: FAMILY MEDICINE

## 2022-07-02 PROCEDURE — 1159F PR MEDICATION LIST DOCUMENTED IN MEDICAL RECORD: ICD-10-PCS | Mod: CPTII,S$GLB,, | Performed by: FAMILY MEDICINE

## 2022-07-02 PROCEDURE — 1125F PR PAIN SEVERITY QUANTIFIED, PAIN PRESENT: ICD-10-PCS | Mod: CPTII,S$GLB,, | Performed by: FAMILY MEDICINE

## 2022-07-02 PROCEDURE — 3079F DIAST BP 80-89 MM HG: CPT | Mod: CPTII,S$GLB,, | Performed by: FAMILY MEDICINE

## 2022-07-02 PROCEDURE — 3051F HG A1C>EQUAL 7.0%<8.0%: CPT | Mod: CPTII,S$GLB,, | Performed by: FAMILY MEDICINE

## 2022-07-02 RX ORDER — LORATADINE 10 MG/1
10 TABLET ORAL DAILY
Qty: 30 TABLET | Refills: 2 | Status: SHIPPED | OUTPATIENT
Start: 2022-07-02 | End: 2022-09-15

## 2022-07-02 RX ORDER — PROMETHAZINE HYDROCHLORIDE AND DEXTROMETHORPHAN HYDROBROMIDE 6.25; 15 MG/5ML; MG/5ML
SYRUP ORAL
Qty: 180 ML | Refills: 0 | Status: SHIPPED | OUTPATIENT
Start: 2022-07-02

## 2022-07-02 RX ORDER — ALBUTEROL SULFATE 90 UG/1
2 AEROSOL, METERED RESPIRATORY (INHALATION) EVERY 6 HOURS PRN
Qty: 18 G | Refills: 3 | Status: SHIPPED | OUTPATIENT
Start: 2022-07-02 | End: 2024-01-29 | Stop reason: SDUPTHER

## 2022-07-02 NOTE — PROGRESS NOTES
"Subjective:       Patient ID: Diana Montenegro is a 67 y.o. female.    Vitals:  height is 5' 5" (1.651 m) and weight is 90.3 kg (199 lb). Her temperature is 98.1 °F (36.7 °C). Her blood pressure is 138/82 and her pulse is 66. Her respiration is 16 and oxygen saturation is 97%.     Chief Complaint: Diarrhea    Pt stated that she came back off a cruise last night and she has been sick x 5 days with diarrhea and sinus symptoms . Pt has taken mylanta and mucinex . Pts pharmacy has been updated in her chart .   Denies any unusual food, no blood or mucous in stool    Denies abdominal pain      Diarrhea   This is a new problem. The current episode started in the past 7 days. The problem occurs less than 2 times per day. The problem has been unchanged. The stool consistency is described as watery. The patient states that diarrhea does not awaken her from sleep. Associated symptoms include chills, coughing and a URI. Pertinent negatives include no abdominal pain, arthralgias, bloating, fever, headaches, increased  flatus, myalgias, sweats, vomiting or weight loss. Nothing aggravates the symptoms. Risk factors include ill contacts. Treatments tried: mylanta. The treatment provided no relief.       Constitution: Positive for chills. Negative for fever.   Respiratory: Positive for cough.    Gastrointestinal: Positive for diarrhea. Negative for abdominal pain and vomiting.   Musculoskeletal: Negative for joint pain and muscle ache.   Neurological: Negative for headaches.       Objective:      Physical Exam   Constitutional: She is oriented to person, place, and time. She appears well-developed. She is cooperative.  Non-toxic appearance. She does not appear ill. No distress.   HENT:   Head: Normocephalic and atraumatic.   Ears:   Right Ear: Hearing, tympanic membrane, external ear and ear canal normal.   Left Ear: Hearing, tympanic membrane, external ear and ear canal normal.   Nose: Nose normal. No mucosal edema, rhinorrhea " or nasal deformity. No epistaxis. Right sinus exhibits no maxillary sinus tenderness and no frontal sinus tenderness. Left sinus exhibits no maxillary sinus tenderness and no frontal sinus tenderness.   Mouth/Throat: Uvula is midline, oropharynx is clear and moist and mucous membranes are normal. Mucous membranes are moist. No trismus in the jaw. Normal dentition. No uvula swelling. No oropharyngeal exudate. Oropharynx is clear.   Eyes: Conjunctivae and lids are normal. Pupils are equal, round, and reactive to light. Right eye exhibits no discharge. Left eye exhibits no discharge. No scleral icterus. Extraocular movement intact   Neck: Phonation normal. Neck supple.   Cardiovascular: Normal rate, regular rhythm, normal heart sounds and normal pulses.   Pulmonary/Chest: Effort normal. Stridor present. No respiratory distress. She has rhonchi.   Abdominal: Normal appearance and bowel sounds are normal. She exhibits no distension, no pulsatile midline mass and no mass. Soft. There is no abdominal tenderness.   Musculoskeletal: Normal range of motion.         General: No deformity. Normal range of motion.   Neurological: She is alert and oriented to person, place, and time. She exhibits normal muscle tone. Coordination normal.   Skin: Skin is warm, dry, intact, not diaphoretic and not pale.   Psychiatric: Her speech is normal and behavior is normal. Judgment and thought content normal.   Nursing note and vitals reviewed.        Assessment:       1. Chills    2. Diarrhea, unspecified type    3. URI, acute          Plan:         Chills  -     POCT COVID-19 Rapid Screening    Diarrhea, unspecified type    URI, acute    Other orders  -     loratadine (CLARITIN) 10 mg tablet; Take 1 tablet (10 mg total) by mouth once daily.  Dispense: 30 tablet; Refill: 2  -     promethazine-dextromethorphan (PROMETHAZINE-DM) 6.25-15 mg/5 mL Syrp; Take one tsp po q 6 hrs prn cough  Dispense: 180 mL; Refill: 0  -     albuterol (VENTOLIN HFA)  90 mcg/actuation inhaler; Inhale 2 puffs into the lungs every 6 (six) hours as needed for Wheezing. Rescue  Dispense: 18 g; Refill: 3             Results for orders placed or performed in visit on 07/02/22   POCT COVID-19 Rapid Screening   Result Value Ref Range    POC Rapid COVID Positive (A) Negative     Acceptable Yes

## 2022-07-14 ENCOUNTER — PATIENT MESSAGE (OUTPATIENT)
Dept: INTERNAL MEDICINE | Facility: CLINIC | Age: 67
End: 2022-07-14
Payer: MEDICARE

## 2022-07-14 DIAGNOSIS — B00.9 HERPES SIMPLEX: Primary | ICD-10-CM

## 2022-07-15 NOTE — TELEPHONE ENCOUNTER
Care Due:                  Date            Visit Type   Department     Provider  --------------------------------------------------------------------------------                                EP -                              PRIMARY      [facility] INTERNAL  Last Visit: 04-      CARE ([facility])   MEDICINE       Radtarah Johnston                              EP -                              PRIMARY      [facility] INTERNAL  Next Visit: 09-      CARE ([facility])   MEDICINE       Radkelle Johnston                                                            Last  Test          Frequency    Reason                     Performed    Due Date  --------------------------------------------------------------------------------    HBA1C.......  6 months...  OZEMPIC, metFORMIN.......  04-   10-    [facility] Embedded Care Gaps. Reference number: [id number]. 7/15/2022   5:20:30 PM CDT

## 2022-07-16 RX ORDER — ACYCLOVIR 50 MG/G
OINTMENT TOPICAL
Qty: 5 G | Refills: 1 | Status: SHIPPED | OUTPATIENT
Start: 2022-07-16

## 2022-07-21 DIAGNOSIS — I65.21 ASYMPTOMATIC STENOSIS OF RIGHT CAROTID ARTERY: Primary | ICD-10-CM

## 2022-08-01 ENCOUNTER — OFFICE VISIT (OUTPATIENT)
Dept: SURGERY | Facility: CLINIC | Age: 67
End: 2022-08-01
Payer: MEDICARE

## 2022-08-01 VITALS
BODY MASS INDEX: 32.83 KG/M2 | WEIGHT: 197.06 LBS | HEIGHT: 65 IN | DIASTOLIC BLOOD PRESSURE: 77 MMHG | HEART RATE: 80 BPM | SYSTOLIC BLOOD PRESSURE: 147 MMHG

## 2022-08-01 DIAGNOSIS — K43.9 VENTRAL HERNIA WITHOUT OBSTRUCTION OR GANGRENE: ICD-10-CM

## 2022-08-01 PROCEDURE — 3077F PR MOST RECENT SYSTOLIC BLOOD PRESSURE >= 140 MM HG: ICD-10-PCS | Mod: CPTII,S$GLB,, | Performed by: SURGERY

## 2022-08-01 PROCEDURE — 3078F DIAST BP <80 MM HG: CPT | Mod: CPTII,S$GLB,, | Performed by: SURGERY

## 2022-08-01 PROCEDURE — 3051F HG A1C>EQUAL 7.0%<8.0%: CPT | Mod: CPTII,S$GLB,, | Performed by: SURGERY

## 2022-08-01 PROCEDURE — 99999 PR PBB SHADOW E&M-EST. PATIENT-LVL V: ICD-10-PCS | Mod: PBBFAC,,, | Performed by: SURGERY

## 2022-08-01 PROCEDURE — 3008F PR BODY MASS INDEX (BMI) DOCUMENTED: ICD-10-PCS | Mod: CPTII,S$GLB,, | Performed by: SURGERY

## 2022-08-01 PROCEDURE — 1101F PR PT FALLS ASSESS DOC 0-1 FALLS W/OUT INJ PAST YR: ICD-10-PCS | Mod: CPTII,S$GLB,, | Performed by: SURGERY

## 2022-08-01 PROCEDURE — 99204 PR OFFICE/OUTPT VISIT, NEW, LEVL IV, 45-59 MIN: ICD-10-PCS | Mod: S$GLB,,, | Performed by: SURGERY

## 2022-08-01 PROCEDURE — 1159F PR MEDICATION LIST DOCUMENTED IN MEDICAL RECORD: ICD-10-PCS | Mod: CPTII,S$GLB,, | Performed by: SURGERY

## 2022-08-01 PROCEDURE — 1126F PR PAIN SEVERITY QUANTIFIED, NO PAIN PRESENT: ICD-10-PCS | Mod: CPTII,S$GLB,, | Performed by: SURGERY

## 2022-08-01 PROCEDURE — 3066F NEPHROPATHY DOC TX: CPT | Mod: CPTII,S$GLB,, | Performed by: SURGERY

## 2022-08-01 PROCEDURE — 3078F PR MOST RECENT DIASTOLIC BLOOD PRESSURE < 80 MM HG: ICD-10-PCS | Mod: CPTII,S$GLB,, | Performed by: SURGERY

## 2022-08-01 PROCEDURE — 99204 OFFICE O/P NEW MOD 45 MIN: CPT | Mod: S$GLB,,, | Performed by: SURGERY

## 2022-08-01 PROCEDURE — 3288F PR FALLS RISK ASSESSMENT DOCUMENTED: ICD-10-PCS | Mod: CPTII,S$GLB,, | Performed by: SURGERY

## 2022-08-01 PROCEDURE — 3051F PR MOST RECENT HEMOGLOBIN A1C LEVEL 7.0 - < 8.0%: ICD-10-PCS | Mod: CPTII,S$GLB,, | Performed by: SURGERY

## 2022-08-01 PROCEDURE — 3008F BODY MASS INDEX DOCD: CPT | Mod: CPTII,S$GLB,, | Performed by: SURGERY

## 2022-08-01 PROCEDURE — 99999 PR PBB SHADOW E&M-EST. PATIENT-LVL V: CPT | Mod: PBBFAC,,, | Performed by: SURGERY

## 2022-08-01 PROCEDURE — 3060F POS MICROALBUMINURIA REV: CPT | Mod: CPTII,S$GLB,, | Performed by: SURGERY

## 2022-08-01 PROCEDURE — 1101F PT FALLS ASSESS-DOCD LE1/YR: CPT | Mod: CPTII,S$GLB,, | Performed by: SURGERY

## 2022-08-01 PROCEDURE — 1160F PR REVIEW ALL MEDS BY PRESCRIBER/CLIN PHARMACIST DOCUMENTED: ICD-10-PCS | Mod: CPTII,S$GLB,, | Performed by: SURGERY

## 2022-08-01 PROCEDURE — 3060F PR POS MICROALBUMINURIA RESULT DOCUMENTED/REVIEW: ICD-10-PCS | Mod: CPTII,S$GLB,, | Performed by: SURGERY

## 2022-08-01 PROCEDURE — 1159F MED LIST DOCD IN RCRD: CPT | Mod: CPTII,S$GLB,, | Performed by: SURGERY

## 2022-08-01 PROCEDURE — 3077F SYST BP >= 140 MM HG: CPT | Mod: CPTII,S$GLB,, | Performed by: SURGERY

## 2022-08-01 PROCEDURE — 3288F FALL RISK ASSESSMENT DOCD: CPT | Mod: CPTII,S$GLB,, | Performed by: SURGERY

## 2022-08-01 PROCEDURE — 1160F RVW MEDS BY RX/DR IN RCRD: CPT | Mod: CPTII,S$GLB,, | Performed by: SURGERY

## 2022-08-01 PROCEDURE — 3066F PR DOCUMENTATION OF TREATMENT FOR NEPHROPATHY: ICD-10-PCS | Mod: CPTII,S$GLB,, | Performed by: SURGERY

## 2022-08-01 PROCEDURE — 1126F AMNT PAIN NOTED NONE PRSNT: CPT | Mod: CPTII,S$GLB,, | Performed by: SURGERY

## 2022-08-01 NOTE — PROGRESS NOTES
History & Physical    SUBJECTIVE:     History of Present Illness:  Patient is a 67 y.o. female with multiple medical co-morbidities including hypertension, diabetes, atrial fibrillation on eliquis, CVA, previous low midline ventral hernia repair 1993, and laparoscopic sleeve gastrectomy 2015 who now presents with general abdominal discomfort over known ventral hernias. Patient states since her hernia repair in 1993, she had early recurrence and over the years noticed additional ventral hernias, some superiorly over her abdomen, some in the midline, and some off midline. They hadn't bothered her until recently whereby she experiences discomfort and constipation. Denies fevers, chills, chest pain, or any difficulty breathing.     Uses walker due to pain in knees and back spasm.     Chief Complaint   Patient presents with    Other     Irregular bowel movements     Hernia       Review of patient's allergies indicates:   Allergen Reactions    Medrol [methylprednisolone] Palpitations       Current Outpatient Medications   Medication Sig Dispense Refill    acyclovir 5% (ZOVIRAX) 5 % ointment Apply topically 6 (six) times daily. 5 g 1    albuterol (PROVENTIL/VENTOLIN HFA) 90 mcg/actuation inhaler Inhale 2 puffs into the lungs every 6 (six) hours as needed for Wheezing. Rescue 18 g 5    albuterol (VENTOLIN HFA) 90 mcg/actuation inhaler Inhale 2 puffs into the lungs every 6 (six) hours as needed for Wheezing. Rescue 18 g 3    amoxicillin-clavulanate 875-125mg (AUGMENTIN) 875-125 mg per tablet Take 1 tablet by mouth every 12 (twelve) hours. 20 tablet 0    apixaban (ELIQUIS) 5 mg Tab Take 1 tablet (5 mg total) by mouth 2 (two) times daily. 180 tablet 3    aspirin (ECOTRIN) 81 MG EC tablet Take 1 tablet (81 mg total) by mouth once daily.      b complex vitamins tablet Take 1 tablet by mouth once daily.      blood pressure test kit-large Kit Use as directed 1 each 0    blood sugar diagnostic Strp Needs test strips to  ck glucose twice daily. Strips that are covered by insurance paln 200 strip 3    blood-glucose meter Misc One touch verio flex or tone touch verio reflect or freestyle freedom lite meter (all covered by insurance) 1 each 0    CALCIUM CITRATE/VITAMIN D3 (CALCIUM CITRATE + D ORAL) Take 2 tablets by mouth every morning.       cetirizine (ZYRTEC) 5 MG tablet Take 5 mg by mouth once daily.      cinnamon bark (CINNAMON ORAL) Take by mouth 2 (two) times daily.      clotrimazole-betamethasone 1-0.05% (LOTRISONE) cream Apply topically 2 (two) times daily. (Patient taking differently: Apply topically 2 (two) times daily. prn) 1 Tube 2    cyanocobalamin, vitamin B-12, 500 mcg Subl Place 1 tablet under the tongue once daily.      docusate sodium (COLACE) 100 MG capsule Take 100 mg by mouth once daily.       EYLEA 2 mg/0.05 mL Syrg       fish oil-omega-3 fatty acids 300-1,000 mg capsule Take by mouth 2 (two) times daily.      FLUAD QUAD 2021-22,65Y UP,,PF, 60 mcg (15 mcg x 4)/0.5 mL Syrg       fluticasone (FLONASE) 50 mcg/actuation nasal spray 2 sprays (100 mcg total) by Each Nare route once daily. (Patient taking differently: 2 sprays by Each Nostril route daily as needed.) 1 Bottle 0    lancets Misc Needs lancets for testing twice daily.  To go with meter covered by insurance. 200 each 3    levothyroxine (SYNTHROID) 112 MCG tablet Take 1 tablet (112 mcg total) by mouth before breakfast. 90 tablet 3    loratadine (CLARITIN) 10 mg tablet Take 1 tablet (10 mg total) by mouth once daily. 30 tablet 2    LORazepam (ATIVAN) 1 MG tablet Take 1 tablet (1 mg total) by mouth 2 (two) times daily as needed for Anxiety. 60 tablet 0    metFORMIN (GLUCOPHAGE) 1000 MG tablet TAKE 1 TABLET BY MOUTH TWICE DAILY WITH MEALS 180 tablet 1    metoprolol tartrate (LOPRESSOR) 25 MG tablet Take 1 tablet (25 mg total) by mouth once as needed. For palpitations 30 tablet 11    multivitamin capsule Take 1 capsule by mouth once daily.       omeprazole (PRILOSEC) 40 MG capsule Take 1 capsule by mouth in the morning 90 capsule 2    OZEMPIC 1 mg/dose (4 mg/3 mL) INJECT 1MG INTO THE SKIN EVERY 7 DAYS 3 pen 1    promethazine-dextromethorphan (PROMETHAZINE-DM) 6.25-15 mg/5 mL Syrp Take one tsp po q 6 hrs prn cough 180 mL 0    rosuvastatin (CRESTOR) 40 MG Tab Take 1 tablet by mouth once daily 90 tablet 3    RUTIN/HESP/BIOFLAV/C/HERB#196 (BIOFLEX ORAL) Take 2 tablets by mouth once daily.      senna (SENNA) 8.6 mg tablet Take 2 tablets by mouth nightly as needed for Constipation. 100 tablet 3    traZODone (DESYREL) 50 MG tablet TAKE 1 TABLET BY MOUTH AS NEEDED FOR INSOMNIA AT BEDTIME. OK TO TAKE 2ND TABLET IN 30 MINUTES IF STILL AWAKE 180 tablet 0    valsartan-hydrochlorothiazide (DIOVAN-HCT) 80-12.5 mg per tablet Take 1 tablet by mouth once daily. 90 tablet 3    albuterol-ipratropium 2.5mg-0.5mg/3mL (DUO-NEB) 0.5 mg-3 mg(2.5 mg base)/3 mL nebulizer solution Take 3 mLs by nebulization every 6 (six) hours as needed for Wheezing. Rescue (Patient taking differently: Take 3 mLs by nebulization every 6 (six) hours as needed for Wheezing. Rescue prn) 3 vial 3    diclofenac sodium (VOLTAREN) 1 % Gel Apply 2 g topically 3 (three) times daily. for 5 days (Patient taking differently: Apply 2 g topically 3 (three) times daily. prn) 100 g 0    levocetirizine (XYZAL) 5 MG tablet Take 1 tablet (5 mg total) by mouth every evening. 30 tablet 11     No current facility-administered medications for this visit.     Facility-Administered Medications Ordered in Other Visits   Medication Dose Route Frequency Provider Last Rate Last Admin    0.9%  NaCl infusion   Intravenous Continuous Khadijah Rivera NP   1,000 mL at 09/17/18 0939    ceFAZolin injection 2 g  2 g Intravenous On Call Procedure Khadijah Rivera NP           Past Medical History:   Diagnosis Date    Arthritis     Atrial fibrillation, unspecified     hx of a fib prior to stroke.    Chronic back  pain     Colon polyp     CVA (cerebral infarction) 14    Degenerative disc disease     cervical; lumbar    Diabetes mellitus type II     Encounter for loop recorder at end of battery life 2018    Hyperlipidemia     Hypertension     Hypothyroidism     Mild nonproliferative diabetic retinopathy 2018    Obesity     Sleep apnea     Stroke 2014    Venous insufficiency (chronic) (peripheral)      Past Surgical History:   Procedure Laterality Date    COLONOSCOPY N/A 2018    Procedure: COLONOSCOPY;  Surgeon: William Clement MD;  Location: [facility] ([address] [place] [address]);  Service: Endoscopy;  Laterality: N/A;  Tito/Dr Rad Johnston/OK to hold 2 days prior to colon/see telephone encounter dated 18/pt has loop recorder/svn    COLONOSCOPY W/ POLYPECTOMY      GASTRECTOMY      HERNIA REPAIR      REMOVAL OF IMPLANTABLE LOOP RECORDER N/A 2018    Procedure: REMOVAL, IMPLANTABLE LOOP RECORDER;  Surgeon: Thomas Randolph MD;  Location: [facility] CATH LAB;  Service: Cardiology;  Laterality: N/A;  TERESA, ILR removal (no reimplant), MDT, RN Sedate, SK, 3 Prep *Reveal LINQ*    TONSILLECTOMY      TUBAL LIGATION       Family History   Problem Relation Age of Onset    No Known Problems Mother     Heart disease Father     Diabetes Maternal Grandfather     Obesity Maternal Grandfather     No Known Problems Sister     No Known Problems Sister     No Known Problems Maternal Grandmother     Stroke Paternal Grandmother     No Known Problems Paternal Grandfather     Heart attack Neg Hx      Social History     Tobacco Use    Smoking status: Former Smoker     Packs/day: 1.00     Years: 30.00     Pack years: 30.00     Types: Cigarettes     Quit date: 2014     Years since quittin.0    Smokeless tobacco: Never Used   Substance Use Topics    Alcohol use: Yes     Alcohol/week: 0.0 standard drinks     Comment: rarely    Drug use: No     Comment: thc at young age        Review of Systems:  Review of  "Systems   Constitutional: Negative for chills and fever.   HENT: Negative for sore throat.    Eyes: Negative for redness.   Respiratory: Negative for shortness of breath.    Cardiovascular: Negative for chest pain.   Gastrointestinal: Positive for abdominal pain and constipation.   Endocrine: Negative for polyuria.   Genitourinary: Negative for dysuria.   Musculoskeletal: Negative for back pain.   Skin: Negative for wound.   Neurological: Negative for headaches.   Psychiatric/Behavioral: Negative for agitation.       OBJECTIVE:     Vital Signs (Most Recent)  Pulse: 80 (08/01/22 1330)  BP: (!) 147/77 (08/01/22 1330)  5' 5" (1.651 m)  89.4 kg (197 lb 1.5 oz)     Physical Exam:  Physical Exam  Vitals and nursing note reviewed.   Constitutional:       General: She is not in acute distress.     Appearance: She is well-developed.   HENT:      Head: Normocephalic and atraumatic.      Right Ear: External ear normal.      Left Ear: External ear normal.      Mouth/Throat:      Mouth: Mucous membranes are moist.   Eyes:      Conjunctiva/sclera: Conjunctivae normal.   Cardiovascular:      Rate and Rhythm: Normal rate.   Pulmonary:      Effort: Pulmonary effort is normal.   Abdominal:      Palpations: Abdomen is soft.      Tenderness: There is no abdominal tenderness.      Comments: Lower midline incision well healed. Multiple ventral hernias, reducible.    Musculoskeletal:      Cervical back: Normal range of motion.   Skin:     General: Skin is warm and dry.   Neurological:      Mental Status: She is alert.   Psychiatric:         Behavior: Behavior normal.         Laboratory  CBC relatively normal, mild anemia  Lipids wnl  A1c 7  CMP CKD 3    Diagnostic Results:  none    ASSESSMENT/PLAN:     67-year-old female with multiple medical co-morbidities including a fib on eliquis with previous CVA, diabetes (controlled), hypertension, previous tobacco use several years ago, previous ventral hernia repair, and CKD 3 with multiple " ventral hernias and discomfort. CeDAR score 21, likely due to previous repair. Acceptable risk for repair. Will need to be seen by cardiology for optimization and recommendations for holding eliquis prior to surgery. Will also need CT abdomen/pelvis for surgical planning purposes. Ideally would do this robotically but may have to do this open given previous repair.     PLAN:Plan     -CT abdomen/pelvis with PO contrast  -follow up cardiology for eliquis recommendation and optimization  -return to clinic to discuss surgical planning     Jhonathan Rice, PGY V  Surgery           I have personally taken the history and examined this patient and agree with the resident's note as stated above.         Burak Stacy MD

## 2022-08-05 ENCOUNTER — HOSPITAL ENCOUNTER (OUTPATIENT)
Dept: VASCULAR SURGERY | Facility: CLINIC | Age: 67
Discharge: HOME | End: 2022-08-05
Attending: SURGERY
Payer: MEDICARE

## 2022-08-05 ENCOUNTER — HOSPITAL ENCOUNTER (OUTPATIENT)
Dept: RADIOLOGY | Facility: HOSPITAL | Age: 67
Discharge: HOME | End: 2022-08-05
Attending: SURGERY
Payer: MEDICARE

## 2022-08-05 ENCOUNTER — OFFICE VISIT (OUTPATIENT)
Dept: VASCULAR SURGERY | Facility: CLINIC | Age: 67
End: 2022-08-05
Attending: SURGERY
Payer: MEDICARE

## 2022-08-05 VITALS
DIASTOLIC BLOOD PRESSURE: 68 MMHG | HEIGHT: 65 IN | BODY MASS INDEX: 32.65 KG/M2 | OXYGEN SATURATION: 98 % | SYSTOLIC BLOOD PRESSURE: 140 MMHG | WEIGHT: 196 LBS | HEART RATE: 67 BPM | TEMPERATURE: 98 F

## 2022-08-05 DIAGNOSIS — I65.23 BILATERAL CAROTID ARTERY STENOSIS: ICD-10-CM

## 2022-08-05 DIAGNOSIS — I65.21 ASYMPTOMATIC STENOSIS OF RIGHT CAROTID ARTERY: ICD-10-CM

## 2022-08-05 DIAGNOSIS — E11.59 HYPERTENSION ASSOCIATED WITH DIABETES: ICD-10-CM

## 2022-08-05 DIAGNOSIS — I15.2 HYPERTENSION ASSOCIATED WITH DIABETES: ICD-10-CM

## 2022-08-05 DIAGNOSIS — K43.9 VENTRAL HERNIA WITHOUT OBSTRUCTION OR GANGRENE: ICD-10-CM

## 2022-08-05 DIAGNOSIS — I65.21 ASYMPTOMATIC STENOSIS OF RIGHT CAROTID ARTERY: Primary | ICD-10-CM

## 2022-08-05 PROCEDURE — 3008F PR BODY MASS INDEX (BMI) DOCUMENTED: ICD-10-PCS | Mod: CPTII,S$GLB,, | Performed by: SURGERY

## 2022-08-05 PROCEDURE — 3077F PR MOST RECENT SYSTOLIC BLOOD PRESSURE >= 140 MM HG: ICD-10-PCS | Mod: CPTII,S$GLB,, | Performed by: SURGERY

## 2022-08-05 PROCEDURE — 3078F PR MOST RECENT DIASTOLIC BLOOD PRESSURE < 80 MM HG: ICD-10-PCS | Mod: CPTII,S$GLB,, | Performed by: SURGERY

## 2022-08-05 PROCEDURE — 3066F NEPHROPATHY DOC TX: CPT | Mod: CPTII,S$GLB,, | Performed by: SURGERY

## 2022-08-05 PROCEDURE — 3051F PR MOST RECENT HEMOGLOBIN A1C LEVEL 7.0 - < 8.0%: ICD-10-PCS | Mod: CPTII,S$GLB,, | Performed by: SURGERY

## 2022-08-05 PROCEDURE — 1101F PT FALLS ASSESS-DOCD LE1/YR: CPT | Mod: CPTII,S$GLB,, | Performed by: SURGERY

## 2022-08-05 PROCEDURE — 25500020 PHARM REV CODE 255: Performed by: SURGERY

## 2022-08-05 PROCEDURE — 1126F AMNT PAIN NOTED NONE PRSNT: CPT | Mod: CPTII,S$GLB,, | Performed by: SURGERY

## 2022-08-05 PROCEDURE — 3060F POS MICROALBUMINURIA REV: CPT | Mod: CPTII,S$GLB,, | Performed by: SURGERY

## 2022-08-05 PROCEDURE — 3060F PR POS MICROALBUMINURIA RESULT DOCUMENTED/REVIEW: ICD-10-PCS | Mod: CPTII,S$GLB,, | Performed by: SURGERY

## 2022-08-05 PROCEDURE — 1126F PR PAIN SEVERITY QUANTIFIED, NO PAIN PRESENT: ICD-10-PCS | Mod: CPTII,S$GLB,, | Performed by: SURGERY

## 2022-08-05 PROCEDURE — 99999 PR PBB SHADOW E&M-EST. PATIENT-LVL V: ICD-10-PCS | Mod: PBBFAC,,, | Performed by: SURGERY

## 2022-08-05 PROCEDURE — 93880 EXTRACRANIAL BILAT STUDY: CPT | Mod: S$GLB,,, | Performed by: SURGERY

## 2022-08-05 PROCEDURE — 3008F BODY MASS INDEX DOCD: CPT | Mod: CPTII,S$GLB,, | Performed by: SURGERY

## 2022-08-05 PROCEDURE — 99999 PR PBB SHADOW E&M-EST. PATIENT-LVL V: CPT | Mod: PBBFAC,,, | Performed by: SURGERY

## 2022-08-05 PROCEDURE — 3066F PR DOCUMENTATION OF TREATMENT FOR NEPHROPATHY: ICD-10-PCS | Mod: CPTII,S$GLB,, | Performed by: SURGERY

## 2022-08-05 PROCEDURE — 93880 PR DUPLEX SCAN EXTRACRANIAL,BILAT: ICD-10-PCS | Mod: S$GLB,,, | Performed by: SURGERY

## 2022-08-05 PROCEDURE — 3078F DIAST BP <80 MM HG: CPT | Mod: CPTII,S$GLB,, | Performed by: SURGERY

## 2022-08-05 PROCEDURE — 99213 OFFICE O/P EST LOW 20 MIN: CPT | Mod: S$GLB,,, | Performed by: SURGERY

## 2022-08-05 PROCEDURE — 1101F PR PT FALLS ASSESS DOC 0-1 FALLS W/OUT INJ PAST YR: ICD-10-PCS | Mod: CPTII,S$GLB,, | Performed by: SURGERY

## 2022-08-05 PROCEDURE — 3051F HG A1C>EQUAL 7.0%<8.0%: CPT | Mod: CPTII,S$GLB,, | Performed by: SURGERY

## 2022-08-05 PROCEDURE — 99213 PR OFFICE/OUTPT VISIT, EST, LEVL III, 20-29 MIN: ICD-10-PCS | Mod: S$GLB,,, | Performed by: SURGERY

## 2022-08-05 PROCEDURE — A9698 NON-RAD CONTRAST MATERIALNOC: HCPCS | Performed by: SURGERY

## 2022-08-05 PROCEDURE — 1159F PR MEDICATION LIST DOCUMENTED IN MEDICAL RECORD: ICD-10-PCS | Mod: CPTII,S$GLB,, | Performed by: SURGERY

## 2022-08-05 PROCEDURE — 3288F PR FALLS RISK ASSESSMENT DOCUMENTED: ICD-10-PCS | Mod: CPTII,S$GLB,, | Performed by: SURGERY

## 2022-08-05 PROCEDURE — 3077F SYST BP >= 140 MM HG: CPT | Mod: CPTII,S$GLB,, | Performed by: SURGERY

## 2022-08-05 PROCEDURE — 74176 CT ABD & PELVIS W/O CONTRAST: CPT | Mod: TC

## 2022-08-05 PROCEDURE — 1159F MED LIST DOCD IN RCRD: CPT | Mod: CPTII,S$GLB,, | Performed by: SURGERY

## 2022-08-05 PROCEDURE — 3288F FALL RISK ASSESSMENT DOCD: CPT | Mod: CPTII,S$GLB,, | Performed by: SURGERY

## 2022-08-05 RX ADMIN — BARIUM SULFATE 450 ML: 20 SUSPENSION ORAL at 05:08

## 2022-08-05 NOTE — PROGRESS NOTES
"VASCULAR SURGERY NOTE    Patient ID: Diana Montenegro is a 67 y.o. female.    I. HISTORY     Chief Complaint:  carotid stenosis    HPI: Diana Montenegro is a 67 y.o. female who is here today for established patient appointment. I last saw her in August 2021 and at that time I wrote the following:    "she has not had any stroke symptoms. Denies unilateral weakness/numbness, facial droop, aphasia, or one sided vision loss (amarosis). She walks into clinic today with a walker and denies any claudication symptoms. She says that her walking is most limited by pain in her back. She does try to walk regularly for exercise. Denies SOB or chest pain. She denies any other significant changes in her medical/surgical history since her last appointment. She continues to take her Eliquis for afib and takes ASA daily as well. She denies any blood in her stool or dark stool."    She has been doing well in the interim since her last visit and denies new neurologic symptoms including weakness, facial droop, aphasia, and amarosis.  She does pool exercises at home and uses a walker for support due to back pain, but denies claudication symptoms.  She reports she has an upcoming hernia repair for ventral hernia with Dr. Stacy.    Family History: Reviewed. No history of aneurysm. + diabetes, + heart disease    Past Medical History:   Diagnosis Date    Arthritis     Atrial fibrillation, unspecified     hx of a fib prior to stroke.    Chronic back pain     Colon polyp     CVA (cerebral infarction) 7/16/14    Degenerative disc disease     cervical; lumbar    Diabetes mellitus type II     Encounter for loop recorder at end of battery life 9/17/2018    Hyperlipidemia     Hypertension     Hypothyroidism     Mild nonproliferative diabetic retinopathy 08/12/2018    Obesity     Sleep apnea     Stroke july 2014    Venous insufficiency (chronic) (peripheral)         Past Surgical History:   Procedure Laterality Date "    COLONOSCOPY N/A 2018    Procedure: COLONOSCOPY;  Surgeon: William Clement MD;  Location: [facility] ENDO (4TH FLR);  Service: Endoscopy;  Laterality: N/A;  Tito/Dr Rad Johnston/OK to hold 2 days prior to colon/see telephone encounter dated 18/pt has loop recorder/svn    COLONOSCOPY W/ POLYPECTOMY      GASTRECTOMY      HERNIA REPAIR      REMOVAL OF IMPLANTABLE LOOP RECORDER N/A 2018    Procedure: REMOVAL, IMPLANTABLE LOOP RECORDER;  Surgeon: Thomas Randolph MD;  Location: [facility] CATH LAB;  Service: Cardiology;  Laterality: N/A;  TERESA, ILR removal (no reimplant), MDT, RN Sedate, SK, 3 Prep *Reveal LINQ*    TONSILLECTOMY      TUBAL LIGATION         Social History     Tobacco Use   Smoking Status Former Smoker    Packs/day: 1.00    Years: 30.00    Pack years: 30.00    Types: Cigarettes    Quit date: 2014    Years since quittin.0   Smokeless Tobacco Never Used        Review of Systems   Constitutional: Negative for weight loss.   HENT: Negative for ear pain and nosebleeds.    Eyes: Negative for discharge and pain.   Cardiovascular: Negative for chest pain and palpitations.   Respiratory: Positive for cough. Negative for shortness of breath and wheezing.    Endocrine: Negative for cold intolerance, heat intolerance and polyphagia.   Hematologic/Lymphatic: Negative for adenopathy. Does not bruise/bleed easily.   Skin: Negative for itching and rash.   Musculoskeletal: Negative for joint swelling and muscle cramps.   Gastrointestinal: Negative for abdominal pain, diarrhea, nausea and vomiting.   Genitourinary: Negative for dysuria and flank pain.   Neurological: Negative for numbness and seizures.             II. PHYSICAL EXAM     Physical Exam  Constitutional:       General: She is not in acute distress.     Appearance: Normal appearance. She is obese. She is not ill-appearing.   HENT:      Head: Normocephalic and atraumatic.      Nose: Nose normal.      Mouth/Throat:      Mouth: Mucous  membranes are moist.   Eyes:      Extraocular Movements: Extraocular movements intact.      Pupils: Pupils are equal, round, and reactive to light.   Cardiovascular:      Pulses: Normal pulses.           Radial pulses are 2+ on the right side and 2+ on the left side.   Abdominal:      Palpations: Abdomen is soft.   Musculoskeletal:         General: Normal range of motion.      Cervical back: Normal range of motion.      Comments: 5/5 flexion/extension of the UE and LE b/l  5/5  strength b/l   Neurological:      General: No focal deficit present.      Mental Status: She is alert and oriented to person, place, and time. Mental status is at baseline.      Motor: No weakness.   Psychiatric:         Mood and Affect: Mood normal.         Behavior: Behavior normal.         Thought Content: Thought content normal.                  III. ASSESSMENT & PLAN (MEDICAL DECISION MAKING)     1. Asymptomatic stenosis of right carotid artery    2. Hypertension associated with diabetes        Imaging Results: (I have personally reviewed all below images and provided my interpretation below)   Carotid Duplex 9/4/20: Images reviewed by me. Results reviewed with patient.  R L   50-75% stenosis ICA PSV: 203cm/s   EDV: 45cm/s 1-49% ICA PSV: 110cm/s  EDV: 35cm/s       Carotid Duplex 8/9/21:  R L   CCA: 60cm/s  ICA PSV: 210cm/s  ICA EDV: 52cm/s  Vert: antegrade normal waveform  ICA/CCA ratio: 3.5   Impression: 60-79% ICA stenosis CCA: 75cm/s  ICA PSV: 89cm/s  ICA EDV: 29cm/s  Vert: antegrade normal waveform  ICA/CCA ratio: 1.2  Impression: less than 50% ICA stenosis       Carotid Duplex 8/5/22:   R L   CCA: 65cm/s  ICA PSV: 211cm/s  ICA EDV: 58cm/s  Vert: antegrade normal waveform  ICA/CCA ratio: 3.2   Impression: 60-79% ICA stenosis CCA: 66cm/s  ICA PSV: 110cm/s  ICA EDV: 30cm/s  Vert: antegrade normal waveform  ICA/CCA ratio: 2.0  Impression: 40-59% ICA stenosis            Assessment/Diagnosis and Plan:  67 y.o. female with asymptomatic  moderate right carotid stenosis. Recommend continued best medical therapy for carotid stenosis with aspirin and rosuvastatin with blood pressure control for goal BP less than 140/90 and A1C goal <7.0. Instructed her that it is critical to continue ASA 81mg through her upcoming surgery.    -continue home ASA 81 mg  -continue home rosuvastatin  -Continue current home BP meds for goal BP less than 140/90  -f/u 1 yr for surveillance carotid duplex    JUNIE Vega II, MD, VI  Vascular Surgeon  [facility]

## 2022-08-06 ENCOUNTER — PATIENT MESSAGE (OUTPATIENT)
Dept: INTERNAL MEDICINE | Facility: CLINIC | Age: 67
End: 2022-08-06
Payer: MEDICARE

## 2022-08-08 ENCOUNTER — TELEPHONE (OUTPATIENT)
Dept: ELECTROPHYSIOLOGY | Facility: CLINIC | Age: 67
End: 2022-08-08
Payer: MEDICARE

## 2022-08-08 RX ORDER — GLIMEPIRIDE 2 MG/1
2 TABLET ORAL
Qty: 90 TABLET | Refills: 3 | Status: SHIPPED | OUTPATIENT
Start: 2022-08-08 | End: 2023-08-28 | Stop reason: SDUPTHER

## 2022-08-08 NOTE — TELEPHONE ENCOUNTER
Called pt to confirm appointment tomorrow with EDDI Rivera. I requested the pt to come earlier to perform an EKG. I also cancelled her appointment in September because she will be seen tomorrow instead. Pt verbally confirmed appointment date/time and thanked me for calling.

## 2022-08-08 NOTE — PROGRESS NOTES
Ms. Montenegro is a patient of Dr. Randolph and was last seen in clinic 8/19/2021.      Subjective:   Patient ID:  Diana Montenegro is a 67 y.o. female who presents for follow-up of Atrial Fibrillation  .     HPI:    Ms. Montenegro is a 67 y.o. female with hypertension, hyperlipidemia, diabetes mellitus, obesity s/p gastric sleeve surgery, right MCA infarction July of 2014 s/p ILR implant (removed 2018), atrial fibrillation, and bradycardia here for annual follow up.    Background:    7/14 had rt MCA stroke.   She was outside of the treatment window for thrombolytics and recovered completely.  Work-up included carotid dopplers showing 60% right ICA stenosis. ILR implanted.  ILR monitoring has revealed rare paroxysmal atrial fibrillation, with episodes up to just less than an hour in duration, asymptomatic. Eliquis 5 mg BID initiated.  She underwent successful ILR removal on 9/17/2018. Remains on eliquis.  Reported palpitations 4/2019. Holter monitor 4/22/2019 showed very rare ectopy and no arrhythmias. At clinic visit 11/2019 she was prescribed BB PRN for palps.    8/10/2020: Feeling well. No cardiac complaints. She has not needed to use PRN metoprolol. Doing well from a rhythm standpoint, with no documented or symptomatic AF episodes since last clinic visit. Has BB PRN for palps but has not needed it. CHADSVASc 3 and remains on eliquis for CVA prophylaxis.    8/19/2021: She is doing well from a rhythm standpoint, with no documented or symptomatic AF episodes. Has BB PRN for palps but has not needed it. CHADSVASc 3 and remains on eliquis for CVA prophylaxis. Encouraged nightly use of CPAP.     Update (08/09/2022):    Follows with vascular surgery re: moderate carotid stenosis.  Likely proceed with ventral hernia repair in the near future.    Today she reports fast heart beating lasting 20 minutes a few months ago. Otherwise no new cardiac complaints. No CP, worsening KING, LH, syncope.    She is currently taking eliquis  5mg BID for stroke prophylaxis and denies significant bleeding episodes. She is currently being treated with lopressor 25mg PRN for HR control. Kidney function is stable, with a creatinine of 1.1 on 4/13/2022.    I have personally reviewed the patient's EKG today, which shows sinus rhythm at 70bpm. KS interval is 166. QRS is 86. QTc is 419.    Relevant Cardiac Test Results:    2D Echo (7/17/2014):    1 - Normal left ventricular systolic function (EF 60-65%).     2 - Normal left ventricular diastolic function.     3 - Normal right ventricular systolic function .     4 - Mild left atrial enlargement.     Current Outpatient Medications   Medication Sig    albuterol (PROVENTIL/VENTOLIN HFA) 90 mcg/actuation inhaler Inhale 2 puffs into the lungs every 6 (six) hours as needed for Wheezing. Rescue    albuterol (VENTOLIN HFA) 90 mcg/actuation inhaler Inhale 2 puffs into the lungs every 6 (six) hours as needed for Wheezing. Rescue    apixaban (ELIQUIS) 5 mg Tab Take 1 tablet (5 mg total) by mouth 2 (two) times daily.    aspirin (ECOTRIN) 81 MG EC tablet Take 1 tablet (81 mg total) by mouth once daily.    b complex vitamins tablet Take 1 tablet by mouth once daily.    blood pressure test kit-large Kit Use as directed    blood sugar diagnostic Strp Needs test strips to ck glucose twice daily. Strips that are covered by insurance paln    blood-glucose meter Misc One touch verio flex or tone touch verio reflect or freestyle freedom lite meter (all covered by insurance)    CALCIUM CITRATE/VITAMIN D3 (CALCIUM CITRATE + D ORAL) Take 1 tablet by mouth every morning.    cetirizine (ZYRTEC) 5 MG tablet Take 5 mg by mouth once daily.    cinnamon bark (CINNAMON ORAL) Take by mouth 2 (two) times daily.    clotrimazole-betamethasone 1-0.05% (LOTRISONE) cream Apply topically 2 (two) times daily. (Patient taking differently: Apply topically 2 (two) times daily. prn)    cyanocobalamin, vitamin B-12, 500 mcg Subl Place 1 tablet  under the tongue once daily.    docusate sodium (COLACE) 100 MG capsule Take 100 mg by mouth once daily.     EYLEA 2 mg/0.05 mL Syrg     fish oil-omega-3 fatty acids 300-1,000 mg capsule Take 1 capsule by mouth 2 (two) times daily. Take 1 cap 2 times daily every other day    FLUAD QUAD 2021-22,65Y UP,,PF, 60 mcg (15 mcg x 4)/0.5 mL Syrg     fluticasone (FLONASE) 50 mcg/actuation nasal spray 2 sprays (100 mcg total) by Each Nare route once daily. (Patient taking differently: 2 sprays by Each Nostril route daily as needed.)    glimepiride (AMARYL) 2 MG tablet Take 1 tablet (2 mg total) by mouth before breakfast. For diabetes control.    lancets Misc Needs lancets for testing twice daily.  To go with meter covered by insurance.    levothyroxine (SYNTHROID) 112 MCG tablet Take 1 tablet (112 mcg total) by mouth before breakfast.    loratadine (CLARITIN) 10 mg tablet Take 1 tablet (10 mg total) by mouth once daily.    LORazepam (ATIVAN) 1 MG tablet Take 1 tablet (1 mg total) by mouth 2 (two) times daily as needed for Anxiety.    metFORMIN (GLUCOPHAGE) 1000 MG tablet TAKE 1 TABLET BY MOUTH TWICE DAILY WITH MEALS    metoprolol tartrate (LOPRESSOR) 25 MG tablet Take 1 tablet (25 mg total) by mouth once as needed. For palpitations    multivitamin capsule Take 1 capsule by mouth once daily.    omeprazole (PRILOSEC) 40 MG capsule Take 1 capsule by mouth in the morning    rosuvastatin (CRESTOR) 40 MG Tab Take 1 tablet by mouth once daily    RUTIN/HESP/BIOFLAV/C/HERB#196 (BIOFLEX ORAL) Take 2 tablets by mouth once daily.    senna (SENNA) 8.6 mg tablet Take 2 tablets by mouth nightly as needed for Constipation.    traZODone (DESYREL) 50 MG tablet TAKE 1 TABLET BY MOUTH AS NEEDED FOR INSOMNIA AT BEDTIME. OK TO TAKE 2ND TABLET IN 30 MINUTES IF STILL AWAKE    valsartan-hydrochlorothiazide (DIOVAN-HCT) 80-12.5 mg per tablet Take 1 tablet by mouth once daily.    acyclovir 5% (ZOVIRAX) 5 % ointment Apply topically 6  "(six) times daily. (Patient not taking: No sig reported)    albuterol-ipratropium 2.5mg-0.5mg/3mL (DUO-NEB) 0.5 mg-3 mg(2.5 mg base)/3 mL nebulizer solution Take 3 mLs by nebulization every 6 (six) hours as needed for Wheezing. Rescue (Patient taking differently: Take 3 mLs by nebulization every 6 (six) hours as needed for Wheezing. Rescue prn)    amoxicillin-clavulanate 875-125mg (AUGMENTIN) 875-125 mg per tablet Take 1 tablet by mouth every 12 (twelve) hours. (Patient not taking: No sig reported)    diclofenac sodium (VOLTAREN) 1 % Gel Apply 2 g topically 3 (three) times daily. for 5 days (Patient taking differently: Apply 2 g topically 3 (three) times daily. prn)    levocetirizine (XYZAL) 5 MG tablet Take 1 tablet (5 mg total) by mouth every evening. (Patient not taking: Reported on 8/5/2022)    promethazine-dextromethorphan (PROMETHAZINE-DM) 6.25-15 mg/5 mL Syrp Take one tsp po q 6 hrs prn cough (Patient not taking: No sig reported)     No current facility-administered medications for this visit.     Facility-Administered Medications Ordered in Other Visits   Medication    0.9%  NaCl infusion    ceFAZolin injection 2 g       Review of Systems   Constitutional: Negative for malaise/fatigue.   Cardiovascular: Positive for palpitations (infrequent). Negative for chest pain, dyspnea on exertion, irregular heartbeat and leg swelling.   Respiratory: Negative for shortness of breath.    Hematologic/Lymphatic: Negative for bleeding problem.   Skin: Negative for rash.   Musculoskeletal: Negative for myalgias.   Gastrointestinal: Negative for hematemesis, hematochezia and nausea.   Genitourinary: Negative for hematuria.   Neurological: Negative for light-headedness.   Psychiatric/Behavioral: Negative for altered mental status.   Allergic/Immunologic: Negative for persistent infections.     Objective:        /70   Pulse 70   Ht 5' 5" (1.651 m)   Wt 91.8 kg (202 lb 6.1 oz)   LMP  (LMP Unknown)   BMI 33.68 " kg/m²     Physical Exam  Vitals and nursing note reviewed.   Constitutional:       Appearance: Normal appearance. She is well-developed.   HENT:      Head: Normocephalic.      Nose: Nose normal.   Eyes:      Pupils: Pupils are equal, round, and reactive to light.   Cardiovascular:      Rate and Rhythm: Normal rate and regular rhythm.   Pulmonary:      Effort: No respiratory distress.      Breath sounds: Normal breath sounds.   Musculoskeletal:         General: Normal range of motion.   Skin:     General: Skin is warm and dry.      Findings: No erythema.   Neurological:      Mental Status: She is alert and oriented to person, place, and time.   Psychiatric:         Speech: Speech normal.         Behavior: Behavior normal.       Lab Results   Component Value Date     04/13/2022    K 4.9 04/13/2022    MG 1.6 07/10/2018    BUN 38 (H) 04/13/2022    CREATININE 1.1 04/13/2022    ALT 32 04/13/2022    AST 29 04/13/2022    HGB 9.6 (L) 04/13/2022    HCT 32.7 (L) 04/13/2022    TSH 1.327 08/10/2021    LDLCALC 75.4 04/13/2022           Assessment:     1. Paroxysmal atrial fibrillation    2. Hypertension associated with diabetes    3. Current use of long term anticoagulation    4. Hx of TIA (transient ischemic attack) and stroke    5. Severe obesity      Plan:     In summary, Ms. Montenegro is a 67 y.o. female with hypertension, hyperlipidemia, diabetes mellitus, obesity s/p gastric sleeve surgery, right MCA infarction July of 2014 s/p ILR implant (now removed), atrial fibrillation, and bradycardia here for follow up.  Stable from a rhythm standpoint, maintaining sinus rhythm. Brief palps. PRN metoprolol. Not on AAD. CHADSVASc 6 on eliquis. She is likely going to proceed with hernia repair surgery in the near future. Update echo. For clearance to hold eliquis, message EP clinic with surgical details. Otherwise, no contraindication to proceeding from arrhythmia standpoint.    Update TTE  Can message EP re: OAC request when type  of surgery is finalized.  Continue current meds  RTC 1 yr, sooner if needed    *A copy of this note has been sent to Dr. Randolph*    Follow up in about 1 year (around 8/9/2023).    ------------------------------------------------------------------    CHELITA Fuentes, NP-C  Cardiac Electrophysiology

## 2022-08-09 ENCOUNTER — OFFICE VISIT (OUTPATIENT)
Dept: ELECTROPHYSIOLOGY | Facility: CLINIC | Age: 67
End: 2022-08-09
Payer: MEDICARE

## 2022-08-09 VITALS
HEART RATE: 70 BPM | HEIGHT: 65 IN | DIASTOLIC BLOOD PRESSURE: 70 MMHG | BODY MASS INDEX: 33.72 KG/M2 | SYSTOLIC BLOOD PRESSURE: 120 MMHG | WEIGHT: 202.38 LBS

## 2022-08-09 DIAGNOSIS — I49.8 OTHER SPECIFIED CARDIAC ARRHYTHMIAS: ICD-10-CM

## 2022-08-09 DIAGNOSIS — E11.59 HYPERTENSION ASSOCIATED WITH DIABETES: ICD-10-CM

## 2022-08-09 DIAGNOSIS — E66.01 SEVERE OBESITY: ICD-10-CM

## 2022-08-09 DIAGNOSIS — Z86.73 HX OF TIA (TRANSIENT ISCHEMIC ATTACK) AND STROKE: ICD-10-CM

## 2022-08-09 DIAGNOSIS — Z79.01 CURRENT USE OF LONG TERM ANTICOAGULATION: ICD-10-CM

## 2022-08-09 DIAGNOSIS — I48.0 PAROXYSMAL ATRIAL FIBRILLATION: Primary | Chronic | ICD-10-CM

## 2022-08-09 DIAGNOSIS — I15.2 HYPERTENSION ASSOCIATED WITH DIABETES: ICD-10-CM

## 2022-08-09 PROCEDURE — 93005 ELECTROCARDIOGRAM TRACING: CPT | Mod: S$GLB,,, | Performed by: INTERNAL MEDICINE

## 2022-08-09 PROCEDURE — 3074F SYST BP LT 130 MM HG: CPT | Mod: CPTII,S$GLB,, | Performed by: NURSE PRACTITIONER

## 2022-08-09 PROCEDURE — 3008F BODY MASS INDEX DOCD: CPT | Mod: CPTII,S$GLB,, | Performed by: NURSE PRACTITIONER

## 2022-08-09 PROCEDURE — 1101F PT FALLS ASSESS-DOCD LE1/YR: CPT | Mod: CPTII,S$GLB,, | Performed by: NURSE PRACTITIONER

## 2022-08-09 PROCEDURE — 1160F PR REVIEW ALL MEDS BY PRESCRIBER/CLIN PHARMACIST DOCUMENTED: ICD-10-PCS | Mod: CPTII,S$GLB,, | Performed by: NURSE PRACTITIONER

## 2022-08-09 PROCEDURE — 99999 PR PBB SHADOW E&M-EST. PATIENT-LVL IV: CPT | Mod: PBBFAC,,, | Performed by: NURSE PRACTITIONER

## 2022-08-09 PROCEDURE — 3060F PR POS MICROALBUMINURIA RESULT DOCUMENTED/REVIEW: ICD-10-PCS | Mod: CPTII,S$GLB,, | Performed by: NURSE PRACTITIONER

## 2022-08-09 PROCEDURE — 1159F MED LIST DOCD IN RCRD: CPT | Mod: CPTII,S$GLB,, | Performed by: NURSE PRACTITIONER

## 2022-08-09 PROCEDURE — 93010 RHYTHM STRIP: ICD-10-PCS | Mod: S$GLB,,, | Performed by: INTERNAL MEDICINE

## 2022-08-09 PROCEDURE — 1126F PR PAIN SEVERITY QUANTIFIED, NO PAIN PRESENT: ICD-10-PCS | Mod: CPTII,S$GLB,, | Performed by: NURSE PRACTITIONER

## 2022-08-09 PROCEDURE — 99214 OFFICE O/P EST MOD 30 MIN: CPT | Mod: S$GLB,,, | Performed by: NURSE PRACTITIONER

## 2022-08-09 PROCEDURE — 3288F FALL RISK ASSESSMENT DOCD: CPT | Mod: CPTII,S$GLB,, | Performed by: NURSE PRACTITIONER

## 2022-08-09 PROCEDURE — 1126F AMNT PAIN NOTED NONE PRSNT: CPT | Mod: CPTII,S$GLB,, | Performed by: NURSE PRACTITIONER

## 2022-08-09 PROCEDURE — 93010 ELECTROCARDIOGRAM REPORT: CPT | Mod: S$GLB,,, | Performed by: INTERNAL MEDICINE

## 2022-08-09 PROCEDURE — 3051F HG A1C>EQUAL 7.0%<8.0%: CPT | Mod: CPTII,S$GLB,, | Performed by: NURSE PRACTITIONER

## 2022-08-09 PROCEDURE — 3060F POS MICROALBUMINURIA REV: CPT | Mod: CPTII,S$GLB,, | Performed by: NURSE PRACTITIONER

## 2022-08-09 PROCEDURE — 3066F NEPHROPATHY DOC TX: CPT | Mod: CPTII,S$GLB,, | Performed by: NURSE PRACTITIONER

## 2022-08-09 PROCEDURE — 93005 RHYTHM STRIP: ICD-10-PCS | Mod: S$GLB,,, | Performed by: INTERNAL MEDICINE

## 2022-08-09 PROCEDURE — 1160F RVW MEDS BY RX/DR IN RCRD: CPT | Mod: CPTII,S$GLB,, | Performed by: NURSE PRACTITIONER

## 2022-08-09 PROCEDURE — 99214 PR OFFICE/OUTPT VISIT, EST, LEVL IV, 30-39 MIN: ICD-10-PCS | Mod: S$GLB,,, | Performed by: NURSE PRACTITIONER

## 2022-08-09 PROCEDURE — 3078F PR MOST RECENT DIASTOLIC BLOOD PRESSURE < 80 MM HG: ICD-10-PCS | Mod: CPTII,S$GLB,, | Performed by: NURSE PRACTITIONER

## 2022-08-09 PROCEDURE — 3051F PR MOST RECENT HEMOGLOBIN A1C LEVEL 7.0 - < 8.0%: ICD-10-PCS | Mod: CPTII,S$GLB,, | Performed by: NURSE PRACTITIONER

## 2022-08-09 PROCEDURE — 1101F PR PT FALLS ASSESS DOC 0-1 FALLS W/OUT INJ PAST YR: ICD-10-PCS | Mod: CPTII,S$GLB,, | Performed by: NURSE PRACTITIONER

## 2022-08-09 PROCEDURE — 3066F PR DOCUMENTATION OF TREATMENT FOR NEPHROPATHY: ICD-10-PCS | Mod: CPTII,S$GLB,, | Performed by: NURSE PRACTITIONER

## 2022-08-09 PROCEDURE — 99999 PR PBB SHADOW E&M-EST. PATIENT-LVL IV: ICD-10-PCS | Mod: PBBFAC,,, | Performed by: NURSE PRACTITIONER

## 2022-08-09 PROCEDURE — 1159F PR MEDICATION LIST DOCUMENTED IN MEDICAL RECORD: ICD-10-PCS | Mod: CPTII,S$GLB,, | Performed by: NURSE PRACTITIONER

## 2022-08-09 PROCEDURE — 3288F PR FALLS RISK ASSESSMENT DOCUMENTED: ICD-10-PCS | Mod: CPTII,S$GLB,, | Performed by: NURSE PRACTITIONER

## 2022-08-09 PROCEDURE — 3008F PR BODY MASS INDEX (BMI) DOCUMENTED: ICD-10-PCS | Mod: CPTII,S$GLB,, | Performed by: NURSE PRACTITIONER

## 2022-08-09 PROCEDURE — 3074F PR MOST RECENT SYSTOLIC BLOOD PRESSURE < 130 MM HG: ICD-10-PCS | Mod: CPTII,S$GLB,, | Performed by: NURSE PRACTITIONER

## 2022-08-09 PROCEDURE — 3078F DIAST BP <80 MM HG: CPT | Mod: CPTII,S$GLB,, | Performed by: NURSE PRACTITIONER

## 2022-08-09 RX ORDER — METOPROLOL TARTRATE 25 MG/1
25 TABLET, FILM COATED ORAL ONCE AS NEEDED
Qty: 30 TABLET | Refills: 11 | Status: SHIPPED | OUTPATIENT
Start: 2022-08-09 | End: 2023-09-26

## 2022-08-16 ENCOUNTER — HOSPITAL ENCOUNTER (OUTPATIENT)
Dept: CARDIOLOGY | Facility: HOSPITAL | Age: 67
Discharge: HOME | End: 2022-08-16
Attending: NURSE PRACTITIONER
Payer: MEDICARE

## 2022-08-16 VITALS
BODY MASS INDEX: 33.66 KG/M2 | SYSTOLIC BLOOD PRESSURE: 120 MMHG | DIASTOLIC BLOOD PRESSURE: 70 MMHG | HEIGHT: 65 IN | WEIGHT: 202 LBS | HEART RATE: 71 BPM

## 2022-08-16 DIAGNOSIS — I48.0 PAROXYSMAL ATRIAL FIBRILLATION: ICD-10-CM

## 2022-08-16 LAB
ASCENDING AORTA: 3.22 CM
AV INDEX (PROSTH): 0.6
AV MEAN GRADIENT: 3 MMHG
AV PEAK GRADIENT: 4 MMHG
AV VALVE AREA: 2.21 CM2
AV VELOCITY RATIO: 0.58
BSA FOR ECHO PROCEDURE: 2.05 M2
CV ECHO LV RWT: 0.35 CM
DOP CALC AO PEAK VEL: 1.01 M/S
DOP CALC AO VTI: 21.66 CM
DOP CALC LVOT AREA: 3.7 CM2
DOP CALC LVOT DIAMETER: 2.17 CM
DOP CALC LVOT PEAK VEL: 0.59 M/S
DOP CALC LVOT STROKE VOLUME: 47.83 CM3
DOP CALCLVOT PEAK VEL VTI: 12.94 CM
E WAVE DECELERATION TIME: 223.57 MSEC
E/A RATIO: 0.57
E/E' RATIO: 5.88 M/S
ECHO LV POSTERIOR WALL: 0.86 CM (ref 0.6–1.1)
EJECTION FRACTION: 60 %
FRACTIONAL SHORTENING: 32 % (ref 28–44)
INTERVENTRICULAR SEPTUM: 0.95 CM (ref 0.6–1.1)
IVRT: 111.32 MSEC
LA MAJOR: 4.96 CM
LA MINOR: 3.74 CM
LA WIDTH: 3.9 CM
LEFT ATRIUM SIZE: 4.4 CM
LEFT ATRIUM VOLUME INDEX MOD: 28.1 ML/M2
LEFT ATRIUM VOLUME INDEX: 31.3 ML/M2
LEFT ATRIUM VOLUME MOD: 55.91 CM3
LEFT ATRIUM VOLUME: 62.2 CM3
LEFT INTERNAL DIMENSION IN SYSTOLE: 3.32 CM (ref 2.1–4)
LEFT VENTRICLE DIASTOLIC VOLUME INDEX: 56.57 ML/M2
LEFT VENTRICLE DIASTOLIC VOLUME: 112.58 ML
LEFT VENTRICLE MASS INDEX: 77 G/M2
LEFT VENTRICLE SYSTOLIC VOLUME INDEX: 22.5 ML/M2
LEFT VENTRICLE SYSTOLIC VOLUME: 44.73 ML
LEFT VENTRICULAR INTERNAL DIMENSION IN DIASTOLE: 4.9 CM (ref 3.5–6)
LEFT VENTRICULAR MASS: 154.07 G
LV LATERAL E/E' RATIO: 5 M/S
LV SEPTAL E/E' RATIO: 7.14 M/S
MV A" WAVE DURATION": 11.99 MSEC
MV PEAK A VEL: 0.88 M/S
MV PEAK E VEL: 0.5 M/S
MV STENOSIS PRESSURE HALF TIME: 64.84 MS
MV VALVE AREA P 1/2 METHOD: 3.39 CM2
PISA TR MAX VEL: 2.14 M/S
PULM VEIN S/D RATIO: 1.6
PV PEAK D VEL: 0.35 M/S
PV PEAK S VEL: 0.56 M/S
RA MAJOR: 4.29 CM
RA PRESSURE: 3 MMHG
RA WIDTH: 3.56 CM
RIGHT VENTRICULAR END-DIASTOLIC DIMENSION: 3.57 CM
RV TISSUE DOPPLER FREE WALL SYSTOLIC VELOCITY 1 (APICAL 4 CHAMBER VIEW): 11.17 CM/S
SINUS: 3.2 CM
STJ: 2.8 CM
TDI LATERAL: 0.1 M/S
TDI SEPTAL: 0.07 M/S
TDI: 0.09 M/S
TR MAX PG: 18 MMHG
TRICUSPID ANNULAR PLANE SYSTOLIC EXCURSION: 1.7 CM
TV REST PULMONARY ARTERY PRESSURE: 21 MMHG

## 2022-08-16 PROCEDURE — 93306 TTE W/DOPPLER COMPLETE: CPT

## 2022-08-16 PROCEDURE — 93306 TTE W/DOPPLER COMPLETE: CPT | Mod: 26,,, | Performed by: INTERNAL MEDICINE

## 2022-08-16 PROCEDURE — 93306 ECHO (CUPID ONLY): ICD-10-PCS | Mod: 26,,, | Performed by: INTERNAL MEDICINE

## 2022-08-26 ENCOUNTER — OFFICE VISIT (OUTPATIENT)
Dept: SURGERY | Facility: CLINIC | Age: 67
End: 2022-08-26
Payer: MEDICARE

## 2022-08-26 ENCOUNTER — TELEPHONE (OUTPATIENT)
Dept: INTERNAL MEDICINE | Facility: CLINIC | Age: 67
End: 2022-08-26
Payer: MEDICARE

## 2022-08-26 ENCOUNTER — TELEPHONE (OUTPATIENT)
Dept: SURGERY | Facility: CLINIC | Age: 67
End: 2022-08-26

## 2022-08-26 VITALS
DIASTOLIC BLOOD PRESSURE: 65 MMHG | HEIGHT: 65 IN | WEIGHT: 200.19 LBS | SYSTOLIC BLOOD PRESSURE: 142 MMHG | BODY MASS INDEX: 33.35 KG/M2 | HEART RATE: 69 BPM

## 2022-08-26 DIAGNOSIS — E66.9 CLASS 1 OBESITY WITH SERIOUS COMORBIDITY AND BODY MASS INDEX (BMI) OF 33.0 TO 33.9 IN ADULT, UNSPECIFIED OBESITY TYPE: ICD-10-CM

## 2022-08-26 DIAGNOSIS — K43.9 VENTRAL HERNIA WITHOUT OBSTRUCTION OR GANGRENE: Primary | ICD-10-CM

## 2022-08-26 DIAGNOSIS — Z79.01 CURRENT USE OF LONG TERM ANTICOAGULATION: ICD-10-CM

## 2022-08-26 DIAGNOSIS — I48.0 PAROXYSMAL ATRIAL FIBRILLATION: Chronic | ICD-10-CM

## 2022-08-26 PROCEDURE — 1160F PR REVIEW ALL MEDS BY PRESCRIBER/CLIN PHARMACIST DOCUMENTED: ICD-10-PCS | Mod: CPTII,S$GLB,, | Performed by: SURGERY

## 2022-08-26 PROCEDURE — 1126F AMNT PAIN NOTED NONE PRSNT: CPT | Mod: CPTII,S$GLB,, | Performed by: SURGERY

## 2022-08-26 PROCEDURE — 3077F PR MOST RECENT SYSTOLIC BLOOD PRESSURE >= 140 MM HG: ICD-10-PCS | Mod: CPTII,S$GLB,, | Performed by: SURGERY

## 2022-08-26 PROCEDURE — 3288F FALL RISK ASSESSMENT DOCD: CPT | Mod: CPTII,S$GLB,, | Performed by: SURGERY

## 2022-08-26 PROCEDURE — 1159F PR MEDICATION LIST DOCUMENTED IN MEDICAL RECORD: ICD-10-PCS | Mod: CPTII,S$GLB,, | Performed by: SURGERY

## 2022-08-26 PROCEDURE — 99999 PR PBB SHADOW E&M-EST. PATIENT-LVL IV: CPT | Mod: PBBFAC,,, | Performed by: SURGERY

## 2022-08-26 PROCEDURE — 3288F PR FALLS RISK ASSESSMENT DOCUMENTED: ICD-10-PCS | Mod: CPTII,S$GLB,, | Performed by: SURGERY

## 2022-08-26 PROCEDURE — 99999 PR PBB SHADOW E&M-EST. PATIENT-LVL IV: ICD-10-PCS | Mod: PBBFAC,,, | Performed by: SURGERY

## 2022-08-26 PROCEDURE — 3066F PR DOCUMENTATION OF TREATMENT FOR NEPHROPATHY: ICD-10-PCS | Mod: CPTII,S$GLB,, | Performed by: SURGERY

## 2022-08-26 PROCEDURE — 1101F PT FALLS ASSESS-DOCD LE1/YR: CPT | Mod: CPTII,S$GLB,, | Performed by: SURGERY

## 2022-08-26 PROCEDURE — 1159F MED LIST DOCD IN RCRD: CPT | Mod: CPTII,S$GLB,, | Performed by: SURGERY

## 2022-08-26 PROCEDURE — 3060F POS MICROALBUMINURIA REV: CPT | Mod: CPTII,S$GLB,, | Performed by: SURGERY

## 2022-08-26 PROCEDURE — 1101F PR PT FALLS ASSESS DOC 0-1 FALLS W/OUT INJ PAST YR: ICD-10-PCS | Mod: CPTII,S$GLB,, | Performed by: SURGERY

## 2022-08-26 PROCEDURE — 1126F PR PAIN SEVERITY QUANTIFIED, NO PAIN PRESENT: ICD-10-PCS | Mod: CPTII,S$GLB,, | Performed by: SURGERY

## 2022-08-26 PROCEDURE — 1160F RVW MEDS BY RX/DR IN RCRD: CPT | Mod: CPTII,S$GLB,, | Performed by: SURGERY

## 2022-08-26 PROCEDURE — 3078F DIAST BP <80 MM HG: CPT | Mod: CPTII,S$GLB,, | Performed by: SURGERY

## 2022-08-26 PROCEDURE — 3077F SYST BP >= 140 MM HG: CPT | Mod: CPTII,S$GLB,, | Performed by: SURGERY

## 2022-08-26 PROCEDURE — 3078F PR MOST RECENT DIASTOLIC BLOOD PRESSURE < 80 MM HG: ICD-10-PCS | Mod: CPTII,S$GLB,, | Performed by: SURGERY

## 2022-08-26 PROCEDURE — 3008F BODY MASS INDEX DOCD: CPT | Mod: CPTII,S$GLB,, | Performed by: SURGERY

## 2022-08-26 PROCEDURE — 3008F PR BODY MASS INDEX (BMI) DOCUMENTED: ICD-10-PCS | Mod: CPTII,S$GLB,, | Performed by: SURGERY

## 2022-08-26 PROCEDURE — 3066F NEPHROPATHY DOC TX: CPT | Mod: CPTII,S$GLB,, | Performed by: SURGERY

## 2022-08-26 PROCEDURE — 3060F PR POS MICROALBUMINURIA RESULT DOCUMENTED/REVIEW: ICD-10-PCS | Mod: CPTII,S$GLB,, | Performed by: SURGERY

## 2022-08-26 PROCEDURE — 3051F HG A1C>EQUAL 7.0%<8.0%: CPT | Mod: CPTII,S$GLB,, | Performed by: SURGERY

## 2022-08-26 PROCEDURE — 3051F PR MOST RECENT HEMOGLOBIN A1C LEVEL 7.0 - < 8.0%: ICD-10-PCS | Mod: CPTII,S$GLB,, | Performed by: SURGERY

## 2022-08-26 PROCEDURE — 99214 OFFICE O/P EST MOD 30 MIN: CPT | Mod: S$GLB,,, | Performed by: SURGERY

## 2022-08-26 PROCEDURE — 99214 PR OFFICE/OUTPT VISIT, EST, LEVL IV, 30-39 MIN: ICD-10-PCS | Mod: S$GLB,,, | Performed by: SURGERY

## 2022-08-26 RX ORDER — CEFAZOLIN SODIUM 2 G/50ML
2 SOLUTION INTRAVENOUS
Status: CANCELLED | OUTPATIENT
Start: 2022-08-26

## 2022-08-26 NOTE — TELEPHONE ENCOUNTER
I spoke to pt, she had a missed call from the clinic but the person did not leave a message.  Not sure what the call was regarding.

## 2022-08-26 NOTE — TELEPHONE ENCOUNTER
----- Message from Katerina Burnett sent at 8/26/2022 12:59 PM CDT -----  Contact: [phone]  Pt returning missed call. Please Advise

## 2022-08-26 NOTE — PROGRESS NOTES
History & Physical    SUBJECTIVE:     History of Present Illness:  Patient is a 67 y.o. female with multiple medical co-morbidities including hypertension, diabetes, atrial fibrillation on eliquis, CVA, previous low midline ventral hernia repair 1993, and laparoscopic sleeve gastrectomy 2015 who now presents with general abdominal discomfort over known ventral hernias. Patient states since her hernia repair in 1993, she had early recurrence and over the years noticed additional ventral hernias, some superiorly over her abdomen, some in the midline, and some off midline. They hadn't bothered her until recently whereby she experiences discomfort and constipation. Denies fevers, chills, chest pain, or any difficulty breathing.     Uses walker due to pain in knees and back spasm.     Interval 8/26/2022: No changes.  Presents after CT.  Still needs cards clearance.  Had arrhythmia clearance.      Review of patient's allergies indicates:   Allergen Reactions    Medrol [methylprednisolone] Palpitations       Current Outpatient Medications   Medication Sig Dispense Refill    acyclovir 5% (ZOVIRAX) 5 % ointment Apply topically 6 (six) times daily. 5 g 1    albuterol (VENTOLIN HFA) 90 mcg/actuation inhaler Inhale 2 puffs into the lungs every 6 (six) hours as needed for Wheezing. Rescue 18 g 3    apixaban (ELIQUIS) 5 mg Tab Take 1 tablet (5 mg total) by mouth 2 (two) times daily. 180 tablet 3    aspirin (ECOTRIN) 81 MG EC tablet Take 1 tablet (81 mg total) by mouth once daily.      b complex vitamins tablet Take 1 tablet by mouth once daily.      blood pressure test kit-large Kit Use as directed 1 each 0    blood sugar diagnostic Strp Needs test strips to ck glucose twice daily. Strips that are covered by insurance paln 200 strip 3    blood-glucose meter Misc One touch verio flex or tone touch verio reflect or freestyle freedom lite meter (all covered by insurance) 1 each 0    CALCIUM CITRATE/VITAMIN D3 (CALCIUM  CITRATE + D ORAL) Take 1 tablet by mouth every morning.      cetirizine (ZYRTEC) 5 MG tablet Take 5 mg by mouth once daily.      cinnamon bark (CINNAMON ORAL) Take by mouth 2 (two) times daily.      clotrimazole-betamethasone 1-0.05% (LOTRISONE) cream Apply topically 2 (two) times daily. (Patient taking differently: Apply topically 2 (two) times daily. prn) 1 Tube 2    cyanocobalamin, vitamin B-12, 500 mcg Subl Place 1 tablet under the tongue once daily.      docusate sodium (COLACE) 100 MG capsule Take 100 mg by mouth once daily.       EYLEA 2 mg/0.05 mL Syrg       fish oil-omega-3 fatty acids 300-1,000 mg capsule Take 1 capsule by mouth 2 (two) times daily. Take 1 cap 2 times daily every other day      FLUAD QUAD 2021-22,65Y UP,,PF, 60 mcg (15 mcg x 4)/0.5 mL Syrg       fluticasone (FLONASE) 50 mcg/actuation nasal spray 2 sprays (100 mcg total) by Each Nare route once daily. (Patient taking differently: 2 sprays by Each Nostril route daily as needed.) 1 Bottle 0    glimepiride (AMARYL) 2 MG tablet Take 1 tablet (2 mg total) by mouth before breakfast. For diabetes control. 90 tablet 3    lancets Misc Needs lancets for testing twice daily.  To go with meter covered by insurance. 200 each 3    levothyroxine (SYNTHROID) 112 MCG tablet Take 1 tablet (112 mcg total) by mouth before breakfast. 90 tablet 3    loratadine (CLARITIN) 10 mg tablet Take 1 tablet (10 mg total) by mouth once daily. 30 tablet 2    LORazepam (ATIVAN) 1 MG tablet Take 1 tablet (1 mg total) by mouth 2 (two) times daily as needed for Anxiety. 60 tablet 0    metFORMIN (GLUCOPHAGE) 1000 MG tablet TAKE 1 TABLET BY MOUTH TWICE DAILY WITH MEALS 180 tablet 1    multivitamin capsule Take 1 capsule by mouth once daily.      omeprazole (PRILOSEC) 40 MG capsule Take 1 capsule by mouth in the morning 90 capsule 2    promethazine-dextromethorphan (PROMETHAZINE-DM) 6.25-15 mg/5 mL Syrp Take one tsp po q 6 hrs prn cough 180 mL 0    rosuvastatin  (CRESTOR) 40 MG Tab Take 1 tablet by mouth once daily 90 tablet 3    RUTIN/HESP/BIOFLAV/C/HERB#196 (BIOFLEX ORAL) Take 2 tablets by mouth once daily.      senna (SENNA) 8.6 mg tablet Take 2 tablets by mouth nightly as needed for Constipation. 100 tablet 3    traZODone (DESYREL) 50 MG tablet TAKE 1 TABLET BY MOUTH AS NEEDED FOR INSOMNIA AT BEDTIME. OK TO TAKE 2ND TABLET IN 30 MINUTES IF STILL AWAKE 180 tablet 0    valsartan-hydrochlorothiazide (DIOVAN-HCT) 80-12.5 mg per tablet Take 1 tablet by mouth once daily. 90 tablet 3    albuterol-ipratropium 2.5mg-0.5mg/3mL (DUO-NEB) 0.5 mg-3 mg(2.5 mg base)/3 mL nebulizer solution Take 3 mLs by nebulization every 6 (six) hours as needed for Wheezing. Rescue (Patient taking differently: Take 3 mLs by nebulization every 6 (six) hours as needed for Wheezing. Rescue prn) 3 vial 3    diclofenac sodium (VOLTAREN) 1 % Gel Apply 2 g topically 3 (three) times daily. for 5 days (Patient taking differently: Apply 2 g topically 3 (three) times daily. prn) 100 g 0    levocetirizine (XYZAL) 5 MG tablet Take 1 tablet (5 mg total) by mouth every evening. (Patient not taking: Reported on 8/5/2022) 30 tablet 11    metoprolol tartrate (LOPRESSOR) 25 MG tablet Take 1 tablet (25 mg total) by mouth once as needed. For palpitations 30 tablet 11     No current facility-administered medications for this visit.     Facility-Administered Medications Ordered in Other Visits   Medication Dose Route Frequency Provider Last Rate Last Admin    0.9%  NaCl infusion   Intravenous Continuous Khadijah Rivera NP   1,000 mL at 09/17/18 0939    ceFAZolin injection 2 g  2 g Intravenous On Call Procedure Khadijah Rivera NP           Past Medical History:   Diagnosis Date    Arthritis     Atrial fibrillation, unspecified     hx of a fib prior to stroke.    Chronic back pain     Colon polyp     CVA (cerebral infarction) 7/16/14    Degenerative disc disease     cervical; lumbar    Diabetes  mellitus type II     Encounter for loop recorder at end of battery life 2018    Hyperlipidemia     Hypertension     Hypothyroidism     Mild nonproliferative diabetic retinopathy 2018    Obesity     Sleep apnea     Stroke 2014    Venous insufficiency (chronic) (peripheral)      Past Surgical History:   Procedure Laterality Date    COLONOSCOPY N/A 2018    Procedure: COLONOSCOPY;  Surgeon: William Clement MD;  Location: [facility] ENDO (4TH FLR);  Service: Endoscopy;  Laterality: N/A;  Tito/Dr Leroy Green/OK to hold 2 days prior to colon/see telephone encounter dated 18/pt has loop recorder/svn    COLONOSCOPY W/ POLYPECTOMY      GASTRECTOMY      HERNIA REPAIR      REMOVAL OF IMPLANTABLE LOOP RECORDER N/A 2018    Procedure: REMOVAL, IMPLANTABLE LOOP RECORDER;  Surgeon: Thomas Randolph MD;  Location: [facility] CATH LAB;  Service: Cardiology;  Laterality: N/A;  TERESA, ILR removal (no reimplant), MDT, RN Sed[place], SK, 3 Prep *Reveal LINQ*    TONSILLECTOMY      TUBAL LIGATION       Family History   Problem Relation Age of Onset    No Known Problems Mother     Heart disease Father     Diabetes Maternal Grandfather     Obesity Maternal Grandfather     No Known Problems Sister     No Known Problems Sister     No Known Problems Maternal Grandmother     Stroke Paternal Grandmother     No Known Problems Paternal Grandfather     Heart attack Neg Hx      Social History     Tobacco Use    Smoking status: Former Smoker     Packs/day: 1.00     Years: 30.00     Pack years: 30.00     Types: Cigarettes     Quit date: 2014     Years since quittin.1    Smokeless tobacco: Never Used   Substance Use Topics    Alcohol use: Yes     Alcohol/week: 0.0 standard drinks     Comment: rarely    Drug use: No     Comment: thc at young age        Review of Systems:  Review of Systems   Constitutional: Negative for chills and fever.   HENT: Negative for sore throat.    Eyes: Negative for redness.  "  Respiratory: Negative for shortness of breath.    Cardiovascular: Negative for chest pain.   Gastrointestinal: Positive for abdominal pain and constipation.   Endocrine: Negative for polyuria.   Genitourinary: Negative for dysuria.   Musculoskeletal: Negative for back pain.   Skin: Negative for wound.   Neurological: Negative for headaches.   Psychiatric/Behavioral: Negative for agitation.       OBJECTIVE:     Vital Signs (Most Recent)  Pulse: 69 (08/26/22 1127)  BP: (!) 142/65 (08/26/22 1127)  5' 5" (1.651 m)  90.8 kg (200 lb 2.8 oz)     Physical Exam:  Physical Exam  Vitals and nursing note reviewed.   Constitutional:       General: She is not in acute distress.     Appearance: She is well-developed.   HENT:      Head: Normocephalic and atraumatic.      Right Ear: External ear normal.      Left Ear: External ear normal.      Mouth/Throat:      Mouth: Mucous membranes are moist.   Eyes:      Conjunctiva/sclera: Conjunctivae normal.   Cardiovascular:      Rate and Rhythm: Normal rate.   Pulmonary:      Effort: Pulmonary effort is normal.   Abdominal:      Palpations: Abdomen is soft.      Tenderness: There is no abdominal tenderness.      Comments: Lower midline incision well healed. Multiple ventral hernias, reducible.    Musculoskeletal:      Cervical back: Normal range of motion.   Skin:     General: Skin is warm and dry.   Neurological:      Mental Status: She is alert.   Psychiatric:         Behavior: Behavior normal.         Laboratory  CBC relatively normal, mild anemia  Lipids wnl  A1c 7  CMP CKD 3    Diagnostic Results:  none    ASSESSMENT/PLAN:     67-year-old female with multiple medical co-morbidities including a fib on eliquis with previous CVA, diabetes (controlled), hypertension, previous tobacco use several years ago, previous ventral hernia repair, and CKD 3 with multiple ventral hernias and discomfort. CeDAR score 21, likely due to previous repair. Acceptable risk for repair. Will need to be seen " by cardiology for optimization and recommendations for holding eliquis prior to surgery. Will also need CT abdomen/pelvis for surgical planning purposes. Ideally would do this robotically but may have to do this open given previous repair.     PLAN:Plan     -Plan for OR.  Possible robotoic repair with high likelihood of conversion to open  -Consent obtained  -Will need cards clearance prior to surgery and need to discuss with arrhythmia clinic regarding eliquis jena Stacy MD

## 2022-08-29 ENCOUNTER — PATIENT OUTREACH (OUTPATIENT)
Dept: ADMINISTRATIVE | Facility: HOSPITAL | Age: 67
End: 2022-08-29
Payer: MEDICARE

## 2022-09-14 ENCOUNTER — LAB VISIT (OUTPATIENT)
Dept: LAB | Facility: HOSPITAL | Age: 67
End: 2022-09-14
Payer: MEDICARE

## 2022-09-14 DIAGNOSIS — E78.5 HYPERLIPIDEMIA ASSOCIATED WITH TYPE 2 DIABETES MELLITUS: ICD-10-CM

## 2022-09-14 DIAGNOSIS — E03.9 ACQUIRED HYPOTHYROIDISM: ICD-10-CM

## 2022-09-14 DIAGNOSIS — E11.59 HYPERTENSION ASSOCIATED WITH DIABETES: ICD-10-CM

## 2022-09-14 DIAGNOSIS — E11.40 TYPE 2 DIABETES MELLITUS WITH DIABETIC NEUROPATHY, WITHOUT LONG-TERM CURRENT USE OF INSULIN: Primary | ICD-10-CM

## 2022-09-14 DIAGNOSIS — I15.2 HYPERTENSION ASSOCIATED WITH DIABETES: ICD-10-CM

## 2022-09-14 DIAGNOSIS — E11.69 HYPERLIPIDEMIA ASSOCIATED WITH TYPE 2 DIABETES MELLITUS: ICD-10-CM

## 2022-09-14 LAB
ALBUMIN SERPL BCP-MCNC: 3.7 G/DL (ref 3.5–5.2)
ALP SERPL-CCNC: 86 U/L (ref 55–135)
ALT SERPL W/O P-5'-P-CCNC: 80 U/L (ref 10–44)
ANION GAP SERPL CALC-SCNC: 8 MMOL/L (ref 8–16)
AST SERPL-CCNC: 50 U/L (ref 10–40)
BILIRUB SERPL-MCNC: 0.3 MG/DL (ref 0.1–1)
BUN SERPL-MCNC: 42 MG/DL (ref 8–23)
CALCIUM SERPL-MCNC: 9.7 MG/DL (ref 8.7–10.5)
CHLORIDE SERPL-SCNC: 109 MMOL/L (ref 95–110)
CHOLEST SERPL-MCNC: 160 MG/DL (ref 120–199)
CHOLEST/HDLC SERPL: 2.5 {RATIO} (ref 2–5)
CO2 SERPL-SCNC: 21 MMOL/L (ref 23–29)
CREAT SERPL-MCNC: 1.3 MG/DL (ref 0.5–1.4)
EST. GFR  (NO RACE VARIABLE): 45.4 ML/MIN/1.73 M^2
ESTIMATED AVG GLUCOSE: 137 MG/DL (ref 68–131)
GLUCOSE SERPL-MCNC: 127 MG/DL (ref 70–110)
HBA1C MFR BLD: 6.4 % (ref 4–5.6)
HDLC SERPL-MCNC: 65 MG/DL (ref 40–75)
HDLC SERPL: 40.6 % (ref 20–50)
LDLC SERPL CALC-MCNC: 79 MG/DL (ref 63–159)
NONHDLC SERPL-MCNC: 95 MG/DL
POTASSIUM SERPL-SCNC: 5 MMOL/L (ref 3.5–5.1)
PROT SERPL-MCNC: 7.1 G/DL (ref 6–8.4)
SODIUM SERPL-SCNC: 138 MMOL/L (ref 136–145)
T4 FREE SERPL-MCNC: 1.14 NG/DL (ref 0.71–1.51)
TRIGL SERPL-MCNC: 80 MG/DL (ref 30–150)
TSH SERPL DL<=0.005 MIU/L-ACNC: 0.14 UIU/ML (ref 0.4–4)

## 2022-09-14 PROCEDURE — 84439 ASSAY OF FREE THYROXINE: CPT | Performed by: NURSE PRACTITIONER

## 2022-09-14 PROCEDURE — 36415 COLL VENOUS BLD VENIPUNCTURE: CPT | Mod: PO | Performed by: NURSE PRACTITIONER

## 2022-09-14 PROCEDURE — 84443 ASSAY THYROID STIM HORMONE: CPT | Performed by: NURSE PRACTITIONER

## 2022-09-14 PROCEDURE — 80061 LIPID PANEL: CPT | Performed by: NURSE PRACTITIONER

## 2022-09-14 PROCEDURE — 83036 HEMOGLOBIN GLYCOSYLATED A1C: CPT | Performed by: NURSE PRACTITIONER

## 2022-09-14 PROCEDURE — 80053 COMPREHEN METABOLIC PANEL: CPT | Performed by: NURSE PRACTITIONER

## 2022-09-14 RX ORDER — LEVOTHYROXINE SODIUM 100 UG/1
100 TABLET ORAL
Qty: 30 TABLET | Refills: 6 | Status: SHIPPED | OUTPATIENT
Start: 2022-09-14 | End: 2023-04-04 | Stop reason: SDUPTHER

## 2022-09-15 ENCOUNTER — OFFICE VISIT (OUTPATIENT)
Dept: DERMATOLOGY | Facility: CLINIC | Age: 67
End: 2022-09-15
Payer: MEDICARE

## 2022-09-15 ENCOUNTER — OFFICE VISIT (OUTPATIENT)
Dept: INTERNAL MEDICINE | Facility: CLINIC | Age: 67
End: 2022-09-15
Payer: MEDICARE

## 2022-09-15 VITALS
RESPIRATION RATE: 16 BRPM | HEART RATE: 60 BPM | WEIGHT: 194 LBS | DIASTOLIC BLOOD PRESSURE: 60 MMHG | BODY MASS INDEX: 32.32 KG/M2 | HEIGHT: 65 IN | SYSTOLIC BLOOD PRESSURE: 108 MMHG | TEMPERATURE: 97 F

## 2022-09-15 DIAGNOSIS — I48.0 PAROXYSMAL ATRIAL FIBRILLATION: Chronic | ICD-10-CM

## 2022-09-15 DIAGNOSIS — E03.9 ACQUIRED HYPOTHYROIDISM: ICD-10-CM

## 2022-09-15 DIAGNOSIS — M19.90 ARTHRITIS: ICD-10-CM

## 2022-09-15 DIAGNOSIS — I15.2 HYPERTENSION ASSOCIATED WITH DIABETES: ICD-10-CM

## 2022-09-15 DIAGNOSIS — L60.1 ONYCHOLYSIS: ICD-10-CM

## 2022-09-15 DIAGNOSIS — E11.59 HYPERTENSION ASSOCIATED WITH DIABETES: ICD-10-CM

## 2022-09-15 DIAGNOSIS — Z12.31 ENCOUNTER FOR SCREENING MAMMOGRAM FOR BREAST CANCER: ICD-10-CM

## 2022-09-15 DIAGNOSIS — Z86.73 HX OF TIA (TRANSIENT ISCHEMIC ATTACK) AND STROKE: ICD-10-CM

## 2022-09-15 DIAGNOSIS — E78.5 HYPERLIPIDEMIA ASSOCIATED WITH TYPE 2 DIABETES MELLITUS: ICD-10-CM

## 2022-09-15 DIAGNOSIS — E11.40 TYPE 2 DIABETES MELLITUS WITH DIABETIC NEUROPATHY, WITHOUT LONG-TERM CURRENT USE OF INSULIN: Primary | ICD-10-CM

## 2022-09-15 DIAGNOSIS — G89.29 CHRONIC BILATERAL LOW BACK PAIN, UNSPECIFIED WHETHER SCIATICA PRESENT: ICD-10-CM

## 2022-09-15 DIAGNOSIS — Z79.01 CURRENT USE OF LONG TERM ANTICOAGULATION: ICD-10-CM

## 2022-09-15 DIAGNOSIS — M54.50 CHRONIC BILATERAL LOW BACK PAIN, UNSPECIFIED WHETHER SCIATICA PRESENT: ICD-10-CM

## 2022-09-15 DIAGNOSIS — L60.8 MELANONYCHIA: Primary | ICD-10-CM

## 2022-09-15 DIAGNOSIS — E11.69 HYPERLIPIDEMIA ASSOCIATED WITH TYPE 2 DIABETES MELLITUS: ICD-10-CM

## 2022-09-15 PROCEDURE — 99202 OFFICE O/P NEW SF 15 MIN: CPT | Mod: 95,,, | Performed by: STUDENT IN AN ORGANIZED HEALTH CARE EDUCATION/TRAINING PROGRAM

## 2022-09-15 PROCEDURE — 99214 PR OFFICE/OUTPT VISIT, EST, LEVL IV, 30-39 MIN: ICD-10-PCS | Mod: S$GLB,,, | Performed by: INTERNAL MEDICINE

## 2022-09-15 PROCEDURE — 3066F PR DOCUMENTATION OF TREATMENT FOR NEPHROPATHY: ICD-10-PCS | Mod: CPTII,S$GLB,, | Performed by: INTERNAL MEDICINE

## 2022-09-15 PROCEDURE — 3044F HG A1C LEVEL LT 7.0%: CPT | Mod: CPTII,95,, | Performed by: STUDENT IN AN ORGANIZED HEALTH CARE EDUCATION/TRAINING PROGRAM

## 2022-09-15 PROCEDURE — 90677 PCV20 VACCINE IM: CPT | Mod: S$GLB,,, | Performed by: INTERNAL MEDICINE

## 2022-09-15 PROCEDURE — 3074F SYST BP LT 130 MM HG: CPT | Mod: CPTII,S$GLB,, | Performed by: INTERNAL MEDICINE

## 2022-09-15 PROCEDURE — 3044F PR MOST RECENT HEMOGLOBIN A1C LEVEL <7.0%: ICD-10-PCS | Mod: CPTII,S$GLB,, | Performed by: INTERNAL MEDICINE

## 2022-09-15 PROCEDURE — 3074F PR MOST RECENT SYSTOLIC BLOOD PRESSURE < 130 MM HG: ICD-10-PCS | Mod: CPTII,S$GLB,, | Performed by: INTERNAL MEDICINE

## 2022-09-15 PROCEDURE — 3008F BODY MASS INDEX DOCD: CPT | Mod: CPTII,S$GLB,, | Performed by: INTERNAL MEDICINE

## 2022-09-15 PROCEDURE — 99202 PR OFFICE/OUTPT VISIT, NEW, LEVL II, 15-29 MIN: ICD-10-PCS | Mod: 95,,, | Performed by: STUDENT IN AN ORGANIZED HEALTH CARE EDUCATION/TRAINING PROGRAM

## 2022-09-15 PROCEDURE — 3008F PR BODY MASS INDEX (BMI) DOCUMENTED: ICD-10-PCS | Mod: CPTII,S$GLB,, | Performed by: INTERNAL MEDICINE

## 2022-09-15 PROCEDURE — 3060F POS MICROALBUMINURIA REV: CPT | Mod: CPTII,S$GLB,, | Performed by: INTERNAL MEDICINE

## 2022-09-15 PROCEDURE — 3078F PR MOST RECENT DIASTOLIC BLOOD PRESSURE < 80 MM HG: ICD-10-PCS | Mod: CPTII,S$GLB,, | Performed by: INTERNAL MEDICINE

## 2022-09-15 PROCEDURE — 3288F FALL RISK ASSESSMENT DOCD: CPT | Mod: CPTII,S$GLB,, | Performed by: INTERNAL MEDICINE

## 2022-09-15 PROCEDURE — 1159F MED LIST DOCD IN RCRD: CPT | Mod: CPTII,95,, | Performed by: STUDENT IN AN ORGANIZED HEALTH CARE EDUCATION/TRAINING PROGRAM

## 2022-09-15 PROCEDURE — 99999 PR PBB SHADOW E&M-EST. PATIENT-LVL V: ICD-10-PCS | Mod: PBBFAC,,, | Performed by: INTERNAL MEDICINE

## 2022-09-15 PROCEDURE — 1160F RVW MEDS BY RX/DR IN RCRD: CPT | Mod: CPTII,95,, | Performed by: STUDENT IN AN ORGANIZED HEALTH CARE EDUCATION/TRAINING PROGRAM

## 2022-09-15 PROCEDURE — 99999 PR PBB SHADOW E&M-EST. PATIENT-LVL V: CPT | Mod: PBBFAC,,, | Performed by: INTERNAL MEDICINE

## 2022-09-15 PROCEDURE — 3066F PR DOCUMENTATION OF TREATMENT FOR NEPHROPATHY: ICD-10-PCS | Mod: CPTII,95,, | Performed by: STUDENT IN AN ORGANIZED HEALTH CARE EDUCATION/TRAINING PROGRAM

## 2022-09-15 PROCEDURE — 3044F HG A1C LEVEL LT 7.0%: CPT | Mod: CPTII,S$GLB,, | Performed by: INTERNAL MEDICINE

## 2022-09-15 PROCEDURE — 3060F PR POS MICROALBUMINURIA RESULT DOCUMENTED/REVIEW: ICD-10-PCS | Mod: CPTII,S$GLB,, | Performed by: INTERNAL MEDICINE

## 2022-09-15 PROCEDURE — G0009 PNEUMOCOCCAL CONJUGATE VACCINE 20-VALENT: ICD-10-PCS | Mod: S$GLB,,, | Performed by: INTERNAL MEDICINE

## 2022-09-15 PROCEDURE — G0009 ADMIN PNEUMOCOCCAL VACCINE: HCPCS | Mod: S$GLB,,, | Performed by: INTERNAL MEDICINE

## 2022-09-15 PROCEDURE — 1159F PR MEDICATION LIST DOCUMENTED IN MEDICAL RECORD: ICD-10-PCS | Mod: CPTII,95,, | Performed by: STUDENT IN AN ORGANIZED HEALTH CARE EDUCATION/TRAINING PROGRAM

## 2022-09-15 PROCEDURE — 1126F AMNT PAIN NOTED NONE PRSNT: CPT | Mod: CPTII,S$GLB,, | Performed by: INTERNAL MEDICINE

## 2022-09-15 PROCEDURE — 1160F PR REVIEW ALL MEDS BY PRESCRIBER/CLIN PHARMACIST DOCUMENTED: ICD-10-PCS | Mod: CPTII,95,, | Performed by: STUDENT IN AN ORGANIZED HEALTH CARE EDUCATION/TRAINING PROGRAM

## 2022-09-15 PROCEDURE — 3066F NEPHROPATHY DOC TX: CPT | Mod: CPTII,S$GLB,, | Performed by: INTERNAL MEDICINE

## 2022-09-15 PROCEDURE — 1101F PR PT FALLS ASSESS DOC 0-1 FALLS W/OUT INJ PAST YR: ICD-10-PCS | Mod: CPTII,S$GLB,, | Performed by: INTERNAL MEDICINE

## 2022-09-15 PROCEDURE — 3044F PR MOST RECENT HEMOGLOBIN A1C LEVEL <7.0%: ICD-10-PCS | Mod: CPTII,95,, | Performed by: STUDENT IN AN ORGANIZED HEALTH CARE EDUCATION/TRAINING PROGRAM

## 2022-09-15 PROCEDURE — 90677 PNEUMOCOCCAL CONJUGATE VACCINE 20-VALENT: ICD-10-PCS | Mod: S$GLB,,, | Performed by: INTERNAL MEDICINE

## 2022-09-15 PROCEDURE — 1101F PT FALLS ASSESS-DOCD LE1/YR: CPT | Mod: CPTII,S$GLB,, | Performed by: INTERNAL MEDICINE

## 2022-09-15 PROCEDURE — 99214 OFFICE O/P EST MOD 30 MIN: CPT | Mod: S$GLB,,, | Performed by: INTERNAL MEDICINE

## 2022-09-15 PROCEDURE — 3288F PR FALLS RISK ASSESSMENT DOCUMENTED: ICD-10-PCS | Mod: CPTII,S$GLB,, | Performed by: INTERNAL MEDICINE

## 2022-09-15 PROCEDURE — 3060F PR POS MICROALBUMINURIA RESULT DOCUMENTED/REVIEW: ICD-10-PCS | Mod: CPTII,95,, | Performed by: STUDENT IN AN ORGANIZED HEALTH CARE EDUCATION/TRAINING PROGRAM

## 2022-09-15 PROCEDURE — 3066F NEPHROPATHY DOC TX: CPT | Mod: CPTII,95,, | Performed by: STUDENT IN AN ORGANIZED HEALTH CARE EDUCATION/TRAINING PROGRAM

## 2022-09-15 PROCEDURE — 3060F POS MICROALBUMINURIA REV: CPT | Mod: CPTII,95,, | Performed by: STUDENT IN AN ORGANIZED HEALTH CARE EDUCATION/TRAINING PROGRAM

## 2022-09-15 PROCEDURE — 1126F PR PAIN SEVERITY QUANTIFIED, NO PAIN PRESENT: ICD-10-PCS | Mod: CPTII,S$GLB,, | Performed by: INTERNAL MEDICINE

## 2022-09-15 PROCEDURE — 3078F DIAST BP <80 MM HG: CPT | Mod: CPTII,S$GLB,, | Performed by: INTERNAL MEDICINE

## 2022-09-15 RX ORDER — SEMAGLUTIDE 1.34 MG/ML
1 INJECTION, SOLUTION SUBCUTANEOUS
COMMUNITY
Start: 2022-09-02 | End: 2022-12-29 | Stop reason: SDUPTHER

## 2022-09-15 NOTE — PROGRESS NOTES
Patient Information  Name: Diana Montenegro  : 1955  MRN: [id number]     Referring Physician:  Dr. Wells ref. provider found   Primary Care Physician:  Dr. Rad Johnston MD   Date of Visit: 09/15/2022      Subjective:       Diana Montenegro is a 67 y.o. female who presents for nail    HPI  The patient location is: [place], [place]  The chief complaint leading to consultation is: nail    Visit type: audiovisual    Face to Face time with patient: 10 min  12 minutes of total time spent on the encounter, which includes face to face time and non-face to face time preparing to see the patient (eg, review of tests), Obtaining and/or reviewing separately obtained history, Documenting clinical information in the electronic or other health record, Independently interpreting results (not separately reported) and communicating results to the patient/family/caregiver, or Care coordination (not separately reported).     Each patient to whom he or she provides medical services by telemedicine is:  (1) informed of the relationship between the physician and patient and the respective role of any other health care provider with respect to management of the patient; and (2) notified that he or she may decline to receive medical services by telemedicine and may withdraw from such care at any time.    Notes:   Patient with new complaint of lesion(s)  Location: right thumb  Duration: 4 months  Symptoms: splitting, dark line  Relieving factors/Previous treatments: none      Patient was last seen:Visit date not found     Prior notes by myself reviewed.   Clinical documentation obtained by nursing staff reviewed.    Review of Systems   Skin:  Negative for itching and rash.      Objective:    Physical Exam   Constitutional: She appears well-developed and well-nourished. No distress.   Neurological: She is alert and oriented to person, place, and time. She is not disoriented.   Psychiatric: She has a normal mood and affect.    Skin:   Areas Examined (abnormalities noted in diagram):   Nails and Digits Inspection Performed           Diagram Legend     Erythematous scaling macule/papule c/w actinic keratosis       Vascular papule c/w angioma      Pigmented verrucoid papule/plaque c/w seborrheic keratosis      Yellow umbilicated papule c/w sebaceous hyperplasia      Irregularly shaped tan macule c/w lentigo     1-2 mm smooth white papules consistent with Milia      Movable subcutaneous cyst with punctum c/w epidermal inclusion cyst      Subcutaneous movable cyst c/w pilar cyst      Firm pink to brown papule c/w dermatofibroma      Pedunculated fleshy papule(s) c/w skin tag(s)      Evenly pigmented macule c/w junctional nevus     Mildly variegated pigmented, slightly irregular-bordered macule c/w mildly atypical nevus      Flesh colored to evenly pigmented papule c/w intradermal nevus       Pink pearly papule/plaque c/w basal cell carcinoma      Erythematous hyperkeratotic cursted plaque c/w SCC      Surgical scar with no sign of skin cancer recurrence      Open and closed comedones      Inflammatory papules and pustules      Verrucoid papule consistent consistent with wart     Erythematous eczematous patches and plaques     Dystrophic onycholytic nail with subungual debris c/w onychomycosis     Umbilicated papule    Erythematous-base heme-crusted tan verrucoid plaque consistent with inflamed seborrheic keratosis     Erythematous Silvery Scaling Plaque c/w Psoriasis     See annotation          [] Data reviewed  [] Independent review of test  [] Management discussed with another provider    Assessment / Plan:        Melanonychia/Onycholysis  - Due to nature of visit and blurry photo, recommend in office visit. I have put in referral to urgent derm clinic for patient.         LOS NUMBER AND COMPLEXITY OF PROBLEMS    COMPLEXITY OF DATA RISK TOTAL TIME (m)   64136  13642 [] 1 self-limited or minor problem [x] Minimal to none [x] No treatment  recommended or patient to monitor 15-29  10-19   18572  88369 Low  [] 2 or > self limited or minor problems  [] 1 stable chronic illness  [] 1 acute, uncomplicated illness or injury Limited (2)  [] Prior external notes from each unique source  [] Review result of each unique test  [] Order each unique test []  Low  OTC medications, minor skin biopsy 30-44  20-29   16040  41305 Moderate  []  1 or > chronic illness with progression, exacerbation or SE of treatment  []  2 or more stable chronic illnesses  []  1 acute illness with systemic symptoms  []  1 acute complicated injury  [x]  1 undiagnosed new problem with uncertain prognosis Moderate (1/3 below)  []  3 or more data items        *Now includes assessment requiring independent historian  []  Independent interpretation of a test  []  Discuss management/test with another provider Moderate  []  Prescription drug mgmt  []  Minor surgery with risk discussed  []  Mgmt limited by social determinates 45-59  30-39   64150  31484 High  []  1 or more chronic illness with severe exacerbation, progression or SE of treatment  []  1 acute or chronic illness/injury that poses a threat to life or bodily function Extensive (2/3 below)  []  3 or more data items        *Now includes assessment requiring independent historian.  []  Independent interpretation of a test  []  Discuss management/test with another provider High  []  Major surgery with risk discussed  []  Drug therapy requiring intensive monitoring for toxicity  []  Hospitalization  []  Decision for DNR 60-74  40-54      No follow-ups on file.    Barb Parry MD, FAAD  [facility]

## 2022-09-16 ENCOUNTER — HOSPITAL ENCOUNTER (OUTPATIENT)
Dept: RADIOLOGY | Facility: HOSPITAL | Age: 67
Discharge: HOME | End: 2022-09-16
Attending: INTERNAL MEDICINE
Payer: MEDICARE

## 2022-09-16 DIAGNOSIS — Z12.31 ENCOUNTER FOR SCREENING MAMMOGRAM FOR BREAST CANCER: ICD-10-CM

## 2022-09-16 PROCEDURE — 77063 BREAST TOMOSYNTHESIS BI: CPT | Mod: TC,PO

## 2022-09-16 PROCEDURE — 77067 SCR MAMMO BI INCL CAD: CPT | Mod: 26,,, | Performed by: RADIOLOGY

## 2022-09-16 PROCEDURE — 77063 BREAST TOMOSYNTHESIS BI: CPT | Mod: 26,,, | Performed by: RADIOLOGY

## 2022-09-16 PROCEDURE — 77063 MAMMO DIGITAL SCREENING BILAT WITH TOMO: ICD-10-PCS | Mod: 26,,, | Performed by: RADIOLOGY

## 2022-09-16 PROCEDURE — 77067 MAMMO DIGITAL SCREENING BILAT WITH TOMO: ICD-10-PCS | Mod: 26,,, | Performed by: RADIOLOGY

## 2022-09-20 ENCOUNTER — TELEPHONE (OUTPATIENT)
Dept: SURGERY | Facility: CLINIC | Age: 67
End: 2022-09-20
Payer: MEDICARE

## 2022-09-20 ENCOUNTER — OFFICE VISIT (OUTPATIENT)
Dept: DERMATOLOGY | Facility: CLINIC | Age: 67
End: 2022-09-20
Payer: MEDICARE

## 2022-09-20 DIAGNOSIS — L81.1 MELASMA: ICD-10-CM

## 2022-09-20 DIAGNOSIS — L60.3 SPLITTING OF NAIL: ICD-10-CM

## 2022-09-20 DIAGNOSIS — L81.4 LENTIGINES: Primary | ICD-10-CM

## 2022-09-20 PROCEDURE — 3060F POS MICROALBUMINURIA REV: CPT | Mod: CPTII,S$GLB,, | Performed by: DERMATOLOGY

## 2022-09-20 PROCEDURE — 99214 PR OFFICE/OUTPT VISIT, EST, LEVL IV, 30-39 MIN: ICD-10-PCS | Mod: GC,S$GLB,, | Performed by: DERMATOLOGY

## 2022-09-20 PROCEDURE — 3066F NEPHROPATHY DOC TX: CPT | Mod: CPTII,S$GLB,, | Performed by: DERMATOLOGY

## 2022-09-20 PROCEDURE — 99214 OFFICE O/P EST MOD 30 MIN: CPT | Mod: GC,S$GLB,, | Performed by: DERMATOLOGY

## 2022-09-20 PROCEDURE — 3044F PR MOST RECENT HEMOGLOBIN A1C LEVEL <7.0%: ICD-10-PCS | Mod: CPTII,S$GLB,, | Performed by: DERMATOLOGY

## 2022-09-20 PROCEDURE — 99999 PR PBB SHADOW E&M-EST. PATIENT-LVL I: CPT | Mod: PBBFAC,,,

## 2022-09-20 PROCEDURE — 99999 PR PBB SHADOW E&M-EST. PATIENT-LVL I: ICD-10-PCS | Mod: PBBFAC,,,

## 2022-09-20 PROCEDURE — 3060F PR POS MICROALBUMINURIA RESULT DOCUMENTED/REVIEW: ICD-10-PCS | Mod: CPTII,S$GLB,, | Performed by: DERMATOLOGY

## 2022-09-20 PROCEDURE — 3066F PR DOCUMENTATION OF TREATMENT FOR NEPHROPATHY: ICD-10-PCS | Mod: CPTII,S$GLB,, | Performed by: DERMATOLOGY

## 2022-09-20 PROCEDURE — 3044F HG A1C LEVEL LT 7.0%: CPT | Mod: CPTII,S$GLB,, | Performed by: DERMATOLOGY

## 2022-09-20 RX ORDER — TRETINOIN 0.25 MG/G
CREAM TOPICAL NIGHTLY
Qty: 45 G | Refills: 2 | Status: SHIPPED | OUTPATIENT
Start: 2022-09-20 | End: 2022-10-20

## 2022-09-20 NOTE — PROGRESS NOTES
"  Subjective:       Patient ID:  Diana Montenegro is a 67 y.o. female who presents for   Chief Complaint   Patient presents with    Nail Problem     66 yo F presents to acute dermatology clinic. Last seen via televisit less than 1 week ago for onycholysis and melanonychia and advised in person clinic evaluation.    Today, pt endorses brittleness and splitting of right first (thumb) nail x 5 months. Had a dip nail polish done in June but then had it removed due to it being too damaging to her nails. Using "hard as hoof" and nicola chacon "hard as nails". Also taking biotin. Denies issues with toenails or hair brittleness. Denies recent change in nutrition of bowel habits. Also inquires about dark spots on her face and what to do about them. Is currently wearing neutrogena SPF 30 daily.       Review of Systems   Constitutional:  Negative for fever and chills.      Objective:    Physical Exam   Constitutional: She appears well-developed and well-nourished.   Neurological: She is alert and oriented to person, place, and time.   Psychiatric: She has a normal mood and affect.   Skin:   Areas Examined (abnormalities noted in diagram):   Head / Face Inspection Performed  Neck Inspection Performed  RUE Inspected  LUE Inspection Performed  RLE Inspected  LLE Inspection Performed  Nails and Digits Inspection Performed                Diagram Legend     Erythematous scaling macule/papule c/w actinic keratosis       Vascular papule c/w angioma      Pigmented verrucoid papule/plaque c/w seborrheic keratosis      Yellow umbilicated papule c/w sebaceous hyperplasia      Irregularly shaped tan macule c/w lentigo     1-2 mm smooth white papules consistent with Milia      Movable subcutaneous cyst with punctum c/w epidermal inclusion cyst      Subcutaneous movable cyst c/w pilar cyst      Firm pink to brown papule c/w dermatofibroma      Pedunculated fleshy papule(s) c/w skin tag(s)      Evenly pigmented macule c/w junctional " "nevus     Mildly variegated pigmented, slightly irregular-bordered macule c/w mildly atypical nevus      Flesh colored to evenly pigmented papule c/w intradermal nevus       Pink pearly papule/plaque c/w basal cell carcinoma      Erythematous hyperkeratotic cursted plaque c/w SCC      Surgical scar with no sign of skin cancer recurrence      Open and closed comedones      Inflammatory papules and pustules      Verrucoid papule consistent consistent with wart     Erythematous eczematous patches and plaques     Dystrophic onycholytic nail with subungual debris c/w onychomycosis     Umbilicated papule    Erythematous-base heme-crusted tan verrucoid plaque consistent with inflamed seborrheic keratosis     Erythematous Silvery Scaling Plaque c/w Psoriasis     See annotation        Assessment / Plan:        Melasma  Lentigines  Discussed etiology  Start tretinoin 0.025% cream nightly.   Reviewed side effects of tretinoin including erythema, peeling/scaling, dryness, burning, pruritus, photosensitivity, temporary worsening of acne. Reviewed that skin irritation consisting of erythema and peeling may occur during the first month of treatment. If this occurs instructed to use moisturizer and decrease tretinoin use to 3 nights per week until side effects subside and then increase use to daily as tolerated.   Continue daily sun protection, mineral SPF is preferred (active ingredients being zinc or titanium)  Can consider addition of hydroquinone or combination triple cream at follow up     -     tretinoin (RETIN-A) 0.025 % cream; Apply topically every evening.  Dispense: 45 g; Refill: 2  -     tretinoin (RETIN-A) 0.025 % cream; Apply topically every evening.  Dispense: 45 g; Refill: 2    Splitting of nail: 5 month history of single nail (right thumb) splitting which worsened after doing a "dip" manicure. No involvement of toenails. No hair changes. Notable 10 digit distal nail plate thinning from dip manicure, proximal 3/4 of " nail healthy appearing. No pain, no longitudinal erythronychia or melanonychia.   Photodocumentation uploaded to chart  Continue Shantel Mosley hard as nails and hard as hoof's nail lacquer. Continue moisturizing nail plate and avoiding manicures.   Can continue biotin supplementation (has been on for years). Counseled that this will not help with hair, only nails and may interfere with lab values such as thyroid and troponin.   Counseled expected time for split to grow out is at least 3 months.     Follow up if symptoms worsen or fail to improve.    Dorothy Alberto MD  [facility], PGY-3

## 2022-09-20 NOTE — TELEPHONE ENCOUNTER
Patient is wanting to reschedule her surgery to 1/24/23 form 12/13/22. Put surgery on hold ,pt v/u.

## 2022-10-21 ENCOUNTER — PATIENT MESSAGE (OUTPATIENT)
Dept: ELECTROPHYSIOLOGY | Facility: CLINIC | Age: 67
End: 2022-10-21
Payer: MEDICARE

## 2022-10-21 ENCOUNTER — PATIENT MESSAGE (OUTPATIENT)
Dept: NEUROLOGY | Facility: CLINIC | Age: 67
End: 2022-10-21
Payer: MEDICARE

## 2022-11-01 ENCOUNTER — PATIENT MESSAGE (OUTPATIENT)
Dept: NEUROLOGY | Facility: CLINIC | Age: 67
End: 2022-11-01
Payer: MEDICARE

## 2022-11-07 ENCOUNTER — NURSE TRIAGE (OUTPATIENT)
Dept: ADMINISTRATIVE | Facility: CLINIC | Age: 67
End: 2022-11-07
Payer: MEDICARE

## 2022-11-07 NOTE — TELEPHONE ENCOUNTER
She tried a new probiotic Monday/Tuesday and had good bm , but none since.  Had tooth pulled later in week so went a few days with no solid foods.    I told her to take dose of mirilax that she has at home and Push fluids.  No solid food for now.  If no bm, take 2nd dose of miralax after 1 hour and continue fluids.  Once she has a good bm and feels relief, could be tomorrow, start noodle or rice soups and continue fluids.  Get to er if not helping.     She expressed understanding.

## 2022-11-07 NOTE — TELEPHONE ENCOUNTER
Pt states that she has not had BM since last Tuesday. Reports having abdominal pain, and nausea. Sates that she vomits in the morning after eating. Pt advised to be seen in Office  now. Pt verbalized understanding.Unable to schedule appointment with PCP. Will send message to office. OAC, RR, and ED offered. States went to UC but wait time was 4 hours.    Reason for Disposition   Constant abdominal pain lasting > 2 hours    Additional Information   Negative: Vomiting bile (green color)   Negative: Patient sounds very sick or weak to the triager    Protocols used: Constipation-A-OH

## 2022-11-08 ENCOUNTER — TELEPHONE (OUTPATIENT)
Dept: SURGERY | Facility: CLINIC | Age: 67
End: 2022-11-08
Payer: MEDICARE

## 2022-11-08 ENCOUNTER — OFFICE VISIT (OUTPATIENT)
Dept: CARDIOLOGY | Facility: CLINIC | Age: 67
End: 2022-11-08
Payer: MEDICARE

## 2022-11-08 VITALS
SYSTOLIC BLOOD PRESSURE: 119 MMHG | HEART RATE: 73 BPM | HEIGHT: 65 IN | DIASTOLIC BLOOD PRESSURE: 63 MMHG | BODY MASS INDEX: 31.59 KG/M2 | WEIGHT: 189.63 LBS

## 2022-11-08 DIAGNOSIS — I65.23 BILATERAL CAROTID ARTERY STENOSIS: ICD-10-CM

## 2022-11-08 DIAGNOSIS — E11.59 HYPERTENSION ASSOCIATED WITH DIABETES: ICD-10-CM

## 2022-11-08 DIAGNOSIS — Z86.73 HX OF TIA (TRANSIENT ISCHEMIC ATTACK) AND STROKE: ICD-10-CM

## 2022-11-08 DIAGNOSIS — I48.0 PAROXYSMAL ATRIAL FIBRILLATION: ICD-10-CM

## 2022-11-08 DIAGNOSIS — K43.9 VENTRAL HERNIA WITHOUT OBSTRUCTION OR GANGRENE: Primary | ICD-10-CM

## 2022-11-08 DIAGNOSIS — E11.69 HYPERLIPIDEMIA ASSOCIATED WITH TYPE 2 DIABETES MELLITUS: ICD-10-CM

## 2022-11-08 DIAGNOSIS — I15.2 HYPERTENSION ASSOCIATED WITH DIABETES: ICD-10-CM

## 2022-11-08 DIAGNOSIS — Z79.01 CURRENT USE OF LONG TERM ANTICOAGULATION: ICD-10-CM

## 2022-11-08 DIAGNOSIS — E78.5 HYPERLIPIDEMIA ASSOCIATED WITH TYPE 2 DIABETES MELLITUS: ICD-10-CM

## 2022-11-08 PROCEDURE — 3288F PR FALLS RISK ASSESSMENT DOCUMENTED: ICD-10-PCS | Mod: CPTII,GC,S$GLB, | Performed by: INTERNAL MEDICINE

## 2022-11-08 PROCEDURE — 3078F DIAST BP <80 MM HG: CPT | Mod: CPTII,GC,S$GLB, | Performed by: INTERNAL MEDICINE

## 2022-11-08 PROCEDURE — 3066F PR DOCUMENTATION OF TREATMENT FOR NEPHROPATHY: ICD-10-PCS | Mod: CPTII,GC,S$GLB, | Performed by: INTERNAL MEDICINE

## 2022-11-08 PROCEDURE — 3044F PR MOST RECENT HEMOGLOBIN A1C LEVEL <7.0%: ICD-10-PCS | Mod: CPTII,GC,S$GLB, | Performed by: INTERNAL MEDICINE

## 2022-11-08 PROCEDURE — 3060F PR POS MICROALBUMINURIA RESULT DOCUMENTED/REVIEW: ICD-10-PCS | Mod: CPTII,GC,S$GLB, | Performed by: INTERNAL MEDICINE

## 2022-11-08 PROCEDURE — 99999 PR PBB SHADOW E&M-EST. PATIENT-LVL V: CPT | Mod: PBBFAC,GC,, | Performed by: INTERNAL MEDICINE

## 2022-11-08 PROCEDURE — 3078F PR MOST RECENT DIASTOLIC BLOOD PRESSURE < 80 MM HG: ICD-10-PCS | Mod: CPTII,GC,S$GLB, | Performed by: INTERNAL MEDICINE

## 2022-11-08 PROCEDURE — 3288F FALL RISK ASSESSMENT DOCD: CPT | Mod: CPTII,GC,S$GLB, | Performed by: INTERNAL MEDICINE

## 2022-11-08 PROCEDURE — 3066F NEPHROPATHY DOC TX: CPT | Mod: CPTII,GC,S$GLB, | Performed by: INTERNAL MEDICINE

## 2022-11-08 PROCEDURE — 3008F PR BODY MASS INDEX (BMI) DOCUMENTED: ICD-10-PCS | Mod: CPTII,GC,S$GLB, | Performed by: INTERNAL MEDICINE

## 2022-11-08 PROCEDURE — 99214 OFFICE O/P EST MOD 30 MIN: CPT | Mod: GC,S$GLB,, | Performed by: INTERNAL MEDICINE

## 2022-11-08 PROCEDURE — 99999 PR PBB SHADOW E&M-EST. PATIENT-LVL V: ICD-10-PCS | Mod: PBBFAC,GC,, | Performed by: INTERNAL MEDICINE

## 2022-11-08 PROCEDURE — 1159F MED LIST DOCD IN RCRD: CPT | Mod: CPTII,GC,S$GLB, | Performed by: INTERNAL MEDICINE

## 2022-11-08 PROCEDURE — 1126F PR PAIN SEVERITY QUANTIFIED, NO PAIN PRESENT: ICD-10-PCS | Mod: CPTII,GC,S$GLB, | Performed by: INTERNAL MEDICINE

## 2022-11-08 PROCEDURE — 1101F PT FALLS ASSESS-DOCD LE1/YR: CPT | Mod: CPTII,GC,S$GLB, | Performed by: INTERNAL MEDICINE

## 2022-11-08 PROCEDURE — 1159F PR MEDICATION LIST DOCUMENTED IN MEDICAL RECORD: ICD-10-PCS | Mod: CPTII,GC,S$GLB, | Performed by: INTERNAL MEDICINE

## 2022-11-08 PROCEDURE — 99214 PR OFFICE/OUTPT VISIT, EST, LEVL IV, 30-39 MIN: ICD-10-PCS | Mod: GC,S$GLB,, | Performed by: INTERNAL MEDICINE

## 2022-11-08 PROCEDURE — 3044F HG A1C LEVEL LT 7.0%: CPT | Mod: CPTII,GC,S$GLB, | Performed by: INTERNAL MEDICINE

## 2022-11-08 PROCEDURE — 3074F PR MOST RECENT SYSTOLIC BLOOD PRESSURE < 130 MM HG: ICD-10-PCS | Mod: CPTII,GC,S$GLB, | Performed by: INTERNAL MEDICINE

## 2022-11-08 PROCEDURE — 1126F AMNT PAIN NOTED NONE PRSNT: CPT | Mod: CPTII,GC,S$GLB, | Performed by: INTERNAL MEDICINE

## 2022-11-08 PROCEDURE — 3074F SYST BP LT 130 MM HG: CPT | Mod: CPTII,GC,S$GLB, | Performed by: INTERNAL MEDICINE

## 2022-11-08 PROCEDURE — 3008F BODY MASS INDEX DOCD: CPT | Mod: CPTII,GC,S$GLB, | Performed by: INTERNAL MEDICINE

## 2022-11-08 PROCEDURE — 3060F POS MICROALBUMINURIA REV: CPT | Mod: CPTII,GC,S$GLB, | Performed by: INTERNAL MEDICINE

## 2022-11-08 PROCEDURE — 1101F PR PT FALLS ASSESS DOC 0-1 FALLS W/OUT INJ PAST YR: ICD-10-PCS | Mod: CPTII,GC,S$GLB, | Performed by: INTERNAL MEDICINE

## 2022-11-08 RX ORDER — EZETIMIBE 10 MG/1
10 TABLET ORAL DAILY
Qty: 90 TABLET | Refills: 3 | Status: SHIPPED | OUTPATIENT
Start: 2022-11-08 | End: 2023-06-01

## 2022-11-08 NOTE — TELEPHONE ENCOUNTER
Received a message that the Patient was in the Waiting Room and is wanting to change her surgery date with Dr. Stacy.  Spoke to Patient.  Stated that she wants to have her surgery in March '23.  Selected 3-13-23.  Sent a message to the Surgery Schedulers to move her case from [facility] to 3-13-23.  She did not have any questions at present.

## 2022-11-08 NOTE — PROGRESS NOTES
Cardiology Clinic Note  Reason for Visit: Per-op Risk Stratification    HPI:   Mrs Montenegro is a 66 yo F with PMH hypertension, diabetes, atrial fibrillation on eliquis, CVA, previous low midline ventral hernia repair 1993, and laparoscopic sleeve gastrectomy 2015 referred from general surgery for pre-op risk stratification prior to ventral hernia repair scheduled for early next year.     She feels well today with no complaints. She had the hernia for quite some time, and luckily minimal GI issues at this time. No chest pain, SOB, KING, palps, leg swelling, orthopnea, PND, or weight gain. No prior stress testing. She ambulates sometimes with a rollator due to back spasms and knee arthritis. She can slowly walk a flight of stairs while using the rails. She can walk flats surfaces without too much difficulty.     She is diabetic, but not on insulin. CVA was in 2014 and luckily has no residual deficits. No Hx of MI or CHF.     ROS:    Constitution: Negative for fever, chills, weight loss or gain.   HENT: Negative for sore throat, rhinorrhea, or headache.  Eyes: Negative for blurred or double vision.   Cardiovascular: See above  Pulmonary: Negative for SOB   Gastrointestinal: Negative for abdominal pain, nausea, vomiting, or diarrhea.   : Negative for dysuria.   Neurological: Negative for focal weakness or sensory changes.  PMH:     Past Medical History:   Diagnosis Date    Arthritis     Atrial fibrillation, unspecified     hx of a fib prior to stroke.    Chronic back pain     Colon polyp     CVA (cerebral infarction) 7/16/14    Degenerative disc disease     cervical; lumbar    Diabetes mellitus type II     Encounter for loop recorder at end of battery life 9/17/2018    Hyperlipidemia     Hypertension     Hypothyroidism     Mild nonproliferative diabetic retinopathy 08/12/2018    Obesity     Sleep apnea     Stroke july 2014    Venous insufficiency (chronic) (peripheral)      Past Surgical History:   Procedure  Laterality Date    COLONOSCOPY N/A 8/30/2018    Procedure: COLONOSCOPY;  Surgeon: William Clement MD;  Location: [facility] (4TH FLR);  Service: Endoscopy;  Laterality: N/A;  Tito/Dr Rad Johnston/OK to hold 2 days prior to colon/see telephone encounter dated 7/24/18/pt has loop recorder/svn    COLONOSCOPY W/ POLYPECTOMY      GASTRECTOMY      HERNIA REPAIR      REMOVAL OF IMPLANTABLE LOOP RECORDER N/A 9/17/2018    Procedure: REMOVAL, IMPLANTABLE LOOP RECORDER;  Surgeon: Thomas Randolph MD;  Location: [facility] CATH LAB;  Service: Cardiology;  Laterality: N/A;  TERESA, ILR removal (no reimplant), MDT, RN Sedate, SK, 3 Prep *Reveal LINQ*    TONSILLECTOMY      TUBAL LIGATION       Allergies:     Review of patient's allergies indicates:   Allergen Reactions    Medrol [methylprednisolone] Palpitations     Medications:     Current Outpatient Medications on File Prior to Visit   Medication Sig Dispense Refill    acyclovir 5% (ZOVIRAX) 5 % ointment Apply topically 6 (six) times daily. 5 g 1    albuterol (VENTOLIN HFA) 90 mcg/actuation inhaler Inhale 2 puffs into the lungs every 6 (six) hours as needed for Wheezing. Rescue 18 g 3    aspirin (ECOTRIN) 81 MG EC tablet Take 1 tablet (81 mg total) by mouth once daily.      b complex vitamins tablet Take 1 tablet by mouth once daily.      blood pressure test kit-large Kit Use as directed 1 each 0    blood sugar diagnostic Strp Needs test strips to ck glucose twice daily. Strips that are covered by insurance paln 200 strip 3    blood-glucose meter Misc One touch verio flex or tone touch verio reflect or freestyle freedom lite meter (all covered by insurance) 1 each 0    CALCIUM CITRATE/VITAMIN D3 (CALCIUM CITRATE + D ORAL) Take 1 tablet by mouth every morning.      cetirizine (ZYRTEC) 5 MG tablet Take 5 mg by mouth once daily.      cinnamon bark (CINNAMON ORAL) Take by mouth 2 (two) times daily.      clotrimazole-betamethasone 1-0.05% (LOTRISONE) cream Apply topically 2 (two) times daily.  (Patient taking differently: Apply topically 2 (two) times daily. prn) 1 Tube 2    cyanocobalamin, vitamin B-12, 500 mcg Subl Place 1 tablet under the tongue once daily.      docusate sodium (COLACE) 100 MG capsule Take 100 mg by mouth once daily.       ELIQUIS 5 mg Tab Take 1 tablet by mouth twice daily 180 tablet 0    EYLEA 2 mg/0.05 mL Syrg       fish oil-omega-3 fatty acids 300-1,000 mg capsule Take 1 capsule by mouth 2 (two) times daily. Take 1 cap 2 times daily every other day      fluticasone (FLONASE) 50 mcg/actuation nasal spray 2 sprays (100 mcg total) by Each Nare route once daily. (Patient taking differently: 2 sprays by Each Nostril route daily as needed.) 1 Bottle 0    glimepiride (AMARYL) 2 MG tablet Take 1 tablet (2 mg total) by mouth before breakfast. For diabetes control. 90 tablet 3    lancets Misc Needs lancets for testing twice daily.  To go with meter covered by insurance. 200 each 3    levothyroxine (SYNTHROID) 100 MCG tablet Take 1 tablet (100 mcg total) by mouth before breakfast. 30 tablet 6    LORazepam (ATIVAN) 1 MG tablet Take 1 tablet (1 mg total) by mouth 2 (two) times daily as needed for Anxiety. 60 tablet 0    metFORMIN (GLUCOPHAGE) 1000 MG tablet TAKE 1 TABLET BY MOUTH TWICE DAILY WITH MEALS 180 tablet 1    multivitamin capsule Take 1 capsule by mouth once daily.      omeprazole (PRILOSEC) 40 MG capsule Take 1 capsule by mouth in the morning 90 capsule 2    OZEMPIC 1 mg/dose (4 mg/3 mL) Inject 1 mg into the skin every 7 days.      promethazine-dextromethorphan (PROMETHAZINE-DM) 6.25-15 mg/5 mL Syrp Take one tsp po q 6 hrs prn cough 180 mL 0    rosuvastatin (CRESTOR) 40 MG Tab Take 1 tablet by mouth once daily 90 tablet 3    RUTIN/HESP/BIOFLAV/C/HERB#196 (BIOFLEX ORAL) Take 2 tablets by mouth once daily.      senna (SENNA) 8.6 mg tablet Take 2 tablets by mouth nightly as needed for Constipation. 100 tablet 3    traZODone (DESYREL) 50 MG tablet TAKE 1 TABLET BY MOUTH AS NEEDED FOR  INSOMNIA AT BEDTIME. OK TO TAKE 2ND TABLET IN 30 MINUTES IF STILL AWAKE 180 tablet 0    valsartan-hydrochlorothiazide (DIOVAN-HCT) 80-12.5 mg per tablet Take 1 tablet by mouth once daily. 90 tablet 3    albuterol-ipratropium 2.5mg-0.5mg/3mL (DUO-NEB) 0.5 mg-3 mg(2.5 mg base)/3 mL nebulizer solution Take 3 mLs by nebulization every 6 (six) hours as needed for Wheezing. Rescue (Patient taking differently: Take 3 mLs by nebulization every 6 (six) hours as needed for Wheezing. Rescue prn) 3 vial 3    diclofenac sodium (VOLTAREN) 1 % Gel Apply 2 g topically 3 (three) times daily. for 5 days (Patient taking differently: Apply 2 g topically 3 (three) times daily. prn) 100 g 0    metoprolol tartrate (LOPRESSOR) 25 MG tablet Take 1 tablet (25 mg total) by mouth once as needed. For palpitations 30 tablet 11     Current Facility-Administered Medications on File Prior to Visit   Medication Dose Route Frequency Provider Last Rate Last Admin    0.9%  NaCl infusion   Intravenous Continuous Khadijah Rivera NP   1,000 mL at 18 0939    ceFAZolin injection 2 g  2 g Intravenous On Call Procedure Khadijah Rivera NP         Social History:     Social History     Tobacco Use    Smoking status: Former     Packs/day: 1.00     Years: 30.00     Pack years: 30.00     Types: Cigarettes     Quit date: 2014     Years since quittin.3    Smokeless tobacco: Never   Substance Use Topics    Alcohol use: Yes     Alcohol/week: 0.0 standard drinks     Comment: rarely     Family History:     Family History   Problem Relation Age of Onset    No Known Problems Mother     Heart disease Father     Diabetes Maternal Grandfather     Obesity Maternal Grandfather     No Known Problems Sister     No Known Problems Sister     No Known Problems Maternal Grandmother     Stroke Paternal Grandmother     No Known Problems Paternal Grandfather     Heart attack Neg Hx      Physical Exam:   /63 (BP Location: Left arm, Patient Position:  "Sitting, BP Method: Medium (Automatic))   Pulse 73   Ht 5' 5" (1.651 m)   Wt 86 kg (189 lb 9.5 oz)   LMP  (LMP Unknown)   BMI 31.55 kg/m²        Physical Exam   Constitutional: She is oriented to person, place, and time.   HENT:   Head: Normocephalic.   Cardiovascular: Normal rate, regular rhythm and normal pulses.   Pulmonary/Chest: Effort normal and breath sounds normal.   Abdominal: Soft. Normal appearance.   Ventral hernia noted with erythema, tenderness, or drainage   Musculoskeletal:      Cervical back: Neck supple.      Right lower leg: No edema.      Left lower leg: No edema.   Neurological: She is alert and oriented to person, place, and time.      Labs:     Lab Results   Component Value Date     09/14/2022    K 5.0 09/14/2022     09/14/2022    CO2 21 (L) 09/14/2022    BUN 42 (H) 09/14/2022    CREATININE 1.3 09/14/2022    ANIONGAP 8 09/14/2022     Lab Results   Component Value Date    HGBA1C 6.4 (H) 09/14/2022     No results found for: BNP, BNPTRIAGEBLO Lab Results   Component Value Date    WBC 4.59 04/13/2022    HGB 9.6 (L) 04/13/2022    HCT 32.7 (L) 04/13/2022     04/13/2022    GRAN 2.9 04/13/2022    GRAN 63.8 04/13/2022     Lab Results   Component Value Date    CHOL 160 09/14/2022    HDL 65 09/14/2022    LDLCALC 79.0 09/14/2022    TRIG 80 09/14/2022          Imaging:   TTE 8/16/22  The left ventricle is normal in size with normal systolic function. The estimated ejection fraction is 60%.  Normal right ventricular size with normal right ventricular systolic function.  Grade I left ventricular diastolic dysfunction.  The estimated PA systolic pressure is 21 mmHg.  Normal central venous pressure (3 mmHg).    Assessment:     1. Ventral hernia without obstruction or gangrene        2. Hx of TIA (transient ischemic attack) and stroke        3. Hypertension associated with diabetes        4. Hyperlipidemia associated with type 2 diabetes mellitus        5. Paroxysmal atrial fibrillation  "       6. Current use of long term anticoagulation        7. Bilateral carotid artery stenosis               Plan:   Hx of TIA (transient ischemic attack) and stroke  Continue ASA and Eliquis  Advised to stop ASA 5 days prior and Eliquis 2 days prior to surgery; can restart within 24 hrs or as sooner as appropriate per surgery    Paroxysmal atrial fibrillation  Afib Dx at time of CVA  Most recent EKG with NSR  [id number] (age, female, CVA, HTN, DM2)  Continue Eliquis, Lopressor    Bilateral carotid artery disease  Continue ASA, Crestor    Hyperlipidemia associated with type 2 diabetes mellitus  Continue Crestor and start Zetia 10 mg daily  LDL goal < 70    Hypertension associated with diabetes  Continue Valsartan-HCTZ  BP goal < 130/80    Ventral hernia  Planned for laparoscopic resection early next year with general surgery  RCRI 1; 6% 30-day risk of death, MI, or cardiac arrest  Hold ASA and Eliquis prior to surgery as mentioned  EKG, TTE reviewed; patient asymptomatic and no further cardiac testing required prior to surgery      RTC PRN.    Discussed case with Dr Mary Mae MD.    Vinny Brand MD PGY5  Cardiovascular Medicine Fellow  [facility]  Pager: [phone]

## 2022-11-09 ENCOUNTER — PATIENT MESSAGE (OUTPATIENT)
Dept: INTERNAL MEDICINE | Facility: CLINIC | Age: 67
End: 2022-11-09
Payer: MEDICARE

## 2022-11-09 DIAGNOSIS — R30.0 DYSURIA: Primary | ICD-10-CM

## 2022-11-09 RX ORDER — CEPHALEXIN 500 MG/1
500 CAPSULE ORAL EVERY 12 HOURS
Qty: 14 CAPSULE | Refills: 0 | Status: SHIPPED | OUTPATIENT
Start: 2022-11-09 | End: 2023-04-10

## 2022-11-09 NOTE — TELEPHONE ENCOUNTER
Painful urination and cloudy.  I set up u/a and culture tomorrow/ Thursday at lab.    Can you send something for her to start on after she gives urine sample?  Jackie quintero.

## 2022-11-09 NOTE — ASSESSMENT & PLAN NOTE
Continue ASA and Eliquis  Advised to stop ASA 5 days prior and Eliquis 2 days prior to surgery; can restart within 24 hrs or as sooner as appropriate per surgery

## 2022-11-09 NOTE — ASSESSMENT & PLAN NOTE
Afib Dx at time of CVA  Most recent EKG with NSR  [id number] (age, female, CVA, HTN, DM2)  Continue Eliquis, Lopressor

## 2022-11-09 NOTE — ASSESSMENT & PLAN NOTE
Planned for laparoscopic resection early next year with general surgery  RCRI 1; 6% 30-day risk of death, MI, or cardiac arrest  Hold ASA and Eliquis prior to surgery as mentioned  EKG, TTE reviewed; patient asymptomatic and no further cardiac testing required prior to surgery

## 2022-11-10 ENCOUNTER — LAB VISIT (OUTPATIENT)
Dept: LAB | Facility: HOSPITAL | Age: 67
End: 2022-11-10
Attending: INTERNAL MEDICINE
Payer: MEDICARE

## 2022-11-10 DIAGNOSIS — R30.0 DYSURIA: ICD-10-CM

## 2022-11-10 LAB
BACTERIA #/AREA URNS AUTO: ABNORMAL /HPF
BILIRUB UR QL STRIP: NEGATIVE
CLARITY UR REFRACT.AUTO: ABNORMAL
COLOR UR AUTO: YELLOW
GLUCOSE UR QL STRIP: NEGATIVE
HGB UR QL STRIP: ABNORMAL
HYALINE CASTS UR QL AUTO: 0 /LPF
KETONES UR QL STRIP: NEGATIVE
LEUKOCYTE ESTERASE UR QL STRIP: ABNORMAL
MICROSCOPIC COMMENT: ABNORMAL
NITRITE UR QL STRIP: NEGATIVE
PH UR STRIP: 6 [PH] (ref 5–8)
PROT UR QL STRIP: ABNORMAL
RBC #/AREA URNS AUTO: 23 /HPF (ref 0–4)
SP GR UR STRIP: 1.02 (ref 1–1.03)
SQUAMOUS #/AREA URNS AUTO: 6 /HPF
URN SPEC COLLECT METH UR: ABNORMAL
WBC #/AREA URNS AUTO: >100 /HPF (ref 0–5)
WBC CLUMPS UR QL AUTO: ABNORMAL

## 2022-11-10 PROCEDURE — 87088 URINE BACTERIA CULTURE: CPT | Performed by: INTERNAL MEDICINE

## 2022-11-10 PROCEDURE — 87186 SC STD MICRODIL/AGAR DIL: CPT | Performed by: INTERNAL MEDICINE

## 2022-11-10 PROCEDURE — 87086 URINE CULTURE/COLONY COUNT: CPT | Performed by: INTERNAL MEDICINE

## 2022-11-10 PROCEDURE — 81001 URINALYSIS AUTO W/SCOPE: CPT | Performed by: INTERNAL MEDICINE

## 2022-11-10 PROCEDURE — 87077 CULTURE AEROBIC IDENTIFY: CPT | Performed by: INTERNAL MEDICINE

## 2022-11-13 LAB — BACTERIA UR CULT: ABNORMAL

## 2022-11-14 ENCOUNTER — PATIENT MESSAGE (OUTPATIENT)
Dept: INTERNAL MEDICINE | Facility: CLINIC | Age: 67
End: 2022-11-14
Payer: MEDICARE

## 2022-11-14 ENCOUNTER — TELEPHONE (OUTPATIENT)
Dept: INTERNAL MEDICINE | Facility: CLINIC | Age: 67
End: 2022-11-14
Payer: MEDICARE

## 2022-11-14 NOTE — TELEPHONE ENCOUNTER
cephALEXin (KEFLEX) 500 MG capsule given 11/10/22.  Please advise what I can tell cornel.  Thanks damien    Results back:   Urine Culture, Routine  Abnormal   CITROBACTER KOSERI   >100,000 cfu/ml     Resulting Agency [facility]        Susceptibility     Citrobacter koseri     CULTURE, URINE     Amox/K Clav'ate <=8/4 mcg/mL Sensitive     Amp/Sulbactam <=8/4 mcg/mL Sensitive     Cefazolin <=2 mcg/mL Sensitive     Cefepime <=2 mcg/mL Sensitive     Ceftriaxone <=1 mcg/mL Sensitive     Ciprofloxacin <=1 mcg/mL Sensitive     Ertapenem <=0.5 mcg/mL Sensitive     Gentamicin <=4 mcg/mL Sensitive     Levofloxacin <=2 mcg/mL Sensitive     Meropenem <=1 mcg/mL Sensitive     Nitrofurantoin 64 mcg/mL Intermediate     Piperacillin/Tazo <=16 mcg/mL Sensitive     Tobramycin <=4 mcg/mL Sensitive     Trimeth/Sulfa <=2/38 mcg/mL Sensitive

## 2022-11-14 NOTE — TELEPHONE ENCOUNTER
Urine culture was positive for bacterial infection.  The bacteria is sensitive to the cephalexin already ordered. The patient should complete the medication.

## 2022-11-14 NOTE — TELEPHONE ENCOUNTER
----- Message from Miguelina Nayak MA sent at 11/10/2022  8:54 AM CST -----  Regarding: urine results back from 11/10?

## 2022-11-21 NOTE — PROGRESS NOTES
Subjective:       Patient ID: Diana Montenegro is a 67 y.o. female.    Chief Complaint: Hypertension (4 mos fol up, see labs)    HPI  The patient presents for follow-up of medical conditions which include type 2 diabetes mellitus, hypertension, hypothyroidism, chronic lower back pain.  The patient also has a ventral hernia.  She has decided to postpone surgical repair.    Blood sugar levels have averaged 113.  Sugar levels have ranged from 78 to greater than 130.  No hypoglycemic symptoms have been noted.    The patient states she has chronic lower back pain symptoms are exacerbated by prolonged standing or walking.    The patient reports she had COVID-19 infection at the end of June 2022.  She relates she has fully recovered.    Review of Systems   Constitutional:  Negative for activity change, appetite change and unexpected weight change.   Eyes:  Negative for visual disturbance.   Respiratory:  Negative for shortness of breath.    Cardiovascular:  Negative for chest pain, palpitations and leg swelling.   Gastrointestinal:  Negative for abdominal pain, blood in stool and diarrhea.   Genitourinary:  Negative for dysuria, frequency, hematuria and urgency.   Musculoskeletal:  Positive for back pain.   Neurological:  Negative for weakness, numbness and headaches.   Psychiatric/Behavioral:  Negative for sleep disturbance.           Physical Exam  Vitals and nursing note reviewed.   Constitutional:       General: She is not in acute distress.     Appearance: Normal appearance. She is well-developed.      Comments: The patient has lost 5 lb since 04/08/2022.   HENT:      Head: Normocephalic and atraumatic.   Eyes:      General: No scleral icterus.     Extraocular Movements: Extraocular movements intact.      Conjunctiva/sclera: Conjunctivae normal.   Neck:      Thyroid: No thyromegaly.      Vascular: No JVD.   Cardiovascular:      Rate and Rhythm: Normal rate and regular rhythm.      Heart sounds: Normal heart  sounds. No murmur heard.    No friction rub. No gallop.   Pulmonary:      Effort: Pulmonary effort is normal. No respiratory distress.      Breath sounds: Normal breath sounds. No wheezing or rales.   Abdominal:      General: Bowel sounds are normal.      Palpations: Abdomen is soft. There is no mass.      Tenderness: There is no abdominal tenderness.   Musculoskeletal:         General: No tenderness. Normal range of motion.      Cervical back: Normal range of motion and neck supple.   Lymphadenopathy:      Cervical: No cervical adenopathy.   Skin:     General: Skin is warm and dry.      Findings: No rash.      Comments: No foot lesions are present.   Neurological:      Mental Status: She is alert and oriented to person, place, and time.      Cranial Nerves: No cranial nerve deficit.      Comments: Sensory exam is intact in both feet on monofilament testing.   Psychiatric:         Mood and Affect: Mood normal.         Behavior: Behavior normal.       Protective Sensation (w/ 10 gram monofilament):  Right: Intact  Left: Intact    Visual Inspection:  Normal -  Bilateral    Pedal Pulses:   Right: Present  Left: Present    Posterior tibialis:   Right:Present  Left: Present    Lab Visit on 09/14/2022   Component Date Value Ref Range Status    Hemoglobin A1C 09/14/2022 6.4 (H)  4.0 - 5.6 % Final    Comment: ADA Screening Guidelines:  5.7-6.4%  Consistent with prediabetes  >or=6.5%  Consistent with diabetes    High levels of fetal hemoglobin interfere with the HbA1C  assay. Heterozygous hemoglobin variants (HbS, HgC, etc)do  not significantly interfere with this assay.   However, presence of multiple variants may affect accuracy.      Estimated Avg Glucose 09/14/2022 137 (H)  68 - 131 mg/dL Final    Cholesterol 09/14/2022 160  120 - 199 mg/dL Final    Comment: The National Cholesterol Education Program (NCEP) has set the  following guidelines (reference ranges) for Cholesterol:  Optimal.....................<200  mg/dL  Borderline High.............200-239 mg/dL  High........................> or = 240 mg/dL      Triglycerides 09/14/2022 80  30 - 150 mg/dL Final    Comment: The National Cholesterol Education Program (NCEP) has set the  following guidelines (reference values) for triglycerides:  Normal......................<150 mg/dL  Borderline High.............150-199 mg/dL  High........................200-499 mg/dL      HDL 09/14/2022 65  40 - 75 mg/dL Final    Comment: The National Cholesterol Education Program (NCEP) has set the  following guidelines (reference values) for HDL Cholesterol:  Low...............<40 mg/dL  Optimal...........>60 mg/dL      LDL Cholesterol 09/14/2022 79.0  63.0 - 159.0 mg/dL Final    Comment: The National Cholesterol Education Program (NCEP) has set the  following guidelines (reference values) for LDL Cholesterol:  Optimal.......................<130 mg/dL  Borderline High...............130-159 mg/dL  High..........................160-189 mg/dL  Very High.....................>190 mg/dL      HDL/Cholesterol Ratio 09/14/2022 40.6  20.0 - 50.0 % Final    Total Cholesterol/HDL Ratio 09/14/2022 2.5  2.0 - 5.0 Final    Non-HDL Cholesterol 09/14/2022 95  mg/dL Final    Comment: Risk category and Non-HDL cholesterol goals:  Coronary heart disease (CHD)or equivalent (10-year risk of CHD >20%):  Non-HDL cholesterol goal     <130 mg/dL  Two or more CHD risk factors and 10-year risk of CHD <= 20%:  Non-HDL cholesterol goal     <160 mg/dL  0 to 1 CHD risk factor:  Non-HDL cholesterol goal     <190 mg/dL      Sodium 09/14/2022 138  136 - 145 mmol/L Final    Potassium 09/14/2022 5.0  3.5 - 5.1 mmol/L Final    Chloride 09/14/2022 109  95 - 110 mmol/L Final    CO2 09/14/2022 21 (L)  23 - 29 mmol/L Final    Glucose 09/14/2022 127 (H)  70 - 110 mg/dL Final    BUN 09/14/2022 42 (H)  8 - 23 mg/dL Final    Creatinine 09/14/2022 1.3  0.5 - 1.4 mg/dL Final    Calcium 09/14/2022 9.7  8.7 - 10.5 mg/dL Final    Total  Protein 09/14/2022 7.1  6.0 - 8.4 g/dL Final    Albumin 09/14/2022 3.7  3.5 - 5.2 g/dL Final    Total Bilirubin 09/14/2022 0.3  0.1 - 1.0 mg/dL Final    Comment: For infants and newborns, interpretation of results should be based  on gestational age, weight and in agreement with clinical  observations.    Premature Infant recommended reference ranges:  Up to 24 hours.............<8.0 mg/dL  Up to 48 hours............<12.0 mg/dL  3-5 days..................<15.0 mg/dL  6-29 days.................<15.0 mg/dL      Alkaline Phosphatase 09/14/2022 86  55 - 135 U/L Final    AST 09/14/2022 50 (H)  10 - 40 U/L Final    ALT 09/14/2022 80 (H)  10 - 44 U/L Final    Anion Gap 09/14/2022 8  8 - 16 mmol/L Final    eGFR 09/14/2022 45.4 (A)  >60 mL/min/1.73 m^2 Final    TSH 09/14/2022 0.137 (L)  0.400 - 4.000 uIU/mL Final    Free T4 09/14/2022 1.14  0.71 - 1.51 ng/dL Final   Lab Visit on 09/14/2022   Component Date Value Ref Range Status    Microalbumin, Urine 09/14/2022 60.0  ug/mL Final    Creatinine, Urine 09/14/2022 81.0  15.0 - 325.0 mg/dL Final    Microalb/Creat Ratio 09/14/2022 74.1 (H)  0.0 - 30.0 ug/mg Final       Assessment & Plan:      Diana was seen today for hypertension.  Current medications will be continued.  The dose of Synthroid will be decreased to 100 mcg every morning.  Laboratory studies will be repeated in 4 months.      Prevnar-20 vaccine will be administered today.    Diagnoses and all orders for this visit:    Type 2 diabetes mellitus with diabetic neuropathy, without long-term current use of insulin  -     Comprehensive Metabolic Panel; Future  -     TSH; Future  -     Hemoglobin A1C; Future    Current use of long term anticoagulation    Chronic bilateral low back pain, unspecified whether sciatica present    Acquired hypothyroidism  -     Comprehensive Metabolic Panel; Future  -     TSH; Future  -     Hemoglobin A1C; Future    Arthritis    Hyperlipidemia associated with type 2 diabetes mellitus  -      Comprehensive Metabolic Panel; Future  -     TSH; Future  -     Hemoglobin A1C; Future    Hypertension associated with diabetes  -     Comprehensive Metabolic Panel; Future  -     TSH; Future  -     Hemoglobin A1C; Future    Paroxysmal atrial fibrillation    Hx of TIA (transient ischemic attack) and stroke    Encounter for screening mammogram for breast cancer  -     Cancel: Mammo Digital Screening Bilat; Future  -     Mammo Digital Screening Bilat w/ Heath; Future    Other orders  -     (In Office Administered) Pneumococcal Conjugate Vaccine (20 Valent) (IM)       Follow up in about 4 months (around 1/15/2023).     Rad Johnston MD

## 2022-12-17 DIAGNOSIS — G47.00 INSOMNIA, UNSPECIFIED TYPE: ICD-10-CM

## 2022-12-17 DIAGNOSIS — I48.0 PAROXYSMAL ATRIAL FIBRILLATION: Chronic | ICD-10-CM

## 2022-12-17 NOTE — TELEPHONE ENCOUNTER
Care Due:                  Date            Visit Type   Department     Provider  --------------------------------------------------------------------------------                                EP -                              PRIMARY      [facility] INTERNAL  Last Visit: 09-      CARE ([facility])   MEDICINE       Radtarah Johnston                              EP -                              PRIMARY      [facility] INTERNAL  Next Visit: 04-      [facility] ([facility])   MEDICINE       Radkelle Johnston                                                            Last  Test          Frequency    Reason                     Performed    Due Date  --------------------------------------------------------------------------------    HBA1C.......  6 months...  glimepiride, metFORMIN...  09- 03-    [facility] Embedded Care Gaps. Reference number: [id number]. 12/17/2022   2:26:54 PM CST

## 2022-12-19 RX ORDER — TRAZODONE HYDROCHLORIDE 50 MG/1
50 TABLET ORAL NIGHTLY PRN
Qty: 180 TABLET | Refills: 2 | Status: SHIPPED | OUTPATIENT
Start: 2022-12-19 | End: 2023-10-08

## 2022-12-19 NOTE — TELEPHONE ENCOUNTER
Refill Routing Note   Medication(s) are not appropriate for processing by [facility] for the following reason(s):      - Outside of protocol (non-delegated)     [facility] action(s):  Route  Approve Medication-related problems identified: Requires labs     Medication Therapy Plan: Labs (a1c) due within 90 days  Medication reconciliation completed: No     Appointments  past 12m or future 3m with PCP    Date Provider   Last Visit   9/15/2022 Rad Johnston MD   Next Visit   4/10/2023 Rad Johnston MD   ED visits in past 90 days: 0        Note composed:3:17 PM 12/19/2022

## 2022-12-20 NOTE — TELEPHONE ENCOUNTER
Provider Staff:     Action is required for this patient.   Please see care gap opportunities below in Care Due Message.     Thanks!  [facility]     Appointments      Date Provider   Last Visit   9/15/2022 Rad Johnston MD   Next Visit   4/10/2023 Rad Johnston MD     Note composed:8:14 PM 12/19/2022

## 2022-12-29 DIAGNOSIS — E11.40 TYPE 2 DIABETES MELLITUS WITH DIABETIC NEUROPATHY, WITHOUT LONG-TERM CURRENT USE OF INSULIN: Primary | ICD-10-CM

## 2022-12-29 RX ORDER — SEMAGLUTIDE 1.34 MG/ML
1 INJECTION, SOLUTION SUBCUTANEOUS
Qty: 1 PEN | Refills: 3 | Status: SHIPPED | OUTPATIENT
Start: 2022-12-29 | End: 2023-01-05 | Stop reason: SDUPTHER

## 2022-12-29 NOTE — TELEPHONE ENCOUNTER
----- Message from Lisset Zelaya sent at 12/29/2022 12:24 PM CST -----  Good Afternoon,  Patient: REGINE SOLARES [[id number]] is requesting a call from the Nurse please regarding her Ozempic. Thank you in advance.

## 2022-12-29 NOTE — TELEPHONE ENCOUNTER
Pt advised the pharmacy called her in reference to Rx Ozempic. The pt did not divulge what the pharmacy stated or advised to her upon communication. The pt advised she does have some Ozempic and she is due for a refill.        Lov     22/09/15 W/ Dr. Johnston    Rtc     23/04/10    Lrf      22/09/02

## 2023-01-05 DIAGNOSIS — E11.40 TYPE 2 DIABETES MELLITUS WITH DIABETIC NEUROPATHY, WITHOUT LONG-TERM CURRENT USE OF INSULIN: ICD-10-CM

## 2023-01-06 RX ORDER — SEMAGLUTIDE 1.34 MG/ML
1 INJECTION, SOLUTION SUBCUTANEOUS
Qty: 1 PEN | Refills: 4 | Status: SHIPPED | OUTPATIENT
Start: 2023-01-06 | End: 2023-12-01

## 2023-01-12 LAB
LEFT EYE DM RETINOPATHY: POSITIVE
RIGHT EYE DM RETINOPATHY: POSITIVE

## 2023-01-17 ENCOUNTER — OFFICE VISIT (OUTPATIENT)
Dept: PODIATRY | Facility: CLINIC | Age: 68
End: 2023-01-17
Payer: MEDICARE

## 2023-01-17 VITALS
HEART RATE: 58 BPM | BODY MASS INDEX: 31.55 KG/M2 | DIASTOLIC BLOOD PRESSURE: 53 MMHG | SYSTOLIC BLOOD PRESSURE: 128 MMHG | HEIGHT: 65 IN

## 2023-01-17 DIAGNOSIS — B35.1 ONYCHOMYCOSIS DUE TO DERMATOPHYTE: ICD-10-CM

## 2023-01-17 DIAGNOSIS — M20.40 HAMMER TOE, UNSPECIFIED LATERALITY: ICD-10-CM

## 2023-01-17 DIAGNOSIS — F41.9 ANXIETY: Primary | ICD-10-CM

## 2023-01-17 DIAGNOSIS — E11.42 DIABETIC POLYNEUROPATHY ASSOCIATED WITH TYPE 2 DIABETES MELLITUS: Primary | ICD-10-CM

## 2023-01-17 DIAGNOSIS — M20.5X9 MALLET TOE, UNSPECIFIED LATERALITY: ICD-10-CM

## 2023-01-17 DIAGNOSIS — E11.9 ENCOUNTER FOR DIABETIC FOOT EXAM: ICD-10-CM

## 2023-01-17 PROCEDURE — 11721 ROUTINE FOOT CARE: ICD-10-PCS | Mod: S$GLB,,, | Performed by: STUDENT IN AN ORGANIZED HEALTH CARE EDUCATION/TRAINING PROGRAM

## 2023-01-17 PROCEDURE — 99999 PR PBB SHADOW E&M-EST. PATIENT-LVL IV: ICD-10-PCS | Mod: PBBFAC,,, | Performed by: STUDENT IN AN ORGANIZED HEALTH CARE EDUCATION/TRAINING PROGRAM

## 2023-01-17 PROCEDURE — 3008F BODY MASS INDEX DOCD: CPT | Mod: CPTII,S$GLB,, | Performed by: STUDENT IN AN ORGANIZED HEALTH CARE EDUCATION/TRAINING PROGRAM

## 2023-01-17 PROCEDURE — 99214 PR OFFICE/OUTPT VISIT, EST, LEVL IV, 30-39 MIN: ICD-10-PCS | Mod: 25,S$GLB,, | Performed by: STUDENT IN AN ORGANIZED HEALTH CARE EDUCATION/TRAINING PROGRAM

## 2023-01-17 PROCEDURE — 1159F MED LIST DOCD IN RCRD: CPT | Mod: CPTII,S$GLB,, | Performed by: STUDENT IN AN ORGANIZED HEALTH CARE EDUCATION/TRAINING PROGRAM

## 2023-01-17 PROCEDURE — 3074F PR MOST RECENT SYSTOLIC BLOOD PRESSURE < 130 MM HG: ICD-10-PCS | Mod: CPTII,S$GLB,, | Performed by: STUDENT IN AN ORGANIZED HEALTH CARE EDUCATION/TRAINING PROGRAM

## 2023-01-17 PROCEDURE — 3008F PR BODY MASS INDEX (BMI) DOCUMENTED: ICD-10-PCS | Mod: CPTII,S$GLB,, | Performed by: STUDENT IN AN ORGANIZED HEALTH CARE EDUCATION/TRAINING PROGRAM

## 2023-01-17 PROCEDURE — 99999 PR PBB SHADOW E&M-EST. PATIENT-LVL IV: CPT | Mod: PBBFAC,,, | Performed by: STUDENT IN AN ORGANIZED HEALTH CARE EDUCATION/TRAINING PROGRAM

## 2023-01-17 PROCEDURE — 11721 DEBRIDE NAIL 6 OR MORE: CPT | Mod: S$GLB,,, | Performed by: STUDENT IN AN ORGANIZED HEALTH CARE EDUCATION/TRAINING PROGRAM

## 2023-01-17 PROCEDURE — 1126F AMNT PAIN NOTED NONE PRSNT: CPT | Mod: CPTII,S$GLB,, | Performed by: STUDENT IN AN ORGANIZED HEALTH CARE EDUCATION/TRAINING PROGRAM

## 2023-01-17 PROCEDURE — 3078F DIAST BP <80 MM HG: CPT | Mod: CPTII,S$GLB,, | Performed by: STUDENT IN AN ORGANIZED HEALTH CARE EDUCATION/TRAINING PROGRAM

## 2023-01-17 PROCEDURE — 3078F PR MOST RECENT DIASTOLIC BLOOD PRESSURE < 80 MM HG: ICD-10-PCS | Mod: CPTII,S$GLB,, | Performed by: STUDENT IN AN ORGANIZED HEALTH CARE EDUCATION/TRAINING PROGRAM

## 2023-01-17 PROCEDURE — 1159F PR MEDICATION LIST DOCUMENTED IN MEDICAL RECORD: ICD-10-PCS | Mod: CPTII,S$GLB,, | Performed by: STUDENT IN AN ORGANIZED HEALTH CARE EDUCATION/TRAINING PROGRAM

## 2023-01-17 PROCEDURE — 1126F PR PAIN SEVERITY QUANTIFIED, NO PAIN PRESENT: ICD-10-PCS | Mod: CPTII,S$GLB,, | Performed by: STUDENT IN AN ORGANIZED HEALTH CARE EDUCATION/TRAINING PROGRAM

## 2023-01-17 PROCEDURE — 99214 OFFICE O/P EST MOD 30 MIN: CPT | Mod: 25,S$GLB,, | Performed by: STUDENT IN AN ORGANIZED HEALTH CARE EDUCATION/TRAINING PROGRAM

## 2023-01-17 PROCEDURE — 3074F SYST BP LT 130 MM HG: CPT | Mod: CPTII,S$GLB,, | Performed by: STUDENT IN AN ORGANIZED HEALTH CARE EDUCATION/TRAINING PROGRAM

## 2023-01-17 RX ORDER — CICLOPIROX 80 MG/ML
SOLUTION TOPICAL NIGHTLY
Qty: 6.6 ML | Refills: 11 | Status: SHIPPED | OUTPATIENT
Start: 2023-01-17 | End: 2024-03-07 | Stop reason: SDUPTHER

## 2023-01-17 NOTE — PROGRESS NOTES
Subjective:      Patient ID: Diana Montenegor is a 67 y.o. female.    Chief Complaint: Diabetes Mellitus (Rad Johnston MD     04/10/2023) and Diabetic Foot Exam    Diana is a 67 y.o. female who presents to the clinic for evaluation and treatment of high risk feet. Diana has a past medical history of Arthritis, Atrial fibrillation, unspecified, Chronic back pain, Colon polyp, CVA (cerebral infarction) (7/16/14), Degenerative disc disease, Diabetes mellitus type II, Encounter for loop recorder at end of battery life (9/17/2018), Hyperlipidemia, Hypertension, Hypothyroidism, Mild nonproliferative diabetic retinopathy (08/12/2018), Obesity, Sleep apnea, Stroke (july 2014), and Venous insufficiency (chronic) (peripheral). The patient's chief complaint is long, thick toenails. Pt is also complaining of itchy feet. This patient has documented high risk feet requiring routine maintenance secondary to diabetes mellitis and those secondary complications of diabetes, as mentioned..  .    1/12/22: Pt seen today for diabetic foot exam. No new pedal complaints.     1/17/23: Seen today for annual diabetic foot exam. Inquiring about treatment options for thickened toenails. No further pedal complaints.     PCP: Rad Johnston MD    Date Last Seen by PCP:  4/10/23  Current shoe gear:  Affected Foot: Casual shoes     Unaffected Foot: Casual shoes    Hemoglobin A1C   Date Value Ref Range Status   09/14/2022 6.4 (H) 4.0 - 5.6 % Final     Comment:     ADA Screening Guidelines:  5.7-6.4%  Consistent with prediabetes  >or=6.5%  Consistent with diabetes    High levels of fetal hemoglobin interfere with the HbA1C  assay. Heterozygous hemoglobin variants (HbS, HgC, etc)do  not significantly interfere with this assay.   However, presence of multiple variants may affect accuracy.     04/13/2022 7.0 (H) 4.0 - 5.6 % Final     Comment:     ADA Screening Guidelines:  5.7-6.4%  Consistent with prediabetes  >or=6.5%  Consistent with  diabetes    High levels of fetal hemoglobin interfere with the HbA1C  assay. Heterozygous hemoglobin variants (HbS, HgC, etc)do  not significantly interfere with this assay.   However, presence of multiple variants may affect accuracy.     02/10/2022 6.5 (H) 4.0 - 5.6 % Final     Comment:     ADA Screening Guidelines:  5.7-6.4%  Consistent with prediabetes  >or=6.5%  Consistent with diabetes    High levels of fetal hemoglobin interfere with the HbA1C  assay. Heterozygous hemoglobin variants (HbS, HgC, etc)do  not significantly interfere with this assay.   However, presence of multiple variants may affect accuracy.         Review of Systems   Constitutional: Negative for decreased appetite, fever and malaise/fatigue.   HENT:  Negative for congestion.    Cardiovascular:  Negative for chest pain and leg swelling.   Respiratory:  Negative for cough and shortness of breath.    Skin:  Positive for dry skin and nail changes. Negative for itching and rash.   Musculoskeletal:  Positive for arthritis and stiffness. Negative for joint pain, joint swelling and muscle weakness.   Gastrointestinal:  Negative for bloating, abdominal pain, nausea and vomiting.   Neurological:  Positive for numbness. Negative for headaches and weakness.   Psychiatric/Behavioral:  Negative for altered mental status.            Past Medical History:   Diagnosis Date    Arthritis     Atrial fibrillation, unspecified     hx of a fib prior to stroke.    Chronic back pain     Colon polyp     CVA (cerebral infarction) 7/16/14    Degenerative disc disease     cervical; lumbar    Diabetes mellitus type II     Encounter for loop recorder at end of battery life 9/17/2018    Hyperlipidemia     Hypertension     Hypothyroidism     Mild nonproliferative diabetic retinopathy 08/12/2018    Obesity     Sleep apnea     Stroke july 2014    Venous insufficiency (chronic) (peripheral)        Past Surgical History:   Procedure Laterality Date    COLONOSCOPY N/A 8/30/2018     Procedure: COLONOSCOPY;  Surgeon: William Clement MD;  Location: [facility] ENDO (4TH FLR);  Service: Endoscopy;  Laterality: N/A;  Tito/Dr Rad Johnston/OK to hold 2 days prior to colon/see telephone encounter dated 18/pt has loop recorder/svn    COLONOSCOPY W/ POLYPECTOMY      GASTRECTOMY      HERNIA REPAIR      REMOVAL OF IMPLANTABLE LOOP RECORDER N/A 2018    Procedure: REMOVAL, IMPLANTABLE LOOP RECORDER;  Surgeon: Thomas Randolph MD;  Location: [facility] CATH LAB;  Service: Cardiology;  Laterality: N/A;  TERESA, ILR removal (no reimplant), MDT, RN Sedate, SK, 3 Prep *Reveal LINQ*    TONSILLECTOMY      TUBAL LIGATION         Family History   Problem Relation Age of Onset    No Known Problems Mother     Heart disease Father     Diabetes Maternal Grandfather     Obesity Maternal Grandfather     No Known Problems Sister     No Known Problems Sister     No Known Problems Maternal Grandmother     Stroke Paternal Grandmother     No Known Problems Paternal Grandfather     Heart attack Neg Hx        Social History     Socioeconomic History    Marital status: Single   Occupational History    Occupation: MOS      Employer: [employer]   Tobacco Use    Smoking status: Former     Packs/day: 1.00     Years: 30.00     Pack years: 30.00     Types: Cigarettes     Quit date: 2014     Years since quittin.5    Smokeless tobacco: Never   Substance and Sexual Activity    Alcohol use: Yes     Alcohol/week: 0.0 standard drinks     Comment: rarely    Drug use: No     Comment: thc at young age    Sexual activity: Not Currently     Partners: Male     Social Determinants of Health     Financial Resource Strain: Low Risk     Difficulty of Paying Living Expenses: Not very hard   Food Insecurity: No Food Insecurity    Worried About Running Out of Food in the Last Year: Never true    Ran Out of Food in the Last Year: Never true   Transportation Needs: No Transportation Needs    Lack of Transportation (Medical): No     Lack of Transportation (Non-Medical): No   Physical Activity: Sufficiently Active    Days of Exercise per Week: 5 days    Minutes of Exercise per Session: 120 min   Stress: Stress Concern Present    Feeling of Stress : To some extent   Social Connections: Unknown    Frequency of Communication with Friends and Family: More than three times a week    Frequency of Social Gatherings with Friends and Family: Three times a week    Active Member of Clubs or Organizations: No    Attends Club or Organization Meetings: Patient refused    Marital Status:    Housing Stability: Low Risk     Unable to Pay for Housing in the Last Year: No    Number of Places Lived in the Last Year: 1    Unstable Housing in the Last Year: No       Current Outpatient Medications   Medication Sig Dispense Refill    acyclovir 5% (ZOVIRAX) 5 % ointment Apply topically 6 (six) times daily. 5 g 1    albuterol (VENTOLIN HFA) 90 mcg/actuation inhaler Inhale 2 puffs into the lungs every 6 (six) hours as needed for Wheezing. Rescue 18 g 3    apixaban (ELIQUIS) 5 mg Tab Take 1 tablet (5 mg total) by mouth 2 (two) times daily. 180 tablet 3    aspirin (ECOTRIN) 81 MG EC tablet Take 1 tablet (81 mg total) by mouth once daily.      b complex vitamins tablet Take 1 tablet by mouth once daily.      blood pressure test kit-large Kit Use as directed 1 each 0    blood sugar diagnostic Strp Needs test strips to ck glucose twice daily. Strips that are covered by insurance paln 200 strip 3    blood-glucose meter Misc One touch verio flex or tone touch verio reflect or freestyle freedom lite meter (all covered by insurance) 1 each 0    CALCIUM CITRATE/VITAMIN D3 (CALCIUM CITRATE + D ORAL) Take 1 tablet by mouth every morning.      cetirizine (ZYRTEC) 5 MG tablet Take 5 mg by mouth once daily.      cinnamon bark (CINNAMON ORAL) Take by mouth 2 (two) times daily.      clotrimazole-betamethasone 1-0.05% (LOTRISONE) cream Apply topically 2 (two) times daily.  (Patient taking differently: Apply topically 2 (two) times daily. prn) 1 Tube 2    cyanocobalamin, vitamin B-12, 500 mcg Subl Place 1 tablet under the tongue once daily.      docusate sodium (COLACE) 100 MG capsule Take 100 mg by mouth once daily.       EYLEA 2 mg/0.05 mL Syrg       ezetimibe (ZETIA) 10 mg tablet Take 1 tablet (10 mg total) by mouth once daily. 90 tablet 3    fish oil-omega-3 fatty acids 300-1,000 mg capsule Take 1 capsule by mouth 2 (two) times daily. Take 1 cap 2 times daily every other day      fluticasone (FLONASE) 50 mcg/actuation nasal spray 2 sprays (100 mcg total) by Each Nare route once daily. (Patient taking differently: 2 sprays by Each Nostril route daily as needed.) 1 Bottle 0    glimepiride (AMARYL) 2 MG tablet Take 1 tablet (2 mg total) by mouth before breakfast. For diabetes control. 90 tablet 3    lancets Misc Needs lancets for testing twice daily.  To go with meter covered by insurance. 200 each 3    levothyroxine (SYNTHROID) 100 MCG tablet Take 1 tablet (100 mcg total) by mouth before breakfast. 30 tablet 6    LORazepam (ATIVAN) 1 MG tablet Take 1 tablet (1 mg total) by mouth 2 (two) times daily as needed for Anxiety. 60 tablet 0    metFORMIN (GLUCOPHAGE) 1000 MG tablet Take 1 tablet (1,000 mg total) by mouth 2 (two) times daily with meals. 180 tablet 1    metFORMIN (GLUCOPHAGE) 1000 MG tablet TAKE 1 TABLET BY MOUTH TWICE DAILY WITH MEALS 180 tablet 0    multivitamin capsule Take 1 capsule by mouth once daily.      omeprazole (PRILOSEC) 40 MG capsule Take 1 capsule by mouth in the morning 90 capsule 2    OZEMPIC 1 mg/dose (4 mg/3 mL) Inject 1 mg into the skin every 7 days. 1 pen 4    promethazine-dextromethorphan (PROMETHAZINE-DM) 6.25-15 mg/5 mL Syrp Take one tsp po q 6 hrs prn cough 180 mL 0    rosuvastatin (CRESTOR) 40 MG Tab Take 1 tablet by mouth once daily 90 tablet 3    RUTIN/HESP/BIOFLAV/C/HERB#196 (BIOFLEX ORAL) Take 2 tablets by mouth once daily.      senna (SENNA) 8.6  "mg tablet Take 2 tablets by mouth nightly as needed for Constipation. 100 tablet 3    traZODone (DESYREL) 50 MG tablet Take 1 tablet (50 mg total) by mouth nightly as needed for Insomnia. Ok to take 2nd tablet in 30 minutes if still awake 180 tablet 2    valsartan-hydrochlorothiazide (DIOVAN-HCT) 80-12.5 mg per tablet Take 1 tablet by mouth once daily. 90 tablet 3    albuterol-ipratropium 2.5mg-0.5mg/3mL (DUO-NEB) 0.5 mg-3 mg(2.5 mg base)/3 mL nebulizer solution Take 3 mLs by nebulization every 6 (six) hours as needed for Wheezing. Rescue (Patient taking differently: Take 3 mLs by nebulization every 6 (six) hours as needed for Wheezing. Rescue prn) 3 vial 3    cephALEXin (KEFLEX) 500 MG capsule Take 1 capsule (500 mg total) by mouth every 12 (twelve) hours. For infection. 14 capsule 0    ciclopirox (PENLAC) 8 % Soln Apply topically nightly. 6.6 mL 11    diclofenac sodium (VOLTAREN) 1 % Gel Apply 2 g topically 3 (three) times daily. for 5 days (Patient taking differently: Apply 2 g topically 3 (three) times daily. prn) 100 g 0    metoprolol tartrate (LOPRESSOR) 25 MG tablet Take 1 tablet (25 mg total) by mouth once as needed. For palpitations 30 tablet 11     No current facility-administered medications for this visit.     Facility-Administered Medications Ordered in Other Visits   Medication Dose Route Frequency Provider Last Rate Last Admin    0.9%  NaCl infusion   Intravenous Continuous Khadijah Rivera NP   1,000 mL at 09/17/18 0939    ceFAZolin injection 2 g  2 g Intravenous On Call Procedure Khadijah Rivera NP           Review of patient's allergies indicates:   Allergen Reactions    Medrol [methylprednisolone] Palpitations       Vitals:    01/17/23 1345   BP: (!) 128/53   Pulse: (!) 58   Height: 5' 5" (1.651 m)   PainSc: 0-No pain   PainLoc: Foot       Objective:      Physical Exam  Vitals and nursing note reviewed.   Constitutional:       General: She is not in acute distress.     Appearance: She is " well-developed. She is not diaphoretic.   Cardiovascular:      Pulses:           Dorsalis pedis pulses are 1+ on the right side and 1+ on the left side.        Posterior tibial pulses are 0 on the right side and 0 on the left side.      Comments: Pedal pulses diminished, stefany. Skin temperature is within normal limits. Toes are cool to touch and feet are warm proximally. Hair growth is diminished. Skin is normotrophic and with hyperpigmentation. Mild edema noted,stefany.  Musculoskeletal:         General: Deformity present. No tenderness.      Right foot: Decreased range of motion. Deformity present.      Left foot: Decreased range of motion. Deformity present.      Comments: Adequate joint range of motion without pain, limitation, nor crepitation to bilateral feet and ankle joints. Muscle strength is 5/5 in all groups bilaterally.    Rigid hammertoe 2 through 4, rigid mallet toe of the hallux bilaterally without pain.     Feet:      Right foot:      Protective Sensation: 10 sites tested.  9 sites sensed.      Skin integrity: No ulcer, blister, skin breakdown, erythema, warmth or dry skin.      Left foot:      Protective Sensation: 10 sites tested.  8 sites sensed.      Skin integrity: No ulcer, blister, skin breakdown, erythema, warmth or dry skin.   Skin:     General: Skin is warm and dry.      Capillary Refill: Capillary refill takes 2 to 3 seconds.      Coloration: Skin is not pale.      Findings: No bruising, ecchymosis, erythema, laceration, lesion, petechiae or rash.      Comments: Skin is warm and dry bilaterally, no acute signs of infection noted, bilaterally, appears stable.     Nails 1-5 stefany are elongated and thickened with dystrophic changes and discoloration noted     Neurological:      Mental Status: She is alert and oriented to person, place, and time.      Sensory: Sensory deficit present.      Comments: Vibratory sensation is diminished. Light touch within normal limits bilaterally.       Psychiatric:          Behavior: Behavior normal.         Thought Content: Thought content normal.         Judgment: Judgment normal.             Assessment:       Encounter Diagnoses   Name Primary?    Diabetic polyneuropathy associated with type 2 diabetes mellitus Yes    Onychomycosis due to dermatophyte     Encounter for diabetic foot exam     Hammer toe, unspecified laterality     Mallet toe, unspecified laterality            Plan:       Diana was seen today for diabetes mellitus and diabetic foot exam.    Diagnoses and all orders for this visit:    Diabetic polyneuropathy associated with type 2 diabetes mellitus  -     Routine Foot Care  -     DIABETIC SHOES FOR HOME USE    Onychomycosis due to dermatophyte  -     Routine Foot Care    Encounter for diabetic foot exam  -     Routine Foot Care    Hammer toe, unspecified laterality  -     DIABETIC SHOES FOR HOME USE    Mallet toe, unspecified laterality  -     DIABETIC SHOES FOR HOME USE    Other orders  -     ciclopirox (PENLAC) 8 % Soln; Apply topically nightly.        I counseled the patient on her conditions, their implications and medical management.    -Routine foot care per attached note    -Rx penlac for onychomycosis      -Shoe inspection. Diabetic Foot Education. Patient reminded of the importance of good nutrition and blood sugar control to help prevent podiatric complications of diabetes. Patient instructed on proper foot hygeine. We discussed wearing proper shoe gear, daily foot inspections, never walking without protective shoe gear, never putting sharp instruments to feet, routine podiatric nail visits      RTC in  1 year, sooner PRN

## 2023-01-17 NOTE — PROCEDURES
"Routine Foot Care    Date/Time: 1/17/2023 1:45 PM  Performed by: Nicole Guerrero DPM  Authorized by: Nicole Guerrero DPM     Time out: Immediately prior to procedure a "time out" was called to verify the correct patient, procedure, equipment, support staff and site/side marked as required.    Consent Done?:  Yes (Verbal)  Hyperkeratotic Skin Lesions?: No      Nail Care Type:  Debride  Location(s): All  (Left 1st Toe, Left 3rd Toe, Left 2nd Toe, Left 4th Toe, Left 5th Toe, Right 1st Toe, Right 2nd Toe, Right 3rd Toe, Right 4th Toe and Right 5th Toe)  Patient tolerance:  Patient tolerated the procedure well with no immediate complications  "

## 2023-01-18 ENCOUNTER — PATIENT MESSAGE (OUTPATIENT)
Dept: INTERNAL MEDICINE | Facility: CLINIC | Age: 68
End: 2023-01-18
Payer: MEDICARE

## 2023-01-18 RX ORDER — LORAZEPAM 1 MG/1
1 TABLET ORAL 2 TIMES DAILY PRN
Qty: 60 TABLET | Refills: 0 | Status: SHIPPED | OUTPATIENT
Start: 2023-01-18 | End: 2023-08-08

## 2023-01-18 NOTE — TELEPHONE ENCOUNTER
No new care gaps identified.  [facility] Embedded Care Gaps. Reference number: [id number]. 1/17/2023   11:17:07 PM CST

## 2023-01-31 ENCOUNTER — OFFICE VISIT (OUTPATIENT)
Dept: URGENT CARE | Facility: CLINIC | Age: 68
End: 2023-01-31
Payer: MEDICARE

## 2023-01-31 VITALS
RESPIRATION RATE: 18 BRPM | HEIGHT: 65 IN | DIASTOLIC BLOOD PRESSURE: 71 MMHG | WEIGHT: 189 LBS | HEART RATE: 75 BPM | OXYGEN SATURATION: 99 % | SYSTOLIC BLOOD PRESSURE: 130 MMHG | TEMPERATURE: 98 F | BODY MASS INDEX: 31.49 KG/M2

## 2023-01-31 DIAGNOSIS — S80.00XA CONTUSION OF KNEE, UNSPECIFIED LATERALITY, INITIAL ENCOUNTER: ICD-10-CM

## 2023-01-31 DIAGNOSIS — L03.119 CELLULITIS OF LOWER LEG: Primary | ICD-10-CM

## 2023-01-31 PROCEDURE — 3078F PR MOST RECENT DIASTOLIC BLOOD PRESSURE < 80 MM HG: ICD-10-PCS | Mod: CPTII,S$GLB,, | Performed by: FAMILY MEDICINE

## 2023-01-31 PROCEDURE — 3078F DIAST BP <80 MM HG: CPT | Mod: CPTII,S$GLB,, | Performed by: FAMILY MEDICINE

## 2023-01-31 PROCEDURE — 3075F PR MOST RECENT SYSTOLIC BLOOD PRESS GE 130-139MM HG: ICD-10-PCS | Mod: CPTII,S$GLB,, | Performed by: FAMILY MEDICINE

## 2023-01-31 PROCEDURE — 73562 XR KNEE 3 VIEW BILATERAL: ICD-10-PCS | Mod: 50,FY,S$GLB, | Performed by: RADIOLOGY

## 2023-01-31 PROCEDURE — 99214 PR OFFICE/OUTPT VISIT, EST, LEVL IV, 30-39 MIN: ICD-10-PCS | Mod: S$GLB,,, | Performed by: FAMILY MEDICINE

## 2023-01-31 PROCEDURE — 3008F BODY MASS INDEX DOCD: CPT | Mod: CPTII,S$GLB,, | Performed by: FAMILY MEDICINE

## 2023-01-31 PROCEDURE — 3008F PR BODY MASS INDEX (BMI) DOCUMENTED: ICD-10-PCS | Mod: CPTII,S$GLB,, | Performed by: FAMILY MEDICINE

## 2023-01-31 PROCEDURE — 1125F AMNT PAIN NOTED PAIN PRSNT: CPT | Mod: CPTII,S$GLB,, | Performed by: FAMILY MEDICINE

## 2023-01-31 PROCEDURE — 73562 X-RAY EXAM OF KNEE 3: CPT | Mod: 50,FY,S$GLB, | Performed by: RADIOLOGY

## 2023-01-31 PROCEDURE — 1125F PR PAIN SEVERITY QUANTIFIED, PAIN PRESENT: ICD-10-PCS | Mod: CPTII,S$GLB,, | Performed by: FAMILY MEDICINE

## 2023-01-31 PROCEDURE — 3075F SYST BP GE 130 - 139MM HG: CPT | Mod: CPTII,S$GLB,, | Performed by: FAMILY MEDICINE

## 2023-01-31 PROCEDURE — 99214 OFFICE O/P EST MOD 30 MIN: CPT | Mod: S$GLB,,, | Performed by: FAMILY MEDICINE

## 2023-01-31 RX ORDER — CLINDAMYCIN HYDROCHLORIDE 300 MG/1
300 CAPSULE ORAL 3 TIMES DAILY
Qty: 30 CAPSULE | Refills: 0 | Status: SHIPPED | OUTPATIENT
Start: 2023-01-31 | End: 2023-02-08 | Stop reason: ALTCHOICE

## 2023-01-31 NOTE — PROGRESS NOTES
"Subjective:       Patient ID: Diana Montenegro is a 67 y.o. female.    Vitals:  height is 5' 5" (1.651 m) and weight is 85.7 kg (189 lb). Her oral temperature is 98.1 °F (36.7 °C). Her blood pressure is 130/71 and her pulse is 75. Her respiration is 18 and oxygen saturation is 99%.     Chief Complaint: Knee Injury    This is a 67 y.o. female who presents today with a chief complaint of  swollen knees, both knees are swollen/bruised  but the R knee is the worst   Pt. Fell on the ground stepping up on a curve Saturday evening   Advised Pt. We don't have X-Ray at this location   Home Tx:rest, ice, compress, & elevate     Knee Injury  Associated symptoms include joint swelling and weakness. Pertinent negatives include no numbness. The symptoms are aggravated by standing, walking and bending. She has tried ice, relaxation, rest and lying down for the symptoms. The treatment provided no relief.     Musculoskeletal:  Positive for joint swelling.   Skin:  Positive for erythema (left knee patella region, no drainage noted).   Neurological:  Negative for numbness.     Objective:      Physical Exam   Constitutional: She does not appear ill. No distress. normal  Cardiovascular: Normal rate, regular rhythm, normal heart sounds and normal pulses.   Pulmonary/Chest: Effort normal and breath sounds normal.   Abdominal: Normal appearance. Soft.   Musculoskeletal:         General: Tenderness and signs of injury (abrasion knee cap left leg with surrounding erythema, mildly tender. FROM noted) present.   Neurological: She is alert.   Skin: bruising (left patella) and erythema (left knee patella region, no drainage noted)   Nursing note and vitals reviewed.      Assessment:       1. Cellulitis of lower leg    2. Contusion of knee, unspecified laterality, initial encounter          Plan:         Cellulitis of lower leg  -     clindamycin (CLEOCIN) 300 MG capsule; Take 1 capsule (300 mg total) by mouth 3 (three) times daily. for 10 " days  Dispense: 30 capsule; Refill: 0    Contusion of knee, unspecified laterality, initial encounter  -     X-Ray Knee 3 View Bilateral; Future; Expected date: 01/31/2023    OTC acetaminophen. Discussed wound care and signs ands symptoms worsening infection. RTC prn. Ice and elevation

## 2023-02-01 ENCOUNTER — TELEPHONE (OUTPATIENT)
Dept: ORTHOPEDICS | Facility: CLINIC | Age: 68
End: 2023-02-01
Payer: MEDICARE

## 2023-02-01 NOTE — TELEPHONE ENCOUNTER
I called and spoke to the patient and scheduled her, and added her to the wait list as requested.     The patient verbalized understanding and has no further questions.     ----- Message from Marisa Sharpe sent at 2/1/2023 12:59 PM CST -----  Contact: pt  Pt calling in regards to recently falling and hurting both knees , right knee is swollen and bruised up       Confirmed patient's contact info below:  Contact Name: Diana Montenegro  Phone Number: [phone]

## 2023-02-02 DIAGNOSIS — E11.40 TYPE 2 DIABETES MELLITUS WITH DIABETIC NEUROPATHY, WITHOUT LONG-TERM CURRENT USE OF INSULIN: ICD-10-CM

## 2023-02-03 ENCOUNTER — TELEPHONE (OUTPATIENT)
Dept: URGENT CARE | Facility: CLINIC | Age: 68
End: 2023-02-03
Payer: MEDICARE

## 2023-02-03 NOTE — TELEPHONE ENCOUNTER
Pt was inquiring about appropriate px medicaitons to take, per the provider, pt was instructed to take Tylenol and continue her abx.

## 2023-02-03 NOTE — TELEPHONE ENCOUNTER
----- Message from Milagros Garcia sent at 2/2/2023 12:25 PM CST -----  Regarding: Need advice  Patient call and state that her PCP inform her that the RX prescribed would affect her kidney. Patient is asking if a different RX can be called in [facility] in [place]. Patient ask for a call back at [phone]      Pt was instructed to call back to discuss the message she left yesterday.

## 2023-02-08 ENCOUNTER — OFFICE VISIT (OUTPATIENT)
Dept: URGENT CARE | Facility: CLINIC | Age: 68
End: 2023-02-08
Payer: MEDICARE

## 2023-02-08 VITALS
WEIGHT: 189 LBS | DIASTOLIC BLOOD PRESSURE: 69 MMHG | HEART RATE: 76 BPM | SYSTOLIC BLOOD PRESSURE: 114 MMHG | BODY MASS INDEX: 31.49 KG/M2 | TEMPERATURE: 99 F | RESPIRATION RATE: 18 BRPM | OXYGEN SATURATION: 98 % | HEIGHT: 65 IN

## 2023-02-08 DIAGNOSIS — L03.116 CELLULITIS OF LEFT LOWER LEG: Primary | ICD-10-CM

## 2023-02-08 DIAGNOSIS — E11.49 OTHER DIABETIC NEUROLOGICAL COMPLICATION ASSOCIATED WITH TYPE 2 DIABETES MELLITUS: ICD-10-CM

## 2023-02-08 PROCEDURE — 3008F BODY MASS INDEX DOCD: CPT | Mod: CPTII,S$GLB,, | Performed by: FAMILY MEDICINE

## 2023-02-08 PROCEDURE — 3078F PR MOST RECENT DIASTOLIC BLOOD PRESSURE < 80 MM HG: ICD-10-PCS | Mod: CPTII,S$GLB,, | Performed by: FAMILY MEDICINE

## 2023-02-08 PROCEDURE — 1159F MED LIST DOCD IN RCRD: CPT | Mod: CPTII,S$GLB,, | Performed by: FAMILY MEDICINE

## 2023-02-08 PROCEDURE — 1159F PR MEDICATION LIST DOCUMENTED IN MEDICAL RECORD: ICD-10-PCS | Mod: CPTII,S$GLB,, | Performed by: FAMILY MEDICINE

## 2023-02-08 PROCEDURE — 99213 OFFICE O/P EST LOW 20 MIN: CPT | Mod: S$GLB,,, | Performed by: FAMILY MEDICINE

## 2023-02-08 PROCEDURE — 3074F PR MOST RECENT SYSTOLIC BLOOD PRESSURE < 130 MM HG: ICD-10-PCS | Mod: CPTII,S$GLB,, | Performed by: FAMILY MEDICINE

## 2023-02-08 PROCEDURE — 3074F SYST BP LT 130 MM HG: CPT | Mod: CPTII,S$GLB,, | Performed by: FAMILY MEDICINE

## 2023-02-08 PROCEDURE — 3008F PR BODY MASS INDEX (BMI) DOCUMENTED: ICD-10-PCS | Mod: CPTII,S$GLB,, | Performed by: FAMILY MEDICINE

## 2023-02-08 PROCEDURE — 1125F PR PAIN SEVERITY QUANTIFIED, PAIN PRESENT: ICD-10-PCS | Mod: CPTII,S$GLB,, | Performed by: FAMILY MEDICINE

## 2023-02-08 PROCEDURE — 1125F AMNT PAIN NOTED PAIN PRSNT: CPT | Mod: CPTII,S$GLB,, | Performed by: FAMILY MEDICINE

## 2023-02-08 PROCEDURE — 3078F DIAST BP <80 MM HG: CPT | Mod: CPTII,S$GLB,, | Performed by: FAMILY MEDICINE

## 2023-02-08 PROCEDURE — 99213 PR OFFICE/OUTPT VISIT, EST, LEVL III, 20-29 MIN: ICD-10-PCS | Mod: S$GLB,,, | Performed by: FAMILY MEDICINE

## 2023-02-08 RX ORDER — MUPIROCIN 20 MG/G
OINTMENT TOPICAL
Qty: 22 G | Refills: 1 | Status: SHIPPED | OUTPATIENT
Start: 2023-02-08

## 2023-02-08 NOTE — PROGRESS NOTES
"Subjective:       Patient ID: Diana Montenegro is a 67 y.o. female.    Vitals:  height is 5' 5" (1.651 m) and weight is 85.7 kg (189 lb). Her oral temperature is 98.8 °F (37.1 °C). Her blood pressure is 114/69 and her pulse is 76. Her respiration is 18 and oxygen saturation is 98%.     Chief Complaint: Leg Pain    This is a 67 y.o. female who presents today with a chief complaint of  Both legs feel weird and sometimes they burn - Since Friday   Pt. Is still taking the antibiotic that was prescribed to her on her last visit & tylenol    Leg Pain   The pain is present in the left leg, left knee, right leg and right knee. The quality of the pain is described as burning and aching. Associated symptoms include muscle weakness, numbness and tingling. The treatment provided no relief.     Skin:  Negative for erythema (LEFT KNEE).   Neurological:  Positive for numbness.     Objective:      Physical Exam   Cardiovascular: Normal rate, regular rhythm, normal heart sounds and normal pulses.   Pulmonary/Chest: Effort normal and breath sounds normal.   Abdominal: Normal appearance.   Neurological: She is alert.   Skin: No erythema (LEFT KNEE) and No lesion (LEFT KNEE PATELLAR REGION, NO ERYTHEMA)   Nursing note and vitals reviewed.      Assessment:       1. Cellulitis of left lower leg    2. Other diabetic neurological complication associated with type 2 diabetes mellitus          Plan:         Cellulitis of left lower leg  -     mupirocin (BACTROBAN) 2 % ointment; Apply to affected area 3 times daily  Dispense: 22 g; Refill: 1    Other diabetic neurological complication associated with type 2 diabetes mellitus    Discontinue with clindamycin and to use bactroban. To see PCP to consider gabapentin.                  "

## 2023-02-20 ENCOUNTER — PATIENT MESSAGE (OUTPATIENT)
Dept: INTERNAL MEDICINE | Facility: CLINIC | Age: 68
End: 2023-02-20
Payer: MEDICARE

## 2023-02-27 ENCOUNTER — PATIENT OUTREACH (OUTPATIENT)
Dept: ADMINISTRATIVE | Facility: HOSPITAL | Age: 68
End: 2023-02-27
Payer: MEDICARE

## 2023-02-27 NOTE — LETTER
AUTHORIZATION FOR RELEASE OF   CONFIDENTIAL INFORMATION    Dear Dr. Melgar,    We are seeing Diana Montenegro, date of birth 1955, in the clinic at [facility]. Rad Johnston MD is the patient's PCP. Diana Montenegro has an outstanding lab/procedure at the time we reviewed her chart. In order to help keep her health information updated, she has authorized us to request the following medical record(s):        (  )  MAMMOGRAM                                      (  )  COLONOSCOPY      (  )  PAP SMEAR                                          (  )  OUTSIDE LAB RESULTS     (  )  DEXA SCAN                                          ( x )  EYE EXAM            (  )  FOOT EXAM                                          (  )  ENTIRE RECORD     (  )  OUTSIDE IMMUNIZATIONS                 (  )  _______________         Please fax records to Rad Johnston MD, [phone]     If you have any questions, please contact JANINE Ngo at [phone].           Patient Name: Diana Montenegro  : 1955  Patient Phone #: [phone]      Pharmacy Vancomycin Note  Date of Service 2018  Patient's  1971   46 year old, male    Indication: Bacteremia  Goal Trough Level: 15-20 mg/L  Day of Therapy: 5  Current Vancomycin regimen:  1750 mg IV q12h    Current estimated CrCl = Estimated Creatinine Clearance: 156.8 mL/min (based on Cr of 0.71).    Creatinine for last 3 days  1/15/2018:  8:01 AM Creatinine 0.62 mg/dL  2018:  8:30 AM Creatinine 0.71 mg/dL    Recent Vancomycin Levels (past 3 days)  2018:  9:36 AM Vancomycin Level 14.8 mg/L (10.25 hr tr)    Vancomycin IV Administrations (past 72 hours)                   vancomycin (VANCOCIN) 1,750 mg in NaCl 0.9 % 500 mL intermittent infusion (mg) 1,750 mg New Bag 18 1112     1,750 mg New Bag 18 2322     1,750 mg New Bag  1025     1,750 mg New Bag  0041     1,750 mg New Bag 01/15/18 1006     1,750 mg Restarted 18 2206     1,750 mg New Bag  1651    vancomycin (VANCOCIN) 1000 mg in dextrose 5% 200 mL PREMIX (mg) 1,000 mg New Bag 01/15/18 1446                Nephrotoxins and other renal medications (Future)    Start     Dose/Rate Route Frequency Ordered Stop    18 1400  vancomycin (VANCOCIN) 1,750 mg in NaCl 0.9 % 500 mL intermittent infusion      1,750 mg  over 2 Hours Intravenous EVERY 12 HOURS 18 1335               Contrast Orders - past 72 hours (72h ago through future)    Start     Dose/Rate Route Frequency Ordered Stop    18 1345  barium sulfate (EZ PAQUE) oral suspension 96%       Oral ONCE 18 1336 18 1336          Interpretation of levels and current regimen:  Trough level is slighty Therapeutic. Of note, pt had multiple times where his PIV was infiltrated so the vanco dose was stopped then at some point resumed later. Hence, pt has not received vancomycin therapy consistently where one can assume steady state yet. Therefore, plan to continue current dose and recheck level in a few days.    Has serum creatinine changed > 50% in last  72 hours: No    Urine output:  good urine output    Renal Function: Stable    Plan:  1.  Continue Current Dose  2.  Pharmacy will check trough levels as appropriate in 1-3 Days.    3.  Serum creatinine levels will be ordered daily for the first week of therapy and at least twice weekly for subsequent weeks.        Gabe Peterson, PharmD, BCPS  January 17, 2018            .

## 2023-03-02 DIAGNOSIS — M25.562 ACUTE PAIN OF BOTH KNEES: Primary | ICD-10-CM

## 2023-03-02 DIAGNOSIS — M25.561 ACUTE PAIN OF BOTH KNEES: Primary | ICD-10-CM

## 2023-03-10 ENCOUNTER — PATIENT OUTREACH (OUTPATIENT)
Dept: ADMINISTRATIVE | Facility: HOSPITAL | Age: 68
End: 2023-03-10
Payer: MEDICARE

## 2023-03-10 ENCOUNTER — PATIENT MESSAGE (OUTPATIENT)
Dept: ADMINISTRATIVE | Facility: HOSPITAL | Age: 68
End: 2023-03-10
Payer: MEDICARE

## 2023-03-10 NOTE — LETTER
AUTHORIZATION FOR RELEASE OF   CONFIDENTIAL INFORMATION        We are seeing Diana Montenegro, date of birth 1955, in the clinic at [facility]. Rad Johnston MD is the patient's PCP. Diana Montenegro has an outstanding lab/procedure at the time we reviewed her chart. In order to help keep her health information updated, she has authorized us to request the following medical record(s):        (  )  MAMMOGRAM                                      (  )  COLONOSCOPY      (  )  PAP SMEAR                                          (  )  OUTSIDE LAB RESULTS     (  )  DEXA SCAN                                          ( x )  EYE EXAM            (  )  FOOT EXAM                                          (  )  ENTIRE RECORD     (  )  OUTSIDE IMMUNIZATIONS                 (  )  _______________         Please fax records to [facility], Dwight A Green, MD, [phone]     If you have any questions, please contact Ghazal at [phone].           Patient Name: Diana Montenegro  : 1955  Patient Phone #: [phone]

## 2023-03-12 ENCOUNTER — PATIENT MESSAGE (OUTPATIENT)
Dept: INTERNAL MEDICINE | Facility: CLINIC | Age: 68
End: 2023-03-12
Payer: MEDICARE

## 2023-03-30 ENCOUNTER — PATIENT OUTREACH (OUTPATIENT)
Dept: ADMINISTRATIVE | Facility: HOSPITAL | Age: 68
End: 2023-03-30
Payer: MEDICARE

## 2023-03-30 NOTE — PROGRESS NOTES
Health Maintenance Due   Topic Date Due    Shingles Vaccine (1 of 2) Never done    Eye Exam  07/28/2022       Chart reviewed.   Immunizations: Reconciled  Orders placed: N/A  Upcoming appts to satisfy JOCELYN topics: Labs 4/3/2023

## 2023-04-03 ENCOUNTER — LAB VISIT (OUTPATIENT)
Dept: LAB | Facility: HOSPITAL | Age: 68
End: 2023-04-03
Payer: MEDICARE

## 2023-04-03 ENCOUNTER — PATIENT MESSAGE (OUTPATIENT)
Dept: ADMINISTRATIVE | Facility: HOSPITAL | Age: 68
End: 2023-04-03
Payer: MEDICARE

## 2023-04-03 DIAGNOSIS — E11.59 HYPERTENSION ASSOCIATED WITH DIABETES: ICD-10-CM

## 2023-04-03 DIAGNOSIS — Z00.00 WELL ADULT EXAM: ICD-10-CM

## 2023-04-03 DIAGNOSIS — E03.9 ACQUIRED HYPOTHYROIDISM: ICD-10-CM

## 2023-04-03 DIAGNOSIS — E11.40 TYPE 2 DIABETES MELLITUS WITH DIABETIC NEUROPATHY, WITHOUT LONG-TERM CURRENT USE OF INSULIN: ICD-10-CM

## 2023-04-03 DIAGNOSIS — E78.5 HYPERLIPIDEMIA ASSOCIATED WITH TYPE 2 DIABETES MELLITUS: ICD-10-CM

## 2023-04-03 DIAGNOSIS — E11.69 HYPERLIPIDEMIA ASSOCIATED WITH TYPE 2 DIABETES MELLITUS: ICD-10-CM

## 2023-04-03 DIAGNOSIS — I15.2 HYPERTENSION ASSOCIATED WITH DIABETES: ICD-10-CM

## 2023-04-03 LAB
ALBUMIN SERPL BCP-MCNC: 3.5 G/DL (ref 3.5–5.2)
ALBUMIN/CREAT UR: 104.9 UG/MG (ref 0–30)
ALP SERPL-CCNC: 117 U/L (ref 55–135)
ALT SERPL W/O P-5'-P-CCNC: 364 U/L (ref 10–44)
ANION GAP SERPL CALC-SCNC: 9 MMOL/L (ref 8–16)
AST SERPL-CCNC: 227 U/L (ref 10–40)
BILIRUB SERPL-MCNC: 0.2 MG/DL (ref 0.1–1)
BUN SERPL-MCNC: 38 MG/DL (ref 8–23)
CALCIUM SERPL-MCNC: 9.5 MG/DL (ref 8.7–10.5)
CHLORIDE SERPL-SCNC: 109 MMOL/L (ref 95–110)
CO2 SERPL-SCNC: 21 MMOL/L (ref 23–29)
CREAT SERPL-MCNC: 1.2 MG/DL (ref 0.5–1.4)
CREAT UR-MCNC: 103 MG/DL (ref 15–325)
EST. GFR  (NO RACE VARIABLE): 49.9 ML/MIN/1.73 M^2
ESTIMATED AVG GLUCOSE: 148 MG/DL (ref 68–131)
GLUCOSE SERPL-MCNC: 104 MG/DL (ref 70–110)
HBA1C MFR BLD: 6.8 % (ref 4–5.6)
MICROALBUMIN UR DL<=1MG/L-MCNC: 108 UG/ML
POTASSIUM SERPL-SCNC: 4.6 MMOL/L (ref 3.5–5.1)
PROT SERPL-MCNC: 6.6 G/DL (ref 6–8.4)
SODIUM SERPL-SCNC: 139 MMOL/L (ref 136–145)
T4 FREE SERPL-MCNC: 1.08 NG/DL (ref 0.71–1.51)
TSH SERPL DL<=0.005 MIU/L-ACNC: 0.48 UIU/ML (ref 0.4–4)

## 2023-04-03 PROCEDURE — 84443 ASSAY THYROID STIM HORMONE: CPT | Performed by: INTERNAL MEDICINE

## 2023-04-03 PROCEDURE — 36415 COLL VENOUS BLD VENIPUNCTURE: CPT | Mod: PO | Performed by: NURSE PRACTITIONER

## 2023-04-03 PROCEDURE — 83036 HEMOGLOBIN GLYCOSYLATED A1C: CPT | Performed by: INTERNAL MEDICINE

## 2023-04-03 PROCEDURE — 80053 COMPREHEN METABOLIC PANEL: CPT | Performed by: INTERNAL MEDICINE

## 2023-04-03 PROCEDURE — 84439 ASSAY OF FREE THYROXINE: CPT | Performed by: NURSE PRACTITIONER

## 2023-04-03 PROCEDURE — 82570 ASSAY OF URINE CREATININE: CPT | Performed by: INTERNAL MEDICINE

## 2023-04-04 ENCOUNTER — PATIENT OUTREACH (OUTPATIENT)
Dept: ADMINISTRATIVE | Facility: HOSPITAL | Age: 68
End: 2023-04-04
Payer: MEDICARE

## 2023-04-04 RX ORDER — LEVOTHYROXINE SODIUM 100 UG/1
100 TABLET ORAL
Qty: 30 TABLET | Refills: 6 | Status: SHIPPED | OUTPATIENT
Start: 2023-04-04 | End: 2023-09-27 | Stop reason: SDUPTHER

## 2023-04-06 ENCOUNTER — CLINICAL SUPPORT (OUTPATIENT)
Dept: AUDIOLOGY | Facility: CLINIC | Age: 68
End: 2023-04-06
Payer: MEDICARE

## 2023-04-06 ENCOUNTER — PATIENT OUTREACH (OUTPATIENT)
Dept: ADMINISTRATIVE | Facility: HOSPITAL | Age: 68
End: 2023-04-06
Payer: MEDICARE

## 2023-04-06 ENCOUNTER — OFFICE VISIT (OUTPATIENT)
Dept: OTOLARYNGOLOGY | Facility: CLINIC | Age: 68
End: 2023-04-06
Payer: MEDICARE

## 2023-04-06 DIAGNOSIS — J30.2 SEASONAL ALLERGIC RHINITIS, UNSPECIFIED TRIGGER: ICD-10-CM

## 2023-04-06 DIAGNOSIS — H69.92 EUSTACHIAN TUBE DISORDER, LEFT: ICD-10-CM

## 2023-04-06 DIAGNOSIS — M26.622 ARTHRALGIA OF LEFT TEMPOROMANDIBULAR JOINT: ICD-10-CM

## 2023-04-06 DIAGNOSIS — H90.3 SENSORINEURAL HEARING LOSS (SNHL) OF BOTH EARS: Primary | ICD-10-CM

## 2023-04-06 DIAGNOSIS — H90.3 SENSORINEURAL HEARING LOSS (SNHL), BILATERAL: Primary | ICD-10-CM

## 2023-04-06 PROCEDURE — 1159F MED LIST DOCD IN RCRD: CPT | Mod: CPTII,S$GLB,,

## 2023-04-06 PROCEDURE — 92567 TYMPANOMETRY: CPT | Mod: S$GLB,,,

## 2023-04-06 PROCEDURE — 92557 COMPREHENSIVE HEARING TEST: CPT | Mod: S$GLB,,,

## 2023-04-06 PROCEDURE — 99999 PR PBB SHADOW E&M-EST. PATIENT-LVL III: ICD-10-PCS | Mod: PBBFAC,,,

## 2023-04-06 PROCEDURE — 3066F NEPHROPATHY DOC TX: CPT | Mod: CPTII,S$GLB,,

## 2023-04-06 PROCEDURE — 99999 PR PBB SHADOW E&M-EST. PATIENT-LVL III: CPT | Mod: PBBFAC,,,

## 2023-04-06 PROCEDURE — 1126F PR PAIN SEVERITY QUANTIFIED, NO PAIN PRESENT: ICD-10-PCS | Mod: CPTII,S$GLB,,

## 2023-04-06 PROCEDURE — 1101F PT FALLS ASSESS-DOCD LE1/YR: CPT | Mod: CPTII,S$GLB,,

## 2023-04-06 PROCEDURE — 92557 PR COMPREHENSIVE HEARING TEST: ICD-10-PCS | Mod: S$GLB,,,

## 2023-04-06 PROCEDURE — 3044F PR MOST RECENT HEMOGLOBIN A1C LEVEL <7.0%: ICD-10-PCS | Mod: CPTII,S$GLB,,

## 2023-04-06 PROCEDURE — 3044F HG A1C LEVEL LT 7.0%: CPT | Mod: CPTII,S$GLB,,

## 2023-04-06 PROCEDURE — 3288F FALL RISK ASSESSMENT DOCD: CPT | Mod: CPTII,S$GLB,,

## 2023-04-06 PROCEDURE — 92567 PR TYMPA2METRY: ICD-10-PCS | Mod: S$GLB,,,

## 2023-04-06 PROCEDURE — 3060F POS MICROALBUMINURIA REV: CPT | Mod: CPTII,S$GLB,,

## 2023-04-06 PROCEDURE — 3066F PR DOCUMENTATION OF TREATMENT FOR NEPHROPATHY: ICD-10-PCS | Mod: CPTII,S$GLB,,

## 2023-04-06 PROCEDURE — 99213 OFFICE O/P EST LOW 20 MIN: CPT | Mod: S$GLB,,,

## 2023-04-06 PROCEDURE — 1101F PR PT FALLS ASSESS DOC 0-1 FALLS W/OUT INJ PAST YR: ICD-10-PCS | Mod: CPTII,S$GLB,,

## 2023-04-06 PROCEDURE — 99213 PR OFFICE/OUTPT VISIT, EST, LEVL III, 20-29 MIN: ICD-10-PCS | Mod: S$GLB,,,

## 2023-04-06 PROCEDURE — 1159F PR MEDICATION LIST DOCUMENTED IN MEDICAL RECORD: ICD-10-PCS | Mod: CPTII,S$GLB,,

## 2023-04-06 PROCEDURE — 3060F PR POS MICROALBUMINURIA RESULT DOCUMENTED/REVIEW: ICD-10-PCS | Mod: CPTII,S$GLB,,

## 2023-04-06 PROCEDURE — 3288F PR FALLS RISK ASSESSMENT DOCUMENTED: ICD-10-PCS | Mod: CPTII,S$GLB,,

## 2023-04-06 PROCEDURE — 1126F AMNT PAIN NOTED NONE PRSNT: CPT | Mod: CPTII,S$GLB,,

## 2023-04-06 RX ORDER — FLUTICASONE PROPIONATE 50 MCG
1 SPRAY, SUSPENSION (ML) NASAL DAILY
Qty: 11.1 ML | Refills: 1 | Status: SHIPPED | OUTPATIENT
Start: 2023-04-06 | End: 2023-06-01

## 2023-04-06 RX ORDER — ACETAMINOPHEN AND CODEINE PHOSPHATE 300; 30 MG/1; MG/1
1 TABLET ORAL EVERY 6 HOURS PRN
COMMUNITY
Start: 2022-11-03 | End: 2023-10-02

## 2023-04-06 NOTE — PROGRESS NOTES
"Diana Montenegro was seen today in the clinic for an audiologic evaluation.  Patient's main complaint was decreased hearing, bilaterally. Ms. Montenegro reported the perception of "water rushing" in the ears intermittently. She reported intermittent irritation deep in the ear, associated with sinus trouble. She endorsed one episode of "spinning" dizziness in the past with no other episodes since. Ms. Montenegro denied tinnitus and otalgia.    Tympanometry revealed Type A in the right ear and Type A in the left ear.     Audiogram results revealed normal hearing sensitivity with a mild to moderate high frequency sensorineural hearing loss (SNHL) in the left ear.      Speech reception thresholds were noted at 20 dBHL in the right ear and 20 dBHL in the left ear.    Speech discrimination scores were 100% in the right ear and 100% in the left ear.    Recommendations:  Otologic evaluation  Annual audiogram or sooner if change perceived  Hearing protection in noise        "

## 2023-04-06 NOTE — PROGRESS NOTES
Subjective:   Diana Montenegro is a 67 y.o. female who presents for a hearing evaluation.    She reports she has noticed over the last year her hearing has decreased, L>rE. She repoirts her family members had stated she is hard of hearing and notices she has to turn the volume up on the television. She reports she notices that she hears a fluid sound/ popping in left ear which may be affecting her left ear hearing. She reports ist occasionally alleviated by tugging at her ear. She denies any otalgia, otorrhea, tinnitus, or dizziness.  There is not a family history of hearing loss at a young age. There is not a prior history of ear surgery. There is not a prior history of ear infections. There is not a history of ear trauma. 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 . She does not wear hearing aids currently. She has not had a hearing test recently.      Past Medical History  She has a past medical history of Arthritis, Atrial fibrillation, unspecified, Chronic back pain, Colon polyp, CVA (cerebral infarction), Degenerative disc disease, Diabetes mellitus type II, Encounter for loop recorder at end of battery life, Hyperlipidemia, Hypertension, Hypothyroidism, Mild nonproliferative diabetic retinopathy, Obesity, Sleep apnea, Stroke, and Venous insufficiency (chronic) (peripheral).    Past Surgical History  She has a past surgical history that includes Tubal ligation; Tonsillectomy; Hernia repair; Gastrectomy; Colonoscopy w/ polypectomy; Colonoscopy (N/A, 8/30/2018); and Removal of implantable loop recorder (N/A, 9/17/2018).    Family History  Her family history includes Diabetes in her maternal grandfather; Heart disease in her father; No Known Problems in her maternal grandmother, mother, paternal grandfather, sister, and sister; Obesity in her maternal grandfather; Stroke in her paternal grandmother.    Social History  She  reports that she quit smoking about 8 years ago. Her smoking use included cigarettes. She has a 30.00 pack-year smoking history. She has never used smokeless tobacco. She reports current alcohol use. She reports that she does not use drugs.    Allergies  She is allergic to medrol [methylprednisolone].    Medications  She has a current medication list which includes the following prescription(s): acyclovir 5%, albuterol, apixaban, aspirin, b complex vitamins, blood pressure test kit-large, blood sugar diagnostic, blood-glucose meter, calcium citrate/vitamin d3, cetirizine, ciclopirox, cinnamon bark, clotrimazole-betamethasone 1-0.05%, cyanocobalamin (vitamin b-12), docusate sodium, eylea, ezetimibe, fish oil-omega-3 fatty acids, fluticasone propionate, glimepiride, lancets, levothyroxine, lorazepam, metformin, multivitamin, mupirocin, omeprazole, ozempic, promethazine-dextromethorphan, rosuvastatin, rutin/hesp/bioflav/c/agalti676, senna, trazodone, valsartan-hydrochlorothiazide, acetaminophen-codeine 300-30mg, albuterol-ipratropium, cephalexin, diclofenac sodium, fluticasone propionate, metformin, and metoprolol tartrate, and the following Facility-Administered Medications: sodium chloride 0.9% and cefazolin.  Review of Systems   HENT: Positive for ear pain, hearing loss, runny nose and sinus pressure.  Negative for ear discharge, ear infection and ringing in the ears.      Respiratory:  Negative for shortness of breath.      Neurological: Negative for dizziness and light-headedness.        Objective:   LMP  (LMP Unknown)      Constitutional:   She appears well-developed and well-nourished. She appears alert. She is cooperative.  Non-toxic appearance. She does not appear ill. Normal speech.      Head:  Normocephalic and atraumatic. Head is with TMJ tenderness (left TMJ tenderness, intraoral pressure positive; no crepitus noted.).     Ears:  Right ear hearing normal to normal and whispered voice; external ear normal  without scars, lesions, or masses; ear canal, tympanic membrane, and middle ear normal. and left ear hearing normal to normal and whispered voice; external ear normal without scars, lesions, or masses; ear canal, tympanic membrane, and middle ear normal..   Right Ear: No lacerations. No drainage, swelling or tenderness. No foreign bodies. No mastoid tenderness. Tympanic membrane is retracted. Tympanic membrane is not injected, not scarred, not perforated, not erythematous and not bulging. Tympanic membrane mobility is normal. No middle ear effusion. No PE tube. No hemotympanum. Decreased hearing is noted.   Left Ear: No lacerations. No drainage, swelling or tenderness. No foreign bodies. No mastoid tenderness. Tympanic membrane is not injected, not scarred, not perforated, not erythematous, not retracted and not bulging. Tympanic membrane mobility is normal.  No middle ear effusion.  No PE tube. No hemotympanum. Decreased hearing is noted.   Ears:      Nose:  Rhinorrhea present. No mucosal edema, septal deviation or polyps. Turbinates normal, no turbinate masses and no turbinate hypertrophy.      Pulmonary/Chest:   No respiratory distress.     Psychiatric:   She has a normal mood and affect. Her speech is normal and behavior is normal.     Neurological:   She is alert.     Data Reviewed  WBC (K/uL)   Date Value   04/13/2022 4.59     Eosinophil % (%)   Date Value   04/13/2022 1.7     Eos # (K/uL)   Date Value   04/13/2022 0.1     Platelets (K/uL)   Date Value   04/13/2022 163     Glucose (mg/dL)   Date Value   04/03/2023 104     No results found for: IGE        Audiogram        I independently reviewed the tracings of the complete audiometric evaluation performed today.  I reviewed the audiogram with the patient as well.  Pertinent findings include binaural sloping HF sensorineural hearing loss with normal tymps.  Assessment:     1. Sensorineural hearing loss (SNHL) of both ears    2. Eustachian tube disorder, left   "  3. Arthralgia of left temporomandibular joint    4. Seasonal allergic rhinitis, unspecified trigger      Plan:   Diana was seen today for hearing loss.    Diagnoses and all orders for this visit:    Sensorineural hearing loss (SNHL) of both ears  Audiometric testing interpretation consistent with sensorineural hearing loss.  Discussed the etiology of SNHL. Hearing conservation in noisy environments. Return to clinic every year for audiometric testing.   Eustachian tube disorder, left  -     fluticasone propionate (FLONASE) 50 mcg/actuation nasal spray; 1 spray (50 mcg total) by Each Nostril route once daily.    Arthralgia of left temporomandibular joint  Ear exam WNL, no signs of infection.  Discussed with patient the etiology of TMJ syndrome and management strategies.  Recommended conservative treatment measures such as OTC anti-inflammatories, "Jaw rest" (soft foods, no crunchy foods, gum or ice chewing), ice pack on jaw joint and stress reduction/ behavioral management.  Follow-up with dentist or oral/ maxillofacial specialist if symptoms persist.      Seasonal allergic rhinitis, unspecified trigger       "

## 2023-04-10 ENCOUNTER — LAB VISIT (OUTPATIENT)
Dept: LAB | Facility: HOSPITAL | Age: 68
End: 2023-04-10
Attending: INTERNAL MEDICINE
Payer: MEDICARE

## 2023-04-10 ENCOUNTER — OFFICE VISIT (OUTPATIENT)
Dept: INTERNAL MEDICINE | Facility: CLINIC | Age: 68
End: 2023-04-10
Payer: MEDICARE

## 2023-04-10 VITALS
HEIGHT: 65 IN | DIASTOLIC BLOOD PRESSURE: 66 MMHG | OXYGEN SATURATION: 99 % | HEART RATE: 71 BPM | WEIGHT: 192.88 LBS | SYSTOLIC BLOOD PRESSURE: 128 MMHG | BODY MASS INDEX: 32.14 KG/M2 | RESPIRATION RATE: 16 BRPM | TEMPERATURE: 98 F

## 2023-04-10 DIAGNOSIS — R79.89 LFT ELEVATION: ICD-10-CM

## 2023-04-10 DIAGNOSIS — E78.5 HYPERLIPIDEMIA ASSOCIATED WITH TYPE 2 DIABETES MELLITUS: ICD-10-CM

## 2023-04-10 DIAGNOSIS — Z79.01 CURRENT USE OF LONG TERM ANTICOAGULATION: ICD-10-CM

## 2023-04-10 DIAGNOSIS — E03.9 ACQUIRED HYPOTHYROIDISM: ICD-10-CM

## 2023-04-10 DIAGNOSIS — E11.40 TYPE 2 DIABETES MELLITUS WITH DIABETIC NEUROPATHY, WITHOUT LONG-TERM CURRENT USE OF INSULIN: Primary | ICD-10-CM

## 2023-04-10 DIAGNOSIS — J31.0 CHRONIC RHINITIS: ICD-10-CM

## 2023-04-10 DIAGNOSIS — E11.59 HYPERTENSION ASSOCIATED WITH DIABETES: ICD-10-CM

## 2023-04-10 DIAGNOSIS — H69.92 EUSTACHIAN TUBE DYSFUNCTION, LEFT: ICD-10-CM

## 2023-04-10 DIAGNOSIS — R74.01 ELEVATION OF LEVELS OF LIVER TRANSAMINASE LEVELS: ICD-10-CM

## 2023-04-10 DIAGNOSIS — E11.69 HYPERLIPIDEMIA ASSOCIATED WITH TYPE 2 DIABETES MELLITUS: ICD-10-CM

## 2023-04-10 DIAGNOSIS — I15.2 HYPERTENSION ASSOCIATED WITH DIABETES: ICD-10-CM

## 2023-04-10 DIAGNOSIS — I48.0 PAROXYSMAL ATRIAL FIBRILLATION: Chronic | ICD-10-CM

## 2023-04-10 DIAGNOSIS — M79.631 PAIN OF RIGHT FOREARM: ICD-10-CM

## 2023-04-10 DIAGNOSIS — K43.9 VENTRAL HERNIA WITHOUT OBSTRUCTION OR GANGRENE: ICD-10-CM

## 2023-04-10 PROCEDURE — 3060F PR POS MICROALBUMINURIA RESULT DOCUMENTED/REVIEW: ICD-10-PCS | Mod: CPTII,S$GLB,, | Performed by: INTERNAL MEDICINE

## 2023-04-10 PROCEDURE — 99999 PR PBB SHADOW E&M-EST. PATIENT-LVL V: ICD-10-PCS | Mod: PBBFAC,,, | Performed by: INTERNAL MEDICINE

## 2023-04-10 PROCEDURE — 1159F PR MEDICATION LIST DOCUMENTED IN MEDICAL RECORD: ICD-10-PCS | Mod: CPTII,S$GLB,, | Performed by: INTERNAL MEDICINE

## 2023-04-10 PROCEDURE — 99214 PR OFFICE/OUTPT VISIT, EST, LEVL IV, 30-39 MIN: ICD-10-PCS | Mod: S$GLB,,, | Performed by: INTERNAL MEDICINE

## 2023-04-10 PROCEDURE — 3074F SYST BP LT 130 MM HG: CPT | Mod: CPTII,S$GLB,, | Performed by: INTERNAL MEDICINE

## 2023-04-10 PROCEDURE — 1159F MED LIST DOCD IN RCRD: CPT | Mod: CPTII,S$GLB,, | Performed by: INTERNAL MEDICINE

## 2023-04-10 PROCEDURE — 1100F PTFALLS ASSESS-DOCD GE2>/YR: CPT | Mod: CPTII,S$GLB,, | Performed by: INTERNAL MEDICINE

## 2023-04-10 PROCEDURE — 3078F PR MOST RECENT DIASTOLIC BLOOD PRESSURE < 80 MM HG: ICD-10-PCS | Mod: CPTII,S$GLB,, | Performed by: INTERNAL MEDICINE

## 2023-04-10 PROCEDURE — 3066F NEPHROPATHY DOC TX: CPT | Mod: CPTII,S$GLB,, | Performed by: INTERNAL MEDICINE

## 2023-04-10 PROCEDURE — 3060F POS MICROALBUMINURIA REV: CPT | Mod: CPTII,S$GLB,, | Performed by: INTERNAL MEDICINE

## 2023-04-10 PROCEDURE — 99214 OFFICE O/P EST MOD 30 MIN: CPT | Mod: S$GLB,,, | Performed by: INTERNAL MEDICINE

## 2023-04-10 PROCEDURE — 3074F PR MOST RECENT SYSTOLIC BLOOD PRESSURE < 130 MM HG: ICD-10-PCS | Mod: CPTII,S$GLB,, | Performed by: INTERNAL MEDICINE

## 2023-04-10 PROCEDURE — 3044F PR MOST RECENT HEMOGLOBIN A1C LEVEL <7.0%: ICD-10-PCS | Mod: CPTII,S$GLB,, | Performed by: INTERNAL MEDICINE

## 2023-04-10 PROCEDURE — 3288F PR FALLS RISK ASSESSMENT DOCUMENTED: ICD-10-PCS | Mod: CPTII,S$GLB,, | Performed by: INTERNAL MEDICINE

## 2023-04-10 PROCEDURE — 1125F AMNT PAIN NOTED PAIN PRSNT: CPT | Mod: CPTII,S$GLB,, | Performed by: INTERNAL MEDICINE

## 2023-04-10 PROCEDURE — 3008F BODY MASS INDEX DOCD: CPT | Mod: CPTII,S$GLB,, | Performed by: INTERNAL MEDICINE

## 2023-04-10 PROCEDURE — 1100F PR PT FALLS ASSESS DOC 2+ FALLS/FALL W/INJURY/YR: ICD-10-PCS | Mod: CPTII,S$GLB,, | Performed by: INTERNAL MEDICINE

## 2023-04-10 PROCEDURE — 99999 PR PBB SHADOW E&M-EST. PATIENT-LVL V: CPT | Mod: PBBFAC,,, | Performed by: INTERNAL MEDICINE

## 2023-04-10 PROCEDURE — 1125F PR PAIN SEVERITY QUANTIFIED, PAIN PRESENT: ICD-10-PCS | Mod: CPTII,S$GLB,, | Performed by: INTERNAL MEDICINE

## 2023-04-10 PROCEDURE — 3066F PR DOCUMENTATION OF TREATMENT FOR NEPHROPATHY: ICD-10-PCS | Mod: CPTII,S$GLB,, | Performed by: INTERNAL MEDICINE

## 2023-04-10 PROCEDURE — 3288F FALL RISK ASSESSMENT DOCD: CPT | Mod: CPTII,S$GLB,, | Performed by: INTERNAL MEDICINE

## 2023-04-10 PROCEDURE — 3078F DIAST BP <80 MM HG: CPT | Mod: CPTII,S$GLB,, | Performed by: INTERNAL MEDICINE

## 2023-04-10 PROCEDURE — 36415 COLL VENOUS BLD VENIPUNCTURE: CPT | Mod: PO | Performed by: INTERNAL MEDICINE

## 2023-04-10 PROCEDURE — 3044F HG A1C LEVEL LT 7.0%: CPT | Mod: CPTII,S$GLB,, | Performed by: INTERNAL MEDICINE

## 2023-04-10 PROCEDURE — 3008F PR BODY MASS INDEX (BMI) DOCUMENTED: ICD-10-PCS | Mod: CPTII,S$GLB,, | Performed by: INTERNAL MEDICINE

## 2023-04-10 PROCEDURE — 80074 ACUTE HEPATITIS PANEL: CPT | Performed by: INTERNAL MEDICINE

## 2023-04-11 ENCOUNTER — PATIENT OUTREACH (OUTPATIENT)
Dept: ADMINISTRATIVE | Facility: HOSPITAL | Age: 68
End: 2023-04-11
Payer: MEDICARE

## 2023-04-11 LAB
HAV IGM SERPL QL IA: NORMAL
HBV CORE IGM SERPL QL IA: NORMAL
HBV SURFACE AG SERPL QL IA: NORMAL
HBV SURFACE AG SERPL QL IA: NORMAL
HCV AB SERPL QL IA: NORMAL
HCV AB SERPL QL IA: NORMAL

## 2023-04-21 NOTE — PROGRESS NOTES
Subjective:       Patient ID: Diana Montenegro is a 67 y.o. female.    Chief Complaint: Follow-up, Diabetes, and Hypertension    HPI  The patient presents for follow-up of medical conditions which include type 2 diabetes mellitus, hypertension, hyperlipidemia, chronic lower back pain, and hypothyroidism.  The patient also has been experiencing left ear discomfort and right forearm pain.    She has been experiencing sinus congestion and an echo in the left ear.  She also describes a fullness in the left ear.  There is no ear discharge.  She denies having any fever, chills, or vertigo.  No tinnitus is noted.      She has noted right forearm pain for the past week.  Symptoms were noted with lifting.  However symptoms have now resolved.    The patient states she had a fall in January of 2023 hurting her right knee.  She did experience swelling of the joint however symptoms have resolved.    She has not been monitoring blood sugars recently.  No hypoglycemic symptoms have been noted.  She does not monitor blood pressures but she is tolerating her medication well without side effects.    Review of Systems   Constitutional:  Negative for activity change, appetite change and unexpected weight change.   HENT:  Positive for nasal congestion, ear pain and postnasal drip. Negative for ear discharge, rhinorrhea and sneezing.    Eyes:  Negative for visual disturbance.   Respiratory:  Negative for shortness of breath.    Cardiovascular:  Negative for chest pain, palpitations and leg swelling.   Gastrointestinal:  Negative for abdominal pain, blood in stool and diarrhea.   Genitourinary:  Negative for dysuria, frequency, hematuria and urgency.   Musculoskeletal:  Positive for arthralgias and back pain.   Neurological:  Negative for weakness, numbness and headaches.   Psychiatric/Behavioral:  Negative for sleep disturbance.           Physical Exam  Vitals and nursing note reviewed.   Constitutional:       General: She is not in  acute distress.     Appearance: Normal appearance. She is well-developed.      Comments: The patient has lost 2 lb since 09/15/2022.   HENT:      Head: Normocephalic and atraumatic.   Eyes:      General: No scleral icterus.     Extraocular Movements: Extraocular movements intact.      Conjunctiva/sclera: Conjunctivae normal.   Neck:      Thyroid: No thyromegaly.      Vascular: No JVD.   Cardiovascular:      Rate and Rhythm: Normal rate and regular rhythm.      Heart sounds: Normal heart sounds. No murmur heard.    No friction rub. No gallop.   Pulmonary:      Effort: Pulmonary effort is normal. No respiratory distress.      Breath sounds: Normal breath sounds. No wheezing or rales.   Abdominal:      General: Bowel sounds are normal.      Palpations: Abdomen is soft. There is no mass.      Tenderness: There is no abdominal tenderness.   Musculoskeletal:         General: No tenderness. Normal range of motion.      Cervical back: Normal range of motion and neck supple.   Lymphadenopathy:      Cervical: No cervical adenopathy.   Skin:     General: Skin is warm and dry.      Findings: No rash.   Neurological:      Mental Status: She is alert and oriented to person, place, and time.      Cranial Nerves: No cranial nerve deficit.   Psychiatric:         Mood and Affect: Mood normal.         Behavior: Behavior normal.       Protective Sensation (w/ 10 gram monofilament):  Right: Intact  Left: Intact    Visual Inspection:  Normal -  Bilateral    Pedal Pulses:   Right: Present  Left: Present    Posterior Tibialis Pulses:   Right:Present  Left: Present    Lab Visit on 04/03/2023   Component Date Value Ref Range Status    Microalbumin, Urine 04/03/2023 108.0  ug/mL Final    Creatinine, Urine 04/03/2023 103.0  15.0 - 325.0 mg/dL Final    Microalb/Creat Ratio 04/03/2023 104.9 (H)  0.0 - 30.0 ug/mg Final    Free T4 04/03/2023 1.08  0.71 - 1.51 ng/dL Final    Sodium 04/03/2023 139  136 - 145 mmol/L Final    Potassium 04/03/2023 4.6   3.5 - 5.1 mmol/L Final    Chloride 04/03/2023 109  95 - 110 mmol/L Final    CO2 04/03/2023 21 (L)  23 - 29 mmol/L Final    Glucose 04/03/2023 104  70 - 110 mg/dL Final    BUN 04/03/2023 38 (H)  8 - 23 mg/dL Final    Creatinine 04/03/2023 1.2  0.5 - 1.4 mg/dL Final    Calcium 04/03/2023 9.5  8.7 - 10.5 mg/dL Final    Total Protein 04/03/2023 6.6  6.0 - 8.4 g/dL Final    Albumin 04/03/2023 3.5  3.5 - 5.2 g/dL Final    Total Bilirubin 04/03/2023 0.2  0.1 - 1.0 mg/dL Final    Comment: For infants and newborns, interpretation of results should be based  on gestational age, weight and in agreement with clinical  observations.    Premature Infant recommended reference ranges:  Up to 24 hours.............<8.0 mg/dL  Up to 48 hours............<12.0 mg/dL  3-5 days..................<15.0 mg/dL  6-29 days.................<15.0 mg/dL      Alkaline Phosphatase 04/03/2023 117  55 - 135 U/L Final    AST 04/03/2023 227 (H)  10 - 40 U/L Final    ALT 04/03/2023 364 (H)  10 - 44 U/L Final    Anion Gap 04/03/2023 9  8 - 16 mmol/L Final    eGFR 04/03/2023 49.9 (A)  >60 mL/min/1.73 m^2 Final    TSH 04/03/2023 0.482  0.400 - 4.000 uIU/mL Final    Hemoglobin A1C 04/03/2023 6.8 (H)  4.0 - 5.6 % Final    Comment: ADA Screening Guidelines:  5.7-6.4%  Consistent with prediabetes  >or=6.5%  Consistent with diabetes    High levels of fetal hemoglobin interfere with the HbA1C  assay. Heterozygous hemoglobin variants (HbS, HgC, etc)do  not significantly interfere with this assay.   However, presence of multiple variants may affect accuracy.      Estimated Avg Glucose 04/03/2023 148 (H)  68 - 131 mg/dL Final       Assessment & Plan:      Diana was seen today for follow-up, diabetes and hypertension.  Additional liver testing will be performed.  Abdominal ultrasound will be obtained.  Hepatitis testing will be ordered.  Zetia will be held for now in view of the elevated liver enzyme levels.  CMP will be repeated next month.    Diagnoses and all  orders for this visit:    Type 2 diabetes mellitus with diabetic neuropathy, without long-term current use of insulin    Hypertension associated with diabetes  -     Comprehensive Metabolic Panel; Future    Hyperlipidemia associated with type 2 diabetes mellitus    Acquired hypothyroidism    Paroxysmal atrial fibrillation    Current use of long term anticoagulation    LFT elevation  -     Hepatitis C Antibody; Future  -     Hepatitis B Surface Antigen; Future  -     Hepatitis Panel, Acute; Future  -     US Abdomen Complete; Future  -     Comprehensive Metabolic Panel; Future    Ventral hernia without obstruction or gangrene    Elevation of levels of liver transaminase levels  -     Hepatitis Panel, Acute; Future    Eustachian tube dysfunction, left    Chronic rhinitis    Pain of right forearm         No follow-ups on file.     Rad Johnston MD

## 2023-04-25 ENCOUNTER — HOSPITAL ENCOUNTER (OUTPATIENT)
Dept: RADIOLOGY | Facility: HOSPITAL | Age: 68
Discharge: HOME | End: 2023-04-25
Attending: INTERNAL MEDICINE
Payer: MEDICARE

## 2023-04-25 DIAGNOSIS — R79.89 LFT ELEVATION: ICD-10-CM

## 2023-04-25 PROCEDURE — 76700 US EXAM ABDOM COMPLETE: CPT | Mod: TC

## 2023-04-25 PROCEDURE — 76700 US ABDOMEN COMPLETE: ICD-10-PCS | Mod: 26,,, | Performed by: RADIOLOGY

## 2023-04-25 PROCEDURE — 76700 US EXAM ABDOM COMPLETE: CPT | Mod: 26,,, | Performed by: RADIOLOGY

## 2023-04-27 ENCOUNTER — PES CALL (OUTPATIENT)
Dept: ADMINISTRATIVE | Facility: CLINIC | Age: 68
End: 2023-04-27
Payer: MEDICARE

## 2023-04-28 DIAGNOSIS — E11.40 TYPE 2 DIABETES MELLITUS WITH DIABETIC NEUROPATHY, WITHOUT LONG-TERM CURRENT USE OF INSULIN: Primary | ICD-10-CM

## 2023-04-28 DIAGNOSIS — E11.59 HYPERTENSION ASSOCIATED WITH DIABETES: ICD-10-CM

## 2023-04-28 DIAGNOSIS — I15.2 HYPERTENSION ASSOCIATED WITH DIABETES: ICD-10-CM

## 2023-04-28 DIAGNOSIS — E78.5 HYPERLIPIDEMIA ASSOCIATED WITH TYPE 2 DIABETES MELLITUS: ICD-10-CM

## 2023-04-28 DIAGNOSIS — E11.69 HYPERLIPIDEMIA ASSOCIATED WITH TYPE 2 DIABETES MELLITUS: ICD-10-CM

## 2023-04-29 NOTE — TELEPHONE ENCOUNTER
No care due was identified.  [facility] Embedded Care Due Messages. Reference number: [id number].   4/29/2023 12:01:06 PM CDT

## 2023-04-30 NOTE — TELEPHONE ENCOUNTER
Refill Decision Note   [facility]  is requesting a refill authorization.  Brief Assessment and Rationale for Refill:  Approve     Medication Therapy Plan:       Medication Reconciliation Completed: No   Comments:     No Care Gaps recommended.     Note composed:12:58 PM 04/30/2023

## 2023-05-03 ENCOUNTER — TELEPHONE (OUTPATIENT)
Dept: INTERNAL MEDICINE | Facility: CLINIC | Age: 68
End: 2023-05-03
Payer: MEDICARE

## 2023-05-03 NOTE — TELEPHONE ENCOUNTER
----- Message from Rad Johnston MD sent at 4/28/2023  3:54 PM CDT -----  Abdominal ultrasound was negative for any abnormality producing abnormal liver function tests.

## 2023-05-11 ENCOUNTER — LAB VISIT (OUTPATIENT)
Dept: LAB | Facility: HOSPITAL | Age: 68
End: 2023-05-11
Attending: INTERNAL MEDICINE
Payer: MEDICARE

## 2023-05-11 DIAGNOSIS — R79.89 LFT ELEVATION: ICD-10-CM

## 2023-05-11 DIAGNOSIS — E11.59 HYPERTENSION ASSOCIATED WITH DIABETES: ICD-10-CM

## 2023-05-11 DIAGNOSIS — I15.2 HYPERTENSION ASSOCIATED WITH DIABETES: ICD-10-CM

## 2023-05-11 LAB
ALBUMIN SERPL BCP-MCNC: 3.5 G/DL (ref 3.5–5.2)
ALP SERPL-CCNC: 109 U/L (ref 55–135)
ALT SERPL W/O P-5'-P-CCNC: 90 U/L (ref 10–44)
ANION GAP SERPL CALC-SCNC: 9 MMOL/L (ref 8–16)
AST SERPL-CCNC: 49 U/L (ref 10–40)
BILIRUB SERPL-MCNC: 0.2 MG/DL (ref 0.1–1)
BUN SERPL-MCNC: 32 MG/DL (ref 8–23)
CALCIUM SERPL-MCNC: 10 MG/DL (ref 8.7–10.5)
CHLORIDE SERPL-SCNC: 107 MMOL/L (ref 95–110)
CO2 SERPL-SCNC: 24 MMOL/L (ref 23–29)
CREAT SERPL-MCNC: 1.2 MG/DL (ref 0.5–1.4)
EST. GFR  (NO RACE VARIABLE): 49.9 ML/MIN/1.73 M^2
GLUCOSE SERPL-MCNC: 112 MG/DL (ref 70–110)
POTASSIUM SERPL-SCNC: 4.7 MMOL/L (ref 3.5–5.1)
PROT SERPL-MCNC: 6.7 G/DL (ref 6–8.4)
SODIUM SERPL-SCNC: 140 MMOL/L (ref 136–145)

## 2023-05-11 PROCEDURE — 80053 COMPREHEN METABOLIC PANEL: CPT | Performed by: INTERNAL MEDICINE

## 2023-05-11 PROCEDURE — 36415 COLL VENOUS BLD VENIPUNCTURE: CPT | Performed by: INTERNAL MEDICINE

## 2023-05-24 ENCOUNTER — TELEPHONE (OUTPATIENT)
Dept: PULMONOLOGY | Facility: CLINIC | Age: 68
End: 2023-05-24
Payer: MEDICARE

## 2023-05-24 DIAGNOSIS — Z87.891 HISTORY OF TOBACCO USE: Primary | ICD-10-CM

## 2023-05-24 NOTE — TELEPHONE ENCOUNTER
Patient returned call to scheduled follow up ldct.  Scheduled scan 6/1 @ 12:15 pm.  Instructions given on where to go.

## 2023-05-30 ENCOUNTER — TELEPHONE (OUTPATIENT)
Dept: ADMINISTRATIVE | Facility: CLINIC | Age: 68
End: 2023-05-30
Payer: MEDICARE

## 2023-06-01 ENCOUNTER — OFFICE VISIT (OUTPATIENT)
Dept: HOME HEALTH SERVICES | Facility: CLINIC | Age: 68
End: 2023-06-01
Payer: MEDICARE

## 2023-06-01 ENCOUNTER — TELEPHONE (OUTPATIENT)
Dept: ADMINISTRATIVE | Facility: CLINIC | Age: 68
End: 2023-06-01
Payer: MEDICARE

## 2023-06-01 ENCOUNTER — HOSPITAL ENCOUNTER (OUTPATIENT)
Dept: RADIOLOGY | Facility: HOSPITAL | Age: 68
Discharge: HOME | End: 2023-06-01
Attending: INTERNAL MEDICINE
Payer: MEDICARE

## 2023-06-01 DIAGNOSIS — E66.01 SEVERE OBESITY: ICD-10-CM

## 2023-06-01 DIAGNOSIS — Z99.89 DEPENDENCE ON OTHER ENABLING MACHINES AND DEVICES: ICD-10-CM

## 2023-06-01 DIAGNOSIS — E11.59 HYPERTENSION ASSOCIATED WITH DIABETES: ICD-10-CM

## 2023-06-01 DIAGNOSIS — J42 CHRONIC BRONCHITIS, UNSPECIFIED CHRONIC BRONCHITIS TYPE: ICD-10-CM

## 2023-06-01 DIAGNOSIS — I15.2 HYPERTENSION ASSOCIATED WITH DIABETES: ICD-10-CM

## 2023-06-01 DIAGNOSIS — N18.31 CHRONIC KIDNEY DISEASE, STAGE 3A: ICD-10-CM

## 2023-06-01 DIAGNOSIS — Z87.891 HISTORY OF TOBACCO USE: ICD-10-CM

## 2023-06-01 DIAGNOSIS — Z00.00 ENCOUNTER FOR PREVENTIVE HEALTH EXAMINATION: Primary | ICD-10-CM

## 2023-06-01 DIAGNOSIS — R26.9 ABNORMALITY OF GAIT AND MOBILITY: ICD-10-CM

## 2023-06-01 DIAGNOSIS — I77.9 BILATERAL CAROTID ARTERY DISEASE, UNSPECIFIED TYPE: ICD-10-CM

## 2023-06-01 PROCEDURE — 1170F FXNL STATUS ASSESSED: CPT | Mod: CPTII,95,, | Performed by: NURSE PRACTITIONER

## 2023-06-01 PROCEDURE — 3044F PR MOST RECENT HEMOGLOBIN A1C LEVEL <7.0%: ICD-10-PCS | Mod: CPTII,95,, | Performed by: NURSE PRACTITIONER

## 2023-06-01 PROCEDURE — 1159F MED LIST DOCD IN RCRD: CPT | Mod: CPTII,95,, | Performed by: NURSE PRACTITIONER

## 2023-06-01 PROCEDURE — 3288F FALL RISK ASSESSMENT DOCD: CPT | Mod: CPTII,95,, | Performed by: NURSE PRACTITIONER

## 2023-06-01 PROCEDURE — 3044F HG A1C LEVEL LT 7.0%: CPT | Mod: CPTII,95,, | Performed by: NURSE PRACTITIONER

## 2023-06-01 PROCEDURE — G0439 PPPS, SUBSEQ VISIT: HCPCS | Mod: 95,,, | Performed by: NURSE PRACTITIONER

## 2023-06-01 PROCEDURE — 71271 CT THORAX LUNG CANCER SCR C-: CPT | Mod: 26,,, | Performed by: RADIOLOGY

## 2023-06-01 PROCEDURE — 71271 CT THORAX LUNG CANCER SCR C-: CPT | Mod: TC

## 2023-06-01 PROCEDURE — 1101F PR PT FALLS ASSESS DOC 0-1 FALLS W/OUT INJ PAST YR: ICD-10-PCS | Mod: CPTII,95,, | Performed by: NURSE PRACTITIONER

## 2023-06-01 PROCEDURE — 71271 CT CHEST LUNG SCREENING LOW DOSE: ICD-10-PCS | Mod: 26,,, | Performed by: RADIOLOGY

## 2023-06-01 PROCEDURE — 3060F POS MICROALBUMINURIA REV: CPT | Mod: CPTII,95,, | Performed by: NURSE PRACTITIONER

## 2023-06-01 PROCEDURE — 3066F NEPHROPATHY DOC TX: CPT | Mod: CPTII,95,, | Performed by: NURSE PRACTITIONER

## 2023-06-01 PROCEDURE — 3066F PR DOCUMENTATION OF TREATMENT FOR NEPHROPATHY: ICD-10-PCS | Mod: CPTII,95,, | Performed by: NURSE PRACTITIONER

## 2023-06-01 PROCEDURE — G0439 PR MEDICARE ANNUAL WELLNESS SUBSEQUENT VISIT: ICD-10-PCS | Mod: 95,,, | Performed by: NURSE PRACTITIONER

## 2023-06-01 PROCEDURE — 1159F PR MEDICATION LIST DOCUMENTED IN MEDICAL RECORD: ICD-10-PCS | Mod: CPTII,95,, | Performed by: NURSE PRACTITIONER

## 2023-06-01 PROCEDURE — 1160F PR REVIEW ALL MEDS BY PRESCRIBER/CLIN PHARMACIST DOCUMENTED: ICD-10-PCS | Mod: CPTII,95,, | Performed by: NURSE PRACTITIONER

## 2023-06-01 PROCEDURE — 1160F RVW MEDS BY RX/DR IN RCRD: CPT | Mod: CPTII,95,, | Performed by: NURSE PRACTITIONER

## 2023-06-01 PROCEDURE — 1170F PR FUNCTIONAL STATUS ASSESSED: ICD-10-PCS | Mod: CPTII,95,, | Performed by: NURSE PRACTITIONER

## 2023-06-01 PROCEDURE — 3060F PR POS MICROALBUMINURIA RESULT DOCUMENTED/REVIEW: ICD-10-PCS | Mod: CPTII,95,, | Performed by: NURSE PRACTITIONER

## 2023-06-01 PROCEDURE — 1101F PT FALLS ASSESS-DOCD LE1/YR: CPT | Mod: CPTII,95,, | Performed by: NURSE PRACTITIONER

## 2023-06-01 PROCEDURE — 3288F PR FALLS RISK ASSESSMENT DOCUMENTED: ICD-10-PCS | Mod: CPTII,95,, | Performed by: NURSE PRACTITIONER

## 2023-06-01 NOTE — PATIENT INSTRUCTIONS
Counseling and Referral of Other Preventative  (Italic type indicates deductible and co-insurance are waived)    Patient Name: Diana Montenegro  Today's Date: 6/1/2023    Health Maintenance       Date Due Completion Date    Shingles Vaccine (1 of 2) Never done ---    LDCT Lung Screen 04/19/2023 4/19/2022    COVID-19 Vaccine (7 - Mixed Product series) 05/03/2023 1/3/2023    Colorectal Cancer Screening 08/30/2023 8/30/2018    Lipid Panel 09/14/2023 9/14/2022    Mammogram 09/16/2023 9/16/2022    Hemoglobin A1c 10/03/2023 4/3/2023    Eye Exam 01/12/2024 1/12/2023    Override on 11/4/2020: Done    Override on 8/23/2019: Done    Override on 3/28/2017: Done    Diabetes Urine Screening 04/03/2024 4/3/2023    High Dose Statin 04/06/2024 4/6/2023    Foot Exam 04/10/2024 4/10/2023    Override on 8/14/2019: Done    Override on 10/10/2017: Done    DEXA Scan 09/17/2024 9/17/2021    TETANUS VACCINE 11/06/2025 11/6/2015        No orders of the defined types were placed in this encounter.    The following information is provided to all patients.  This information is to help you find resources for any of the problems found today that may be affecting your health:                Living healthy guide: www.[facility].[place].gov      Understanding Diabetes: www.diabetes.org      Eating healthy: www.cdc.gov/healthyweight      CDC home safety checklist: www.cdc.gov/steadi/patient.html      Agency on Aging: www.goea.[place].gov      Alcoholics anonymous (AA): www.aa.org      Physical Activity: www.sahnthi.nih.gov/kg6zjal      Tobacco use: www.quitwithusla.org

## 2023-06-01 NOTE — PROGRESS NOTES
The patient location is: [place]  The chief complaint leading to consultation is: awv    Visit type: audiovisual    Face to Face time with patient: 60  60 minutes of total time spent on the encounter, which includes face to face time and non-face to face time preparing to see the patient (eg, review of tests), Obtaining and/or reviewing separately obtained history, Documenting clinical information in the electronic or other health record, Independently interpreting results (not separately reported) and communicating results to the patient/family/caregiver, or Care coordination (not separately reported).         Each patient to whom he or she provides medical services by telemedicine is:  (1) informed of the relationship between the physician and patient and the respective role of any other health care provider with respect to management of the patient; and (2) notified that he or she may decline to receive medical services by telemedicine and may withdraw from such care at any time.    Notes:   Review for Opioid Screening: Pt does not have Rx for Opioids    Review for Substance Use Disorders: Patient does not use substance        Diana Montenegro presented for a  Medicare AWV and comprehensive Health Risk Assessment today. The following components were reviewed and updated:    Medical history  Family History  Social history  Allergies and Current Medications  Health Risk Assessment  Health Maintenance  Care Team         ** See Completed Assessments for Annual Wellness Visit within the encounter summary.**         The following assessments were completed:  Living Situation  CAGE  Depression Screening  Fall Risk Assessment (MACH 10)  Hearing Assessment(HHI)  Cognitive Function Screening  Nutrition Screening  ADL Screening  PAQ Screening      There were no vitals filed for this visit.  There is no height or weight on file to calculate BMI.  Physical Exam  Constitutional:       Appearance: Normal appearance.    Neurological:      Mental Status: She is alert.   Psychiatric:         Attention and Perception: Attention and perception normal.         Mood and Affect: Mood and affect normal.         Speech: Speech normal.         Behavior: Behavior normal. Behavior is cooperative.         Thought Content: Thought content normal.         Cognition and Memory: Cognition and memory normal.         Judgment: Judgment normal.             Diagnoses and health risks identified today and associated recommendations/orders:    1. Encounter for preventive health examination  Stable, followed by provider    2. Chronic kidney disease, stage 3a  Stable, followed by provider, takes metoprolol, valsartan for BP control     3. Severe obesity  Stable, followed by provider, pt states she exercises 5 days per week    4. Bilateral carotid artery disease, unspecified type  Stable, followed by provider, takes metoprolol, valsartan, aspirin, rosuvastatin     5. Chronic bronchitis, unspecified chronic bronchitis type  Stable, followed by provider, takes albuterol     6. Dependence on other enabling machines and devices  Stable, followed by provider, uses cane or walker when needed    7. Abnormality of gait and mobility  Stable, followed by provider, uses cane or walker when needed    8. Hypertension associated with diabetes  Stable, followed by provider, takes metoprolol, valsartan      Provided Diana with a 5-10 year written screening schedule and personal prevention plan. Recommendations were developed using the USPSTF age appropriate recommendations. Education, counseling, and referrals were provided as needed. After Visit Summary printed and given to patient which includes a list of additional screenings\tests needed.    Follow up in about 1 year (around 6/1/2024) for your next annual wellness visit.    Patricio Olson NP  I offered to discuss advanced care planning, including how to pick a person who would make decisions for you if you were  unable to make them for yourself, called a health care power of , and what kind of decisions you might make such as use of life sustaining treatments such as ventilators and tube feeding when faced with a life limiting illness recorded on a living will that they will need to know. (How you want to be cared for as you near the end of your natural life)     X Patient is interested in learning more about how to make advanced directives.  I provided them paperwork and offered to discuss this with them.

## 2023-06-02 ENCOUNTER — TELEPHONE (OUTPATIENT)
Dept: INTERNAL MEDICINE | Facility: CLINIC | Age: 68
End: 2023-06-02
Payer: MEDICARE

## 2023-06-02 DIAGNOSIS — R79.89 LFT ELEVATION: Primary | ICD-10-CM

## 2023-06-02 NOTE — PROGRESS NOTES
CT scan of the lung was negative for evidence of any new lung tumor.  There was no evidence of emphysema.  A follow-up CT scan of the lung in 1 year was recommended.

## 2023-06-02 NOTE — PROGRESS NOTES
Liver enzyme levels have improved and are now mildly elevated.  Consultation with hepatology (liver specialist) is recommended for follow-up evaluation.

## 2023-06-02 NOTE — TELEPHONE ENCOUNTER
A referral order will be placed to hepatology for consultation regarding the patient's recent abnormal liver enzyme levels.  Most recent blood tests showed improvement in the enzyme levels; they are mildly elevated at the present time.

## 2023-06-06 ENCOUNTER — TELEPHONE (OUTPATIENT)
Dept: HEPATOLOGY | Facility: CLINIC | Age: 68
End: 2023-06-06
Payer: MEDICARE

## 2023-06-06 NOTE — TELEPHONE ENCOUNTER
Referral to hepatology for elevated liver enzymes     Scheduling attempt # 1    Please call pt to schedule appt with DESHAWN  Encourage video visits for new patient appts with APPs

## 2023-06-09 DIAGNOSIS — Z98.84 S/P LAPAROSCOPIC SLEEVE GASTRECTOMY: ICD-10-CM

## 2023-06-09 RX ORDER — OMEPRAZOLE 40 MG/1
CAPSULE, DELAYED RELEASE ORAL
Qty: 90 CAPSULE | Refills: 3 | Status: SHIPPED | OUTPATIENT
Start: 2023-06-09

## 2023-06-09 RX ORDER — VALSARTAN AND HYDROCHLOROTHIAZIDE 80; 12.5 MG/1; MG/1
1 TABLET, FILM COATED ORAL DAILY
Qty: 90 TABLET | Refills: 3 | Status: SHIPPED | OUTPATIENT
Start: 2023-06-09 | End: 2024-06-03

## 2023-06-09 NOTE — TELEPHONE ENCOUNTER
Refill Decision Note   Diana Montenegro  is requesting a refill authorization.  Brief Assessment and Rationale for Refill:  Approve     Medication Therapy Plan:         Comments:     Note composed:3:55 PM 06/09/2023             Appointments     Last Visit   4/10/2023 Rad Johnston MD   Next Visit   9/28/2023 Rad Johnston MD

## 2023-06-09 NOTE — TELEPHONE ENCOUNTER
No care due was identified.  [facility] Embedded Care Due Messages. Reference number: [id number].   6/09/2023 3:04:56 PM CDT

## 2023-06-13 NOTE — TELEPHONE ENCOUNTER
Scheduling attempt #2    Patient have an appt scheduled w/Dorothy Hernandez on July 31, 2023 at 2PM.

## 2023-06-23 RX ORDER — DICLOFENAC SODIUM 10 MG/G
2 GEL TOPICAL 3 TIMES DAILY
Qty: 100 G | Refills: 0 | Status: SHIPPED | OUTPATIENT
Start: 2023-06-23 | End: 2023-09-26

## 2023-07-07 ENCOUNTER — PATIENT OUTREACH (OUTPATIENT)
Dept: ADMINISTRATIVE | Facility: HOSPITAL | Age: 68
End: 2023-07-07
Payer: MEDICARE

## 2023-08-05 DIAGNOSIS — F41.9 ANXIETY: ICD-10-CM

## 2023-08-05 NOTE — TELEPHONE ENCOUNTER
Care Due:                  Date            Visit Type   Department     Provider  --------------------------------------------------------------------------------                                EP -                              PRIMARY      [facility] INTERNAL  Last Visit: 04-      [facility])   MEDICINE       Radtarah Johnston                              EP -                              PRIMARY      [facility] INTERNAL  Next Visit: 09-      [facility] ([facility])   MEDICINE       Radkelle Johnston                                                            Last  Test          Frequency    Reason                     Performed    Due Date  --------------------------------------------------------------------------------    HBA1C.......  6 months...  glimepiride, metFORMIN...  04- 09-    Lipid Panel.  12 months..  rosuvastatin.............  09- 09-    [facility] Embedded Care Due Messages. Reference number: [id number].   8/05/2023 10:34:56 AM CDT

## 2023-08-08 RX ORDER — LORAZEPAM 1 MG/1
1 TABLET ORAL 2 TIMES DAILY
Qty: 60 TABLET | Refills: 0 | Status: SHIPPED | OUTPATIENT
Start: 2023-08-08

## 2023-08-09 ENCOUNTER — PATIENT MESSAGE (OUTPATIENT)
Dept: ADMINISTRATIVE | Facility: HOSPITAL | Age: 68
End: 2023-08-09
Payer: MEDICARE

## 2023-08-09 ENCOUNTER — PATIENT OUTREACH (OUTPATIENT)
Dept: ADMINISTRATIVE | Facility: HOSPITAL | Age: 68
End: 2023-08-09
Payer: MEDICARE

## 2023-08-09 NOTE — PROGRESS NOTES
Chart reviewed  Immunizations reconciled   Portal message sent regarding colon cancer screening

## 2023-08-16 ENCOUNTER — TELEPHONE (OUTPATIENT)
Dept: ELECTROPHYSIOLOGY | Facility: CLINIC | Age: 68
End: 2023-08-16
Payer: MEDICARE

## 2023-08-18 DIAGNOSIS — I48.0 PAROXYSMAL ATRIAL FIBRILLATION: Primary | Chronic | ICD-10-CM

## 2023-08-25 ENCOUNTER — TELEPHONE (OUTPATIENT)
Dept: INTERNAL MEDICINE | Facility: CLINIC | Age: 68
End: 2023-08-25
Payer: MEDICARE

## 2023-08-25 DIAGNOSIS — Z12.11 COLON CANCER SCREENING: Primary | ICD-10-CM

## 2023-08-25 DIAGNOSIS — I65.23 BILATERAL CAROTID ARTERY STENOSIS: Primary | ICD-10-CM

## 2023-08-25 DIAGNOSIS — K63.5 POLYP OF COLON, UNSPECIFIED PART OF COLON, UNSPECIFIED TYPE: ICD-10-CM

## 2023-08-25 NOTE — TELEPHONE ENCOUNTER
----- Message from Dominick Giordano sent at 8/25/2023 12:59 PM CDT -----  Contact: [phone]  [email or web address]     Patient is calling for Medical Advice regarding: pt states she got a message from Ghazal on the [employer] that it was time for her to schedule her colonscopy. No orders listed in Epic for the pt. Please advise.     How long has patient had these symptoms:    Pharmacy name and phone#:    Would like response via [employer]:  no    Comments:

## 2023-08-28 ENCOUNTER — TELEPHONE (OUTPATIENT)
Dept: VASCULAR SURGERY | Facility: CLINIC | Age: 68
End: 2023-08-28
Payer: MEDICARE

## 2023-08-28 NOTE — TELEPHONE ENCOUNTER
Contacted pt to notify her that appt today with Dr. Vega will be cancelled due to provider out ill and that she will receive a call back from nurse Nicole to reschedule. Pt verbalized understanding.

## 2023-08-28 NOTE — TELEPHONE ENCOUNTER
No care due was identified.  [facility] Embedded Care Due Messages. Reference number: [id number].   8/28/2023 2:33:08 PM CDT

## 2023-08-29 RX ORDER — GLIMEPIRIDE 2 MG/1
2 TABLET ORAL
Qty: 90 TABLET | Refills: 0 | Status: SHIPPED | OUTPATIENT
Start: 2023-08-29

## 2023-08-29 NOTE — TELEPHONE ENCOUNTER
Refill Routing Note   Medication(s) are not appropriate for processing by [facility] for the following reason(s):      Required labs abnormal: eGFR    [facility] action(s):  Defer Care Due:  None identified        Pharmacist review requested: Yes     Appointments  past 12m or future 3m with PCP    Date Provider   Last Visit   4/10/2023 Rad Johnston MD   Next Visit   9/28/2023 Rad Johnston MD   ED visits in past 90 days: 0        Note composed:11:40 AM 08/29/2023

## 2023-08-31 NOTE — TELEPHONE ENCOUNTER
No care due was identified.  [facility] Embedded Care Due Messages. Reference number: [id number].   8/31/2023 3:56:02 PM CDT

## 2023-09-01 RX ORDER — GLIMEPIRIDE 2 MG/1
2 TABLET ORAL
Qty: 90 TABLET | Refills: 0 | OUTPATIENT
Start: 2023-09-01

## 2023-09-01 NOTE — TELEPHONE ENCOUNTER
Refill Decision Note   Diana Montenegro  is requesting a refill authorization.  Brief Assessment and Rationale for Refill:  Quick Discontinue     Medication Therapy Plan:         Comments:     Note composed:10:01 AM 09/01/2023

## 2023-09-02 ENCOUNTER — TELEPHONE (OUTPATIENT)
Dept: ENDOSCOPY | Facility: HOSPITAL | Age: 68
End: 2023-09-02
Payer: MEDICARE

## 2023-09-02 VITALS — WEIGHT: 195 LBS | HEIGHT: 65 IN | BODY MASS INDEX: 32.49 KG/M2

## 2023-09-02 DIAGNOSIS — Z12.11 ENCOUNTER FOR SCREENING COLONOSCOPY: Primary | ICD-10-CM

## 2023-09-02 NOTE — TELEPHONE ENCOUNTER
Spoke to Kaleigh  to schedule procedure(s) Colonoscopy       Physician to perform procedure(s) Dr. TANISHA Youssef  Date of Procedure (s) 12/06/2023  Arrival Time 10:00 AM  Time of Procedure(s) 11:00 AM   Location of Procedure(s) [facility] [address] [facility]  Type of Rx Prep sent to patient: PEG  Instructions provided to patient via [facility]    Patient was informed on the following information and verbalized understanding. Screening questionnaire reviewed with patient and complete. If procedure requires anesthesia, a responsible adult needs to be present to accompany the patient home, patient cannot drive after receiving anesthesia. Appointment details are tentative, especially check-in time. Patient will receive a prep-op call 4 days prior to confirm check-in time for procedure. If applicable the patient should contact their pharmacy to verify Rx for procedure prep is ready for pick-up. Patient was advised to call the scheduling department at [phone] if pharmacy states no Rx is available. Patient was advised to call the endoscopy scheduling department if any questions or concerns arise.      [facility] Scheduling Department

## 2023-09-04 RX ORDER — METFORMIN HYDROCHLORIDE 1000 MG/1
1000 TABLET ORAL 2 TIMES DAILY WITH MEALS
Qty: 180 TABLET | Refills: 0 | Status: SHIPPED | OUTPATIENT
Start: 2023-09-04 | End: 2023-12-06

## 2023-09-04 NOTE — TELEPHONE ENCOUNTER
No care due was identified.  [facility] Embedded Care Due Messages. Reference number: [id number].   9/04/2023 12:29:18 PM CDT

## 2023-09-04 NOTE — TELEPHONE ENCOUNTER
Refill Decision Note   Diana Montenegro  is requesting a refill authorization.  Brief Assessment and Rationale for Refill:  Approve     Medication Therapy Plan:         Comments:     Note composed:12:52 PM 09/04/2023

## 2023-09-25 NOTE — PROGRESS NOTES
Ms. Montenegro is a patient of Dr. Randolph and was last seen in clinic 8/9/2022.      Subjective:   Patient ID:  Diana Montenegro is a 68 y.o. female who presents for follow up of Atrial Fibrillation  .     HPI:    Ms. Montenegro is a 68 y.o. female with hypertension, hyperlipidemia, diabetes mellitus, obesity s/p gastric sleeve surgery, right MCA infarction July of 2014 s/p ILR implant (removed 2018), atrial fibrillation, and bradycardia here for annual follow up.    Background:    7/14 had rt MCA stroke.   She was outside of the treatment window for thrombolytics and recovered completely.  Work-up included carotid dopplers showing 60% right ICA stenosis. ILR implanted.  ILR monitoring has revealed rare paroxysmal atrial fibrillation, with episodes up to just less than an hour in duration, asymptomatic. Eliquis 5 mg BID initiated.  She underwent successful ILR removal on 9/17/2018. Remains on eliquis.  Reported palpitations 4/2019. Holter monitor 4/22/2019 showed very rare ectopy and no arrhythmias. At clinic visit 11/2019 she was prescribed BB PRN for palps.    8/10/2020: Feeling well. No cardiac complaints. She has not needed to use PRN metoprolol. Doing well from a rhythm standpoint, with no documented or symptomatic AF episodes since last clinic visit. Has BB PRN for palps but has not needed it. CHADSVASc 3 and remains on eliquis for CVA prophylaxis.    8/19/2021: She is doing well from a rhythm standpoint, with no documented or symptomatic AF episodes. Has BB PRN for palps but has not needed it. CHADSVASc 3 and remains on eliquis for CVA prophylaxis. Encouraged nightly use of CPAP.     8/9/2022: Stable from a rhythm standpoint, maintaining sinus rhythm. Brief palps. PRN metoprolol. Not on AAD. CHADSVASc 6 on eliquis. She is likely going to proceed with hernia repair surgery in the near future. Update echo. For clearance to hold eliquis, message EP clinic with surgical details. Otherwise, no contraindication to  proceeding from arrhythmia standpoint.    Update (09/26/2023):    Today she reports she has had several stumbling falls recently. No LH but off balance and feels like she isn't lifting her legs enough. She also reports worsening fatigue. No sustained palps. One brief episode and she has not needed her PRN metoprolol. No CP or worsening KING. No syncope.  Ended up not having additional hernia surgery.    She is currently taking eliquis 5mg BID for stroke prophylaxis and denies significant bleeding episodes. Has lopressor 25mg PRN for palps. Kidney function is stable, with a creatinine of 1.2 on 5/11/2023.    I have personally reviewed the patient's EKG today, which shows sinus rhythm at 69bpm. ME interval is 178. QRS is 86. QTc is 426.    Relevant Cardiac Test Results:    2D Echo (8/16/2022):  The left ventricle is normal in size with normal systolic function. The estimated ejection fraction is 60%.  Normal right ventricular size with normal right ventricular systolic function.  Grade I left ventricular diastolic dysfunction.  The estimated PA systolic pressure is 21 mmHg.  Normal central venous pressure (3 mmHg).    Current Outpatient Medications   Medication Sig    acetaminophen-codeine 300-30mg (TYLENOL #3) 300-30 mg Tab Take 1 tablet by mouth every 6 (six) hours as needed.    acyclovir 5% (ZOVIRAX) 5 % ointment Apply topically 6 (six) times daily.    albuterol (VENTOLIN HFA) 90 mcg/actuation inhaler Inhale 2 puffs into the lungs every 6 (six) hours as needed for Wheezing. Rescue    albuterol-ipratropium 2.5mg-0.5mg/3mL (DUO-NEB) 0.5 mg-3 mg(2.5 mg base)/3 mL nebulizer solution Take 3 mLs by nebulization every 6 (six) hours as needed for Wheezing. Rescue (Patient taking differently: Take 3 mLs by nebulization every 6 (six) hours as needed for Wheezing. Rescue prn)    apixaban (ELIQUIS) 5 mg Tab Take 1 tablet (5 mg total) by mouth 2 (two) times daily.    aspirin (ECOTRIN) 81 MG EC tablet Take 1 tablet (81 mg total)  by mouth once daily.    b complex vitamins tablet Take 1 tablet by mouth once daily.    blood pressure test kit-large Kit Use as directed    blood sugar diagnostic (FREESTYLE LITE STRIPS) Strp USE 1 STRIP TO CHECK GLUCOSE TWICE DAILY    blood-glucose meter Misc One touch verio flex or tone touch verio reflect or freestyle freedom lite meter (all covered by insurance)    CALCIUM CITRATE/VITAMIN D3 (CALCIUM CITRATE + D ORAL) Take 1 tablet by mouth every morning.    cetirizine (ZYRTEC) 5 MG tablet Take 5 mg by mouth once daily.    ciclopirox (PENLAC) 8 % Soln Apply topically nightly.    cinnamon bark (CINNAMON ORAL) Take by mouth 2 (two) times daily.    clotrimazole-betamethasone 1-0.05% (LOTRISONE) cream Apply topically 2 (two) times daily. (Patient taking differently: Apply topically 2 (two) times daily. prn)    cyanocobalamin, vitamin B-12, 500 mcg Subl Place 1 tablet under the tongue once daily.    diclofenac sodium (VOLTAREN) 1 % Gel Apply 2 g topically 3 (three) times daily. for 5 days    docusate sodium (COLACE) 100 MG capsule Take 100 mg by mouth once daily.     EYLEA 2 mg/0.05 mL Syrg     fish oil-omega-3 fatty acids 300-1,000 mg capsule Take 1 capsule by mouth 2 (two) times daily. Take 1 cap 2 times daily every other day    fluticasone (FLONASE) 50 mcg/actuation nasal spray 2 sprays (100 mcg total) by Each Nare route once daily. (Patient taking differently: 2 sprays by Each Nostril route daily as needed.)    glimepiride (AMARYL) 2 MG tablet Take 1 tablet (2 mg total) by mouth before breakfast. For diabetes control.    lancets Misc Needs lancets for testing twice daily.  To go with meter covered by insurance.    levothyroxine (SYNTHROID) 100 MCG tablet Take 1 tablet (100 mcg total) by mouth before breakfast.    LORazepam (ATIVAN) 1 MG tablet Take 1 tablet by mouth twice daily as needed for anxiety    metFORMIN (GLUCOPHAGE) 1000 MG tablet TAKE 1 TABLET BY MOUTH TWICE DAILY WITH MEALS    metoprolol tartrate  (LOPRESSOR) 25 MG tablet Take 1 tablet (25 mg total) by mouth once as needed. For palpitations    multivitamin capsule Take 1 capsule by mouth once daily.    mupirocin (BACTROBAN) 2 % ointment Apply to affected area 3 times daily    omeprazole (PRILOSEC) 40 MG capsule Take 1 capsule by mouth in the morning    OZEMPIC 1 mg/dose (4 mg/3 mL) Inject 1 mg into the skin every 7 days.    promethazine-dextromethorphan (PROMETHAZINE-DM) 6.25-15 mg/5 mL Syrp Take one tsp po q 6 hrs prn cough    rosuvastatin (CRESTOR) 40 MG Tab Take 1 tablet by mouth once daily    RUTIN/HESP/BIOFLAV/C/HERB#196 (BIOFLEX ORAL) Take 2 tablets by mouth once daily.    senna (SENNA) 8.6 mg tablet Take 2 tablets by mouth nightly as needed for Constipation.    traZODone (DESYREL) 50 MG tablet Take 1 tablet (50 mg total) by mouth nightly as needed for Insomnia. Ok to take 2nd tablet in 30 minutes if still awake    valsartan-hydrochlorothiazide (DIOVAN-HCT) 80-12.5 mg per tablet Take 1 tablet by mouth once daily     No current facility-administered medications for this visit.     Facility-Administered Medications Ordered in Other Visits   Medication    0.9%  NaCl infusion    ceFAZolin injection 2 g     Review of Systems   Constitutional: Positive for malaise/fatigue.   Cardiovascular:  Negative for chest pain, dyspnea on exertion, irregular heartbeat, leg swelling and palpitations.   Respiratory:  Negative for shortness of breath.    Hematologic/Lymphatic: Negative for bleeding problem.   Skin:  Negative for rash.   Musculoskeletal:  Positive for falls. Negative for myalgias.   Gastrointestinal:  Negative for hematemesis, hematochezia and nausea.   Genitourinary:  Negative for hematuria.   Neurological:  Positive for loss of balance.   Psychiatric/Behavioral:  Negative for altered mental status.    Allergic/Immunologic: Negative for persistent infections.     Objective:        /68   Pulse 69   Wt 90.6 kg (199 lb 11.8 oz)   LMP  (LMP Unknown)    BMI 33.24 kg/m²     Physical Exam  Vitals and nursing note reviewed.   Constitutional:       Appearance: Normal appearance. She is well-developed.   HENT:      Head: Normocephalic.      Nose: Nose normal.   Eyes:      Pupils: Pupils are equal, round, and reactive to light.   Cardiovascular:      Rate and Rhythm: Normal rate and regular rhythm.   Pulmonary:      Effort: No respiratory distress.      Breath sounds: Normal breath sounds.   Musculoskeletal:         General: Normal range of motion.   Skin:     General: Skin is warm and dry.      Findings: No erythema.   Neurological:      Mental Status: She is alert and oriented to person, place, and time.   Psychiatric:         Speech: Speech normal.         Behavior: Behavior normal.       Lab Results   Component Value Date     09/26/2023    K 4.4 09/26/2023    MG 1.6 07/10/2018    BUN 34 (H) 09/26/2023    CREATININE 1.2 09/26/2023    ALT 69 (H) 09/26/2023    AST 40 09/26/2023    HGB 9.6 (L) 04/13/2022    HCT 32.7 (L) 04/13/2022    TSH 0.482 04/03/2023    LDLCALC 66.8 09/26/2023             Assessment:     1. Paroxysmal atrial fibrillation    2. Hypertension associated with diabetes    3. Bradycardia    4. Current use of long term anticoagulation    5. Hx of TIA (transient ischemic attack) and stroke    6. Status post placement of implantable loop recorder      Plan:     In summary, Ms. Montenegro is a 68 y.o. female with hypertension, hyperlipidemia, diabetes mellitus, obesity s/p gastric sleeve surgery, right MCA infarction July of 2014 s/p ILR implant (removed 2018), atrial fibrillation, and bradycardia here for annual follow up.  Doing well from a rhythm standpoint, with no sustained palpitations and she has not needed her PRN metoprolol. She is reporting worsening fatigue. Will update CBC and TSH. Also encouraged CPAP use. She is interested in Inspire device. Will refer back to sleep medicine. Margarita 6 on eliquis for CVA prophylaxis. She has had some  stumbling falls recently due to loss of balance she does use a walker. May benefit from vestibular PT. We discussed if her falls continue or worsen, will consider watchman. Information about watchman provided. PCP appt this week and colonoscopy scheduled for December.    TSH, CBC  Sleep referral  Continue current meds  RTC 1 yr, sooner if needed    *A copy of this note has been sent to Dr. Randolph*    Follow up in about 1 year (around 9/26/2024).    ------------------------------------------------------------------    CHELITA Fuentes, NP-C  Cardiac Electrophysiology

## 2023-09-26 ENCOUNTER — OFFICE VISIT (OUTPATIENT)
Dept: ELECTROPHYSIOLOGY | Facility: CLINIC | Age: 68
End: 2023-09-26
Payer: MEDICARE

## 2023-09-26 ENCOUNTER — LAB VISIT (OUTPATIENT)
Dept: LAB | Facility: HOSPITAL | Age: 68
End: 2023-09-26
Attending: INTERNAL MEDICINE
Payer: MEDICARE

## 2023-09-26 VITALS
HEART RATE: 69 BPM | WEIGHT: 199.75 LBS | SYSTOLIC BLOOD PRESSURE: 128 MMHG | BODY MASS INDEX: 33.24 KG/M2 | DIASTOLIC BLOOD PRESSURE: 68 MMHG

## 2023-09-26 DIAGNOSIS — Z95.818 STATUS POST PLACEMENT OF IMPLANTABLE LOOP RECORDER: ICD-10-CM

## 2023-09-26 DIAGNOSIS — R00.1 BRADYCARDIA: ICD-10-CM

## 2023-09-26 DIAGNOSIS — E11.69 HYPERLIPIDEMIA ASSOCIATED WITH TYPE 2 DIABETES MELLITUS: ICD-10-CM

## 2023-09-26 DIAGNOSIS — I15.2 HYPERTENSION ASSOCIATED WITH DIABETES: ICD-10-CM

## 2023-09-26 DIAGNOSIS — Z86.73 HX OF TIA (TRANSIENT ISCHEMIC ATTACK) AND STROKE: ICD-10-CM

## 2023-09-26 DIAGNOSIS — E11.59 HYPERTENSION ASSOCIATED WITH DIABETES: ICD-10-CM

## 2023-09-26 DIAGNOSIS — I48.0 PAROXYSMAL ATRIAL FIBRILLATION: Primary | Chronic | ICD-10-CM

## 2023-09-26 DIAGNOSIS — Z79.01 CURRENT USE OF LONG TERM ANTICOAGULATION: ICD-10-CM

## 2023-09-26 DIAGNOSIS — E78.5 HYPERLIPIDEMIA ASSOCIATED WITH TYPE 2 DIABETES MELLITUS: ICD-10-CM

## 2023-09-26 DIAGNOSIS — G47.33 OSA (OBSTRUCTIVE SLEEP APNEA): ICD-10-CM

## 2023-09-26 DIAGNOSIS — E11.40 TYPE 2 DIABETES MELLITUS WITH DIABETIC NEUROPATHY, WITHOUT LONG-TERM CURRENT USE OF INSULIN: ICD-10-CM

## 2023-09-26 LAB
ALBUMIN SERPL BCP-MCNC: 3.2 G/DL (ref 3.5–5.2)
ALP SERPL-CCNC: 93 U/L (ref 55–135)
ALT SERPL W/O P-5'-P-CCNC: 69 U/L (ref 10–44)
ANION GAP SERPL CALC-SCNC: 8 MMOL/L (ref 8–16)
AST SERPL-CCNC: 40 U/L (ref 10–40)
BILIRUB SERPL-MCNC: 0.2 MG/DL (ref 0.1–1)
BUN SERPL-MCNC: 34 MG/DL (ref 8–23)
CALCIUM SERPL-MCNC: 9.1 MG/DL (ref 8.7–10.5)
CHLORIDE SERPL-SCNC: 107 MMOL/L (ref 95–110)
CHOLEST SERPL-MCNC: 130 MG/DL (ref 120–199)
CHOLEST/HDLC SERPL: 2.5 {RATIO} (ref 2–5)
CO2 SERPL-SCNC: 26 MMOL/L (ref 23–29)
CREAT SERPL-MCNC: 1.2 MG/DL (ref 0.5–1.4)
EST. GFR  (NO RACE VARIABLE): 49.6 ML/MIN/1.73 M^2
ESTIMATED AVG GLUCOSE: 146 MG/DL (ref 68–131)
GLUCOSE SERPL-MCNC: 101 MG/DL (ref 70–110)
HBA1C MFR BLD: 6.7 % (ref 4–5.6)
HDLC SERPL-MCNC: 52 MG/DL (ref 40–75)
HDLC SERPL: 40 % (ref 20–50)
LDLC SERPL CALC-MCNC: 66.8 MG/DL (ref 63–159)
NONHDLC SERPL-MCNC: 78 MG/DL
POTASSIUM SERPL-SCNC: 4.4 MMOL/L (ref 3.5–5.1)
PROT SERPL-MCNC: 6.7 G/DL (ref 6–8.4)
SODIUM SERPL-SCNC: 141 MMOL/L (ref 136–145)
TRIGL SERPL-MCNC: 56 MG/DL (ref 30–150)

## 2023-09-26 PROCEDURE — 83036 HEMOGLOBIN GLYCOSYLATED A1C: CPT | Performed by: INTERNAL MEDICINE

## 2023-09-26 PROCEDURE — 3288F PR FALLS RISK ASSESSMENT DOCUMENTED: ICD-10-PCS | Mod: CPTII,S$GLB,, | Performed by: NURSE PRACTITIONER

## 2023-09-26 PROCEDURE — 1159F MED LIST DOCD IN RCRD: CPT | Mod: CPTII,S$GLB,, | Performed by: NURSE PRACTITIONER

## 2023-09-26 PROCEDURE — 93005 RHYTHM STRIP: ICD-10-PCS | Mod: S$GLB,,, | Performed by: INTERNAL MEDICINE

## 2023-09-26 PROCEDURE — 80053 COMPREHEN METABOLIC PANEL: CPT | Performed by: INTERNAL MEDICINE

## 2023-09-26 PROCEDURE — 1125F PR PAIN SEVERITY QUANTIFIED, PAIN PRESENT: ICD-10-PCS | Mod: CPTII,S$GLB,, | Performed by: NURSE PRACTITIONER

## 2023-09-26 PROCEDURE — 3008F PR BODY MASS INDEX (BMI) DOCUMENTED: ICD-10-PCS | Mod: CPTII,S$GLB,, | Performed by: NURSE PRACTITIONER

## 2023-09-26 PROCEDURE — 3060F PR POS MICROALBUMINURIA RESULT DOCUMENTED/REVIEW: ICD-10-PCS | Mod: CPTII,S$GLB,, | Performed by: NURSE PRACTITIONER

## 2023-09-26 PROCEDURE — 99214 OFFICE O/P EST MOD 30 MIN: CPT | Mod: S$GLB,,, | Performed by: NURSE PRACTITIONER

## 2023-09-26 PROCEDURE — 99999 PR PBB SHADOW E&M-EST. PATIENT-LVL V: CPT | Mod: PBBFAC,,, | Performed by: NURSE PRACTITIONER

## 2023-09-26 PROCEDURE — 1159F PR MEDICATION LIST DOCUMENTED IN MEDICAL RECORD: ICD-10-PCS | Mod: CPTII,S$GLB,, | Performed by: NURSE PRACTITIONER

## 2023-09-26 PROCEDURE — 93010 RHYTHM STRIP: ICD-10-PCS | Mod: S$GLB,,, | Performed by: INTERNAL MEDICINE

## 2023-09-26 PROCEDURE — 3044F HG A1C LEVEL LT 7.0%: CPT | Mod: CPTII,S$GLB,, | Performed by: NURSE PRACTITIONER

## 2023-09-26 PROCEDURE — 36415 COLL VENOUS BLD VENIPUNCTURE: CPT | Performed by: INTERNAL MEDICINE

## 2023-09-26 PROCEDURE — 3066F PR DOCUMENTATION OF TREATMENT FOR NEPHROPATHY: ICD-10-PCS | Mod: CPTII,S$GLB,, | Performed by: NURSE PRACTITIONER

## 2023-09-26 PROCEDURE — 3074F PR MOST RECENT SYSTOLIC BLOOD PRESSURE < 130 MM HG: ICD-10-PCS | Mod: CPTII,S$GLB,, | Performed by: NURSE PRACTITIONER

## 2023-09-26 PROCEDURE — 93010 ELECTROCARDIOGRAM REPORT: CPT | Mod: S$GLB,,, | Performed by: INTERNAL MEDICINE

## 2023-09-26 PROCEDURE — 3078F PR MOST RECENT DIASTOLIC BLOOD PRESSURE < 80 MM HG: ICD-10-PCS | Mod: CPTII,S$GLB,, | Performed by: NURSE PRACTITIONER

## 2023-09-26 PROCEDURE — 1100F PR PT FALLS ASSESS DOC 2+ FALLS/FALL W/INJURY/YR: ICD-10-PCS | Mod: CPTII,S$GLB,, | Performed by: NURSE PRACTITIONER

## 2023-09-26 PROCEDURE — 3008F BODY MASS INDEX DOCD: CPT | Mod: CPTII,S$GLB,, | Performed by: NURSE PRACTITIONER

## 2023-09-26 PROCEDURE — 1100F PTFALLS ASSESS-DOCD GE2>/YR: CPT | Mod: CPTII,S$GLB,, | Performed by: NURSE PRACTITIONER

## 2023-09-26 PROCEDURE — 3060F POS MICROALBUMINURIA REV: CPT | Mod: CPTII,S$GLB,, | Performed by: NURSE PRACTITIONER

## 2023-09-26 PROCEDURE — 3074F SYST BP LT 130 MM HG: CPT | Mod: CPTII,S$GLB,, | Performed by: NURSE PRACTITIONER

## 2023-09-26 PROCEDURE — 93005 ELECTROCARDIOGRAM TRACING: CPT | Mod: S$GLB,,, | Performed by: INTERNAL MEDICINE

## 2023-09-26 PROCEDURE — 3078F DIAST BP <80 MM HG: CPT | Mod: CPTII,S$GLB,, | Performed by: NURSE PRACTITIONER

## 2023-09-26 PROCEDURE — 3044F PR MOST RECENT HEMOGLOBIN A1C LEVEL <7.0%: ICD-10-PCS | Mod: CPTII,S$GLB,, | Performed by: NURSE PRACTITIONER

## 2023-09-26 PROCEDURE — 99214 PR OFFICE/OUTPT VISIT, EST, LEVL IV, 30-39 MIN: ICD-10-PCS | Mod: S$GLB,,, | Performed by: NURSE PRACTITIONER

## 2023-09-26 PROCEDURE — 3066F NEPHROPATHY DOC TX: CPT | Mod: CPTII,S$GLB,, | Performed by: NURSE PRACTITIONER

## 2023-09-26 PROCEDURE — 99999 PR PBB SHADOW E&M-EST. PATIENT-LVL V: ICD-10-PCS | Mod: PBBFAC,,, | Performed by: NURSE PRACTITIONER

## 2023-09-26 PROCEDURE — 3288F FALL RISK ASSESSMENT DOCD: CPT | Mod: CPTII,S$GLB,, | Performed by: NURSE PRACTITIONER

## 2023-09-26 PROCEDURE — 1125F AMNT PAIN NOTED PAIN PRSNT: CPT | Mod: CPTII,S$GLB,, | Performed by: NURSE PRACTITIONER

## 2023-09-26 PROCEDURE — 80061 LIPID PANEL: CPT | Performed by: INTERNAL MEDICINE

## 2023-09-26 RX ORDER — DICLOFENAC SODIUM 10 MG/G
GEL TOPICAL
Qty: 100 G | Refills: 0 | Status: SHIPPED | OUTPATIENT
Start: 2023-09-26 | End: 2024-02-05 | Stop reason: SDUPTHER

## 2023-09-27 ENCOUNTER — TELEPHONE (OUTPATIENT)
Dept: ENDOSCOPY | Facility: HOSPITAL | Age: 68
End: 2023-09-27
Payer: MEDICARE

## 2023-09-27 DIAGNOSIS — I48.0 PAROXYSMAL ATRIAL FIBRILLATION: Chronic | ICD-10-CM

## 2023-09-27 RX ORDER — LEVOTHYROXINE SODIUM 100 UG/1
100 TABLET ORAL
Qty: 30 TABLET | Refills: 6 | Status: SHIPPED | OUTPATIENT
Start: 2023-09-27 | End: 2023-09-28

## 2023-09-27 NOTE — TELEPHONE ENCOUNTER
----- Message from Brook Colunga MA sent at 2023 11:23 AM CDT -----  Regardin/6 BT  The patient is currently under an internal PCP Dr. Rad eng and requires a clearance Eliquis (apixaban) for their upcoming scheduled Colonoscopy on 2023.

## 2023-09-27 NOTE — TELEPHONE ENCOUNTER
Refill Routing Note   Medication(s) are not appropriate for processing by [facility] for the following reason(s):      Medication outside of protocol    [facility] action(s):  Route Care Due:  None identified              Appointments  past 12m or future 3m with PCP    Date Provider   Last Visit   4/10/2023 Rad Johnston MD   Next Visit   9/28/2023 Rad Johnston MD   ED visits in past 90 days: 0        Note composed:4:27 PM 09/27/2023

## 2023-09-28 ENCOUNTER — OFFICE VISIT (OUTPATIENT)
Dept: INTERNAL MEDICINE | Facility: CLINIC | Age: 68
End: 2023-09-28
Payer: MEDICARE

## 2023-09-28 VITALS
SYSTOLIC BLOOD PRESSURE: 112 MMHG | RESPIRATION RATE: 15 BRPM | WEIGHT: 198.63 LBS | HEIGHT: 65 IN | HEART RATE: 69 BPM | TEMPERATURE: 98 F | BODY MASS INDEX: 33.09 KG/M2 | DIASTOLIC BLOOD PRESSURE: 60 MMHG | OXYGEN SATURATION: 99 %

## 2023-09-28 DIAGNOSIS — Z79.01 CURRENT USE OF LONG TERM ANTICOAGULATION: ICD-10-CM

## 2023-09-28 DIAGNOSIS — E03.9 ACQUIRED HYPOTHYROIDISM: ICD-10-CM

## 2023-09-28 DIAGNOSIS — E78.5 HYPERLIPIDEMIA ASSOCIATED WITH TYPE 2 DIABETES MELLITUS: ICD-10-CM

## 2023-09-28 DIAGNOSIS — E11.40 TYPE 2 DIABETES MELLITUS WITH DIABETIC NEUROPATHY, WITHOUT LONG-TERM CURRENT USE OF INSULIN: Primary | ICD-10-CM

## 2023-09-28 DIAGNOSIS — I15.2 HYPERTENSION ASSOCIATED WITH DIABETES: ICD-10-CM

## 2023-09-28 DIAGNOSIS — R26.89 IMPAIRMENT OF BALANCE: ICD-10-CM

## 2023-09-28 DIAGNOSIS — E11.69 HYPERLIPIDEMIA ASSOCIATED WITH TYPE 2 DIABETES MELLITUS: ICD-10-CM

## 2023-09-28 DIAGNOSIS — E11.59 HYPERTENSION ASSOCIATED WITH DIABETES: ICD-10-CM

## 2023-09-28 DIAGNOSIS — R29.6 FREQUENT FALLS: ICD-10-CM

## 2023-09-28 PROCEDURE — 3074F SYST BP LT 130 MM HG: CPT | Mod: CPTII,S$GLB,, | Performed by: INTERNAL MEDICINE

## 2023-09-28 PROCEDURE — 90694 FLU VACCINE - QUADRIVALENT - ADJUVANTED: ICD-10-PCS | Mod: S$GLB,,, | Performed by: INTERNAL MEDICINE

## 2023-09-28 PROCEDURE — 3074F PR MOST RECENT SYSTOLIC BLOOD PRESSURE < 130 MM HG: ICD-10-PCS | Mod: CPTII,S$GLB,, | Performed by: INTERNAL MEDICINE

## 2023-09-28 PROCEDURE — 1125F AMNT PAIN NOTED PAIN PRSNT: CPT | Mod: CPTII,S$GLB,, | Performed by: INTERNAL MEDICINE

## 2023-09-28 PROCEDURE — 3008F PR BODY MASS INDEX (BMI) DOCUMENTED: ICD-10-PCS | Mod: CPTII,S$GLB,, | Performed by: INTERNAL MEDICINE

## 2023-09-28 PROCEDURE — 99999 PR PBB SHADOW E&M-EST. PATIENT-LVL V: CPT | Mod: PBBFAC,,, | Performed by: INTERNAL MEDICINE

## 2023-09-28 PROCEDURE — 3288F PR FALLS RISK ASSESSMENT DOCUMENTED: ICD-10-PCS | Mod: CPTII,S$GLB,, | Performed by: INTERNAL MEDICINE

## 2023-09-28 PROCEDURE — 1100F PTFALLS ASSESS-DOCD GE2>/YR: CPT | Mod: CPTII,S$GLB,, | Performed by: INTERNAL MEDICINE

## 2023-09-28 PROCEDURE — 3060F POS MICROALBUMINURIA REV: CPT | Mod: CPTII,S$GLB,, | Performed by: INTERNAL MEDICINE

## 2023-09-28 PROCEDURE — 99214 PR OFFICE/OUTPT VISIT, EST, LEVL IV, 30-39 MIN: ICD-10-PCS | Mod: 25,S$GLB,, | Performed by: INTERNAL MEDICINE

## 2023-09-28 PROCEDURE — 3008F BODY MASS INDEX DOCD: CPT | Mod: CPTII,S$GLB,, | Performed by: INTERNAL MEDICINE

## 2023-09-28 PROCEDURE — 3044F PR MOST RECENT HEMOGLOBIN A1C LEVEL <7.0%: ICD-10-PCS | Mod: CPTII,S$GLB,, | Performed by: INTERNAL MEDICINE

## 2023-09-28 PROCEDURE — 3060F PR POS MICROALBUMINURIA RESULT DOCUMENTED/REVIEW: ICD-10-PCS | Mod: CPTII,S$GLB,, | Performed by: INTERNAL MEDICINE

## 2023-09-28 PROCEDURE — 3066F NEPHROPATHY DOC TX: CPT | Mod: CPTII,S$GLB,, | Performed by: INTERNAL MEDICINE

## 2023-09-28 PROCEDURE — 99999 PR PBB SHADOW E&M-EST. PATIENT-LVL V: ICD-10-PCS | Mod: PBBFAC,,, | Performed by: INTERNAL MEDICINE

## 2023-09-28 PROCEDURE — 1125F PR PAIN SEVERITY QUANTIFIED, PAIN PRESENT: ICD-10-PCS | Mod: CPTII,S$GLB,, | Performed by: INTERNAL MEDICINE

## 2023-09-28 PROCEDURE — G0008 ADMIN INFLUENZA VIRUS VAC: HCPCS | Mod: S$GLB,,, | Performed by: INTERNAL MEDICINE

## 2023-09-28 PROCEDURE — 3078F PR MOST RECENT DIASTOLIC BLOOD PRESSURE < 80 MM HG: ICD-10-PCS | Mod: CPTII,S$GLB,, | Performed by: INTERNAL MEDICINE

## 2023-09-28 PROCEDURE — 3288F FALL RISK ASSESSMENT DOCD: CPT | Mod: CPTII,S$GLB,, | Performed by: INTERNAL MEDICINE

## 2023-09-28 PROCEDURE — 3078F DIAST BP <80 MM HG: CPT | Mod: CPTII,S$GLB,, | Performed by: INTERNAL MEDICINE

## 2023-09-28 PROCEDURE — 3044F HG A1C LEVEL LT 7.0%: CPT | Mod: CPTII,S$GLB,, | Performed by: INTERNAL MEDICINE

## 2023-09-28 PROCEDURE — G0008 FLU VACCINE - QUADRIVALENT - ADJUVANTED: ICD-10-PCS | Mod: S$GLB,,, | Performed by: INTERNAL MEDICINE

## 2023-09-28 PROCEDURE — 3066F PR DOCUMENTATION OF TREATMENT FOR NEPHROPATHY: ICD-10-PCS | Mod: CPTII,S$GLB,, | Performed by: INTERNAL MEDICINE

## 2023-09-28 PROCEDURE — 90694 VACC AIIV4 NO PRSRV 0.5ML IM: CPT | Mod: S$GLB,,, | Performed by: INTERNAL MEDICINE

## 2023-09-28 PROCEDURE — 1100F PR PT FALLS ASSESS DOC 2+ FALLS/FALL W/INJURY/YR: ICD-10-PCS | Mod: CPTII,S$GLB,, | Performed by: INTERNAL MEDICINE

## 2023-09-28 PROCEDURE — 99214 OFFICE O/P EST MOD 30 MIN: CPT | Mod: 25,S$GLB,, | Performed by: INTERNAL MEDICINE

## 2023-09-28 RX ORDER — LEVOTHYROXINE SODIUM 88 UG/1
88 TABLET ORAL
Qty: 30 TABLET | Refills: 6 | Status: SHIPPED | OUTPATIENT
Start: 2023-09-28 | End: 2023-12-14 | Stop reason: SDUPTHER

## 2023-09-29 ENCOUNTER — PATIENT MESSAGE (OUTPATIENT)
Dept: INTERNAL MEDICINE | Facility: CLINIC | Age: 68
End: 2023-09-29
Payer: MEDICARE

## 2023-10-02 ENCOUNTER — HOSPITAL ENCOUNTER (OUTPATIENT)
Dept: VASCULAR SURGERY | Facility: CLINIC | Age: 68
Discharge: HOME | End: 2023-10-02
Attending: SURGERY
Payer: MEDICARE

## 2023-10-02 ENCOUNTER — LAB VISIT (OUTPATIENT)
Dept: LAB | Facility: HOSPITAL | Age: 68
End: 2023-10-02
Payer: MEDICARE

## 2023-10-02 ENCOUNTER — OFFICE VISIT (OUTPATIENT)
Dept: HEPATOLOGY | Facility: CLINIC | Age: 68
End: 2023-10-02
Payer: MEDICARE

## 2023-10-02 ENCOUNTER — OFFICE VISIT (OUTPATIENT)
Dept: VASCULAR SURGERY | Facility: CLINIC | Age: 68
End: 2023-10-02
Attending: SURGERY
Payer: MEDICARE

## 2023-10-02 VITALS — BODY MASS INDEX: 33.57 KG/M2 | HEIGHT: 65 IN | WEIGHT: 201.5 LBS

## 2023-10-02 VITALS
SYSTOLIC BLOOD PRESSURE: 133 MMHG | WEIGHT: 197.31 LBS | BODY MASS INDEX: 32.87 KG/M2 | OXYGEN SATURATION: 97 % | HEIGHT: 65 IN | DIASTOLIC BLOOD PRESSURE: 61 MMHG | HEART RATE: 68 BPM

## 2023-10-02 DIAGNOSIS — I15.2 HYPERTENSION ASSOCIATED WITH DIABETES: ICD-10-CM

## 2023-10-02 DIAGNOSIS — E11.40 TYPE 2 DIABETES MELLITUS WITH DIABETIC NEUROPATHY, WITHOUT LONG-TERM CURRENT USE OF INSULIN: ICD-10-CM

## 2023-10-02 DIAGNOSIS — R79.89 LFT ELEVATION: ICD-10-CM

## 2023-10-02 DIAGNOSIS — E11.59 HYPERTENSION ASSOCIATED WITH DIABETES: ICD-10-CM

## 2023-10-02 DIAGNOSIS — E11.69 HYPERLIPIDEMIA ASSOCIATED WITH TYPE 2 DIABETES MELLITUS: Primary | ICD-10-CM

## 2023-10-02 DIAGNOSIS — E78.5 HYPERLIPIDEMIA ASSOCIATED WITH TYPE 2 DIABETES MELLITUS: Primary | ICD-10-CM

## 2023-10-02 DIAGNOSIS — I65.21 ASYMPTOMATIC STENOSIS OF RIGHT CAROTID ARTERY: Primary | ICD-10-CM

## 2023-10-02 DIAGNOSIS — E03.9 ACQUIRED HYPOTHYROIDISM: ICD-10-CM

## 2023-10-02 DIAGNOSIS — E11.69 HYPERLIPIDEMIA ASSOCIATED WITH TYPE 2 DIABETES MELLITUS: ICD-10-CM

## 2023-10-02 DIAGNOSIS — E78.5 HYPERLIPIDEMIA ASSOCIATED WITH TYPE 2 DIABETES MELLITUS: ICD-10-CM

## 2023-10-02 DIAGNOSIS — I65.23 BILATERAL CAROTID ARTERY STENOSIS: ICD-10-CM

## 2023-10-02 LAB
ALBUMIN SERPL BCP-MCNC: 3.4 G/DL (ref 3.5–5.2)
ALP SERPL-CCNC: 96 U/L (ref 55–135)
ALT SERPL W/O P-5'-P-CCNC: 67 U/L (ref 10–44)
AST SERPL-CCNC: 34 U/L (ref 10–40)
BILIRUB DIRECT SERPL-MCNC: 0.1 MG/DL (ref 0.1–0.3)
BILIRUB SERPL-MCNC: 0.3 MG/DL (ref 0.1–1)
CK SERPL-CCNC: 52 U/L (ref 20–180)
FERRITIN SERPL-MCNC: 35 NG/ML (ref 20–300)
HAV IGG SER QL IA: NORMAL
HBV CORE AB SERPL QL IA: NORMAL
HBV SURFACE AB SER-ACNC: <3 MIU/ML
HBV SURFACE AB SER-ACNC: NORMAL M[IU]/ML
IGG SERPL-MCNC: 830 MG/DL (ref 650–1600)
PROT SERPL-MCNC: 6.9 G/DL (ref 6–8.4)

## 2023-10-02 PROCEDURE — 3008F PR BODY MASS INDEX (BMI) DOCUMENTED: ICD-10-PCS | Mod: CPTII,S$GLB,, | Performed by: PHYSICIAN ASSISTANT

## 2023-10-02 PROCEDURE — 82728 ASSAY OF FERRITIN: CPT | Performed by: PHYSICIAN ASSISTANT

## 2023-10-02 PROCEDURE — 3044F PR MOST RECENT HEMOGLOBIN A1C LEVEL <7.0%: ICD-10-PCS | Mod: CPTII,S$GLB,, | Performed by: SURGERY

## 2023-10-02 PROCEDURE — 3008F BODY MASS INDEX DOCD: CPT | Mod: CPTII,S$GLB,, | Performed by: PHYSICIAN ASSISTANT

## 2023-10-02 PROCEDURE — 99213 PR OFFICE/OUTPT VISIT, EST, LEVL III, 20-29 MIN: ICD-10-PCS | Mod: S$GLB,,, | Performed by: SURGERY

## 2023-10-02 PROCEDURE — 3060F POS MICROALBUMINURIA REV: CPT | Mod: CPTII,S$GLB,, | Performed by: SURGERY

## 2023-10-02 PROCEDURE — 36415 COLL VENOUS BLD VENIPUNCTURE: CPT | Performed by: PHYSICIAN ASSISTANT

## 2023-10-02 PROCEDURE — 99999 PR PBB SHADOW E&M-EST. PATIENT-LVL IV: CPT | Mod: PBBFAC,,, | Performed by: SURGERY

## 2023-10-02 PROCEDURE — 3075F SYST BP GE 130 - 139MM HG: CPT | Mod: CPTII,S$GLB,, | Performed by: SURGERY

## 2023-10-02 PROCEDURE — 86790 VIRUS ANTIBODY NOS: CPT | Performed by: PHYSICIAN ASSISTANT

## 2023-10-02 PROCEDURE — 3066F NEPHROPATHY DOC TX: CPT | Mod: CPTII,S$GLB,, | Performed by: SURGERY

## 2023-10-02 PROCEDURE — 82103 ALPHA-1-ANTITRYPSIN TOTAL: CPT | Performed by: PHYSICIAN ASSISTANT

## 2023-10-02 PROCEDURE — 3044F HG A1C LEVEL LT 7.0%: CPT | Mod: CPTII,S$GLB,, | Performed by: SURGERY

## 2023-10-02 PROCEDURE — 99214 PR OFFICE/OUTPT VISIT, EST, LEVL IV, 30-39 MIN: ICD-10-PCS | Mod: S$GLB,,, | Performed by: PHYSICIAN ASSISTANT

## 2023-10-02 PROCEDURE — 82390 ASSAY OF CERULOPLASMIN: CPT | Performed by: PHYSICIAN ASSISTANT

## 2023-10-02 PROCEDURE — 3288F FALL RISK ASSESSMENT DOCD: CPT | Mod: CPTII,S$GLB,, | Performed by: PHYSICIAN ASSISTANT

## 2023-10-02 PROCEDURE — 1160F RVW MEDS BY RX/DR IN RCRD: CPT | Mod: CPTII,S$GLB,, | Performed by: PHYSICIAN ASSISTANT

## 2023-10-02 PROCEDURE — 99999 PR PBB SHADOW E&M-EST. PATIENT-LVL IV: ICD-10-PCS | Mod: PBBFAC,,, | Performed by: SURGERY

## 2023-10-02 PROCEDURE — 86704 HEP B CORE ANTIBODY TOTAL: CPT | Performed by: PHYSICIAN ASSISTANT

## 2023-10-02 PROCEDURE — 86381 MITOCHONDRIAL ANTIBODY EACH: CPT | Performed by: PHYSICIAN ASSISTANT

## 2023-10-02 PROCEDURE — 3288F FALL RISK ASSESSMENT DOCD: CPT | Mod: CPTII,S$GLB,, | Performed by: SURGERY

## 2023-10-02 PROCEDURE — 1159F PR MEDICATION LIST DOCUMENTED IN MEDICAL RECORD: ICD-10-PCS | Mod: CPTII,S$GLB,, | Performed by: SURGERY

## 2023-10-02 PROCEDURE — 3288F PR FALLS RISK ASSESSMENT DOCUMENTED: ICD-10-PCS | Mod: CPTII,S$GLB,, | Performed by: PHYSICIAN ASSISTANT

## 2023-10-02 PROCEDURE — 1126F AMNT PAIN NOTED NONE PRSNT: CPT | Mod: CPTII,S$GLB,, | Performed by: SURGERY

## 2023-10-02 PROCEDURE — 86038 ANTINUCLEAR ANTIBODIES: CPT | Performed by: PHYSICIAN ASSISTANT

## 2023-10-02 PROCEDURE — 1100F PTFALLS ASSESS-DOCD GE2>/YR: CPT | Mod: CPTII,S$GLB,, | Performed by: SURGERY

## 2023-10-02 PROCEDURE — 3288F PR FALLS RISK ASSESSMENT DOCUMENTED: ICD-10-PCS | Mod: CPTII,S$GLB,, | Performed by: SURGERY

## 2023-10-02 PROCEDURE — 3060F POS MICROALBUMINURIA REV: CPT | Mod: CPTII,S$GLB,, | Performed by: PHYSICIAN ASSISTANT

## 2023-10-02 PROCEDURE — 1126F AMNT PAIN NOTED NONE PRSNT: CPT | Mod: CPTII,S$GLB,, | Performed by: PHYSICIAN ASSISTANT

## 2023-10-02 PROCEDURE — 86015 ACTIN ANTIBODY EACH: CPT | Performed by: PHYSICIAN ASSISTANT

## 2023-10-02 PROCEDURE — 3066F PR DOCUMENTATION OF TREATMENT FOR NEPHROPATHY: ICD-10-PCS | Mod: CPTII,S$GLB,, | Performed by: PHYSICIAN ASSISTANT

## 2023-10-02 PROCEDURE — 99999 PR PBB SHADOW E&M-EST. PATIENT-LVL V: ICD-10-PCS | Mod: PBBFAC,,, | Performed by: PHYSICIAN ASSISTANT

## 2023-10-02 PROCEDURE — 3066F NEPHROPATHY DOC TX: CPT | Mod: CPTII,S$GLB,, | Performed by: PHYSICIAN ASSISTANT

## 2023-10-02 PROCEDURE — 3075F PR MOST RECENT SYSTOLIC BLOOD PRESS GE 130-139MM HG: ICD-10-PCS | Mod: CPTII,S$GLB,, | Performed by: SURGERY

## 2023-10-02 PROCEDURE — 1159F MED LIST DOCD IN RCRD: CPT | Mod: CPTII,S$GLB,, | Performed by: PHYSICIAN ASSISTANT

## 2023-10-02 PROCEDURE — 1160F PR REVIEW ALL MEDS BY PRESCRIBER/CLIN PHARMACIST DOCUMENTED: ICD-10-PCS | Mod: CPTII,S$GLB,, | Performed by: PHYSICIAN ASSISTANT

## 2023-10-02 PROCEDURE — 99213 OFFICE O/P EST LOW 20 MIN: CPT | Mod: S$GLB,,, | Performed by: SURGERY

## 2023-10-02 PROCEDURE — 99214 OFFICE O/P EST MOD 30 MIN: CPT | Mod: S$GLB,,, | Performed by: PHYSICIAN ASSISTANT

## 2023-10-02 PROCEDURE — 3060F PR POS MICROALBUMINURIA RESULT DOCUMENTED/REVIEW: ICD-10-PCS | Mod: CPTII,S$GLB,, | Performed by: SURGERY

## 2023-10-02 PROCEDURE — 99999 PR PBB SHADOW E&M-EST. PATIENT-LVL V: CPT | Mod: PBBFAC,,, | Performed by: PHYSICIAN ASSISTANT

## 2023-10-02 PROCEDURE — 1159F PR MEDICATION LIST DOCUMENTED IN MEDICAL RECORD: ICD-10-PCS | Mod: CPTII,S$GLB,, | Performed by: PHYSICIAN ASSISTANT

## 2023-10-02 PROCEDURE — 93880 PR DUPLEX SCAN EXTRACRANIAL,BILAT: ICD-10-PCS | Mod: S$GLB,,, | Performed by: SURGERY

## 2023-10-02 PROCEDURE — 3044F HG A1C LEVEL LT 7.0%: CPT | Mod: CPTII,S$GLB,, | Performed by: PHYSICIAN ASSISTANT

## 2023-10-02 PROCEDURE — 80076 HEPATIC FUNCTION PANEL: CPT | Performed by: PHYSICIAN ASSISTANT

## 2023-10-02 PROCEDURE — 3044F PR MOST RECENT HEMOGLOBIN A1C LEVEL <7.0%: ICD-10-PCS | Mod: CPTII,S$GLB,, | Performed by: PHYSICIAN ASSISTANT

## 2023-10-02 PROCEDURE — 3078F DIAST BP <80 MM HG: CPT | Mod: CPTII,S$GLB,, | Performed by: SURGERY

## 2023-10-02 PROCEDURE — 3066F PR DOCUMENTATION OF TREATMENT FOR NEPHROPATHY: ICD-10-PCS | Mod: CPTII,S$GLB,, | Performed by: SURGERY

## 2023-10-02 PROCEDURE — 1126F PR PAIN SEVERITY QUANTIFIED, NO PAIN PRESENT: ICD-10-PCS | Mod: CPTII,S$GLB,, | Performed by: SURGERY

## 2023-10-02 PROCEDURE — 3078F PR MOST RECENT DIASTOLIC BLOOD PRESSURE < 80 MM HG: ICD-10-PCS | Mod: CPTII,S$GLB,, | Performed by: SURGERY

## 2023-10-02 PROCEDURE — 82784 ASSAY IGA/IGD/IGG/IGM EACH: CPT | Performed by: PHYSICIAN ASSISTANT

## 2023-10-02 PROCEDURE — 1100F PR PT FALLS ASSESS DOC 2+ FALLS/FALL W/INJURY/YR: ICD-10-PCS | Mod: CPTII,S$GLB,, | Performed by: SURGERY

## 2023-10-02 PROCEDURE — 82550 ASSAY OF CK (CPK): CPT | Performed by: PHYSICIAN ASSISTANT

## 2023-10-02 PROCEDURE — 1100F PR PT FALLS ASSESS DOC 2+ FALLS/FALL W/INJURY/YR: ICD-10-PCS | Mod: CPTII,S$GLB,, | Performed by: PHYSICIAN ASSISTANT

## 2023-10-02 PROCEDURE — 1159F MED LIST DOCD IN RCRD: CPT | Mod: CPTII,S$GLB,, | Performed by: SURGERY

## 2023-10-02 PROCEDURE — 3060F PR POS MICROALBUMINURIA RESULT DOCUMENTED/REVIEW: ICD-10-PCS | Mod: CPTII,S$GLB,, | Performed by: PHYSICIAN ASSISTANT

## 2023-10-02 PROCEDURE — 1126F PR PAIN SEVERITY QUANTIFIED, NO PAIN PRESENT: ICD-10-PCS | Mod: CPTII,S$GLB,, | Performed by: PHYSICIAN ASSISTANT

## 2023-10-02 PROCEDURE — 93880 EXTRACRANIAL BILAT STUDY: CPT | Mod: S$GLB,,, | Performed by: SURGERY

## 2023-10-02 PROCEDURE — 86706 HEP B SURFACE ANTIBODY: CPT | Mod: 91 | Performed by: PHYSICIAN ASSISTANT

## 2023-10-02 PROCEDURE — 3008F BODY MASS INDEX DOCD: CPT | Mod: CPTII,S$GLB,, | Performed by: SURGERY

## 2023-10-02 PROCEDURE — 3008F PR BODY MASS INDEX (BMI) DOCUMENTED: ICD-10-PCS | Mod: CPTII,S$GLB,, | Performed by: SURGERY

## 2023-10-02 PROCEDURE — 1100F PTFALLS ASSESS-DOCD GE2>/YR: CPT | Mod: CPTII,S$GLB,, | Performed by: PHYSICIAN ASSISTANT

## 2023-10-02 NOTE — PROGRESS NOTES
"VASCULAR SURGERY NOTE    Patient ID: Diana Montenegro is a 68 y.o. female.    I. HISTORY     Chief Complaint:  carotid stenosis    HPI: Diana Montenegro is a 68 y.o. female who is here today for established patient appointment. I last saw her in August 2022 and at that time I wrote the following:    "She has been doing well in the interim since her last visit and denies new neurologic symptoms including weakness, facial droop, aphasia, and amarosis.  She does pool exercises at home and uses a walker for support due to back pain, but denies claudication symptoms.  She reports she has an upcoming hernia repair for ventral hernia with Dr. Stacy."    Since her last appt she has not had significant changes. She was able to go on a cruise to the [facility] recently. She did not end up getting her ventral hernia repair because she did not feel like it would help. She had COVID a couple of weeks ago but is feeling much better now. She has continued taking her aspirin and statin as instructed.    Family History: Reviewed. No history of aneurysm. + diabetes, + heart disease    Past Medical History:   Diagnosis Date    Arthritis     Atrial fibrillation, unspecified     hx of a fib prior to stroke.    Chronic back pain     Colon polyp     CVA (cerebral infarction) 7/16/14    Degenerative disc disease     cervical; lumbar    Diabetes mellitus type II     Encounter for loop recorder at end of battery life 9/17/2018    Hyperlipidemia     Hypertension     Hypothyroidism     Mild nonproliferative diabetic retinopathy 08/12/2018    Obesity     Sleep apnea     Stroke july 2014    Venous insufficiency (chronic) (peripheral)         Past Surgical History:   Procedure Laterality Date    COLONOSCOPY N/A 8/30/2018    Procedure: COLONOSCOPY;  Surgeon: William Clement MD;  Location: [facility] ([address] [place] [address]);  Service: Endoscopy;  Laterality: N/A;  Tito/Dr Rad Johnston/OK to hold 2 days prior to colon/see telephone encounter dated " 18/pt has loop recorder/svn    COLONOSCOPY W/ POLYPECTOMY      GASTRECTOMY      HERNIA REPAIR      REMOVAL OF IMPLANTABLE LOOP RECORDER N/A 2018    Procedure: REMOVAL, IMPLANTABLE LOOP RECORDER;  Surgeon: Thomas Randolph MD;  Location: [facility] CATH LAB;  Service: Cardiology;  Laterality: N/A;  TERESA, ILR removal (no reimplant), MDT, RN Sed[place], SK, 3 Prep *Reveal LINQ*    TONSILLECTOMY      TUBAL LIGATION         Social History     Tobacco Use   Smoking Status Former    Current packs/day: 0.00    Average packs/day: 1 pack/day for 30.0 years (30.0 ttl pk-yrs)    Types: Cigarettes    Start date: 1984    Quit date: 2014    Years since quittin.2   Smokeless Tobacco Never        Review of Systems   Constitutional: Negative for weight loss.   HENT:  Negative for ear pain and nosebleeds.    Eyes:  Negative for discharge and pain.   Cardiovascular:  Negative for chest pain and palpitations.   Respiratory:  Negative for cough, shortness of breath and wheezing.    Endocrine: Negative for cold intolerance, heat intolerance and polyphagia.   Hematologic/Lymphatic: Negative for adenopathy. Does not bruise/bleed easily.   Skin:  Negative for itching and rash.   Musculoskeletal:  Negative for joint swelling and muscle cramps.   Gastrointestinal:  Negative for abdominal pain, diarrhea, nausea and vomiting.   Genitourinary:  Negative for dysuria and flank pain.   Neurological:  Negative for numbness and seizures.             II. PHYSICAL EXAM     Physical Exam  Constitutional:       General: She is not in acute distress.     Appearance: Normal appearance. She is normal weight. She is not ill-appearing or diaphoretic.   HENT:      Head: Normocephalic and atraumatic.   Eyes:      General: No scleral icterus.        Right eye: No discharge.         Left eye: No discharge.      Extraocular Movements: Extraocular movements intact.      Conjunctiva/sclera: Conjunctivae normal.   Cardiovascular:      Rate and Rhythm: Normal  rate and regular rhythm.      Pulses: Normal pulses.   Pulmonary:      Effort: Pulmonary effort is normal. No respiratory distress.   Musculoskeletal:         General: Normal range of motion.   Skin:     General: Skin is warm and dry.      Coloration: Skin is not jaundiced or pale.      Findings: No erythema or rash.      Comments: Tan skin   Neurological:      General: No focal deficit present.      Mental Status: She is alert and oriented to person, place, and time. Mental status is at baseline.      Cranial Nerves: No cranial nerve deficit.   Psychiatric:         Mood and Affect: Mood normal.         Behavior: Behavior normal.                  III. ASSESSMENT & PLAN (MEDICAL DECISION MAKING)     1. Asymptomatic stenosis of right carotid artery          Imaging Results: (I have personally reviewed all below images and provided my interpretation below)   Carotid Duplex 9/4/20: Images reviewed by me. Results reviewed with patient.        Carotid Duplex 8/5/22:   R L   CCA: 65cm/s  ICA PSV: 211cm/s  ICA EDV: 58cm/s  Vert: antegrade normal waveform  ICA/CCA ratio: 3.2   Impression: 60-79% ICA stenosis CCA: 66cm/s  ICA PSV: 110cm/s  ICA EDV: 30cm/s  Vert: antegrade normal waveform  ICA/CCA ratio: 2.0  Impression: 40-59% ICA stenosis        Carotid Duplex 10/2/23:  R L   CCA PSV: 72cm/s  ICA PSV: 205cm/s  ICA EDV: 60cm/s  Vert: antegrade  ICA/CCA ratio: 2.9  Impression: 60-79%  CCA PSV: 69cm/s  ICA PSV: 131cm/s  ICA EDV: 34cm/s  Vert: antegade  ICA/CCA ratio: 1.9  Impression: 40-60% stenosis           Assessment/Diagnosis and Plan:  68 y.o. female with asymptomatic moderate right carotid stenosis. Recommend continued best medical therapy for carotid stenosis with aspirin and rosuvastatin with blood pressure control for goal BP less than 140/90 and A1C goal <7.0. Instructed her that it is critical to continue ASA 81mg through her upcoming surgery.    -Eliquis for a-fib  -continue home ASA 81 mg  -continue home  rosuvastatin  -Continue current home BP meds for goal BP less than 140/90  -f/u 1 yr for surveillance carotid duplex    JUNIE Vega II, MD, [facility]  Vascular Surgeon  [facility]

## 2023-10-02 NOTE — PROGRESS NOTES
Hepatology Consultation: [facility]    NEW PATIENT   PCP: Rad Johnston MD    Subjective      Ms. Montenegro is a 68 y.o. female with PMH as listed below who presents to clinic for elevated liver enzymes.        In relation to metabolic risk factors:   Lab Results   Component Value Date    HGBA1C 6.7 (H) 09/26/2023    CHOL 130 09/26/2023    TSH 0.258 (L) 09/26/2023     Body mass index is 33.53 kg/m².    Lab Results   Component Value Date    ALT 69 (H) 09/26/2023    AST 40 09/26/2023    ALKPHOS 93 09/26/2023    BILITOT 0.2 09/26/2023    ALBUMIN 3.2 (L) 09/26/2023    INR 1.0 09/10/2018     09/26/2023       Initial history 10/02/2023  Diana Montenegro arrives today alone.  She is well-appearing and feels well. Denies symptoms of hepatic decompensation including jaundice, ascites, cognitive problems to suggest hepatic encephalopathy, or GI bleeding.    She reports 2015 gastric bypass 330lbs pre-surgery.    Reports has been on Ozempic for last year, lost about 15 lbs    Struggles with MSK pain - mainly knees and neck.    Denies  a family history of liver cancer, cirrhosis, or hepatitis.      No significant alcohol use. Denies drug use or smoking.    PMH Diana has a past medical history of Arthritis, Atrial fibrillation, unspecified, Chronic back pain, Colon polyp, CVA (cerebral infarction) (07/16/2014), Degenerative disc disease, Diabetes mellitus type II, Encounter for loop recorder at end of battery life (09/17/2018), Hyperlipidemia, Hypertension, Hypothyroidism, Mild nonproliferative diabetic retinopathy (08/12/2018), Obesity, Sleep apnea, Stroke (07/2014), and Venous insufficiency (chronic) (peripheral).   PSXH Diaan has a past surgical history that includes Tubal ligation; Tonsillectomy; Hernia repair; Gastrectomy; Colonoscopy w/ polypectomy; Colonoscopy (N/A, 08/30/2018); and Removal of implantable loop recorder (N/A, 09/17/2018).    Diana's family  "history includes Diabetes in her maternal grandfather; Heart disease in her father; No Known Problems in her maternal grandmother, mother, paternal grandfather, sister, and sister; Obesity in her maternal grandfather; Stroke in her paternal grandmother.   KELLIE Ortiz reports that she quit smoking about 9 years ago. Her smoking use included cigarettes. She started smoking about 39 years ago. She has a 30.0 pack-year smoking history. She has never used smokeless tobacco. She reports that she does not currently use alcohol. She reports that she does not use drugs.   SHANNAN Ortiz is allergic to medrol [methylprednisolone].   RICARDO Ortiz has a current medication list which includes the following prescription(s): acyclovir 5%, albuterol, apixaban, aspirin, b complex vitamins, blood pressure test kit-large, blood sugar diagnostic, blood-glucose meter, calcium citrate/vitamin d3, cetirizine, ciclopirox, cinnamon bark, cyanocobalamin (vitamin b-12), diclofenac sodium, docusate sodium, eylea, fish oil-omega-3 fatty acids, fluticasone propionate, glimepiride, lancets, levothyroxine, lorazepam, metformin, multivitamin, mupirocin, omeprazole, ozempic, promethazine-dextromethorphan, rosuvastatin, rutin/hesp/bioflav/c/[id number], senna, trazodone, valsartan-hydrochlorothiazide, albuterol-ipratropium, and metoprolol tartrate, and the following Facility-Administered Medications: sodium chloride 0.9%.       ROS:   GENERAL: Denies fatigue  HEENT: Denies yellowing of eyes   CARDIOVASCULAR: Denies edema  GI: Denies abdominal pain  SKIN: Denies rash, itching   MSK: (+) knee pain    Objective     Ht 5' 5" (1.651 m)   Wt 91.4 kg (201 lb 8 oz)   LMP  (LMP Unknown)   BMI 33.53 kg/m²     PHYSICAL EXAM:   Friendly woman, in no acute distress; alert and oriented to person, place and time  EYES: Sclerae anicteric  GI: Soft, non-tender, non-distended. No ascites.  EXTREMITIES:  No edema.  SKIN: Warm and dry. No jaundice. No telangectasias noted. " "No palmar erythema.  NEURO:  Using walker to ambulate. No asterixis.  PSYCH:  Memory intact. Thought and speech pattern appropriate. Behavior normal    DIAGNOSTICS:  Lab Results   Component Value Date    ALT 69 (H) 09/26/2023    AST 40 09/26/2023    ALKPHOS 93 09/26/2023    BILITOT 0.2 09/26/2023    ALBUMIN 3.2 (L) 09/26/2023    INR 1.0 09/10/2018     09/26/2023     No results found for: "AFP"    Prior serologic workup:   Lab Results   Component Value Date    FESATURATED 17 (L) 08/28/2017    HEPBSAG Non-reactive 04/10/2023    HEPBSAG Non-reactive 04/10/2023    HEPCAB Non-reactive 04/10/2023    HEPCAB Non-reactive 04/10/2023    HEPAIGM Non-reactive 04/10/2023       Imaging:  US ABDOMEN COMPLETE     CLINICAL HISTORY:  Other specified abnormal findings of blood chemistry     TECHNIQUE:  Complete abdominal ultrasound (including pancreas, aorta, liver, gallbladder, common bile duct, IVC, kidneys, and spleen) was performed.     COMPARISON:  Abdominal ultrasound 04/13/2022.  CT abdomen pelvis 08/05/2022.     FINDINGS:  Pancreas: The visualized portions of pancreas appear normal.     Aorta: Mid and distal portions are obscured by overlying bowel gas.  No aneurysm visualized proximally.     Liver: 15.3 cm, normal in size. Homogeneous parenchymal echotexture.  Hepatorenal index measures 1.0.  No focal lesions.     Gallbladder: No calculi, wall thickening, or pericholecystic fluid.  Negative sonographic Boyce's sign.     Biliary system: 5 mm common bile duct.  No intrahepatic ductal dilatation.     Inferior vena cava: Normal in appearance.     Right kidney: 10.5 cm. No hydronephrosis.     Left kidney: 10.0 cm. No hydronephrosis.     Spleen: 11.3 x 4.3 cm.  Normal in size with homogeneous echotexture.     Miscellaneous: No ascites.     Impression:     No sonographic abnormality.     Electronically signed by resident: Daniel Mann  Date:                                            04/25/2023  Time:                         "                   11:56    Assessment/Plan     68 y.o. female with:    1. LFT elevation  - Ambulatory referral/consult to Hepatology  - Alpha-1-Antitrypsin; Future  - DAVEY Screen w/Reflex; Future  - Antimitochondrial Antibody; Future  - Anti-Smooth Muscle Antibody; Future  - Ceruloplasmin; Future  - CK; Future  - Ferritin; Future  - IgG; Future  - Hepatitis A antibody, IgG; Future  - Hepatitis B Core Antibody, Total; Future  - Hepatitis B Surface Ab, Qualitative; Future  - FibroScan Unity (Vibration Controlled Transient Elastography); Future  - Hepatic Function Panel; Future    2. Hyperlipidemia associated with type 2 diabetes mellitus  - Alpha-1-Antitrypsin; Future  - DAVEY Screen w/Reflex; Future  - Antimitochondrial Antibody; Future  - Anti-Smooth Muscle Antibody; Future  - Ceruloplasmin; Future  - CK; Future  - Ferritin; Future  - IgG; Future  - Hepatitis A antibody, IgG; Future  - Hepatitis B Core Antibody, Total; Future  - Hepatitis B Surface Ab, Qualitative; Future    3. Hypertension associated with diabetes    4. Type 2 diabetes mellitus with diabetic neuropathy, without long-term current use of insulin    5. Acquired hypothyroidism    6. BMI 33.0-33.9,adult      Orders Placed This Encounter   Procedures    FibroScan Unity (Vibration Controlled Transient Elastography)    Alpha-1-Antitrypsin    DAVEY Screen w/Reflex    Antimitochondrial Antibody    Anti-Smooth Muscle Antibody    Ceruloplasmin    CK    Ferritin    IgG    Hepatitis A antibody, IgG    Hepatitis B Core Antibody, Total    Hepatitis B Surface Ab, Qualitative    Hepatic Function Panel       Serologic w/u now.  Follow-up in 6 weeks with Fibroscan prior     I spent 45 minutes on the day of this encounter preparing for, evaluating, treating, and managing this patient.     Thank you for allowing me to participate in the care of TAYLOR [facility]  Hepatology & Liver Transplant

## 2023-10-03 LAB
A1AT SERPL-MCNC: 171 MG/DL (ref 100–190)
ANA SER QL IF: NORMAL
CERULOPLASMIN SERPL-MCNC: 26 MG/DL (ref 15–45)

## 2023-10-04 LAB
MITOCHONDRIA AB TITR SER IF: NORMAL {TITER}
SMOOTH MUSCLE AB TITR SER IF: NORMAL {TITER}

## 2023-10-07 DIAGNOSIS — G47.00 INSOMNIA, UNSPECIFIED TYPE: ICD-10-CM

## 2023-10-07 NOTE — TELEPHONE ENCOUNTER
No care due was identified.  [facility] Embedded Care Due Messages. Reference number: [id number].   10/07/2023 3:13:57 PM CDT

## 2023-10-08 RX ORDER — TRAZODONE HYDROCHLORIDE 50 MG/1
TABLET ORAL
Qty: 180 TABLET | Refills: 3 | Status: SHIPPED | OUTPATIENT
Start: 2023-10-08

## 2023-10-08 NOTE — TELEPHONE ENCOUNTER
Refill Decision Note   Diana Montenegro  is requesting a refill authorization.  Brief Assessment and Rationale for Refill:  Approve     Medication Therapy Plan:         Comments:     Note composed:2:54 AM 10/08/2023

## 2023-10-16 ENCOUNTER — HOSPITAL ENCOUNTER (OUTPATIENT)
Dept: RADIOLOGY | Facility: HOSPITAL | Age: 68
Discharge: HOME | End: 2023-10-16
Attending: INTERNAL MEDICINE
Payer: MEDICARE

## 2023-10-16 DIAGNOSIS — Z12.31 ENCOUNTER FOR SCREENING MAMMOGRAM FOR BREAST CANCER: ICD-10-CM

## 2023-10-16 PROCEDURE — 77067 SCR MAMMO BI INCL CAD: CPT | Mod: TC

## 2023-10-16 PROCEDURE — 77067 SCR MAMMO BI INCL CAD: CPT | Mod: 26,,, | Performed by: RADIOLOGY

## 2023-10-16 PROCEDURE — 77063 BREAST TOMOSYNTHESIS BI: CPT | Mod: 26,,, | Performed by: RADIOLOGY

## 2023-10-16 PROCEDURE — 77063 MAMMO DIGITAL SCREENING BILAT WITH TOMO: ICD-10-PCS | Mod: 26,,, | Performed by: RADIOLOGY

## 2023-10-16 PROCEDURE — 77067 MAMMO DIGITAL SCREENING BILAT WITH TOMO: ICD-10-PCS | Mod: 26,,, | Performed by: RADIOLOGY

## 2023-10-30 NOTE — PROGRESS NOTES
Subjective:       Patient ID: Diana Montenegro is a 68 y.o. female.    Chief Complaint: Follow-up, Diabetes, Hypertension, and Fall    HPI  The patient presents for follow-up of medical conditions which include type 2 diabetes mellitus, hypertension hypothyroidism, hyperlipidemia, paroxysmal atrial fibrillation, chronic anticoagulation, and frequent falls.  The patient reports she has sustained multiple falls this year.  She does not experience syncope.  She is not experienced head injury except for a chin injury.  Her last fall was in a hospital in [place] where she was visiting.    She was diagnosed to have COVID-19 on 09/18/2023.  She had presented with symptoms of chest congestion, fever, and rhinorrhea.  She was not treated with antiviral medication.    The patient is using a Rollator walker for assisted ambulation.  She does experience intermittent sharp pains in both feet.  This usually occurs at night.    She has not been monitoring blood sugars lately.  No hypoglycemic symptoms have been noted.  Her vision is stable.  She does not monitor blood pressures but she is tolerating her medication well without side effects.  No excessive bruising or bleeding have been noted with use of Eliquis.    Review of Systems   Constitutional:  Negative for activity change, appetite change and unexpected weight change.   Eyes:  Negative for visual disturbance.   Respiratory:  Negative for shortness of breath.    Cardiovascular:  Negative for chest pain, palpitations and leg swelling.   Gastrointestinal:  Negative for abdominal pain, blood in stool and diarrhea.   Genitourinary:  Negative for dysuria, frequency, hematuria and urgency.   Musculoskeletal:  Positive for gait problem.   Neurological:  Negative for weakness, numbness and headaches.   Psychiatric/Behavioral:  Negative for sleep disturbance.             Physical Exam  Vitals and nursing note reviewed.   Constitutional:       General: She is not in acute  distress.     Appearance: Normal appearance. She is well-developed.   HENT:      Head: Normocephalic and atraumatic.   Eyes:      General: No scleral icterus.     Extraocular Movements: Extraocular movements intact.      Conjunctiva/sclera: Conjunctivae normal.   Neck:      Thyroid: No thyromegaly.      Vascular: No JVD.   Cardiovascular:      Rate and Rhythm: Normal rate and regular rhythm.      Heart sounds: Normal heart sounds. No murmur heard.     No friction rub. No gallop.   Pulmonary:      Effort: Pulmonary effort is normal. No respiratory distress.      Breath sounds: Normal breath sounds. No wheezing or rales.   Abdominal:      General: Bowel sounds are normal.      Palpations: Abdomen is soft. There is no mass.      Tenderness: There is no abdominal tenderness.   Musculoskeletal:         General: No tenderness. Normal range of motion.      Cervical back: Normal range of motion and neck supple.   Lymphadenopathy:      Cervical: No cervical adenopathy.   Skin:     General: Skin is warm and dry.      Findings: No rash.      Comments: Right hip:  Ecchymosis is present.  Slight tenderness is noted at the site.   Neurological:      Mental Status: She is alert and oriented to person, place, and time.      Cranial Nerves: No cranial nerve deficit.      Comments: Patient's gait is antalgic.     Psychiatric:         Mood and Affect: Mood normal.         Behavior: Behavior normal.       Protective Sensation (w/ 10 gram monofilament):  Right: Intact  Left: Intact    Visual Inspection:  Normal -  Bilateral    Pedal Pulses:   Right: Present  Left: Present    Posterior Tibialis Pulses:   Right:Present  Left: Present      Lab Visit on 09/26/2023   Component Date Value Ref Range Status    WBC 09/26/2023 5.41  3.90 - 12.70 K/uL Final    RBC 09/26/2023 3.39 (L)  4.00 - 5.40 M/uL Final    Hemoglobin 09/26/2023 9.4 (L)  12.0 - 16.0 g/dL Final    Hematocrit 09/26/2023 31.6 (L)  37.0 - 48.5 % Final    MCV 09/26/2023 93  82 - 98  fL Final    MCH 09/26/2023 27.7  27.0 - 31.0 pg Final    MCHC 09/26/2023 29.7 (L)  32.0 - 36.0 g/dL Final    RDW 09/26/2023 12.8  11.5 - 14.5 % Final    Platelets 09/26/2023 168  150 - 450 K/uL Final    MPV 09/26/2023 9.7  9.2 - 12.9 fL Final    Immature Granulocytes 09/26/2023 0.7 (H)  0.0 - 0.5 % Final    Gran # (ANC) 09/26/2023 3.5  1.8 - 7.7 K/uL Final    Immature Grans (Abs) 09/26/2023 0.04  0.00 - 0.04 K/uL Final    Comment: Mild elevation in immature granulocytes is non specific and   can be seen in a variety of conditions including stress response,   acute inflammation, trauma and pregnancy. Correlation with other   laboratory and clinical findings is essential.      Lymph # 09/26/2023 1.4  1.0 - 4.8 K/uL Final    Mono # 09/26/2023 0.4  0.3 - 1.0 K/uL Final    Eos # 09/26/2023 0.1  0.0 - 0.5 K/uL Final    Baso # 09/26/2023 0.04  0.00 - 0.20 K/uL Final    nRBC 09/26/2023 0  0 /100 WBC Final    Gran % 09/26/2023 64.2  38.0 - 73.0 % Final    Lymph % 09/26/2023 25.0  18.0 - 48.0 % Final    Mono % 09/26/2023 8.1  4.0 - 15.0 % Final    Eosinophil % 09/26/2023 1.3  0.0 - 8.0 % Final    Basophil % 09/26/2023 0.7  0.0 - 1.9 % Final    Differential Method 09/26/2023 Automated   Final    TSH 09/26/2023 0.258 (L)  0.400 - 4.000 uIU/mL Final    Free T4 09/26/2023 1.06  0.71 - 1.51 ng/dL Final   Lab Visit on 09/26/2023   Component Date Value Ref Range Status    Sodium 09/26/2023 141  136 - 145 mmol/L Final    Potassium 09/26/2023 4.4  3.5 - 5.1 mmol/L Final    Chloride 09/26/2023 107  95 - 110 mmol/L Final    CO2 09/26/2023 26  23 - 29 mmol/L Final    Glucose 09/26/2023 101  70 - 110 mg/dL Final    BUN 09/26/2023 34 (H)  8 - 23 mg/dL Final    Creatinine 09/26/2023 1.2  0.5 - 1.4 mg/dL Final    Calcium 09/26/2023 9.1  8.7 - 10.5 mg/dL Final    Total Protein 09/26/2023 6.7  6.0 - 8.4 g/dL Final    Albumin 09/26/2023 3.2 (L)  3.5 - 5.2 g/dL Final    Total Bilirubin 09/26/2023 0.2  0.1 - 1.0 mg/dL Final    Comment: For  infants and newborns, interpretation of results should be based  on gestational age, weight and in agreement with clinical  observations.    Premature Infant recommended reference ranges:  Up to 24 hours.............<8.0 mg/dL  Up to 48 hours............<12.0 mg/dL  3-5 days..................<15.0 mg/dL  6-29 days.................<15.0 mg/dL      Alkaline Phosphatase 09/26/2023 93  55 - 135 U/L Final    AST 09/26/2023 40  10 - 40 U/L Final    ALT 09/26/2023 69 (H)  10 - 44 U/L Final    eGFR 09/26/2023 49.6 (A)  >60 mL/min/1.73 m^2 Final    Anion Gap 09/26/2023 8  8 - 16 mmol/L Final    Cholesterol 09/26/2023 130  120 - 199 mg/dL Final    Comment: The National Cholesterol Education Program (NCEP) has set the  following guidelines (reference ranges) for Cholesterol:  Optimal.....................<200 mg/dL  Borderline High.............200-239 mg/dL  High........................> or = 240 mg/dL      Triglycerides 09/26/2023 56  30 - 150 mg/dL Final    Comment: The National Cholesterol Education Program (NCEP) has set the  following guidelines (reference values) for triglycerides:  Normal......................<150 mg/dL  Borderline High.............150-199 mg/dL  High........................200-499 mg/dL      HDL 09/26/2023 52  40 - 75 mg/dL Final    Comment: The National Cholesterol Education Program (NCEP) has set the  following guidelines (reference values) for HDL Cholesterol:  Low...............<40 mg/dL  Optimal...........>60 mg/dL      LDL Cholesterol 09/26/2023 66.8  63.0 - 159.0 mg/dL Final    Comment: The National Cholesterol Education Program (NCEP) has set the  following guidelines (reference values) for LDL Cholesterol:  Optimal.......................<130 mg/dL  Borderline High...............130-159 mg/dL  High..........................160-189 mg/dL  Very High.....................>190 mg/dL      HDL/Cholesterol Ratio 09/26/2023 40.0  20.0 - 50.0 % Final    Total Cholesterol/HDL Ratio 09/26/2023 2.5  2.0 -  5.0 Final    Non-HDL Cholesterol 09/26/2023 78  mg/dL Final    Comment: Risk category and Non-HDL cholesterol goals:  Coronary heart disease (CHD)or equivalent (10-year risk of CHD >20%):  Non-HDL cholesterol goal     <130 mg/dL  Two or more CHD risk factors and 10-year risk of CHD <= 20%:  Non-HDL cholesterol goal     <160 mg/dL  0 to 1 CHD risk factor:  Non-HDL cholesterol goal     <190 mg/dL      Hemoglobin A1C 09/26/2023 6.7 (H)  4.0 - 5.6 % Final    Comment: ADA Screening Guidelines:  5.7-6.4%  Consistent with prediabetes  >or=6.5%  Consistent with diabetes    High levels of fetal hemoglobin interfere with the HbA1C  assay. Heterozygous hemoglobin variants (HbS, HgC, etc)do  not significantly interfere with this assay.   However, presence of multiple variants may affect accuracy.      Estimated Avg Glucose 09/26/2023 146 (H)  68 - 131 mg/dL Final       Assessment & Plan:      Diana was seen today for follow-up, diabetes, hypertension and fall.  Outpatient physical therapy will be requested for gait evaluation and training and for therapeutic muscle strengthening.  This is to reduce risk of frequent falls.    The dose of levothyroxine will be decreased to 88 mcg daily.    Influenza vaccine will be administered today.    The patient will be due for colonoscopy in 12/2024.    Diagnoses and all orders for this visit:    Type 2 diabetes mellitus with diabetic neuropathy, without long-term current use of insulin  -     Comprehensive Metabolic Panel; Future  -     CBC Auto Differential; Future  -     TSH; Future  -     Hemoglobin A1C; Future    Hypertension associated with diabetes  -     Comprehensive Metabolic Panel; Future  -     CBC Auto Differential; Future  -     TSH; Future  -     Hemoglobin A1C; Future    Hyperlipidemia associated with type 2 diabetes mellitus  -     Comprehensive Metabolic Panel; Future  -     CBC Auto Differential; Future  -     TSH; Future  -     Hemoglobin A1C; Future    Acquired  hypothyroidism  -     Comprehensive Metabolic Panel; Future  -     CBC Auto Differential; Future  -     TSH; Future  -     Hemoglobin A1C; Future    Current use of long term anticoagulation    Frequent falls  -     Ambulatory referral/consult to Physical/Occupational Therapy; Future    Impairment of balance  -     Ambulatory referral/consult to Physical/Occupational Therapy; Future    Other orders  -     levothyroxine (SYNTHROID) 88 MCG tablet; Take 1 tablet (88 mcg total) by mouth before breakfast.  -     Influenza - Quadrivalent (Adjuvanted)         Follow up in about 4 months (around 1/28/2024).     Rad Johnston MD

## 2023-11-02 NOTE — TELEPHONE ENCOUNTER
Good morning Bernarda. Based on our protocol for OAC's, Ms. Montenegro can hold her Eliquis for 2 days prior to her Colonoscopy. Shaniqua Jones RN

## 2023-11-02 NOTE — TELEPHONE ENCOUNTER
Dear KHANH Rivera,    Patient has a scheduled procedure Colonoscopy on 12/6/23 and is currently taking a blood thinner prescribed by your office. In order to ensure patient safety, we would like to confirm that the patient can place their blood thinner medication on hold for the procedure. Can he/she discontinue Eliquis (apixaban) for a minimum of 2 days prior to the procedure?     Thank you for your prompt reply.    [facility] Endoscopy Scheduling

## 2023-11-07 ENCOUNTER — CLINICAL SUPPORT (OUTPATIENT)
Dept: REHABILITATION | Facility: HOSPITAL | Age: 68
End: 2023-11-07
Attending: INTERNAL MEDICINE
Payer: MEDICARE

## 2023-11-07 DIAGNOSIS — R29.6 FREQUENT FALLS: ICD-10-CM

## 2023-11-07 DIAGNOSIS — R26.89 IMPAIRMENT OF BALANCE: ICD-10-CM

## 2023-11-07 PROCEDURE — 97110 THERAPEUTIC EXERCISES: CPT

## 2023-11-07 PROCEDURE — 97161 PT EVAL LOW COMPLEX 20 MIN: CPT

## 2023-11-07 NOTE — PLAN OF CARE
[facility]   Physical Therapy Initial Evaluation      Name: Diana Montenegro  Clinic Number: [id number]    Therapy Diagnosis:   Encounter Diagnoses   Name Primary?    Frequent falls     Impairment of balance         Physician: Rad Johnston MD    Physician Orders: PT Eval and Treat   Medical Diagnosis from Referral:   R29.6 (ICD-10-CM) - Frequent falls   R26.89 (ICD-10-CM) - Impairment of balance   Evaluation Date: 11/7/2023  Authorization Period Expiration: TBD  Plan of Care Expiration: 2/7/2023  Progress Note Due: 12/7/2023  Visit # / Visits authorized: 1/ 1   FOTO: 1/ 3    Precautions: Standard and Fall     Time In: 1:45pm  Time Out: 2:30pm  Total Billable Time: 45 minutes    Subjective     Date of onset: within the last year    History of current condition - Kaleigh reports: she fell five times this year so far. A couple times times were tripping and she's not sure about the others. She likes to work out in the pool and does occasional weights. Weights will happen without warning, but occasionally she does feel off balance. She denies any LE surgeries. She uses a RW for long distance walking but doesn't use it for short distances. She has been using the rollator for 2-3 years because she gets spasms in her back if she walks too far.     Falls: see above    Imaging: none recently    Prior Therapy: yes for bursitis  Social History: lives alone with 0 ELYSIA  Occupation: not working  Prior Level of Function: Mod I for community ambulation with rollator   Current Level of Function: Mod I for community ambulation with rollator    Pain:  N/A    Patients goals: to have less falls     Medical History:   Past Medical History:   Diagnosis Date    Arthritis     Atrial fibrillation, unspecified     hx of a fib prior to stroke.    Chronic back pain     Colon polyp     CVA (cerebral infarction) 07/16/2014    Degenerative disc disease     cervical; lumbar    Diabetes mellitus type II     Encounter  "for loop recorder at end of battery life 09/17/2018    Hyperlipidemia     Hypertension     Hypothyroidism     Mild nonproliferative diabetic retinopathy 08/12/2018    Obesity     Sleep apnea     Stroke 07/2014    Venous insufficiency (chronic) (peripheral)        Surgical History:   Diana Montenegro  has a past surgical history that includes Tubal ligation; Tonsillectomy; Hernia repair; Gastrectomy; Colonoscopy w/ polypectomy; Colonoscopy (N/A, 08/30/2018); and Removal of implantable loop recorder (N/A, 09/17/2018).    Medications:   Diana has a current medication list which includes the following prescription(s): acyclovir 5%, albuterol, albuterol-ipratropium, apixaban, aspirin, b complex vitamins, blood pressure test kit-large, blood sugar diagnostic, blood-glucose meter, calcium citrate/vitamin d3, cetirizine, ciclopirox, cinnamon bark, cyanocobalamin (vitamin b-12), diclofenac sodium, docusate sodium, eylea, fish oil-omega-3 fatty acids, fluticasone propionate, glimepiride, lancets, levothyroxine, lorazepam, metformin, metoprolol tartrate, multivitamin, mupirocin, omeprazole, ozempic, promethazine-dextromethorphan, rosuvastatin, rutin/hesp/bioflav/c/[id number], senna, trazodone, and valsartan-hydrochlorothiazide, and the following Facility-Administered Medications: sodium chloride 0.9%.    Allergies:   Review of patient's allergies indicates:   Allergen Reactions    Medrol [methylprednisolone] Palpitations        Objective         Right Left Comment: ! = pain   Hip Flexion: 4-/5 4-/5    Hip ABD: 4/5 4-/5    Hip ADD: 4/5 4-/5    Hip IR: 4/5 4/5    Hip ER: 4/5 4/5       Right Left Comment ! = pain   Knee extension: 4+/5 4+/5    Knee flexion: 4+/5 4+/5    Ankle DF: 4+/5 4+/5    Ankle PF: 4/5 4/5       TUG Cutoff Scores: x12s with rollator and without        30" sit to stand Cutoff Scores:x3 without UE use; x11 with UE use          Romberg:   EO EC Comments   Floor 30 sec 30 sec    Foam 30 sec 4 sec* Min A for " recovery   *=LOB   Intake Outcome Measure for FOTO  Survey    Therapist reviewed FOTO scores for Diana Montenegro on 11/7/2023.   FOTO report - see Media section or FOTO account episode details.    Intake Score: 40%         Treatment     Total Treatment time (time-based codes) separate from Evaluation: 10 minutes     Kaleigh received the treatments listed below:      therapeutic exercises to develop strength, endurance, ROM, flexibility, posture, and core stabilization for 10 minutes including:  Sit to stand with scap retr 2x10  Seated clams blue theraband 3x10  Heel toe rocking x30    Next session:  Nustep  Bridges  Standing marching  Cone tapping march      Patient Education and Home Exercises     Education provided:   - Role of PT  - PT POC  - HEP    Written Home Exercises Provided: yes. Exercises were reviewed and Kaleigh was able to demonstrate them prior to the end of the session.  Kaleigh demonstrated good  understanding of the education provided. See EMR under Patient Instructions for exercises provided during therapy sessions.    Assessment     Diana is a 68 y.o. female referred to outpatient Physical Therapy with a medical diagnosis of impaired balance and frequent falls. Patient presents with symptoms consistent with medical diagnoses and impairments that are effecting their daily life. She demonstrates impaired balance as indicated by rhomberg testing and impaired strength as indicated by 30s sit to stand and MMT.     Patient prognosis is Good.   Patient will benefit from skilled outpatient Physical Therapy to address the deficits stated above and in the chart below, provide patient /family education, and to maximize patientt's level of independence.     Plan of care discussed with patient: Yes  Patient's spiritual, cultural and educational needs considered and patient is agreeable to the plan of care and goals as stated below:     Anticipated Barriers for therapy: co-morbidities    Medical Necessity is  demonstrated by the following  History  Co-morbidities and personal factors that may impact the plan of care [] LOW: no personal factors / co-morbidities  [] MODERATE: 1-2 personal factors / co-morbidities  [x] HIGH: 3+ personal factors / co-morbidities    Moderate / High Support Documentation:   Co-morbidities affecting plan of care: see history above    Personal Factors:   no deficits     Examination  Body Structures and Functions, activity limitations and participation restrictions that may impact the plan of care [x] LOW: addressing 1-2 elements  [] MODERATE: 3+ elements  [] HIGH: 4+ elements (please support below)    Moderate / High Support Documentation: n/a     Clinical Presentation [x] LOW: stable  [] MODERATE: Evolving  [] HIGH: Unstable     Decision Making/ Complexity Score: low       Goals:  Short Term Goals (4 Weeks):   1. Pt will be compliant with HEP to supplement PT in restoring pain free function.  2. Pt will improve EC on foam test to 15 sec to improve balance for functional community ambulation  3. Pt will improve impaired LE MMTs by 1/2 grade  to improve strength for functional tasks  4. Pt improve 30s sit to stand test to x8 without UE support to improve LE strength and functional mobility.   Long Term Goals (8 Weeks):  1. Pt will improve FOTO score to </= 48% limited to decrease perceived limitation with mobility  2. Pt will improve EC on foam test to 30 sec to improve balance for functional community ambulation  3. Pt will improve impaired LE MMTs by 1 grade to improve strength for functional tasks.  4. Pt improve 30s sit to stand test to x12 to improve LE strength and functional mobility.    Plan     Plan of care Certification: 11/7/2023 to 2/7/2023.    Outpatient Physical Therapy 2 times weekly for 12 weeks to include the following interventions: Cervical/Lumbar Traction, Electrical Stimulation  , Gait Training, Manual Therapy, Moist Heat/ Ice, Neuromuscular Re-ed, Therapeutic Activities,  Therapeutic Exercise, Ultrasound, and dry needling, IASTM, and kinesiotaping PRN.     Brian Morgan PT        Physician's Signature: _________________________________________ Date: ________________

## 2023-11-10 ENCOUNTER — CLINICAL SUPPORT (OUTPATIENT)
Dept: REHABILITATION | Facility: HOSPITAL | Age: 68
End: 2023-11-10
Payer: MEDICARE

## 2023-11-10 DIAGNOSIS — R26.89 IMPAIRMENT OF BALANCE: ICD-10-CM

## 2023-11-10 DIAGNOSIS — R29.6 FREQUENT FALLS: Primary | ICD-10-CM

## 2023-11-10 PROCEDURE — 97112 NEUROMUSCULAR REEDUCATION: CPT | Mod: CQ

## 2023-11-10 PROCEDURE — 97110 THERAPEUTIC EXERCISES: CPT | Mod: CQ

## 2023-11-10 NOTE — PROGRESS NOTES
"[facility]   Physical Therapy Treatment Note      Name: Diana Montenegro  Clinic Number: [id number]    Therapy Diagnosis: No diagnosis found.  Physician: Rad Johnston MD    Visit Date: 11/13/2023    Physician Orders: PT Eval and Treat   Medical Diagnosis from Referral:   R29.6 (ICD-10-CM) - Frequent falls   R26.89 (ICD-10-CM) - Impairment of balance   Evaluation Date: 11/7/2023  Authorization Period Expiration: 12/31/2023  Plan of Care Expiration: 2/7/2023  Progress Note Due: 12/7/2023  Visit # / Visits authorized: 1/ 1, 1/20   FOTO: 1/ 3     Precautions: Standard and Fall      PTA Visit #: 1/5     Time In: ***  Time Out: ***  Total Billable Time: *** minutes    Subjective     Pt reports: ***.  She {Actions; was/was not:53589} compliant with home exercise program.  Response to previous treatment: ***  Functional change: ***    Pain: {0-10:73134::"0"}/10  Location: {RIGHT/LEFT/BILATERAL:07371} {LOCATION ON BODY:16827}     Objective      Objective Measures updated at progress report unless specified.     Treatment     Kaleigh received the treatments listed below:      therapeutic exercises to develop strength, endurance, ROM, flexibility, posture, and core stabilization for 10 minutes including:  Sit to stand with scap retr 2x10  Seated clams blue theraband 3x10  Heel toe rocking x30     Next session:  Nustep  Bridges  Standing marching  Cone tapping march    manual therapy techniques: {AMB PT PROGRESS MANUAL THERAPY:04946} were applied to the: *** for *** minutes, including:  ***    neuromuscular re-education activities to improve: {AMB PT PROGRESS NEURO RE-ED:34853} for *** minutes. The following activities were included:  ***    therapeutic activities to improve functional performance for ***  minutes, including:  ***    gait training to improve functional mobility and safety for ***  minutes, including:  ***    direct contact modalities after being cleared for contraindications: " "{AMB PT PROGRESS DIRECT CONTACT MODES:81071}    supervised modalities after being cleared for contradictions: {AMB PT SUPERVISED MODES:54411}    hot pack for *** minutes to ***.    cold pack for *** minutes to ***.    Patient Education and Home Exercises       Education provided:   - ***    Written Home Exercises Provided: {Blank single:40240::"yes","Patient instructed to cont prior HEP"}. Exercises were reviewed and Kaleigh was able to demonstrate them prior to the end of the session.  Kaleigh demonstrated {Desc; good/fair/poor:90605} understanding of the education provided. See EMR under Patient Instructions for exercises provided during therapy sessions    Assessment     ***    Kaleigh Is progressing well towards her goals.   Pt prognosis is Good.     Pt will continue to benefit from skilled outpatient physical therapy to address the deficits listed in the problem list box on initial evaluation, provide pt/family education and to maximize pt's level of independence in the home and community environment.     Pt's spiritual, cultural and educational needs considered and pt agreeable to plan of care and goals.     Anticipated barriers to physical therapy: co-morbidities     Goals:   Short Term Goals (4 Weeks):   1. Pt will be compliant with HEP to supplement PT in restoring pain free function.  2. Pt will improve EC on foam test to 15 sec to improve balance for functional community ambulation  3. Pt will improve impaired LE MMTs by 1/2 grade  to improve strength for functional tasks  4. Pt improve 30s sit to stand test to x8 without UE support to improve LE strength and functional mobility.   Long Term Goals (8 Weeks):  1. Pt will improve FOTO score to </= 48% limited to decrease perceived limitation with mobility  2. Pt will improve EC on foam test to 30 sec to improve balance for functional community ambulation  3. Pt will improve impaired LE MMTs by 1 grade to improve strength for functional tasks.  4. Pt improve 30s sit " to stand test to x12 to improve LE strength and functional mobility.      Plan     Plan of care Certification: 11/7/2023 to 2/7/2023.     Ghazal Milton, PTA

## 2023-11-10 NOTE — PROGRESS NOTES
"[facility]   Physical Therapy Treatment Note      Name: Diana Montenegro  Clinic Number: [id number]    Therapy Diagnosis:   Encounter Diagnoses   Name Primary?    Frequent falls Yes    Impairment of balance      Physician: Rad Johnston MD    Visit Date: 11/10/2023    Physician: Rad Johnston MD     Physician Orders: PT Eval and Treat   Medical Diagnosis from Referral:   R29.6 (ICD-10-CM) - Frequent falls   R26.89 (ICD-10-CM) - Impairment of balance   Evaluation Date: 11/7/2023  Authorization Period Expiration: 12/31/23  Plan of Care Expiration: 2/7/2023  Progress Note Due: 12/7/2023  Visit # / Visits authorized: 1/ 1 1/20  FOTO: 1/ 3     Precautions: Standard and Fall      Time In: 1:48 pm  Time Out: 238 pm  Total Billable Time: 50 minutes      PTA Visit #: 1/5       Subjective     Patient reports: she does get occasional knee pain.  She was compliant with home exercise program.  Response to previous treatment: no adverse affects  Functional change: none at this time    Pain: 0/10  Location: N/A    Objective      Objective Measures updated at progress report unless specified.     Treatment     Kaleigh received the treatments listed below:      therapeutic exercises to develop strength, endurance, ROM, flexibility, posture, and core stabilization for 20 minutes including:  Nustep 6 min  Bridges 2x10  Standing hip abd/ext 2x10  Standing marching 2x1-0  Cone tapping march B 20x   Seated clams blue theraband 3x10  Heel toe rocking x30      neuromuscular re-education activities to improve: Balance, Coordination, Kinesthetic, Sense, Proprioception, and Posture for 25 minutes. The following activities were included:  Tandem stance B 30"x1  Rhomberg stance 30"x2  SL iso clams B 1'x 1  SL iso hip abd B 30"x1    therapeutic activities to improve functional performance for 5  minutes, including:  Sit to stand with scap retr 2x10 airex        Patient Education and Home Exercises   "     Education provided:   - safety awareness    Written Home Exercises Provided: Patient instructed to cont prior HEP. Exercises were reviewed and Kaleigh was able to demonstrate them prior to the end of the session.  Kaleigh demonstrated good  understanding of the education provided. See Electronic Medical Record under Patient Instructions for exercises provided during therapy sessions    Assessment     Pt did well for her first full treatment of PT. Reviewed HEP and initiated program to focus on NMR and B LE strength. Added airex to chair for STS d/t level of difficulty with reps. Will monitor and progress as tolerated.    Kaleigh Is progressing well towards her goals.   Patient prognosis is Good.     Patient will continue to benefit from skilled outpatient physical therapy to address the deficits listed in the problem list box on initial evaluation, provide pt/family education and to maximize pt's level of independence in the home and community environment.     Patient's spiritual, cultural and educational needs considered and pt agreeable to plan of care and goals.     Anticipated barriers to physical therapy: co-morbidities    Goals:   Short Term Goals (4 Weeks):   1. Pt will be compliant with HEP to supplement PT in restoring pain free function.  2. Pt will improve EC on foam test to 15 sec to improve balance for functional community ambulation  3. Pt will improve impaired LE MMTs by 1/2 grade  to improve strength for functional tasks  4. Pt improve 30s sit to stand test to x8 without UE support to improve LE strength and functional mobility.   Long Term Goals (8 Weeks):  1. Pt will improve FOTO score to </= 48% limited to decrease perceived limitation with mobility  2. Pt will improve EC on foam test to 30 sec to improve balance for functional community ambulation  3. Pt will improve impaired LE MMTs by 1 grade to improve strength for functional tasks.  4. Pt improve 30s sit to stand test to x12 to improve LE strength  and functional mobility.   Plan     Plan of care Certification: 11/7/2023 to 2/7/2023.     Outpatient Physical Therapy 2 times weekly for 12 weeks to include the following interventions: Cervical/Lumbar Traction, Electrical Stimulation  , Gait Training, Manual Therapy, Moist Heat/ Ice, Neuromuscular Re-ed, Therapeutic Activities, Therapeutic Exercise, Ultrasound, and dry needling, IASTM, and kinesiotaping PRN.     Kobi Trejo, PTA

## 2023-11-13 ENCOUNTER — CLINICAL SUPPORT (OUTPATIENT)
Dept: REHABILITATION | Facility: HOSPITAL | Age: 68
End: 2023-11-13
Payer: MEDICARE

## 2023-11-13 DIAGNOSIS — R26.89 IMPAIRMENT OF BALANCE: ICD-10-CM

## 2023-11-13 DIAGNOSIS — R29.6 FREQUENT FALLS: Primary | ICD-10-CM

## 2023-11-13 PROCEDURE — 97530 THERAPEUTIC ACTIVITIES: CPT | Mod: CQ

## 2023-11-13 PROCEDURE — 97112 NEUROMUSCULAR REEDUCATION: CPT | Mod: CQ

## 2023-11-13 PROCEDURE — 97110 THERAPEUTIC EXERCISES: CPT | Mod: CQ

## 2023-11-13 NOTE — PROGRESS NOTES
"[facility]   Physical Therapy Treatment Note      Name: Diana Montenegro  Clinic Number: [id number]    Therapy Diagnosis:   Encounter Diagnoses   Name Primary?    Frequent falls Yes    Impairment of balance      Physician: Rad Johnston MD    Visit Date: 11/13/2023    Physician: Rad Johnston MD     Physician Orders: PT Eval and Treat   Medical Diagnosis from Referral:   R29.6 (ICD-10-CM) - Frequent falls   R26.89 (ICD-10-CM) - Impairment of balance   Evaluation Date: 11/7/2023  Authorization Period Expiration: 12/31/23  Plan of Care Expiration: 2/7/2023  Progress Note Due: 12/7/2023  Visit # / Visits authorized: 1/ 1 2/20  FOTO: 1/ 3     Precautions: Standard and Fall      Time In: 3:25 pm  Time Out: 4:08 pm  Total Billable Time: 43 minutes    PTA Visit #: 2/5       Subjective     Patient reports: that her left thigh is bothering her a little more today and just started this afternoon with trigger.   She was compliant with home exercise program.  Response to previous treatment: no adverse affects  Functional change: none at this time    Pain: 0/10  Location: N/A    Objective      Objective Measures updated at progress report unless specified.     Treatment     Kaleigh received the treatments listed below:      therapeutic exercises to develop strength, endurance, ROM, flexibility, posture, and core stabilization for 26 minutes including:  Nustep 6 min  Bridges 2x10  Standing marching 2x1-0  Cone tapping march B 20x   Seated clams blue theraband 3x10  Heel toe rocking x30  Ankle inversion/DF YTB 2x10 ea BLE       neuromuscular re-education activities to improve: Balance, Coordination, Kinesthetic, Sense, Proprioception, and Posture for 8 minutes. The following activities were included:  Tandem stance B 30"x2  Rhomberg stance 30"x2  SL iso clams B 1'x 1   SL iso hip abd B 30"x1 (RLE only today)     therapeutic activities to improve functional performance for 9 minutes, " including:  Sit to stand with scap retr 2x10 airex  Standing hip abd/ext 2x10 YTB       Patient Education and Home Exercises       Education provided:   - safety awareness    Written Home Exercises Provided: Patient instructed to cont prior HEP. Exercises were reviewed and Kaleigh was able to demonstrate them prior to the end of the session.  Kaleigh demonstrated good  understanding of the education provided. See Electronic Medical Record under Patient Instructions for exercises provided during therapy sessions    Assessment     Incorporated additional TE for improved ankle strength. Plan to also incorporate ankle PF and eversion during future sessions as tolerated. Increased sets of tandem stance for addition proprioceptive training. Will continue to progress pt per her tolerance during future sessions.     Kaleigh Is progressing well towards her goals.   Patient prognosis is Good.     Patient will continue to benefit from skilled outpatient physical therapy to address the deficits listed in the problem list box on initial evaluation, provide pt/family education and to maximize pt's level of independence in the home and community environment.     Patient's spiritual, cultural and educational needs considered and pt agreeable to plan of care and goals.     Anticipated barriers to physical therapy: co-morbidities    Goals:   Short Term Goals (4 Weeks):   1. Pt will be compliant with HEP to supplement PT in restoring pain free function.  2. Pt will improve EC on foam test to 15 sec to improve balance for functional community ambulation  3. Pt will improve impaired LE MMTs by 1/2 grade  to improve strength for functional tasks  4. Pt improve 30s sit to stand test to x8 without UE support to improve LE strength and functional mobility.   Long Term Goals (8 Weeks):  1. Pt will improve FOTO score to </= 48% limited to decrease perceived limitation with mobility  2. Pt will improve EC on foam test to 30 sec to improve balance for  functional community ambulation  3. Pt will improve impaired LE MMTs by 1 grade to improve strength for functional tasks.  4. Pt improve 30s sit to stand test to x12 to improve LE strength and functional mobility.   Plan     Plan of care Certification: 11/7/2023 to 2/7/2023.     Outpatient Physical Therapy 2 times weekly for 12 weeks to include the following interventions: Cervical/Lumbar Traction, Electrical Stimulation  , Gait Training, Manual Therapy, Moist Heat/ Ice, Neuromuscular Re-ed, Therapeutic Activities, Therapeutic Exercise, Ultrasound, and dry needling, IASTM, and kinesiotaping PRN.     Ghazal Milton, PTA

## 2023-11-14 ENCOUNTER — TELEPHONE (OUTPATIENT)
Dept: HEPATOLOGY | Facility: CLINIC | Age: 68
End: 2023-11-14
Payer: MEDICARE

## 2023-11-14 NOTE — PROGRESS NOTES
"[facility]   Physical Therapy Treatment Note      Name: Diana Montenegro  Clinic Number: [id number]    Therapy Diagnosis:   Encounter Diagnoses   Name Primary?    Frequent falls Yes    Impairment of balance        Physician: Rad Johnston MD    Visit Date: 11/15/2023    Physician: Rad Johnston MD     Physician Orders: PT Eval and Treat   Medical Diagnosis from Referral:   R29.6 (ICD-10-CM) - Frequent falls   R26.89 (ICD-10-CM) - Impairment of balance   Evaluation Date: 11/7/2023  Authorization Period Expiration: 12/31/23  Plan of Care Expiration: 2/7/2023  Progress Note Due: 12/7/2023  Visit # / Visits authorized: 1/ 1 3/20  FOTO: 1/ 3     Precautions: Standard and Fall      Time In: 2:31 pm  Time Out: 3:12 pm  Total Billable Time: 41 minutes    PTA Visit #: 2/5       Subjective     Patient reports: that her leg is feeling better compared to last session.   She was compliant with home exercise program.  Response to previous treatment: no adverse affects  Functional change: none at this time    Pain: 0/10  Location: N/A    Objective      Objective Measures updated at progress report unless specified.     Treatment     Kaleigh received the treatments listed below:      therapeutic exercises to develop strength, endurance, ROM, flexibility, posture, and core stabilization for 22 minutes including:  Nustep 6 min  Bridges 2x10  Standing marching 2x10  Cone tapping march B 20x   Seated clams blue theraband 3x10  Heel toe rocking x30  Ankle inversion/DF YTB 2x10 ea BLE       neuromuscular re-education activities to improve: Balance, Coordination, Kinesthetic, Sense, Proprioception, and Posture for 10 minutes. The following activities were included:  Tandem stance B 30"x2  NBOS 30"x1 on floor, 2x30" on airex   SL iso clams B 1'x 1   SL iso hip abd B 30"x1 (RLE only today)     therapeutic activities to improve functional performance for 9 minutes, including:  Sit to stand with scap retr " 2x10 airex  Standing hip abd/ext 2x10 YTB       Patient Education and Home Exercises       Education provided:   - safety awareness    Written Home Exercises Provided: Patient instructed to cont prior HEP. Exercises were reviewed and Kaleigh was able to demonstrate them prior to the end of the session.  Kaleigh demonstrated good  understanding of the education provided. See Electronic Medical Record under Patient Instructions for exercises provided during therapy sessions    Assessment     Pt exhibited tolerance to established and progressed activities this session. Performed NBOS on airex after pt performed with ease on floor.  Pt will be OOT during holidays and will return to PT after she is back in town. Pt encouraged to continue HEP during this time.     Kaleigh Is progressing well towards her goals.   Patient prognosis is Good.     Patient will continue to benefit from skilled outpatient physical therapy to address the deficits listed in the problem list box on initial evaluation, provide pt/family education and to maximize pt's level of independence in the home and community environment.     Patient's spiritual, cultural and educational needs considered and pt agreeable to plan of care and goals.     Anticipated barriers to physical therapy: co-morbidities    Goals:   Short Term Goals (4 Weeks):   1. Pt will be compliant with HEP to supplement PT in restoring pain free function.  2. Pt will improve EC on foam test to 15 sec to improve balance for functional community ambulation  3. Pt will improve impaired LE MMTs by 1/2 grade  to improve strength for functional tasks  4. Pt improve 30s sit to stand test to x8 without UE support to improve LE strength and functional mobility.   Long Term Goals (8 Weeks):  1. Pt will improve FOTO score to </= 48% limited to decrease perceived limitation with mobility  2. Pt will improve EC on foam test to 30 sec to improve balance for functional community ambulation  3. Pt will improve  impaired LE MMTs by 1 grade to improve strength for functional tasks.  4. Pt improve 30s sit to stand test to x12 to improve LE strength and functional mobility.   Plan     Plan of care Certification: 11/7/2023 to 2/7/2023.     Outpatient Physical Therapy 2 times weekly for 12 weeks to include the following interventions: Cervical/Lumbar Traction, Electrical Stimulation  , Gait Training, Manual Therapy, Moist Heat/ Ice, Neuromuscular Re-ed, Therapeutic Activities, Therapeutic Exercise, Ultrasound, and dry needling, IASTM, and kinesiotaping PRN.     Ghazal Milton, PTA

## 2023-11-14 NOTE — TELEPHONE ENCOUNTER
Returned call to patient. Rescheduled appts per her request. Mailed appointment reminder to patient.

## 2023-11-14 NOTE — TELEPHONE ENCOUNTER
----- Message from Johnna Tello sent at 11/14/2023 10:25 AM CST -----  Regarding: Appt  Contact: Pt  [phone]            Current Appt date: 11/22/23     Type of Appt: Fibroscan, Ep     Physician:  Mary Hidalgo NP    Reason for rescheduling:  Will be out of town returning on 12/04/23     Caller:  Diana     Contact Preference: [phone]

## 2023-11-15 ENCOUNTER — CLINICAL SUPPORT (OUTPATIENT)
Dept: REHABILITATION | Facility: HOSPITAL | Age: 68
End: 2023-11-15
Payer: MEDICARE

## 2023-11-15 DIAGNOSIS — R26.89 IMPAIRMENT OF BALANCE: ICD-10-CM

## 2023-11-15 DIAGNOSIS — R29.6 FREQUENT FALLS: Primary | ICD-10-CM

## 2023-11-15 PROCEDURE — 97530 THERAPEUTIC ACTIVITIES: CPT | Mod: CQ

## 2023-11-15 PROCEDURE — 97112 NEUROMUSCULAR REEDUCATION: CPT | Mod: CQ

## 2023-11-15 PROCEDURE — 97110 THERAPEUTIC EXERCISES: CPT | Mod: CQ

## 2023-11-21 ENCOUNTER — DOCUMENTATION ONLY (OUTPATIENT)
Dept: REHABILITATION | Facility: HOSPITAL | Age: 68
End: 2023-11-21
Payer: MEDICARE

## 2023-11-21 NOTE — PROGRESS NOTES
PT/PTA met face to face to discuss pt's treatment plan and progress towards established goals. Pt will be seen by a physical therapist minimally every 6th visit or every 30 days.    Kobi Trejo PTA      Face to Face PTA Conference performed with Kobi Trejo PTA regarding patient's current status, overall progress, and plan of care.    Brian Morgan, PT

## 2023-11-29 ENCOUNTER — PATIENT MESSAGE (OUTPATIENT)
Dept: ENDOSCOPY | Facility: HOSPITAL | Age: 68
End: 2023-11-29
Payer: MEDICARE

## 2023-12-01 DIAGNOSIS — E11.40 TYPE 2 DIABETES MELLITUS WITH DIABETIC NEUROPATHY, WITHOUT LONG-TERM CURRENT USE OF INSULIN: ICD-10-CM

## 2023-12-01 RX ORDER — SEMAGLUTIDE 1.34 MG/ML
INJECTION, SOLUTION SUBCUTANEOUS
Qty: 3 ML | Refills: 2 | Status: SHIPPED | OUTPATIENT
Start: 2023-12-01 | End: 2024-02-05 | Stop reason: SDUPTHER

## 2023-12-06 ENCOUNTER — HOSPITAL ENCOUNTER (OUTPATIENT)
Facility: HOSPITAL | Age: 68
Discharge: HOME | End: 2023-12-06
Attending: STUDENT IN AN ORGANIZED HEALTH CARE EDUCATION/TRAINING PROGRAM | Admitting: STUDENT IN AN ORGANIZED HEALTH CARE EDUCATION/TRAINING PROGRAM
Payer: MEDICARE

## 2023-12-06 ENCOUNTER — ANESTHESIA EVENT (OUTPATIENT)
Dept: ENDOSCOPY | Facility: HOSPITAL | Age: 68
End: 2023-12-06
Payer: MEDICARE

## 2023-12-06 ENCOUNTER — ANESTHESIA (OUTPATIENT)
Dept: ENDOSCOPY | Facility: HOSPITAL | Age: 68
End: 2023-12-06
Payer: MEDICARE

## 2023-12-06 VITALS
OXYGEN SATURATION: 99 % | WEIGHT: 197 LBS | RESPIRATION RATE: 16 BRPM | TEMPERATURE: 98 F | DIASTOLIC BLOOD PRESSURE: 70 MMHG | HEART RATE: 53 BPM | HEIGHT: 65 IN | SYSTOLIC BLOOD PRESSURE: 178 MMHG | BODY MASS INDEX: 32.82 KG/M2

## 2023-12-06 DIAGNOSIS — Z12.11 ENCOUNTER FOR SCREENING COLONOSCOPY: ICD-10-CM

## 2023-12-06 DIAGNOSIS — Z12.11 SCREENING FOR COLON CANCER: Primary | ICD-10-CM

## 2023-12-06 LAB — POCT GLUCOSE: 106 MG/DL (ref 70–110)

## 2023-12-06 PROCEDURE — E9220 PRA ENDO ANESTHESIA: ICD-10-PCS | Mod: PT,,, | Performed by: NURSE ANESTHETIST, CERTIFIED REGISTERED

## 2023-12-06 PROCEDURE — 37000008 HC ANESTHESIA 1ST 15 MINUTES: Performed by: STUDENT IN AN ORGANIZED HEALTH CARE EDUCATION/TRAINING PROGRAM

## 2023-12-06 PROCEDURE — 88305 TISSUE EXAM BY PATHOLOGIST: CPT | Performed by: PATHOLOGY

## 2023-12-06 PROCEDURE — E9220 PRA ENDO ANESTHESIA: HCPCS | Mod: PT,,, | Performed by: NURSE ANESTHETIST, CERTIFIED REGISTERED

## 2023-12-06 PROCEDURE — 27201089 HC SNARE, DISP (ANY): Performed by: STUDENT IN AN ORGANIZED HEALTH CARE EDUCATION/TRAINING PROGRAM

## 2023-12-06 PROCEDURE — 45385 COLONOSCOPY W/LESION REMOVAL: CPT | Mod: PT | Performed by: STUDENT IN AN ORGANIZED HEALTH CARE EDUCATION/TRAINING PROGRAM

## 2023-12-06 PROCEDURE — 25000003 PHARM REV CODE 250: Performed by: NURSE ANESTHETIST, CERTIFIED REGISTERED

## 2023-12-06 PROCEDURE — 45385 PR COLONOSCOPY,REMV LESN,SNARE: ICD-10-PCS | Mod: PT,,, | Performed by: STUDENT IN AN ORGANIZED HEALTH CARE EDUCATION/TRAINING PROGRAM

## 2023-12-06 PROCEDURE — 63600175 PHARM REV CODE 636 W HCPCS: Performed by: NURSE ANESTHETIST, CERTIFIED REGISTERED

## 2023-12-06 PROCEDURE — 37000009 HC ANESTHESIA EA ADD 15 MINS: Performed by: STUDENT IN AN ORGANIZED HEALTH CARE EDUCATION/TRAINING PROGRAM

## 2023-12-06 PROCEDURE — 88305 TISSUE EXAM BY PATHOLOGIST: ICD-10-PCS | Mod: 26,,, | Performed by: PATHOLOGY

## 2023-12-06 PROCEDURE — 88305 TISSUE EXAM BY PATHOLOGIST: CPT | Mod: 26,,, | Performed by: PATHOLOGY

## 2023-12-06 PROCEDURE — 45385 COLONOSCOPY W/LESION REMOVAL: CPT | Mod: PT,,, | Performed by: STUDENT IN AN ORGANIZED HEALTH CARE EDUCATION/TRAINING PROGRAM

## 2023-12-06 RX ORDER — SODIUM CHLORIDE 9 MG/ML
INJECTION, SOLUTION INTRAVENOUS CONTINUOUS
Status: DISCONTINUED | OUTPATIENT
Start: 2023-12-06 | End: 2023-12-06 | Stop reason: HOSPADM

## 2023-12-06 RX ORDER — PROPOFOL 10 MG/ML
VIAL (ML) INTRAVENOUS
Status: DISCONTINUED | OUTPATIENT
Start: 2023-12-06 | End: 2023-12-06

## 2023-12-06 RX ORDER — LIDOCAINE HYDROCHLORIDE 20 MG/ML
INJECTION INTRAVENOUS
Status: DISCONTINUED | OUTPATIENT
Start: 2023-12-06 | End: 2023-12-06

## 2023-12-06 RX ORDER — METFORMIN HYDROCHLORIDE 1000 MG/1
1000 TABLET ORAL 2 TIMES DAILY WITH MEALS
Qty: 180 TABLET | Refills: 1 | Status: SHIPPED | OUTPATIENT
Start: 2023-12-06

## 2023-12-06 RX ORDER — PROPOFOL 10 MG/ML
VIAL (ML) INTRAVENOUS CONTINUOUS PRN
Status: DISCONTINUED | OUTPATIENT
Start: 2023-12-06 | End: 2023-12-06

## 2023-12-06 RX ADMIN — PROPOFOL 200 MCG/KG/MIN: 10 INJECTION, EMULSION INTRAVENOUS at 11:12

## 2023-12-06 RX ADMIN — LIDOCAINE HYDROCHLORIDE 50 MG: 20 INJECTION INTRAVENOUS at 11:12

## 2023-12-06 RX ADMIN — SODIUM CHLORIDE: 9 INJECTION, SOLUTION INTRAVENOUS at 11:12

## 2023-12-06 RX ADMIN — PROPOFOL 5 MG: 10 INJECTION, EMULSION INTRAVENOUS at 11:12

## 2023-12-06 RX ADMIN — GLYCOPYRROLATE 0.2 MG: 0.2 INJECTION, SOLUTION INTRAMUSCULAR; INTRAVENOUS at 11:12

## 2023-12-06 NOTE — TELEPHONE ENCOUNTER
No care due was identified.  [facility] Embedded Care Due Messages. Reference number: [id number].   12/06/2023 1:04:32 PM CST

## 2023-12-06 NOTE — TELEPHONE ENCOUNTER
Refill Decision Note   Diana Montenegro  is requesting a refill authorization.  Brief Assessment and Rationale for Refill:  Approve     Medication Therapy Plan:         Comments:     Note composed:3:48 PM 12/06/2023

## 2023-12-06 NOTE — [employer] PATIENT INSTRUCTIONS
Discharge Summary/Instructions after an Endoscopic Procedure  Patient Name: Diana Montenegro  Patient MRN: [id number]  Patient YOB: 1955 Wednesday, December 6, 2023  Vinny Youssef MD  Dear patient,  As a result of recent federal legislation (The Federal Cures Act), you may   receive lab or pathology results from your procedure in your [facility]   account before your physician is able to contact you. Your physician or   their representative will relay the results to you with their   recommendations at their soonest availability.  Thank you,  RESTRICTIONS:  During your procedure today, you received medications for sedation.  These   medications may affect your judgment, balance and coordination.  Therefore,   for 24 hours, you have the following restrictions:   - DO NOT drive a car, operate machinery, make legal/financial decisions,   sign important papers or drink alcohol.    ACTIVITY:  Today: no heavy lifting, straining or running due to procedural   sedation/anesthesia.  The following day: return to full activity including work.  DIET:  Eat and drink normally unless instructed otherwise.     TREATMENT FOR COMMON SIDE EFFECTS:  - Mild abdominal pain, nausea, belching, bloating or excessive gas:  rest,   eat lightly and use a heating pad.  - Sore Throat: treat with throat lozenges and/or gargle with warm salt   water.  - Because air was used during the procedure, expelling large amounts of air   from your rectum or belching is normal.  - If a bowel prep was taken, you may not have a bowel movement for 1-3 days.    This is normal.  SYMPTOMS TO WATCH FOR AND REPORT TO YOUR PHYSICIAN:  1. Abdominal pain or bloating, other than gas cramps.  2. Chest pain.  3. Back pain.  4. Signs of infection such as: chills or fever occurring within 24 hours   after the procedure.  5. Rectal bleeding, which would show as bright red, maroon, or black stools.   (A tablespoon of blood from the rectum is not serious,  especially if   hemorrhoids are present.)  6. Vomiting.  7. Weakness or dizziness.  GO DIRECTLY TO THE NEAREST EMERGENCY ROOM IF YOU HAVE ANY OF THE FOLLOWING:      Difficulty breathing              Chills and/or fever over 101 F   Persistent vomiting and/or vomiting blood   Severe abdominal pain   Severe chest pain   Black, tarry stools   Bleeding- more than one tablespoon   Any other symptom or condition that you feel may need urgent attention  Your doctor recommends these additional instructions:  If any biopsies were taken, your doctors clinic will contact you in 1 to 2   weeks with any results.  - Discharge patient to home.   - Resume previous diet.   - Continue present medications.   - Await pathology results.   - Repeat colonoscopy in 3 years for surveillance based on pathology results.     - Return to referring physician as previously scheduled.  For questions, problems or results please call your physician - Vinny Youssef MD at Work:  [phone].  [facility] EMERGENCY ROOM PHONE NUMBER: [phone]  IF A COMPLICATION OR EMERGENCY SITUATION ARISES AND YOU ARE UNABLE TO REACH   YOUR PHYSICIAN - GO DIRECTLY TO THE EMERGENCY ROOM.  MD Vinny Tidwell MD  12/6/2023 11:44:08 AM  This report has been verified and signed electronically.  Dear patient,  As a result of recent federal legislation (The Federal Cures Act), you may   receive lab or pathology results from your procedure in your [facility]   account before your physician is able to contact you. Your physician or   their representative will relay the results to you with their   recommendations at their soonest availability.  Thank you,  [employer]ATION

## 2023-12-06 NOTE — TRANSFER OF CARE
"Anesthesia Transfer of Care Note    Patient: Diana Montenegro    Procedure(s) Performed: Procedure(s) (LRB):  COLONOSCOPY (N/A)    Patient location: GI    Anesthesia Type: general    Transport from OR: Transported from OR on room air with adequate spontaneous ventilation    Post pain: adequate analgesia    Post assessment: no apparent anesthetic complications    Post vital signs: stable    Level of consciousness: awake    Nausea/Vomiting: no nausea/vomiting    Complications: none    Transfer of care protocol was followed    Last vitals: Visit Vitals  BP (!) 171/72 (BP Location: Left arm, Patient Position: Lying)   Pulse 61   Temp 36.3 °C (97.3 °F) (Temporal)   Resp 14   Ht 5' 5" (1.651 m)   Wt 89.4 kg (197 lb)   LMP  (LMP Unknown)   SpO2 99%   Breastfeeding No   BMI 32.78 kg/m²     "

## 2023-12-06 NOTE — H&P
[facility]  Colon and Rectal Surgery    [address]  [place], [place]  Tel: [phone]  Fax: [phone]  [email or web address]   MD William Campos MD Brian Kann, MD W. Forrest Johnston, MD Matthew Giglia, MD Jennifer Paruch, MD William Kethman, MD Danielle Kay, MD     Patient name: Kaleigh Montenegro   YOB: 1955   MRN: [id number]  Date of procedure: 12/06/2023    Procedure: Colonoscopy  Indications: Previous adenomatous polyp and No family history of colorectal cancer    Last colonoscopy: 2018 (Complete)    The patient was informed of the availability of a certified  without charge. A certified  was not necessary for this visit.    Sedation plan: MAC  ASA: ASA 2 - Patient with mild systemic disease with no functional limitations    Review of Systems  See above    Past Medical History:   Diagnosis Date    Arthritis     Atrial fibrillation, unspecified     hx of a fib prior to stroke.    Chronic back pain     Colon polyp     CVA (cerebral infarction) 07/16/2014    Degenerative disc disease     cervical; lumbar    Diabetes mellitus type II     Encounter for loop recorder at end of battery life 09/17/2018    Hyperlipidemia     Hypertension     Hypothyroidism     Mild nonproliferative diabetic retinopathy 08/12/2018    Obesity     Sleep apnea     Stroke 07/2014    Venous insufficiency (chronic) (peripheral)      Past Surgical History:   Procedure Laterality Date    COLONOSCOPY N/A 08/30/2018    Procedure: COLONOSCOPY;  Surgeon: William Clement MD;  Location: [address]);  Service: Endoscopy;  Laterality: N/A;  Tito/Dr Rad Johnston/OK to hold 2 days prior to colon/see telephone encounter dated 7/24/18/pt has loop recorder/svn    COLONOSCOPY W/ POLYPECTOMY      GASTRECTOMY      HERNIA REPAIR      REMOVAL OF IMPLANTABLE LOOP RECORDER N/A 09/17/2018    Procedure: REMOVAL, IMPLANTABLE LOOP RECORDER;  Surgeon:  Thomas Randolph MD;  Location: [facility] CATH LAB;  Service: Cardiology;  Laterality: N/A;  TERESA, ILR removal (no reimplant), STACY, RN Sedate, SK, 3 Prep *Reveal LINQ*    TONSILLECTOMY      TUBAL LIGATION       Family History   Problem Relation Age of Onset    No Known Problems Mother     Heart disease Father     No Known Problems Sister     No Known Problems Sister     No Known Problems Maternal Grandmother     Diabetes Maternal Grandfather     Obesity Maternal Grandfather     Stroke Paternal Grandmother     No Known Problems Paternal Grandfather     Heart attack Neg Hx     Cirrhosis Neg Hx      Social History     Tobacco Use    Smoking status: Former     Current packs/day: 0.00     Average packs/day: 1 pack/day for 30.0 years (30.0 ttl pk-yrs)     Types: Cigarettes     Start date: 1984     Quit date: 2014     Years since quittin.3    Smokeless tobacco: Never   Substance Use Topics    Alcohol use: Not Currently     Comment: rarely    Drug use: No     Comment: thc at young age     Review of patient's allergies indicates:   Allergen Reactions    Medrol [methylprednisolone] Palpitations       Prior to Admission medications    Medication Sig Start Date End Date Taking? Authorizing Provider   acyclovir 5% (ZOVIRAX) 5 % ointment Apply topically 6 (six) times daily. 22  Yes Rad Johnston MD   aspirin (ECOTRIN) 81 MG EC tablet Take 1 tablet (81 mg total) by mouth once daily. 5/12/15  Yes Herman Quinones MD   b complex vitamins tablet Take 1 tablet by mouth once daily.   Yes Provider, Historical   blood pressure test kit-large Kit Use as directed 4/7/15  Yes Herman Quinones MD   blood sugar diagnostic (FREESTYLE LITE STRIPS) Strp USE 1 STRIP TO CHECK GLUCOSE TWICE DAILY 23  Yes Rad Johnston MD   blood-glucose meter Misc One touch verio flex or tone touch verio reflect or freestyle freedom lite meter (all covered by insurance) 11/3/21  Yes Rad Johnston MD   CALCIUM CITRATE/VITAMIN D3 (CALCIUM  CITRATE + D ORAL) Take 1 tablet by mouth every morning.   Yes Provider, Historical   cetirizine (ZYRTEC) 5 MG tablet Take 5 mg by mouth once daily.   Yes Provider, Historical   cyanocobalamin, vitamin B-12, 500 mcg Subl Place 1 tablet under the tongue once daily.   Yes Provider, Historical   docusate sodium (COLACE) 100 MG capsule Take 100 mg by mouth once daily.    Yes Provider, Historical   fish oil-omega-3 fatty acids 300-1,000 mg capsule Take 1 capsule by mouth 2 (two) times daily. Take 1 cap 2 times daily every other day   Yes Provider, Historical   lancets Misc Needs lancets for testing twice daily.  To go with meter covered by insurance. 11/3/21  Yes Rad Johnston MD   levothyroxine (SYNTHROID) 88 MCG tablet Take 1 tablet (88 mcg total) by mouth before breakfast. 9/28/23 9/27/24 Yes Rad Johnston MD   metFORMIN (GLUCOPHAGE) 1000 MG tablet TAKE 1 TABLET BY MOUTH TWICE DAILY WITH MEALS 9/4/23  Yes Rad Johnston MD   multivitamin capsule Take 1 capsule by mouth once daily. 2/23/21  Yes Rad Johnston MD   mupirocin (BACTROBAN) 2 % ointment Apply to affected area 3 times daily 2/8/23  Yes Allen Giordano MD   omeprazole (PRILOSEC) 40 MG capsule Take 1 capsule by mouth in the morning 6/9/23  Yes Rad Johnston MD   rosuvastatin (CRESTOR) 40 MG Tab Take 1 tablet by mouth once daily 4/2/23  Yes Rda Johnston MD   RUTIN/HESP/BIOFLAV/C/HERB#196 (BIOFLEX ORAL) Take 2 tablets by mouth once daily.   Yes Provider, Historical   senna (SENNA) 8.6 mg tablet Take 2 tablets by mouth nightly as needed for Constipation. 4/8/16  Yes Rylie Mcfarland MD   traZODone (DESYREL) 50 MG tablet TAKE 1 TABLET BY MOUTH NIGHTLY AS NEEDED FOR  INSOMNIA  (OKAY  TO  TAKE  2ND  TABLET  BY  MOUTH  IN  30  MINUTES  IF  STILL  AWAKE) 10/8/23  Yes Rad Johnston MD   valsartan-hydrochlorothiazide (DIOVAN-HCT) 80-12.5 mg per tablet Take 1 tablet by mouth once daily 6/9/23 6/3/24 Yes Rad Johnston MD   albuterol  (VENTOLIN HFA) 90 mcg/actuation inhaler Inhale 2 puffs into the lungs every 6 (six) hours as needed for Wheezing. Rescue 7/2/22   Randy Murphy MD   albuterol-ipratropium 2.5mg-0.5mg/3mL (DUO-NEB) 0.5 mg-3 mg(2.5 mg base)/3 mL nebulizer solution Take 3 mLs by nebulization every 6 (six) hours as needed for Wheezing. Rescue  Patient taking differently: Take 3 mLs by nebulization every 6 (six) hours as needed for Wheezing. Rescue prn 4/12/17 9/26/23  Minal Toribio MD   apixaban (ELIQUIS) 5 mg Tab Take 1 tablet (5 mg total) by mouth 2 (two) times daily. 9/27/23   Rad Johnston MD   ciclopirox (PENLAC) 8 % Soln Apply topically nightly. 1/17/23   Nicole Guerrero, DPM   cinnamon bark (CINNAMON ORAL) Take by mouth 2 (two) times daily.    Provider, Historical   diclofenac sodium (VOLTAREN) 1 % Gel APPLY 2 GRAMS TOPICALLY THREE TIMES DAILY FOR 5 DAYS 9/26/23   Rad Johnston MD   EYLEA 2 mg/0.05 mL Syrg  12/13/21   Provider, Historical   fluticasone (FLONASE) 50 mcg/actuation nasal spray 2 sprays (100 mcg total) by Each Nare route once daily.  Patient taking differently: 2 sprays by Each Nostril route daily as needed. 11/21/18   Elin Garcia MD   glimepiride (AMARYL) 2 MG tablet Take 1 tablet (2 mg total) by mouth before breakfast. For diabetes control. 8/29/23   Rad Johnston MD   LORazepam (ATIVAN) 1 MG tablet Take 1 tablet by mouth twice daily as needed for anxiety 8/8/23   Rad Johnston MD   metoprolol tartrate (LOPRESSOR) 25 MG tablet Take 1 tablet (25 mg total) by mouth once as needed. For palpitations 8/9/22 9/26/23  Khadijah Rivera, NP   OZEMPIC 1 mg/dose (4 mg/3 mL) INJECT 1 MG SUBCUTANEOUSLY EVERY 7 DAYS 12/1/23   Harriet Palacio, APRN, FNP   promethazine-dextromethorphan (PROMETHAZINE-DM) 6.25-15 mg/5 mL Syrp Take one tsp po q 6 hrs prn cough 7/2/22   Randy Murphy MD       Physical Examination  BP (!) 171/72 (BP Location: Left arm, Patient Position: Lying)   Pulse  "61   Temp 97.3 °F (36.3 °C) (Temporal)   Resp 14   Ht 5' 5" (1.651 m)   Wt 89.4 kg (197 lb)   LMP  (LMP Unknown)   SpO2 99%   Breastfeeding No   BMI 32.78 kg/m²      Constitutional: well developed, no cough, no dyspnea, alert, and no acute distress    Head: Normocephalic, no lesions, without obvious abnormality  Eye: Normal external eye, conjunctiva, and lids, PERRL  Cardiovascular: regular rate and regular rhythm  Respiratory: normal air entry  Gastrointestinal: soft, non-tender, without masses or organomegaly  Neurologic: alert, oriented, normal speech, no focal findings or movement disorder noted  Psychiatric: appropriate, normal mood    Plan of Care    It was a pleasure meeting Ms. Montenegro today - we will plan to perform a colonoscopy with monitored anesthesia care. The details of the procedure, the possible need for biopsy or polypectomy and the potential risks including bleeding, perforation, missed polyps were discussed in detail and they consented to undergo the procedure.      Vinny Youssef MD, FACS, FASCRS  Department of Colon & Rectal Surgery  [facility]    "

## 2023-12-06 NOTE — ANESTHESIA PREPROCEDURE EVALUATION
Past Medical History:   Diagnosis Date    Arthritis     Atrial fibrillation, unspecified     hx of a fib prior to stroke.    Chronic back pain     Colon polyp     CVA (cerebral infarction) 07/16/2014    Degenerative disc disease     cervical; lumbar    Diabetes mellitus type II     Encounter for loop recorder at end of battery life 09/17/2018    Hyperlipidemia     Hypertension     Hypothyroidism     Mild nonproliferative diabetic retinopathy 08/12/2018    Obesity     Sleep apnea     Stroke 07/2014    Venous insufficiency (chronic) (peripheral)      Patient Active Problem List   Diagnosis    Diabetes mellitus, type 2    Hypothyroidism    Chronic back pain    Venous insufficiency (chronic) (peripheral)    Arthritis    Degenerative disc disease    Hypoglycemia secondary to sulfonylurea    Left atrial enlargement    Back pain    Loss of coordination    Bilateral carotid artery disease    Paroxysmal atrial fibrillation    Mass    Stenosis of right carotid artery    Severe obesity    Status post placement of implantable loop recorder    Current use of long term anticoagulation    Chronic bronchitis    Chronic sinusitis    Bradycardia    Screening for colon cancer    Hyperlipidemia associated with type 2 diabetes mellitus    Hypertension associated with diabetes    Hx of TIA (transient ischemic attack) and stroke    Ventral hernia    Chronic kidney disease, stage 3a    Frequent falls    Impairment of balance     Past Surgical History:   Procedure Laterality Date    COLONOSCOPY N/A 08/30/2018    Procedure: COLONOSCOPY;  Surgeon: William Clement MD;  Location: [facility] ([address] [place] [address]);  Service: Endoscopy;  Laterality: N/A;  Tito/Dr Rad Johnston/OK to hold 2 days prior to colon/see telephone encounter dated 7/24/18/pt has loop recorder/svn    COLONOSCOPY W/ POLYPECTOMY      GASTRECTOMY      HERNIA REPAIR      REMOVAL OF IMPLANTABLE LOOP RECORDER N/A 09/17/2018    Procedure: REMOVAL, IMPLANTABLE LOOP RECORDER;  Surgeon: Thomas  MD Tomasa;  Location: [facility] CATH LAB;  Service: Cardiology;  Laterality: N/A;  TERESA, ILR removal (no reimplant), STACY, RN Inocencio, SK, 3 Prep *Reveal LINQ*    TONSILLECTOMY      TUBAL LIGATION                                                                                                                    12/06/2023  Diana Montenegro is a 68 y.o., female.      Pre-op Assessment    I have reviewed the Patient Summary Reports.     I have reviewed the Nursing Notes. I have reviewed the NPO Status.   I have reviewed the Medications.     Review of Systems      Physical Exam  General: Well nourished    Airway:  Mallampati: II   Mouth Opening: Normal  TM Distance: Normal  Tongue: Normal  Neck ROM: Normal ROM    Dental:  Intact        Anesthesia Plan  Type of Anesthesia, risks & benefits discussed:    Anesthesia Type: Gen Natural Airway  Intra-op Monitoring Plan: Standard ASA Monitors  Informed Consent: Informed consent signed with the Patient and all parties understand the risks and agree with anesthesia plan.  All questions answered.   ASA Score: 3  Day of Surgery Review of History & Physical: H&P Update referred to the surgeon/provider.I have interviewed and examined the patient. I have reviewed the patient's H&P dated: There are no significant changes.     Ready For Surgery From Anesthesia Perspective.     .

## 2023-12-06 NOTE — ANESTHESIA POSTPROCEDURE EVALUATION
Anesthesia Post Evaluation    Patient: Diana Montenegro    Procedure(s) Performed: Procedure(s) (LRB):  COLONOSCOPY (N/A)    Final Anesthesia Type: general      Patient location during evaluation: PACU  Patient participation: Yes- Able to Participate  Level of consciousness: awake and alert  Post-procedure vital signs: reviewed and stable  Pain management: adequate  Airway patency: patent    PONV status at discharge: No PONV  Anesthetic complications: no      Cardiovascular status: hemodynamically stable  Respiratory status: spontaneous ventilation  Follow-up not needed.              Vitals Value Taken Time   /62 12/06/23 1201   Temp 36.4 °C (97.5 °F) 12/06/23 1146   Pulse 64 12/06/23 1201   Resp 16 12/06/23 1201   SpO2 100 % 12/06/23 1201         No case tracking events are documented in the log.      Pain/Roger Score: Roger Score: 8 (12/6/2023 11:46 AM)

## 2023-12-08 LAB
FINAL PATHOLOGIC DIAGNOSIS: NORMAL
GROSS: NORMAL
Lab: NORMAL

## 2023-12-12 ENCOUNTER — CLINICAL SUPPORT (OUTPATIENT)
Dept: REHABILITATION | Facility: HOSPITAL | Age: 68
End: 2023-12-12
Payer: MEDICARE

## 2023-12-12 DIAGNOSIS — R29.6 FREQUENT FALLS: Primary | ICD-10-CM

## 2023-12-12 DIAGNOSIS — R26.89 IMPAIRMENT OF BALANCE: ICD-10-CM

## 2023-12-12 PROCEDURE — 97110 THERAPEUTIC EXERCISES: CPT

## 2023-12-12 PROCEDURE — 97530 THERAPEUTIC ACTIVITIES: CPT

## 2023-12-12 NOTE — PROGRESS NOTES
"[facility]   Physical Therapy Treatment Note      Name: Diana Montenegro  Clinic Number: [id number]    Therapy Diagnosis:   Encounter Diagnoses   Name Primary?    Frequent falls Yes    Impairment of balance          Physician: Rad Johnston MD    Visit Date: 12/12/2023     Physician Orders: PT Eval and Treat   Medical Diagnosis from Referral:   R29.6 (ICD-10-CM) - Frequent falls   R26.89 (ICD-10-CM) - Impairment of balance   Evaluation Date: 11/7/2023  Authorization Period Expiration: 12/31/23  Plan of Care Expiration: 2/7/2023  Progress Note Due: 12/7/2023  Visit # / Visits authorized: 1/ 1 4/20  FOTO: 1/ 3     Precautions: Standard and Fall      Time In: 1:46 pm  Time Out: 2:37pm   Total Billable Time: 51 minutes    PTA Visit #: 0/5       Subjective     Patient reports: she was out of town the last month due to a family emergency. She has started having shoulder pain when reaching out to the side and overhead the last few weeks. No pain at rest. No falls since last session.   She was compliant with home exercise program.  Response to previous treatment: no adverse affects  Functional change: none at this time    Pain: 0/10  Location: N/A    Objective      Objective Measures updated at progress report unless specified.     Shoulder Right Right Left Left Left Pain/Dysfunction with Movement    AROM MMT AROM PROM MMT    Flexion WNL 4+/5 90! WFL 4-/5!    Abduction WNL 4+/5 90! WFL 4-/5!    Internal rotation L2 4+/5 L2 NT 4/5!    External Rotation 80 4+/5 70! NT 4-/5!      Elbow Right Right Left Left Pain/Dysfunction with Movement    AROM MMT AROM MMT    Flexion WNL 4+/5 WNL 4+/5    Extension WNL 4+/5 WNL 4+/5        Pull-arm test w/ ER:  improved symptoms    TUG Cutoff Scores: x11s without SPC         30" sit to stand Cutoff Scores:x; x9 with UE use             Romberg:    EO EC Comments   Floor 30 sec 30 sec     Foam 30 sec 3 sec* Min A for recovery   *=LOB    Treatment     Kaleigh " "received the treatments listed below:      therapeutic exercises to develop strength, endurance, ROM, flexibility, posture, and core stabilization for 30 minutes including:  Nustep 6 min  Objective measures above  Wall circles x30  Rows x30  No money x30  Table slides x20, 5s    NP:  Bridges 2x10  Standing marching 2x10  Cone tapping march B 20x   Seated clams blue theraband 3x10  Ankle inversion/DF YTB 2x10 ea BLE       neuromuscular re-education activities to improve: Balance, Coordination, Kinesthetic, Sense, Proprioception, and Posture for 00 minutes. The following activities were included:  Tandem stance B 30"x2  NBOS 30"x1 on floor, 2x30" on airex   SL iso clams B 1'x 1   SL iso hip abd B 30"x1 (RLE only today)     therapeutic activities to improve functional performance for 21 minutes, including:  Sit to stand with scap retr 2x10 airex  Standing hip abd/ext 2x10 YTB   Heel raises 2x10  Tib push ups 2x10      Patient Education and Home Exercises       Education provided:   - safety awareness    Written Home Exercises Provided: Patient instructed to cont prior HEP. Exercises were reviewed and Kaleigh was able to demonstrate them prior to the end of the session.  Kaleigh demonstrated good  understanding of the education provided. See Electronic Medical Record under Patient Instructions for exercises provided during therapy sessions    Assessment     Pt returns to therapy following lapse in care due to family emergency. Pt's objective measures mostly unchanged from initial evaluation but she does present with new symptoms of L shoulder pain consistent with impingement. Provided pt with HEP that she was able to perform adequately in clinic. Plan to re-introduce established therapy plan next session as tolerated.     Kaleigh Is progressing well towards her goals.   Patient prognosis is Good.     Patient will continue to benefit from skilled outpatient physical therapy to address the deficits listed in the problem list box " on initial evaluation, provide pt/family education and to maximize pt's level of independence in the home and community environment.     Patient's spiritual, cultural and educational needs considered and pt agreeable to plan of care and goals.     Anticipated barriers to physical therapy: co-morbidities    Goals:   Short Term Goals (4 Weeks):   1. Pt will be compliant with HEP to supplement PT in restoring pain free function.  2. Pt will improve EC on foam test to 15 sec to improve balance for functional community ambulation  3. Pt will improve impaired LE MMTs by 1/2 grade  to improve strength for functional tasks  4. Pt improve 30s sit to stand test to x8 without UE support to improve LE strength and functional mobility.   Long Term Goals (8 Weeks):  1. Pt will improve FOTO score to </= 48% limited to decrease perceived limitation with mobility  2. Pt will improve EC on foam test to 30 sec to improve balance for functional community ambulation  3. Pt will improve impaired LE MMTs by 1 grade to improve strength for functional tasks.  4. Pt improve 30s sit to stand test to x12 to improve LE strength and functional mobility.   Plan     Plan of care Certification: 11/7/2023 to 2/7/2023.     Outpatient Physical Therapy 2 times weekly for 12 weeks to include the following interventions: Cervical/Lumbar Traction, Electrical Stimulation  , Gait Training, Manual Therapy, Moist Heat/ Ice, Neuromuscular Re-ed, Therapeutic Activities, Therapeutic Exercise, Ultrasound, and dry needling, IASTM, and kinesiotaping PRN.     Brian Morgan, PT

## 2023-12-13 ENCOUNTER — DOCUMENTATION ONLY (OUTPATIENT)
Dept: REHABILITATION | Facility: HOSPITAL | Age: 68
End: 2023-12-13
Payer: MEDICARE

## 2023-12-13 NOTE — PROGRESS NOTES
PT/PTA met face to face to discuss pt's treatment plan and progress towards established goals. Pt will be seen by a physical therapist minimally every 6th visit or every 30 days.    Ghazal Milton PTA    Face to Face PTA Conference performed with Ghazal Milton PTA regarding patient's current status, overall progress, and plan of care.    Brian Morgan, PT

## 2023-12-13 NOTE — PROGRESS NOTES
"[facility]   Physical Therapy Treatment Note      Name: Diana Montenegro  Clinic Number: [id number]    Therapy Diagnosis:   Encounter Diagnoses   Name Primary?    Frequent falls Yes    Impairment of balance          Physician: Rad Johnston MD    Visit Date: 12/12/2023     Physician Orders: PT Eval and Treat   Medical Diagnosis from Referral:   R29.6 (ICD-10-CM) - Frequent falls   R26.89 (ICD-10-CM) - Impairment of balance   Evaluation Date: 11/7/2023  Authorization Period Expiration: 12/31/23  Plan of Care Expiration: 2/7/2023  Progress Note Due: 12/7/2023  Visit # / Visits authorized: 1/ 1 5/20  FOTO: 1/ 3     Precautions: Standard and Fall      Time In: 12:23 pm (late arrival)   Time Out: 12:58 pm   Total Billable Time: 35 minutes    PTA Visit #: 0/5       Subjective     Patient reports: that she is doing fair today and is ready to get back into her routine of therapy and the pool when it reopens.   She was compliant with home exercise program.  Response to previous treatment: no adverse affects  Functional change: none at this time    Pain: 0/10  Location: N/A    Objective      Objective Measures updated at progress report unless specified.     Shoulder Right Right Left Left Left Pain/Dysfunction with Movement    AROM MMT AROM PROM MMT    Flexion WNL 4+/5 90! WFL 4-/5!    Abduction WNL 4+/5 90! WFL 4-/5!    Internal rotation L2 4+/5 L2 NT 4/5!    External Rotation 80 4+/5 70! NT 4-/5!      Elbow Right Right Left Left Pain/Dysfunction with Movement    AROM MMT AROM MMT    Flexion WNL 4+/5 WNL 4+/5    Extension WNL 4+/5 WNL 4+/5        Pull-arm test w/ ER:  improved symptoms    TUG Cutoff Scores: x11s without SPC         30" sit to stand Cutoff Scores:x; x9 with UE use             Romberg:    EO EC Comments   Floor 30 sec 30 sec     Foam 30 sec 3 sec* Min A for recovery   *=LOB    Treatment     Kaleigh received the treatments listed below:      therapeutic exercises to develop " "strength, endurance, ROM, flexibility, posture, and core stabilization for 8 minutes including:  Nustep 6 min  Supine clams blue theraband 3x10    NP:  Bridges 2x10  Standing marching 2x10  Cone tapping march B 20x     Ankle inversion/DF YTB 2x10 ea BLE       neuromuscular re-education activities to improve: Balance, Coordination, Kinesthetic, Sense, Proprioception, and Posture for 12 minutes. The following activities were included:  Tandem stance B 30"x2  NBOS 2x30" on airex   NBOS on floor c/ EC 3x30"   SL iso clams B 1'x 1 (NP)  SL iso hip abd B 30"x1     therapeutic activities to improve functional performance for 15 minutes, including:  Sit to stand with scap retr 2x10 airex  Standing hip abd/ext 2x10 YTB   Heel raises 2x10  Tib push ups 2x10      Patient Education and Home Exercises       Education provided:   - safety awareness    Written Home Exercises Provided: Patient instructed to cont prior HEP. Exercises were reviewed and Kaleigh was able to demonstrate them prior to the end of the session.  Kaleigh demonstrated good  understanding of the education provided. See Electronic Medical Record under Patient Instructions for exercises provided during therapy sessions    Assessment     Abbreviated session performed today due to late arrival. We were, however, able to complete additional NMR this session with eye closed balancing. Plan to continue to progress towards dynamic stability and functional mobility.      Kaleigh Is progressing well towards her goals.   Patient prognosis is Good.     Patient will continue to benefit from skilled outpatient physical therapy to address the deficits listed in the problem list box on initial evaluation, provide pt/family education and to maximize pt's level of independence in the home and community environment.     Patient's spiritual, cultural and educational needs considered and pt agreeable to plan of care and goals.     Anticipated barriers to physical therapy: " co-morbidities    Goals:   Short Term Goals (4 Weeks):   1. Pt will be compliant with HEP to supplement PT in restoring pain free function.  2. Pt will improve EC on foam test to 15 sec to improve balance for functional community ambulation  3. Pt will improve impaired LE MMTs by 1/2 grade  to improve strength for functional tasks  4. Pt improve 30s sit to stand test to x8 without UE support to improve LE strength and functional mobility.   Long Term Goals (8 Weeks):  1. Pt will improve FOTO score to </= 48% limited to decrease perceived limitation with mobility  2. Pt will improve EC on foam test to 30 sec to improve balance for functional community ambulation  3. Pt will improve impaired LE MMTs by 1 grade to improve strength for functional tasks.  4. Pt improve 30s sit to stand test to x12 to improve LE strength and functional mobility.   Plan     Plan of care Certification: 11/7/2023 to 2/7/2023.     Outpatient Physical Therapy 2 times weekly for 12 weeks to include the following interventions: Cervical/Lumbar Traction, Electrical Stimulation  , Gait Training, Manual Therapy, Moist Heat/ Ice, Neuromuscular Re-ed, Therapeutic Activities, Therapeutic Exercise, Ultrasound, and dry needling, IASTM, and kinesiotaping PRN.     Brian Morgna, PT

## 2023-12-14 ENCOUNTER — PATIENT MESSAGE (OUTPATIENT)
Dept: INTERNAL MEDICINE | Facility: CLINIC | Age: 68
End: 2023-12-14
Payer: MEDICARE

## 2023-12-14 ENCOUNTER — CLINICAL SUPPORT (OUTPATIENT)
Dept: REHABILITATION | Facility: HOSPITAL | Age: 68
End: 2023-12-14
Payer: MEDICARE

## 2023-12-14 DIAGNOSIS — R29.6 FREQUENT FALLS: Primary | ICD-10-CM

## 2023-12-14 DIAGNOSIS — R26.89 IMPAIRMENT OF BALANCE: ICD-10-CM

## 2023-12-14 PROCEDURE — 97112 NEUROMUSCULAR REEDUCATION: CPT | Mod: CQ

## 2023-12-14 PROCEDURE — 97530 THERAPEUTIC ACTIVITIES: CPT | Mod: CQ

## 2023-12-14 RX ORDER — LEVOTHYROXINE SODIUM 88 UG/1
88 TABLET ORAL
Qty: 30 TABLET | Refills: 6 | Status: SHIPPED | OUTPATIENT
Start: 2023-12-14 | End: 2024-12-13

## 2023-12-14 NOTE — TELEPHONE ENCOUNTER
No care due was identified.  [facility] Embedded Care Due Messages. Reference number: [id number].   12/14/2023 11:01:59 AM CST

## 2023-12-19 ENCOUNTER — CLINICAL SUPPORT (OUTPATIENT)
Dept: REHABILITATION | Facility: HOSPITAL | Age: 68
End: 2023-12-19
Payer: MEDICARE

## 2023-12-19 DIAGNOSIS — R29.6 FREQUENT FALLS: Primary | ICD-10-CM

## 2023-12-19 DIAGNOSIS — R26.89 IMPAIRMENT OF BALANCE: ICD-10-CM

## 2023-12-19 PROCEDURE — 97110 THERAPEUTIC EXERCISES: CPT | Mod: CQ

## 2023-12-19 PROCEDURE — 97112 NEUROMUSCULAR REEDUCATION: CPT | Mod: CQ

## 2023-12-19 PROCEDURE — 97530 THERAPEUTIC ACTIVITIES: CPT | Mod: CQ

## 2023-12-19 NOTE — PROGRESS NOTES
"[facility]   Physical Therapy Treatment Note      Name: Diana Montenegro  Clinic Number: [id number]    Therapy Diagnosis:   Encounter Diagnoses   Name Primary?    Frequent falls Yes    Impairment of balance            Physician: Rad Johnston MD    Visit Date: 12/19/2023     Physician Orders: PT Eval and Treat   Medical Diagnosis from Referral:   R29.6 (ICD-10-CM) - Frequent falls   R26.89 (ICD-10-CM) - Impairment of balance   Evaluation Date: 11/7/2023  Authorization Period Expiration: 12/31/23  Plan of Care Expiration: 2/7/2023  Progress Note Due: 12/7/2023  Visit # / Visits authorized: 1/ 1 6/20  FOTO: 1/ 3     Precautions: Standard and Fall      Time In: 12:58 pm   Time Out: 1:39 pm   Total Billable Time: 41 minutes    PTA Visit #: 0/5       Subjective     Patient reports: that she is just feeling tired today for no particular reason.   She was compliant with home exercise program.  Response to previous treatment: no adverse affects  Functional change: none at this time    Pain: 0/10  Location: N/A    Objective      Objective Measures updated at progress report unless specified.     Treatment     Kaleigh received the treatments listed below:      therapeutic exercises to develop strength, endurance, ROM, flexibility, posture, and core stabilization for 9 minutes including:  Nustep 6 min  Supine clams blue theraband 3x10    NP:  Bridges 2x10  Standing marching 2x10  Cone tapping march B 20x     Ankle inversion/DF YTB 2x10 ea BLE       neuromuscular re-education activities to improve: Balance, Coordination, Kinesthetic, Sense, Proprioception, and Posture for 17 minutes. The following activities were included:  Tandem stance B 30"x2  NBOS 2x30" on airex   NBOS on floor c/ EC 3x30"   SL iso clams B 1'x 1 (NP)  SL iso hip abd B 30"x1     therapeutic activities to improve functional performance for 15 minutes, including:  Sit to stand with scap retr 2x10 airex  Standing hip abd/ext 2x10 " YTB   Heel raises 2x10  Tib push ups 2x10      Patient Education and Home Exercises       Education provided:   - safety awareness    Written Home Exercises Provided: Patient instructed to cont prior HEP. Exercises were reviewed and Kaleigh was able to demonstrate them prior to the end of the session.  Kaleigh demonstrated good  understanding of the education provided. See Electronic Medical Record under Patient Instructions for exercises provided during therapy sessions    Assessment     Continued with established activities without adverse effects noted. Pt with god tolerance to NBOS with eyes closed, therefore will incorporate airex next session. Plan to also incorporate more dynamic strengthening and NMR activities in order to improved functional mobility.     Kaleigh Is progressing well towards her goals.   Patient prognosis is Good.     Patient will continue to benefit from skilled outpatient physical therapy to address the deficits listed in the problem list box on initial evaluation, provide pt/family education and to maximize pt's level of independence in the home and community environment.     Patient's spiritual, cultural and educational needs considered and pt agreeable to plan of care and goals.     Anticipated barriers to physical therapy: co-morbidities    Goals:   Short Term Goals (4 Weeks):   1. Pt will be compliant with HEP to supplement PT in restoring pain free function.  2. Pt will improve EC on foam test to 15 sec to improve balance for functional community ambulation  3. Pt will improve impaired LE MMTs by 1/2 grade  to improve strength for functional tasks  4. Pt improve 30s sit to stand test to x8 without UE support to improve LE strength and functional mobility.   Long Term Goals (8 Weeks):  1. Pt will improve FOTO score to </= 48% limited to decrease perceived limitation with mobility  2. Pt will improve EC on foam test to 30 sec to improve balance for functional community ambulation  3. Pt will  improve impaired LE MMTs by 1 grade to improve strength for functional tasks.  4. Pt improve 30s sit to stand test to x12 to improve LE strength and functional mobility.   Plan     Plan of care Certification: 11/7/2023 to 2/7/2023.     Outpatient Physical Therapy 2 times weekly for 12 weeks to include the following interventions: Cervical/Lumbar Traction, Electrical Stimulation  , Gait Training, Manual Therapy, Moist Heat/ Ice, Neuromuscular Re-ed, Therapeutic Activities, Therapeutic Exercise, Ultrasound, and dry needling, IASTM, and kinesiotaping PRN.     Ghazal Milton, PTA

## 2023-12-21 ENCOUNTER — CLINICAL SUPPORT (OUTPATIENT)
Dept: REHABILITATION | Facility: HOSPITAL | Age: 68
End: 2023-12-21
Payer: MEDICARE

## 2023-12-21 DIAGNOSIS — R26.89 IMPAIRMENT OF BALANCE: ICD-10-CM

## 2023-12-21 DIAGNOSIS — R29.6 FREQUENT FALLS: Primary | ICD-10-CM

## 2023-12-21 PROCEDURE — 97110 THERAPEUTIC EXERCISES: CPT | Mod: CQ

## 2023-12-21 PROCEDURE — 97530 THERAPEUTIC ACTIVITIES: CPT | Mod: CQ

## 2023-12-21 PROCEDURE — 97112 NEUROMUSCULAR REEDUCATION: CPT | Mod: CQ

## 2023-12-21 NOTE — PROGRESS NOTES
"[facility]   Physical Therapy Treatment Note      Name: Diana Montenegro  Clinic Number: [id number]    Therapy Diagnosis:   Encounter Diagnoses   Name Primary?    Frequent falls Yes    Impairment of balance          Physician: Rad Johnston MD    Visit Date: 12/21/2023     Physician Orders: PT Eval and Treat   Medical Diagnosis from Referral:   R29.6 (ICD-10-CM) - Frequent falls   R26.89 (ICD-10-CM) - Impairment of balance   Evaluation Date: 11/7/2023  Authorization Period Expiration: 12/31/23  Plan of Care Expiration: 2/7/2023  Progress Note Due: 12/7/2023  Visit # / Visits authorized: 1/ 1 7/20  FOTO: 1/ 3     Precautions: Standard and Fall      Time In: 12:20 pm   Time Out: 1:01 pm   Total Billable Time: 41 minutes    PTA Visit #: 1/5       Subjective     Patient reports: that she is doing alright today, but having some pain in her left groin today.   She was compliant with home exercise program.  Response to previous treatment: no adverse affects  Functional change: none at this time    Pain: 0/10  Location: N/A    Objective      Objective Measures updated at progress report unless specified.     Treatment     Kaleigh received the treatments listed below:      therapeutic exercises to develop strength, endurance, ROM, flexibility, posture, and core stabilization for 10 minutes including:  Nustep 6 min  Supine clams blue theraband 3x10  Supine hip flexor stretch 3x30" LLE     NP:  Bridges 2x10  Standing marching 2x10  Cone tapping march B 20x     Ankle inversion/DF YTB 2x10 ea BLE       neuromuscular re-education activities to improve: Balance, Coordination, Kinesthetic, Sense, Proprioception, and Posture for 15 minutes. The following activities were included:  Tandem stance B 30"x2  NBOS 2x30" on airex   NBOS on floor c/ EC 3x30"   SL iso clams B 1'x 1 (NP)  SL iso hip abd B 30"x1 (NP)  Lateral alexander step overs 10 laps     therapeutic activities to improve functional performance " for 16 minutes, including:  Sit to stand with scap retr 2x10 airex  Standing hip abd/ext 2x10 YTB on airex   Heel raises 2x10 on airex   Tib push ups 2x10  Alexander step overs 2 laps         Patient Education and Home Exercises       Education provided:   - safety awareness    Written Home Exercises Provided: Patient instructed to cont prior HEP. Exercises were reviewed and Kaleigh was able to demonstrate them prior to the end of the session.  Kaleigh demonstrated good  understanding of the education provided. See Electronic Medical Record under Patient Instructions for exercises provided during therapy sessions    Assessment     Progressed NMR and therapeutic activities this session with the addition of alexander step overs. Pt used SPC during hurdles for stability. Included supine hip flexor stretch to address groin pain with no adverse effects noted by patient.     Kaleigh Is progressing well towards her goals.   Patient prognosis is Good.     Patient will continue to benefit from skilled outpatient physical therapy to address the deficits listed in the problem list box on initial evaluation, provide pt/family education and to maximize pt's level of independence in the home and community environment.     Patient's spiritual, cultural and educational needs considered and pt agreeable to plan of care and goals.     Anticipated barriers to physical therapy: co-morbidities    Goals:   Short Term Goals (4 Weeks):   1. Pt will be compliant with HEP to supplement PT in restoring pain free function.  2. Pt will improve EC on foam test to 15 sec to improve balance for functional community ambulation  3. Pt will improve impaired LE MMTs by 1/2 grade  to improve strength for functional tasks  4. Pt improve 30s sit to stand test to x8 without UE support to improve LE strength and functional mobility.   Long Term Goals (8 Weeks):  1. Pt will improve FOTO score to </= 48% limited to decrease perceived limitation with mobility  2. Pt will  improve EC on foam test to 30 sec to improve balance for functional community ambulation  3. Pt will improve impaired LE MMTs by 1 grade to improve strength for functional tasks.  4. Pt improve 30s sit to stand test to x12 to improve LE strength and functional mobility.   Plan     Plan of care Certification: 11/7/2023 to 2/7/2023.     Outpatient Physical Therapy 2 times weekly for 12 weeks to include the following interventions: Cervical/Lumbar Traction, Electrical Stimulation  , Gait Training, Manual Therapy, Moist Heat/ Ice, Neuromuscular Re-ed, Therapeutic Activities, Therapeutic Exercise, Ultrasound, and dry needling, IASTM, and kinesiotaping PRN.     Ghazal Milton, PTA

## 2023-12-26 ENCOUNTER — CLINICAL SUPPORT (OUTPATIENT)
Dept: REHABILITATION | Facility: HOSPITAL | Age: 68
End: 2023-12-26
Payer: MEDICARE

## 2023-12-26 DIAGNOSIS — R29.6 FREQUENT FALLS: Primary | ICD-10-CM

## 2023-12-26 DIAGNOSIS — R26.89 IMPAIRMENT OF BALANCE: ICD-10-CM

## 2023-12-26 PROCEDURE — 97110 THERAPEUTIC EXERCISES: CPT

## 2023-12-26 PROCEDURE — 97112 NEUROMUSCULAR REEDUCATION: CPT

## 2023-12-26 PROCEDURE — 97530 THERAPEUTIC ACTIVITIES: CPT

## 2023-12-26 NOTE — PROGRESS NOTES
"[facility]   Physical Therapy Treatment Note      Name: Diana Montenegro  Clinic Number: [id number]    Therapy Diagnosis:   Encounter Diagnoses   Name Primary?    Frequent falls Yes    Impairment of balance        Physician: Rad Johnston MD    Visit Date: 12/26/2023     Physician Orders: PT Eval and Treat   Medical Diagnosis from Referral:   R29.6 (ICD-10-CM) - Frequent falls   R26.89 (ICD-10-CM) - Impairment of balance   Evaluation Date: 11/7/2023  Authorization Period Expiration: 12/31/23  Plan of Care Expiration: 2/7/2023  Progress Note Due: 12/7/2023  Visit # / Visits authorized: 1/1, 8/20  FOTO: 1/3     Precautions: Standard and Fall      Time In: 1:00pm  Time Out: 1:43 pm   Total Billable Time: 43 minutes    PTA Visit #: 0/5       Subjective     Patient reports: her arm still hurts her when reaching out. She had one almost trip and fall but was able to catch herself before falling. Otherwise she is doing well with no new complaints.   She was compliant with home exercise program.  Response to previous treatment: no adverse affects  Functional change: none at this time    Pain: 0/10  Location: N/A    Objective      Objective Measures updated at progress report unless specified.     Treatment     Kaleigh received the treatments listed below:      therapeutic exercises to develop strength, endurance, ROM, flexibility, posture, and core stabilization for 13 minutes including:  Nustep 6 min  Supine clams blue theraband 3x10  Supine hip flexor stretch 3x30" LLE   SLR 2x10 B    NP:  Bridges 2x10  Standing marching 2x10  Cone tapping march B 20x     Ankle inversion/DF YTB 2x10 ea BLE       neuromuscular re-education activities to improve: Balance, Coordination, Kinesthetic, Sense, Proprioception, and Posture for 15 minutes. The following activities were included:  NBOS 2x30" on airex   NBOS on airex c/ EC 3x30"   SL iso clams B 1'x 1 (NP)  SL iso hip abd B 30"x1 (NP)  Lateral alexander " step overs 10 laps   Marching on airex with UE support 3x30s   Tandem on airex 2x30s each way    therapeutic activities to improve functional performance for 15 minutes, including:  Sit to stand with scap retr 2x10 airex  Standing hip abd/ext 2x10 YTB on airex   Heel raises 2x10 on airex   Tib push ups 2x10  Latrell step overs 2 laps         Patient Education and Home Exercises       Education provided:   - safety awareness    Written Home Exercises Provided: Patient instructed to cont prior HEP. Exercises were reviewed and Kaleigh was able to demonstrate them prior to the end of the session.  Kaleigh demonstrated good  understanding of the education provided. See Electronic Medical Record under Patient Instructions for exercises provided during therapy sessions    Assessment     Continued with good performance during all activities with decreasing need for assistance during NMR activities. Progressed shoulder HEP with good demonstration in clinic. Discussed returning to gym for physical activity to monitor response. No adverse effects to treatment session today.     Kaleigh Is progressing well towards her goals.   Patient prognosis is Good.     Patient will continue to benefit from skilled outpatient physical therapy to address the deficits listed in the problem list box on initial evaluation, provide pt/family education and to maximize pt's level of independence in the home and community environment.     Patient's spiritual, cultural and educational needs considered and pt agreeable to plan of care and goals.     Anticipated barriers to physical therapy: co-morbidities    Goals:   Short Term Goals (4 Weeks):   1. Pt will be compliant with HEP to supplement PT in restoring pain free function.  2. Pt will improve EC on foam test to 15 sec to improve balance for functional community ambulation  3. Pt will improve impaired LE MMTs by 1/2 grade  to improve strength for functional tasks  4. Pt improve 30s sit to stand test to x8  without UE support to improve LE strength and functional mobility.   Long Term Goals (8 Weeks):  1. Pt will improve FOTO score to </= 48% limited to decrease perceived limitation with mobility  2. Pt will improve EC on foam test to 30 sec to improve balance for functional community ambulation  3. Pt will improve impaired LE MMTs by 1 grade to improve strength for functional tasks.  4. Pt improve 30s sit to stand test to x12 to improve LE strength and functional mobility.   Plan     Plan of care Certification: 11/7/2023 to 2/7/2023.     Outpatient Physical Therapy 2 times weekly for 12 weeks to include the following interventions: Cervical/Lumbar Traction, Electrical Stimulation  , Gait Training, Manual Therapy, Moist Heat/ Ice, Neuromuscular Re-ed, Therapeutic Activities, Therapeutic Exercise, Ultrasound, and dry needling, IASTM, and kinesiotaping PRN.     Brian Morgan, PT

## 2023-12-29 DIAGNOSIS — E78.5 HYPERLIPIDEMIA, UNSPECIFIED HYPERLIPIDEMIA TYPE: ICD-10-CM

## 2023-12-29 NOTE — TELEPHONE ENCOUNTER
Care Due:                  Date            Visit Type   Department     Provider  --------------------------------------------------------------------------------                                EP -                              PRIMARY      [facility] INTERNAL  Last Visit: 09-      CARE ([facility])   MEDICINE       Radtarah Johnston                              EP -                              PRIMARY      [facility] INTERNAL  Next Visit: 02-      CARE ([facility])   MEDICINE       Radkelle Johnston                                                            Last  Test          Frequency    Reason                     Performed    Due Date  --------------------------------------------------------------------------------    HBA1C.......  6 months...  glimepiride, metFORMIN...  09- 03-    [facility] Embedded Care Due Messages. Reference number: [id number].   12/29/2023 4:05:24 PM CST

## 2023-12-30 ENCOUNTER — PATIENT MESSAGE (OUTPATIENT)
Dept: PHARMACY | Facility: CLINIC | Age: 68
End: 2023-12-30
Payer: MEDICARE

## 2023-12-30 RX ORDER — ROSUVASTATIN CALCIUM 40 MG/1
TABLET, COATED ORAL
Qty: 90 TABLET | Refills: 2 | Status: SHIPPED | OUTPATIENT
Start: 2023-12-30

## 2023-12-30 NOTE — TELEPHONE ENCOUNTER
Provider Staff:  Action required for this patient    Requires labs      Please see care gap opportunities below in Care Due Message.    Thanks!  [facility]     Appointments      Date Provider   Last Visit   9/28/2023 Rad Johnston MD   Next Visit   12/29/2023 Rad Johnston MD     Refill Decision Note   Diana Montenegro  is requesting a refill authorization.  Brief Assessment and Rationale for Refill:  Approve     Medication Therapy Plan:         Comments:     Note composed:5:25 PM 12/30/2023

## 2024-01-02 ENCOUNTER — CLINICAL SUPPORT (OUTPATIENT)
Dept: REHABILITATION | Facility: HOSPITAL | Age: 69
End: 2024-01-02
Payer: MEDICARE

## 2024-01-02 DIAGNOSIS — R29.6 FREQUENT FALLS: Primary | ICD-10-CM

## 2024-01-02 DIAGNOSIS — R26.89 IMPAIRMENT OF BALANCE: ICD-10-CM

## 2024-01-02 PROCEDURE — 97112 NEUROMUSCULAR REEDUCATION: CPT

## 2024-01-02 PROCEDURE — 97530 THERAPEUTIC ACTIVITIES: CPT

## 2024-01-02 PROCEDURE — 97110 THERAPEUTIC EXERCISES: CPT

## 2024-01-02 NOTE — PROGRESS NOTES
"[facility]   Physical Therapy Treatment Note      Name: Diana Montenegro  Clinic Number: [id number]    Therapy Diagnosis:   Encounter Diagnoses   Name Primary?    Frequent falls Yes    Impairment of balance          Physician: Rad Johnston MD    Visit Date: 1/2/2024     Physician Orders: PT Eval and Treat   Medical Diagnosis from Referral:   R29.6 (ICD-10-CM) - Frequent falls   R26.89 (ICD-10-CM) - Impairment of balance   Evaluation Date: 11/7/2023  Authorization Period Expiration: 12/31/23  Plan of Care Expiration: 2/7/2023  Progress Note Due: 12/7/2023  Visit # / Visits authorized: 1/1, 9/20  FOTO: 1/3     Precautions: Standard and Fall      Time In: 1:49pm  Time Out: 2:30 pm   Total Billable Time: 41 minutes    PTA Visit #: 0/5       Subjective     Patient reports: her balance has been okay. She has been going back to the pool in the gym lately with good tolerance.   She was compliant with home exercise program.  Response to previous treatment: no adverse affects  Functional change: none at this time    Pain: 0/10  Location: N/A    Objective      Objective Measures updated at progress report unless specified.     Treatment     Kaleigh received the treatments listed below:      therapeutic exercises to develop strength, endurance, ROM, flexibility, posture, and core stabilization for 11 minutes including:  Nustep 6 min  Supine clams blue theraband 3x10  Supine hip flexor stretch 3x30" LLE   SLR 2x10 B    NP:  Bridges 2x10  Standing marching 2x10  Cone tapping march B 20x     Ankle inversion/DF YTB 2x10 ea BLE       neuromuscular re-education activities to improve: Balance, Coordination, Kinesthetic, Sense, Proprioception, and Posture for 15 minutes. The following activities were included:  NBOS 2x30" on airex   NBOS on airex c/ EC 3x30"   SL iso clams B 1'x 1 (NP)  SL iso hip abd B 30"x1 (NP)  Lateral alexander step overs 10 laps   Marching on airex with UE support 3x30s   Tandem on " airex 2x30s each way    therapeutic activities to improve functional performance for 15 minutes, including:  Sit to stand with scap retr 2x10 airex  Standing hip abd/ext 2x10 on airex   Heel raises 2x10 on airex   Tib push ups 3x10  Latrell step overs 2 laps  NP  Unilateral farmer's carry 1x1' each 15#  Shuttle DLP 3.5c 3x10      Patient Education and Home Exercises       Education provided:   - safety awareness    Written Home Exercises Provided: Patient instructed to cont prior HEP. Exercises were reviewed and Kaleigh was able to demonstrate them prior to the end of the session.  Kaleigh demonstrated good  understanding of the education provided. See Electronic Medical Record under Patient Instructions for exercises provided during therapy sessions    Assessment     Progressed functional strengthening with shuttle and farmer's carry with good tolerance and no adverse effects. Plan to continue to progress as tolerated as she starts weaning into gym activities.     Kaleigh Is progressing well towards her goals.   Patient prognosis is Good.     Patient will continue to benefit from skilled outpatient physical therapy to address the deficits listed in the problem list box on initial evaluation, provide pt/family education and to maximize pt's level of independence in the home and community environment.     Patient's spiritual, cultural and educational needs considered and pt agreeable to plan of care and goals.     Anticipated barriers to physical therapy: co-morbidities    Goals:   Short Term Goals (4 Weeks):   1. Pt will be compliant with HEP to supplement PT in restoring pain free function.  2. Pt will improve EC on foam test to 15 sec to improve balance for functional community ambulation  3. Pt will improve impaired LE MMTs by 1/2 grade  to improve strength for functional tasks  4. Pt improve 30s sit to stand test to x8 without UE support to improve LE strength and functional mobility.   Long Term Goals (8 Weeks):  1. Pt  will improve FOTO score to </= 48% limited to decrease perceived limitation with mobility  2. Pt will improve EC on foam test to 30 sec to improve balance for functional community ambulation  3. Pt will improve impaired LE MMTs by 1 grade to improve strength for functional tasks.  4. Pt improve 30s sit to stand test to x12 to improve LE strength and functional mobility.   Plan     Plan of care Certification: 11/7/2023 to 2/7/2023.     Outpatient Physical Therapy 2 times weekly for 12 weeks to include the following interventions: Cervical/Lumbar Traction, Electrical Stimulation  , Gait Training, Manual Therapy, Moist Heat/ Ice, Neuromuscular Re-ed, Therapeutic Activities, Therapeutic Exercise, Ultrasound, and dry needling, IASTM, and kinesiotaping PRN.     Brian Morgan, PT

## 2024-01-03 ENCOUNTER — CLINICAL SUPPORT (OUTPATIENT)
Dept: AUDIOLOGY | Facility: CLINIC | Age: 69
End: 2024-01-03
Payer: MEDICARE

## 2024-01-03 ENCOUNTER — OFFICE VISIT (OUTPATIENT)
Dept: OTOLARYNGOLOGY | Facility: CLINIC | Age: 69
End: 2024-01-03
Payer: MEDICARE

## 2024-01-03 ENCOUNTER — PROCEDURE VISIT (OUTPATIENT)
Dept: HEPATOLOGY | Facility: CLINIC | Age: 69
End: 2024-01-03
Payer: MEDICARE

## 2024-01-03 DIAGNOSIS — H90.3 BILATERAL HIGH FREQUENCY SENSORINEURAL HEARING LOSS: ICD-10-CM

## 2024-01-03 DIAGNOSIS — R79.89 LFT ELEVATION: ICD-10-CM

## 2024-01-03 DIAGNOSIS — H93.8X2 EAR FULLNESS, LEFT: Primary | ICD-10-CM

## 2024-01-03 DIAGNOSIS — R04.0 EPISTAXIS: ICD-10-CM

## 2024-01-03 DIAGNOSIS — H90.3 SENSORINEURAL HEARING LOSS (SNHL), BILATERAL: Primary | ICD-10-CM

## 2024-01-03 PROCEDURE — 92567 TYMPANOMETRY: CPT | Mod: S$GLB,,,

## 2024-01-03 PROCEDURE — 92557 COMPREHENSIVE HEARING TEST: CPT | Mod: S$GLB,,,

## 2024-01-03 PROCEDURE — 99215 OFFICE O/P EST HI 40 MIN: CPT | Mod: S$GLB,,, | Performed by: NURSE PRACTITIONER

## 2024-01-03 PROCEDURE — 76981 USE PARENCHYMA: CPT | Mod: S$GLB,,, | Performed by: NURSE PRACTITIONER

## 2024-01-03 NOTE — PROCEDURES
FibroScan [place] (Vibration Controlled Transient Elastography)    Date/Time: 1/3/2024 2:30 PM    Performed by: Mary Hidalgo NP  Authorized by: Dorothy Hernandez PA-C    Diagnosis:  NAFLD    Probe:  XL    Universal Protocol: Patient's identity, procedure and site were verified, confirmatory pause was performed.  Discussed procedure including risks and potential complications.  Questions answered.  Patient verbalizes understanding and wishes to proceed with VCTE.     Procedure: After providing explanations of the procedure, patient was placed in the supine position with right arm in maximum abduction to allow optimal exposure of right lateral abdomen.  Patient was briefly assessed, Testing was performed in the mid-axillary location, 50Hz Shear Wave pulses were applied and the resulting Shear Wave and Propagation Speed detected with a 3.5 MHz ultrasonic signal, using the FibroScan probe, Skin to liver capsule distance and liver parenchyma were accessed during the entire examination with the FibroScan probe, Patient was instructed to breathe normally and to abstain from sudden movements during the procedure, allowing for random measurements of liver stiffness. At least 10 Shear Waves were produced, Individual measurements of each Shear Wave were calculated.  Patient tolerated the procedure well with no complications.  Meets discharge criteria as was dismissed.  Rates pain 0 out of 10.  Patient will follow up with ordering provider to review results.    Findings  Median liver stiffness score:  3.4  CAP Reading: dB/m:  266    IQR/med %:  15  Interpretation  Fibrosis interpretation is based on medial liver stiffness - Kilopascal (kPa).    Fibrosis Stage:  F 0-1  Steatosis interpretation is based on controlled attenuation parameter - (dB/m).    Steatosis Grade:  S2

## 2024-01-03 NOTE — PROGRESS NOTES
"Subjective:   Diana Montenegro is a 68 y.o. female who was self-referred for left ear fullness and  muffled hearing . Kaleigh reports that for about 1 year she has experienced left ear fullness. She has not found anything that makes it better, but notes that "popping" her ear will temporarily help. She denies any other otologic symptoms including: otalgia, otorrhea,tinnitus or vertigo. She denies any pulsatile tinnitus. There is not a family history of hearing loss at a young age. There is not a prior history of ear surgery. There is not a prior history of ear infections. There is not a history of ear trauma. 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 She does not wear hearing aids currently. She has not had a hearing test recently.     Past Medical History  She has a past medical history of Arthritis, Atrial fibrillation, unspecified, Chronic back pain, Colon polyp, CVA (cerebral infarction), Degenerative disc disease, Diabetes mellitus type II, Encounter for loop recorder at end of battery life, Hyperlipidemia, Hypertension, Hypothyroidism, Mild nonproliferative diabetic retinopathy, Obesity, Sleep apnea, Stroke, and Venous insufficiency (chronic) (peripheral).    Past Surgical History  She has a past surgical history that includes Tubal ligation; Tonsillectomy; Hernia repair; Gastrectomy; Colonoscopy w/ polypectomy; Colonoscopy (N/A, 08/30/2018); Removal of implantable loop recorder (N/A, 09/17/2018); and Colonoscopy (N/A, 12/6/2023).    Family History  Her family history includes Diabetes in her maternal grandfather; Heart disease in her father; No Known Problems in her maternal grandmother, mother, paternal grandfather, sister, and sister; Obesity in her maternal grandfather; Stroke in her paternal grandmother.    Social History  She reports that she quit smoking about 9 years ago. Her smoking use included cigarettes. She " started smoking about 39 years ago. She has a 30.0 pack-year smoking history. She has never used smokeless tobacco. She reports that she does not currently use alcohol. She reports that she does not use drugs.    Allergies  She is allergic to medrol [methylprednisolone].    Medications  She has a current medication list which includes the following prescription(s): acyclovir 5%, albuterol, albuterol-ipratropium, apixaban, aspirin, b complex vitamins, blood pressure test kit-large, blood sugar diagnostic, blood-glucose meter, calcium citrate/vitamin d3, cetirizine, ciclopirox, cinnamon bark, cyanocobalamin (vitamin b-12), diclofenac sodium, docusate sodium, eylea, fish oil-omega-3 fatty acids, fluticasone propionate, glimepiride, lancets, levothyroxine, lorazepam, metformin, metoprolol tartrate, multivitamin, mupirocin, omeprazole, ozempic, promethazine-dextromethorphan, rosuvastatin, rutin/hesp/bioflav/c/[id number], senna, trazodone, and valsartan-hydrochlorothiazide, and the following Facility-Administered Medications: sodium chloride 0.9%.  Review of Systems     Constitutional: Negative for chills and fever.      HENT: Positive for hearing loss (muffled AS).  Negative for ear discharge, ear infection, ear pain and ringing in the ears.      Neurological: Negative for dizziness, headaches, light-headedness, seizures and tremors.          Objective:     Constitutional:   She is oriented to person, place, and time. She appears well-developed and well-nourished. She appears alert. She is cooperative.  Non-toxic appearance. She does not have a sickly appearance. She does not appear ill. Normal speech.      Head:  Normocephalic and atraumatic. Not macrocephalic and not microcephalic. Head is without abrasion, without right periorbital erythema, without left periorbital erythema and without TMJ tenderness.     Ears:    Right Ear: No drainage, swelling or tenderness. No mastoid tenderness. Tympanic membrane is not scarred,  not perforated, not erythematous, not retracted and not bulging. No middle ear effusion.   Left Ear: No drainage, swelling or tenderness. No mastoid tenderness. Tympanic membrane is not scarred, not perforated, not erythematous, not retracted and not bulging.  No middle ear effusion.   Ears:      Nose:  No mucosal edema, rhinorrhea, nose lacerations, septal deviation or nasal septal hematoma. No epistaxis. Turbinates normal.      Pulmonary/Chest:   Effort normal.     Psychiatric:   She has a normal mood and affect. Her speech is normal and behavior is normal.     Neurological:   She is alert and oriented to person, place, and time.     Procedure  None    Audiogram        I independently reviewed the tracings of the complete audiometric evaluation. I reviewed the audiogram with the patient as well. Pertinent findings include  normal hearing sensitivity from 250-4000 Hz sloping to a mild hearing loss from 1166-4261 Hz in the right ear and normal hearing sensitivity from 250-2000 Hz sloping from a mild to moderate SNHL from 5988-6096 Hz in the left ear.   Assessment:     1. Ear fullness, left    2. Bilateral high frequency sensorineural hearing loss    3. Epistaxis      Plan:   Ear fullness, left  Patient noted to have hemotympanum of the left ear. Would like to monitor with 1 month follow-up. She denied any recent head trauma, but did admit to frequent epistaxis episodes. Will consider adding Flonase if nosebleeds are controlled.  Bilateral high frequency sensorineural hearing loss  Audiometric testing interpretation consistent with sensorineural hearing loss. Discussed the etiology of SNHL. Hearing conservation in noisy environments.   Epistaxis  Had a lengthy conversation with the patient regarding the etiology of epistaxis and management strategies. Patient provided detailed instructions for the management and prevention of nosebleeds.

## 2024-01-03 NOTE — PROGRESS NOTES
Diana Montenegro, a 68 y.o. female, was seen today in the clinic for an audiologic evaluation.  The patient has a history of a bilateral sensorineural hearing loss (SNHL).  Ms. Montenegro reported muffled hearing in the left ear that began about 1 year ago.  She also reported occasional aural fullness mostly in the left ear.  She denied otalgia and tinnitus.  She reported she is currently in physical therapy for balance issues.     Tympanometry revealed Type A in the right ear and Type A in the left ear.  Audiogram results revealed normal hearing sensitivity from 250-4000 Hz sloping to a mild hearing loss from 6412-9403 Hz in the right ear and normal hearing sensitivity from 250-2000 Hz sloping from a mild to moderate SNHL from 8438-4494 Hz in the left ear.  Speech reception thresholds were noted at 20 dB in the right ear and 20 dB in the left ear.  Speech discrimination scores were 100% in the right ear and 100% in the left ear.    Recommendations:  Otologic evaluation  Hearing protection when in noise  Annual audiogram or sooner if change in hearing is perceived

## 2024-01-04 ENCOUNTER — CLINICAL SUPPORT (OUTPATIENT)
Dept: REHABILITATION | Facility: HOSPITAL | Age: 69
End: 2024-01-04
Payer: MEDICARE

## 2024-01-04 DIAGNOSIS — R26.89 IMPAIRMENT OF BALANCE: ICD-10-CM

## 2024-01-04 DIAGNOSIS — R29.6 FREQUENT FALLS: Primary | ICD-10-CM

## 2024-01-04 PROCEDURE — 97110 THERAPEUTIC EXERCISES: CPT

## 2024-01-04 PROCEDURE — 97530 THERAPEUTIC ACTIVITIES: CPT

## 2024-01-04 PROCEDURE — 97112 NEUROMUSCULAR REEDUCATION: CPT

## 2024-01-04 NOTE — PROGRESS NOTES
"[facility]   Physical Therapy Treatment Note      Name: Diana Montenegro  Clinic Number: [id number]    Therapy Diagnosis:   Encounter Diagnoses   Name Primary?    Frequent falls Yes    Impairment of balance          Physician: Rad Johnston MD    Visit Date: 1/4/2024     Physician Orders: PT Eval and Treat   Medical Diagnosis from Referral:   R29.6 (ICD-10-CM) - Frequent falls   R26.89 (ICD-10-CM) - Impairment of balance   Evaluation Date: 11/7/2023  Authorization Period Expiration: 12/31/23  Plan of Care Expiration: 2/7/2023  Progress Note Due: 12/7/2023  Visit # / Visits authorized: 1/1, 8/20, 2/12  FOTO: 1/3     Precautions: Standard and Fall      Time In: 1:05pm  Time Out: 1:40pm   Total Billable Time: 40 minutes    PTA Visit #: 0/5       Subjective     Patient reports: she caught the tip of her toe on the ground a couple times lately, so she is using the cane today. She leaves for a cruise this weekend.   She was compliant with home exercise program.  Response to previous treatment: no adverse affects  Functional change: none at this time    Pain: 0/10  Location: N/A    Objective      Objective Measures updated at progress report unless specified.     Treatment     Kaleigh received the treatments listed below:      therapeutic exercises to develop strength, endurance, ROM, flexibility, posture, and core stabilization for 10 minutes including:  Nustep 6 min  Supine clams blue theraband 3x10  Supine hip flexor stretch 3x30" LLE NP  SLR 3x10 B    NP:  Bridges 2x10  Standing marching 2x10  Cone tapping march B 20x   Ankle inversion/DF YTB 2x10 ea BLE       neuromuscular re-education activities to improve: Balance, Coordination, Kinesthetic, Sense, Proprioception, and Posture for 15 minutes. The following activities were included:  NBOS 2x30" on airex   NBOS on airex c/ EC 3x30"   SL iso clams B 1'x 1 (NP)  SL iso hip abd B 30"x1 (NP)  Lateral alexander step overs 10 laps   Marching with " red theraband at toes and UE support 2x10  Tandem on airex 2x30s each way    therapeutic activities to improve functional performance for 15 minutes, including:  Sit to stand with scap retr 2x10 airex  Standing hip abd/ext 2x10 on airex   Heel raises 2x10 on airex   Tib push ups 3x10  Latrell step overs 2 laps  NP  Unilateral farmer's carry 1x1' each 15#  Shuttle DLP 3.5c 3x10      Patient Education and Home Exercises       Education provided:   - safety awareness    Written Home Exercises Provided: Patient instructed to cont prior HEP. Exercises were reviewed and Kaleigh was able to demonstrate them prior to the end of the session.  Kaleigh demonstrated good  understanding of the education provided. See Electronic Medical Record under Patient Instructions for exercises provided during therapy sessions    Assessment     Progressed hip flexion strengthening to address reports of tripping with good tolerance and no adverse effects with progressions. Will continue to assess progress when pt returns from her vacation.     Kaleigh Is progressing well towards her goals.   Patient prognosis is Good.     Patient will continue to benefit from skilled outpatient physical therapy to address the deficits listed in the problem list box on initial evaluation, provide pt/family education and to maximize pt's level of independence in the home and community environment.     Patient's spiritual, cultural and educational needs considered and pt agreeable to plan of care and goals.     Anticipated barriers to physical therapy: co-morbidities    Goals:   Short Term Goals (4 Weeks):   1. Pt will be compliant with HEP to supplement PT in restoring pain free function.  2. Pt will improve EC on foam test to 15 sec to improve balance for functional community ambulation  3. Pt will improve impaired LE MMTs by 1/2 grade  to improve strength for functional tasks  4. Pt improve 30s sit to stand test to x8 without UE support to improve LE strength and  functional mobility.   Long Term Goals (8 Weeks):  1. Pt will improve FOTO score to </= 48% limited to decrease perceived limitation with mobility  2. Pt will improve EC on foam test to 30 sec to improve balance for functional community ambulation  3. Pt will improve impaired LE MMTs by 1 grade to improve strength for functional tasks.  4. Pt improve 30s sit to stand test to x12 to improve LE strength and functional mobility.   Plan     Plan of care Certification: 11/7/2023 to 2/7/2023.     Outpatient Physical Therapy 2 times weekly for 12 weeks to include the following interventions: Cervical/Lumbar Traction, Electrical Stimulation  , Gait Training, Manual Therapy, Moist Heat/ Ice, Neuromuscular Re-ed, Therapeutic Activities, Therapeutic Exercise, Ultrasound, and dry needling, IASTM, and kinesiotaping PRN.     Brian Morgan, PT

## 2024-01-16 ENCOUNTER — CLINICAL SUPPORT (OUTPATIENT)
Dept: REHABILITATION | Facility: HOSPITAL | Age: 69
End: 2024-01-16
Payer: MEDICARE

## 2024-01-16 DIAGNOSIS — R29.6 FREQUENT FALLS: Primary | ICD-10-CM

## 2024-01-16 DIAGNOSIS — R26.89 IMPAIRMENT OF BALANCE: ICD-10-CM

## 2024-01-16 PROCEDURE — 97530 THERAPEUTIC ACTIVITIES: CPT

## 2024-01-16 PROCEDURE — 97112 NEUROMUSCULAR REEDUCATION: CPT

## 2024-01-16 PROCEDURE — 97110 THERAPEUTIC EXERCISES: CPT

## 2024-01-16 NOTE — PROGRESS NOTES
"[facility]   Physical Therapy Treatment Note      Name: Diana Montenegro  Clinic Number: [id number]    Therapy Diagnosis:   Encounter Diagnoses   Name Primary?    Frequent falls Yes    Impairment of balance        Physician: Rad Johnston MD    Visit Date: 1/16/2024     Physician Orders: PT Eval and Treat   Medical Diagnosis from Referral:   R29.6 (ICD-10-CM) - Frequent falls   R26.89 (ICD-10-CM) - Impairment of balance   Evaluation Date: 11/7/2023  Authorization Period Expiration: 12/31/23  Plan of Care Expiration: 2/7/2023  Progress Note Due: 12/7/2023  Visit # / Visits authorized: 1/1, 8/20, 3/12  FOTO: 1/3     Precautions: Standard and Fall      Time In: 2:33pm  Time Out: 3:18pm  Total Billable Time: 45 minutes    PTA Visit #: 0/5       Subjective     Patient reports: her balance was good on the cruise but she did have her walker for safety. She did a lot of walking around so her knees and hip have been bothering her since. She is still a little achy from that.   She was compliant with home exercise program.  Response to previous treatment: no adverse affects  Functional change: none at this time    Pain: 0/10  Location: N/A    Objective      Objective Measures updated at progress report unless specified.    Right Left Comment: ! = pain   Hip Flexion: 4/5 4/5    Hip ABD: 4+/5 4+/5    Hip ADD: 4/5 4/5    Hip IR: 4/5 4/5    Hip ER: 4/5 4/5       Right Left Comment ! = pain   Knee extension: 4+/5 4+/5    Knee flexion: 4+/5 4+/5    Ankle DF: 4+/5 4+/5    Ankle PF: 4/5 4/5               30" sit to stand Cutoff Scores: x8 without UE use; x11 with UE use             Romberg:    EO EC Comments   Floor 30 sec 30 sec     Foam 30 sec 18 sec* No assist needed for recovery   *=LOB    Treatment     Kaleigh received the treatments listed below:      therapeutic exercises to develop strength, endurance, ROM, flexibility, posture, and core stabilization for 20 minutes including:  Nustep 6 " "min  Supine clams blue theraband 3x10  Supine hip flexor stretch 3x30" LLE NP  SLR 3x10 B  Objective measures above        neuromuscular re-education activities to improve: Balance, Coordination, Kinesthetic, Sense, Proprioception, and Posture for 10 minutes. The following activities were included:  NBOS x30" on airex   NBOS on airex c/ EC 3x30"   SL iso clams B 1'x 1 (NP)  SL iso hip abd B 30"x1 (NP)  Lateral alexander step overs 10 laps   Marching with red theraband at toes and UE support 2x10  Tandem on airex 2x30s each way NP    therapeutic activities to improve functional performance for 15 minutes, including:  Sit to stand with scap retr 2x10 NO  Standing hip abd/ext 2x10 on airex NP  Heel raises 3x10 on BOSU  Tib push ups 3x10  Alexander step overs 2 laps  NP  Unilateral farmer's carry 1x1' each 15#  Shuttle DLP 3.5c 3x10      Patient Education and Home Exercises       Education provided:   - safety awareness    Written Home Exercises Provided: Patient instructed to cont prior HEP. Exercises were reviewed and Kaleigh was able to demonstrate them prior to the end of the session.  Kaleigh demonstrated good  understanding of the education provided. See Electronic Medical Record under Patient Instructions for exercises provided during therapy sessions    Assessment     Pt presents with significant improvements in subjective reports and objective measures as compared to initial evaluation as indicated above. She presents with most limitation noted in hip flexion and add strength, so progressed program and HEP to address this. Plan to discharge at next date of service pending pt tolerance.     Kaleigh Is progressing well towards her goals.   Patient prognosis is Good.     Patient will continue to benefit from skilled outpatient physical therapy to address the deficits listed in the problem list box on initial evaluation, provide pt/family education and to maximize pt's level of independence in the home and community environment. "     Patient's spiritual, cultural and educational needs considered and pt agreeable to plan of care and goals.     Anticipated barriers to physical therapy: co-morbidities    Goals:   Short Term Goals (4 Weeks):   1. Pt will be compliant with HEP to supplement PT in restoring pain free function.  2. Pt will improve EC on foam test to 15 sec to improve balance for functional community ambulation  3. Pt will improve impaired LE MMTs by 1/2 grade  to improve strength for functional tasks  4. Pt improve 30s sit to stand test to x8 without UE support to improve LE strength and functional mobility.   Long Term Goals (8 Weeks):  1. Pt will improve FOTO score to </= 48% limited to decrease perceived limitation with mobility  2. Pt will improve EC on foam test to 30 sec to improve balance for functional community ambulation  3. Pt will improve impaired LE MMTs by 1 grade to improve strength for functional tasks.  4. Pt improve 30s sit to stand test to x12 to improve LE strength and functional mobility.   Plan     Plan of care Certification: 11/7/2023 to 2/7/2023.     Outpatient Physical Therapy 2 times weekly for 12 weeks to include the following interventions: Cervical/Lumbar Traction, Electrical Stimulation  , Gait Training, Manual Therapy, Moist Heat/ Ice, Neuromuscular Re-ed, Therapeutic Activities, Therapeutic Exercise, Ultrasound, and dry needling, IASTM, and kinesiotaping PRN.     Brian Morgan, PT

## 2024-01-18 ENCOUNTER — CLINICAL SUPPORT (OUTPATIENT)
Dept: REHABILITATION | Facility: HOSPITAL | Age: 69
End: 2024-01-18
Payer: MEDICARE

## 2024-01-18 DIAGNOSIS — R26.89 IMPAIRMENT OF BALANCE: ICD-10-CM

## 2024-01-18 DIAGNOSIS — R29.6 FREQUENT FALLS: Primary | ICD-10-CM

## 2024-01-18 PROCEDURE — 97530 THERAPEUTIC ACTIVITIES: CPT | Mod: CQ

## 2024-01-18 PROCEDURE — 97112 NEUROMUSCULAR REEDUCATION: CPT | Mod: CQ

## 2024-01-18 NOTE — PROGRESS NOTES
"[facility]   Physical Therapy Treatment Note      Name: Diana Montenegro  Clinic Number: [id number]    Therapy Diagnosis:   Encounter Diagnoses   Name Primary?    Frequent falls Yes    Impairment of balance          Physician: Rad Johnston MD    Visit Date: 1/18/2024     Physician Orders: PT Eval and Treat   Medical Diagnosis from Referral:   R29.6 (ICD-10-CM) - Frequent falls   R26.89 (ICD-10-CM) - Impairment of balance   Evaluation Date: 11/7/2023  Authorization Period Expiration: 12/31/23  Plan of Care Expiration: 2/7/2023  Progress Note Due: 12/7/2023  Visit # / Visits authorized: 1/1, 8/20, 4/12  FOTO: 1/3     Precautions: Standard and Fall      Time In: 12:20 pm  Time Out: 12:24 pm  Total Billable Time: 24 minutes    PTA Visit #: 0/5       Subjective     Patient reports: that she is doing well today and   She was compliant with home exercise program.  Response to previous treatment: no adverse affects  Functional change: none at this time    Pain: 0/10  Location: N/A    Objective      See previous note.     Treatment     Kaleigh received the treatments listed below:      therapeutic exercises to develop strength, endurance, ROM, flexibility, posture, and core stabilization for 0 minutes including:  Nustep 6 min  Supine clams blue theraband 3x10  Supine hip flexor stretch 3x30" LLE NP  SLR 3x10 B  Objective measures above        neuromuscular re-education activities to improve: Balance, Coordination, Kinesthetic, Sense, Proprioception, and Posture for 12 minutes. The following activities were included:    NBOS on airex c/ EC 3x30"   Lateral alexander step overs 10 laps   Tandem on airex 2x30s each way     therapeutic activities to improve functional performance for 12 minutes, including:  Sit to stand with scap retr 2x10 NO  Tib push ups 3x10  Unilateral farmer's carry 1x1' each 15#        Patient Education and Home Exercises       Education provided:   - safety " awareness    Written Home Exercises Provided: Patient instructed to cont prior HEP. Exercises were reviewed and Kaleigh was able to demonstrate them prior to the end of the session.  Kaleigh demonstrated good  understanding of the education provided. See Electronic Medical Record under Patient Instructions for exercises provided during therapy sessions    Assessment     Abbreviated session performed today due to discharge. Performed NMR and TA this session, as well as reviewed HEP. Pt given progressed therabands as well. Please see previous noted by Brian Tao DPT for updated objective measures.      Kaleigh Is progressing well towards her goals.   Patient prognosis is Good.     Patient will continue to benefit from skilled outpatient physical therapy to address the deficits listed in the problem list box on initial evaluation, provide pt/family education and to maximize pt's level of independence in the home and community environment.     Patient's spiritual, cultural and educational needs considered and pt agreeable to plan of care and goals.     Anticipated barriers to physical therapy: co-morbidities    Goals:   Short Term Goals (4 Weeks):   1. Pt will be compliant with HEP to supplement PT in restoring pain free function.  2. Pt will improve EC on foam test to 15 sec to improve balance for functional community ambulation  3. Pt will improve impaired LE MMTs by 1/2 grade  to improve strength for functional tasks  4. Pt improve 30s sit to stand test to x8 without UE support to improve LE strength and functional mobility.   Long Term Goals (8 Weeks):  1. Pt will improve FOTO score to </= 48% limited to decrease perceived limitation with mobility  2. Pt will improve EC on foam test to 30 sec to improve balance for functional community ambulation  3. Pt will improve impaired LE MMTs by 1 grade to improve strength for functional tasks.  4. Pt improve 30s sit to stand test to x12 to improve LE strength and functional  mobility.   Plan     Plan of care Certification: 11/7/2023 to 2/7/2023.     Outpatient Physical Therapy 2 times weekly for 12 weeks to include the following interventions: Cervical/Lumbar Traction, Electrical Stimulation  , Gait Training, Manual Therapy, Moist Heat/ Ice, Neuromuscular Re-ed, Therapeutic Activities, Therapeutic Exercise, Ultrasound, and dry needling, IASTM, and kinesiotaping PRN.     Ghazal Milton, PTA

## 2024-01-19 ENCOUNTER — PATIENT MESSAGE (OUTPATIENT)
Dept: HEPATOLOGY | Facility: CLINIC | Age: 69
End: 2024-01-19

## 2024-01-19 ENCOUNTER — TELEPHONE (OUTPATIENT)
Dept: HEPATOLOGY | Facility: CLINIC | Age: 69
End: 2024-01-19
Payer: MEDICARE

## 2024-01-19 DIAGNOSIS — K76.0 FATTY LIVER: Primary | ICD-10-CM

## 2024-01-19 NOTE — TELEPHONE ENCOUNTER
Previous pt of Tomas's. Pt unable to log on to video visit, called pt to discuss results.     Please call pt and schedule in person f/u in 1 year with lab a few days before, fibroscan same day as visit    Thanks!

## 2024-01-28 ENCOUNTER — PATIENT MESSAGE (OUTPATIENT)
Dept: INTERNAL MEDICINE | Facility: CLINIC | Age: 69
End: 2024-01-28
Payer: MEDICARE

## 2024-01-29 NOTE — TELEPHONE ENCOUNTER
No care due was identified.  [facility] Embedded Care Due Messages. Reference number: [id number].   1/29/2024 12:07:38 AM CST

## 2024-01-30 ENCOUNTER — LAB VISIT (OUTPATIENT)
Dept: LAB | Facility: HOSPITAL | Age: 69
End: 2024-01-30
Attending: INTERNAL MEDICINE
Payer: MEDICARE

## 2024-01-30 DIAGNOSIS — E11.69 HYPERLIPIDEMIA ASSOCIATED WITH TYPE 2 DIABETES MELLITUS: ICD-10-CM

## 2024-01-30 DIAGNOSIS — E11.59 HYPERTENSION ASSOCIATED WITH DIABETES: ICD-10-CM

## 2024-01-30 DIAGNOSIS — E03.9 ACQUIRED HYPOTHYROIDISM: ICD-10-CM

## 2024-01-30 DIAGNOSIS — I15.2 HYPERTENSION ASSOCIATED WITH DIABETES: ICD-10-CM

## 2024-01-30 DIAGNOSIS — E11.40 TYPE 2 DIABETES MELLITUS WITH DIABETIC NEUROPATHY, WITHOUT LONG-TERM CURRENT USE OF INSULIN: ICD-10-CM

## 2024-01-30 DIAGNOSIS — E78.5 HYPERLIPIDEMIA ASSOCIATED WITH TYPE 2 DIABETES MELLITUS: ICD-10-CM

## 2024-01-30 LAB
ALBUMIN SERPL BCP-MCNC: 3.5 G/DL (ref 3.5–5.2)
ALP SERPL-CCNC: 80 U/L (ref 55–135)
ALT SERPL W/O P-5'-P-CCNC: 46 U/L (ref 10–44)
ANION GAP SERPL CALC-SCNC: 8 MMOL/L (ref 8–16)
AST SERPL-CCNC: 30 U/L (ref 10–40)
BASOPHILS # BLD AUTO: 0.04 K/UL (ref 0–0.2)
BASOPHILS NFR BLD: 0.8 % (ref 0–1.9)
BILIRUB SERPL-MCNC: 0.3 MG/DL (ref 0.1–1)
BUN SERPL-MCNC: 30 MG/DL (ref 8–23)
CALCIUM SERPL-MCNC: 9.7 MG/DL (ref 8.7–10.5)
CHLORIDE SERPL-SCNC: 108 MMOL/L (ref 95–110)
CO2 SERPL-SCNC: 23 MMOL/L (ref 23–29)
CREAT SERPL-MCNC: 1.1 MG/DL (ref 0.5–1.4)
DIFFERENTIAL METHOD BLD: ABNORMAL
EOSINOPHIL # BLD AUTO: 0.1 K/UL (ref 0–0.5)
EOSINOPHIL NFR BLD: 1.2 % (ref 0–8)
ERYTHROCYTE [DISTWIDTH] IN BLOOD BY AUTOMATED COUNT: 13.8 % (ref 11.5–14.5)
EST. GFR  (NO RACE VARIABLE): 55.1 ML/MIN/1.73 M^2
ESTIMATED AVG GLUCOSE: 171 MG/DL (ref 68–131)
GLUCOSE SERPL-MCNC: 101 MG/DL (ref 70–110)
HBA1C MFR BLD: 7.6 % (ref 4–5.6)
HCT VFR BLD AUTO: 34.9 % (ref 37–48.5)
HGB BLD-MCNC: 10.4 G/DL (ref 12–16)
IMM GRANULOCYTES # BLD AUTO: 0.01 K/UL (ref 0–0.04)
IMM GRANULOCYTES NFR BLD AUTO: 0.2 % (ref 0–0.5)
LYMPHOCYTES # BLD AUTO: 1.2 K/UL (ref 1–4.8)
LYMPHOCYTES NFR BLD: 22.8 % (ref 18–48)
MCH RBC QN AUTO: 27.8 PG (ref 27–31)
MCHC RBC AUTO-ENTMCNC: 29.8 G/DL (ref 32–36)
MCV RBC AUTO: 93 FL (ref 82–98)
MONOCYTES # BLD AUTO: 0.4 K/UL (ref 0.3–1)
MONOCYTES NFR BLD: 6.9 % (ref 4–15)
NEUTROPHILS # BLD AUTO: 3.5 K/UL (ref 1.8–7.7)
NEUTROPHILS NFR BLD: 68.1 % (ref 38–73)
NRBC BLD-RTO: 0 /100 WBC
PLATELET # BLD AUTO: 143 K/UL (ref 150–450)
PMV BLD AUTO: 11.1 FL (ref 9.2–12.9)
POTASSIUM SERPL-SCNC: 4.6 MMOL/L (ref 3.5–5.1)
PROT SERPL-MCNC: 6.6 G/DL (ref 6–8.4)
RBC # BLD AUTO: 3.74 M/UL (ref 4–5.4)
SODIUM SERPL-SCNC: 139 MMOL/L (ref 136–145)
TSH SERPL DL<=0.005 MIU/L-ACNC: 1.25 UIU/ML (ref 0.4–4)
WBC # BLD AUTO: 5.18 K/UL (ref 3.9–12.7)

## 2024-01-30 PROCEDURE — 85025 COMPLETE CBC W/AUTO DIFF WBC: CPT | Performed by: INTERNAL MEDICINE

## 2024-01-30 PROCEDURE — 36415 COLL VENOUS BLD VENIPUNCTURE: CPT | Mod: PO | Performed by: INTERNAL MEDICINE

## 2024-01-30 PROCEDURE — 83036 HEMOGLOBIN GLYCOSYLATED A1C: CPT | Performed by: INTERNAL MEDICINE

## 2024-01-30 PROCEDURE — 80053 COMPREHEN METABOLIC PANEL: CPT | Performed by: INTERNAL MEDICINE

## 2024-01-30 PROCEDURE — 84443 ASSAY THYROID STIM HORMONE: CPT | Performed by: INTERNAL MEDICINE

## 2024-01-30 RX ORDER — ALBUTEROL SULFATE 90 UG/1
2 AEROSOL, METERED RESPIRATORY (INHALATION) EVERY 6 HOURS PRN
Qty: 18 G | Refills: 3 | Status: SHIPPED | OUTPATIENT
Start: 2024-01-30 | End: 2024-06-17 | Stop reason: SDUPTHER

## 2024-01-31 ENCOUNTER — TELEPHONE (OUTPATIENT)
Dept: PODIATRY | Facility: CLINIC | Age: 69
End: 2024-01-31
Payer: MEDICARE

## 2024-01-31 ENCOUNTER — PATIENT MESSAGE (OUTPATIENT)
Dept: PODIATRY | Facility: CLINIC | Age: 69
End: 2024-01-31
Payer: MEDICARE

## 2024-01-31 NOTE — TELEPHONE ENCOUNTER
Attempted to reach out to Ms Montenegro to inform her that Dr Guerrero will not be in clinic on 2/1/24 due to having the flu. Left voice message encouraging call back to assist her with rescheduling her appt.

## 2024-02-01 ENCOUNTER — TELEPHONE (OUTPATIENT)
Dept: PODIATRY | Facility: CLINIC | Age: 69
End: 2024-02-01
Payer: MEDICARE

## 2024-02-01 NOTE — TELEPHONE ENCOUNTER
2nd attempt to reach out to the patient, no answer, left message to please call us back to resched her appt.,

## 2024-02-05 ENCOUNTER — OFFICE VISIT (OUTPATIENT)
Dept: INTERNAL MEDICINE | Facility: CLINIC | Age: 69
End: 2024-02-05
Payer: MEDICARE

## 2024-02-05 VITALS
DIASTOLIC BLOOD PRESSURE: 68 MMHG | RESPIRATION RATE: 18 BRPM | SYSTOLIC BLOOD PRESSURE: 146 MMHG | HEART RATE: 77 BPM | HEIGHT: 65 IN | OXYGEN SATURATION: 97 % | TEMPERATURE: 98 F | WEIGHT: 206.13 LBS | BODY MASS INDEX: 34.34 KG/M2

## 2024-02-05 DIAGNOSIS — E11.40 TYPE 2 DIABETES MELLITUS WITH DIABETIC NEUROPATHY, WITHOUT LONG-TERM CURRENT USE OF INSULIN: ICD-10-CM

## 2024-02-05 DIAGNOSIS — N18.31 CHRONIC KIDNEY DISEASE, STAGE 3A: ICD-10-CM

## 2024-02-05 DIAGNOSIS — I77.9 BILATERAL CAROTID ARTERY DISEASE, UNSPECIFIED TYPE: ICD-10-CM

## 2024-02-05 DIAGNOSIS — I48.0 PAROXYSMAL ATRIAL FIBRILLATION: ICD-10-CM

## 2024-02-05 DIAGNOSIS — E11.3311 TYPE 2 DIABETES MELLITUS WITH RIGHT EYE AFFECTED BY MODERATE NONPROLIFERATIVE RETINOPATHY AND MACULAR EDEMA, WITHOUT LONG-TERM CURRENT USE OF INSULIN: ICD-10-CM

## 2024-02-05 DIAGNOSIS — J42 CHRONIC BRONCHITIS, UNSPECIFIED CHRONIC BRONCHITIS TYPE: ICD-10-CM

## 2024-02-05 PROCEDURE — 3077F SYST BP >= 140 MM HG: CPT | Mod: CPTII,S$GLB,, | Performed by: INTERNAL MEDICINE

## 2024-02-05 PROCEDURE — 3008F BODY MASS INDEX DOCD: CPT | Mod: CPTII,S$GLB,, | Performed by: INTERNAL MEDICINE

## 2024-02-05 PROCEDURE — 99999 PR PBB SHADOW E&M-EST. PATIENT-LVL V: CPT | Mod: PBBFAC,,, | Performed by: INTERNAL MEDICINE

## 2024-02-05 PROCEDURE — 3078F DIAST BP <80 MM HG: CPT | Mod: CPTII,S$GLB,, | Performed by: INTERNAL MEDICINE

## 2024-02-05 PROCEDURE — 3051F HG A1C>EQUAL 7.0%<8.0%: CPT | Mod: CPTII,S$GLB,, | Performed by: INTERNAL MEDICINE

## 2024-02-05 PROCEDURE — 1101F PT FALLS ASSESS-DOCD LE1/YR: CPT | Mod: CPTII,S$GLB,, | Performed by: INTERNAL MEDICINE

## 2024-02-05 PROCEDURE — 3288F FALL RISK ASSESSMENT DOCD: CPT | Mod: CPTII,S$GLB,, | Performed by: INTERNAL MEDICINE

## 2024-02-05 PROCEDURE — 1126F AMNT PAIN NOTED NONE PRSNT: CPT | Mod: CPTII,S$GLB,, | Performed by: INTERNAL MEDICINE

## 2024-02-05 PROCEDURE — 99214 OFFICE O/P EST MOD 30 MIN: CPT | Mod: S$GLB,,, | Performed by: INTERNAL MEDICINE

## 2024-02-05 RX ORDER — DICLOFENAC SODIUM 10 MG/G
GEL TOPICAL
Qty: 200 G | Refills: 4 | Status: SHIPPED | OUTPATIENT
Start: 2024-02-05

## 2024-02-05 RX ORDER — SEMAGLUTIDE 1.34 MG/ML
INJECTION, SOLUTION SUBCUTANEOUS
Qty: 9 ML | Refills: 1 | Status: SHIPPED | OUTPATIENT
Start: 2024-02-05

## 2024-02-12 ENCOUNTER — PATIENT OUTREACH (OUTPATIENT)
Dept: ADMINISTRATIVE | Facility: HOSPITAL | Age: 69
End: 2024-02-12
Payer: MEDICARE

## 2024-02-12 NOTE — LETTER
AUTHORIZATION FOR RELEASE OF   CONFIDENTIAL INFORMATION    Dear Dr. Melgar,    We are seeing Diana Montenegro, date of birth 1955, in the clinic at [facility]. Rad Johnston MD is the patient's PCP. Diana Montenegro has an outstanding lab/procedure at the time we reviewed her chart. In order to help keep her health information updated, she has authorized us to request the following medical record(s):        (  )  MAMMOGRAM                                      (  )  COLONOSCOPY      (  )  PAP SMEAR                                          (  )  OUTSIDE LAB RESULTS     (  )  DEXA SCAN                                          ( x )  EYE EXAM            (  )  FOOT EXAM                                          (  )  ENTIRE RECORD     (  )  OUTSIDE IMMUNIZATIONS                 (  )  _______________         Please fax records to Rad Johnston MD, [phone]     If you have any questions, please contact JANINE Ngo at [phone].           Patient Name: Diana Montenegro  : 1955  Patient Phone #: [phone]

## 2024-02-12 NOTE — PROGRESS NOTES

## 2024-03-02 ENCOUNTER — HOSPITAL ENCOUNTER (OUTPATIENT)
Dept: RADIOLOGY | Facility: HOSPITAL | Age: 69
Discharge: HOME | End: 2024-03-02
Attending: ORTHOPAEDIC SURGERY
Payer: MEDICARE

## 2024-03-02 DIAGNOSIS — M25.561 ACUTE PAIN OF BOTH KNEES: ICD-10-CM

## 2024-03-02 DIAGNOSIS — M25.562 ACUTE PAIN OF BOTH KNEES: ICD-10-CM

## 2024-03-02 PROCEDURE — 73560 X-RAY EXAM OF KNEE 1 OR 2: CPT | Mod: 26,50,, | Performed by: RADIOLOGY

## 2024-03-02 PROCEDURE — 73560 X-RAY EXAM OF KNEE 1 OR 2: CPT | Mod: TC,50

## 2024-03-05 ENCOUNTER — OFFICE VISIT (OUTPATIENT)
Dept: OTOLARYNGOLOGY | Facility: CLINIC | Age: 69
End: 2024-03-05
Payer: MEDICARE

## 2024-03-05 DIAGNOSIS — H93.8X2 EAR FULLNESS, LEFT: Primary | ICD-10-CM

## 2024-03-05 DIAGNOSIS — H90.3 SENSORINEURAL HEARING LOSS (SNHL) OF BOTH EARS: ICD-10-CM

## 2024-03-05 PROCEDURE — 99214 OFFICE O/P EST MOD 30 MIN: CPT | Mod: S$GLB,,, | Performed by: NURSE PRACTITIONER

## 2024-03-05 PROCEDURE — 3051F HG A1C>EQUAL 7.0%<8.0%: CPT | Mod: CPTII,S$GLB,, | Performed by: NURSE PRACTITIONER

## 2024-03-05 NOTE — PROGRESS NOTES
"Subjective:   Diana is a 68 y.o. female who presents for 2 month follow-up for left ear fullness and muffled hearing. She was last seen by me 01/03/24, found to have hemotympanum of left ear likely due to recurrent epistaxis. Patient still reports constant left ear fullness and muffled hearing. Denies any symptoms in the right ear. Denies otalgia, otorrhea, tinnitus or vertigo. Denies autophony. Denies true room spinning but has have intermittent episodes of imbalance. Was previously seen in PT from November 2023 - January 2024 for her imbalance with some improvement.     She notes 3-4 episodes of epistaxis since last visit - she denies overt bleeding with epistaxis but states she has blood on tissue after blowing her nose in the morning. She admits to forceful nose blowing. She is using intranasal saline 2-3 times per day with no improvement.     Note from previous visit on 1/3/2024:  Diana Montenegro is a 68 y.o. female who was self-referred for left ear fullness and muffled hearing. Kaleigh reports that for about 1 year she has experienced left ear fullness. She has not found anything that makes it better, but notes that "popping" her ear will temporarily help. She denies any other otologic symptoms including: otalgia, otorrhea,tinnitus or vertigo. She denies any pulsatile tinnitus. There is not a family history of hearing loss at a young age. There is not a prior history of ear surgery. There is not a prior history of ear infections. There is not a history of ear trauma. 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 She does not wear hearing aids currently. She has not had a hearing test recently.     The patient's medications, allergies, past medical, surgical, social and family histories were reviewed and updated as appropriate.    Review of Systems   Constitutional:  Positive for malaise/fatigue.   Eyes:  Positive for " photophobia.   Respiratory: Negative.     Cardiovascular: Negative.    Gastrointestinal: Negative.    Genitourinary: Negative.    Skin: Negative.    Neurological: Negative.    Endo/Heme/Allergies:  Bruises/bleeds easily.   Psychiatric/Behavioral: Negative.         Objective:     Constitutional:   She is oriented to person, place, and time. She appears well-developed and well-nourished. She appears alert. She is cooperative.  Non-toxic appearance. She does not have a sickly appearance. She does not appear ill. Normal speech.      Head:  Normocephalic and atraumatic. Not macrocephalic and not microcephalic. Head is without abrasion, without right periorbital erythema, without left periorbital erythema and without TMJ tenderness.     Ears:    Right Ear: No drainage, swelling or tenderness. No mastoid tenderness. Tympanic membrane is not scarred, not perforated, not erythematous, not retracted and not bulging. No middle ear effusion.   Left Ear: No drainage, swelling or tenderness. No mastoid tenderness. Tympanic membrane is not scarred, not perforated, not erythematous, not retracted and not bulging.  No middle ear effusion.     Pulmonary/Chest:   Effort normal.     Psychiatric:   She has a normal mood and affect. Her speech is normal and behavior is normal.     Neurological:   She is alert and oriented to person, place, and time.       Procedure  None    Audiogram        Assessment:     1. Ear fullness, left    2. Sensorineural hearing loss (SNHL) of both ears      Plan:     Ear fullness, left  Otoscopic exam benign- no retractions, cerumen, infection or fluid noted. No TMJ symptoms or clinical exam findings. Prior hemotympanum resolved. No clear explanation for left ear fullness. Advised decreasing/stopping forceful nose blowing and monitoring her symptoms. Patient verbalized still feeling annoyed by ear fullness. Can follow-up with otologist if interested.     Continue saline. Avoid medicated nasal sprays. STOP  forceful blowing, but needs to see sinonasal specialist for reported epistaxis if bleeding persists.

## 2024-03-07 ENCOUNTER — OFFICE VISIT (OUTPATIENT)
Dept: PODIATRY | Facility: CLINIC | Age: 69
End: 2024-03-07
Payer: MEDICARE

## 2024-03-07 VITALS
SYSTOLIC BLOOD PRESSURE: 113 MMHG | HEIGHT: 65 IN | DIASTOLIC BLOOD PRESSURE: 66 MMHG | BODY MASS INDEX: 34.3 KG/M2 | HEART RATE: 63 BPM

## 2024-03-07 DIAGNOSIS — E11.51 TYPE II DIABETES MELLITUS WITH PERIPHERAL CIRCULATORY DISORDER: ICD-10-CM

## 2024-03-07 DIAGNOSIS — B35.1 ONYCHOMYCOSIS DUE TO DERMATOPHYTE: ICD-10-CM

## 2024-03-07 DIAGNOSIS — E11.42 DIABETIC POLYNEUROPATHY ASSOCIATED WITH TYPE 2 DIABETES MELLITUS: ICD-10-CM

## 2024-03-07 DIAGNOSIS — E11.9 ENCOUNTER FOR DIABETIC FOOT EXAM: Primary | ICD-10-CM

## 2024-03-07 PROCEDURE — 3008F BODY MASS INDEX DOCD: CPT | Mod: CPTII,S$GLB,, | Performed by: STUDENT IN AN ORGANIZED HEALTH CARE EDUCATION/TRAINING PROGRAM

## 2024-03-07 PROCEDURE — 1125F AMNT PAIN NOTED PAIN PRSNT: CPT | Mod: CPTII,S$GLB,, | Performed by: STUDENT IN AN ORGANIZED HEALTH CARE EDUCATION/TRAINING PROGRAM

## 2024-03-07 PROCEDURE — 11721 DEBRIDE NAIL 6 OR MORE: CPT | Mod: Q8,S$GLB,, | Performed by: STUDENT IN AN ORGANIZED HEALTH CARE EDUCATION/TRAINING PROGRAM

## 2024-03-07 PROCEDURE — 1159F MED LIST DOCD IN RCRD: CPT | Mod: CPTII,S$GLB,, | Performed by: STUDENT IN AN ORGANIZED HEALTH CARE EDUCATION/TRAINING PROGRAM

## 2024-03-07 PROCEDURE — 3078F DIAST BP <80 MM HG: CPT | Mod: CPTII,S$GLB,, | Performed by: STUDENT IN AN ORGANIZED HEALTH CARE EDUCATION/TRAINING PROGRAM

## 2024-03-07 PROCEDURE — 99999 PR PBB SHADOW E&M-EST. PATIENT-LVL IV: CPT | Mod: PBBFAC,,, | Performed by: STUDENT IN AN ORGANIZED HEALTH CARE EDUCATION/TRAINING PROGRAM

## 2024-03-07 PROCEDURE — 3074F SYST BP LT 130 MM HG: CPT | Mod: CPTII,S$GLB,, | Performed by: STUDENT IN AN ORGANIZED HEALTH CARE EDUCATION/TRAINING PROGRAM

## 2024-03-07 PROCEDURE — 99214 OFFICE O/P EST MOD 30 MIN: CPT | Mod: 25,S$GLB,, | Performed by: STUDENT IN AN ORGANIZED HEALTH CARE EDUCATION/TRAINING PROGRAM

## 2024-03-07 PROCEDURE — 3051F HG A1C>EQUAL 7.0%<8.0%: CPT | Mod: CPTII,S$GLB,, | Performed by: STUDENT IN AN ORGANIZED HEALTH CARE EDUCATION/TRAINING PROGRAM

## 2024-03-07 RX ORDER — CICLOPIROX 80 MG/ML
SOLUTION TOPICAL NIGHTLY
Qty: 6.6 ML | Refills: 11 | Status: SHIPPED | OUTPATIENT
Start: 2024-03-07

## 2024-03-07 NOTE — PROGRESS NOTES
Subjective:      Patient ID: Diana Montenegro is a 68 y.o. female.    Chief Complaint: Diabetes Mellitus (PCP Dr. Johnston, 2/5/24), Diabetic Foot Exam, Routine Foot Care, and Peripheral Neuropathy    Diana is a 68 y.o. female who presents to the clinic for evaluation and treatment of high risk feet. Diana has a past medical history of Arthritis, Atrial fibrillation, unspecified, Chronic back pain, Colon polyp, CVA (cerebral infarction) (07/16/2014), Degenerative disc disease, Diabetes mellitus type II, Encounter for loop recorder at end of battery life (09/17/2018), Hyperlipidemia, Hypertension, Hypothyroidism, Mild nonproliferative diabetic retinopathy (08/12/2018), Obesity, Sleep apnea, Stroke (07/2014), and Venous insufficiency (chronic) (peripheral). The patient's chief complaint is long, thick toenails. Pt is also complaining of itchy feet. This patient has documented high risk feet requiring routine maintenance secondary to diabetes mellitis and those secondary complications of diabetes, as mentioned..  .    3/7/24: Seen today for annual diabetic foot exam. Previously prescribed penlac for onychomycosis, relates she never picked it up. No further pedal complaints.     PCP: Rad Johnston MD  2/5/24     Hemoglobin A1C   Date Value Ref Range Status   01/30/2024 7.6 (H) 4.0 - 5.6 % Final     Comment:     ADA Screening Guidelines:  5.7-6.4%  Consistent with prediabetes  >or=6.5%  Consistent with diabetes    High levels of fetal hemoglobin interfere with the HbA1C  assay. Heterozygous hemoglobin variants (HbS, HgC, etc)do  not significantly interfere with this assay.   However, presence of multiple variants may affect accuracy.     09/26/2023 6.7 (H) 4.0 - 5.6 % Final     Comment:     ADA Screening Guidelines:  5.7-6.4%  Consistent with prediabetes  >or=6.5%  Consistent with diabetes    High levels of fetal hemoglobin interfere with the HbA1C  assay. Heterozygous hemoglobin variants (HbS, HgC, etc)do  not  significantly interfere with this assay.   However, presence of multiple variants may affect accuracy.     04/03/2023 6.8 (H) 4.0 - 5.6 % Final     Comment:     ADA Screening Guidelines:  5.7-6.4%  Consistent with prediabetes  >or=6.5%  Consistent with diabetes    High levels of fetal hemoglobin interfere with the HbA1C  assay. Heterozygous hemoglobin variants (HbS, HgC, etc)do  not significantly interfere with this assay.   However, presence of multiple variants may affect accuracy.         Review of Systems   Constitutional: Negative for decreased appetite, fever and malaise/fatigue.   HENT:  Negative for congestion.    Cardiovascular:  Negative for chest pain.   Respiratory:  Negative for cough and shortness of breath.    Skin:  Positive for dry skin and nail changes. Negative for itching and rash.   Musculoskeletal:  Positive for arthritis, back pain, joint pain and stiffness. Negative for muscle weakness.   Gastrointestinal:  Negative for bloating, abdominal pain, nausea and vomiting.   Neurological:  Positive for paresthesias. Negative for headaches and weakness.   Psychiatric/Behavioral:  Negative for altered mental status.              Past Medical History:   Diagnosis Date    Arthritis     Atrial fibrillation, unspecified     hx of a fib prior to stroke.    Chronic back pain     Colon polyp     CVA (cerebral infarction) 07/16/2014    Degenerative disc disease     cervical; lumbar    Diabetes mellitus type II     Encounter for loop recorder at end of battery life 09/17/2018    Hyperlipidemia     Hypertension     Hypothyroidism     Mild nonproliferative diabetic retinopathy 08/12/2018    Obesity     Sleep apnea     Stroke 07/2014    Venous insufficiency (chronic) (peripheral)        Past Surgical History:   Procedure Laterality Date    COLONOSCOPY N/A 08/30/2018    Procedure: COLONOSCOPY;  Surgeon: William Clement MD;  Location: [facility] ([address] [place] [address]);  Service: Endoscopy;  Laterality: N/A;  Eliquis/Dr Leroy  Green/OK to hold 2 days prior to colon/see telephone encounter dated 18/pt has loop recorder/svn    COLONOSCOPY N/A 2023    Procedure: COLONOSCOPY;  Surgeon: Vinny Youssef MD;  Location: [facility] ENDO (4TH FLR);  Service: Colon and Rectal;  Laterality: N/A;  ref Green  eliquis   diabetic/ [facility] ozempic hold 7days prior    peg prep jm / loop recorder  ok to hold Eliquis 2 days per J Miguel NP/L Robert RN-GT  -precall complete-pt verbalized understanding of holding Ozempic-MS    COLONOSCOPY W/ POLYPECTOMY      GASTRECTOMY      HERNIA REPAIR      REMOVAL OF IMPLANTABLE LOOP RECORDER N/A 2018    Procedure: REMOVAL, IMPLANTABLE LOOP RECORDER;  Surgeon: Thomas Randolph MD;  Location: [facility] CATH LAB;  Service: Cardiology;  Laterality: N/A;  TERESA, ILR removal (no reimplant), MDT, RN Sedate, SK, 3 Prep *Reveal LINQ*    TONSILLECTOMY      TUBAL LIGATION         Family History   Problem Relation Age of Onset    No Known Problems Mother     Heart disease Father     No Known Problems Sister     No Known Problems Sister     No Known Problems Maternal Grandmother     Diabetes Maternal Grandfather     Obesity Maternal Grandfather     Stroke Paternal Grandmother     No Known Problems Paternal Grandfather     Heart attack Neg Hx     Cirrhosis Neg Hx        Social History     Socioeconomic History    Marital status: Single   Occupational History    Occupation: MOS      Employer: [employer]   Tobacco Use    Smoking status: Former     Current packs/day: 0.00     Average packs/day: 1 pack/day for 30.0 years (30.0 ttl pk-yrs)     Types: Cigarettes     Start date: 1984     Quit date: 2014     Years since quittin.6    Smokeless tobacco: Never   Substance and Sexual Activity    Alcohol use: Not Currently    Drug use: No     Comment: thc at young age    Sexual activity: Not Currently     Partners: Male     Social Determinants of Health     Financial Resource Strain: Low Risk  (2024)     Overall Financial Resource Strain (CARDIA)     Difficulty of Paying Living Expenses: Not very hard   Food Insecurity: No Food Insecurity (1/19/2024)    Hunger Vital Sign     Worried About Running Out of Food in the Last Year: Never true     Ran Out of Food in the Last Year: Never true   Transportation Needs: No Transportation Needs (1/19/2024)    PRAPARE - Transportation     Lack of Transportation (Medical): No     Lack of Transportation (Non-Medical): No   Physical Activity: Sufficiently Active (1/19/2024)    Exercise Vital Sign     Days of Exercise per Week: 3 days     Minutes of Exercise per Session: 90 min   Stress: Stress Concern Present (1/19/2024)    [employer] - Occupational Stress Questionnaire     Feeling of Stress : To some extent   Social Connections: Unknown (1/19/2024)    Social Connection and Isolation Panel [NHANES]     Frequency of Communication with Friends and Family: More than three times a week     Frequency of Social Gatherings with Friends and Family: Once a week     Active Member of Clubs or Organizations: Patient declined     Attends Club or Organization Meetings: Patient declined     Marital Status:    Housing Stability: Low Risk  (1/19/2024)    Housing Stability Vital Sign     Unable to Pay for Housing in the Last Year: No     Number of Places Lived in the Last Year: 1     Unstable Housing in the Last Year: No       Current Outpatient Medications   Medication Sig Dispense Refill    acyclovir 5% (ZOVIRAX) 5 % ointment Apply topically 6 (six) times daily. 5 g 1    albuterol (VENTOLIN HFA) 90 mcg/actuation inhaler Inhale 2 puffs into the lungs every 6 (six) hours as needed for Wheezing. Rescue 18 g 3    apixaban (ELIQUIS) 5 mg Tab Take 1 tablet (5 mg total) by mouth 2 (two) times daily. 180 tablet 3    aspirin (ECOTRIN) 81 MG EC tablet Take 1 tablet (81 mg total) by mouth once daily.      b complex vitamins tablet Take 1 tablet by mouth once daily.       blood pressure test kit-large Kit Use as directed 1 each 0    blood sugar diagnostic (FREESTYLE LITE STRIPS) Strp USE 1 STRIP TO CHECK GLUCOSE TWICE DAILY 200 each 3    blood-glucose meter Misc One touch verio flex or tone touch verio reflect or freestyle freedom lite meter (all covered by insurance) 1 each 0    CALCIUM CITRATE/VITAMIN D3 (CALCIUM CITRATE + D ORAL) Take 1 tablet by mouth every morning.      cetirizine (ZYRTEC) 5 MG tablet Take 5 mg by mouth once daily.      cinnamon bark (CINNAMON ORAL) Take by mouth 2 (two) times daily.      cyanocobalamin, vitamin B-12, 500 mcg Subl Place 1 tablet under the tongue once daily.      diclofenac sodium (VOLTAREN) 1 % Gel APPLY 2 GRAMS TOPICALLY THREE TIMES DAILY 200 g 4    docusate sodium (COLACE) 100 MG capsule Take 100 mg by mouth once daily.       EYLEA 2 mg/0.05 mL Syrg       fish oil-omega-3 fatty acids 300-1,000 mg capsule Take 1 capsule by mouth 2 (two) times daily. Take 1 cap 2 times daily every other day      fluticasone (FLONASE) 50 mcg/actuation nasal spray 2 sprays (100 mcg total) by Each Nare route once daily. (Patient taking differently: 2 sprays by Each Nostril route daily as needed.) 1 Bottle 0    glimepiride (AMARYL) 2 MG tablet Take 1 tablet (2 mg total) by mouth before breakfast. For diabetes control. 90 tablet 0    lancets Misc Needs lancets for testing twice daily.  To go with meter covered by insurance. 200 each 3    levothyroxine (SYNTHROID) 88 MCG tablet Take 1 tablet (88 mcg total) by mouth before breakfast. 30 tablet 6    LORazepam (ATIVAN) 1 MG tablet Take 1 tablet by mouth twice daily as needed for anxiety 60 tablet 0    metFORMIN (GLUCOPHAGE) 1000 MG tablet TAKE 1 TABLET BY MOUTH TWICE DAILY WITH MEALS 180 tablet 1    multivitamin capsule Take 1 capsule by mouth once daily.      mupirocin (BACTROBAN) 2 % ointment Apply to affected area 3 times daily 22 g 1    omeprazole (PRILOSEC) 40 MG capsule Take 1 capsule by mouth in the morning 90  "capsule 3    OZEMPIC 1 mg/dose (4 mg/3 mL) INJECT 1 MG SUBCUTANEOUSLY EVERY 7 DAYS 9 mL 1    promethazine-dextromethorphan (PROMETHAZINE-DM) 6.25-15 mg/5 mL Syrp Take one tsp po q 6 hrs prn cough 180 mL 0    rosuvastatin (CRESTOR) 40 MG Tab Take 1 tablet by mouth once daily 90 tablet 2    RUTIN/HESP/BIOFLAV/C/HERB#196 (BIOFLEX ORAL) Take 2 tablets by mouth once daily.      senna (SENNA) 8.6 mg tablet Take 2 tablets by mouth nightly as needed for Constipation. 100 tablet 3    traZODone (DESYREL) 50 MG tablet TAKE 1 TABLET BY MOUTH NIGHTLY AS NEEDED FOR  INSOMNIA  (OKAY  TO  TAKE  2ND  TABLET  BY  MOUTH  IN  30  MINUTES  IF  STILL  AWAKE) 180 tablet 3    valsartan-hydrochlorothiazide (DIOVAN-HCT) 80-12.5 mg per tablet Take 1 tablet by mouth once daily 90 tablet 3    albuterol-ipratropium 2.5mg-0.5mg/3mL (DUO-NEB) 0.5 mg-3 mg(2.5 mg base)/3 mL nebulizer solution Take 3 mLs by nebulization every 6 (six) hours as needed for Wheezing. Rescue (Patient taking differently: Take 3 mLs by nebulization every 6 (six) hours as needed for Wheezing. Rescue prn) 3 vial 3    ciclopirox (PENLAC) 8 % Soln Apply topically nightly. 6.6 mL 11    metoprolol tartrate (LOPRESSOR) 25 MG tablet Take 1 tablet (25 mg total) by mouth once as needed. For palpitations 30 tablet 11     No current facility-administered medications for this visit.     Facility-Administered Medications Ordered in Other Visits   Medication Dose Route Frequency Provider Last Rate Last Admin    0.9%  NaCl infusion   Intravenous Continuous Khadijah Rivera, NP   1,000 mL at 09/17/18 0939       Review of patient's allergies indicates:   Allergen Reactions    Medrol [methylprednisolone] Palpitations       Vitals:    03/07/24 1405   BP: 113/66   Pulse: 63   Height: 5' 5" (1.651 m)   PainSc:   2   PainLoc: Foot       Objective:      Physical Exam  Vitals and nursing note reviewed.   Constitutional:       General: She is not in acute distress.     Appearance: She is " well-developed. She is not diaphoretic.   Cardiovascular:      Pulses:           Dorsalis pedis pulses are 1+ on the right side and 1+ on the left side.        Posterior tibial pulses are 0 on the right side and 0 on the left side.      Comments: Pedal pulses diminished, stefany. Skin temperature is within normal limits. Toes are cool to touch and feet are warm proximally. Hair growth is diminished. Skin is normotrophic and with hyperpigmentation. Mild edema noted,stefany.  Musculoskeletal:         General: Deformity present. No tenderness.      Right foot: Decreased range of motion. Deformity present.      Left foot: Decreased range of motion. Deformity present.      Comments: Adequate joint range of motion without pain, limitation, nor crepitation to bilateral feet and ankle joints. Muscle strength is 5/5 in all groups bilaterally.    Rigid hammertoe 2 through 4, rigid mallet toe of the hallux bilaterally without pain.     Feet:      Right foot:      Protective Sensation: 10 sites tested.  9 sites sensed.      Skin integrity: No ulcer, blister, skin breakdown, erythema, warmth or dry skin.      Left foot:      Protective Sensation: 10 sites tested.  8 sites sensed.      Skin integrity: No ulcer, blister, skin breakdown, erythema, warmth or dry skin.   Skin:     General: Skin is warm and dry.      Capillary Refill: Capillary refill takes 2 to 3 seconds.      Coloration: Skin is not pale.      Findings: No bruising, ecchymosis, erythema, laceration, lesion, petechiae or rash.      Comments: Skin is warm and dry bilaterally, no acute signs of infection noted, bilaterally, appears stable.     Nails 1-5 stefany are elongated and thickened with dystrophic changes and discoloration noted     Neurological:      Mental Status: She is alert and oriented to person, place, and time.      Sensory: Sensory deficit present.      Comments: Vibratory sensation is diminished. Light touch within normal limits bilaterally.       Psychiatric:          Behavior: Behavior normal.         Thought Content: Thought content normal.         Judgment: Judgment normal.               Assessment:       Encounter Diagnoses   Name Primary?    Diabetic polyneuropathy associated with type 2 diabetes mellitus     Onychomycosis due to dermatophyte     Encounter for diabetic foot exam Yes    Type II diabetes mellitus with peripheral circulatory disorder            Plan:       Diana was seen today for diabetes mellitus, diabetic foot exam, routine foot care and peripheral neuropathy.    Diagnoses and all orders for this visit:    Encounter for diabetic foot exam    Diabetic polyneuropathy associated with type 2 diabetes mellitus    Onychomycosis due to dermatophyte  -     Routine Foot Care    Type II diabetes mellitus with peripheral circulatory disorder  -     Routine Foot Care    Other orders  -     ciclopirox (PENLAC) 8 % Soln; Apply topically nightly.        I counseled the patient on her conditions, their implications and medical management.    -Routine foot care per attached note    -Rx penlac for onychomycosis      -Shoe inspection. Diabetic Foot Education. Patient reminded of the importance of good nutrition and blood sugar control to help prevent podiatric complications of diabetes. Patient instructed on proper foot hygeine. We discussed wearing proper shoe gear, daily foot inspections, never walking without protective shoe gear, never putting sharp instruments to feet, routine podiatric nail visits      RTC in  1 year, sooner PRN

## 2024-03-07 NOTE — PROCEDURES
"Routine Foot Care    Date/Time: 3/7/2024 1:45 PM    Performed by: Nicole Guerrero DPM  Authorized by: Nicole Guerrero DPM    Time out: Immediately prior to procedure a "time out" was called to verify the correct patient, procedure, equipment, support staff and site/side marked as required.    Consent Done?:  Yes (Verbal)  Hyperkeratotic Skin Lesions?: No      Nail Care Type:  Debride  Location(s): All  (Left 1st Toe, Left 3rd Toe, Left 2nd Toe, Left 4th Toe, Left 5th Toe, Right 1st Toe, Right 2nd Toe, Right 3rd Toe, Right 4th Toe and Right 5th Toe)  Patient tolerance:  Patient tolerated the procedure well with no immediate complications    "

## 2024-03-20 NOTE — PROGRESS NOTES
Subjective:       Patient ID: Diana Montenegro is a 68 y.o. female.    Chief Complaint: Follow-up    HPI  The patient presents for follow-up of medical conditions which include type 2 diabetes mellitus, hypertension, chronic kidney disease, fatty liver, paroxysmal atrial fibrillation.  The patient has osteoarthritis, chronic back pain, chronic long-term anticoagulation use with Eliquis, hypothyroidism, chronic kidney disease, bilateral carotid artery disease, obesity, and degenerative disc disease.  The patient also has experienced frequent falls.  She has cerebrovascular disease being status post TIA and stroke.  The patient is using a Rollator walker for extended walking.  Today she has using a single cane.  She has had frequent falls.  There has been no loss of consciousness.  She denies experiencing any severe injuries.    She has not been monitoring blood sugars lately.  She has not experienced any recent hypoglycemic symptoms.  She is not currently using insulin.  She is tolerating her blood pressure medication well without side effects.  She does not monitor blood pressures.  Her vision is stable.  Her last eye examination was in January 2024 by her ophthalmologist Dr. Henry Melgar.    She has not experienced any palpitations or chest pain.  No exertional dyspnea is noted with her current level of physical activity.    Review of Systems   Constitutional:  Negative for activity change, appetite change and unexpected weight change.   Eyes:  Negative for visual disturbance.   Respiratory:  Negative for shortness of breath.    Cardiovascular:  Negative for chest pain, palpitations and leg swelling.   Gastrointestinal:  Negative for abdominal pain, blood in stool and diarrhea.   Genitourinary:  Negative for dysuria, frequency, hematuria and urgency.   Musculoskeletal:  Positive for arthralgias, back pain and gait problem.   Neurological:  Negative for weakness, numbness and headaches.    Psychiatric/Behavioral:  Negative for sleep disturbance.             Physical Exam  Vitals and nursing note reviewed.   Constitutional:       General: She is not in acute distress.     Appearance: Normal appearance. She is well-developed.      Comments: The patient has gained 8 lb since 09/28/2023.   HENT:      Head: Normocephalic and atraumatic.   Eyes:      General: No scleral icterus.     Extraocular Movements: Extraocular movements intact.      Conjunctiva/sclera: Conjunctivae normal.   Neck:      Thyroid: No thyromegaly.      Vascular: No JVD.   Cardiovascular:      Rate and Rhythm: Normal rate and regular rhythm.      Heart sounds: Normal heart sounds. No murmur heard.     No friction rub. No gallop.   Pulmonary:      Effort: Pulmonary effort is normal. No respiratory distress.      Breath sounds: Normal breath sounds. No wheezing or rales.   Abdominal:      General: Bowel sounds are normal.      Palpations: Abdomen is soft. There is no mass.      Tenderness: There is no abdominal tenderness.   Musculoskeletal:         General: No tenderness. Normal range of motion.      Cervical back: Normal range of motion and neck supple.      Comments: Left shoulder:  Full range of motion is present.  No point tenderness is noted.   Lymphadenopathy:      Cervical: No cervical adenopathy.   Skin:     General: Skin is warm and dry.      Findings: No rash.      Comments: No foot lesions are present.   Neurological:      Mental Status: She is alert and oriented to person, place, and time.      Cranial Nerves: No cranial nerve deficit.      Comments: Gait:  Antalgic.  The patient is using a single cane today for assisted ambulation.   Psychiatric:         Mood and Affect: Mood normal.         Behavior: Behavior normal.       Protective Sensation (w/ 10 gram monofilament):  Right: Intact  Left: Intact    Visual Inspection:  Normal -  Bilateral    Pedal Pulses:   Right: Present  Left: Present    Posterior Tibialis Pulses:    Right:Present  Left: Present    Lab Visit on 01/30/2024   Component Date Value Ref Range Status    Sodium 01/30/2024 139  136 - 145 mmol/L Final    Potassium 01/30/2024 4.6  3.5 - 5.1 mmol/L Final    Chloride 01/30/2024 108  95 - 110 mmol/L Final    CO2 01/30/2024 23  23 - 29 mmol/L Final    Glucose 01/30/2024 101  70 - 110 mg/dL Final    BUN 01/30/2024 30 (H)  8 - 23 mg/dL Final    Creatinine 01/30/2024 1.1  0.5 - 1.4 mg/dL Final    Calcium 01/30/2024 9.7  8.7 - 10.5 mg/dL Final    Total Protein 01/30/2024 6.6  6.0 - 8.4 g/dL Final    Albumin 01/30/2024 3.5  3.5 - 5.2 g/dL Final    Total Bilirubin 01/30/2024 0.3  0.1 - 1.0 mg/dL Final    Comment: For infants and newborns, interpretation of results should be based  on gestational age, weight and in agreement with clinical  observations.    Premature Infant recommended reference ranges:  Up to 24 hours.............<8.0 mg/dL  Up to 48 hours............<12.0 mg/dL  3-5 days..................<15.0 mg/dL  6-29 days.................<15.0 mg/dL      Alkaline Phosphatase 01/30/2024 80  55 - 135 U/L Final    AST 01/30/2024 30  10 - 40 U/L Final    ALT 01/30/2024 46 (H)  10 - 44 U/L Final    eGFR 01/30/2024 55.1 (A)  >60 mL/min/1.73 m^2 Final    Anion Gap 01/30/2024 8  8 - 16 mmol/L Final    WBC 01/30/2024 5.18  3.90 - 12.70 K/uL Final    RBC 01/30/2024 3.74 (L)  4.00 - 5.40 M/uL Final    Hemoglobin 01/30/2024 10.4 (L)  12.0 - 16.0 g/dL Final    Hematocrit 01/30/2024 34.9 (L)  37.0 - 48.5 % Final    MCV 01/30/2024 93  82 - 98 fL Final    MCH 01/30/2024 27.8  27.0 - 31.0 pg Final    MCHC 01/30/2024 29.8 (L)  32.0 - 36.0 g/dL Final    RDW 01/30/2024 13.8  11.5 - 14.5 % Final    Platelets 01/30/2024 143 (L)  150 - 450 K/uL Final    MPV 01/30/2024 11.1  9.2 - 12.9 fL Final    Immature Granulocytes 01/30/2024 0.2  0.0 - 0.5 % Final    Gran # (ANC) 01/30/2024 3.5  1.8 - 7.7 K/uL Final    Immature Grans (Abs) 01/30/2024 0.01  0.00 - 0.04 K/uL Final     Comment: Mild elevation in immature granulocytes is non specific and   can be seen in a variety of conditions including stress response,   acute inflammation, trauma and pregnancy. Correlation with other   laboratory and clinical findings is essential.      Lymph # 01/30/2024 1.2  1.0 - 4.8 K/uL Final    Mono # 01/30/2024 0.4  0.3 - 1.0 K/uL Final    Eos # 01/30/2024 0.1  0.0 - 0.5 K/uL Final    Baso # 01/30/2024 0.04  0.00 - 0.20 K/uL Final    nRBC 01/30/2024 0  0 /100 WBC Final    Gran % 01/30/2024 68.1  38.0 - 73.0 % Final    Lymph % 01/30/2024 22.8  18.0 - 48.0 % Final    Mono % 01/30/2024 6.9  4.0 - 15.0 % Final    Eosinophil % 01/30/2024 1.2  0.0 - 8.0 % Final    Basophil % 01/30/2024 0.8  0.0 - 1.9 % Final    Differential Method 01/30/2024 Automated   Final    TSH 01/30/2024 1.251  0.400 - 4.000 uIU/mL Final    Hemoglobin A1C 01/30/2024 7.6 (H)  4.0 - 5.6 % Final    Comment: ADA Screening Guidelines:  5.7-6.4%  Consistent with prediabetes  >or=6.5%  Consistent with diabetes    High levels of fetal hemoglobin interfere with the HbA1C  assay. Heterozygous hemoglobin variants (HbS, HgC, etc)do  not significantly interfere with this assay.   However, presence of multiple variants may affect accuracy.      Estimated Avg Glucose 01/30/2024 171 (H)  68 - 131 mg/dL Final   Admission on 12/06/2023, Discharged on 12/06/2023   Component Date Value Ref Range Status    POCT Glucose 12/06/2023 106  70 - 110 mg/dL Final    Final Pathologic Diagnosis 12/06/2023    Final                    Value:Cecum, polyp, biopsy:   Tubular adenoma, multiple fragments.      Interp By Vinny Martinez M.D., Signed on 12/08/2023 at 14:17    Gross 12/06/2023    Final                    Value:Pathology ID:  [id number]  Patient ID:  [id number]  The specimen is received in formalin labeled &quot;cecal polyp&quot;.  The specimen consists of multiple tan fragments of soft tissue measuring 1.6 x 0.8 x 0.3 cm in aggregate.  The specimen  is submitted entirely in cassette [id number].    Otto Morrell      Disclaimer 12/06/2023 Unless the case is a 'gross only' or additional testing only, the final diagnosis for each specimen is based on a microscopic examination of appropriate tissue sections.   Final       Assessment & Plan:      Diana was seen today for follow-up.  Current therapy will be continued for treatment of diabetes and hypertension.  Diet therapy was discussed.  The patient was encouraged to increase her exercise level and to lose weight.    We discussed obtaining the recommended RSV and COVID -19 vaccines through her local pharmacy.    Fasting blood tests will be obtained in 4 months.    Diagnoses and all orders for this visit:    Type 2 diabetes mellitus with diabetic neuropathy, without long-term current use of insulin  -     OZEMPIC 1 mg/dose (4 mg/3 mL); INJECT 1 MG SUBCUTANEOUSLY EVERY 7 DAYS  -     Comprehensive Metabolic Panel; Future  -     CBC Auto Differential; Future  -     Hemoglobin A1C; Future    Chronic bronchitis, unspecified chronic bronchitis type    Paroxysmal atrial fibrillation  -     Comprehensive Metabolic Panel; Future  -     CBC Auto Differential; Future  -     Hemoglobin A1C; Future    Chronic kidney disease, stage 3a  -     Comprehensive Metabolic Panel; Future  -     CBC Auto Differential; Future  -     Hemoglobin A1C; Future    Bilateral carotid artery disease, unspecified type    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     Rad Johnston MD

## 2024-04-19 ENCOUNTER — PATIENT MESSAGE (OUTPATIENT)
Dept: ADMINISTRATIVE | Facility: HOSPITAL | Age: 69
End: 2024-04-19
Payer: MEDICARE

## 2024-04-22 ENCOUNTER — OFFICE VISIT (OUTPATIENT)
Dept: URGENT CARE | Facility: CLINIC | Age: 69
End: 2024-04-22
Payer: MEDICARE

## 2024-04-22 VITALS
SYSTOLIC BLOOD PRESSURE: 136 MMHG | TEMPERATURE: 99 F | WEIGHT: 206 LBS | RESPIRATION RATE: 18 BRPM | HEART RATE: 73 BPM | BODY MASS INDEX: 34.32 KG/M2 | DIASTOLIC BLOOD PRESSURE: 74 MMHG | OXYGEN SATURATION: 98 % | HEIGHT: 65 IN

## 2024-04-22 DIAGNOSIS — J02.9 SORE THROAT: Primary | ICD-10-CM

## 2024-04-22 LAB
CTP QC/QA: YES
CTP QC/QA: YES
MOLECULAR STREP A: NEGATIVE
SARS-COV-2 AG RESP QL IA.RAPID: NEGATIVE

## 2024-04-22 PROCEDURE — 87811 SARS-COV-2 COVID19 W/OPTIC: CPT | Mod: QW,S$GLB,, | Performed by: FAMILY MEDICINE

## 2024-04-22 PROCEDURE — 99213 OFFICE O/P EST LOW 20 MIN: CPT | Mod: S$GLB,,, | Performed by: FAMILY MEDICINE

## 2024-04-22 PROCEDURE — 87651 STREP A DNA AMP PROBE: CPT | Mod: QW,S$GLB,, | Performed by: FAMILY MEDICINE

## 2024-04-22 NOTE — PROGRESS NOTES
"Subjective:      Patient ID: Diana Montenegro is a 68 y.o. female.    Vitals:  height is 5' 5" (1.651 m) and weight is 93.4 kg (206 lb). Her tympanic temperature is 98.6 °F (37 °C). Her blood pressure is 136/74 and her pulse is 73. Her respiration is 18 and oxygen saturation is 98%.     Chief Complaint: Sore Throat    This is a 68 y.o female who presents today with chief complaint of body aches, sore throat and headaches x2 days.   Home tx: Tylenol and reports no improvement.   Patient denies SOB, and chest pain.    Sore Throat   This is a new problem. The current episode started in the past 7 days. There has been no fever. The pain is at a severity of 4/10. The pain is mild. Associated symptoms include headaches. Pertinent negatives include no abdominal pain, congestion, coughing, diarrhea, drooling, ear discharge, ear pain, hoarse voice, plugged ear sensation, neck pain, shortness of breath, stridor, swollen glands, trouble swallowing or vomiting. She has tried acetaminophen for the symptoms. The treatment provided no relief.       HENT:  Positive for sore throat. Negative for ear pain, ear discharge, drooling, congestion and trouble swallowing.    Neck: Negative for neck pain.   Respiratory:  Negative for cough, shortness of breath and stridor.    Gastrointestinal:  Negative for abdominal pain, vomiting and diarrhea.   Neurological:  Positive for headaches.      Objective:     Physical Exam   Constitutional: She does not appear ill. No distress. obesity  HENT:   Head: Normocephalic and atraumatic.   Mouth/Throat: Mucous membranes are moist. Posterior oropharyngeal erythema (mild) present.   Eyes: Pupils are equal, round, and reactive to light. Extraocular movement intact   Neck: Neck supple.   Cardiovascular: Normal rate, regular rhythm, normal heart sounds and normal pulses.   Pulmonary/Chest: Effort normal and breath sounds normal.   Abdominal: Normal appearance.   Neurological: She is alert.   Nursing " note and vitals reviewed.    Results for orders placed or performed in visit on 04/22/24   SARS Coronavirus 2 Antigen, POCT Manual Read   Result Value Ref Range    SARS Coronavirus 2 Antigen Negative Negative     Acceptable Yes    POCT Strep A, Molecular   Result Value Ref Range    Molecular Strep A, POC Negative Negative     Acceptable Yes      *Note: Due to a large number of results and/or encounters for the requested time period, some results have not been displayed. A complete set of results can be found in Results Review.      Assessment:     1. Sore throat        Plan:       Sore throat  -     SARS Coronavirus 2 Antigen, POCT Manual Read  -     POCT Strep A, Molecular  -     diphenhydrAMINE-aluminum-magnesium hydroxide-simethicone-LIDOcaine viscous HCl 2%; Swish and spit 15 mLs every 4 (four) hours as needed (sore throat).  Dispense: 120 each; Refill: 0

## 2024-05-08 ENCOUNTER — TELEPHONE (OUTPATIENT)
Dept: INTERNAL MEDICINE | Facility: CLINIC | Age: 69
End: 2024-05-08
Payer: MEDICARE

## 2024-05-08 VITALS — DIASTOLIC BLOOD PRESSURE: 74 MMHG | SYSTOLIC BLOOD PRESSURE: 136 MMHG

## 2024-05-14 RX ORDER — GLIMEPIRIDE 2 MG/1
2 TABLET ORAL
Qty: 90 TABLET | Refills: 0 | Status: SHIPPED | OUTPATIENT
Start: 2024-05-14

## 2024-05-14 NOTE — TELEPHONE ENCOUNTER
Care Due:                  Date            Visit Type   Department     Provider  --------------------------------------------------------------------------------                                EP -                              PRIMARY      [facility] INTERNAL  Last Visit: 02-      [facility]   MEDICINE       Radtarah Johnston                              EP -                              PRIMARY      [facility] INTERNAL  Next Visit: 06-      [facility]   MEDICINE       Rad A  Preston                                                            Last  Test          Frequency    Reason                     Performed    Due Date  --------------------------------------------------------------------------------    HBA1C.......  6 months...  OZEMPIC, glimepiride,      01- 07-                             metFORMIN................    [employer] Embedded Care Due Messages. Reference number: [id number].   5/14/2024 1:22:35 PM CDT

## 2024-05-14 NOTE — TELEPHONE ENCOUNTER
Refill Routing Note   Medication(s) are not appropriate for processing by [facility] for the following reason(s):        Required labs abnormal    [facility] action(s):  Defer        Medication Therapy Plan: FLOS 6/10/2024    Pharmacist review requested: Yes     Appointments  past 12m or future 3m with PCP    Date Provider   Last Visit   2/5/2024 Rad Johnston MD   Next Visit   6/17/2024 Rad Johnston MD   ED visits in past 90 days: 0        Note composed:2:34 PM 05/14/2024

## 2024-05-14 NOTE — TELEPHONE ENCOUNTER
Refill Decision Note   [facility]  is requesting a refill authorization.  Brief Assessment and Rationale for Refill:  Approve     Medication Therapy Plan:  FLOS 6/10/2024; No dose adjustment recommended per renal fxn.       Pharmacist review requested: Yes   Extended chart review required: Yes   Comments:     Note composed:3:36 PM 05/14/2024

## 2024-06-02 ENCOUNTER — PATIENT MESSAGE (OUTPATIENT)
Dept: INTERNAL MEDICINE | Facility: CLINIC | Age: 69
End: 2024-06-02
Payer: MEDICARE

## 2024-06-02 DIAGNOSIS — Z98.84 S/P LAPAROSCOPIC SLEEVE GASTRECTOMY: ICD-10-CM

## 2024-06-02 DIAGNOSIS — F17.211 CIGARETTE NICOTINE DEPENDENCE IN REMISSION: Primary | ICD-10-CM

## 2024-06-02 DIAGNOSIS — F41.9 ANXIETY: ICD-10-CM

## 2024-06-04 RX ORDER — LEVOTHYROXINE SODIUM 88 UG/1
88 TABLET ORAL
Qty: 30 TABLET | Refills: 6 | Status: SHIPPED | OUTPATIENT
Start: 2024-06-04 | End: 2025-06-04

## 2024-06-04 RX ORDER — VALSARTAN AND HYDROCHLOROTHIAZIDE 80; 12.5 MG/1; MG/1
1 TABLET, FILM COATED ORAL DAILY
Qty: 90 TABLET | Refills: 3 | Status: SHIPPED | OUTPATIENT
Start: 2024-06-04 | End: 2025-05-30

## 2024-06-04 RX ORDER — OMEPRAZOLE 40 MG/1
40 CAPSULE, DELAYED RELEASE ORAL EVERY MORNING
Qty: 90 CAPSULE | Refills: 3 | Status: SHIPPED | OUTPATIENT
Start: 2024-06-04

## 2024-06-04 RX ORDER — LORAZEPAM 1 MG/1
1 TABLET ORAL 2 TIMES DAILY
Qty: 60 TABLET | Refills: 0 | Status: SHIPPED | OUTPATIENT
Start: 2024-06-04

## 2024-06-05 DIAGNOSIS — E11.9 TYPE 2 DIABETES MELLITUS WITHOUT COMPLICATION: ICD-10-CM

## 2024-06-05 RX ORDER — METFORMIN HYDROCHLORIDE 1000 MG/1
1000 TABLET ORAL 2 TIMES DAILY WITH MEALS
Qty: 180 TABLET | Refills: 0 | Status: SHIPPED | OUTPATIENT
Start: 2024-06-05

## 2024-06-05 NOTE — TELEPHONE ENCOUNTER
Refill Decision Note   Diana Montenegro  is requesting a refill authorization.  Brief Assessment and Rationale for Refill:  Approve     Medication Therapy Plan:        Comments:     Note composed:7:48 AM 06/05/2024

## 2024-06-05 NOTE — TELEPHONE ENCOUNTER
No care due was identified.  [facility] Embedded Care Due Messages. Reference number: [id number].   6/05/2024 7:01:34 AM CDT

## 2024-06-06 ENCOUNTER — PATIENT OUTREACH (OUTPATIENT)
Dept: ADMINISTRATIVE | Facility: HOSPITAL | Age: 69
End: 2024-06-06
Payer: MEDICARE

## 2024-06-07 ENCOUNTER — TELEPHONE (OUTPATIENT)
Dept: INTERNAL MEDICINE | Facility: CLINIC | Age: 69
End: 2024-06-07
Payer: MEDICARE

## 2024-06-07 ENCOUNTER — HOSPITAL ENCOUNTER (OUTPATIENT)
Dept: RADIOLOGY | Facility: HOSPITAL | Age: 69
Discharge: HOME | End: 2024-06-07
Attending: INTERNAL MEDICINE
Payer: MEDICARE

## 2024-06-07 DIAGNOSIS — F17.211 CIGARETTE NICOTINE DEPENDENCE IN REMISSION: ICD-10-CM

## 2024-06-07 DIAGNOSIS — F41.9 ANXIETY: ICD-10-CM

## 2024-06-07 PROCEDURE — 71271 CT THORAX LUNG CANCER SCR C-: CPT | Mod: TC

## 2024-06-07 PROCEDURE — 71271 CT THORAX LUNG CANCER SCR C-: CPT | Mod: 26,,, | Performed by: STUDENT IN AN ORGANIZED HEALTH CARE EDUCATION/TRAINING PROGRAM

## 2024-06-10 ENCOUNTER — LAB VISIT (OUTPATIENT)
Dept: LAB | Facility: HOSPITAL | Age: 69
End: 2024-06-10
Attending: INTERNAL MEDICINE
Payer: MEDICARE

## 2024-06-10 DIAGNOSIS — E11.40 TYPE 2 DIABETES MELLITUS WITH DIABETIC NEUROPATHY, WITHOUT LONG-TERM CURRENT USE OF INSULIN: ICD-10-CM

## 2024-06-10 DIAGNOSIS — I48.0 PAROXYSMAL ATRIAL FIBRILLATION: ICD-10-CM

## 2024-06-10 DIAGNOSIS — N18.31 CHRONIC KIDNEY DISEASE, STAGE 3A: ICD-10-CM

## 2024-06-10 LAB
ALBUMIN SERPL BCP-MCNC: 3.6 G/DL (ref 3.5–5.2)
ALP SERPL-CCNC: 92 U/L (ref 55–135)
ALT SERPL W/O P-5'-P-CCNC: 121 U/L (ref 10–44)
ANION GAP SERPL CALC-SCNC: 12 MMOL/L (ref 8–16)
AST SERPL-CCNC: 73 U/L (ref 10–40)
BASOPHILS # BLD AUTO: 0.03 K/UL (ref 0–0.2)
BASOPHILS NFR BLD: 0.7 % (ref 0–1.9)
BILIRUB SERPL-MCNC: 0.2 MG/DL (ref 0.1–1)
BUN SERPL-MCNC: 37 MG/DL (ref 8–23)
CALCIUM SERPL-MCNC: 9.9 MG/DL (ref 8.7–10.5)
CHLORIDE SERPL-SCNC: 109 MMOL/L (ref 95–110)
CO2 SERPL-SCNC: 18 MMOL/L (ref 23–29)
CREAT SERPL-MCNC: 1.4 MG/DL (ref 0.5–1.4)
DIFFERENTIAL METHOD BLD: ABNORMAL
EOSINOPHIL # BLD AUTO: 0.1 K/UL (ref 0–0.5)
EOSINOPHIL NFR BLD: 1.7 % (ref 0–8)
ERYTHROCYTE [DISTWIDTH] IN BLOOD BY AUTOMATED COUNT: 13.9 % (ref 11.5–14.5)
EST. GFR  (NO RACE VARIABLE): 41.2 ML/MIN/1.73 M^2
ESTIMATED AVG GLUCOSE: 140 MG/DL (ref 68–131)
GLUCOSE SERPL-MCNC: 110 MG/DL (ref 70–110)
HBA1C MFR BLD: 6.5 % (ref 4–5.6)
HCT VFR BLD AUTO: 35.4 % (ref 37–48.5)
HGB BLD-MCNC: 10.8 G/DL (ref 12–16)
IMM GRANULOCYTES # BLD AUTO: 0.01 K/UL (ref 0–0.04)
IMM GRANULOCYTES NFR BLD AUTO: 0.2 % (ref 0–0.5)
LYMPHOCYTES # BLD AUTO: 1.5 K/UL (ref 1–4.8)
LYMPHOCYTES NFR BLD: 34.7 % (ref 18–48)
MCH RBC QN AUTO: 28.6 PG (ref 27–31)
MCHC RBC AUTO-ENTMCNC: 30.5 G/DL (ref 32–36)
MCV RBC AUTO: 94 FL (ref 82–98)
MONOCYTES # BLD AUTO: 0.3 K/UL (ref 0.3–1)
MONOCYTES NFR BLD: 7.6 % (ref 4–15)
NEUTROPHILS # BLD AUTO: 2.3 K/UL (ref 1.8–7.7)
NEUTROPHILS NFR BLD: 55.1 % (ref 38–73)
NRBC BLD-RTO: 0 /100 WBC
PLATELET # BLD AUTO: 146 K/UL (ref 150–450)
PMV BLD AUTO: 11.2 FL (ref 9.2–12.9)
POTASSIUM SERPL-SCNC: 4.4 MMOL/L (ref 3.5–5.1)
PROT SERPL-MCNC: 6.9 G/DL (ref 6–8.4)
RBC # BLD AUTO: 3.77 M/UL (ref 4–5.4)
SODIUM SERPL-SCNC: 139 MMOL/L (ref 136–145)
WBC # BLD AUTO: 4.21 K/UL (ref 3.9–12.7)

## 2024-06-10 PROCEDURE — 83036 HEMOGLOBIN GLYCOSYLATED A1C: CPT | Performed by: INTERNAL MEDICINE

## 2024-06-10 PROCEDURE — 80053 COMPREHEN METABOLIC PANEL: CPT | Performed by: INTERNAL MEDICINE

## 2024-06-10 PROCEDURE — 85025 COMPLETE CBC W/AUTO DIFF WBC: CPT | Performed by: INTERNAL MEDICINE

## 2024-06-10 PROCEDURE — 36415 COLL VENOUS BLD VENIPUNCTURE: CPT | Mod: PO | Performed by: INTERNAL MEDICINE

## 2024-06-17 ENCOUNTER — OFFICE VISIT (OUTPATIENT)
Dept: INTERNAL MEDICINE | Facility: CLINIC | Age: 69
End: 2024-06-17
Payer: MEDICARE

## 2024-06-17 VITALS
WEIGHT: 196.63 LBS | HEIGHT: 65 IN | HEART RATE: 70 BPM | BODY MASS INDEX: 32.76 KG/M2 | OXYGEN SATURATION: 99 % | SYSTOLIC BLOOD PRESSURE: 130 MMHG | DIASTOLIC BLOOD PRESSURE: 70 MMHG | TEMPERATURE: 97 F | RESPIRATION RATE: 16 BRPM

## 2024-06-17 DIAGNOSIS — E11.40 TYPE 2 DIABETES MELLITUS WITH DIABETIC NEUROPATHY, WITHOUT LONG-TERM CURRENT USE OF INSULIN: Primary | ICD-10-CM

## 2024-06-17 DIAGNOSIS — E78.5 HYPERLIPIDEMIA ASSOCIATED WITH TYPE 2 DIABETES MELLITUS: ICD-10-CM

## 2024-06-17 DIAGNOSIS — N18.31 CHRONIC KIDNEY DISEASE, STAGE 3A: ICD-10-CM

## 2024-06-17 DIAGNOSIS — E11.69 HYPERLIPIDEMIA ASSOCIATED WITH TYPE 2 DIABETES MELLITUS: ICD-10-CM

## 2024-06-17 DIAGNOSIS — E11.59 HYPERTENSION ASSOCIATED WITH DIABETES: ICD-10-CM

## 2024-06-17 DIAGNOSIS — M54.50 CHRONIC RIGHT-SIDED LOW BACK PAIN WITHOUT SCIATICA: ICD-10-CM

## 2024-06-17 DIAGNOSIS — E03.9 ACQUIRED HYPOTHYROIDISM: ICD-10-CM

## 2024-06-17 DIAGNOSIS — R79.89 LFT ELEVATION: ICD-10-CM

## 2024-06-17 DIAGNOSIS — Z79.01 CURRENT USE OF LONG TERM ANTICOAGULATION: ICD-10-CM

## 2024-06-17 DIAGNOSIS — I48.0 PAROXYSMAL ATRIAL FIBRILLATION: ICD-10-CM

## 2024-06-17 DIAGNOSIS — I15.2 HYPERTENSION ASSOCIATED WITH DIABETES: ICD-10-CM

## 2024-06-17 DIAGNOSIS — G89.29 CHRONIC RIGHT-SIDED LOW BACK PAIN WITHOUT SCIATICA: ICD-10-CM

## 2024-06-17 PROCEDURE — 3008F BODY MASS INDEX DOCD: CPT | Mod: CPTII,S$GLB,, | Performed by: INTERNAL MEDICINE

## 2024-06-17 PROCEDURE — 3078F DIAST BP <80 MM HG: CPT | Mod: CPTII,S$GLB,, | Performed by: INTERNAL MEDICINE

## 2024-06-17 PROCEDURE — 3044F HG A1C LEVEL LT 7.0%: CPT | Mod: CPTII,S$GLB,, | Performed by: INTERNAL MEDICINE

## 2024-06-17 PROCEDURE — 99999 PR PBB SHADOW E&M-EST. PATIENT-LVL V: CPT | Mod: PBBFAC,,, | Performed by: INTERNAL MEDICINE

## 2024-06-17 PROCEDURE — 99214 OFFICE O/P EST MOD 30 MIN: CPT | Mod: S$GLB,,, | Performed by: INTERNAL MEDICINE

## 2024-06-17 PROCEDURE — 1126F AMNT PAIN NOTED NONE PRSNT: CPT | Mod: CPTII,S$GLB,, | Performed by: INTERNAL MEDICINE

## 2024-06-17 PROCEDURE — 3066F NEPHROPATHY DOC TX: CPT | Mod: CPTII,S$GLB,, | Performed by: INTERNAL MEDICINE

## 2024-06-17 PROCEDURE — 3075F SYST BP GE 130 - 139MM HG: CPT | Mod: CPTII,S$GLB,, | Performed by: INTERNAL MEDICINE

## 2024-06-17 PROCEDURE — 3288F FALL RISK ASSESSMENT DOCD: CPT | Mod: CPTII,S$GLB,, | Performed by: INTERNAL MEDICINE

## 2024-06-17 PROCEDURE — 3060F POS MICROALBUMINURIA REV: CPT | Mod: CPTII,S$GLB,, | Performed by: INTERNAL MEDICINE

## 2024-06-17 PROCEDURE — 1101F PT FALLS ASSESS-DOCD LE1/YR: CPT | Mod: CPTII,S$GLB,, | Performed by: INTERNAL MEDICINE

## 2024-06-17 RX ORDER — ALBUTEROL SULFATE 90 UG/1
2 AEROSOL, METERED RESPIRATORY (INHALATION) EVERY 6 HOURS PRN
Qty: 7 G | Refills: 3 | Status: SHIPPED | OUTPATIENT
Start: 2024-06-17

## 2024-06-24 NOTE — PROGRESS NOTES
Subjective:     Patient ID: Diana Montenegro is a 69 y.o. female.    Chief Complaint: No chief complaint on file.    Ms Montenegro is a 70 yo female sent in consultation by Dr. Johnston for evaluation for the healthy back lumbar program.  she has had low back pain for 20 years after back spasm and since then she gets one a month.  The last one was 2 weeks ago.  The pain is right low back dominant, no radiating pain.  Pain is sharp and locking.  There is no tingling numbness burning or weakness.  The pain is intermittent 0-9/10.  It is worse with walking, standing, climbing stairs, lifting and at the end of the day.  It is better with using walker, sitting, lying down any position, reclining, morning, water aerobics, ice and heat. She has not had any treatment.  Her goals are walking, standing, and going upstairs. She has had some falls last one in February.  She uses a walker for distance and a cane for shorter distances.Pattern 1    MRI lumbar 2015  Technique: Sagittal T1, sagittal T2, sagittal STIR, axial T1, and axial T2 weighted images of the lumbar spine were obtained without contrast.     Comparison: MRI lumbar spine 2/23/12.     Findings: Examination limited secondary to open magnet technique.     There is grade 1 anterolisthesis of L4 on L5.  Otherwise, lumbar spinal alignment is within normal limits.  The vertebral body heights are well maintained, with no fracture.  There is no marrow signal abnormality suspicious for infiltrative process.  There is multilevel disk desiccation with prominent disk height loss noted at L3-L4 and L4-L5 with degenerative endplate changes..  The conus is normal in appearance and terminates at the L1 level.  The adjacent soft tissue structures show no significant abnormalities.     T12-L1: No focal disk bulge, significant spinal canal or neuroforaminal narrowing.     L1-L2: There is mild broad-based disk bulge, facet arthropathy without significant central canal or neural  foraminal narrowing.     L2-L3: There is mild broad-based disk bulge, facet arthropathy without significant central canal or neural foraminal narrowing.     L3-L4: Degenerative disk changes with disk height loss and superimposed broad-based disk bulge which contributes to mild spinal canal narrowing.  Additionally there is mild right neural foraminal stenosis.     L4-L5: Grade 1 anterolisthesis of L4 on L5 with uncovering of the disk. There is severe degenerative disk disease at L4-L5 with abnormal STIR hyperintensity within the disk as well as adjacent endplates suggestive of edema/inflammation.  Superimposed infection is not excluded. There is broad-based disk bulge and facet arthropathy with severe spinal canal stenosis at this level.  Additionally, there is moderate bilateral neuroforaminal narrowing.     L5-S1: There is broad-based disk bulge and facet arthropathy without significant spinal canal or neuroforaminal stenosis.  IMPRESSION:         Severe degenerative disk disease and facet joint arthropathy with grade 1 anterolisthesis at L4-5, resulting in severe spinal canal and moderate neural foraminal stenosis, all likely contributing to back pain.  Superimposed spondylodiskitis not excluded.      Past Medical History:  No date: Arthritis  No date: Atrial fibrillation, unspecified      Comment:  hx of a fib prior to stroke.  No date: Chronic back pain  No date: Colon polyp  07/16/2014: CVA (cerebral infarction)  No date: Degenerative disc disease      Comment:  cervical; lumbar  No date: Diabetes mellitus type II  09/17/2018: Encounter for loop recorder at end of battery life  No date: Hyperlipidemia  No date: Hypertension  No date: Hypothyroidism  08/12/2018: Mild nonproliferative diabetic retinopathy  No date: Obesity  No date: Sleep apnea  07/2014: Stroke  No date: Venous insufficiency (chronic) (peripheral)    Past Surgical History:  08/30/2018: COLONOSCOPY; N/A      Comment:  Procedure: COLONOSCOPY;   Surgeon: William Clement MD;                 Location: [facility] ENDO (4TH FLR);  Service: Endoscopy;                 Laterality: N/A;  Tito/Dr Rad Johnston/OK to hold 2                days prior to colon/see telephone encounter dated                7/24/18/pt has loop recorder/svn  12/6/2023: COLONOSCOPY; N/A      Comment:  Procedure: COLONOSCOPY;  Surgeon: Vinny Youssef MD;  Location: [facility] ENDO (4TH FLR);  Service: Colon and                Rectal;  Laterality: N/A;  ref Greeneliquis diabetic/               [place] ozempic hold 7days prior  peg prep jm / loop                recorderok to hold Eliquis 2 days per J Miguel NP/L                Robert RN-GT11/29-precall complete-pt verbalized                understanding of holding Ozempic-MS  No date: COLONOSCOPY W/ POLYPECTOMY  No date: GASTRECTOMY  No date: HERNIA REPAIR  09/17/2018: REMOVAL OF IMPLANTABLE LOOP RECORDER; N/A      Comment:  Procedure: REMOVAL, IMPLANTABLE LOOP RECORDER;  Surgeon:               Thomas Randolph MD;  Location: [facility] CATH LAB;  Service:                Cardiology;  Laterality: N/A;  TERESA, ILR removal (no                reimplant), MDT, RN Sedate, SK, 3 Prep *Reveal LINQ*  No date: TONSILLECTOMY  No date: TUBAL LIGATION    Review of patient's family history indicates:  Problem: No Known Problems      Relation: Mother          Name:               Age of Onset: (Not Specified)  Problem: Heart disease      Relation: Father          Name:               Age of Onset: (Not Specified)  Problem: No Known Problems      Relation: Sister          Name:               Age of Onset: (Not Specified)  Problem: No Known Problems      Relation: Sister          Name:               Age of Onset: (Not Specified)  Problem: No Known Problems      Relation: Maternal Grandmother          Name:               Age of Onset: (Not Specified)  Problem: Diabetes      Relation: Maternal Grandfather          Name:               Age of Onset: (Not  Specified)  Problem: Obesity      Relation: Maternal Grandfather          Name:               Age of Onset: (Not Specified)  Problem: Stroke      Relation: Paternal Grandmother          Name:               Age of Onset: (Not Specified)  Problem: No Known Problems      Relation: Paternal Grandfather          Name:               Age of Onset: (Not Specified)  Problem: Heart attack      Relation: Neg Hx          Name:               Age of Onset: (Not Specified)  Problem: Cirrhosis      Relation: Neg Hx          Name:               Age of Onset: (Not Specified)      Social History    Socioeconomic History      Marital status: Single    Occupational History      Occupation: MOS         Employer: [employer]    Tobacco Use      Smoking status: Former        Packs/day: 0.00        Years: 1 pack/day for 30.0 years (30.0 ttl pk-yrs)        Types: Cigarettes        Start date: 1984        Quit date: 2014        Years since quittin.9      Smokeless tobacco: Never    Substance and Sexual Activity      Alcohol use: Not Currently      Drug use: No        Comment: thc at young age      Sexual activity: Not Currently        Partners: Male    Social Determinants of Health  Financial Resource Strain: Low Risk  (2024)      Overall Financial Resource Strain (CARDIA)          Difficulty of Paying Living Expenses: Not very hard  Food Insecurity: No Food Insecurity (2024)      Hunger Vital Sign          Worried About Running Out of Food in the Last Year: Never true          Ran Out of Food in the Last Year: Never true  Transportation Needs: No Transportation Needs (2024)      PRAPARE - Transportation          Lack of Transportation (Medical): No          Lack of Transportation (Non-Medical): No  Physical Activity: Sufficiently Active (2024)      Exercise Vital Sign          Days of Exercise per Week: 3 days          Minutes of Exercise per Session: 90 min  Stress: Stress Concern  Present (1/19/2024)      [facility] of Occupational Health - Occupational Stress Questionnaire          Feeling of Stress : To some extent  Housing Stability: Low Risk  (1/19/2024)      Housing Stability Vital Sign          Unable to Pay for Housing in the Last Year: No          Number of Places Lived in the Last Year: 1          Unstable Housing in the Last Year: No    Current Outpatient Medications:  acyclovir 5% (ZOVIRAX) 5 % ointment, Apply topically 6 (six) times daily., Disp: 5 g, Rfl: 1  albuterol (VENTOLIN HFA) 90 mcg/actuation inhaler, Inhale 2 puffs into the lungs every 6 (six) hours as needed for Wheezing. Rescue, Disp: 7 g, Rfl: 3  albuterol-ipratropium 2.5mg-0.5mg/3mL (DUO-NEB) 0.5 mg-3 mg(2.5 mg base)/3 mL nebulizer solution, Take 3 mLs by nebulization every 6 (six) hours as needed for Wheezing. Rescue (Patient taking differently: Take 3 mLs by nebulization every 6 (six) hours as needed for Wheezing. Rescue prn), Disp: 3 vial, Rfl: 3  apixaban (ELIQUIS) 5 mg Tab, Take 1 tablet (5 mg total) by mouth 2 (two) times daily., Disp: 180 tablet, Rfl: 3  aspirin (ECOTRIN) 81 MG EC tablet, Take 1 tablet (81 mg total) by mouth once daily., Disp: , Rfl:   b complex vitamins tablet, Take 1 tablet by mouth once daily., Disp: , Rfl:    blood pressure test kit-large Kit, Use as directed, Disp: 1 each, Rfl: 0  blood sugar diagnostic (FREESTYLE LITE STRIPS) Strp, USE 1 STRIP TO CHECK GLUCOSE TWICE DAILY, Disp: 200 each, Rfl: 3  blood-glucose meter Misc, One touch verio flex or tone touch verio reflect or freestyle freedom lite meter (all covered by insurance), Disp: 1 each, Rfl: 0  CALCIUM CITRATE/VITAMIN D3 (CALCIUM CITRATE + D ORAL), Take 1 tablet by mouth every morning., Disp: , Rfl:   cetirizine (ZYRTEC) 5 MG tablet, Take 5 mg by mouth once daily., Disp: , Rfl:   ciclopirox (PENLAC) 8 % Soln, Apply topically nightly., Disp: 6.6 mL, Rfl: 11  cinnamon bark (CINNAMON ORAL), Take by mouth 2 (two) times daily.,  Disp: , Rfl:   cyanocobalamin, vitamin B-12, 500 mcg Subl, Place 1 tablet under the tongue once daily., Disp: , Rfl:   diclofenac sodium (VOLTAREN) 1 % Gel, APPLY 2 GRAMS TOPICALLY THREE TIMES DAILY, Disp: 200 g, Rfl: 4  diphenhydrAMINE-aluminum-magnesium hydroxide-simethicone-LIDOcaine viscous HCl 2%, Swish and spit 15 mLs every 4 (four) hours as needed (sore throat)., Disp: 120 each, Rfl: 0  docusate sodium (COLACE) 100 MG capsule, Take 100 mg by mouth once daily. , Disp: , Rfl:   EYLEA 2 mg/0.05 mL Syrg, , Disp: , Rfl:   fish oil-omega-3 fatty acids 300-1,000 mg capsule, Take 1 capsule by mouth 2 (two) times daily. Take 1 cap 2 times daily every other day, Disp: , Rfl:   fluticasone (FLONASE) 50 mcg/actuation nasal spray, 2 sprays (100 mcg total) by Each Nare route once daily. (Patient taking differently: 2 sprays by Each Nostril route daily as needed.), Disp: 1 Bottle, Rfl: 0  glimepiride (AMARYL) 2 MG tablet, Take 1 tablet (2 mg total) by mouth before breakfast. For diabetes control., Disp: 90 tablet, Rfl: 0  lancets Misc, Needs lancets for testing twice daily.  To go with meter covered by insurance., Disp: 200 each, Rfl: 3  levothyroxine (SYNTHROID) 88 MCG tablet, Take 1 tablet (88 mcg total) by mouth before breakfast., Disp: 30 tablet, Rfl: 6  LORazepam (ATIVAN) 1 MG tablet, Take 1 tablet (1 mg total) by mouth 2 (two) times daily. as needed for anxiety., Disp: 60 tablet, Rfl: 0  metFORMIN (GLUCOPHAGE) 1000 MG tablet, TAKE 1 TABLET BY MOUTH TWICE DAILY WITH MEALS, Disp: 180 tablet, Rfl: 0  metoprolol tartrate (LOPRESSOR) 25 MG tablet, Take 1 tablet (25 mg total) by mouth once as needed. For palpitations, Disp: 30 tablet, Rfl: 11  multivitamin capsule, Take 1 capsule by mouth once daily., Disp: , Rfl:   mupirocin (BACTROBAN) 2 % ointment, Apply to affected area 3 times daily, Disp: 22 g, Rfl: 1  omeprazole (PRILOSEC) 40 MG capsule, Take 1 capsule (40 mg total) by mouth every morning., Disp: 90 capsule, Rfl:  3  OZEMPIC 1 mg/dose (4 mg/3 mL), INJECT 1 MG SUBCUTANEOUSLY EVERY 7 DAYS, Disp: 9 mL, Rfl: 1  promethazine-dextromethorphan (PROMETHAZINE-DM) 6.25-15 mg/5 mL Syrp, Take one tsp po q 6 hrs prn cough, Disp: 180 mL, Rfl: 0  rosuvastatin (CRESTOR) 40 MG Tab, Take 1 tablet by mouth once daily, Disp: 90 tablet, Rfl: 2  RUTIN/HESP/BIOFLAV/C/HERB#196 (BIOFLEX ORAL), Take 2 tablets by mouth once daily., Disp: , Rfl:   senna (SENNA) 8.6 mg tablet, Take 2 tablets by mouth nightly as needed for Constipation., Disp: 100 tablet, Rfl: 3  traZODone (DESYREL) 50 MG tablet, TAKE 1 TABLET BY MOUTH NIGHTLY AS NEEDED FOR  INSOMNIA  (OKAY  TO  TAKE  2ND  TABLET  BY  MOUTH  IN  30  MINUTES  IF  STILL  AWAKE), Disp: 180 tablet, Rfl: 3  valsartan-hydrochlorothiazide (DIOVAN-HCT) 80-12.5 mg per tablet, Take 1 tablet by mouth once daily., Disp: 90 tablet, Rfl: 3    No current facility-administered medications for this visit.  Facility-Administered Medications Ordered in Other Visits:  0.9%  NaCl infusion, , Intravenous, Continuous, Khadijah Rivera NP, 1,000 mL at 09/17/18 0939        Review of patient's allergies indicates:   -- Medrol [methylprednisolone] -- Palpitations          Review of Systems   Constitutional: Negative for weight gain and weight loss.   Cardiovascular:  Negative for chest pain.   Respiratory:  Negative for shortness of breath.    Musculoskeletal:  Positive for back pain and joint pain (knee). Negative for joint swelling.   Gastrointestinal:  Negative for abdominal pain, bowel incontinence, nausea and vomiting.   Genitourinary:  Negative for bladder incontinence.   Neurological:  Negative for numbness.        Objective:     General: Diana is well-developed, well-nourished, appears stated age, in no acute distress, alert and oriented to time, place and person.     General    Vitals reviewed.  Constitutional: She is oriented to person, place, and time. She appears well-developed and well-nourished.   HENT:   Head:  Normocephalic and atraumatic.   Pulmonary/Chest: Effort normal.   Neurological: She is alert and oriented to person, place, and time.   Psychiatric: She has a normal mood and affect. Her behavior is normal. Judgment and thought content normal.     General Musculoskeletal Exam   Gait: antalgic (knee pain)     Back (L-Spine & T-Spine) / Neck (C-Spine) Exam     Back (L-Spine & T-Spine) Range of Motion   Extension:  20   Flexion:  90   Lateral bend right:  20   Lateral bend left:  20   Rotation right:  30   Rotation left:  30     Spinal Sensation   Right Side Sensation  C-Spine Level: normal   L-Spine Level: normal  S-Spine Level: normal  Left Side Sensation  C-Spine Level: normal  L-Spine Level: normal  S-Spine Level: normal    Back (L-Spine & T-Spine) Tests   Right Side Tests  Straight leg raise:        Sitting SLR: > 70 degrees    Left Side Tests  Straight leg raise:       Sitting SLR: > 70 degrees      Other   She has no scoliosis .  Spinal Kyphosis:  Absent      Muscle Strength   Right Upper Extremity   Biceps: 5/5   Deltoid:  5/5  Triceps:  5/5  Wrist extension: 5/5   Finger Flexors:  5/5  Left Upper Extremity  Biceps: 5/5   Deltoid:  5/5  Triceps:  5/5  Wrist extension: 5/5   Finger Flexors:  5/5  Right Lower Extremity   Hip Flexion: 5/5   Quadriceps:  5/5   Anterior tibial:  5/5   EHL:  5/5  Left Lower Extremity   Hip Flexion: 5/5   Quadriceps:  5/5   Anterior tibial:  5/5   EHL:  5/5    Reflexes     Left Side  Biceps:  2+  Triceps:  2+  Brachioradialis:  2+  Achilles:  2+  Left Mendoza's Sign:  Absent  Babinski Sign:  absent  Quadriceps:  2+    Right Side   Biceps:  2+  Triceps:  2+  Brachioradialis:  2+  Achilles:  2+  Right Mendoza's Sign:  absent  Babinski Sign:  absent  Quadriceps:  2+    Vascular Exam     Right Pulses        Carotid:                  2+    Left Pulses        Carotid:                  2+          Assessment:     1. Chronic right-sided low back pain without sciatica         Plan:     Orders  Placed This Encounter    Ambulatory referral/consult to [facility]     The patient has had a history of low back pain with limitations in there activities of Daily living    Previous treatment has not provided relief.    The situation was discussed at length with the patient.  We discussed different causes of back pain and different treatment options.  We discussed the importance of stretching and strengthening.  We discussed posture sitting.  We discussed the pros and cons of further diagnostic testing, alternative treatment and Medications.  She is considering knee replacement.  She does use a walker or cane and antalgic gait with knee djd    Based on the history, physical exam, and functional index, an active physical therapy program is recommended.  The goal is to restore the patients function and reduce pain.  A program of progressive resistance exercises, biomechanical, and mobility maneuvers, instructions in proper body mechanics, aerobic conditioning and HEP will be utilized. The program will continue as long as making improvements.    An assessment of patients progress will be made at each visit to document change in status.    The patient will be actively involved in their own treatment, and responsible for appointments and home program    The patient's lumbar isometric strength will be tested and they will be placed in a program of isolated strength training based on 50% of their total functional torque and advanced as clinically appropriate.      Directional preference of pain will further influence the patients active rehabilitation program    The patient was instructed there might be an initial increase in discomfort    They are enrolled with good prognosis  Pattern 1      Follow-up: No follow-ups on file. If there are any questions prior to this, the patient was instructed to contact the office.

## 2024-06-26 ENCOUNTER — CLINICAL SUPPORT (OUTPATIENT)
Dept: REHABILITATION | Facility: HOSPITAL | Age: 69
End: 2024-06-26
Payer: MEDICARE

## 2024-06-26 ENCOUNTER — CLINICAL SUPPORT (OUTPATIENT)
Dept: REHABILITATION | Facility: HOSPITAL | Age: 69
End: 2024-06-26
Attending: PHYSICAL MEDICINE & REHABILITATION
Payer: MEDICARE

## 2024-06-26 VITALS
SYSTOLIC BLOOD PRESSURE: 106 MMHG | HEART RATE: 65 BPM | BODY MASS INDEX: 32.65 KG/M2 | WEIGHT: 196 LBS | HEIGHT: 65 IN | DIASTOLIC BLOOD PRESSURE: 68 MMHG

## 2024-06-26 DIAGNOSIS — Z76.89 SEEN BY HEALTH AND WELLBEING COACH: Primary | ICD-10-CM

## 2024-06-26 DIAGNOSIS — M54.50 CHRONIC RIGHT-SIDED LOW BACK PAIN WITHOUT SCIATICA: ICD-10-CM

## 2024-06-26 DIAGNOSIS — G89.29 CHRONIC RIGHT-SIDED LOW BACK PAIN WITHOUT SCIATICA: ICD-10-CM

## 2024-06-26 PROCEDURE — 99204 OFFICE O/P NEW MOD 45 MIN: CPT | Mod: ,,, | Performed by: PHYSICAL MEDICINE & REHABILITATION

## 2024-06-26 NOTE — PROGRESS NOTES
Health  met with patient to complete initial outcomes for the Healthy Back lumbar program.  Questions were reviewed with patient and discussed with Dr. Buchanan. The patient will meet with Dr. Buchanan to determine program enrollment.         6/26/2024    10:20 AM   HealthyBack Questionnaire    Location of your worst pain: Lower Back   Please select the location of any additional pain: (hold down the control key, and click to select multiple responses) Neck    Low back    Did the pain begin after an event, injury, or accident? No   How long has this pain been going on?  15 years approximately   Please indicate how the pain is changing.  No Change   What is the WORST level of pain that you have experienced in the last week?  9   What is the LEAST level of pain that you have experienced in the past week?  1   If you have any comments about your pain level, please provide below:  Using   What can you NOT do not that you used to be able to do?  Cant walk far without spasms   Are your limitations mainly due to your pain?  Yes   What are your additional complaints, if any? Weakness   Is there ever a time during the day when your pain stops, even for a brief moment, before returning? Yes   If yes, when your pain stops, does it disappear completely? Is it totally gone? Yes   Does bending forward make your typical pain worse? No   Morning: Same   Afternoon: Same   Evening:  Same   Nighttime: Same   Standing:  Worse   Lying on stomach: Same   Lying on back: Better   Sitting:  Better   Walking: Worse   Climbing stairs: Worse   Emergency department  Yes   Health care providers (hold down the control key, and click to select multiple responses) Family doctor   Investigations done (hold down the control key, and click to select multiple responses) X-ray    MRI   Procedures (hold down the control key, and click to select multiple responses) None   Have you had any surgery on your back?  No   Please rashmi what you are  experiencing. (hold down the control key, and click to select multiple responses) Problems with bowel functions    Palpitations    Arthritic joint pain    Muscle pain in arms or legs   First activity you would like to perform better:  walk without assistance   Second activity you would like to perform better: stand   Third activity you would like to perform better: go upstairs   What is your occupation?  retired   What is your highest level of education? Prefer not to answer   What is your work status? Retired   How did you hear about the Healthyback program?  Patient referral

## 2024-06-29 ENCOUNTER — TELEPHONE (OUTPATIENT)
Dept: INTERNAL MEDICINE | Facility: CLINIC | Age: 69
End: 2024-06-29
Payer: MEDICARE

## 2024-06-29 DIAGNOSIS — I25.10 CORONARY ARTERY DISEASE DUE TO CALCIFIED CORONARY LESION: Primary | ICD-10-CM

## 2024-06-29 DIAGNOSIS — I25.84 CORONARY ARTERY DISEASE DUE TO CALCIFIED CORONARY LESION: Primary | ICD-10-CM

## 2024-06-30 NOTE — PROGRESS NOTES
Subjective:       Patient ID: Diana Montenegro is a 69 y.o. female.    Chief Complaint: 4/5 m follow up    HPI  The patient presents for follow-up of medical conditions which include hypertension, type 2 diabetes mellitus, chronic lower back pain.  Pain is primarily localized in the right lumbar area.  The pain is non radiating.    Blood sugar levels have been stable.  No hypoglycemia is noted.  Decreased visual acuity is noted in the right eye due to cataract.    She has not been monitoring blood pressures but she is tolerating her medication without side effects.    Review of Systems   Constitutional:  Negative for activity change, appetite change and unexpected weight change.   Eyes:  Positive for visual disturbance.   Respiratory:  Negative for shortness of breath.    Cardiovascular:  Negative for chest pain, palpitations and leg swelling.   Gastrointestinal:  Negative for abdominal pain, blood in stool and diarrhea.   Genitourinary:  Negative for dysuria, frequency, hematuria and urgency.   Musculoskeletal:  Positive for back pain.   Neurological:  Negative for weakness, numbness and headaches.   Psychiatric/Behavioral:  Negative for sleep disturbance.             Physical Exam  Vitals and nursing note reviewed.   Constitutional:       General: She is not in acute distress.     Appearance: Normal appearance. She is well-developed.   HENT:      Head: Normocephalic and atraumatic.   Eyes:      General: No scleral icterus.     Extraocular Movements: Extraocular movements intact.      Conjunctiva/sclera: Conjunctivae normal.   Neck:      Thyroid: No thyromegaly.      Vascular: No JVD.   Cardiovascular:      Rate and Rhythm: Normal rate and regular rhythm.      Heart sounds: Normal heart sounds. No murmur heard.     No friction rub. No gallop.   Pulmonary:      Effort: Pulmonary effort is normal. No respiratory distress.      Breath sounds: Normal breath sounds. No wheezing or rales.   Abdominal:      General:  Bowel sounds are normal.      Palpations: Abdomen is soft. There is no mass.      Tenderness: There is no abdominal tenderness.   Musculoskeletal:         General: No tenderness. Normal range of motion.      Cervical back: Normal range of motion and neck supple.   Lymphadenopathy:      Cervical: No cervical adenopathy.   Skin:     General: Skin is warm and dry.      Findings: No rash.      Comments: No foot lesions are present.   Neurological:      Mental Status: She is alert and oriented to person, place, and time.      Cranial Nerves: No cranial nerve deficit.      Comments: Sensory exam is intact in both feet on monofilament testing.   Psychiatric:         Mood and Affect: Mood normal.         Behavior: Behavior normal.           Lab Visit on 06/10/2024   Component Date Value Ref Range Status    Microalbumin, Urine 06/10/2024 224.0  ug/mL Final    Creatinine, Urine 06/10/2024 110.0  15.0 - 325.0 mg/dL Final    Microalb/Creat Ratio 06/10/2024 203.6 (H)  0.0 - 30.0 ug/mg Final   Lab Visit on 06/10/2024   Component Date Value Ref Range Status    Sodium 06/10/2024 139  136 - 145 mmol/L Final    Potassium 06/10/2024 4.4  3.5 - 5.1 mmol/L Final    Chloride 06/10/2024 109  95 - 110 mmol/L Final    CO2 06/10/2024 18 (L)  23 - 29 mmol/L Final    Glucose 06/10/2024 110  70 - 110 mg/dL Final    BUN 06/10/2024 37 (H)  8 - 23 mg/dL Final    Creatinine 06/10/2024 1.4  0.5 - 1.4 mg/dL Final    Calcium 06/10/2024 9.9  8.7 - 10.5 mg/dL Final    Total Protein 06/10/2024 6.9  6.0 - 8.4 g/dL Final    Albumin 06/10/2024 3.6  3.5 - 5.2 g/dL Final    Total Bilirubin 06/10/2024 0.2  0.1 - 1.0 mg/dL Final    Comment: For infants and newborns, interpretation of results should be based  on gestational age, weight and in agreement with clinical  observations.    Premature Infant recommended reference ranges:  Up to 24 hours.............<8.0 mg/dL  Up to 48 hours............<12.0 mg/dL  3-5 days..................<15.0 mg/dL  6-29  days.................<15.0 mg/dL      Alkaline Phosphatase 06/10/2024 92  55 - 135 U/L Final    AST 06/10/2024 73 (H)  10 - 40 U/L Final    ALT 06/10/2024 121 (H)  10 - 44 U/L Final    eGFR 06/10/2024 41.2 (A)  >60 mL/min/1.73 m^2 Final    Anion Gap 06/10/2024 12  8 - 16 mmol/L Final    WBC 06/10/2024 4.21  3.90 - 12.70 K/uL Final    RBC 06/10/2024 3.77 (L)  4.00 - 5.40 M/uL Final    Hemoglobin 06/10/2024 10.8 (L)  12.0 - 16.0 g/dL Final    Hematocrit 06/10/2024 35.4 (L)  37.0 - 48.5 % Final    MCV 06/10/2024 94  82 - 98 fL Final    MCH 06/10/2024 28.6  27.0 - 31.0 pg Final    MCHC 06/10/2024 30.5 (L)  32.0 - 36.0 g/dL Final    RDW 06/10/2024 13.9  11.5 - 14.5 % Final    Platelets 06/10/2024 146 (L)  150 - 450 K/uL Final    MPV 06/10/2024 11.2  9.2 - 12.9 fL Final    Immature Granulocytes 06/10/2024 0.2  0.0 - 0.5 % Final    Gran # (ANC) 06/10/2024 2.3  1.8 - 7.7 K/uL Final    Immature Grans (Abs) 06/10/2024 0.01  0.00 - 0.04 K/uL Final    Comment: Mild elevation in immature granulocytes is non specific and   can be seen in a variety of conditions including stress response,   acute inflammation, trauma and pregnancy. Correlation with other   laboratory and clinical findings is essential.      Lymph # 06/10/2024 1.5  1.0 - 4.8 K/uL Final    Mono # 06/10/2024 0.3  0.3 - 1.0 K/uL Final    Eos # 06/10/2024 0.1  0.0 - 0.5 K/uL Final    Baso # 06/10/2024 0.03  0.00 - 0.20 K/uL Final    nRBC 06/10/2024 0  0 /100 WBC Final    Gran % 06/10/2024 55.1  38.0 - 73.0 % Final    Lymph % 06/10/2024 34.7  18.0 - 48.0 % Final    Mono % 06/10/2024 7.6  4.0 - 15.0 % Final    Eosinophil % 06/10/2024 1.7  0.0 - 8.0 % Final    Basophil % 06/10/2024 0.7  0.0 - 1.9 % Final    Differential Method 06/10/2024 Automated   Final    Hemoglobin A1C 06/10/2024 6.5 (H)  4.0 - 5.6 % Final    Comment: ADA Screening Guidelines:  5.7-6.4%  Consistent with prediabetes  >or=6.5%  Consistent with diabetes    High levels of fetal hemoglobin interfere with  "the HbA1C  assay. Heterozygous hemoglobin variants (HbS, HgC, etc)do  not significantly interfere with this assay.   However, presence of multiple variants may affect accuracy.      Estimated Avg Glucose 06/10/2024 140 (H)  68 - 131 mg/dL Final   Office Visit on 04/22/2024   Component Date Value Ref Range Status    SARS Coronavirus 2 Antigen 04/22/2024 Negative  Negative Final     Acceptable 04/22/2024 Yes   Final    Molecular Strep A, POC 04/22/2024 Negative  Negative Final     Acceptable 04/22/2024 Yes   Final       Assessment & Plan:      Diana Casas" was seen today for 4/5 m follow up.  Lab studies will be obtained in 6 weeks.    Current therapy will be continued for treatment of diabetes and hypertension.    The patient will be referred to the [facility] for evaluation treatment of her chronic back pain.    Routine labs will be obtained in 4 months.    Diagnoses and all orders for this visit:    Type 2 diabetes mellitus with diabetic neuropathy, without long-term current use of insulin  -     Comprehensive Metabolic Panel; Future  -     LIPASE; Future  -     Comprehensive Metabolic Panel; Future  -     Lipid Panel; Future  -     CBC Auto Differential; Future  -     Hemoglobin A1C; Future    Hypertension associated with diabetes  -     Comprehensive Metabolic Panel; Future  -     LIPASE; Future  -     Comprehensive Metabolic Panel; Future  -     Lipid Panel; Future  -     CBC Auto Differential; Future  -     Hemoglobin A1C; Future    Chronic kidney disease, stage 3a  -     Comprehensive Metabolic Panel; Future  -     LIPASE; Future  -     Comprehensive Metabolic Panel; Future  -     Lipid Panel; Future  -     CBC Auto Differential; Future  -     Hemoglobin A1C; Future    Hyperlipidemia associated with type 2 diabetes mellitus  -     Comprehensive Metabolic Panel; Future  -     LIPASE; Future  -     Comprehensive Metabolic Panel; Future  -     Lipid Panel; Future  -     " CBC Auto Differential; Future  -     Hemoglobin A1C; Future    Acquired hypothyroidism    Paroxysmal atrial fibrillation    LFT elevation    Chronic right-sided low back pain without sciatica  -     Ambulatory referral/consult to [facility]; Future    Current use of long term anticoagulation    Other orders  -     albuterol (VENTOLIN HFA) 90 mcg/actuation inhaler; Inhale 2 puffs into the lungs every 6 (six) hours as needed for Wheezing. Rescue         Follow up in about 4 months (around 10/17/2024).     Rad Johnston MD

## 2024-07-16 ENCOUNTER — CLINICAL SUPPORT (OUTPATIENT)
Dept: REHABILITATION | Facility: HOSPITAL | Age: 69
End: 2024-07-16
Attending: PHYSICAL MEDICINE & REHABILITATION
Payer: MEDICARE

## 2024-07-16 DIAGNOSIS — Z78.9 IMPAIRED MOBILITY AND ADLS: ICD-10-CM

## 2024-07-16 DIAGNOSIS — R29.898 DECREASED STRENGTH OF TRUNK AND BACK: Primary | ICD-10-CM

## 2024-07-16 DIAGNOSIS — Z74.09 IMPAIRED MOBILITY AND ADLS: ICD-10-CM

## 2024-07-16 PROCEDURE — 97750 PHYSICAL PERFORMANCE TEST: CPT | Mod: 32

## 2024-07-16 NOTE — PLAN OF CARE
[facility]  Physical Therapy Lumbar Evaluation      Name: Diana Montenegro  Clinic Number: [id number]    Therapy Diagnosis:   Encounter Diagnoses   Name Primary?    Decreased strength of trunk and back Yes    Impaired mobility and ADLs      Physician: Rosalie Buchanan, *    Physician Orders: PT Eval and Treat  Medical Diagnosis from Referral: M54.50,G89.29 (ICD-10-CM) - Chronic right-sided low back pain without sciatica  Evaluation Date: 7/16/2024  Authorization Period Expiration: 12/31/2024  Plan of Care Expiration: 9/24/2024  Reassessment Due: 8/16/2024  Visit # / Visits authorized: 1/20  MedX testing visit 2    Time In: 1:00 PM  Time Out: 2:00 PM  Total Billable Time: 60 minutes  INSURANCE and OUTCOMES: Fee for Service with FOTO Outcomes 1/3    Precautions: standard, fall, history of stroke, and      Pattern of pain determined: 1 PEN    Subjective     Date of onset:  chronic, at least 10 years   History of current condition: Kaleigh reports right sided low back pain for past ten plus year.  She says pain has gotten more aggravating in past few months. She believes pain in her right knee and change in how she is walking has made her back pain worse.  She has most difficulty with standing and walking and will get spasms in right side of low back with activity.  Pain is intermittent, ranges from 3-8 in severity.  Pt goes to water aerobics class twice a week, works out in the pool three times a week on her own.  Pt says she plans to have her right knee replaced this fall.     Medical History:   Past Medical History:   Diagnosis Date    Arthritis     Atrial fibrillation, unspecified     hx of a fib prior to stroke.    Chronic back pain     Colon polyp     CVA (cerebral infarction) 07/16/2014    Degenerative disc disease     cervical; lumbar    Diabetes mellitus type II     Encounter for loop recorder at end of battery life 09/17/2018    Hyperlipidemia     Hypertension      Hypothyroidism     Mild nonproliferative diabetic retinopathy 08/12/2018    Obesity     Sleep apnea     Stroke 07/2014    Venous insufficiency (chronic) (peripheral)        Surgical History:   Diana Montenegro  has a past surgical history that includes Tubal ligation; Tonsillectomy; Hernia repair; Gastrectomy; Colonoscopy w/ polypectomy; Colonoscopy (N/A, 08/30/2018); Removal of implantable loop recorder (N/A, 09/17/2018); and Colonoscopy (N/A, 12/6/2023).    Medications:   Diana has a current medication list which includes the following prescription(s): acyclovir 5%, albuterol, albuterol-ipratropium, apixaban, aspirin, b complex vitamins, blood pressure test kit-large, blood sugar diagnostic, blood-glucose meter, calcium citrate/vitamin d3, cetirizine, ciclopirox, cinnamon bark, cyanocobalamin (vitamin b-12), diclofenac sodium, diphenhydrAMINE-aluminum-magnesium hydroxide-simethicone-LIDOcaine viscous HCl 2%, docusate sodium, eylea, fish oil-omega-3 fatty acids, fluticasone propionate, glimepiride, lancets, levothyroxine, lorazepam, metformin, metoprolol tartrate, multivitamin, mupirocin, omeprazole, ozempic, promethazine-dextromethorphan, rosuvastatin, rutin/hesp/bioflav/c/[id number], senna, trazodone, and valsartan-hydrochlorothiazide, and the following Facility-Administered Medications: 0.9% nacl.    Allergies:   Review of patient's allergies indicates:   Allergen Reactions    Medrol [methylprednisolone] Palpitations        Imaging: MRI   MRI lumbar 2015  Technique: Sagittal T1, sagittal T2, sagittal STIR, axial T1, and axial T2 weighted images of the lumbar spine were obtained without contrast.     Comparison: MRI lumbar spine 2/23/12.     Findings: Examination limited secondary to open magnet technique.     There is grade 1 anterolisthesis of L4 on L5.  Otherwise, lumbar spinal alignment is within normal limits.  The vertebral body heights are well maintained, with no fracture.  There is no marrow  signal abnormality suspicious for infiltrative process.  There is multilevel disk desiccation with prominent disk height loss noted at L3-L4 and L4-L5 with degenerative endplate changes..  The conus is normal in appearance and terminates at the L1 level.  The adjacent soft tissue structures show no significant abnormalities.     T12-L1: No focal disk bulge, significant spinal canal or neuroforaminal narrowing.     L1-L2: There is mild broad-based disk bulge, facet arthropathy without significant central canal or neural foraminal narrowing.     L2-L3: There is mild broad-based disk bulge, facet arthropathy without significant central canal or neural foraminal narrowing.     L3-L4: Degenerative disk changes with disk height loss and superimposed broad-based disk bulge which contributes to mild spinal canal narrowing.  Additionally there is mild right neural foraminal stenosis.     L4-L5: Grade 1 anterolisthesis of L4 on L5 with uncovering of the disk. There is severe degenerative disk disease at L4-L5 with abnormal STIR hyperintensity within the disk as well as adjacent endplates suggestive of edema/inflammation.  Superimposed infection is not excluded. There is broad-based disk bulge and facet arthropathy with severe spinal canal stenosis at this level.  Additionally, there is moderate bilateral neuroforaminal narrowing.     L5-S1: There is broad-based disk bulge and facet arthropathy without significant spinal canal or neuroforaminal stenosis.  IMPRESSION:         Severe degenerative disk disease and facet joint arthropathy with grade 1 anterolisthesis at L4-5, resulting in severe spinal canal and moderate neural foraminal stenosis, all likely contributing to back pain.  Superimposed spondylodiskitis not excluded.    Prior Therapy: no  Prior Treatment: none  Social History:  lives alone in first floor apartment  Occupation: retired  Leisure: going to gym, vacation/traveling      Prior Level of Function:  "independent  Current Level of Function: independent  DME owned/used: SPC, rollator  Gym Membership: yes, [employer]    Pain:  Current 0/10, worst 8/10, best 0/10   Location: right back   Description: Aching and Tight  Aggravating Factors: Standing and Walking  Easing Factors:  stop and bend forward  Disturbed Sleep: no    Pattern of pain questions:  1.  Where is your pain the worst? back  2.  Is your pain constant or intermittent? intermittent  3.  Does bending forward make your typical pain worse? no  4.  Since the start of your back pain, has there been a change in your bowel or bladder? no  5.  What can't you do now that you use to be able to do? Household chores, vacuuming, walking distances    Pts goals: "try to get rid of this pain"    Red Flag Screening:   Cough/Sneeze Strain: (--)  Bladder/Bowel: (--)  Falls: (+)  most recent fall in January, 5 falls last year, went for PT  Night pain: (--)  Unexplained weight loss: (--)  General health: fair (pt reports kidney and liver issues)    Objective      Postural examination/scapula alignment: Rounded shoulder, Head forward, and increased lumbar lordosis  Joint integrity: Firm end feeling  Skin integrity:WNL   Edema: None  Correction of posture: better with lumbar roll  Sitting: poor  Standing: fair    MOVEMENT LOSS - Lumbar   Norms ROM Loss Initial   Flexion Fingers touch toes, sacral angle >/= 70 deg, uniform spinal curvature, posterior weight shift  within functional limits   Extension ASIS surpasses toes, spine of scapulae surpasses heels, uniform spinal curve moderate loss   Side glide Right  minimal loss   Side glide Left  minimal loss   Rotation Right PT observes contralateral shoulder minimal loss   Rotation Left PT observes contralateral shoulder minimal loss     Lower Extremity Strength  Right LE  Left LE    Hip flexion: 3+/5 Hip flexion: 4-/5   Hip extension:  3+/5 Hip extension: 3+/5   Hip abduction: 4-/5 Hip abduction: 4-/5   Hip adduction:  4/5 Hip " adduction:  4/5   Hip External Rotation 4/5 Hip External Rotation 4/5   Hip Internal rotation   4/5 Hip Internal rotation 4/5   Knee Flexion 4+/5 Knee Flexion 4+/5   Knee Extension 4+/5 Knee Extension 4+/5   Ankle dorsiflexion: 4+/5 Ankle dorsiflexion: 4+/5   Ankle plantarflexion: 4+/5 Ankle plantarflexion: 4+/5     GAIT:  Assistive Device used: straight cane  Level of Assistance: independent  Patient displays the following gait deviations: antalgic gait and trendelenburg gait pattern .     Special Tests:   Test Name  Test Result   Prone Instability Test (+)   SI Joint Provocation Test (--)   Straight Leg Raise (--)   Neural Tension Test (--)   Crossed Straight Leg Raise (--)   Walking on toes Able   Walking on heels  Able     NEUROLOGICAL SCREENING:     Sensory deficits: intact to light touch    Reflexes:    Left Right   Patella Tendon 1+ 1+   Achilles Tendon 1+ 1+   Babinski  NT NT   Clonus (--) (--)     REPEATED TEST MOVEMENTS:    Baseline symptoms:  Repeated Flexion in Standing better  abolished pain caused by extension   Repeated Extension in Standing end range pain  worse   Repeated Flexion in lying no effect   Repeated Extension in lying  no effect       STATIC TESTS and other movements:   Prone lie no effect   Prone lie on elbows no effect   Sitting slouched  no effect   Sitting erect no effect   Standing slouched no effect   Standing erect  no worse  no better   Lying prone in extension  NT   Long sitting   NT   Sustained flexion better   Sustained prone using mat NT     Lumbar testing Visit 2      Intake Outcome Measure for FOTO Lumbar Survey    Therapist reviewed FOTO scores for Diana Montenegro on 7/16/2024.   FOTO report - see Media section or FOTO account episode details.    Intake Score: 49% functional ability  Goal: 58%            Treatment     Total Treatment time separate from Evaluation: 20 minutes    Diana received therapeutic exercises to develop/improve posture, lumbar ROM, strength, and  "muscular endurance for 10 minutes including the following exercises:     LTR x 10  SLR x 10 with transverse abdominal activation  SKTC 5" x 5     Written Home Exercises Provided: yes.    HEP AS FOLLOWS:  LTR, SLR, SKTC    Exercises were reviewed and Kaleigh was able to demonstrate them prior to the end of the session. Kaleigh demonstrated good  understanding of the education provided.     See EMR under Patient Instructions for exercises provided 7/16/2024.    MedX Testing:  MedX testing to be performed next visit        7/16/2024     3:29 PM   [facility]   Visit Number 1   VAS Pain Rating 0         Therapeutic Education/Activity provided for 10 minutes:   - Patient was given an [facility] Visit 1 handout which discusses the following:  - what to expect in therapy  - an overview of the program, including health coaching and wellness  - importance of spinal hygiene, proper posture, lifting mechanics, sleep quality, and nutrition/hydration   - Charles roll trialed, recommended, and purchase information was provided.  - Patient received a handout regarding anticipated muscular soreness following the isometric test and strategies for management were reviewed with patient including stretching, using ice and scheduled rest.   - Patient received verbal education on the following:   - Healthy [facility] program   - purpose of the isometric test  - safe progression of lumbar strengthening, wellness approach, and systemic strengthening.   - safe usage of MedX machine and testing protocols.    Kaleigh received cold pack for 0 minutes to low back in Z-lie.    Assessment     Diana is a 69 y.o. female referred to [facility] with a medical diagnosis of M54.50,G89.29 (ICD-10-CM) - Chronic right-sided low back pain without sciatica. Pt presents with poor posture, decreased lumbar strength and range of motion, decreased core and proximal lower extremity strength, balance and gait deficits.  Pt had increased right " sided low back pain with extension motion but pain was resolved with movement into flexion range of motion. Isometric strength testing will be completed next visit. All of the above noted supports potential lumbar classification as a pattern 1 PEN with recurrent/or consistent symptoms, thus pt is a good candidate for the [facility] Program. Pt would benefit from LE and trunk mobility training, stability training,  improved cardiovascular and muscular endurance, neuromuscular re-education for posture, coordination, and muscular recruitment and education on positional offloading techniques to decrease the intensity and frequency of flare-ups.       Pain Pattern: 1 PEN       Pt prognosis is Good.     Pt will benefit from skilled outpatient Physical Therapy to address the deficits stated above and in the chart below, to provide pt/family education, and to maximize pt's level of independence. Based on the above history and physical examination an active physical therapy program is recommended.      Plan of care discussed with patient: Yes  Pt's spiritual, cultural and educational needs considered and patient is agreeable to the plan of care and goals as stated below:     Anticipated Barriers for therapy: right knee pain/OA    PT Evaluation Completed? Yes    Medical necessity is demonstrated by the following problem list:    History  Co-morbidities and personal factors that may impact the plan of care [] LOW: no personal factors / co-morbidities  [] MODERATE: 1-2 personal factors / co-morbidities  [x] HIGH: 3+ personal factors / co-morbidities    Moderate / High Support Documentation:   Co-morbidities affecting plan of care: OA, body habitus, HTN    Personal Factors:   no deficits     Examination  Body Structures and Functions, activity limitations and participation restrictions that may impact the plan of care [x] LOW: addressing 1-2 elements  [] MODERATE: 3+ elements  [] HIGH: 4+ elements (please support  below)    Moderate / High Support Documentation:     Clinical Presentation [x] LOW: stable  [] MODERATE: Evolving  [] HIGH: Unstable     Decision Making/ Complexity Score: low         GOALS: Pt is in agreement with the following goals.    Short term goals:  6 weeks or 10 visits   - Pt will demonstrate increased lumbar MedX ROM by at least 3 degrees from the initial ROM value with improvements noted in functional ROM and ability to perform ADLs. Appropriate and Ongoing  - Pt will demonstrate increased MedX average isometric strength value by 20% from initial test resulting in improved ability to perform bending, lifting, and carrying activities safely, confidently. Appropriate and Ongoing  - Pt will report a reduction in worst pain score by 1-2 points for improved tolerance for household chores. Appropriate and Ongoing  - Pt able to perform HEP correctly with minimal cueing or supervision from therapist to encourage independent management of symptoms. Appropriate and Ongoing    Long term goals: 10 weeks or 20 visits   - Pt will demonstrate increased lumbar MedX ROM by at least 9 degrees from initial ROM value, resulting in improved ability to perform functional forward bending while standing and sitting. Appropriate and Ongoing  - Pt will demonstrate increased MedX average isometric strength value by 35% from initial test resulting in improved ability to perform bending, lifting, and carrying activities safely and confidently. Appropriate and Ongoing  - Pt to demonstrate ability to independently control and reduce their pain through posture positioning and mechanical movements throughout a typical day. Appropriate and Ongoing  - Pt will demonstrate reduced pain and improved functional outcomes as reported on the FOTO by reaching an intake score of >/= 58% functional ability in order to demonstrate subjective improvement in patient's condition. . Appropriate and Ongoing  - Pt will demonstrate independence with the HEP  at discharge. Appropriate and Ongoing  - Pt will be able to complete meal prep without onset of back pain. Appropriate and Ongoing    Plan     Outpatient physical therapy 2x week for 10 weeks or 20 visits to include the following:   - Patient education  - Therapeutic exercise  - Manual therapy  - Performance testing   - Neuromuscular Re-education  - Therapeutic activity   - Modalities    Pt may be seen by PTA as part of the rehabilitation team.     Therapist: Henrietta Rodriguez, PT  7/16/2024

## 2024-07-18 ENCOUNTER — CLINICAL SUPPORT (OUTPATIENT)
Dept: REHABILITATION | Facility: HOSPITAL | Age: 69
End: 2024-07-18
Attending: PHYSICAL MEDICINE & REHABILITATION
Payer: MEDICARE

## 2024-07-18 DIAGNOSIS — G89.29 CHRONIC RIGHT-SIDED LOW BACK PAIN WITHOUT SCIATICA: ICD-10-CM

## 2024-07-18 DIAGNOSIS — Z74.09 IMPAIRED MOBILITY AND ADLS: ICD-10-CM

## 2024-07-18 DIAGNOSIS — M54.50 CHRONIC RIGHT-SIDED LOW BACK PAIN WITHOUT SCIATICA: ICD-10-CM

## 2024-07-18 DIAGNOSIS — R29.898 DECREASED STRENGTH OF TRUNK AND BACK: Primary | ICD-10-CM

## 2024-07-18 DIAGNOSIS — Z78.9 IMPAIRED MOBILITY AND ADLS: ICD-10-CM

## 2024-07-18 PROCEDURE — 97750 PHYSICAL PERFORMANCE TEST: CPT

## 2024-07-18 PROCEDURE — 97110 THERAPEUTIC EXERCISES: CPT

## 2024-07-18 NOTE — PROGRESS NOTES
"[facility]      Name: Diana Montenegro  Clinic Number: [id number]    Therapy Diagnosis:   Encounter Diagnoses   Name Primary?    Chronic right-sided low back pain without sciatica     Decreased strength of trunk and back Yes    Impaired mobility and ADLs      Physician: Rosalie Buchanan, *    Visit Date: 2024  Physician Orders: PT Eval and Treat  Medical Diagnosis from Referral: M54.50,G89.29 (ICD-10-CM) - Chronic right-sided low back pain without sciatica  Evaluation Date: 2024  Authorization Period Expiration: 2024  Plan of Care Expiration: 2024  Reassessment Due: 2024  Visit # / Visits authorized:   MedX testing visit 2     Time In: 220  Time Out: 330  Total Billable Time: 60 minutes  INSURANCE and OUTCOMES: Fee for Service with FOTO Outcomes 1/3     Precautions: standard, fall, history of stroke     Pattern of pain determined: 1 PEN      Subjective   Diana reports she has been performing HEP daily and continues to go to the gym several times a wk.      Patient reports tolerating previous visit well  Patient reports their pain to be n/a/10 on a 0-10 scale with 0 being no pain and 10 being the worst pain imaginable.  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[employer], rollator  Gym Membership: yes, [employer]       Pt goals: "try to get rid of this pain"     Objective     Baseline IM Testing Results:   Date of testin24  ROM 0-39 deg   Max Peak Torque 85    Min Peak Torque 19   Flex/Ext Ratio 4.4:1   % below normative data 57       Intake Outcome Measure for FOTO Lumbar Survey     Therapist reviewed FOTO scores for Diana Montenegro on 2024.   FOTO report - see Media section or FOTO account episode " details.     Intake Score: 49% functional ability  Goal: 58%           Treatment    Kaleigh received the treatments listed below:      MedX testing performed day 2: Patient  received neuromuscular education to engage spinal musculature correctly for motor control and engagement of musculature for 15 minutes including the MedX exercise component and practice and standard testing. MedX dynamic exercise and baseline isometric test performed with instructions to guide the patient safely through the testing procedure. Patient instructed to perform isometric test correctly and safely while building to an optimal force with a pain-free effort. Patient also instructed that they should feel support/pressure from MedX restraints but no pain/discomfort, and encouraged to report any pain to therapist. Patient demonstrated appropriate understanding of information and tolerance of test.  Education regarding purpose of test, safety during test given, and reviewed possible more soreness and strategies.            7/18/2024     3:12 PM   [facility]   Visit Number 2   VAS Pain Rating 0   Time 5   Flexion in Lying 10   Lumbar Extension Seat Pad 2   Femur Restraint 6   Top Dead Center 24   Lumbar Flexion 39   Lumbar Extension 0   Lumbar Peak Torque 85 ft. lbs.   Min Torque 19   Test Percent Below Normative Data 57 %   Ice - Z Lie (in min.) 5       therapeutic exercises to develop strength, endurance, ROM, flexibility, posture, and core stabilization for 45 minutes including:  LTR, 10x  SKTC 5x10 sec  SLR  PPT 10x  Bridge 10x  Peripheral muscle strengthening which included 1 set of 15-20 repetitions at a slow, controlled 10-13 second per rep pace focused on strengthening supporting musculature for improved body mechanics and functional mobility.  Pt and therapist focused on proper form during treatment to ensure optimal strengthening of each targeted muscle group.  Machines were utilized including torso rotation, leg press, hip  abd and hip add, leg ext.  Leg curl, triceps, biceps, chest and row added visit 3      cold pack for 5 minutes to LB.    Home Exercises Provided and Patient Education Provided   Home exercises include:SLR/LTR/SKTC  Cardio program:TBD  Lifting education date:TBD    Fridge Magnet Discharge handout (date given):  Equipment at home/gym membership: [facility]      Education provided:   - exertion levels  Review of HEP  Med X testing protocol    Written Home Exercises Provided: Patient instructed to cont prior HEP.  Exercises were reviewed and Kaleigh was able to demonstrate them prior to the end of the session.  Kaleigh demonstrated good  understanding of the education provided.     See EMR under Patient Instructions for exercises provided prior visit.          Assessment     Pt presents to second healthy back visit reporting 0/10 pain scale, was able to demo HEP with Min VC for form. Pt was tested on Med X today and demonstrates a 57% deficit as compared to age related strength norms. Flex to ext ratio was 4:1, therefore start initial Med X weight at < 50% max peak torque.. Pt was also able to complete half of the peripheral strengthening exercises without increased discomfort and will complete the complete circuit next visit as tolerated.    Patient is making good progress towards established goals.  Pt will continue to benefit from skilled outpatient physical therapy to address the deficits stated in the impairment chart, provide pt/family education and to maximize pt's level of independence in the home and community environment.     Anticipated Barriers for therapy: right knee pain/OA   Pt's spiritual, cultural and educational needs considered and pt agreeable to plan of care and goals as stated below:       GOALS: Pt is in agreement with the following goals.     Short term goals:  6 weeks or 10 visits   - Pt will demonstrate increased lumbar MedX ROM by at least 3 degrees from the initial ROM value with improvements noted in  functional ROM and ability to perform ADLs. Appropriate and Ongoing  - Pt will demonstrate increased MedX average isometric strength value by 20% from initial test resulting in improved ability to perform bending, lifting, and carrying activities safely, confidently. Appropriate and Ongoing  - Pt will report a reduction in worst pain score by 1-2 points for improved tolerance for household chores. Appropriate and Ongoing  - Pt able to perform HEP correctly with minimal cueing or supervision from therapist to encourage independent management of symptoms. Appropriate and Ongoing     Long term goals: 10 weeks or 20 visits   - Pt will demonstrate increased lumbar MedX ROM by at least 9 degrees from initial ROM value, resulting in improved ability to perform functional forward bending while standing and sitting. Appropriate and Ongoing  - Pt will demonstrate increased MedX average isometric strength value by 35% from initial test resulting in improved ability to perform bending, lifting, and carrying activities safely and confidently. Appropriate and Ongoing  - Pt to demonstrate ability to independently control and reduce their pain through posture positioning and mechanical movements throughout a typical day. Appropriate and Ongoing  - Pt will demonstrate reduced pain and improved functional outcomes as reported on the FOTO by reaching an intake score of >/= 58% functional ability in order to demonstrate subjective improvement in patient's condition. . Appropriate and Ongoing  - Pt will demonstrate independence with the HEP at discharge. Appropriate and Ongoing  - Pt will be able to complete meal prep without onset of back pain. Appropriate and Ongoing          Plan   Continue with established Plan of Care towards established PT goals.       Therapist: Margy Jefferson, PT  7/18/2024

## 2024-07-19 NOTE — PROGRESS NOTES
"[facility]      Name: Diana Montenegro  Clinic Number: [id number]    Therapy Diagnosis:   Encounter Diagnoses   Name Primary?    Decreased strength of trunk and back Yes    Impaired mobility and ADLs      Physician: Rosalie Buchanan, *    Visit Date: 2024  Physician Orders: PT Eval and Treat  Medical Diagnosis from Referral: M54.50,G89.29 (ICD-10-CM) - Chronic right-sided low back pain without sciatica  Evaluation Date: 2024  Authorization Period Expiration: 2024  Plan of Care Expiration: 2024  Reassessment Due: 2024  Visit # / Visits authorized: 3/20  MedX testing visit 2     Time In: 1:30 PM   Time Out:  2:25PM  Total Billable Time: 25 minutes( 1 on 1 time)  INSURANCE and OUTCOMES: Fee for Service with FOTO Outcomes 1/3     Precautions: standard, fall, history of stroke     Pattern of pain determined: 1 PEN    Subjective   Diana reports no back pain presently. States that she had some increased knee and R wrist soreness after last session. States that she is in need of a knee replacement.    Patient reports tolerating previous visit soreness  Patient reports their pain to be 0/10 on a 0-10 scale with 0 being no pain and 10 being the worst pain imaginable.  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[employer], rollator  Gym Membership: yes, [facility]     Pt goals: "try to get rid of this pain"     Objective     Baseline IM Testing Results:   Date of testin24  ROM 0-39 deg   Max Peak Torque 85    Min Peak Torque 19   Flex/Ext Ratio 4.4:1   % below normative data 57       Intake Outcome Measure for FOTO Lumbar Survey     Therapist reviewed FOTO scores for Diana Montenegro on 2024.   FOTO report - see Media " section or FOTO account episode details.     Intake Score: 49% functional ability  Goal: 58%           Treatment    Kaleigh received the treatments listed below:      Kaleigh received neuromuscular education  to isolate and engage spinal stabilization musculature correctly for motor control and coordination to aid in function and posture for 10 minutes on the Medical Medx Machine.  Patient performed MedX dynamic exercise with emphasis on spinal muscular control using pacer throughout  active range of motion. Therapist assisted patient in achieving optimal exertion for neural reeducation and endurance training by using the  Suresh Exertion Rating scale, by instructing the patient to aim for mid range of exertion, performing 15-20 repetitions, slowly, correctly,and safely.          7/22/2024     1:40 PM   [facility] - Short   Visit Number 3   VAS Pain Rating 0   Time 5   Lumbar Stretches - Slouch 10   Flexion in Lying 10   Lumbar Weight 35 lbs   Repetitions 15   Rating of Perceived Exertion 3      therapeutic exercises to develop strength, endurance, ROM, flexibility, posture, and core stabilization for 40 minutes including:    LTR, 10x  SKTC x 10 ,5 sec  PPT 10x ( cues)  Bridge + RTB 10x  + BKFO RTB x 10  + SOC x 10    Peripheral muscle strengthening which included 1 set of 15-20 repetitions at a slow, controlled 10-13 second per rep pace focused on strengthening supporting musculature for improved body mechanics and functional mobility.  Pt and therapist focused on proper form during treatment to ensure optimal strengthening of each targeted muscle group.  Machines were utilized including torso rotation, leg press, hip abd and hip add, leg ext.  Leg curl, triceps, biceps, chest and row added visit 3    cold pack for 5 minutes to LB.    Home Exercises Provided and Patient Education Provided   Home exercises include:SLR/LTR/SKTC  Cardio program:TBD  Lifting education date:TBD  Lumbar roll: recommended  Fridge Magnet  Discharge handout (date given):  Equipment at home/gym membership: [facility]      Education provided:   - cues w/ex's  MedX performance  Precor ex performance    Written Home Exercises Provided: Patient instructed to cont prior HEP.  Exercises were reviewed and Kaleigh was able to demonstrate them prior to the end of the session.  Kaleigh demonstrated good  understanding of the education provided.     See EMR under Patient Instructions for exercises provided prior visit.    Assessment   Kaleigh presents to her third healthy back visit without c/o back pain. She does c/o some chronic arthritic knee and R wrist discomfort. Treatment continued with flexibility, strengthening and neuromuscular reeducation ex's. Added RTB resistance to bridging, BKFO and also added SOC ex this visit.. She was able to perform ex's with min cues and without increased pain.  Lumbar MedX resistance was initiated at 35 ft/lbs (< 50% Max peak torque due to higher flex/ext ratio) and she completed 15 reps with a RPE = 3/10. (Min cues for pacing and extension hold).She completed peripheral strengthening ex's without increased pain (leg extension and chest press were deferred.. Will continue per HB protocol and patient tolerance.     Patient is making progress towards established goals.  Pt will continue to benefit from skilled outpatient physical therapy to address the deficits stated in the impairment chart, provide pt/family education and to maximize pt's level of independence in the home and community environment.     Anticipated Barriers for therapy: right knee pain/OA   Pt's spiritual, cultural and educational needs considered and pt agreeable to plan of care and goals as stated below:       GOALS: Pt is in agreement with the following goals.     Short term goals:  6 weeks or 10 visits   - Pt will demonstrate increased lumbar MedX ROM by at least 3 degrees from the initial ROM value with improvements noted in functional ROM and ability to perform ADLs.  Appropriate and Ongoing  - Pt will demonstrate increased MedX average isometric strength value by 20% from initial test resulting in improved ability to perform bending, lifting, and carrying activities safely, confidently. Appropriate and Ongoing  - Pt will report a reduction in worst pain score by 1-2 points for improved tolerance for household chores. Appropriate and Ongoing  - Pt able to perform HEP correctly with minimal cueing or supervision from therapist to encourage independent management of symptoms. Appropriate and Ongoing     Long term goals: 10 weeks or 20 visits   - Pt will demonstrate increased lumbar MedX ROM by at least 9 degrees from initial ROM value, resulting in improved ability to perform functional forward bending while standing and sitting. Appropriate and Ongoing  - Pt will demonstrate increased MedX average isometric strength value by 35% from initial test resulting in improved ability to perform bending, lifting, and carrying activities safely and confidently. Appropriate and Ongoing  - Pt to demonstrate ability to independently control and reduce their pain through posture positioning and mechanical movements throughout a typical day. Appropriate and Ongoing  - Pt will demonstrate reduced pain and improved functional outcomes as reported on the FOTO by reaching an intake score of >/= 58% functional ability in order to demonstrate subjective improvement in patient's condition. . Appropriate and Ongoing  - Pt will demonstrate independence with the HEP at discharge. Appropriate and Ongoing  - Pt will be able to complete meal prep without onset of back pain. Appropriate and Ongoing    Plan   Continue with established Plan of Care towards established PT goals.     Therapist: Jani Carrizales, PTA  7/19/2024

## 2024-07-19 NOTE — PROGRESS NOTES
"[facility]      Name: Diana Montenegro  Clinic Number: [id number]    Therapy Diagnosis:   Encounter Diagnoses   Name Primary?    Decreased strength of trunk and back Yes    Impaired mobility and ADLs      Physician: Rosalie Buchanan, *    Visit Date: 2024  Physician Orders: PT Eval and Treat  Medical Diagnosis from Referral: M54.50,G89.29 (ICD-10-CM) - Chronic right-sided low back pain without sciatica  Evaluation Date: 2024  Authorization Period Expiration: 2024  Plan of Care Expiration: 2024  Reassessment Due: 2024  Visit # / Visits authorized:   MedX testing visit 2     Time In: 1:00 PM   Time Out: 1:55 PM  Total Billable Time: 40 minutes  INSURANCE and OUTCOMES: Fee for Service with FOTO Outcomes 1/3     Precautions: standard, fall, history of stroke     Pattern of pain determined: 1 PEN    Subjective   Diana reports no problems after initial MedX performance last visit. No c/o back pain currently. States that she has some discomfort in her Knees (R worse than L) and is in need of Knee replacement. States that she met with Dr. Sweeney in Orthopedics yesterday to discuss this.     Patient reports tolerating previous visit: No c/o  Patient reports their pain to be 0/10 on a 0-10 scale with 0 being no pain and 10 being the worst pain imaginable.  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[employer], rollator  Gym Membership: yes, [employer]     Pt goals: "try to get rid of this pain"     Objective     Baseline IM Testing Results:   Date of testin24  ROM 0-39 deg   Max Peak Torque 85    Min Peak Torque 19   Flex/Ext Ratio 4.4:1   % below normative data 57       Intake Outcome Measure for FOTO Lumbar Survey   "   Therapist reviewed FOTO scores for Diana Montenegro on 7/16/2024.   FOTO report - see Media section or FOTO account episode details.     Intake Score: 49% functional ability  Goal: 58%           Treatment    Kaleigh received the treatments listed below:      Kaleigh received neuromuscular education  to isolate and engage spinal stabilization musculature correctly for motor control and coordination to aid in function and posture for 10 minutes on the Medical Medx Machine.  Patient performed MedX dynamic exercise with emphasis on spinal muscular control using pacer throughout  active range of motion. Therapist assisted patient in achieving optimal exertion for neural reeducation and endurance training by using the  Suresh Exertion Rating scale, by instructing the patient to aim for mid range of exertion, performing 15-20 repetitions, slowly, correctly,and safely.          7/24/2024     1:32 PM   [facility] - Short   Visit Number 4   VAS Pain Rating 0   Time 5   Lumbar Stretches - Slouch 10   Extension in Standing 10   Flexion in Lying 10   Lumbar Weight 38 lbs   Repetitions 20   Rating of Perceived Exertion 3      therapeutic exercises to develop strength, endurance, ROM, flexibility, posture, and core stabilization for 45 minutes including:    LTR, 10x  DKTC c/T-ball 10 ,5 sec  PPT 10x ( cues)  Bridge + RTB 15x  BKFO RTB x 10  SOC x 10  +EIS x 10    Peripheral muscle strengthening which included 1 set of 15-20 repetitions at a slow, controlled 10-13 second per rep pace focused on strengthening supporting musculature for improved body mechanics and functional mobility.  Pt and therapist focused on proper form during treatment to ensure optimal strengthening of each targeted muscle group.  Machines were utilized including torso rotation, leg press, hip abd and hip add, leg ext (NP).  Leg curl, triceps, biceps, chest (NP) and row added visit 3    cold pack for 5 minutes to LB.    Home Exercises Provided and  Patient Education Provided   Home exercises include:SLR/LTR/SKTC  Cardio program:TBD  Lifting education date:TBD  Lumbar Roll: recommended  Fridge Magnet Discharge handout (date given):  Equipment at home/gym membership: [facility]    Education provided:   - cues w/exs   MedX performance  Precor ex performance    Written Home Exercises Provided: Patient instructed to cont prior HEP.  Exercises were reviewed and Kaleigh was able to demonstrate them prior to the end of the session.  Kaleigh demonstrated good  understanding of the education provided.     See EMR under Patient Instructions for exercises provided prior visit.    Assessment   Kaleigh presents to her fourth healthy back visit reporting some (B) knee discomfort R>L due to arthritis but no back pain currently. Treatment continued with flexibility, strengthening and neuromuscular reeducation ex's.  She was progressed with increased reps for bridging with band and also added EIS this visit.  She was able to perform ex's with min cues and without increased pain.  Lumbar MedX resistance was maintained at 38 ft/lbs and she completed 20 reps with a RPE = 3/10. (Min cues for pacing and extension hold initially). She completed peripheral strengthening ex's without c/o.(Leg extension not performed due to knee pain and chest press not performed due to wrist pain). Will continue per HB protocol and patient tolerance.      Patient is making  progress towards established goals.  Pt will continue to benefit from skilled outpatient physical therapy to address the deficits stated in the impairment chart, provide pt/family education and to maximize pt's level of independence in the home and community environment.     Anticipated Barriers for therapy: right knee pain/OA   Pt's spiritual, cultural and educational needs considered and pt agreeable to plan of care and goals as stated below:       GOALS: Pt is in agreement with the following goals.     Short term goals:  6 weeks or 10 visits   -  Pt will demonstrate increased lumbar MedX ROM by at least 3 degrees from the initial ROM value with improvements noted in functional ROM and ability to perform ADLs. Appropriate and Ongoing  - Pt will demonstrate increased MedX average isometric strength value by 20% from initial test resulting in improved ability to perform bending, lifting, and carrying activities safely, confidently. Appropriate and Ongoing  - Pt will report a reduction in worst pain score by 1-2 points for improved tolerance for household chores. Appropriate and Ongoing  - Pt able to perform HEP correctly with minimal cueing or supervision from therapist to encourage independent management of symptoms. Appropriate and Ongoing     Long term goals: 10 weeks or 20 visits   - Pt will demonstrate increased lumbar MedX ROM by at least 9 degrees from initial ROM value, resulting in improved ability to perform functional forward bending while standing and sitting. Appropriate and Ongoing  - Pt will demonstrate increased MedX average isometric strength value by 35% from initial test resulting in improved ability to perform bending, lifting, and carrying activities safely and confidently. Appropriate and Ongoing  - Pt to demonstrate ability to independently control and reduce their pain through posture positioning and mechanical movements throughout a typical day. Appropriate and Ongoing  - Pt will demonstrate reduced pain and improved functional outcomes as reported on the FOTO by reaching an intake score of >/= 58% functional ability in order to demonstrate subjective improvement in patient's condition. . Appropriate and Ongoing  - Pt will demonstrate independence with the HEP at discharge. Appropriate and Ongoing  - Pt will be able to complete meal prep without onset of back pain. Appropriate and Ongoing    Plan   Continue with established Plan of Care towards established PT goals.     Therapist: Jani Carrizales, PTA  7/24/2024

## 2024-07-22 ENCOUNTER — CLINICAL SUPPORT (OUTPATIENT)
Dept: REHABILITATION | Facility: HOSPITAL | Age: 69
End: 2024-07-22
Attending: PHYSICAL MEDICINE & REHABILITATION
Payer: MEDICARE

## 2024-07-22 DIAGNOSIS — Z78.9 IMPAIRED MOBILITY AND ADLS: ICD-10-CM

## 2024-07-22 DIAGNOSIS — R29.898 DECREASED STRENGTH OF TRUNK AND BACK: Primary | ICD-10-CM

## 2024-07-22 DIAGNOSIS — Z74.09 IMPAIRED MOBILITY AND ADLS: ICD-10-CM

## 2024-07-22 PROCEDURE — 97110 THERAPEUTIC EXERCISES: CPT | Mod: CQ

## 2024-07-22 PROCEDURE — 97112 NEUROMUSCULAR REEDUCATION: CPT | Mod: CQ

## 2024-07-23 ENCOUNTER — HOSPITAL ENCOUNTER (OUTPATIENT)
Dept: RADIOLOGY | Facility: HOSPITAL | Age: 69
Discharge: HOME | End: 2024-07-23
Attending: ORTHOPAEDIC SURGERY
Payer: MEDICARE

## 2024-07-23 ENCOUNTER — OFFICE VISIT (OUTPATIENT)
Dept: ORTHOPEDICS | Facility: CLINIC | Age: 69
End: 2024-07-23
Payer: MEDICARE

## 2024-07-23 VITALS — HEIGHT: 65 IN | BODY MASS INDEX: 33.21 KG/M2 | WEIGHT: 199.31 LBS

## 2024-07-23 DIAGNOSIS — M25.562 ACUTE PAIN OF BOTH KNEES: ICD-10-CM

## 2024-07-23 DIAGNOSIS — M17.0 ARTHRITIS OF BOTH KNEES: Primary | ICD-10-CM

## 2024-07-23 DIAGNOSIS — M25.561 ACUTE PAIN OF BOTH KNEES: Primary | ICD-10-CM

## 2024-07-23 DIAGNOSIS — M25.562 ACUTE PAIN OF BOTH KNEES: Primary | ICD-10-CM

## 2024-07-23 DIAGNOSIS — M25.561 ACUTE PAIN OF BOTH KNEES: ICD-10-CM

## 2024-07-23 DIAGNOSIS — M17.11 PRIMARY OSTEOARTHRITIS OF RIGHT KNEE: ICD-10-CM

## 2024-07-23 PROCEDURE — 3008F BODY MASS INDEX DOCD: CPT | Mod: CPTII,S$GLB,, | Performed by: ORTHOPAEDIC SURGERY

## 2024-07-23 PROCEDURE — 73564 X-RAY EXAM KNEE 4 OR MORE: CPT | Mod: 26,50,, | Performed by: RADIOLOGY

## 2024-07-23 PROCEDURE — 99999 PR PBB SHADOW E&M-EST. PATIENT-LVL IV: CPT | Mod: PBBFAC,,, | Performed by: ORTHOPAEDIC SURGERY

## 2024-07-23 PROCEDURE — 73564 X-RAY EXAM KNEE 4 OR MORE: CPT | Mod: TC,50

## 2024-07-23 PROCEDURE — 3288F FALL RISK ASSESSMENT DOCD: CPT | Mod: CPTII,S$GLB,, | Performed by: ORTHOPAEDIC SURGERY

## 2024-07-23 PROCEDURE — 1159F MED LIST DOCD IN RCRD: CPT | Mod: CPTII,S$GLB,, | Performed by: ORTHOPAEDIC SURGERY

## 2024-07-23 PROCEDURE — 3066F NEPHROPATHY DOC TX: CPT | Mod: CPTII,S$GLB,, | Performed by: ORTHOPAEDIC SURGERY

## 2024-07-23 PROCEDURE — 3044F HG A1C LEVEL LT 7.0%: CPT | Mod: CPTII,S$GLB,, | Performed by: ORTHOPAEDIC SURGERY

## 2024-07-23 PROCEDURE — 3060F POS MICROALBUMINURIA REV: CPT | Mod: CPTII,S$GLB,, | Performed by: ORTHOPAEDIC SURGERY

## 2024-07-23 PROCEDURE — 1126F AMNT PAIN NOTED NONE PRSNT: CPT | Mod: CPTII,S$GLB,, | Performed by: ORTHOPAEDIC SURGERY

## 2024-07-23 PROCEDURE — 1100F PTFALLS ASSESS-DOCD GE2>/YR: CPT | Mod: CPTII,S$GLB,, | Performed by: ORTHOPAEDIC SURGERY

## 2024-07-23 PROCEDURE — 99204 OFFICE O/P NEW MOD 45 MIN: CPT | Mod: S$GLB,,, | Performed by: ORTHOPAEDIC SURGERY

## 2024-07-23 NOTE — PROGRESS NOTES
"  Subjective:     HPI:   Diana Montenegro is a 69 y.o. female who presents for eval B knee pain     Seen 4/18/19: B knee pain   "Bilateral knee varus arthritis which appears minimally symptomatic, exam most localizes the pes bursitis/Tendinitis"  Discussed pes bursitis: tylenol, PT, no injections (says she cannot have steroids)  Re: knee OA: BMI optimization, A1c <8, social support (family in [place])    Today:   Thinks knees are throwing her back out of wack, walking crooked all the time   C/o knee pain global today R>L      Meds: volt gel for knees, cannot take nsaids  Inj: says she cannot have steroids, synvisc with [facility] years ago  PT: doing healthy back PT now  Asst: rollator long distances, can short distances  Limits: walk like a normal person  Sos: lives alone, initially says all family in [facility]    Initially says she does not know anyone who can help  But then:   Does have stepson who lives in [place]  [place] a [place]  Could get mother or sister to come down and help    Stepson: says his mother was type 1 diabetic, lost both legs after TKA    History of Present Illness  The patient presents for evaluation of knee pain.    The patient was last evaluated in 2019 for knee arthritis and pes bursitis. At that time, we discussed the potential benefits of Tylenol and therapy. However, due to a steroid allergy, steroid injections were deemed unnecessary. The patient's long-term knee arthritis necessitated optimization of her weight and diabetes management. She experienced a stroke in 2014 and is currently on a regimen of aspirin and Eliquis. Her diabetes has shown significant improvement, with a recent reading of 16. She has experienced significant weight loss, which she attributes to a sleeve gastrectomy performed in 2015. Her last consultation with a vascular specialist was in 08/2023. She is preparing to consult a cardiologist due to a perceived abnormality on a CT scan. Her last " carotid ultrasound was conducted in 10/2020, with a follow-up scheduled for 1 year later. She expresses a desire to have her knees evaluated, which is exacerbating her back pain. She is currently undergoing physical therapy for her back, which she believes is contributing to her gait abnormality. She uses a walker for mobility and experiences widespread knee pain during ambulation. She applies Voltaren gel to her knees and does not take any medication for her back pain. Prolonged standing results in spasms. She received gel injections in her knees several years ago, but found them ineffective. Her knee pain limits her mobility, and she lives alone.   She is allergic to STEROIDS.       Objective:   Body mass index is 33.16 kg/m².  Exam:    Physical Exam  Significant limp and antalgic gait favoring the right leg are observed. No groin pain with active straight leg raise. No pain with active or passive range of motion of her hip joint. Distally, sensation is intact to light touch with 2-second capillary refill at her toes. Nonpalpable DP pulse and 1+ PT pulse are present. Knee range of motion is 10 to 120 degrees. Alignment is 0 degrees, which does correct into valgus. Tender to palpation at the medial, lateral, and patellofemoral joint lines with mild to moderate effusion are observed. Knee is stable to anterior, posterior, varus and valgus stresses with significant flexion contracture, but no extension laxity.        Imaging:    Results  Imaging  X-rays of the thigh bones and shin bones show severe arthritis, with the right knee showing bone-on-bone arthritis. Significant calcification down the arteries on both legs.    KNEE R ARTHRITIS    Indication:  Right knee pain  Exam Ordered: Radiographs of the right knee include a standing anteroposterior view, a standing posterioanterior view, a lateral view in full flexion, and a sunrise view  Details of Examination: Exam shows evidence of joint space narrowing, osteophyte  formation, and subchondral sclerosis, all consistent with degenerative arthritis of the knee.  No other significant findings are noted.  Impression:  Degenerative Arthritis, Right Knee and KNEE L ARTHRITIS     Indication:  Left knee pain  Exam Ordered: Radiographs of the left knee include a standing anteroposterior view, a standing posterioanterior view, a lateral view in full flexion, and a sunrise view  Details of Examination: Exam shows evidence of joint space narrowing, osteophyte formation, and subchondral sclerosis, all consistent with degenerative arthritis of the knee.  No other significant findings are noted.  Impression:  Degenerative Arthritis, Left Knee    B knee Klg4 varus OA, severe bone on bone    Normal DEXA 2021      Assessment/Plan:     No diagnosis found.     BMI 33 down from 41 2019    Allergic to steroids - medrol palpitations    Atrial fibrillation on Eliquis and aspirin    History of R sided MCA stroke related carotid stenosis 2014 -   R 60-79% ICA stenosis, L 40-59% ICA stenosis last carotid US 10/2023, Dr Vega plan for f/u in 1 year    Gastric sleeve  Sleep apnea  Chronic back pain  Anemia 10.9 -> 10.8 = baseline  Hx of Poorly controlled diabetes, 8.9 -> now 6.5  CKD 1.4/41  CBP  Alb 3.6  Elevated LFTs - AST 73,     Assessment/Plan:     Assessment & Plan  1. Knee arthritis.  The patient's x-rays from 2019 revealed severe arthritis, with the right knee exhibiting more severe symptoms, indicative of bone-on-bone contact. A comprehensive discussion regarding knee replacement surgery was held with the patient, emphasizing that her Medicare Advantage plan will no longer cover a skilled nursing facility or rehabilitation post-knee replacement. Given her concurrent use of blood thinners, anti-inflammatory medications may not be suitable for her. The use of Voltaren gel was suggested, along with the addition of Tylenol Arthritis to her current regimen.    2. Vascular  calcification.  There is significant calcification down the arteries in both legs and poor pulses in her feet. Blood flow studies will be ordered to assess the blood flow in her legs. She was advised to consult with Dr. Johnston for medical optimization from a general medical perspective for surgery and to consult her cardiologist, Dr. Montenegro, for vascular optimization.    The above findings were discussed with patient length. We discussed the risks of conservative versus surgical management knee arthritis. Conservative management consisting of anti-inflammatory medications, glucosamine/chondroitin sulfate, weight loss, physical therapy, activity modification, as well as injections (lubricant versus corticosteroid) was discussed at length. At this point considering the patient's level of activity, pain, and radiographic findings I recommend TKA when medically optimized    I explained the potentially adverse gastric, cardiac, and renal effects of NSAIDs and explained that if the patient wishes to take it for longer that the patient should discuss this with their primary care physician to determine if it is safe to do so.    x Tylenol   Can't take Aleve   x Voltaren Gel    AA[facility] non-op arthritis info    Bursitis info   X + velys Total Joint Info    HEP: [facility] home exercise conditioning program     PT    CSI: intra-articular steroid injection    CSI: greater trochanter bursitis    HA: hyaluronic acid injection    Brace:     Referral:      -Concerned about PAD - poorly palpable pulses and calcification of artery on XR and medical Hx  Order STACEY    She has multiple medical co-morbidities which make her a high risk surgical candidate:   -PCP=general medical optimization  -cardiology optimization  -vascular (Gary) optimization: awaiting STACEY, also carotid dispo  She needs to reach out to these providers to obtain notes of optimziation     Social support set up  Medicare advantage - discussed would be outpatient so  no SNF benefit    In the interim: refer to pain clinic for non-op options    No orders of the defined types were placed in this encounter.      This note was generated with the assistance of ambient listening technology. Verbal consent was obtained by the patient and accompanying visitor(s) for the recording of patient appointment to facilitate this note. I attest to having reviewed and edited the generated note for accuracy, though some syntax or spelling errors may persist. Please contact the author of this note for any clarification.            Past Medical History:   Diagnosis Date    Arthritis     Atrial fibrillation, unspecified     hx of a fib prior to stroke.    Chronic back pain     Colon polyp     CVA (cerebral infarction) 07/16/2014    Degenerative disc disease     cervical; lumbar    Diabetes mellitus type II     Encounter for loop recorder at end of battery life 09/17/2018    Hyperlipidemia     Hypertension     Hypothyroidism     Mild nonproliferative diabetic retinopathy 08/12/2018    Obesity     Sleep apnea     Stroke 07/2014    Venous insufficiency (chronic) (peripheral)        Past Surgical History:   Procedure Laterality Date    COLONOSCOPY N/A 08/30/2018    Procedure: COLONOSCOPY;  Surgeon: William Clement MD;  [facility]  Service: Endoscopy;  Laterality: N/A;  Eliquis/Dr Rad Johnston/OK to hold 2 days prior to colon/see telephone encounter dated 7/24/18/pt has loop recorder/svn    COLONOSCOPY N/A 12/6/2023    Procedure: COLONOSCOPY;  Surgeon: Vinny Youssef MD;  Location: [facility] (4TH FLR);  Service: Colon and Rectal;  Laterality: N/A;  ref Green  eliquis   diabetic/ [facility] ozempic hold 7days prior    peg prep jm / loop recorder  ok to hold Eliquis 2 days per KHANH Rivera NP/L Robert RN-GT  11/29-precall complete-pt verbalized understanding of holding Ozempic-MS    COLONOSCOPY W/ POLYPECTOMY      GASTRECTOMY      HERNIA REPAIR      REMOVAL OF IMPLANTABLE LOOP RECORDER N/A  09/17/2018    Procedure: REMOVAL, IMPLANTABLE LOOP RECORDER;  Surgeon: hTomas Randolph MD;  Location: [facility] CATH LAB;  Service: Cardiology;  Laterality: N/A;  TERESA, ILR removal (no reimplant), STACY, RN Inocencio, SK, 3 Prep *Reveal LINQ*    TONSILLECTOMY      TUBAL LIGATION         Family History   Problem Relation Name Age of Onset    No Known Problems Mother      Heart disease Father      No Known Problems Sister      No Known Problems Sister      No Known Problems Maternal Grandmother      Diabetes Maternal Grandfather      Obesity Maternal Grandfather      Stroke Paternal Grandmother      No Known Problems Paternal Grandfather      Heart attack Neg Hx      Cirrhosis Neg Hx         Social History     Socioeconomic History    Marital status: Single   Occupational History    Occupation: MOS      Employer: [employer]   Tobacco Use    Smoking status: Former     Current packs/day: 0.00     Average packs/day: 1 pack/day for 30.0 years (30.0 ttl pk-yrs)     Types: Cigarettes     Start date: 7/16/1984     Quit date: 7/16/2014     Years since quitting: 10.0    Smokeless tobacco: Never   Substance and Sexual Activity    Alcohol use: Not Currently    Drug use: No     Comment: thc at young age    Sexual activity: Not Currently     Partners: Male     Social Determinants of Health     Financial Resource Strain: Low Risk  (1/19/2024)    Overall Financial Resource Strain (CARDIA)     Difficulty of Paying Living Expenses: Not very hard   Food Insecurity: No Food Insecurity (1/19/2024)    Hunger Vital Sign     Worried About Running Out of Food in the Last Year: Never true     Ran Out of Food in the Last Year: Never true   Transportation Needs: No Transportation Needs (1/19/2024)    PRAPARE - Transportation     Lack of Transportation (Medical): No     Lack of Transportation (Non-Medical): No   Physical Activity: Sufficiently Active (1/19/2024)    Exercise Vital Sign     Days of Exercise per Week: 3 days     Minutes  of Exercise per Session: 90 min   Stress: Stress Concern Present (1/19/2024)    [place] [employer] - Occupational Stress Questionnaire     Feeling of Stress : To some extent   Housing Stability: Low Risk  (1/19/2024)    Housing Stability Vital Sign     Unable to Pay for Housing in the Last Year: No     Number of Places Lived in the Last Year: 1     Unstable Housing in the Last Year: No

## 2024-07-24 ENCOUNTER — CLINICAL SUPPORT (OUTPATIENT)
Dept: REHABILITATION | Facility: HOSPITAL | Age: 69
End: 2024-07-24
Attending: PHYSICAL MEDICINE & REHABILITATION
Payer: MEDICARE

## 2024-07-24 DIAGNOSIS — Z78.9 IMPAIRED MOBILITY AND ADLS: ICD-10-CM

## 2024-07-24 DIAGNOSIS — R29.898 DECREASED STRENGTH OF TRUNK AND BACK: Primary | ICD-10-CM

## 2024-07-24 DIAGNOSIS — Z74.09 IMPAIRED MOBILITY AND ADLS: ICD-10-CM

## 2024-07-24 PROCEDURE — 97112 NEUROMUSCULAR REEDUCATION: CPT | Mod: CQ

## 2024-07-24 PROCEDURE — 97110 THERAPEUTIC EXERCISES: CPT | Mod: CQ

## 2024-07-24 NOTE — PROGRESS NOTES
"[facility]      Name: Diana Montenegro  Clinic Number: [id number]    Therapy Diagnosis:   Encounter Diagnoses   Name Primary?    Decreased strength of trunk and back Yes    Impaired mobility and ADLs      Physician: Rosalie Buchanan, *    Visit Date: 2024  Physician Orders: PT Eval and Treat  Medical Diagnosis from Referral: M54.50,G89.29 (ICD-10-CM) - Chronic right-sided low back pain without sciatica  Evaluation Date: 2024  Authorization Period Expiration: 2024  Plan of Care Expiration: 2024  Reassessment Due: 2024  Visit # / Visits authorized:   MedX testing visit 2     Time In: 1:00 PM   Time Out: 1:55 PM  Total Billable Time: 40 minutes  INSURANCE and OUTCOMES: Fee for Service with FOTO Outcomes 1/3     Precautions: standard, fall, history of stroke     Pattern of pain determined: 1 PEN    Subjective   Diana reports no problems after initial MedX performance last visit. No c/o back pain currently. States that she has some discomfort in her Knees (R worse than L) and is in need of Knee replacement. States that she met with Dr. Sweeney in Orthopedics yesterday to discuss this.     Patient reports tolerating previous visit: No c/o  Patient reports their pain to be 0/10 on a 0-10 scale with 0 being no pain and 10 being the worst pain imaginable.  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[employer], rollator  Gym Membership: yes, [employer]     Pt goals: "try to get rid of this pain"     Objective     Baseline IM Testing Results:   Date of testin24  ROM 0-39 deg   Max Peak Torque 85    Min Peak Torque 19   Flex/Ext Ratio 4.4:1   % below normative data 57       Intake Outcome Measure for FOTO Lumbar Survey   "   Therapist reviewed FOTO scores for Diana Montenegro on 7/16/2024.   FOTO report - see Media section or FOTO account episode details.     Intake Score: 49% functional ability  Goal: 58%           Treatment    Kaleigh received the treatments listed below:      Kaleigh received neuromuscular education  to isolate and engage spinal stabilization musculature correctly for motor control and coordination to aid in function and posture for 10 minutes on the Medical Medx Machine.  Patient performed MedX dynamic exercise with emphasis on spinal muscular control using pacer throughout  active range of motion. Therapist assisted patient in achieving optimal exertion for neural reeducation and endurance training by using the  Suresh Exertion Rating scale, by instructing the patient to aim for mid range of exertion, performing 15-20 repetitions, slowly, correctly,and safely.          7/24/2024     1:32 PM   [facility] - Short   Visit Number 4   VAS Pain Rating 0   Time 5   Lumbar Stretches - Slouch 10   Extension in Standing 10   Flexion in Lying 10   Lumbar Weight 38 lbs   Repetitions 20   Rating of Perceived Exertion 3      therapeutic exercises to develop strength, endurance, ROM, flexibility, posture, and core stabilization for 45 minutes including:    LTR, 10x  DKTC c/T-ball 10 ,5 sec  PPT 10x ( cues)  Bridge + RTB 15x  BKFO RTB x 10  SOC x 10  +EIS x 10    Peripheral muscle strengthening which included 1 set of 15-20 repetitions at a slow, controlled 10-13 second per rep pace focused on strengthening supporting musculature for improved body mechanics and functional mobility.  Pt and therapist focused on proper form during treatment to ensure optimal strengthening of each targeted muscle group.  Machines were utilized including torso rotation, leg press, hip abd and hip add, leg ext (NP).  Leg curl, triceps, biceps, chest (NP) and row added visit 3    cold pack for 5 minutes to LB.    Home Exercises Provided and  Patient Education Provided   Home exercises include:SLR/LTR/SKTC  Cardio program:TBD  Lifting education date:TBD  Lumbar Roll: recommended  Fridge Magnet Discharge handout (date given):  Equipment at home/gym membership: [facility]    Education provided:   - cues w/exs   MedX performance  Precor ex performance    Written Home Exercises Provided: Patient instructed to cont prior HEP.  Exercises were reviewed and Kaleigh was able to demonstrate them prior to the end of the session.  Kaleigh demonstrated good  understanding of the education provided.     See EMR under Patient Instructions for exercises provided prior visit.    Assessment   Kaleigh presents to her fourth healthy back visit reporting some (B) knee discomfort R>L due to arthritis but no back pain currently. . Treatment continued with flexibility, strengthening and neuromuscular reeducation ex's.  She was progressed with increased reps for bridging with band and also added EIS this visit.  She was able to perform ex's with min cues and without increased pain.  Lumbar MedX resistance was maintained at 38 ft/lbs and she completed 20 reps with a RPE = 3/10. (Min cues for pacing and extension hold initially). She completed peripheral strengthening ex's without c/o.(Leg extension not performed due to knee pain and chest press not performed due to wrist pain). Will continue per HB protocol and patient tolerance.      Patient is making  progress towards established goals.  Pt will continue to benefit from skilled outpatient physical therapy to address the deficits stated in the impairment chart, provide pt/family education and to maximize pt's level of independence in the home and community environment.     Anticipated Barriers for therapy: right knee pain/OA   Pt's spiritual, cultural and educational needs considered and pt agreeable to plan of care and goals as stated below:       GOALS: Pt is in agreement with the following goals.     Short term goals:  6 weeks or 10 visits    - Pt will demonstrate increased lumbar MedX ROM by at least 3 degrees from the initial ROM value with improvements noted in functional ROM and ability to perform ADLs. Appropriate and Ongoing  - Pt will demonstrate increased MedX average isometric strength value by 20% from initial test resulting in improved ability to perform bending, lifting, and carrying activities safely, confidently. Appropriate and Ongoing  - Pt will report a reduction in worst pain score by 1-2 points for improved tolerance for household chores. Appropriate and Ongoing  - Pt able to perform HEP correctly with minimal cueing or supervision from therapist to encourage independent management of symptoms. Appropriate and Ongoing     Long term goals: 10 weeks or 20 visits   - Pt will demonstrate increased lumbar MedX ROM by at least 9 degrees from initial ROM value, resulting in improved ability to perform functional forward bending while standing and sitting. Appropriate and Ongoing  - Pt will demonstrate increased MedX average isometric strength value by 35% from initial test resulting in improved ability to perform bending, lifting, and carrying activities safely and confidently. Appropriate and Ongoing  - Pt to demonstrate ability to independently control and reduce their pain through posture positioning and mechanical movements throughout a typical day. Appropriate and Ongoing  - Pt will demonstrate reduced pain and improved functional outcomes as reported on the FOTO by reaching an intake score of >/= 58% functional ability in order to demonstrate subjective improvement in patient's condition. . Appropriate and Ongoing  - Pt will demonstrate independence with the HEP at discharge. Appropriate and Ongoing  - Pt will be able to complete meal prep without onset of back pain. Appropriate and Ongoing    Plan   Continue with established Plan of Care towards established PT goals.     Therapist: Jani Carrizales, PTA  7/19/2024

## 2024-07-29 NOTE — PROGRESS NOTES
"[facility]      Name: Diana Montenegro  Clinic Number: [id number]    Therapy Diagnosis:   Encounter Diagnoses   Name Primary?    Decreased strength of trunk and back Yes    Impaired mobility and ADLs      Physician: Rosalie Buchanan, *    Visit Date: 2024  Physician Orders: PT Eval and Treat  Medical Diagnosis from Referral: M54.50,G89.29 (ICD-10-CM) - Chronic right-sided low back pain without sciatica  Evaluation Date: 2024  Authorization Period Expiration: 2024  Plan of Care Expiration: 2024  Reassessment Due: 2024  Visit # / Visits authorized:   MedX testing visit 2     Time In: 1:00pm  Time Out: 1:57pm  Total Billable Time: 25 mins 1 on 1 (32 mins with PT Technician)    INSURANCE and OUTCOMES: Fee for Service with FOTO Outcomes 1/3     Precautions: standard, fall, history of stroke     Pattern of pain determined: 1 PEN    Subjective   Diana reports increased pain following last visit, required pain medication and topical cream to bring pain down to a tolerable place for her.    Patient reports their pain to be 0/10 on a 0-10 scale with 0 being no pain and 10 being the worst pain imaginable.  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[employer], rollator  Gym Membership: yes, [employer]     Pt goals: "try to get rid of this pain"     Objective     Baseline IM Testing Results:   Date of testin24  ROM 0-39 deg   Max Peak Torque 85    Min Peak Torque 19   Flex/Ext Ratio 4.4:1   % below normative data 57       Intake Outcome Measure for FOTO Lumbar Survey     Therapist reviewed FOTO scores for Diana Montenegro on 2024.   FOTO report - see Media section or FOTO account episode details.     Intake Score: 49% " functional ability  Goal: 58%           Treatment    Kaleigh received the treatments listed below:      Kaleigh received neuromuscular education  to isolate and engage spinal stabilization musculature correctly for motor control and coordination to aid in function and posture for 10 minutes on the Medical Medx Machine.  Patient performed MedX dynamic exercise with emphasis on spinal muscular control using pacer throughout  active range of motion. Therapist assisted patient in achieving optimal exertion for neural reeducation and endurance training by using the  Suresh Exertion Rating scale, by instructing the patient to aim for mid range of exertion, performing 15-20 repetitions, slowly, correctly,and safely.          7/30/2024     1:54 PM   [facility]   Visit Number 5   VAS Pain Rating 0   Time 5   Lumbar Stretches - Slouch Overcorrection 10   Extension in Standing 10   Flexion in Lying 10   Lumbar Weight 35 lbs   Repetitions 18   Rating of Perceived Exertion 3   Ice - Z Lie (in min.) 5     therapeutic exercises to develop strength, endurance, ROM, flexibility, posture, and core stabilization for 45 minutes including:    LTR, 10x  DKTC c/T-ball 10 ,5 sec  PPT 10x ( cues)  Bridge + RTB 15x  BKFO RTB x 10  SOC x 10  +EIS x 10    Peripheral muscle strengthening which included 1 set of 15-20 repetitions at a slow, controlled 10-13 second per rep pace focused on strengthening supporting musculature for improved body mechanics and functional mobility.  Pt and therapist focused on proper form during treatment to ensure optimal strengthening of each targeted muscle group.  Machines were utilized including torso rotation, leg press, hip abd and hip add, leg ext (NP).  Leg curl, triceps, biceps, chest (NP) and row added visit 3    cold pack for 5 minutes to LB.    Home Exercises Provided and Patient Education Provided   Home exercises include:SLR/LTR/SKTC  Cardio program:TBD  Lifting education date:TBD  Lumbar Roll:  recommended  Frie [employer] Discharge handout (date given):  Equipment at home/gym membership: [facility]    Education provided:   - cues w/exs   MedX performance  Precor ex performance    Written Home Exercises Provided: Patient instructed to cont prior HEP.  Exercises were reviewed and Kaleigh was able to demonstrate them prior to the end of the session.  Kaleigh demonstrated good  understanding of the education provided.     See EMR under Patient Instructions for exercises provided prior visit.    Assessment   Diana presents to 5th healthy back visit reporting pain flare after last treatment session that subsided within 2-3 days. Pt thought pain may have been nerve irritation, but pain pattern over left buttock w/o radicular sx, numbness or tingling and that improved with manual pressure suggests muscular guarding response as etiology. Patient tolerated peripheral strengthening machines well and w/o c/o. MedX strengthening performed for 18 reps at 35 ft-lbs with RPE = 3/10. Did not progress MedX resistance level given pain flare last session, though recommend progressing at next visit if tolerated well. Pt given education regarding nerve irritation vs muscle tension/guarding after exercise which provided relief to her and decreased fear with exercise during treatment.      Patient is making  progress towards established goals.  Pt will continue to benefit from skilled outpatient physical therapy to address the deficits stated in the impairment chart, provide pt/family education and to maximize pt's level of independence in the home and community environment.     Anticipated Barriers for therapy: right knee pain/OA   Pt's spiritual, cultural and educational needs considered and pt agreeable to plan of care and goals as stated below:       GOALS: Pt is in agreement with the following goals.     Short term goals:  6 weeks or 10 visits   - Pt will demonstrate increased lumbar MedX ROM by at least 3 degrees from the initial ROM  value with improvements noted in functional ROM and ability to perform ADLs. Appropriate and Ongoing  - Pt will demonstrate increased MedX average isometric strength value by 20% from initial test resulting in improved ability to perform bending, lifting, and carrying activities safely, confidently. Appropriate and Ongoing  - Pt will report a reduction in worst pain score by 1-2 points for improved tolerance for household chores. Appropriate and Ongoing  - Pt able to perform HEP correctly with minimal cueing or supervision from therapist to encourage independent management of symptoms. Appropriate and Ongoing     Long term goals: 10 weeks or 20 visits   - Pt will demonstrate increased lumbar MedX ROM by at least 9 degrees from initial ROM value, resulting in improved ability to perform functional forward bending while standing and sitting. Appropriate and Ongoing  - Pt will demonstrate increased MedX average isometric strength value by 35% from initial test resulting in improved ability to perform bending, lifting, and carrying activities safely and confidently. Appropriate and Ongoing  - Pt to demonstrate ability to independently control and reduce their pain through posture positioning and mechanical movements throughout a typical day. Appropriate and Ongoing  - Pt will demonstrate reduced pain and improved functional outcomes as reported on the FOTO by reaching an intake score of >/= 58% functional ability in order to demonstrate subjective improvement in patient's condition. . Appropriate and Ongoing  - Pt will demonstrate independence with the HEP at discharge. Appropriate and Ongoing  - Pt will be able to complete meal prep without onset of back pain. Appropriate and Ongoing    Plan   Continue with established Plan of Care towards established PT goals.     Memo Douglas PT

## 2024-07-30 ENCOUNTER — CLINICAL SUPPORT (OUTPATIENT)
Dept: REHABILITATION | Facility: HOSPITAL | Age: 69
End: 2024-07-30
Attending: PHYSICAL MEDICINE & REHABILITATION
Payer: MEDICARE

## 2024-07-30 ENCOUNTER — LAB VISIT (OUTPATIENT)
Dept: LAB | Facility: HOSPITAL | Age: 69
End: 2024-07-30
Attending: INTERNAL MEDICINE
Payer: MEDICARE

## 2024-07-30 DIAGNOSIS — E78.5 HYPERLIPIDEMIA ASSOCIATED WITH TYPE 2 DIABETES MELLITUS: ICD-10-CM

## 2024-07-30 DIAGNOSIS — I15.2 HYPERTENSION ASSOCIATED WITH DIABETES: ICD-10-CM

## 2024-07-30 DIAGNOSIS — R29.898 DECREASED STRENGTH OF TRUNK AND BACK: Primary | ICD-10-CM

## 2024-07-30 DIAGNOSIS — N18.31 CHRONIC KIDNEY DISEASE, STAGE 3A: ICD-10-CM

## 2024-07-30 DIAGNOSIS — Z78.9 IMPAIRED MOBILITY AND ADLS: ICD-10-CM

## 2024-07-30 DIAGNOSIS — E11.59 HYPERTENSION ASSOCIATED WITH DIABETES: ICD-10-CM

## 2024-07-30 DIAGNOSIS — E11.40 TYPE 2 DIABETES MELLITUS WITH DIABETIC NEUROPATHY, WITHOUT LONG-TERM CURRENT USE OF INSULIN: ICD-10-CM

## 2024-07-30 DIAGNOSIS — E11.69 HYPERLIPIDEMIA ASSOCIATED WITH TYPE 2 DIABETES MELLITUS: ICD-10-CM

## 2024-07-30 DIAGNOSIS — Z74.09 IMPAIRED MOBILITY AND ADLS: ICD-10-CM

## 2024-07-30 LAB
ALBUMIN SERPL BCP-MCNC: 3.5 G/DL (ref 3.5–5.2)
ALP SERPL-CCNC: 81 U/L (ref 55–135)
ALT SERPL W/O P-5'-P-CCNC: 94 U/L (ref 10–44)
ANION GAP SERPL CALC-SCNC: 11 MMOL/L (ref 8–16)
AST SERPL-CCNC: 51 U/L (ref 10–40)
BILIRUB SERPL-MCNC: 0.2 MG/DL (ref 0.1–1)
BUN SERPL-MCNC: 42 MG/DL (ref 8–23)
CALCIUM SERPL-MCNC: 10 MG/DL (ref 8.7–10.5)
CHLORIDE SERPL-SCNC: 108 MMOL/L (ref 95–110)
CO2 SERPL-SCNC: 20 MMOL/L (ref 23–29)
CREAT SERPL-MCNC: 1.4 MG/DL (ref 0.5–1.4)
EST. GFR  (NO RACE VARIABLE): 41 ML/MIN/1.73 M^2
GLUCOSE SERPL-MCNC: 104 MG/DL (ref 70–110)
LIPASE SERPL-CCNC: 89 U/L (ref 4–60)
POTASSIUM SERPL-SCNC: 4.8 MMOL/L (ref 3.5–5.1)
PROT SERPL-MCNC: 6.6 G/DL (ref 6–8.4)
SODIUM SERPL-SCNC: 139 MMOL/L (ref 136–145)

## 2024-07-30 PROCEDURE — 80053 COMPREHEN METABOLIC PANEL: CPT | Performed by: INTERNAL MEDICINE

## 2024-07-30 PROCEDURE — 36415 COLL VENOUS BLD VENIPUNCTURE: CPT | Performed by: INTERNAL MEDICINE

## 2024-07-30 PROCEDURE — 83690 ASSAY OF LIPASE: CPT | Performed by: INTERNAL MEDICINE

## 2024-07-30 PROCEDURE — 97110 THERAPEUTIC EXERCISES: CPT | Mod: KX

## 2024-07-30 PROCEDURE — 97112 NEUROMUSCULAR REEDUCATION: CPT | Mod: KX

## 2024-07-31 ENCOUNTER — HOSPITAL ENCOUNTER (OUTPATIENT)
Dept: CARDIOLOGY | Facility: HOSPITAL | Age: 69
Discharge: HOME | End: 2024-07-31
Attending: ORTHOPAEDIC SURGERY
Payer: MEDICARE

## 2024-07-31 DIAGNOSIS — M17.0 ARTHRITIS OF BOTH KNEES: ICD-10-CM

## 2024-07-31 DIAGNOSIS — M17.11 PRIMARY OSTEOARTHRITIS OF RIGHT KNEE: ICD-10-CM

## 2024-07-31 PROCEDURE — 93922 UPR/L XTREMITY ART 2 LEVELS: CPT

## 2024-07-31 PROCEDURE — 93922 UPR/L XTREMITY ART 2 LEVELS: CPT | Mod: 26,,, | Performed by: INTERNAL MEDICINE

## 2024-08-01 ENCOUNTER — TELEPHONE (OUTPATIENT)
Dept: PAIN MEDICINE | Facility: CLINIC | Age: 69
End: 2024-08-01
Payer: MEDICARE

## 2024-08-01 ENCOUNTER — TELEPHONE (OUTPATIENT)
Dept: ELECTROPHYSIOLOGY | Facility: CLINIC | Age: 69
End: 2024-08-01
Payer: MEDICARE

## 2024-08-01 ENCOUNTER — CLINICAL SUPPORT (OUTPATIENT)
Dept: REHABILITATION | Facility: HOSPITAL | Age: 69
End: 2024-08-01
Attending: PHYSICAL MEDICINE & REHABILITATION
Payer: MEDICARE

## 2024-08-01 DIAGNOSIS — Z78.9 IMPAIRED MOBILITY AND ADLS: ICD-10-CM

## 2024-08-01 DIAGNOSIS — Z74.09 IMPAIRED MOBILITY AND ADLS: ICD-10-CM

## 2024-08-01 DIAGNOSIS — R29.898 DECREASED STRENGTH OF TRUNK AND BACK: Primary | ICD-10-CM

## 2024-08-01 LAB
LEFT ABI: 1.03
LEFT ARM BP: 121 MMHG
LEFT DORSALIS PEDIS: 102 MMHG
LEFT POSTERIOR TIBIAL: 125 MMHG
LEFT TBI: 0.55
LEFT TOE PRESSURE: 66 MMHG
RIGHT ABI: 1.07
RIGHT ARM BP: 117 MMHG
RIGHT DORSALIS PEDIS: 102 MMHG
RIGHT POSTERIOR TIBIAL: 129 MMHG
RIGHT TBI: 0.69
RIGHT TOE PRESSURE: 83 MMHG

## 2024-08-01 PROCEDURE — 97110 THERAPEUTIC EXERCISES: CPT

## 2024-08-01 PROCEDURE — 97112 NEUROMUSCULAR REEDUCATION: CPT

## 2024-08-01 NOTE — PROGRESS NOTES
"[facility]      Name: Diana Montenegro  Clinic Number: [id number]    Therapy Diagnosis:   Encounter Diagnoses   Name Primary?    Decreased strength of trunk and back Yes    Impaired mobility and ADLs      Physician: Rosalie Buchanan, *    Visit Date: 2024  Physician Orders: PT Eval and Treat  Medical Diagnosis from Referral: M54.50,G89.29 (ICD-10-CM) - Chronic right-sided low back pain without sciatica  Evaluation Date: 2024  Authorization Period Expiration: 2024  Plan of Care Expiration: 2024  Reassessment Due: 2024  Visit # / Visits authorized:   MedX testing visit 2     Time In: 1:30pm  Time Out: 230  Total Billable Time: 55    INSURANCE and OUTCOMES: Fee for Service with FOTO Outcomes 1/3     Precautions: standard, fall, history of stroke     Pattern of pain determined: 1 PEN    Subjective   Diana reports she had increased pain following water aerobics yesterday.  Instructed pt to ice and stretch if she has inc soreness  Patient reports their pain to be 1/10 on a 0-10 scale with 0 being no pain and 10 being the worst pain imaginable.  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[employer], rollator  Gym Membership: yes, [employer]     Pt goals: "try to get rid of this pain"     Objective     Baseline IM Testing Results:   Date of testin24  ROM 0-39 deg   Max Peak Torque 85    Min Peak Torque 19   Flex/Ext Ratio 4.4:1   % below normative data 57       Intake Outcome Measure for FOTO Lumbar Survey     Therapist reviewed FOTO scores for Diana Montenegro on 2024.   FOTO report - see Media section or FOTO account episode details.     Intake Score: 49% functional ability  Goal: 58%           Treatment    Kaleigh " received the treatments listed below:      Kaleigh received neuromuscular education  to isolate and engage spinal stabilization musculature correctly for motor control and coordination to aid in function and posture for 10 minutes on the Medical Medx Machine.  Patient performed MedX dynamic exercise with emphasis on spinal muscular control using pacer throughout  active range of motion. Therapist assisted patient in achieving optimal exertion for neural reeducation and endurance training by using the  Suresh Exertion Rating scale, by instructing the patient to aim for mid range of exertion, performing 15-20 repetitions, slowly, correctly,and safely.        therapeutic exercises to develop strength, endurance, ROM, flexibility, posture, and core stabilization for 45 minutes including:    LTR, 10x  DKTC c/T-ball 10 ,5 sec  PPT 10x ( cues)  Bridge + RTB 20x  BKFO RTB x 10  SOC x 10  +EIS x 10  SLR c/ TrA contracttion 10x  Peripheral muscle strengthening which included 1 set of 15-20 repetitions at a slow, controlled 10-13 second per rep pace focused on strengthening supporting musculature for improved body mechanics and functional mobility.  Pt and therapist focused on proper form during treatment to ensure optimal strengthening of each targeted muscle group.  Machines were utilized including torso rotation, leg press, hip abd and hip add, leg ext (NP).  Leg curl, triceps, biceps, chest (NP) and row added visit 3    cold pack for 5 minutes to LB.    Home Exercises Provided and Patient Education Provided   Home exercises include:SLR/LTR/SKTC  Cardio program:TBD  Lifting education date:TBD  Lumbar Roll: recommended  [employer] Discharge handout (date given):  Equipment at home/gym membership: [employer]    Education provided:   - cues w/exs   MedX performance  Precor ex performance    Written Home Exercises Provided: Patient instructed to cont prior HEP.  Exercises were reviewed and Kaleigh was able to demonstrate them prior to the  end of the session.  Kaleigh demonstrated good  understanding of the education provided.     See EMR under Patient Instructions for exercises provided prior visit.    Assessment   Diana presents to 6th healthy back visit reporting pain after exercising yesterday.  Pt reports she did not stretch or ice after having pain.  Educated pt stretching and ice to decrease symptoms. Added SLR and reps to bridges today . Patient tolerated peripheral strengthening machines well and w/o c/o. MedX strengthening performed for 20 reps at 35 ft-lbs with RPE = 3/10. Increase 10% next visit. Pt given education regarding nerve irritation vs muscle tension/guarding after exercise which provided relief to her and decreased fear with exercise during treatment.      Patient is making  progress towards established goals.  Pt will continue to benefit from skilled outpatient physical therapy to address the deficits stated in the impairment chart, provide pt/family education and to maximize pt's level of independence in the home and community environment.     Anticipated Barriers for therapy: right knee pain/OA   Pt's spiritual, cultural and educational needs considered and pt agreeable to plan of care and goals as stated below:       GOALS: Pt is in agreement with the following goals.     Short term goals:  6 weeks or 10 visits   - Pt will demonstrate increased lumbar MedX ROM by at least 3 degrees from the initial ROM value with improvements noted in functional ROM and ability to perform ADLs. Appropriate and Ongoing  - Pt will demonstrate increased MedX average isometric strength value by 20% from initial test resulting in improved ability to perform bending, lifting, and carrying activities safely, confidently. Appropriate and Ongoing  - Pt will report a reduction in worst pain score by 1-2 points for improved tolerance for household chores. Appropriate and Ongoing  - Pt able to perform HEP correctly with minimal cueing or supervision from  therapist to encourage independent management of symptoms. Appropriate and Ongoing     Long term goals: 10 weeks or 20 visits   - Pt will demonstrate increased lumbar MedX ROM by at least 9 degrees from initial ROM value, resulting in improved ability to perform functional forward bending while standing and sitting. Appropriate and Ongoing  - Pt will demonstrate increased MedX average isometric strength value by 35% from initial test resulting in improved ability to perform bending, lifting, and carrying activities safely and confidently. Appropriate and Ongoing  - Pt to demonstrate ability to independently control and reduce their pain through posture positioning and mechanical movements throughout a typical day. Appropriate and Ongoing  - Pt will demonstrate reduced pain and improved functional outcomes as reported on the FOTO by reaching an intake score of >/= 58% functional ability in order to demonstrate subjective improvement in patient's condition. . Appropriate and Ongoing  - Pt will demonstrate independence with the HEP at discharge. Appropriate and Ongoing  - Pt will be able to complete meal prep without onset of back pain. Appropriate and Ongoing    Plan   Continue with established Plan of Care towards established PT goals.     Memo Douglas PT

## 2024-08-01 NOTE — TELEPHONE ENCOUNTER
Spoke with pt in regards to message in the portal. Pt agreed to date and time. Call ended. ----- Message from Perla Barrera DO sent at 8/1/2024  4:13 PM CDT -----  Regarding: Needs [place] appt please  Hi, this patient is being referred to our clinic by Dr. Renee at [facility].  If you could please reach out to this patient and help her schedule an appointment with me here in [place].  ----- Message -----  From: Hermelindo Murphy MD  Sent: 7/31/2024   3:36 PM CDT  To: Perla Barrera DO; Piyush Mar MA    Can we get patient sooner appointment and please include Dr Barrera as patient lives closer to [facility]

## 2024-08-02 ENCOUNTER — TELEPHONE (OUTPATIENT)
Dept: PAIN MEDICINE | Facility: CLINIC | Age: 69
End: 2024-08-02
Payer: MEDICARE

## 2024-08-02 ENCOUNTER — OFFICE VISIT (OUTPATIENT)
Dept: PAIN MEDICINE | Facility: CLINIC | Age: 69
End: 2024-08-02
Payer: MEDICARE

## 2024-08-02 VITALS
HEART RATE: 76 BPM | SYSTOLIC BLOOD PRESSURE: 95 MMHG | BODY MASS INDEX: 32.54 KG/M2 | DIASTOLIC BLOOD PRESSURE: 59 MMHG | HEIGHT: 65 IN | WEIGHT: 195.31 LBS

## 2024-08-02 DIAGNOSIS — G89.29 CHRONIC PAIN OF RIGHT KNEE: ICD-10-CM

## 2024-08-02 DIAGNOSIS — G89.4 CHRONIC PAIN SYNDROME: ICD-10-CM

## 2024-08-02 DIAGNOSIS — M17.11 OSTEOARTHRITIS OF RIGHT KNEE, UNSPECIFIED OSTEOARTHRITIS TYPE: Primary | ICD-10-CM

## 2024-08-02 DIAGNOSIS — M25.561 CHRONIC PAIN OF RIGHT KNEE: Primary | ICD-10-CM

## 2024-08-02 DIAGNOSIS — M25.561 CHRONIC PAIN OF RIGHT KNEE: ICD-10-CM

## 2024-08-02 DIAGNOSIS — G89.29 CHRONIC PAIN OF RIGHT KNEE: Primary | ICD-10-CM

## 2024-08-02 PROCEDURE — 99999 PR PBB SHADOW E&M-EST. PATIENT-LVL IV: CPT | Mod: PBBFAC,,, | Performed by: STUDENT IN AN ORGANIZED HEALTH CARE EDUCATION/TRAINING PROGRAM

## 2024-08-02 NOTE — TELEPHONE ENCOUNTER
----- Message from Chaparrita Barrera DO sent at 2024  1:53 PM CDT -----  Regarding: Order for REGINE SOLARES    Patient Name: REGINE SOLARES([id number])  Sex: Female  : 1955      PCP: INÉS PALOMO    Center: [facility] CENTRAL BILLING OFFICE     Types of orders made on 2024: Procedure Request    Order Date:2024  Ordering User:CHAPARRITA BARRERA [[id number]]  Encounter Provider:Chaparrita Barrera DO [[id number]]  Z1   Authorizing Provider: Chaparrita Barrera DO [[id number]]  Department:[facility] PAIN MANAGEMENT[[id number]]    Common Order Information  Procedure -> Joint/Bursa Injection (Specify joint and laterality) Cmt: Right             diagnostic genicular block    Pre-op Diagnosis -> Knee osteoarthritis       -> Chronic pain of right knee     Order Specific Information  Order: Procedure Order to Pain Management [Custom: [id number]]  Order #:            [id number]: 1 FUTURE    Priority: Routine  Class: Clinic Performed    Future Order Information      Expires on:2025            Expected by:2024                   Associated Diagnoses      M17.11 Osteoarthritis of right knee, unspecified osteoarthritis type      M25.561, G89.29 Chronic pain of right knee      Physician -> gelter         Is patient on anti-coagulants? -> Yes Cmt: Eliquis, aspiri  n        Facility Name: -> [facility]           Priority: Routine  Class: Clinic Performed    Future Order Information      Expires on:2025            Expected by:2024                   Associated Diagnoses      M17.11 Osteoarthritis of right knee, unspecified osteoarthritis type      M25.561, G89.29 Chronic pain of right knee      Procedure -> Joint/Bursa Injection (Specify joint and laterality) Cmt:                   Right diagnostic genicular block        Physician -> gelter         Is patient on anti-coagulants? -> Yes Cmt: Eliquis, aspirin        Pre-op Diagnosis -> Knee osteoarthritis           -> Chronic pain of right knee          Facility Name: -> [facility]

## 2024-08-02 NOTE — PROGRESS NOTES
[facility] - New Patient Evaluation    Referred by: Aaareferral Self   Reason for referral: Osteoarthritis of right knee, unspecified osteoarthritis type     CC:   Chief Complaint   Patient presents with    Knee Pain         8/2/2024     1:21 PM   Last 3 PDI Scores   Pain Disability Index (PDI) 63       Subjective 08/02/2024:   Diana Montenegro is a 69 y.o. female who presents complaining of right knee pain.  Imaging was significant for severe degenerative changes at the bilateral medial knee compartment and bilateral femoral patellar joint.  She has been evaluated by Orthopedics who are assessing her candidacy for surgery.  She was previously undergone Synvisc injections and steroid injections which have become less and less efficacious.  Currently taking Tylenol Arthritis and using Voltaren gel.  Unable to take oral NSAIDs due to concurrent anticoagulant therapy.  She was referred here for additional pain management options.    Initial Pain Assessment:  Location: right knee  Onset: years  Current Pain Score: 4/10  Daily Pain of Range: 5-6/10  Quality: Aching, Throbbing, and Shooting  Radiation: N/A  Worsened by: standing for more than a few minutes and walking for more than a few minutes  Improved by: rest     Patient denies night fever/night sweats, urinary incontinence, bowel incontinence, significant weight loss, significant motor weakness, and loss of sensations.      Previous Interventions:  - intra-articular injections with steroid and Synvisc, no longer effective    Previous Therapies:  PT/OT: yes   Chiropractor:   HEP:   Relevant Surgery: no     Previous Medications:   - Tylenol or NSAIDS:   - Muscle Relaxants:    - TCAs:   - SNRIs:   - Topicals:  Voltaren gel  - Anticonvulsants:    - Opioids:   - Adjuvants:     Current Pain Medications:  Voltaren gel    Review of Systems:  ROS    GENERAL:  No weight loss, malaise or fevers.  HEENT:   No recent changes in vision or  hearing  NECK:  No difficulty with swallowing. No stridor.   RESPIRATORY:  Negative for cough, wheezing or shortness of breath, patient denies any recent URI.  CARDIOVASCULAR:  Negative for chest pain, leg swelling or palpitations.  GI:  Negative for abdominal discomfort, blood in stools or black stools or change in bowel habits.  MUSCULOSKELETAL:  See HPI.  SKIN:  Negative for lesions, rash, and itching.  PSYCH:  No mood disorder or recent psychosocial stressors.    HEMATOLOGY/LYMPHOLOGY:  Negative for prolonged bleeding, bruising easily or swollen nodes.  Patient is not currently taking any anti-coagulants  NEURO:   No history of headaches, syncope, paralysis, seizures or tremors.  All other reviewed and negative other than HPI.    History:  Current medications, allergies, medical history, surgical history,   family history, and social history were reviewed in the chart as marked.    Full Medication List:    Current Outpatient Medications:     acyclovir 5% (ZOVIRAX) 5 % ointment, Apply topically 6 (six) times daily., Disp: 5 g, Rfl: 1    albuterol (VENTOLIN HFA) 90 mcg/actuation inhaler, Inhale 2 puffs into the lungs every 6 (six) hours as needed for Wheezing. Rescue, Disp: 7 g, Rfl: 3    apixaban (ELIQUIS) 5 mg Tab, Take 1 tablet (5 mg total) by mouth 2 (two) times daily., Disp: 180 tablet, Rfl: 3    aspirin (ECOTRIN) 81 MG EC tablet, Take 1 tablet (81 mg total) by mouth once daily., Disp: , Rfl:     b complex vitamins tablet, Take 1 tablet by mouth once daily., Disp: , Rfl:     blood pressure test kit-large Kit, Use as directed, Disp: 1 each, Rfl: 0    blood sugar diagnostic (FREESTYLE LITE STRIPS) Strp, USE 1 STRIP TO CHECK GLUCOSE TWICE DAILY, Disp: 200 each, Rfl: 3    blood-glucose meter Misc, One touch verio flex or tone touch verio reflect or freestyle freedom lite meter (all covered by insurance), Disp: 1 each, Rfl: 0    CALCIUM CITRATE/VITAMIN D3 (CALCIUM CITRATE + D ORAL), Take 1 tablet by mouth every  morning., Disp: , Rfl:     cetirizine (ZYRTEC) 5 MG tablet, Take 5 mg by mouth once daily., Disp: , Rfl:     ciclopirox (PENLAC) 8 % Soln, Apply topically nightly., Disp: 6.6 mL, Rfl: 11    cinnamon bark (CINNAMON ORAL), Take by mouth 2 (two) times daily., Disp: , Rfl:     cyanocobalamin, vitamin B-12, 500 mcg Subl, Place 1 tablet under the tongue once daily., Disp: , Rfl:     diclofenac sodium (VOLTAREN) 1 % Gel, APPLY 2 GRAMS TOPICALLY THREE TIMES DAILY, Disp: 200 g, Rfl: 4    diphenhydrAMINE-aluminum-magnesium hydroxide-simethicone-LIDOcaine viscous HCl 2%, Swish and spit 15 mLs every 4 (four) hours as needed (sore throat)., Disp: 120 each, Rfl: 0    docusate sodium (COLACE) 100 MG capsule, Take 100 mg by mouth once daily. , Disp: , Rfl:     EYLEA 2 mg/0.05 mL Syrg, , Disp: , Rfl:     fish oil-omega-3 fatty acids 300-1,000 mg capsule, Take 1 capsule by mouth 2 (two) times daily. Take 1 cap 2 times daily every other day, Disp: , Rfl:     fluticasone (FLONASE) 50 mcg/actuation nasal spray, 2 sprays (100 mcg total) by Each Nare route once daily. (Patient taking differently: 2 sprays by Each Nostril route daily as needed.), Disp: 1 Bottle, Rfl: 0    glimepiride (AMARYL) 2 MG tablet, Take 1 tablet (2 mg total) by mouth before breakfast. For diabetes control., Disp: 90 tablet, Rfl: 0    lancets Misc, Needs lancets for testing twice daily.  To go with meter covered by insurance., Disp: 200 each, Rfl: 3    levothyroxine (SYNTHROID) 88 MCG tablet, Take 1 tablet (88 mcg total) by mouth before breakfast., Disp: 30 tablet, Rfl: 6    LORazepam (ATIVAN) 1 MG tablet, Take 1 tablet (1 mg total) by mouth 2 (two) times daily. as needed for anxiety., Disp: 60 tablet, Rfl: 0    metFORMIN (GLUCOPHAGE) 1000 MG tablet, TAKE 1 TABLET BY MOUTH TWICE DAILY WITH MEALS, Disp: 180 tablet, Rfl: 0    multivitamin capsule, Take 1 capsule by mouth once daily., Disp: , Rfl:     mupirocin (BACTROBAN) 2 % ointment, Apply to affected area 3 times  daily, Disp: 22 g, Rfl: 1    omeprazole (PRILOSEC) 40 MG capsule, Take 1 capsule (40 mg total) by mouth every morning., Disp: 90 capsule, Rfl: 3    OZEMPIC 1 mg/dose (4 mg/3 mL), INJECT 1 MG SUBCUTANEOUSLY EVERY 7 DAYS, Disp: 9 mL, Rfl: 1    promethazine-dextromethorphan (PROMETHAZINE-DM) 6.25-15 mg/5 mL Syrp, Take one tsp po q 6 hrs prn cough, Disp: 180 mL, Rfl: 0    rosuvastatin (CRESTOR) 40 MG Tab, Take 1 tablet by mouth once daily, Disp: 90 tablet, Rfl: 2    RUTIN/HESP/BIOFLAV/C/HERB#196 (BIOFLEX ORAL), Take 2 tablets by mouth once daily., Disp: , Rfl:     senna (SENNA) 8.6 mg tablet, Take 2 tablets by mouth nightly as needed for Constipation., Disp: 100 tablet, Rfl: 3    traZODone (DESYREL) 50 MG tablet, TAKE 1 TABLET BY MOUTH NIGHTLY AS NEEDED FOR  INSOMNIA  (OKAY  TO  TAKE  2ND  TABLET  BY  MOUTH  IN  30  MINUTES  IF  STILL  AWAKE), Disp: 180 tablet, Rfl: 3    valsartan-hydrochlorothiazide (DIOVAN-HCT) 80-12.5 mg per tablet, Take 1 tablet by mouth once daily., Disp: 90 tablet, Rfl: 3    albuterol-ipratropium 2.5mg-0.5mg/3mL (DUO-NEB) 0.5 mg-3 mg(2.5 mg base)/3 mL nebulizer solution, Take 3 mLs by nebulization every 6 (six) hours as needed for Wheezing. Rescue (Patient taking differently: Take 3 mLs by nebulization every 6 (six) hours as needed for Wheezing. Rescue prn), Disp: 3 vial, Rfl: 3    metoprolol tartrate (LOPRESSOR) 25 MG tablet, Take 1 tablet (25 mg total) by mouth once as needed. For palpitations, Disp: 30 tablet, Rfl: 11  No current facility-administered medications for this visit.    Facility-Administered Medications Ordered in Other Visits:     0.9%  NaCl infusion, , Intravenous, Continuous, Khadijah Rivera NP, 1,000 mL at 09/17/18 0939     Allergies:  Medrol [methylprednisolone]     Medical History:   has a past medical history of Arthritis, Atrial fibrillation, unspecified, Chronic back pain, Colon polyp, CVA (cerebral infarction) (07/16/2014), Degenerative disc disease, Diabetes  "mellitus type II, Encounter for loop recorder at end of battery life (09/17/2018), Hyperlipidemia, Hypertension, Hypothyroidism, Mild nonproliferative diabetic retinopathy (08/12/2018), Obesity, Sleep apnea, Stroke (07/2014), and Venous insufficiency (chronic) (peripheral).    Surgical History:   has a past surgical history that includes Tubal ligation; Tonsillectomy; Hernia repair; Gastrectomy; Colonoscopy w/ polypectomy; Colonoscopy (N/A, 08/30/2018); Removal of implantable loop recorder (N/A, 09/17/2018); and Colonoscopy (N/A, 12/6/2023).    Family History:  family history includes Diabetes in her maternal grandfather; Heart disease in her father; No Known Problems in her maternal grandmother, mother, paternal grandfather, sister, and sister; Obesity in her maternal grandfather; Stroke in her paternal grandmother.    Social History:   reports that she quit smoking about 10 years ago. Her smoking use included cigarettes. She started smoking about 40 years ago. She has a 30 pack-year smoking history. She has never used smokeless tobacco. She reports that she does not currently use alcohol. She reports that she does not use drugs.    Physical Exam:  Vitals:    08/02/24 1325   BP: (!) 95/59   Pulse: 76   Weight: 88.6 kg (195 lb 5.2 oz)   Height: 5' 5" (1.651 m)   PainSc:   4   PainLoc: Knee       GENERAL: Well appearing, in no acute distress, alert and oriented x3.  PSYCH:  Mood and affect appropriate.  SKIN: Skin color, texture, turgor normal, no rashes or lesions.  HEAD/FACE:  Normocephalic, atraumatic. Cranial nerves grossly intact.  NECK: Normal ROM. Supple.   CV: RRR with palpation of the radial artery.  PULM: No evidence of respiratory difficulty, symmetric chest rise.  GI:  Soft and non-distended.  MSK:   + pain with palpation of the right knee joint lines.  Pain with passive range of motion.  No obvious deformities, edema, or skin discoloration.  No atrophy or tone abnormalities are noted.   NEURO: Bilateral " upper and lower extremity coordination and strength is symmetric.  No loss of sensation is noted.  MENTAL STATUS: A x O x 3, good concentration, speech is fluent and goal directed  MOTOR: 5/5 in bilateral lower extremity muscle groups  GAIT:  Pain with ambulation.  Antalgic.  Ambulates unassisted.    Imaging 07/23/24:  X-ray Knee Ortho Bilateral with Flexion  Order: [id number]  Status: Final result       Visible to patient: Yes (not seen)       Next appt: 08/06/2024 at 02:00 PM in Outpatient Rehab ([facility])       Dx: Acute pain of both knees    0 Result Notes  Details    Reading Physician Reading Date Result Priority   Yuki Mancilla MD  [phone] 7/23/2024 Routine     Narrative & Impression  EXAMINATION:  XR KNEE ORTHO BILAT WITH FLEXION     CLINICAL HISTORY:  Pain in right knee     TECHNIQUE:  AP standing of both knees, PA flexion standing views of both knees, and Merchant views of both knees were performed.  Lateral views of both knees were also performed.     COMPARISON:  03/02/2024     FINDINGS:  Right knee: Severe medial knee compartment joint space narrowing.  Large osteophyte at the medial and lateral knee margin.  No acute fracture, no osseous lesions.  Severe lateral femoral patellar joint space narrowing and large osteophyte at the femoral patellar joint.  No joint effusion.  Extensive vascular calcifications seen.  The soft tissues appear normal.     Left knee: Severe medial knee compartment joint space narrowing.  Large osteophyte at the medial knee margin.  No acute fracture, no osseous lesions.  Severe lateral femoral patellar joint space narrowing and large osteophyte at the femoral patellar joint.  Trace of joint fluid.  Extensive vascular calcification.  The soft tissues appear normal.     Impression:     Severe degenerative changes at the bilateral medial knee compartment and bilateral femoral patellar joint.        Electronically signed by:Yuki Mancilla MD  Date:                                             07/23/2024  Time:                                           16:08           Exam Ended: 07/23/24 14:54 CDT Last Resulted: 07/23/24 16:08 CDT             Labs:  BMP  Lab Results   Component Value Date     07/30/2024    K 4.8 07/30/2024     07/30/2024    CO2 20 (L) 07/30/2024    BUN 42 (H) 07/30/2024    CREATININE 1.4 07/30/2024    CALCIUM 10.0 07/30/2024    ANIONGAP 11 07/30/2024    EGFRNORACEVR 41.0 (A) 07/30/2024     Lab Results   Component Value Date    ALT 94 (H) 07/30/2024    AST 51 (H) 07/30/2024    ALKPHOS 81 07/30/2024    BILITOT 0.2 07/30/2024     Lab Results   Component Value Date    WBC 4.21 06/10/2024    HGB 10.8 (L) 06/10/2024    HCT 35.4 (L) 06/10/2024    MCV 94 06/10/2024     (L) 06/10/2024           Assessment:  Problem List Items Addressed This Visit    None  Visit Diagnoses       Osteoarthritis of right knee, unspecified osteoarthritis type    -  Primary    Relevant Orders    Procedure Order to Pain Management    Chronic pain of right knee        Relevant Orders    Procedure Order to Pain Management    Chronic pain syndrome                08/02/2024 - Diana Montenegro is a 69 y.o. female who  has a past medical history of Arthritis, Atrial fibrillation, unspecified, Chronic back pain, Colon polyp, CVA (cerebral infarction) (07/16/2014), Degenerative disc disease, Diabetes mellitus type II, Encounter for loop recorder at end of battery life (09/17/2018), Hyperlipidemia, Hypertension, Hypothyroidism, Mild nonproliferative diabetic retinopathy (08/12/2018), Obesity, Sleep apnea, Stroke (07/2014), and Venous insufficiency (chronic) (peripheral).  By history and examination this patient has chronic chronic right knee pain secondary to severe degenerative joint disease.  Pathology is confirmed by imaging.  We discussed the underlying diagnoses and multiple treatment options including non-opioid medications, interventional procedures, physical  therapy, and home exercise.  She was currently being assessed for candidacy for a knee replacement.  Previous injections with steroid and Synvisc have become less efficacious.  Would recommend genicular nerve blocks/RFA.  The risks and benefits of each treatment option were discussed and all questions were answered.      Treatment Plan:   Procedures:  Scheduled for right genicular nerve blocks.  Consider RFA if appropriate.  PT/OT/HEP: I have stressed the importance of physical activity and a home exercise plan to help with pain and improve health.  She was completed physical therapy and is compliant with a home exercise regimen.  Medications:    - Tylenol and Voltaren gel topical p.r.n.   -  Reviewed and consistent with medication use as prescribed.  Imaging:  Imaging was reviewed and discussed with the patient.  Follow Up:  Postprocedure    Perla Barrera DO   Interventional Pain Medicine / Anesthesiology    Disclaimer: This note was partly generated using dictation software which may occasionally result in transcription errors.

## 2024-08-04 ENCOUNTER — PATIENT MESSAGE (OUTPATIENT)
Dept: INTERNAL MEDICINE | Facility: CLINIC | Age: 69
End: 2024-08-04
Payer: MEDICARE

## 2024-08-04 DIAGNOSIS — E11.3311 TYPE 2 DIABETES MELLITUS WITH RIGHT EYE AFFECTED BY MODERATE NONPROLIFERATIVE RETINOPATHY AND MACULAR EDEMA, WITHOUT LONG-TERM CURRENT USE OF INSULIN: Primary | ICD-10-CM

## 2024-08-05 RX ORDER — SEMAGLUTIDE 2.68 MG/ML
2 INJECTION, SOLUTION SUBCUTANEOUS
Qty: 9 ML | Refills: 1 | Status: SHIPPED | OUTPATIENT
Start: 2024-08-05 | End: 2025-08-05

## 2024-08-06 ENCOUNTER — CLINICAL SUPPORT (OUTPATIENT)
Dept: REHABILITATION | Facility: HOSPITAL | Age: 69
End: 2024-08-06
Attending: PHYSICAL MEDICINE & REHABILITATION
Payer: MEDICARE

## 2024-08-06 DIAGNOSIS — R29.898 DECREASED STRENGTH OF TRUNK AND BACK: Primary | ICD-10-CM

## 2024-08-06 DIAGNOSIS — Z78.9 IMPAIRED MOBILITY AND ADLS: ICD-10-CM

## 2024-08-06 DIAGNOSIS — Z74.09 IMPAIRED MOBILITY AND ADLS: ICD-10-CM

## 2024-08-06 PROCEDURE — 97110 THERAPEUTIC EXERCISES: CPT

## 2024-08-06 PROCEDURE — 97112 NEUROMUSCULAR REEDUCATION: CPT

## 2024-08-07 ENCOUNTER — OFFICE VISIT (OUTPATIENT)
Dept: CARDIOLOGY | Facility: CLINIC | Age: 69
End: 2024-08-07
Payer: MEDICARE

## 2024-08-07 ENCOUNTER — PATIENT MESSAGE (OUTPATIENT)
Dept: ORTHOPEDICS | Facility: CLINIC | Age: 69
End: 2024-08-07
Payer: MEDICARE

## 2024-08-07 VITALS
HEART RATE: 72 BPM | BODY MASS INDEX: 32.72 KG/M2 | DIASTOLIC BLOOD PRESSURE: 65 MMHG | WEIGHT: 196.63 LBS | SYSTOLIC BLOOD PRESSURE: 101 MMHG

## 2024-08-07 DIAGNOSIS — I25.84 CORONARY ARTERY DISEASE DUE TO CALCIFIED CORONARY LESION: ICD-10-CM

## 2024-08-07 DIAGNOSIS — E11.69 HYPERLIPIDEMIA ASSOCIATED WITH TYPE 2 DIABETES MELLITUS: ICD-10-CM

## 2024-08-07 DIAGNOSIS — I25.10 CORONARY ARTERY DISEASE DUE TO CALCIFIED CORONARY LESION: ICD-10-CM

## 2024-08-07 DIAGNOSIS — E11.59 HYPERTENSION ASSOCIATED WITH DIABETES: ICD-10-CM

## 2024-08-07 DIAGNOSIS — I15.2 HYPERTENSION ASSOCIATED WITH DIABETES: ICD-10-CM

## 2024-08-07 DIAGNOSIS — Z01.810 PREOP CARDIOVASCULAR EXAM: Primary | ICD-10-CM

## 2024-08-07 DIAGNOSIS — I25.10 CORONARY ARTERY DISEASE INVOLVING NATIVE CORONARY ARTERY OF NATIVE HEART WITHOUT ANGINA PECTORIS: ICD-10-CM

## 2024-08-07 DIAGNOSIS — I48.0 PAROXYSMAL ATRIAL FIBRILLATION: Chronic | ICD-10-CM

## 2024-08-07 DIAGNOSIS — I65.23 BILATERAL CAROTID ARTERY STENOSIS: ICD-10-CM

## 2024-08-07 DIAGNOSIS — E78.5 HYPERLIPIDEMIA ASSOCIATED WITH TYPE 2 DIABETES MELLITUS: ICD-10-CM

## 2024-08-07 PROCEDURE — 99999 PR PBB SHADOW E&M-EST. PATIENT-LVL IV: CPT | Mod: PBBFAC,,, | Performed by: INTERNAL MEDICINE

## 2024-08-08 ENCOUNTER — TELEPHONE (OUTPATIENT)
Dept: OPHTHALMOLOGY | Facility: CLINIC | Age: 69
End: 2024-08-08
Payer: MEDICARE

## 2024-08-08 ENCOUNTER — CLINICAL SUPPORT (OUTPATIENT)
Dept: REHABILITATION | Facility: HOSPITAL | Age: 69
End: 2024-08-08
Attending: PHYSICAL MEDICINE & REHABILITATION
Payer: MEDICARE

## 2024-08-08 DIAGNOSIS — Z74.09 IMPAIRED MOBILITY AND ADLS: ICD-10-CM

## 2024-08-08 DIAGNOSIS — R29.898 DECREASED STRENGTH OF TRUNK AND BACK: Primary | ICD-10-CM

## 2024-08-08 DIAGNOSIS — Z78.9 IMPAIRED MOBILITY AND ADLS: ICD-10-CM

## 2024-08-08 LAB
OHS QRS DURATION: 90 MS
OHS QTC CALCULATION: 416 MS

## 2024-08-08 PROCEDURE — 97112 NEUROMUSCULAR REEDUCATION: CPT | Mod: CQ

## 2024-08-08 PROCEDURE — 97110 THERAPEUTIC EXERCISES: CPT | Mod: CQ

## 2024-08-13 ENCOUNTER — CLINICAL SUPPORT (OUTPATIENT)
Dept: REHABILITATION | Facility: HOSPITAL | Age: 69
End: 2024-08-13
Attending: PHYSICAL MEDICINE & REHABILITATION
Payer: MEDICARE

## 2024-08-13 DIAGNOSIS — Z78.9 IMPAIRED MOBILITY AND ADLS: ICD-10-CM

## 2024-08-13 DIAGNOSIS — Z74.09 IMPAIRED MOBILITY AND ADLS: ICD-10-CM

## 2024-08-13 DIAGNOSIS — R29.898 DECREASED STRENGTH OF TRUNK AND BACK: Primary | ICD-10-CM

## 2024-08-13 PROCEDURE — 97112 NEUROMUSCULAR REEDUCATION: CPT

## 2024-08-13 PROCEDURE — 97110 THERAPEUTIC EXERCISES: CPT

## 2024-08-13 NOTE — PROGRESS NOTES
"[facility]      Name: Diana Montenegro  Clinic Number: [id number]    Therapy Diagnosis:   Encounter Diagnoses   Name Primary?    Decreased strength of trunk and back Yes    Impaired mobility and ADLs      Physician: Rosalie Buchanan, *    Visit Date: 2024    Physician Orders: PT Eval and Treat  Medical Diagnosis from Referral: M54.50,G89.29 (ICD-10-CM) - Chronic right-sided low back pain without sciatica  Evaluation Date: 2024  Authorization Period Expiration: 2024  Plan of Care Expiration: 2024  Reassessment Due: 2024  Visit # / Visits authorized:   MedX testing visit 2     Time In: 1  Time Out: 2:  Total Billable Time:  55 minutes  30 min     INSURANCE and OUTCOMES: Fee for Service with FOTO Outcomes 1/3     Precautions: standard, fall, history of stroke     Pattern of pain determined: 1 PEN    Subjective   Diana that she is doing fairly well today. Pt arrives with a rollator secondary to having to park further away from entrance to building.  Pt continues to report difficulty with standing activities.  Patient reports their pain to be 2/10 on a 0-10 scale with 0 being no pain and 10 being the worst pain imaginable.  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[facility], rollator  Gym Membership: yes, [employer]     Pt goals: "try to get rid of this pain"     Objective     Baseline IM Testing Results:   Date of testin24  ROM 0-39 deg   Max Peak Torque 85    Min Peak Torque 19   Flex/Ext Ratio 4.4:1   % below normative data 57       Intake Outcome Measure for FOTO Lumbar Survey     Therapist reviewed FOTO scores for Diana Montenegro on 2024.   FOTO report - see Media section or FOTO account episode " details.     Intake Score: 49% functional ability  Goal: 58%           Treatment    Kaleigh received the treatments listed below:      Kaleigh received neuromuscular education  to isolate and engage spinal stabilization musculature correctly for motor control and coordination to aid in function and posture for 10 minutes on the Medical Medx Machine.  Patient performed MedX dynamic exercise with emphasis on spinal muscular control using pacer throughout  active range of motion. Therapist assisted patient in achieving optimal exertion for neural reeducation and endurance training by using the  Suresh Exertion Rating scale, by instructing the patient to aim for mid range of exertion, performing 15-20 repetitions, slowly, correctly,and safely.          8/13/2024     1:51 PM   [facility]   Visit Number 9   VAS Pain Rating 2   Time 5   Lumbar Stretches - Slouch Overcorrection 10   Extension in Standing 10   Flexion in Lying 10   Lumbar Weight 41 lbs   Repetitions 18   Rating of Perceived Exertion 3   Ice - Z Lie (in min.) 5       therapeutic exercises to develop strength, endurance, ROM, flexibility, posture, and core stabilization for 50 minutes including:    LTR, 10x  +Piriformis stretch 2 x 20 sec   DKTC c/T-ball 10 ,5 sec  SLR c/ TrA contracttion 15x  PPT 10x ( cues)  Bridge + GTB 20x  BKFO GTB x 10  SL clam with GTB x 10   SOC x 10  EIS x 10    Peripheral muscle strengthening which included 1 set of 15-20 repetitions at a slow, controlled 10-13 second per rep pace focused on strengthening supporting musculature for improved body mechanics and functional mobility.  Pt and therapist focused on proper form during treatment to ensure optimal strengthening of each targeted muscle group.  Machines were utilized including torso rotation, leg press, hip abd and hip add, leg ext (NP).  Leg curl, triceps, biceps, chest (NP) and row added visit 3    cold pack for 5 minutes to LB.    Home Exercises Provided and Patient  Education Provided   Home exercises include:SLR/LTR/SKTC  Cardio program:TBD  Lifting education date:TBD  Lumbar Roll: recommended  Fridge Magnet Discharge handout (date given):  Equipment at home/gym membership: [facility]    Education provided:   - cues w/exs   MedX performance  Precor ex performance    Written Home Exercises Provided: Patient instructed to cont prior HEP.  Exercises were reviewed and Kaleigh was able to demonstrate them prior to the end of the session.  Kaleigh demonstrated good  understanding of the education provided.     See EMR under Patient Instructions for exercises provided prior visit.    Assessm   Diana arrives with only mild lower back and R leg discomfort. Treatment continued with flexibility, strengthening and neuromuscular reeducation ex's. Increased reps for bridges.  She was able to perform ex's with min cues and without increased pain. Lumbar MedX increased to 41ft/lbs and she [id number] reps with a RPE = 3/10. She completed peripheral strengthening ex's without c/o.(Leg extension not performed due to knee pain and chest press not performed due to wrist pain). Will continue per HB protocol and patient tolerance.      Patient is making  progress towards established goals.  Pt will continue to benefit from skilled outpatient physical therapy to address the deficits stated in the impairment chart, provide pt/family education and to maximize pt's level of independence in the home and community environment.     Anticipated Barriers for therapy: right knee pain/OA   Pt's spiritual, cultural and educational needs considered and pt agreeable to plan of care and goals as stated below:       GOALS: Pt is in agreement with the following goals.     Short term goals:  6 weeks or 10 visits   - Pt will demonstrate increased lumbar MedX ROM by at least 3 degrees from the initial ROM value with improvements noted in functional ROM and ability to perform ADLs. Appropriate and Ongoing  - Pt will demonstrate  increased MedX average isometric strength value by 20% from initial test resulting in improved ability to perform bending, lifting, and carrying activities safely, confidently. Appropriate and Ongoing  - Pt will report a reduction in worst pain score by 1-2 points for improved tolerance for household chores. Appropriate and Ongoing  - Pt able to perform HEP correctly with minimal cueing or supervision from therapist to encourage independent management of symptoms. Appropriate and Ongoing     Long term goals: 10 weeks or 20 visits   - Pt will demonstrate increased lumbar MedX ROM by at least 9 degrees from initial ROM value, resulting in improved ability to perform functional forward bending while standing and sitting. Appropriate and Ongoing  - Pt will demonstrate increased MedX average isometric strength value by 35% from initial test resulting in improved ability to perform bending, lifting, and carrying activities safely and confidently. Appropriate and Ongoing  - Pt to demonstrate ability to independently control and reduce their pain through posture positioning and mechanical movements throughout a typical day. Appropriate and Ongoing  - Pt will demonstrate reduced pain and improved functional outcomes as reported on the FOTO by reaching an intake score of >/= 58% functional ability in order to demonstrate subjective improvement in patient's condition. . Appropriate and Ongoing  - Pt will demonstrate independence with the HEP at discharge. Appropriate and Ongoing  - Pt will be able to complete meal prep without onset of back pain. Appropriate and Ongoing    Plan   Continue with established Plan of Care towards established PT goals.     Margy Jefferson, PT    8/13/2024

## 2024-08-14 ENCOUNTER — TELEPHONE (OUTPATIENT)
Dept: PAIN MEDICINE | Facility: CLINIC | Age: 69
End: 2024-08-14
Payer: MEDICARE

## 2024-08-15 ENCOUNTER — CLINICAL SUPPORT (OUTPATIENT)
Dept: REHABILITATION | Facility: HOSPITAL | Age: 69
End: 2024-08-15
Attending: PHYSICAL MEDICINE & REHABILITATION
Payer: MEDICARE

## 2024-08-15 DIAGNOSIS — Z78.9 IMPAIRED MOBILITY AND ADLS: ICD-10-CM

## 2024-08-15 DIAGNOSIS — R29.898 DECREASED STRENGTH OF TRUNK AND BACK: Primary | ICD-10-CM

## 2024-08-15 DIAGNOSIS — Z74.09 IMPAIRED MOBILITY AND ADLS: ICD-10-CM

## 2024-08-15 PROCEDURE — 97750 PHYSICAL PERFORMANCE TEST: CPT

## 2024-08-15 PROCEDURE — 97110 THERAPEUTIC EXERCISES: CPT

## 2024-08-15 NOTE — PROGRESS NOTES
"[facility]      Name: Diana Montenegro  Clinic Number: [id number]    Therapy Diagnosis:   Encounter Diagnoses   Name Primary?    Decreased strength of trunk and back Yes    Impaired mobility and ADLs      Physician: Rosalie Buchanan, *    Visit Date: 8/15/2024    Physician Orders: PT Eval and Treat  Medical Diagnosis from Referral: M54.50,G89.29 (ICD-10-CM) - Chronic right-sided low back pain without sciatica  Evaluation Date: 2024  Authorization Period Expiration: 2024  Plan of Care Expiration: 2024  Reassessment Due:9/15/24  Visit # / Visits authorized: 10/20  MedX testing visit 2     Time In: 1  Time Out: 2:  Total Billable Time:  55 minutes  30 min 1 on     INSURANCE and OUTCOMES: Fee for Service with FOTO Outcomes 1/3     Precautions: standard, fall, history of stroke     Pattern of pain determined: 1 PEN    Subjective   Diana that she is doing fairly well today.  Pt reports increased L shoulder pain today  Pt continues to report difficulty with standing activities.  Patient reports their pain to be 2/10 on a 0-10 scale with 0 being no pain and 10 being the worst pain imaginable.  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   FOTO report - see Media section or FOTO account episode details.     Intake Score: 49% functional ability  Goal: 58%           Treatment    Kaleigh received the treatments listed below:      Kaleigh received neuromuscular education  to isolate and engage spinal stabilization musculature correctly for motor control and coordination to aid in function and posture for 10 minutes on the Medical Medx Machine.  Patient performed MedX dynamic exercise with emphasis on spinal muscular control using pacer throughout  active range of motion. Therapist assisted patient in achieving optimal exertion for neural reeducation and endurance training by using the  Suresh Exertion Rating scale, by instructing the patient to aim for mid range of exertion, performing 15-20 repetitions, slowly, correctly,and safely.          8/15/2024     1:58 PM   [facility]   Visit Number 10   VAS Pain Rating 2   Time 5   Lumbar Stretches - Slouch Overcorrection 10   Extension in Standing 10   Flexion in Lying 10   Lumbar Flexion 45   Lumbar Extension 0   Lumbar Peak Torque 105 ft. lbs.   Min Torque 30   Test Percent Below Normative Data 42 %   Test Percent Gain in Strength from Initial  39 %   Ice - Z Lie (in min.) 5       therapeutic exercises to develop strength, endurance, ROM, flexibility, posture, and core stabilization for 50 minutes including:    LTR, 10x  +Piriformis stretch 2 x 20 sec   DKTC c/T-ball 10 ,5 sec  SLR c/ TrA contracttion 15x  PPT 10x ( cues)  Bridge + GTB 20x  BKFO GTB x 10  SL clam with GTB x 10   SOC x 10  EIS x 10    Peripheral muscle strengthening which included 1 set of 15-20 repetitions at a slow, controlled 10-13 second per rep pace focused on strengthening supporting musculature for improved body mechanics and functional mobility.  Pt and therapist focused on proper form during treatment to ensure optimal strengthening of each targeted muscle  group.  Machines were utilized including torso rotation, leg press, hip abd and hip add, leg ext (NP).  Leg curl, triceps, biceps, chest (NP) and row added visit 3    cold pack for 5 minutes to LB.    Home Exercises Provided and Patient Education Provided   Home exercises include:SLR/LTR/SKTC  Cardio program:TBD  Lifting education date:TBD  Lumbar Roll: recommended  Fridge Magnet Discharge handout (date given):  Equipment at home/gym membership: [facility]    Education provided:   - cues w/exs   MedX performance  Precor ex performance    Written Home Exercises Provided: Patient instructed to cont prior HEP.  Exercises were reviewed and Kaleigh was able to demonstrate them prior to the end of the session.  Kaleigh demonstrated good  understanding of the education provided.     See EMR under Patient Instructions for exercises provided prior visit.    Assessm   Patient has attended 10 visits at [facility] which included MD evaluation, PT evaluation with isometric testing, and physical therapy treatment including HEP instruction, education, aerobic activity, dynamic strengthening on MedX equipment for the spine, and whole body strengthening on MedX equipment with increasing resistance. Patient demonstrates increased ability to reduce symptoms, improve posture, improve ROM, and improve strength, as stated below:      -Improved 6\ ROM, initially on MedX test 0-39 and currently 0-45.  -Improved strength at each test point on lumbar MedX isometric test with 39 average improvement noted with reduced pain noted by patient.  -Initial outcome tool score 49% and current outcome tool score 47% indicating reduced pain and improved function.       Patient is making  progress towards established goals.  Pt will continue to benefit from skilled outpatient physical therapy to address the deficits stated in the impairment chart, provide pt/family education and to maximize pt's level of independence in the home and community environment.      Anticipated Barriers for therapy: right knee pain/OA   Pt's spiritual, cultural and educational needs considered and pt agreeable to plan of care and goals as stated below:       GOALS: Pt is in agreement with the following goals.     Short term goals:  6 weeks or 10 visits   - Pt will demonstrate increased lumbar MedX ROM by at least 3 degrees from the initial ROM value with improvements noted in functional ROM and ability to perform ADLs. met  - Pt will demonstrate increased MedX average isometric strength value by 20% from initial test resulting in improved ability to perform bending, lifting, and carrying activities safely, confidently. met  - Pt will report a reduction in worst pain score by 1-2 points for improved tolerance for household chores. Appropriate and Ongoing  - Pt able to perform HEP correctly with minimal cueing or supervision from therapist to encourage independent management of symptoms. Appropriate and Ongoing     Long term goals: 10 weeks or 20 visits   - Pt will demonstrate increased lumbar MedX ROM by at least 9 degrees from initial ROM value, resulting in improved ability to perform functional forward bending while standing and sitting. Appropriate and Ongoing  - Pt will demonstrate increased MedX average isometric strength value by 35% from initial test resulting in improved ability to perform bending, lifting, and carrying activities safely and confidently. MET  - Pt to demonstrate ability to independently control and reduce their pain through posture positioning and mechanical movements throughout a typical day. Appropriate and Ongoing  - Pt will demonstrate reduced pain and improved functional outcomes as reported on the FOTO by reaching an intake score of >/= 58% functional ability in order to demonstrate subjective improvement in patient's condition. . Appropriate and Ongoing  - Pt will demonstrate independence with the HEP at discharge. Appropriate and Ongoing  - Pt will be able  to complete meal prep without onset of back pain. Appropriate and Ongoing    Plan   Continue with established Plan of Care towards established PT goals.     Margy Jefferson, PT    8/15/2024

## 2024-08-16 ENCOUNTER — TELEPHONE (OUTPATIENT)
Dept: PAIN MEDICINE | Facility: CLINIC | Age: 69
End: 2024-08-16
Payer: MEDICARE

## 2024-08-16 NOTE — TELEPHONE ENCOUNTER
----- Message from Janet Cooper MA sent at 8/16/2024  2:08 PM CDT -----  Please assist.  ----- Message -----  From: Khadijah Nina  Sent: 8/16/2024   1:52 PM CDT  To: [facility] Staff    Needs advice from nurse:      Who Called:pt  Regarding:has questions about upcoming procedure  Would the patient rather a call back or VIA [facility]?  Best Call Back number:[phone]  Additional Info:

## 2024-08-19 NOTE — PRE-PROCEDURE INSTRUCTIONS
Patient reviewed on 8/19/2024.  Okay to proceed at [facility]. The following pre-procedure instructions and arrival time have been reviewed with patient via phone and sent to patient portal for review.  Patient verbalized an understanding.  Pt to be accompanied by possibly her son day of procedure as responsible .    Dear Kaleigh ,     Please read over the following pre-procedure instructions in it's entirety as there is helpful information here to get you well prepared for your upcoming procedure.              You are scheduled for a procedure with Dr. Barrera on 8/21/2024.    Your scheduled arrival time is 1:20 pm.  This arrival time is roughly 1 hour before your anticipated procedure time to allow sufficient time for pre-op..    Please wear comfortable clothes. You will be placed in a gown for your procedure.  Please do not wear a dress.  This procedure will take place at the [facility] at the corner of [facility] and [facility].  It is in the [facility] next to [facility].  The address is:     [address] [place] [address].  [place] [id number]     After entering the building, you will proceed to the second floor where you can check in with registration. You should take any medications that you routinely take for blood pressure, heart medications, thyroid, cholesterol, etc.      The fasting restrictions are dependent on whether or not you are receiving sedation.  Sedation is not available for all procedures.      Your fasting instructions are as follow:  Oral Sedation. You do not need to fast before this procedure.  You can eat and drink like normal.  You CANNOT drive yourself and must have a .     If you are on blood thinners, you need to follow the anticoagulation instructions that had been discussed previously.  You should only stop the blood thinners if it was approved by your primary care physician or your cardiologist.  In the event that you are not able to stop your  blood thinners, a blood thinner was not listed on your medication list, or we were not able to get clearance from your cardiologist, then the procedure may have to be postponed/canceled.      IF you were told to stop your blood thinners, this is how long you should generally hold some of the more common ones.  Remember that stopping blood thinners is only necessary for certain procedures. If you are unsure of your instructions, please call us.   Aspirin - 5 days  Plavix/Clopidogrel - 7 days  Warfarin / Coumadin - 5 days  Eliquis - 3 days  Pradaxa/Dabigatran - 4 days  Xarelto/Rivaroxaban - 3 days     If you are a diabetic, do not take your medication if you will be fasting, but bring it with you. Please plan on being here for roughly 3 hours.     Please call us if you have been sick (running fever, having any flu-like symptoms) or have been taking ANTIBIOTICS in the past 2 weeks or had any outpatient procedures other than with us (colonoscopy, endoscopy, OBGYN, dental, etc.).     If you have been previously COVID positive, you will need to hold off on your procedure until you are symptom free for 10 days. If you did not have any symptoms, you can have your procedure 10 days from your positive test result.       On the morning of your procedure:  *HOLD ALL VITAMINS, MINERALS, HERBS (INCLUDING HERBAL TEAS) AND SUPPLEMENTS  *SHOWER WITH ANTIBACTERIAL SOAP (EX. DIAL) NIGHT BEFORE AND MORNING OF PROCEDURE  *DO NOT APPLY ANY LOTIONS, OILS, POWDERS, PERFUME/COLOGNE, OINTMENTS, GELS, CREAMS, MAKEUP OR DEODORANT TO YOUR SKIN MORNING OF PROCEDURE  *LEAVE JEWELRY AND ANY VALUABLES AT HOME  *WEAR LOOSE COMFORTABLE CLOTHING (PREFERABLY A BUTTON UP SHIRT)     Please reply to this message as receipt of delivery.     Thank you,  [facility]  [place], LPN [facility]  Pre-Admit

## 2024-08-20 ENCOUNTER — CLINICAL SUPPORT (OUTPATIENT)
Dept: REHABILITATION | Facility: HOSPITAL | Age: 69
End: 2024-08-20
Attending: PHYSICAL MEDICINE & REHABILITATION
Payer: MEDICARE

## 2024-08-20 DIAGNOSIS — R29.898 DECREASED STRENGTH OF TRUNK AND BACK: Primary | ICD-10-CM

## 2024-08-20 DIAGNOSIS — Z74.09 IMPAIRED MOBILITY AND ADLS: ICD-10-CM

## 2024-08-20 DIAGNOSIS — Z78.9 IMPAIRED MOBILITY AND ADLS: ICD-10-CM

## 2024-08-20 PROCEDURE — 97112 NEUROMUSCULAR REEDUCATION: CPT | Mod: CQ

## 2024-08-20 PROCEDURE — 97110 THERAPEUTIC EXERCISES: CPT | Mod: CQ

## 2024-08-20 NOTE — PROGRESS NOTES
"[facility]      Name: Diana Montenegro  Clinic Number: [id number]    Therapy Diagnosis:   Encounter Diagnoses   Name Primary?    Decreased strength of trunk and back Yes    Impaired mobility and ADLs      Physician: Rosalie Buchanan, *    Visit Date: 2024    Physician Orders: PT Eval and Treat  Medical Diagnosis from Referral: M54.50,G89.29 (ICD-10-CM) - Chronic right-sided low back pain without sciatica  Evaluation Date: 2024  Authorization Period Expiration: 2024  Plan of Care Expiration: 2024  Reassessment Due:9/15/24  Visit # / Visits authorized:   MedX testing visit 2     Time In: 12:55 PM  Time Out: 1:55 PM  Total Billable Time:   30 minutes   1 on     INSURANCE and OUTCOMES: Fee for Service with FOTO Outcomes 1/3     Precautions: standard, fall, history of stroke     Pattern of pain determined: 1 PEN    Subjective   [place]  mild lower back discomfort, moderate (L) shoulder discomfort.    Patient reports their pain to be 2/10 on a 0-10 scale with 0 being no pain and 10 being the worst pain imaginable.  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[employer], rollator  Gym Membership: yes, [facility]     Pt goals: "try to get rid of this pain"     Objective     Baseline IM Testing Results:   Date of testin24  ROM 0-39 deg   Max Peak Torque 85    Min Peak Torque 19   Flex/Ext Ratio 4.4:1   % below normative data 57       Mid IM Testing Results:   Date of testin/15/24  ROM 0-45deg   Max Peak Torque 105   Min Peak Torque 30   Flex/Ext Ratio 3.5:1   % below normative data 42   \\ 39%strength increase  Intake Outcome Measure for FOTO Lumbar Survey     Therapist reviewed FOTO scores for Diana Montenegro on " 7/16/2024.   FOTO report - see Media section or FOTO account episode details.     Intake Score: 49% functional ability  Goal: 58%           Treatment    Kaleigh received the treatments listed below:      Kaleigh received neuromuscular education  to isolate and engage spinal stabilization musculature correctly for motor control and coordination to aid in function and posture for 10 minutes on the Medical Medx Machine.  Patient performed MedX dynamic exercise with emphasis on spinal muscular control using pacer throughout  active range of motion. Therapist assisted patient in achieving optimal exertion for neural reeducation and endurance training by using the  Suresh Exertion Rating scale, by instructing the patient to aim for mid range of exertion, performing 15-20 repetitions, slowly, correctly,and safely.           8/20/2024    12:59 PM   [facility] - Short   Visit Number 11   VAS Pain Rating 2   Time 5   Lumbar Stretches - Slouch 10   Extension in Standing 10   Flexion in Lying 10   Lumbar Weight 41 lbs   Repetitions 20   Rating of Perceived Exertion 3      therapeutic exercises to develop strength, endurance, ROM, flexibility, posture, and core stabilization for 45 minutes including:    LTR, 10x  Piriformis stretch 2 x 20 sec   DKTC c/T-ball 10 ,5 sec  SLR c/ TrA contracttion 15x    PPT 10x ( cues)  Bridge + GTB 20x  BKFO GTB x 15  SL clam with GTB x 15  SOC x 10   EIS x 10  + Lifting education   Floor<->waist lift   Golfer's lift      Peripheral muscle strengthening which included 1 set of 15-20 repetitions at a slow, controlled 10-13 second per rep pace focused on strengthening supporting musculature for improved body mechanics and functional mobility.  Pt and therapist focused on proper form during treatment to ensure optimal strengthening of each targeted muscle group.  Machines were utilized including torso rotation, leg press, hip abd and hip add, leg ext (NP).  Leg curl, triceps, biceps, chest (NP) and  row added visit 3    cold pack for 5 minutes to LB.    Home Exercises Provided and Patient Education Provided   Home exercises include:SLR/LTR/SKTC  Cardio program:TBD  Lifting education date: 8/20/24 Do's and Don'ts of lifting handout reviewed, practiced and provided.  Lumbar Roll: recommended  Fridge Magnet Discharge handout (date given):  Equipment at home/gym membership: [facility]    Education provided:   - cues w/exs   MedX performance  Precor ex performance    Written Home Exercises Provided: Patient instructed to cont prior HEP.  Exercises were reviewed and Kaleigh was able to demonstrate them prior to the end of the session.  Kaleigh demonstrated good  understanding of the education provided.     See EMR under Patient Instructions for exercises provided prior visit and today's visit.    Assessment   Diana arrives with only mild lower back discomfort. Treatment continued with flexibility, strengthening and neuromuscular reeducation ex's. She was progressed with increased reps for  BKFO and clamshells. She was able to perform ex's with min cues and without increased pain. She was also educated on the Do's and Don'ts of lifting this visit requiring min cues. Lumbar MedX resistance was maintained at 41 ft/lbs and she completed 20 reps with a RPE = 3/10. She completed peripheral strengthening ex's without c/o.(Leg extension not performed due to knee pain and chest press/ row not performed due to (L) shoulder pain). Will continue per HB protocol and patient tolerance.       Patient is making  progress towards established goals.  Pt will continue to benefit from skilled outpatient physical therapy to address the deficits stated in the impairment chart, provide pt/family education and to maximize pt's level of independence in the home and community environment.     Anticipated Barriers for therapy: right knee pain/OA   Pt's spiritual, cultural and educational needs considered and pt agreeable to plan of care and goals as  stated below:       GOALS: Pt is in agreement with the following goals.     Short term goals:  6 weeks or 10 visits   - Pt will demonstrate increased lumbar MedX ROM by at least 3 degrees from the initial ROM value with improvements noted in functional ROM and ability to perform ADLs. met  - Pt will demonstrate increased MedX average isometric strength value by 20% from initial test resulting in improved ability to perform bending, lifting, and carrying activities safely, confidently. met  - Pt will report a reduction in worst pain score by 1-2 points for improved tolerance for household chores. Appropriate and Ongoing  - Pt able to perform HEP correctly with minimal cueing or supervision from therapist to encourage independent management of symptoms. Appropriate and Ongoing     Long term goals: 10 weeks or 20 visits   - Pt will demonstrate increased lumbar MedX ROM by at least 9 degrees from initial ROM value, resulting in improved ability to perform functional forward bending while standing and sitting. Appropriate and Ongoing  - Pt will demonstrate increased MedX average isometric strength value by 35% from initial test resulting in improved ability to perform bending, lifting, and carrying activities safely and confidently. MET  - Pt to demonstrate ability to independently control and reduce their pain through posture positioning and mechanical movements throughout a typical day. Appropriate and Ongoing  - Pt will demonstrate reduced pain and improved functional outcomes as reported on the FOTO by reaching an intake score of >/= 58% functional ability in order to demonstrate subjective improvement in patient's condition. . Appropriate and Ongoing  - Pt will demonstrate independence with the HEP at discharge. Appropriate and Ongoing  - Pt will be able to complete meal prep without onset of back pain. Appropriate and Ongoing    Plan   Continue with established Plan of Care towards established PT goals.     Jani  All, PTA    8/20/2024

## 2024-08-21 ENCOUNTER — HOSPITAL ENCOUNTER (OUTPATIENT)
Facility: HOSPITAL | Age: 69
Discharge: HOME | End: 2024-08-21
Attending: STUDENT IN AN ORGANIZED HEALTH CARE EDUCATION/TRAINING PROGRAM | Admitting: STUDENT IN AN ORGANIZED HEALTH CARE EDUCATION/TRAINING PROGRAM
Payer: MEDICARE

## 2024-08-21 ENCOUNTER — TELEPHONE (OUTPATIENT)
Dept: PAIN MEDICINE | Facility: CLINIC | Age: 69
End: 2024-08-21
Payer: MEDICARE

## 2024-08-21 VITALS
TEMPERATURE: 97 F | HEART RATE: 64 BPM | SYSTOLIC BLOOD PRESSURE: 108 MMHG | OXYGEN SATURATION: 100 % | RESPIRATION RATE: 18 BRPM | DIASTOLIC BLOOD PRESSURE: 63 MMHG

## 2024-08-21 DIAGNOSIS — G89.29 CHRONIC PAIN: ICD-10-CM

## 2024-08-21 DIAGNOSIS — M17.11 OSTEOARTHRITIS OF RIGHT KNEE, UNSPECIFIED OSTEOARTHRITIS TYPE: Primary | ICD-10-CM

## 2024-08-21 DIAGNOSIS — M25.561 CHRONIC PAIN OF RIGHT KNEE: ICD-10-CM

## 2024-08-21 DIAGNOSIS — G89.29 CHRONIC PAIN OF RIGHT KNEE: ICD-10-CM

## 2024-08-21 LAB — POCT GLUCOSE: 127 MG/DL (ref 70–110)

## 2024-08-21 PROCEDURE — 25000003 PHARM REV CODE 250: Performed by: STUDENT IN AN ORGANIZED HEALTH CARE EDUCATION/TRAINING PROGRAM

## 2024-08-21 PROCEDURE — 82962 GLUCOSE BLOOD TEST: CPT | Performed by: STUDENT IN AN ORGANIZED HEALTH CARE EDUCATION/TRAINING PROGRAM

## 2024-08-21 PROCEDURE — 64454 NJX AA&/STRD GNCLR NRV BRNCH: CPT | Mod: RT,,, | Performed by: STUDENT IN AN ORGANIZED HEALTH CARE EDUCATION/TRAINING PROGRAM

## 2024-08-21 PROCEDURE — 63600175 PHARM REV CODE 636 W HCPCS: Mod: JZ,JG | Performed by: STUDENT IN AN ORGANIZED HEALTH CARE EDUCATION/TRAINING PROGRAM

## 2024-08-21 PROCEDURE — 64454 NJX AA&/STRD GNCLR NRV BRNCH: CPT | Mod: RT | Performed by: STUDENT IN AN ORGANIZED HEALTH CARE EDUCATION/TRAINING PROGRAM

## 2024-08-21 RX ORDER — LIDOCAINE HYDROCHLORIDE 20 MG/ML
INJECTION, SOLUTION EPIDURAL; INFILTRATION; INTRACAUDAL; PERINEURAL
Status: DISCONTINUED | OUTPATIENT
Start: 2024-08-21 | End: 2024-08-21 | Stop reason: HOSPADM

## 2024-08-21 RX ORDER — BUPIVACAINE HYDROCHLORIDE 2.5 MG/ML
INJECTION, SOLUTION EPIDURAL; INFILTRATION; INTRACAUDAL
Status: DISCONTINUED | OUTPATIENT
Start: 2024-08-21 | End: 2024-08-21 | Stop reason: HOSPADM

## 2024-08-21 RX ORDER — ALPRAZOLAM 0.5 MG/1
1 TABLET, ORALLY DISINTEGRATING ORAL
Status: DISCONTINUED | OUTPATIENT
Start: 2024-08-21 | End: 2024-08-21 | Stop reason: HOSPADM

## 2024-08-21 RX ORDER — ALPRAZOLAM 0.5 MG/1
0.5 TABLET, ORALLY DISINTEGRATING ORAL
Status: DISCONTINUED | OUTPATIENT
Start: 2024-08-21 | End: 2024-08-21

## 2024-08-21 RX ADMIN — ALPRAZOLAM 1 MG: 0.5 TABLET, ORALLY DISINTEGRATING ORAL at 02:08

## 2024-08-21 NOTE — PLAN OF CARE
Pt in [place] [address], [place] [facility], meds held until consents signed. Pt denies any open wounds on body or the use of any immunizations or antibiotics in the past 2 weeks. Pt needs site marking, oral sedation and consents, otherwise ready to roll.

## 2024-08-21 NOTE — DISCHARGE SUMMARY
Discharge Note  Short Stay      SUMMARY     Admit Date: 8/21/2024    Attending Physician: Perla Barrera DO    Discharge Physician: Perla Barrera DO      Discharge Date: 8/21/2024 3:41 PM    Procedure(s) (LRB):  Right diagnostic genicular block (Right)    Final Diagnosis: Chronic pain of right knee [M25.561, G89.29]    Disposition: Home or self care    Patient Instructions:   Current Discharge Medication List        CONTINUE these medications which have NOT CHANGED    Details   albuterol (VENTOLIN HFA) 90 mcg/actuation inhaler Inhale 2 puffs into the lungs every 6 (six) hours as needed for Wheezing. Rescue  Qty: 7 g, Refills: 3      apixaban (ELIQUIS) 5 mg Tab Take 1 tablet (5 mg total) by mouth 2 (two) times daily.  Qty: 180 tablet, Refills: 3    Associated Diagnoses: Paroxysmal atrial fibrillation      aspirin (ECOTRIN) 81 MG EC tablet Take 1 tablet (81 mg total) by mouth once daily.      b complex vitamins tablet Take 1 tablet by mouth once daily.      CALCIUM CITRATE/VITAMIN D3 (CALCIUM CITRATE + D ORAL) Take 1 tablet by mouth every morning.      cetirizine (ZYRTEC) 5 MG tablet Take 5 mg by mouth once daily.      cinnamon bark (CINNAMON ORAL) Take by mouth 2 (two) times daily.      cyanocobalamin, vitamin B-12, 500 mcg Subl Place 1 tablet under the tongue once daily.      docusate sodium (COLACE) 100 MG capsule Take 100 mg by mouth once daily.       fish oil-omega-3 fatty acids 300-1,000 mg capsule Take 1 capsule by mouth 2 (two) times daily. Take 1 cap 2 times daily every other day      glimepiride (AMARYL) 2 MG tablet Take 1 tablet (2 mg total) by mouth before breakfast. For diabetes control.  Qty: 90 tablet, Refills: 0      levothyroxine (SYNTHROID) 88 MCG tablet Take 1 tablet (88 mcg total) by mouth before breakfast.  Qty: 30 tablet, Refills: 6    Comments: New dosage as of 9/28/23.      LORazepam (ATIVAN) 1 MG tablet Take 1 tablet (1 mg total) by mouth 2 (two) times daily. as needed for  anxiety.  Qty: 60 tablet, Refills: 0    Associated Diagnoses: Anxiety      metFORMIN (GLUCOPHAGE) 1000 MG tablet TAKE 1 TABLET BY MOUTH TWICE DAILY WITH MEALS  Qty: 180 tablet, Refills: 0      multivitamin capsule Take 1 capsule by mouth once daily.      omeprazole (PRILOSEC) 40 MG capsule Take 1 capsule (40 mg total) by mouth every morning.  Qty: 90 capsule, Refills: 3    Associated Diagnoses: S/P laparoscopic sleeve gastrectomy      rosuvastatin (CRESTOR) 40 MG Tab Take 1 tablet by mouth once daily  Qty: 90 tablet, Refills: 2    Associated Diagnoses: Hyperlipidemia, unspecified hyperlipidemia type      RUTIN/HESP/BIOFLAV/C/HERB#196 (BIOFLEX ORAL) Take 2 tablets by mouth once daily.      senna (SENNA) 8.6 mg tablet Take 2 tablets by mouth nightly as needed for Constipation.  Qty: 100 tablet, Refills: 3      traZODone (DESYREL) 50 MG tablet TAKE 1 TABLET BY MOUTH NIGHTLY AS NEEDED FOR  INSOMNIA  (OKAY  TO  TAKE  2ND  TABLET  BY  MOUTH  IN  30  MINUTES  IF  STILL  AWAKE)  Qty: 180 tablet, Refills: 3    Associated Diagnoses: Insomnia, unspecified type      valsartan-hydrochlorothiazide (DIOVAN-HCT) 80-12.5 mg per tablet Take 1 tablet by mouth once daily.  Qty: 90 tablet, Refills: 3    Comments: .      acyclovir 5% (ZOVIRAX) 5 % ointment Apply topically 6 (six) times daily.  Qty: 5 g, Refills: 1    Associated Diagnoses: Herpes simplex      albuterol-ipratropium 2.5mg-0.5mg/3mL (DUO-NEB) 0.5 mg-3 mg(2.5 mg base)/3 mL nebulizer solution Take 3 mLs by nebulization every 6 (six) hours as needed for Wheezing. Rescue  Qty: 3 vial, Refills: 3      blood pressure test kit-large Kit Use as directed  Qty: 1 each, Refills: 0      blood sugar diagnostic (FREESTYLE LITE STRIPS) Strp USE 1 STRIP TO CHECK GLUCOSE TWICE DAILY  Qty: 200 each, Refills: 3      blood-glucose meter Misc One touch verio flex or tone touch verio reflect or freestyle freedom lite meter (all covered by insurance)  Qty: 1 each, Refills: 0      ciclopirox  (PENLAC) 8 % Soln Apply topically nightly.  Qty: 6.6 mL, Refills: 11      diclofenac sodium (VOLTAREN) 1 % Gel APPLY 2 GRAMS TOPICALLY THREE TIMES DAILY  Qty: 200 g, Refills: 4      diphenhydrAMINE-aluminum-magnesium hydroxide-simethicone-LIDOcaine viscous HCl 2% Swish and spit 15 mLs every 4 (four) hours as needed (sore throat).  Qty: 120 each, Refills: 0    Associated Diagnoses: Sore throat      EYLEA 2 mg/0.05 mL Syrg       fluticasone (FLONASE) 50 mcg/actuation nasal spray 2 sprays (100 mcg total) by Each Nare route once daily.  Qty: 1 Bottle, Refills: 0      lancets Misc Needs lancets for testing twice daily.  To go with meter covered by insurance.  Qty: 200 each, Refills: 3      metoprolol tartrate (LOPRESSOR) 25 MG tablet Take 1 tablet (25 mg total) by mouth once as needed. For palpitations  Qty: 30 tablet, Refills: 11    Comments: .  Associated Diagnoses: Paroxysmal atrial fibrillation      mupirocin (BACTROBAN) 2 % ointment Apply to affected area 3 times daily  Qty: 22 g, Refills: 1    Associated Diagnoses: Cellulitis of left lower leg      promethazine-dextromethorphan (PROMETHAZINE-DM) 6.25-15 mg/5 mL Syrp Take one tsp po q 6 hrs prn cough  Qty: 180 mL, Refills: 0      semaglutide (OZEMPIC) 2 mg/dose (8 mg/3 mL) PnIj Inject 2 mg into the skin every 7 days.  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  Activity: as per instruction sheet.  Discharge to: Home with a responsible adult.  Follow up: 2-4 weeks       Please call my office or pager at [phone] if experienced any weakness or loss of sensation, fever > 101.5, pain uncontrolled with oral medications, persistent  nausea/vomiting/or diarrhea, redness or drainage from the incisions, or any other worrisome concerns. If physician on call was not reached or could not communicate with our office for any reason please go to the nearest emergency department     Fransisco Piña MD

## 2024-08-21 NOTE — TELEPHONE ENCOUNTER
----- Message from Larry Barrientos MA sent at 8/21/2024  8:38 AM CDT -----  Please advise......  ----- Message -----  From: Rosario Pena  Sent: 8/21/2024   8:33 AM CDT  To: [facility] Staff    Type:  Procedure questions     Who Called:Pt   Does the patient know what this is regarding?: pt does not have transportation to procedure today, would  like to know if she can take a taxi before & after and also discuss her ELIQUIS rx for the procedure   Would the patient rather a call back or a response via [facility]? Call   Best Call Back Number:[phone]   Additional Information:

## 2024-08-21 NOTE — OP NOTE
Diagnostic Genicular Nerve Block under Fluoroscopic Guidance    The procedure, risks, benefits, and options were discussed with the patient. There are no contraindications to the procedure. The patent expressed understanding and agreed to the procedure. Informed written consent was obtained prior to the start of the procedure and can be found in the patient's chart.    PATIENT NAME: Diana Montenegro   MRN: [id number]     DATE OF PROCEDURE: 08/21/2024    PROCEDURE: Diagnostic Right Genicular Nerve Block under Fluoroscopic Guidance    PRE-OP DIAGNOSIS: Chronic pain of right knee [M25.561, G89.29]    POST-OP DIAGNOSIS: Same    PHYSICIAN: Perla Barrera DO    ASSISTANTS: Fransisco Piña MD  [facility]       MEDICATIONS INJECTED: Bupivacine 0.25%     LOCAL ANESTHETIC INJECTED: Xylocaine 2%     SEDATION: None    ESTIMATED BLOOD LOSS: None    COMPLICATIONS: None    INTERVAL HISTORY: Patient has clinical findings of chronic knee pain that is not relieved by other therapies.     TECHNIQUE: Time-out was performed to identify the patient and procedure to be performed. With the patient laying in a supine position, the surgical area was prepped and draped in the usual sterile fashion using ChloraPrep and a fenestrated drape. Three target sites including the superior lateral genicular nerve where the lateral femoral shaft meets the epicondyle, the superior medial genicular nerve where the medial femoral shaft meets the epicondyle, and the inferior medial genicular nerve where the medial tibial shaft meets the epicondyle, were determined under fluoroscopy guidance. Skin anesthesia was achieved by injecting Lidocaine 2% over the injection sites. A 25 gauge, 3.5 inch spinal quinke needle was then advanced under fluoroscopy in the AP and lateral views into the positions of the geniculate nerves at each site. Once the needle tip position was confirmed on fluoroscopy, negative pressure was applied to confirm no intravascular  placement.  The needles were removed and bleeding was nil. A sterile dressing was applied. No specimens collected. The patient tolerated the procedure well.       The patient was monitored after the procedure in the recovery area. They were given post-procedure and discharge instructions to follow at home. The patient was discharged in a stable condition.    Fransisco Piña MD   [employer] [facility] Fellow   [facility]     I reviewed and edited the fellow's note. I conducted my own interview and physical examination. I agree with the findings. I was present and supervising all critical portions of the procedure.

## 2024-08-21 NOTE — H&P
HPI  Patient presenting for Procedure(s) (LRB):  Right diagnostic genicular block (Right)     Patient on Anti-coagulation No    No health changes since previous encounter    Past Medical History:   Diagnosis Date    Arthritis     Atrial fibrillation, unspecified     hx of a fib prior to stroke.    Chronic back pain     Colon polyp     CVA (cerebral infarction) 07/16/2014    Degenerative disc disease     cervical; lumbar    Diabetes mellitus type II     Encounter for loop recorder at end of battery life 09/17/2018    Hyperlipidemia     Hypertension     Hypothyroidism     Mild nonproliferative diabetic retinopathy 08/12/2018    Obesity     Sleep apnea     Stroke 07/2014    Venous insufficiency (chronic) (peripheral)      Past Surgical History:   Procedure Laterality Date    COLONOSCOPY N/A 08/30/2018    Procedure: COLONOSCOPY;  Surgeon: William Clement MD;  Location: [facility] (4TH FLR);  Service: Endoscopy;  Laterality: N/A;  Tito/Dr Rad Johnston/OK to hold 2 days prior to colon/see telephone encounter dated 7/24/18/pt has loop recorder/svn    COLONOSCOPY N/A 12/6/2023    Procedure: COLONOSCOPY;  Surgeon: Vinny Youssef MD;  Location: [facility] ([address] [place] [address]);  Service: Colon and Rectal;  Laterality: N/A;  ref Green  eliquis   diabetic/ [facility] ozempic hold 7days prior    peg prep jm / loop recorder  ok to hold Eliquis 2 days per KHANH Rivera NP/MADISON Jones RN-GT  11/29-precall complete-pt verbalized understanding of holding Ozempic-MS    COLONOSCOPY W/ POLYPECTOMY      GASTRECTOMY      HERNIA REPAIR      REMOVAL OF IMPLANTABLE LOOP RECORDER N/A 09/17/2018    Procedure: REMOVAL, IMPLANTABLE LOOP RECORDER;  Surgeon: Thomas Randolph MD;  Location: [facility] CATH LAB;  Service: Cardiology;  Laterality: N/A;  TERESA, ILR removal (no reimplant), MDT, RN Sedate, SK, 3 Prep *Reveal LINQ*    TONSILLECTOMY      TUBAL LIGATION       Review of patient's allergies indicates:   Allergen Reactions    Medrol [methylprednisolone] Palpitations       Current Facility-Administered Medications   Medication    alprazolam ODT dissolvable tablet 0.5 mg     Facility-Administered Medications Ordered in Other Encounters   Medication    0.9%  NaCl infusion       PMHx, PSHx, Allergies, Medications reviewed in epic    ROS negative except pain complaints in HPI    OBJECTIVE:    BP (!) 110/54   Pulse 66   Temp 97.7 °F (36.5 °C) (Temporal)   Resp 18   LMP  (LMP Unknown)   SpO2 100%   Breastfeeding No     PHYSICAL EXAMINATION:    GENERAL: Well appearing, in no acute distress, alert and oriented x3.  PSYCH:  Mood and affect appropriate.  SKIN: Skin color, texture, turgor normal, no rashes or lesions which will impact the procedure.  CV: RRR with palpation of the radial artery.  PULM: No evidence of respiratory difficulty, symmetric chest rise. Clear to auscultation.  NEURO: Cranial nerves grossly intact.    Plan:    Proceed with procedure as planned Procedure(s) (LRB):  Right diagnostic genicular block (Right)    Fransisco Piña  08/21/2024

## 2024-08-21 NOTE — PLAN OF CARE
Kaleigh Montenegro has met all discharge criteria from Phase II. Vital Signs are stable, ambulating  without difficulty. Discharge instructions given, patient verbalized understanding. Discharged from facility via wheelchair in stable condition.

## 2024-08-21 NOTE — DISCHARGE INSTRUCTIONS
Home Care Instructions Pain Management:    1.  DIET:    You may resume your normal diet today.    2.  BATHING:    You may shower with luke warm water.    3.  DRESSING:    You may remove your bandage today.    4.  ACTIVITY LEVEL:      You may resume your normal activities 24 hours after your procedure.    5.  MEDICATIONS:    You may resume your normal medications today.    6.  SPECIAL INSTRUCTIONS:    No heat to the injection site for 24 hours including bath or shower, heating pad, moist heat or hot tubs.    Use an ice pack to the injection site for any pain or discomfort.  Apply ice packs for 20 minute intervals as needed.    If you have received any sedatives by mouth today, you can not drive for 12 hours.    If you have received sedation through an IV, you can not drive for 24 hours.    PLEASE CALL YOUR DOCTOR FOR THE FOLLOWIN.  Redness or swelling around the injection site.  2.  Fever of 101 degrees.  3.  Drainage (pus) from the injection site.  4.  For any continuous bleeding (some dried blood over the incision is normal.)    FOR EMERGENCIES:    If any unusual problems or difficulties occur during clinic hours, call [phone] or dial [phone].    Follow up with with your physician in 2-3 weeks.

## 2024-08-22 ENCOUNTER — CLINICAL SUPPORT (OUTPATIENT)
Dept: REHABILITATION | Facility: HOSPITAL | Age: 69
End: 2024-08-22
Attending: PHYSICAL MEDICINE & REHABILITATION
Payer: MEDICARE

## 2024-08-22 ENCOUNTER — PATIENT MESSAGE (OUTPATIENT)
Dept: PAIN MEDICINE | Facility: CLINIC | Age: 69
End: 2024-08-22
Payer: MEDICARE

## 2024-08-22 DIAGNOSIS — Z74.09 IMPAIRED MOBILITY AND ADLS: ICD-10-CM

## 2024-08-22 DIAGNOSIS — R29.898 DECREASED STRENGTH OF TRUNK AND BACK: Primary | ICD-10-CM

## 2024-08-22 DIAGNOSIS — Z78.9 IMPAIRED MOBILITY AND ADLS: ICD-10-CM

## 2024-08-22 PROCEDURE — 97110 THERAPEUTIC EXERCISES: CPT | Mod: CQ

## 2024-08-22 PROCEDURE — 97112 NEUROMUSCULAR REEDUCATION: CPT | Mod: CQ

## 2024-08-22 NOTE — PROGRESS NOTES
"[facility]      Name: Diana Montenegro  Clinic Number: [id number]    Therapy Diagnosis:   Encounter Diagnoses   Name Primary?    Decreased strength of trunk and back Yes    Impaired mobility and ADLs      Physician: Rosalie Buchanan, *    Visit Date: 2024    Physician Orders: PT Eval and Treat  Medical Diagnosis from Referral: M54.50,G89.29 (ICD-10-CM) - Chronic right-sided low back pain without sciatica  Evaluation Date: 2024  Authorization Period Expiration: 2024  Plan of Care Expiration: 2024  Reassessment Due:9/15/24  Visit # / Visits authorized:   MedX testing visit 2     Time In: 2:25 PM  Time Out: 3:30 PM  Total Billable Time:   55 minutes       INSURANCE and OUTCOMES: Fee for Service with FOTO Outcomes 1/3     Precautions: standard, fall, history of stroke     Pattern of pain determined: 1 PEN    Subjective   Diana  mild lower back discomfort, moderate (L) shoulder discomfort. Also states that she received a R knee genicular block yesterday which provided good relief for her knee pain.     Patient reports their pain to be 2/10 on a 0-10 scale with 0 being no pain and 10 being the worst pain imaginable.  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[employer], rollator  Gym Membership: yes, [employer]     Pt goals: "try to get rid of this pain"     Objective     Baseline IM Testing Results:   Date of testin24  ROM 0-39 deg   Max Peak Torque 85    Min Peak Torque 19   Flex/Ext Ratio 4.4:1   % below normative data 57       Mid IM Testing Results:   Date of testin/15/24  ROM 0-45deg   Max Peak Torque 105   Min Peak Torque 30   Flex/Ext Ratio 3.5:1   % below normative data 42   \\ 39%strength increase  Intake " Outcome Measure for FOTO Lumbar Survey     Therapist reviewed FOTO scores for Diana Montenegro on 7/16/2024.   FOTO report - see Media section or FOTO account episode details.     Intake Score: 49% functional ability  Goal: 58%           Treatment    Kaleigh received the treatments listed below:      Kaleigh received neuromuscular education  to isolate and engage spinal stabilization musculature correctly for motor control and coordination to aid in function and posture for 10 minutes on the Medical Medx Machine.  Patient performed MedX dynamic exercise with emphasis on spinal muscular control using pacer throughout  active range of motion. Therapist assisted patient in achieving optimal exertion for neural reeducation and endurance training by using the  Suresh Exertion Rating scale, by instructing the patient to aim for mid range of exertion, performing 15-20 repetitions, slowly, correctly,and safely.          8/22/2024     2:30 PM   [facility] - Short   Visit Number 12   VAS Pain Rating 2   Time 5   Lumbar Stretches - Slouch 10   Extension in Standing 10   Flexion in Lying 10   Lumbar Weight 44 lbs   Repetitions 15   Rating of Perceived Exertion 3       therapeutic exercises to develop strength, endurance, ROM, flexibility, posture, and core stabilization for 45 minutes including:    LTR, 10x  Piriformis stretch 2 x 20 sec   DKTC c/T-ball 10 ,5 sec  SLR c/ TrA contracttion 15x + 1#  PPT 10x ( cues)  Bridge + BTB 15x  BKFO BTB x 15  SL clam with BTB x 15  SOC x 10   EIS x 10  +seated thoracic extension stretch w 1/2 foam roll x 10      Peripheral muscle strengthening which included 1 set of 15-20 repetitions at a slow, controlled 10-13 second per rep pace focused on strengthening supporting musculature for improved body mechanics and functional mobility.  Pt and therapist focused on proper form during treatment to ensure optimal strengthening of each targeted muscle group.  Machines were utilized including  torso rotation, leg press, hip abd and hip add, leg ext (NP).  Leg curl, triceps, biceps, chest (NP) and row added visit 3    cold pack for 5 minutes to LB.    Home Exercises Provided and Patient Education Provided   Home exercises include:SLR/LTR/SKTC  Cardio program:TBD  Lifting education date: 8/20/24 Do's and Don'ts of lifting handout reviewed, practiced and provided.  Lumbar Roll: recommended  Fridge Magnet Discharge handout (date given):  Equipment at home/gym membership: [facility]    Education provided:   - cues w/exs   MedX performance  Precor ex performance    Written Home Exercises Provided: Patient instructed to cont prior HEP.  Exercises were reviewed and Kaleigh was able to demonstrate them prior to the end of the session.  Kaleigh demonstrated good  understanding of the education provided.     See EMR under Patient Instructions for exercises provided prior visit     Assessment   Diana arrives with only mild lower back discomfort. Treatment continued with flexibility, strengthening and neuromuscular reeducation ex's. She was progressed to BTB for bridging with band, BKFO and clamshells and also added 1# resistance for TrA + SLR for progressive strengthening. Also added seated thoracic extension stretch for flexibility. She was able to perform ex's with min cues and without increased pain. Lumbar MedX resistance was increased to 44 ft/lbs and she completed 15 reps with a RPE = 3/10. She completed peripheral strengthening ex's without c/o.(Leg extension not performed due to knee pain and chest press/ row not performed due to (L) shoulder pain). Will continue per HB protocol and patient tolerance.       Patient is making progress towards established goals.  Pt will continue to benefit from skilled outpatient physical therapy to address the deficits stated in the impairment chart, provide pt/family education and to maximize pt's level of independence in the home and community environment.     Anticipated Barriers for  therapy: right knee pain/OA   Pt's spiritual, cultural and educational needs considered and pt agreeable to plan of care and goals as stated below:       GOALS: Pt is in agreement with the following goals.   Short term goals:  6 weeks or 10 visits   - Pt will demonstrate increased lumbar MedX ROM by at least 3 degrees from the initial ROM value with improvements noted in functional ROM and ability to perform ADLs. met  - Pt will demonstrate increased MedX average isometric strength value by 20% from initial test resulting in improved ability to perform bending, lifting, and carrying activities safely, confidently. met  - Pt will report a reduction in worst pain score by 1-2 points for improved tolerance for household chores. Appropriate and Ongoing  - Pt able to perform HEP correctly with minimal cueing or supervision from therapist to encourage independent management of symptoms. Appropriate and Ongoing     Long term goals: 10 weeks or 20 visits   - Pt will demonstrate increased lumbar MedX ROM by at least 9 degrees from initial ROM value, resulting in improved ability to perform functional forward bending while standing and sitting. Appropriate and Ongoing  - Pt will demonstrate increased MedX average isometric strength value by 35% from initial test resulting in improved ability to perform bending, lifting, and carrying activities safely and confidently. MET  - Pt to demonstrate ability to independently control and reduce their pain through posture positioning and mechanical movements throughout a typical day. Appropriate and Ongoing  - Pt will demonstrate reduced pain and improved functional outcomes as reported on the FOTO by reaching an intake score of >/= 58% functional ability in order to demonstrate subjective improvement in patient's condition. . Appropriate and Ongoing  - Pt will demonstrate independence with the HEP at discharge. Appropriate and Ongoing  - Pt will be able to complete meal prep without  onset of back pain. Appropriate and Ongoing    Plan   Continue with established Plan of Care towards established PT goals.     Jani Carrizales, PTA  8/22/2024

## 2024-08-23 ENCOUNTER — TELEPHONE (OUTPATIENT)
Dept: PAIN MEDICINE | Facility: CLINIC | Age: 69
End: 2024-08-23
Payer: MEDICARE

## 2024-08-23 DIAGNOSIS — M25.561 CHRONIC PAIN OF RIGHT KNEE: Primary | ICD-10-CM

## 2024-08-23 DIAGNOSIS — G89.29 CHRONIC PAIN OF RIGHT KNEE: Primary | ICD-10-CM

## 2024-08-23 DIAGNOSIS — M17.11 OSTEOARTHRITIS OF RIGHT KNEE, UNSPECIFIED OSTEOARTHRITIS TYPE: ICD-10-CM

## 2024-08-23 NOTE — PROGRESS NOTES
Patient reports 100% pain relief following diagnostic genicular nerve block.  I will place orders for the RFA procedure.    Perla Barrera, DO   Interventional Pain Management

## 2024-08-23 NOTE — TELEPHONE ENCOUNTER
----- Message from Chaparrita Barrera DO sent at 2024 12:28 PM CDT -----  Regarding: Order for REGINE SOLARES    Patient Name: REGINE SOLARES([id number])  Sex: Female  : 1955      PCP: INÉS PALOMO    Center: [facility] CENTRAL BILLING OFFICE     Types of orders made on 2024: Procedure Request    Order Date:2024  Ordering User:CHAPARRITA BARRERA [[id number]]  Encounter Provider:Chaparrita Barrera DO [[id number]]    Z1 Authorizing Provider: Chaparrita Barrera DO [[id number]]  Department:[facility] PAIN MANAGEMENT[[id number]]    Common Order Information  Procedure -> Radiofrequency Ablation (Specify level and laterality) Cmt: Right             genicular RFA    Pre-op Diagnosis -> Knee pain, chronic       -> OA (osteoarthritis) of knee     Order Specific Information  Order: Procedure Order to Pain Management [Custom: [id number]]  Order #:          [id number]  1996Qty: 1 FUTURE    Priority: Routine  Class: Clinic Performed    Future Order Information      Expires on:2025            Expected by:2024                   Associated Diagnoses      M25.561, G89.29 Chronic pain of right knee      M17.11 Osteoarthritis of right knee, unspecified osteoarthritis type      Physician -> Gelter         Is patient on anti-coagulants? -> Yes         Facility [facility]: [facility]           Priority: Routine  Class: Clinic Performed    Future Order Information      Expires on:2025            Expected by:2024                   Associated Diagnoses      M25.561, G89.29 Chronic pain of right knee      M17.11 Osteoarthritis of right knee, unspecified osteoarthritis type      Procedure -> Radiofrequency Ablation (Specify level and laterality) Cmt:                 Right genicula  r RFA        Physician -> Gelter         Is patient on anti-coagulants? -> Yes         Pre-op Diagnosis -> Knee pain, chronic           -> OA (osteoarthritis) of knee         Facility Name: -> [facility]

## 2024-08-27 ENCOUNTER — CLINICAL SUPPORT (OUTPATIENT)
Dept: REHABILITATION | Facility: HOSPITAL | Age: 69
End: 2024-08-27
Attending: PHYSICAL MEDICINE & REHABILITATION
Payer: MEDICARE

## 2024-08-27 ENCOUNTER — DOCUMENTATION ONLY (OUTPATIENT)
Dept: REHABILITATION | Facility: HOSPITAL | Age: 69
End: 2024-08-27

## 2024-08-27 DIAGNOSIS — Z78.9 IMPAIRED MOBILITY AND ADLS: ICD-10-CM

## 2024-08-27 DIAGNOSIS — Z74.09 IMPAIRED MOBILITY AND ADLS: ICD-10-CM

## 2024-08-27 DIAGNOSIS — R29.898 DECREASED STRENGTH OF TRUNK AND BACK: Primary | ICD-10-CM

## 2024-08-27 DIAGNOSIS — E11.3311 TYPE 2 DIABETES MELLITUS WITH RIGHT EYE AFFECTED BY MODERATE NONPROLIFERATIVE RETINOPATHY AND MACULAR EDEMA, WITHOUT LONG-TERM CURRENT USE OF INSULIN: Primary | ICD-10-CM

## 2024-08-27 PROCEDURE — 97110 THERAPEUTIC EXERCISES: CPT | Mod: CQ

## 2024-08-27 PROCEDURE — 97112 NEUROMUSCULAR REEDUCATION: CPT | Mod: CQ

## 2024-08-27 NOTE — PROGRESS NOTES
"[facility]      Name: Diana Montenegro  Clinic Number: [id number]    Therapy Diagnosis:   Encounter Diagnoses   Name Primary?    Decreased strength of trunk and back Yes    Impaired mobility and ADLs      Physician: Rosalie Buchanan, *    Visit Date: 2024    Physician Orders: PT Eval and Treat  Medical Diagnosis from Referral: M54.50,G89.29 (ICD-10-CM) - Chronic right-sided low back pain without sciatica  Evaluation Date: 2024  Authorization Period Expiration: 2024  Plan of Care Expiration: 2024  Reassessment Due:9/15/24  Visit # / Visits authorized:   MedX testing visit 2     Time In: 12:55 PM  Time Out: 1:55 PM  Total Billable Time: 55 minutes       INSURANCE and OUTCOMES: Fee for Service with FOTO Outcomes 1/3     Precautions: standard, fall, history of stroke     Pattern of pain determined: 1 PEN    Subjective   Maudina  mild lower back discomfort, moderate (L) shoulder discomfort.     Patient reports their pain to be 2/10 on a 0-10 scale with 0 being no pain and 10 being the worst pain imaginable.  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[employer], rollator  Gym Membership: yes, [employer]     Pt goals: "try to get rid of this pain"     Objective     Baseline IM Testing Results:   Date of testin24  ROM 0-39 deg   Max Peak Torque 85    Min Peak Torque 19   Flex/Ext Ratio 4.4:1   % below normative data 57       Mid IM Testing Results:   Date of testin/15/24  ROM 0-45deg   Max Peak Torque 105   Min Peak Torque 30   Flex/Ext Ratio 3.5:1   % below normative data 42   \\ 39%strength increase  Intake Outcome Measure for FOTO Lumbar Survey     Therapist reviewed FOTO scores for Diana Montenegro on 2024. "   FOTO report - see Media section or FOTO account episode details.     Intake Score: 49% functional ability  Goal: 58%           Treatment    Kaleigh received the treatments listed below:      Kaleigh received neuromuscular education  to isolate and engage spinal stabilization musculature correctly for motor control and coordination to aid in function and posture for 10 minutes on the Medical Medx Machine.  Patient performed MedX dynamic exercise with emphasis on spinal muscular control using pacer throughout  active range of motion. Therapist assisted patient in achieving optimal exertion for neural reeducation and endurance training by using the  Suresh Exertion Rating scale, by instructing the patient to aim for mid range of exertion, performing 15-20 repetitions, slowly, correctly,and safely.          8/27/2024     1:09 PM   [facility] - Short   Visit Number 13   VAS Pain Rating 2   Time 5   Lumbar Stretches - Slouch 10   Extension in Standing 10   Flexion in Lying 10   Lumbar Weight 44 lbs   Repetitions 18   Rating of Perceived Exertion 3        therapeutic exercises to develop strength, endurance, ROM, flexibility, posture, and core stabilization for 45 minutes including:    LTR, 10x  Piriformis stretch 2 x 20 sec   DKTC c/T-ball 10 ,5 sec  SLR c/ TrA contracttion 10x + 1.5#  PPT 10x ( cues)  Bridge + BTB 20x  BKFO BTB x 15  SL clam with BTB x 15  SOC x 10   EIS x 10  seated thoracic extension stretch w 1/2 foam roll x 10  + Paloff press with 5# x 10      Peripheral muscle strengthening which included 1 set of 15-20 repetitions at a slow, controlled 10-13 second per rep pace focused on strengthening supporting musculature for improved body mechanics and functional mobility.  Pt and therapist focused on proper form during treatment to ensure optimal strengthening of each targeted muscle group.  Machines were utilized including torso rotation, leg press, hip abd and hip add, leg ext (NP).  Leg curl, triceps,  biceps, chest (NP) and row added visit 3    cold pack for 5 minutes to LB.    Home Exercises Provided and Patient Education Provided   Home exercises include:SLR/LTR/SKTC  Cardio program:TBD  Lifting education date: 8/20/24 Do's and Don'ts of lifting handout reviewed, practiced and provided.  Lumbar Roll: recommended  Fridge Magnet Discharge handout (date given):  Equipment at home/gym membership: [facility]    Education provided:   - cues w/exs   MedX performance  Precor ex performance    Written Home Exercises Provided: Patient instructed to cont prior HEP.  Exercises were reviewed and Kaleigh was able to demonstrate them prior to the end of the session.  Kaleigh demonstrated good  understanding of the education provided.     See EMR under Patient Instructions for exercises provided prior visit     Assessment   Diana arrives with mild lower back discomfort/stiffness. Treatment continued with flexibility, strengthening and neuromuscular reeducation ex's. She was progressed with increased reps for bridging with band and increased resistance to 1.5# for TrA activation + SLR. ALso added Paloff press  with light resistance for additional core strengthening. She was able to perform ex's including progressions with min cues and without increased pain. Lumbar MedX flexion ROM was increased to 48 degrees,resistance was maintained at 44 ft/lbs and she completed 18 reps with a RPE = 3/10. She completed peripheral strengthening ex's without c/o.(Leg extension not performed due to knee pain and chest press/ row not performed due to (L) shoulder pain). Will continue per HB protocol and patient tolerance.       Patient is making progress towards established goals.  Pt will continue to benefit from skilled outpatient physical therapy to address the deficits stated in the impairment chart, provide pt/family education and to maximize pt's level of independence in the home and community environment.     Anticipated Barriers for therapy: right  knee pain/OA   Pt's spiritual, cultural and educational needs considered and pt agreeable to plan of care and goals as stated below:       GOALS: Pt is in agreement with the following goals.   Short term goals:  6 weeks or 10 visits   - Pt will demonstrate increased lumbar MedX ROM by at least 3 degrees from the initial ROM value with improvements noted in functional ROM and ability to perform ADLs. met  - Pt will demonstrate increased MedX average isometric strength value by 20% from initial test resulting in improved ability to perform bending, lifting, and carrying activities safely, confidently. met  - Pt will report a reduction in worst pain score by 1-2 points for improved tolerance for household chores. Appropriate and Ongoing  - Pt able to perform HEP correctly with minimal cueing or supervision from therapist to encourage independent management of symptoms. Appropriate and Ongoing     Long term goals: 10 weeks or 20 visits   - Pt will demonstrate increased lumbar MedX ROM by at least 9 degrees from initial ROM value, resulting in improved ability to perform functional forward bending while standing and sitting. Appropriate and Ongoing  - Pt will demonstrate increased MedX average isometric strength value by 35% from initial test resulting in improved ability to perform bending, lifting, and carrying activities safely and confidently. MET  - Pt to demonstrate ability to independently control and reduce their pain through posture positioning and mechanical movements throughout a typical day. Appropriate and Ongoing  - Pt will demonstrate reduced pain and improved functional outcomes as reported on the FOTO by reaching an intake score of >/= 58% functional ability in order to demonstrate subjective improvement in patient's condition. . Appropriate and Ongoing  - Pt will demonstrate independence with the HEP at discharge. Appropriate and Ongoing  - Pt will be able to complete meal prep without onset of back  pain. Appropriate and Ongoing    Plan   Continue with established Plan of Care towards established PT goals.     Jani Carrizales, PTA  8/27/2024

## 2024-08-27 NOTE — PROGRESS NOTES
PT/PTA met face to face to discuss pt's treatment plan and progress towards established goals. Pt will be seen by a physical therapist minimally every 6th visit or every 30 days.    Jani Carrizales PTA

## 2024-08-29 ENCOUNTER — CLINICAL SUPPORT (OUTPATIENT)
Dept: OPHTHALMOLOGY | Facility: CLINIC | Age: 69
End: 2024-08-29
Payer: MEDICARE

## 2024-08-29 ENCOUNTER — OFFICE VISIT (OUTPATIENT)
Dept: OPHTHALMOLOGY | Facility: CLINIC | Age: 69
End: 2024-08-29
Payer: MEDICARE

## 2024-08-29 DIAGNOSIS — H04.123 BILATERAL DRY EYES: ICD-10-CM

## 2024-08-29 DIAGNOSIS — E11.36 DIABETIC CATARACT OF BOTH EYES: ICD-10-CM

## 2024-08-29 DIAGNOSIS — E11.3393 TYPE 2 DIABETES MELLITUS WITH BOTH EYES AFFECTED BY MODERATE NONPROLIFERATIVE RETINOPATHY WITHOUT MACULAR EDEMA, WITHOUT LONG-TERM CURRENT USE OF INSULIN: Primary | ICD-10-CM

## 2024-08-29 PROCEDURE — 1101F PT FALLS ASSESS-DOCD LE1/YR: CPT | Mod: CPTII,S$GLB,, | Performed by: OPHTHALMOLOGY

## 2024-08-29 PROCEDURE — 1126F AMNT PAIN NOTED NONE PRSNT: CPT | Mod: CPTII,S$GLB,, | Performed by: OPHTHALMOLOGY

## 2024-08-29 PROCEDURE — 3044F HG A1C LEVEL LT 7.0%: CPT | Mod: CPTII,S$GLB,, | Performed by: OPHTHALMOLOGY

## 2024-08-29 PROCEDURE — 92134 CPTRZ OPH DX IMG PST SGM RTA: CPT | Mod: S$GLB,,, | Performed by: OPHTHALMOLOGY

## 2024-08-29 PROCEDURE — 3060F POS MICROALBUMINURIA REV: CPT | Mod: CPTII,S$GLB,, | Performed by: OPHTHALMOLOGY

## 2024-08-29 PROCEDURE — 99999 PR PBB SHADOW E&M-EST. PATIENT-LVL III: CPT | Mod: PBBFAC,,, | Performed by: OPHTHALMOLOGY

## 2024-08-29 PROCEDURE — 3066F NEPHROPATHY DOC TX: CPT | Mod: CPTII,S$GLB,, | Performed by: OPHTHALMOLOGY

## 2024-08-29 PROCEDURE — 99203 OFFICE O/P NEW LOW 30 MIN: CPT | Mod: S$GLB,,, | Performed by: OPHTHALMOLOGY

## 2024-08-29 PROCEDURE — 2022F DILAT RTA XM EVC RTNOPTHY: CPT | Mod: CPTII,S$GLB,, | Performed by: OPHTHALMOLOGY

## 2024-08-29 PROCEDURE — 1160F RVW MEDS BY RX/DR IN RCRD: CPT | Mod: CPTII,S$GLB,, | Performed by: OPHTHALMOLOGY

## 2024-08-29 PROCEDURE — G2211 COMPLEX E/M VISIT ADD ON: HCPCS | Mod: S$GLB,,, | Performed by: OPHTHALMOLOGY

## 2024-08-29 PROCEDURE — 1159F MED LIST DOCD IN RCRD: CPT | Mod: CPTII,S$GLB,, | Performed by: OPHTHALMOLOGY

## 2024-08-29 PROCEDURE — 3288F FALL RISK ASSESSMENT DOCD: CPT | Mod: CPTII,S$GLB,, | Performed by: OPHTHALMOLOGY

## 2024-08-29 NOTE — PROGRESS NOTES
Oct macula done ou, per Dr. Ni./MA          There are no diagnoses linked to this encounter.     69 y.o. y/o here for screening for Diabetic Renopathy with non-dilated fundus photos per Rad Johnston MD

## 2024-08-30 ENCOUNTER — PATIENT MESSAGE (OUTPATIENT)
Dept: INTERNAL MEDICINE | Facility: CLINIC | Age: 69
End: 2024-08-30
Payer: MEDICARE

## 2024-08-30 ENCOUNTER — CLINICAL SUPPORT (OUTPATIENT)
Dept: REHABILITATION | Facility: HOSPITAL | Age: 69
End: 2024-08-30
Attending: PHYSICAL MEDICINE & REHABILITATION
Payer: MEDICARE

## 2024-08-30 DIAGNOSIS — R29.6 FREQUENT FALLS: ICD-10-CM

## 2024-08-30 DIAGNOSIS — Z74.09 IMPAIRED MOBILITY AND ADLS: ICD-10-CM

## 2024-08-30 DIAGNOSIS — R29.898 DECREASED STRENGTH OF TRUNK AND BACK: Primary | ICD-10-CM

## 2024-08-30 DIAGNOSIS — Z78.9 IMPAIRED MOBILITY AND ADLS: ICD-10-CM

## 2024-08-30 DIAGNOSIS — I65.23 BILATERAL CAROTID ARTERY STENOSIS: Primary | ICD-10-CM

## 2024-08-30 DIAGNOSIS — R26.89 IMPAIRMENT OF BALANCE: ICD-10-CM

## 2024-08-30 DIAGNOSIS — M19.90 ARTHRITIS: Primary | ICD-10-CM

## 2024-08-30 PROCEDURE — 97112 NEUROMUSCULAR REEDUCATION: CPT

## 2024-08-30 PROCEDURE — 97110 THERAPEUTIC EXERCISES: CPT

## 2024-08-30 NOTE — PROGRESS NOTES
"[facility]      Name: Diana Montenegro  Clinic Number: [id number]    Therapy Diagnosis:   Encounter Diagnoses   Name Primary?    Decreased strength of trunk and back Yes    Impaired mobility and ADLs      Physician: Rosalie Buchanan, *    Visit Date: 2024    Physician Orders: PT Eval and Treat  Medical Diagnosis from Referral: M54.50,G89.29 (ICD-10-CM) - Chronic right-sided low back pain without sciatica  Evaluation Date: 2024  Authorization Period Expiration: 2024  Plan of Care Expiration: 2024  Reassessment Due:9/15/24  Visit # / Visits authorized:   MedX testing visit 2     Time In: 9:15 AM  Time Out:  10:15 AM  Total Billable Time: 55 minutes   (40 min 1:1)    INSURANCE and OUTCOMES: Fee for Service with FOTO Outcomes 1/3     Precautions: standard, fall, history of stroke     Pattern of pain determined: 1 PEN    Subjective   Maudina  mild lower back discomfort, moderate (L) shoulder discomfort.     Patient reports their pain to be 2/10 on a 0-10 scale with 0 being no pain and 10 being the worst pain imaginable.  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[employer], rollator  Gym Membership: yes, [employer]     Pt goals: "try to get rid of this pain"     Objective     Baseline IM Testing Results:   Date of testin24  ROM 0-39 deg   Max Peak Torque 85    Min Peak Torque 19   Flex/Ext Ratio 4.4:1   % below normative data 57       Mid IM Testing Results:   Date of testin/15/24  ROM 0-45deg   Max Peak Torque 105   Min Peak Torque 30   Flex/Ext Ratio 3.5:1   % below normative data 42   \\ 39%strength increase  Intake Outcome Measure for FOTO Lumbar Survey     Therapist reviewed FOTO scores for Diana Montenegro on " 7/16/2024.   FOTO report - see Media section or FOTO account episode details.     Intake Score: 49% functional ability  Goal: 58%           Treatment    Kaleigh received the treatments listed below:      Kaleigh received neuromuscular education  to isolate and engage spinal stabilization musculature correctly for motor control and coordination to aid in function and posture for 10 minutes on the Medical Medx Machine.  Patient performed MedX dynamic exercise with emphasis on spinal muscular control using pacer throughout  active range of motion. Therapist assisted patient in achieving optimal exertion for neural reeducation and endurance training by using the  Suresh Exertion Rating scale, by instructing the patient to aim for mid range of exertion, performing 15-20 repetitions, slowly, correctly,and safely.          8/30/2024    10:13 AM   [facility]   Visit Number 14   VAS Pain Rating 2   Time 5   Lumbar Stretches - Slouch Overcorrection 10   Extension in Standing 10   Flexion in Lying 10   Lumbar Weight 44 lbs   Repetitions 20   Rating of Perceived Exertion 3.5   Ice - Z Lie (in min.) 5             therapeutic exercises to develop strength, endurance, ROM, flexibility, posture, and core stabilization for 45 minutes including:    LTR, 10x  Piriformis stretch 2 x 20 sec   DKTC c/T-ball 10 ,5 sec  SLR c/ TrA contracttion 10x + 2#  PPT 10x ( cues)  Bridge + BTB 20x  BKFO BTB x 15  SL clam with BTB x 15  SOC x 10   EIS x 10  seated thoracic extension stretch w 1/2 foam roll x 10  Paloff press with lift 5# x 10      Peripheral muscle strengthening which included 1 set of 15-20 repetitions at a slow, controlled 10-13 second per rep pace focused on strengthening supporting musculature for improved body mechanics and functional mobility.  Pt and therapist focused on proper form during treatment to ensure optimal strengthening of each targeted muscle group.  Machines were utilized including torso rotation, leg press, hip  abd and hip add, leg ext (NP).  Leg curl, triceps, biceps, chest (NP) and row added visit 3    cold pack for 5 minutes to LB.    Home Exercises Provided and Patient Education Provided   Home exercises include:SLR/LTR/SKTC  Cardio program:TBD  Lifting education date: 8/20/24 Do's and Don'ts of lifting handout reviewed, practiced and provided.  Lumbar Roll: recommended  Fridge Magnet Discharge handout (date given):  Equipment at home/gym membership: [facility]    Education provided:   - cues w/exs   MedX performance  Precor ex performance    Written Home Exercises Provided: Patient instructed to cont prior HEP.  Exercises were reviewed and Kaleigh was able to demonstrate them prior to the end of the session.  Kaleigh demonstrated good  understanding of the education provided.     See EMR under Patient Instructions for exercises provided prior visit     Assessment   Diana arrives with mild lower back discomfort/stiffness. Treatment continued with flexibility, strengthening and neuromuscular reeducation ex's. She was progressed with increased reps for bridging with band and increased resistance to 2# for TrA activation + SLR. Added lift during Paloff press with light resistance for additional core strengthening. She was able to perform ex's including progressions with min cues and without increased pain. Lumbar MedX maintained at 44 ft/lbs and she completed 20 reps with a RPE = 3.5/10. She completed peripheral strengthening ex's without c/o.(Leg extension not performed due to knee pain and chest press/ row not performed due to (L) shoulder pain). Will continue per HB protocol and patient tolerance.       Patient is making progress towards established goals.  Pt will continue to benefit from skilled outpatient physical therapy to address the deficits stated in the impairment chart, provide pt/family education and to maximize pt's level of independence in the home and community environment.     Anticipated Barriers for therapy:  right knee pain/OA   Pt's spiritual, cultural and educational needs considered and pt agreeable to plan of care and goals as stated below:       GOALS: Pt is in agreement with the following goals.   Short term goals:  6 weeks or 10 visits   - Pt will demonstrate increased lumbar MedX ROM by at least 3 degrees from the initial ROM value with improvements noted in functional ROM and ability to perform ADLs. met  - Pt will demonstrate increased MedX average isometric strength value by 20% from initial test resulting in improved ability to perform bending, lifting, and carrying activities safely, confidently. met  - Pt will report a reduction in worst pain score by 1-2 points for improved tolerance for household chores. Appropriate and Ongoing  - Pt able to perform HEP correctly with minimal cueing or supervision from therapist to encourage independent management of symptoms. Appropriate and Ongoing     Long term goals: 10 weeks or 20 visits   - Pt will demonstrate increased lumbar MedX ROM by at least 9 degrees from initial ROM value, resulting in improved ability to perform functional forward bending while standing and sitting. Appropriate and Ongoing  - Pt will demonstrate increased MedX average isometric strength value by 35% from initial test resulting in improved ability to perform bending, lifting, and carrying activities safely and confidently. MET  - Pt to demonstrate ability to independently control and reduce their pain through posture positioning and mechanical movements throughout a typical day. Appropriate and Ongoing  - Pt will demonstrate reduced pain and improved functional outcomes as reported on the FOTO by reaching an intake score of >/= 58% functional ability in order to demonstrate subjective improvement in patient's condition. . Appropriate and Ongoing  - Pt will demonstrate independence with the HEP at discharge. Appropriate and Ongoing  - Pt will be able to complete meal prep without onset of  back pain. Appropriate and Ongoing    Plan   Continue with established Plan of Care towards established PT goals.     Henrietta Rodriguez, PT  8/30/2024

## 2024-08-30 NOTE — PROGRESS NOTES
"Subjective:       Patient ID: Diana Montenegro is a 69 y.o. female      Chief Complaint   Patient presents with    Injections     History of Present Illness  HPI    Pt here for DR ornelas    Pt states vision is blurry OD>>OS.   Pt states no eye pain or discomfort.   Pt states she was seeing Dr. Howard but he retired.Pt states she has fluid   behind her OD. Pt states every now and then she would see a little black   dot floating around, denies any flashes or diplopia. Pt states she seaw   Dr. Howard at the beginning of July right before he retired. Pt states she   was getting Eylea injections OD with Dr. Howard for "fluid in eye".     Thinks last inj was April    Meds;  Refresh QID OU    Hemoglobin A1C       Date                     Value               Ref Range             Status                06/10/2024               6.5 (H)             4.0 - 5.6 %           Final              Last edited by Ronny Ni MD on 8/29/2024  9:11 PM.        Imaging:    See report    Assessment/Plan:     1. Type 2 diabetes mellitus with both eyes affected by moderate nonproliferative retinopathy without macular edema, without long-term current use of insulin  No ME today.  Pt reports h/o Ey for ME OD, last one April 2024  Will prior auth Ey in case needs in future    Diabetic Retinopathy discussed in detail, all questions answered  Stressed importance of good BS/BP/Chol Control  RTC immediately PRN any vision changes, graciela blurry vision, missing vision, floaters, distortions, etc    - Posterior Segment OCT Retina-Both eyes  - Prior authorization Order    2. Diabetic cataract of both eyes  Vis sig OD>>OS  Refer for cataract eval    3. Bilateral dry eyes  Can continue ATs OU    Visit today included increased complexity associated with the care of the episodic problem DR, cataracts, dry eyes addressed and managing the longitudinal care of the patient due to the serious and/or complex managed problem(s) DR, cataracts, dry " eyes.      Follow up in about 2 months (around 10/29/2024), or if symptoms worsen or fail to improve, for OCT Mac, Dilated examination.

## 2024-08-31 RX ORDER — METFORMIN HYDROCHLORIDE 1000 MG/1
1000 TABLET ORAL 2 TIMES DAILY WITH MEALS
Qty: 180 TABLET | Refills: 1 | Status: SHIPPED | OUTPATIENT
Start: 2024-08-31

## 2024-08-31 NOTE — TELEPHONE ENCOUNTER
Care Due:                  Date            Visit Type   Department     Provider  --------------------------------------------------------------------------------                                EP -                              PRIMARY      [facility] INTERNAL  Last Visit: 06-      CARE ([facility])   MEDICINE       Radtarah Johnston                              EP -                              PRIMARY      [facility] INTERNAL  Next Visit: 11-      [facility] ([facility])   MEDICINE       Radkelle Johnston                                                            Last  Test          Frequency    Reason                     Performed    Due Date  --------------------------------------------------------------------------------    Lipid Panel.  12 months..  rosuvastatin.............  09- 09-    [employer] Embedded Care Due Messages. Reference number: [id number].   8/31/2024 7:01:19 AM CDT

## 2024-09-01 NOTE — TELEPHONE ENCOUNTER
Provider Staff:  Action required for this patient    Requires labs      Please see care gap opportunities below in Care Due Message.    Thanks!  [facility]     Appointments      Date Provider   Last Visit   6/17/2024 Rad Johnston MD   Next Visit   11/21/2024 Rad Johnston MD     Refill Decision Note   Diana Montenegro  is requesting a refill authorization.  Brief Assessment and Rationale for Refill:  Approve     Medication Therapy Plan:        Comments:     Note composed:10:40 PM 08/31/2024

## 2024-09-03 DIAGNOSIS — I48.0 PAROXYSMAL ATRIAL FIBRILLATION: Primary | ICD-10-CM

## 2024-09-03 NOTE — PROGRESS NOTES
Ms. Montenegro is a patient of Dr. Randolph and was last seen in clinic 9/26/2023.      Subjective:   Patient ID:  Diana Montenegro is a 69 y.o. female who presents for follow up of Atrial Fibrillation  .     HPI:    Ms. Montenegro is a 69 y.o. female with hypertension, hyperlipidemia, diabetes mellitus, obesity s/p gastric sleeve surgery, right MCA infarction July of 2014 s/p ILR implant (removed 2018), atrial fibrillation, and bradycardia here for annual follow up.     Background:     7/14 had rt MCA stroke.   She was outside of the treatment window for thrombolytics and recovered completely.  Work-up included carotid dopplers showing 60% right ICA stenosis. ILR implanted.  ILR monitoring has revealed rare paroxysmal atrial fibrillation, with episodes up to just less than an hour in duration, asymptomatic. Eliquis 5 mg BID initiated.  She underwent successful ILR removal on 9/17/2018. Remains on eliquis.  Reported palpitations 4/2019. Holter monitor 4/22/2019 showed very rare ectopy and no arrhythmias. At clinic visit 11/2019 she was prescribed BB PRN for palps.     8/10/2020: Feeling well. No cardiac complaints. She has not needed to use PRN metoprolol. Doing well from a rhythm standpoint, with no documented or symptomatic AF episodes since last clinic visit. Has BB PRN for palps but has not needed it. CHADSVASc 3 and remains on eliquis for CVA prophylaxis.     8/19/2021: She is doing well from a rhythm standpoint, with no documented or symptomatic AF episodes. Has BB PRN for palps but has not needed it. CHADSVASc 3 and remains on eliquis for CVA prophylaxis. Encouraged nightly use of CPAP.      8/9/2022: Stable from a rhythm standpoint, maintaining sinus rhythm. Brief palps. PRN metoprolol. Not on AAD. CHADSVASc 6 on eliquis. She is likely going to proceed with hernia repair surgery in the near future. Update echo. For clearance to hold eliquis, message EP clinic with surgical details. Otherwise, no contraindication  to proceeding from arrhythmia standpoint.     9/26/2023: Doing well from a rhythm standpoint, with no sustained palpitations and she has not needed her PRN metoprolol. She is reporting worsening fatigue. Will update CBC and TSH. Also encouraged CPAP use. She is interested in Inspire device. Will refer back to sleep medicine. Margarita 6 on eliquis for CVA prophylaxis. She has had some stumbling falls recently due to loss of balance she does use a walker. May benefit from vestibular PT. We discussed if her falls continue or worsen, will consider watchman. Information about watchman provided. PCP appt this week and colonoscopy scheduled for December.     Update (09/04/2024):    Today she reports increased fatigue over the past couple of months. No KING, CP, palpitations, LH, syncope reported. She is not on CPAP and is interested in Inspire device. Awaiting right knee replacement surgery.    She is currently taking eliquis 5mg BID for stroke prophylaxis and denies significant bleeding episodes. She is currently being treated with metoprolol tartrate 25mg PRN for palpitations.  Kidney function is stable, with a creatinine of 1.4 on 7/30/2024.    I have personally reviewed the patient's EKG today, which shows sinus rhythm at 72bpm. MN interval is 172. QRS is 90. QTc is 420.    Relevant Cardiac Test Results:     2D Echo (8/16/2022):  The left ventricle is normal in size with normal systolic function. The estimated ejection fraction is 60%.  Normal right ventricular size with normal right ventricular systolic function.  Grade I left ventricular diastolic dysfunction.  The estimated PA systolic pressure is 21 mmHg.  Normal central venous pressure (3 mmHg).    Current Outpatient Medications   Medication Sig    acyclovir 5% (ZOVIRAX) 5 % ointment Apply topically 6 (six) times daily.    albuterol (VENTOLIN HFA) 90 mcg/actuation inhaler Inhale 2 puffs into the lungs every 6 (six) hours as needed for Wheezing. Rescue     albuterol-ipratropium 2.5mg-0.5mg/3mL (DUO-NEB) 0.5 mg-3 mg(2.5 mg base)/3 mL nebulizer solution Take 3 mLs by nebulization every 6 (six) hours as needed for Wheezing. Rescue (Patient taking differently: Take 3 mLs by nebulization every 6 (six) hours as needed for Wheezing. Rescue prn)    apixaban (ELIQUIS) 5 mg Tab Take 1 tablet (5 mg total) by mouth 2 (two) times daily.    aspirin (ECOTRIN) 81 MG EC tablet Take 1 tablet (81 mg total) by mouth once daily.    b complex vitamins tablet Take 1 tablet by mouth once daily.    blood pressure test kit-large Kit Use as directed    blood sugar diagnostic (FREESTYLE LITE STRIPS) Strp USE 1 STRIP TO CHECK GLUCOSE TWICE DAILY    blood-glucose meter Misc One touch verio flex or tone touch verio reflect or freestyle freedom lite meter (all covered by insurance)    CALCIUM CITRATE/VITAMIN D3 (CALCIUM CITRATE + D ORAL) Take 1 tablet by mouth every morning.    cetirizine (ZYRTEC) 5 MG tablet Take 5 mg by mouth once daily.    ciclopirox (PENLAC) 8 % Soln Apply topically nightly.    cinnamon bark (CINNAMON ORAL) Take by mouth 2 (two) times daily.    cyanocobalamin, vitamin B-12, 500 mcg Subl Place 1 tablet under the tongue once daily.    diclofenac sodium (VOLTAREN) 1 % Gel APPLY 2 GRAMS TOPICALLY THREE TIMES DAILY    diphenhydrAMINE-aluminum-magnesium hydroxide-simethicone-LIDOcaine viscous HCl 2% Swish and spit 15 mLs every 4 (four) hours as needed (sore throat).    docusate sodium (COLACE) 100 MG capsule Take 100 mg by mouth once daily.     EYLEA 2 mg/0.05 mL Syrg     fish oil-omega-3 fatty acids 300-1,000 mg capsule Take 1 capsule by mouth 2 (two) times daily. Take 1 cap 2 times daily every other day    fluticasone (FLONASE) 50 mcg/actuation nasal spray 2 sprays (100 mcg total) by Each Nare route once daily. (Patient taking differently: 2 sprays by Each Nostril route daily as needed.)    glimepiride (AMARYL) 2 MG tablet Take 1 tablet (2 mg total) by mouth before breakfast. For  diabetes control.    lancets Misc Needs lancets for testing twice daily.  To go with meter covered by insurance.    levothyroxine (SYNTHROID) 88 MCG tablet Take 1 tablet (88 mcg total) by mouth before breakfast.    LORazepam (ATIVAN) 1 MG tablet Take 1 tablet (1 mg total) by mouth 2 (two) times daily. as needed for anxiety.    metFORMIN (GLUCOPHAGE) 1000 MG tablet TAKE 1 TABLET BY MOUTH TWICE DAILY WITH MEALS    metoprolol tartrate (LOPRESSOR) 25 MG tablet Take 1 tablet (25 mg total) by mouth once as needed. For palpitations    multivitamin capsule Take 1 capsule by mouth once daily.    mupirocin (BACTROBAN) 2 % ointment Apply to affected area 3 times daily    omeprazole (PRILOSEC) 40 MG capsule Take 1 capsule (40 mg total) by mouth every morning.    promethazine-dextromethorphan (PROMETHAZINE-DM) 6.25-15 mg/5 mL Syrp Take one tsp po q 6 hrs prn cough    rosuvastatin (CRESTOR) 40 MG Tab Take 1 tablet by mouth once daily    RUTIN/HESP/BIOFLAV/C/HERB#196 (BIOFLEX ORAL) Take 2 tablets by mouth once daily.    semaglutide (OZEMPIC) 2 mg/dose (8 mg/3 mL) PnIj Inject 2 mg into the skin every 7 days.    senna (SENNA) 8.6 mg tablet Take 2 tablets by mouth nightly as needed for Constipation.    traZODone (DESYREL) 50 MG tablet TAKE 1 TABLET BY MOUTH NIGHTLY AS NEEDED FOR  INSOMNIA  (OKAY  TO  TAKE  2ND  TABLET  BY  MOUTH  IN  30  MINUTES  IF  STILL  AWAKE)    valsartan-hydrochlorothiazide (DIOVAN-HCT) 80-12.5 mg per tablet Take 1 tablet by mouth once daily.     No current facility-administered medications for this visit.     Facility-Administered Medications Ordered in Other Visits   Medication    0.9%  NaCl infusion       Review of Systems   Constitutional: Positive for malaise/fatigue.   Cardiovascular:  Negative for chest pain, dyspnea on exertion, irregular heartbeat, leg swelling and palpitations.   Respiratory:  Negative for shortness of breath.    Hematologic/Lymphatic: Negative for bleeding problem.   Skin:  Negative  "for rash.   Musculoskeletal:  Positive for joint pain (knee). Negative for myalgias.   Gastrointestinal:  Negative for hematemesis, hematochezia and nausea.   Genitourinary:  Negative for hematuria.   Neurological:  Negative for light-headedness.   Psychiatric/Behavioral:  Negative for altered mental status.    Allergic/Immunologic: Negative for persistent infections.       Objective:          BP (!) 92/47   Pulse 72   Ht 5' 5" (1.651 m)   Wt 87.9 kg (193 lb 12.6 oz)   LMP  (LMP Unknown)   BMI 32.25 kg/m²     Physical Exam  Vitals and nursing note reviewed.   Constitutional:       Appearance: Normal appearance. She is well-developed.   HENT:      Head: Normocephalic.      Nose: Nose normal.   Eyes:      Pupils: Pupils are equal, round, and reactive to light.   Cardiovascular:      Rate and Rhythm: Normal rate and regular rhythm.   Pulmonary:      Effort: No respiratory distress.   Musculoskeletal:         General: Normal range of motion.   Skin:     General: Skin is warm and dry.      Findings: No erythema.   Neurological:      Mental Status: She is alert and oriented to person, place, and time.   Psychiatric:         Speech: Speech normal.         Behavior: Behavior normal.           Lab Results   Component Value Date     07/30/2024    K 4.8 07/30/2024    MG 1.6 07/10/2018    BUN 42 (H) 07/30/2024    CREATININE 1.4 07/30/2024    ALT 94 (H) 07/30/2024    AST 51 (H) 07/30/2024    HGB 10.8 (L) 06/10/2024    HCT 35.4 (L) 06/10/2024    TSH 1.251 01/30/2024    LDLCALC 66.8 09/26/2023             Assessment:     1. Paroxysmal atrial fibrillation    2. Hypertension associated with diabetes    3. Current use of long term anticoagulation    4. Hx of TIA (transient ischemic attack) and stroke    5. COURTNEY (obstructive sleep apnea)    6. Fatigue, unspecified type      Plan:     In summary, Ms. Montenegro is a 69 y.o. female with hypertension, hyperlipidemia, diabetes mellitus, obesity s/p gastric sleeve surgery, right MCA " infarction July of 2014 s/p ILR implant (removed 2018), atrial fibrillation, and bradycardia here for annual follow up.  Pt is doing well from a rhythm standpoint, with no palpitations reported. Has not needed her PRN metoprolol. CHADSVASc 6 on eliquis. She is reporting worsening fatigue. No overt bleeding noted. Last colonoscopy 12/2023 one polyp removed. Will update TSH and CBC.    TSH, CBC - (UPDATE: Pt H&H decreased. No overt bleeding reported. Sent message to patient to see PCP asap for further workup)  Continue current meds  No contraindication to proceeding with knee replacement surgery from an arrhythmia standpoint  RTC 1 yr, sooner if needed    *A copy of this note has been sent to Dr. Randolph*    Follow up in about 1 year (around 9/4/2025).    ------------------------------------------------------------------    CHELITA Fuentes, NP-C  Cardiac Electrophysiology

## 2024-09-04 ENCOUNTER — HOSPITAL ENCOUNTER (OUTPATIENT)
Dept: CARDIOLOGY | Facility: CLINIC | Age: 69
Discharge: HOME | End: 2024-09-04
Payer: MEDICARE

## 2024-09-04 ENCOUNTER — TELEPHONE (OUTPATIENT)
Dept: PAIN MEDICINE | Facility: CLINIC | Age: 69
End: 2024-09-04
Payer: MEDICARE

## 2024-09-04 ENCOUNTER — LAB VISIT (OUTPATIENT)
Dept: LAB | Facility: HOSPITAL | Age: 69
End: 2024-09-04
Payer: MEDICARE

## 2024-09-04 ENCOUNTER — TELEPHONE (OUTPATIENT)
Dept: OPHTHALMOLOGY | Facility: CLINIC | Age: 69
End: 2024-09-04
Payer: MEDICARE

## 2024-09-04 ENCOUNTER — OFFICE VISIT (OUTPATIENT)
Dept: ELECTROPHYSIOLOGY | Facility: CLINIC | Age: 69
End: 2024-09-04
Payer: MEDICARE

## 2024-09-04 VITALS
HEIGHT: 65 IN | SYSTOLIC BLOOD PRESSURE: 92 MMHG | HEART RATE: 72 BPM | WEIGHT: 193.81 LBS | BODY MASS INDEX: 32.29 KG/M2 | DIASTOLIC BLOOD PRESSURE: 47 MMHG

## 2024-09-04 DIAGNOSIS — E11.59 HYPERTENSION ASSOCIATED WITH DIABETES: ICD-10-CM

## 2024-09-04 DIAGNOSIS — I15.2 HYPERTENSION ASSOCIATED WITH DIABETES: ICD-10-CM

## 2024-09-04 DIAGNOSIS — I48.0 PAROXYSMAL ATRIAL FIBRILLATION: Primary | Chronic | ICD-10-CM

## 2024-09-04 DIAGNOSIS — R53.83 FATIGUE, UNSPECIFIED TYPE: ICD-10-CM

## 2024-09-04 DIAGNOSIS — I48.0 PAROXYSMAL ATRIAL FIBRILLATION: Chronic | ICD-10-CM

## 2024-09-04 DIAGNOSIS — Z86.73 HX OF TIA (TRANSIENT ISCHEMIC ATTACK) AND STROKE: ICD-10-CM

## 2024-09-04 DIAGNOSIS — Z79.01 CURRENT USE OF LONG TERM ANTICOAGULATION: ICD-10-CM

## 2024-09-04 DIAGNOSIS — G47.33 OSA (OBSTRUCTIVE SLEEP APNEA): ICD-10-CM

## 2024-09-04 LAB
BASOPHILS # BLD AUTO: 0.03 K/UL (ref 0–0.2)
BASOPHILS NFR BLD: 0.7 % (ref 0–1.9)
DIFFERENTIAL METHOD BLD: ABNORMAL
EOSINOPHIL # BLD AUTO: 0.1 K/UL (ref 0–0.5)
EOSINOPHIL NFR BLD: 1.8 % (ref 0–8)
ERYTHROCYTE [DISTWIDTH] IN BLOOD BY AUTOMATED COUNT: 13.5 % (ref 11.5–14.5)
HCT VFR BLD AUTO: 26.1 % (ref 37–48.5)
HGB BLD-MCNC: 7.8 G/DL (ref 12–16)
IMM GRANULOCYTES # BLD AUTO: 0.01 K/UL (ref 0–0.04)
IMM GRANULOCYTES NFR BLD AUTO: 0.2 % (ref 0–0.5)
LYMPHOCYTES # BLD AUTO: 1 K/UL (ref 1–4.8)
LYMPHOCYTES NFR BLD: 23.3 % (ref 18–48)
MCH RBC QN AUTO: 29 PG (ref 27–31)
MCHC RBC AUTO-ENTMCNC: 29.9 G/DL (ref 32–36)
MCV RBC AUTO: 97 FL (ref 82–98)
MONOCYTES # BLD AUTO: 0.4 K/UL (ref 0.3–1)
MONOCYTES NFR BLD: 8.7 % (ref 4–15)
NEUTROPHILS # BLD AUTO: 2.9 K/UL (ref 1.8–7.7)
NEUTROPHILS NFR BLD: 65.3 % (ref 38–73)
NRBC BLD-RTO: 0 /100 WBC
OHS QRS DURATION: 90 MS
OHS QTC CALCULATION: 420 MS
PLATELET # BLD AUTO: 157 K/UL (ref 150–450)
PMV BLD AUTO: 9.7 FL (ref 9.2–12.9)
RBC # BLD AUTO: 2.69 M/UL (ref 4–5.4)
TSH SERPL DL<=0.005 MIU/L-ACNC: 0.81 UIU/ML (ref 0.4–4)
WBC # BLD AUTO: 4.46 K/UL (ref 3.9–12.7)

## 2024-09-04 PROCEDURE — 85025 COMPLETE CBC W/AUTO DIFF WBC: CPT | Performed by: NURSE PRACTITIONER

## 2024-09-04 PROCEDURE — 84443 ASSAY THYROID STIM HORMONE: CPT | Performed by: NURSE PRACTITIONER

## 2024-09-04 PROCEDURE — 3044F HG A1C LEVEL LT 7.0%: CPT | Mod: CPTII,S$GLB,, | Performed by: NURSE PRACTITIONER

## 2024-09-04 PROCEDURE — 3078F DIAST BP <80 MM HG: CPT | Mod: CPTII,S$GLB,, | Performed by: NURSE PRACTITIONER

## 2024-09-04 PROCEDURE — 1159F MED LIST DOCD IN RCRD: CPT | Mod: CPTII,S$GLB,, | Performed by: NURSE PRACTITIONER

## 2024-09-04 PROCEDURE — 99999 PR PBB SHADOW E&M-EST. PATIENT-LVL V: CPT | Mod: PBBFAC,,, | Performed by: NURSE PRACTITIONER

## 2024-09-04 PROCEDURE — 93010 ELECTROCARDIOGRAM REPORT: CPT | Mod: S$GLB,,, | Performed by: INTERNAL MEDICINE

## 2024-09-04 PROCEDURE — 36415 COLL VENOUS BLD VENIPUNCTURE: CPT | Performed by: NURSE PRACTITIONER

## 2024-09-04 PROCEDURE — 3288F FALL RISK ASSESSMENT DOCD: CPT | Mod: CPTII,S$GLB,, | Performed by: NURSE PRACTITIONER

## 2024-09-04 PROCEDURE — 3008F BODY MASS INDEX DOCD: CPT | Mod: CPTII,S$GLB,, | Performed by: NURSE PRACTITIONER

## 2024-09-04 PROCEDURE — 3074F SYST BP LT 130 MM HG: CPT | Mod: CPTII,S$GLB,, | Performed by: NURSE PRACTITIONER

## 2024-09-04 PROCEDURE — 1160F RVW MEDS BY RX/DR IN RCRD: CPT | Mod: CPTII,S$GLB,, | Performed by: NURSE PRACTITIONER

## 2024-09-04 PROCEDURE — 99214 OFFICE O/P EST MOD 30 MIN: CPT | Mod: S$GLB,,, | Performed by: NURSE PRACTITIONER

## 2024-09-04 PROCEDURE — 93005 ELECTROCARDIOGRAM TRACING: CPT | Mod: S$GLB,,, | Performed by: INTERNAL MEDICINE

## 2024-09-04 PROCEDURE — 1101F PT FALLS ASSESS-DOCD LE1/YR: CPT | Mod: CPTII,S$GLB,, | Performed by: NURSE PRACTITIONER

## 2024-09-04 PROCEDURE — 1126F AMNT PAIN NOTED NONE PRSNT: CPT | Mod: CPTII,S$GLB,, | Performed by: NURSE PRACTITIONER

## 2024-09-04 PROCEDURE — 3066F NEPHROPATHY DOC TX: CPT | Mod: CPTII,S$GLB,, | Performed by: NURSE PRACTITIONER

## 2024-09-04 PROCEDURE — 3060F POS MICROALBUMINURIA REV: CPT | Mod: CPTII,S$GLB,, | Performed by: NURSE PRACTITIONER

## 2024-09-04 RX ORDER — METOPROLOL TARTRATE 25 MG/1
25 TABLET, FILM COATED ORAL ONCE AS NEEDED
Qty: 1 TABLET | Refills: 3 | Status: SHIPPED | OUTPATIENT
Start: 2024-09-04 | End: 2024-09-04

## 2024-09-04 RX ORDER — METOPROLOL TARTRATE 25 MG/1
TABLET, FILM COATED ORAL
Qty: 1 TABLET | Refills: 3 | OUTPATIENT
Start: 2024-09-04

## 2024-09-04 NOTE — Clinical Note
FYI - this mutual patient was reporting fatigue. She has chronic anemia but her H&H is decreased (7.8/26.1). She denies bleeding. Recent normal colonoscopy (12/2023 - one polyp removed). I asked her to follow up with you. Thanks.

## 2024-09-05 NOTE — TELEPHONE ENCOUNTER
----- Message from Antonietta Murray MA sent at 9/5/2024 10:58 AM CDT -----  Regarding: FW: Clarification    ----- Message -----  From: Ksenia Gaspar  Sent: 9/5/2024  10:50 AM CDT  To: Miguel Lucio Staff  Subject: Clarification                                    [facility] calling to get clarification on patient metoprolol tartrate (LOPRESSOR) 25 MG tablet

## 2024-09-06 ENCOUNTER — TELEPHONE (OUTPATIENT)
Dept: ELECTROPHYSIOLOGY | Facility: CLINIC | Age: 69
End: 2024-09-06
Payer: MEDICARE

## 2024-09-06 NOTE — TELEPHONE ENCOUNTER
Called patient to discuss blood test results. Pt with chronic anemia however H&H has decreased quite a bit in the past 2 months (hemoglobin 7.8). She continues to endorse fatigue but denies bleeding. Colonoscopy with 1 polyp in Dec 2023. Advised pt to make an urgent visit with PCP for further workup but that if she feels significantly more fatigued, light-headed, presyncopal, pale, diaphoretic then to go to the emergency room. Pt verbalized understanding.

## 2024-09-09 ENCOUNTER — TELEPHONE (OUTPATIENT)
Dept: PAIN MEDICINE | Facility: CLINIC | Age: 69
End: 2024-09-09
Payer: MEDICARE

## 2024-09-09 RX ORDER — CALCIUM CARBONATE 160(400)MG
TABLET,CHEWABLE ORAL
Qty: 1 EACH | Refills: 0 | Status: SHIPPED | OUTPATIENT
Start: 2024-09-09

## 2024-09-09 RX ORDER — CALCIUM CARBONATE 160(400)MG
TABLET,CHEWABLE ORAL
Qty: 1 EACH | Refills: 0 | Status: SHIPPED | OUTPATIENT
Start: 2024-09-09 | End: 2024-09-09

## 2024-09-09 NOTE — PRE-PROCEDURE INSTRUCTIONS
Pt replied to portal message stating she needs to reschedule d/t not having a ride to and from facility day of procedure on 9/11.

## 2024-09-10 ENCOUNTER — CLINICAL SUPPORT (OUTPATIENT)
Dept: REHABILITATION | Facility: HOSPITAL | Age: 69
End: 2024-09-10
Attending: PHYSICAL MEDICINE & REHABILITATION
Payer: MEDICARE

## 2024-09-10 DIAGNOSIS — R29.898 DECREASED STRENGTH OF TRUNK AND BACK: Primary | ICD-10-CM

## 2024-09-10 DIAGNOSIS — Z74.09 IMPAIRED MOBILITY AND ADLS: ICD-10-CM

## 2024-09-10 DIAGNOSIS — Z78.9 IMPAIRED MOBILITY AND ADLS: ICD-10-CM

## 2024-09-10 PROCEDURE — 97112 NEUROMUSCULAR REEDUCATION: CPT | Performed by: PHYSICAL MEDICINE & REHABILITATION

## 2024-09-10 PROCEDURE — 97110 THERAPEUTIC EXERCISES: CPT | Performed by: PHYSICAL MEDICINE & REHABILITATION

## 2024-09-10 NOTE — PROGRESS NOTES
"[facility]      Name: Diana Montenegro  Clinic Number: [id number]    Therapy Diagnosis:   Encounter Diagnoses   Name Primary?    Decreased strength of trunk and back Yes    Impaired mobility and ADLs      Physician: Rosalie Buchanan, *    Visit Date: 9/10/2024    Physician Orders: PT Eval and Treat  Medical Diagnosis from Referral: M54.50,G89.29 (ICD-10-CM) - Chronic right-sided low back pain without sciatica  Evaluation Date: 7/16/2024  Authorization Period Expiration: 12/31/2024  Plan of Care Expiration: 9/24/2024  Reassessment Due: 10/10/24  Visit # / Visits authorized: 15/20  MedX testing visit 2     Time In: 12:55 pm  Time Out 1:56 pm  Total Billable Time: 59 minutes   (40 min 1:1)    INSURANCE and OUTCOMES: Fee for Service with FOTO Outcomes 1/3     Precautions: standard, fall, history of stroke     Pattern of pain determined: 1 PEN    Subjective   Diana  mild lower back discomfort, moderate (L) shoulder discomfort. She goes to the pool 3-5/week at [place].   We discussed the healthy back medx at [place] and she may go look for it.  Encouraged her to find it so she can use it at discharge from here.   She will likely continue [place] at her  discharge versus wellness due to the cost, but will think about it.  She doesn't walk or ride bike at home due to knee pain, but gets cardio in pool.  She does stretch regularly.     Patient reports their pain to be 2/10 on a 0-10 scale with 0 being no pain and 10 being the worst pain imaginable.  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[employer], [employer]ator  Gym Membership: yes, [employer] goes and does pool regularly    Pt goals: "try to get rid of this pain"     Objective       MOVEMENT LOSS - Lumbar    " Norms ROM Loss Initial 9/10/24   Flexion Fingers touch toes, sacral angle >/= 70 deg, uniform spinal curvature, posterior weight shift  within functional limits functional   Extension ASIS surpasses toes, spine of scapulae surpasses heels, uniform spinal curve moderate loss Min loss   Side glide Right   minimal loss functional   Side glide Left   minimal loss functional   Rotation Right PT observes contralateral shoulder minimal loss functional   Rotation Left PT observes contralateral shoulder minimal loss functional      Baseline IM Testing Results:   Date of testin24  ROM 0-39 deg   Max Peak Torque 85    Min Peak Torque 19   Flex/Ext Ratio 4.4:1   % below normative data 57       Mid IM Testing Results:   Date of testin/15/24  ROM 0-45deg   Max Peak Torque 105   Min Peak Torque 30   Flex/Ext Ratio 3.5:1   % below normative data 42   \\ 39%strength increase  Intake Outcome Measure for FOTO Lumbar Survey     Therapist reviewed FOTO scores for Diana Montenegro on 2024.   FOTO report - see Media section or FOTO account episode details.     Intake Score: 49% functional ability  Goal: 58%           Treatment    Kaleigh received the treatments listed below:      Kaleigh received neuromuscular education  to isolate and engage spinal stabilization musculature correctly for motor control and coordination to aid in function and posture for 10 minutes on the Medical Medx Machine.  Patient performed MedX dynamic exercise with emphasis on spinal muscular control using pacer throughout  active range of motion. Therapist assisted patient in achieving optimal exertion for neural reeducation and endurance training by using the  Suresh Exertion Rating scale, by instructing the patient to aim for mid range of exertion, performing 15-20 repetitions, slowly, correctly,and safely.            9/10/2024     1:34 PM   [facility]   Visit Number 15   VAS Pain Rating 1   Time 5   Lumbar Stretches - Slouch Overcorrection  10   Extension in Standing 10   Flexion in Lying 10   Lumbar Weight 48 lbs   Repetitions 15   Rating of Perceived Exertion 4   Ice - Z Lie (in min.) 5            therapeutic exercises to develop strength, endurance, ROM, flexibility, posture, and core stabilization for 45 minutes including:    LTR, 10x  Piriformis stretch 2 x 20 sec   DKTC c/T-ball 10 ,5 sec  SLR c/ TrA contracttion 10x + 2#  PPT 10x ( cues)  Bridge + BTB 20x  BKFO BTB x 15  SL clam with BTB x 15  SOC x 10   EIS x 10  seated thoracic extension stretch w 1/2 foam roll x 10  Paloff press with lift 5# x 10      Peripheral muscle strengthening which included 1 set of 15-20 repetitions at a slow, controlled 10-13 second per rep pace focused on strengthening supporting musculature for improved body mechanics and functional mobility.  Pt and therapist focused on proper form during treatment to ensure optimal strengthening of each targeted muscle group.  Machines were utilized including torso rotation, leg press, hip abd and hip add, leg ext (NP).  Leg curl, triceps, biceps, chest (NP) and row added visit 3    cold pack for 5 minutes to LB.    Home Exercises Provided and Patient Education Provided   Home exercises include:SLR/LTR/SKTC, gentle ext in standing  Cardio program:she does pool 3-5 / week  Lifting education date: 8/20/24 Do's and Don'ts of lifting handout reviewed, practiced and provided.  Lumbar Roll: recommended  Fridge Magnet Discharge handout (date given):  Equipment at home/gym membership: [facility], pool, encouraged her to find the medx there    Education provided:   - cues w/exs   MedX performance  Precor ex performance    Written Home Exercises Provided: Patient instructed to cont prior HEP.  Exercises were reviewed and Kaleigh was able to demonstrate them prior to the end of the session.  Kaleigh demonstrated good  understanding of the education provided.     See EMR under Patient Instructions for exercises provided prior visit     Assessment    Diana arrives with mild lower back discomfort/stiffness. Treatment continued with flexibility, strengthening and neuromuscular reeducation ex's. She continued  bridging with band and increased resistance to 2# for TrA activation + SLR. Continued  lift during Paloff press with light resistance for additional core strengthening. Discussed wellness but she thinks she will continue in pool.  Suggested she find the medx there for discharge so she can maintain back strength.  She was able to perform ex's including progressions with min cues and without increased pain. Lumbar MedX  increased to 48 ft/lbs and she completed 15 reps with a RPE = 3.5/10. She completed peripheral strengthening ex's without c/o.(Leg extension not performed due to knee pain and chest press/ row not performed due to (L) shoulder pain). Will continue per HB protocol and patient tolerance.       Patient is making progress towards established goals.  Pt will continue to benefit from skilled outpatient physical therapy to address the deficits stated in the impairment chart, provide pt/family education and to maximize pt's level of independence in the home and community environment.     Anticipated Barriers for therapy: right knee pain/OA   Pt's spiritual, cultural and educational needs considered and pt agreeable to plan of care and goals as stated below:       GOALS: Pt is in agreement with the following goals.   Short term goals:  6 weeks or 10 visits   - Pt will demonstrate increased lumbar MedX ROM by at least 3 degrees from the initial ROM value with improvements noted in functional ROM and ability to perform ADLs. met  - Pt will demonstrate increased MedX average isometric strength value by 20% from initial test resulting in improved ability to perform bending, lifting, and carrying activities safely, confidently. met  - Pt will report a reduction in worst pain score by 1-2 points for improved tolerance for household chores. Appropriate and  Ongoing  - Pt able to perform HEP correctly with minimal cueing or supervision from therapist to encourage independent management of symptoms. Appropriate and Ongoing     Long term goals: 10 weeks or 20 visits   - Pt will demonstrate increased lumbar MedX ROM by at least 9 degrees from initial ROM value, resulting in improved ability to perform functional forward bending while standing and sitting. Appropriate and Ongoing  - Pt will demonstrate increased MedX average isometric strength value by 35% from initial test resulting in improved ability to perform bending, lifting, and carrying activities safely and confidently. MET  - Pt to demonstrate ability to independently control and reduce their pain through posture positioning and mechanical movements throughout a typical day. Appropriate and Ongoing  - Pt will demonstrate reduced pain and improved functional outcomes as reported on the FOTO by reaching an intake score of >/= 58% functional ability in order to demonstrate subjective improvement in patient's condition. . Appropriate and Ongoing  - Pt will demonstrate independence with the HEP at discharge. Appropriate and Ongoing  - Pt will be able to complete meal prep without onset of back pain. Appropriate and Ongoing    Plan   Continue with established Plan of Care towards established PT goals.     Dorothy Sandoval, PT  9/10/2024

## 2024-09-13 ENCOUNTER — CLINICAL SUPPORT (OUTPATIENT)
Dept: REHABILITATION | Facility: HOSPITAL | Age: 69
End: 2024-09-13
Attending: PHYSICAL MEDICINE & REHABILITATION
Payer: MEDICARE

## 2024-09-13 DIAGNOSIS — Z78.9 IMPAIRED MOBILITY AND ADLS: ICD-10-CM

## 2024-09-13 DIAGNOSIS — R29.898 DECREASED STRENGTH OF TRUNK AND BACK: Primary | ICD-10-CM

## 2024-09-13 DIAGNOSIS — Z74.09 IMPAIRED MOBILITY AND ADLS: ICD-10-CM

## 2024-09-13 PROCEDURE — 97112 NEUROMUSCULAR REEDUCATION: CPT

## 2024-09-13 PROCEDURE — 97110 THERAPEUTIC EXERCISES: CPT

## 2024-09-13 NOTE — PROGRESS NOTES
"[facility]      Name: Diana Montenegro  Clinic Number: [id number]    Therapy Diagnosis:   Encounter Diagnoses   Name Primary?    Decreased strength of trunk and back Yes    Impaired mobility and ADLs      Physician: Rosalie Buchanan, *    Visit Date: 9/13/2024    Physician Orders: PT Eval and Treat  Medical Diagnosis from Referral: M54.50,G89.29 (ICD-10-CM) - Chronic right-sided low back pain without sciatica  Evaluation Date: 7/16/2024  Authorization Period Expiration: 12/31/2024  Plan of Care Expiration: 9/24/2024  Reassessment Due: 10/10/24  Visit # / Visits authorized: 16/20  MedX testing visit 2     Time In: 12:25 pm   Time Out 1:25 pm  Total Billable Time: 55 minutes     INSURANCE and OUTCOMES: Fee for Service with FOTO Outcomes 1/3     Precautions: standard, fall, history of stroke     Pattern of pain determined: 1 PEN    Subjective   Diana arrives without back pain today.    Patient reports their pain to be 0/10 on a 0-10 scale with 0 being no pain and 10 being the worst pain imaginable.  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[email or web address], rollator  Gym Membership: yes, [employer] goes and does pool regularly    Pt goals: "try to get rid of this pain"     Objective       MOVEMENT LOSS - Lumbar    Norms ROM Loss Initial 9/10/24   Flexion Fingers touch toes, sacral angle >/= 70 deg, uniform spinal curvature, posterior weight shift  within functional limits functional   Extension ASIS surpasses toes, spine of scapulae surpasses heels, uniform spinal curve moderate loss Min loss   Side glide Right   minimal loss functional   Side glide Left   minimal loss functional   Rotation Right PT observes contralateral shoulder minimal loss functional "   Rotation Left PT observes contralateral shoulder minimal loss functional      Baseline IM Testing Results:   Date of testin24  ROM 0-39 deg   Max Peak Torque 85    Min Peak Torque 19   Flex/Ext Ratio 4.4:1   % below normative data 57       Mid IM Testing Results:   Date of testin/15/24  ROM 0-45deg   Max Peak Torque 105   Min Peak Torque 30   Flex/Ext Ratio 3.5:1   % below normative data 42   \\ 39%strength increase  Intake Outcome Measure for FOTO Lumbar Survey     Therapist reviewed FOTO scores for Diana Montenegro on 2024.   FOTO report - see Media section or FOTO account episode details.     Intake Score: 49% functional ability  Goal: 58%           Treatment    Kaleigh received the treatments listed below:      Kaleigh received neuromuscular education  to isolate and engage spinal stabilization musculature correctly for motor control and coordination to aid in function and posture for 10 minutes on the Medical Medx Machine.  Patient performed MedX dynamic exercise with emphasis on spinal muscular control using pacer throughout  active range of motion. Therapist assisted patient in achieving optimal exertion for neural reeducation and endurance training by using the  Suresh Exertion Rating scale, by instructing the patient to aim for mid range of exertion, performing 15-20 repetitions, slowly, correctly,and safely.            2024     1:23 PM   [facility]   Visit Number 16   VAS Pain Rating 0   Time 5   Lumbar Stretches - Slouch Overcorrection 10   Extension in Standing 10   Flexion in Lying 10   Lumbar Weight 48 lbs   Repetitions 18   Rating of Perceived Exertion 3.5   Ice - Z Lie (in min.) 5         therapeutic exercises to develop strength, endurance, ROM, flexibility, posture, and core stabilization for 45 minutes including:    LTR, 10x  Piriformis stretch 2 x 20 sec   DKTC c/T-ball 10 ,5 sec  SLR c/ TrA contracttion 10x + 2#  PPT 10x ( cues)  Bridge + BTB 20x  BKFO BTB x 15  SL  clam with BTB x 15  SOC x 10   EIS x 10  seated thoracic extension stretch w 1/2 foam roll x 10  Paloff press with lift 5# x 10      Peripheral muscle strengthening which included 1 set of 15-20 repetitions at a slow, controlled 10-13 second per rep pace focused on strengthening supporting musculature for improved body mechanics and functional mobility.  Pt and therapist focused on proper form during treatment to ensure optimal strengthening of each targeted muscle group.  Machines were utilized including torso rotation, leg press, hip abd and hip add, leg ext (NP).  Leg curl, triceps, biceps, chest (NP) and row added visit 3    cold pack for 5 minutes to LB.    Home Exercises Provided and Patient Education Provided   Home exercises include:SLR/LTR/SKTC, gentle ext in standing  Cardio program:she does pool 3-5 / week  Lifting education date: 8/20/24 Do's and Don'ts of lifting handout reviewed, practiced and provided.  Lumbar Roll: recommended  Fridge Magnet Discharge handout (date given):  Equipment at home/gym membership: [facility], pool, encouraged her to find the medx there    Education provided:   - cues w/exs   MedX performance  Precor ex performance    Written Home Exercises Provided: Patient instructed to cont prior HEP.  Exercises were reviewed and Kaleigh was able to demonstrate them prior to the end of the session.  Kaleigh demonstrated good  understanding of the education provided.     See EMR under Patient Instructions for exercises provided prior visit     Assessment   Diana arrives without lower back discomfort/stiffness today. Treatment continued with flexibility, strengthening and neuromuscular reeducation ex's.  Lumbar MedX maintained at 48 ft/lbs and she completed 18 reps with a RPE = 3.5/10. She completed peripheral strengthening ex's without c/o.(Leg extension not performed due to knee pain and chest press androw not performed due to (L) shoulder pain). Will continue per HB protocol and patient tolerance.        Patient is making progress towards established goals.  Pt will continue to benefit from skilled outpatient physical therapy to address the deficits stated in the impairment chart, provide pt/family education and to maximize pt's level of independence in the home and community environment.     Anticipated Barriers for therapy: right knee pain/OA   Pt's spiritual, cultural and educational needs considered and pt agreeable to plan of care and goals as stated below:       GOALS: Pt is in agreement with the following goals.   Short term goals:  6 weeks or 10 visits   - Pt will demonstrate increased lumbar MedX ROM by at least 3 degrees from the initial ROM value with improvements noted in functional ROM and ability to perform ADLs. met  - Pt will demonstrate increased MedX average isometric strength value by 20% from initial test resulting in improved ability to perform bending, lifting, and carrying activities safely, confidently. met  - Pt will report a reduction in worst pain score by 1-2 points for improved tolerance for household chores. Appropriate and Ongoing  - Pt able to perform HEP correctly with minimal cueing or supervision from therapist to encourage independent management of symptoms. Appropriate and Ongoing     Long term goals: 10 weeks or 20 visits   - Pt will demonstrate increased lumbar MedX ROM by at least 9 degrees from initial ROM value, resulting in improved ability to perform functional forward bending while standing and sitting. Appropriate and Ongoing  - Pt will demonstrate increased MedX average isometric strength value by 35% from initial test resulting in improved ability to perform bending, lifting, and carrying activities safely and confidently. MET  - Pt to demonstrate ability to independently control and reduce their pain through posture positioning and mechanical movements throughout a typical day. Appropriate and Ongoing  - Pt will demonstrate reduced pain and improved functional  outcomes as reported on the FOTO by reaching an intake score of >/= 58% functional ability in order to demonstrate subjective improvement in patient's condition. . Appropriate and Ongoing  - Pt will demonstrate independence with the HEP at discharge. Appropriate and Ongoing  - Pt will be able to complete meal prep without onset of back pain. Appropriate and Ongoing    Plan   Continue with established Plan of Care towards established PT goals.     Henrietta Rodriguez, PT  9/13/2024

## 2024-09-17 ENCOUNTER — CLINICAL SUPPORT (OUTPATIENT)
Dept: REHABILITATION | Facility: HOSPITAL | Age: 69
End: 2024-09-17
Attending: PHYSICAL MEDICINE & REHABILITATION
Payer: MEDICARE

## 2024-09-17 DIAGNOSIS — Z78.9 IMPAIRED MOBILITY AND ADLS: ICD-10-CM

## 2024-09-17 DIAGNOSIS — R29.898 DECREASED STRENGTH OF TRUNK AND BACK: Primary | ICD-10-CM

## 2024-09-17 DIAGNOSIS — Z74.09 IMPAIRED MOBILITY AND ADLS: ICD-10-CM

## 2024-09-17 PROCEDURE — 97110 THERAPEUTIC EXERCISES: CPT | Mod: CQ

## 2024-09-17 PROCEDURE — 97112 NEUROMUSCULAR REEDUCATION: CPT | Mod: CQ

## 2024-09-17 NOTE — PROGRESS NOTES
"[facility]      Name: Diana Montenegro  Clinic Number: [id number]    Therapy Diagnosis:   Encounter Diagnoses   Name Primary?    Decreased strength of trunk and back Yes    Impaired mobility and ADLs      Physician: Rosalie Buchanan, *    Visit Date: 9/17/2024    Physician Orders: PT Eval and Treat  Medical Diagnosis from Referral: M54.50,G89.29 (ICD-10-CM) - Chronic right-sided low back pain without sciatica  Evaluation Date: 7/16/2024  Authorization Period Expiration: 12/31/2024  Plan of Care Expiration: 9/24/2024  Reassessment Due: 10/10/24  Visit # / Visits authorized: 17/20  MedX testing visit 2     Time In: 1:00 pm   Time Out  2:00 pm  Total Billable Time: 55 minutes     INSURANCE and OUTCOMES: Fee for Service with FOTO Outcomes 1/3     Precautions: standard, fall, history of stroke     Pattern of pain determined: 1 PEN    Subjective   Diana arrives with minimal stiffness without c/o pain currently. States that she remains active with aquatic ex's.    Patient reports their pain to be 0/10 on a 0-10 scale with 0 being no pain and 10 being the worst pain imaginable.  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[employer], rollator  Gym Membership: yes, [employer] goes and does pool regularly    Pt goals: "try to get rid of this pain"     Objective       MOVEMENT LOSS - Lumbar    Norms ROM Loss Initial 9/10/24   Flexion Fingers touch toes, sacral angle >/= 70 deg, uniform spinal curvature, posterior weight shift  within functional limits functional   Extension ASIS surpasses toes, spine of scapulae surpasses heels, uniform spinal curve moderate loss Min loss   Side glide Right   minimal loss functional   Side glide Left   minimal loss functional "   Rotation Right PT observes contralateral shoulder minimal loss functional   Rotation Left PT observes contralateral shoulder minimal loss functional      Baseline IM Testing Results:   Date of testin24  ROM 0-39 deg   Max Peak Torque 85    Min Peak Torque 19   Flex/Ext Ratio 4.4:1   % below normative data 57       Mid IM Testing Results:   Date of testin/15/24  ROM 0-45deg   Max Peak Torque 105   Min Peak Torque 30   Flex/Ext Ratio 3.5:1   % below normative data 42   \\ 39%strength increase  Intake Outcome Measure for FOTO Lumbar Survey     Therapist reviewed FOTO scores for Diana Montenegro on 2024.   FOTO report - see Media section or FOTO account episode details.     Intake Score: 49% functional ability  Goal: 58%           Treatment    Kaleigh received the treatments listed below:      Kaleigh received neuromuscular education  to isolate and engage spinal stabilization musculature correctly for motor control and coordination to aid in function and posture for 10 minutes on the Medical [employer] Machine.  Patient performed MedX dynamic exercise with emphasis on spinal muscular control using pacer throughout  active range of motion. Therapist assisted patient in achieving optimal exertion for neural reeducation and endurance training by using the  Suresh Exertion Rating scale, by instructing the patient to aim for mid range of exertion, performing 15-20 repetitions, slowly, correctly,and safely.          2024     1:06 PM   [facility] - [facility]   Visit Number 17   VAS Pain Rating 0   Time 5   Lumbar Stretches - Slouch 10   Extension in Standing 10   Flexion in Lying 10   Lumbar Flexion 51   Lumbar Extension 0   Lumbar Weight 48 lbs   Repetitions 20   Rating of Perceived Exertion 3.5      therapeutic exercises to develop strength, endurance, ROM, flexibility, posture, and core stabilization for 45 minutes including:    LTR, 10x  Piriformis stretch 2 x 20 sec   DKTC c/T-ball 10 ,5 sec  SLR c/  TrA contracttion 12x + 2#  PPT 10x   Bridge + BTB 20x  BKFO BTB x 15  SL clam with BTB x 15  SOC x 10   EIS w/towel x 10  seated thoracic extension stretch w 1/2 foam roll x 10  Paloff press 10# x 10  +Lateral side stepping along edge of plinth with RTB at knees x 4 laps      Peripheral muscle strengthening which included 1 set of 15-20 repetitions at a slow, controlled 10-13 second per rep pace focused on strengthening supporting musculature for improved body mechanics and functional mobility.  Pt and therapist focused on proper form during treatment to ensure optimal strengthening of each targeted muscle group.  Machines were utilized including torso rotation, leg press, hip abd and hip add, leg ext (NP).  Leg curl, triceps, biceps, chest (NP) and row added visit 3    cold pack for 5 minutes to LB.    Home Exercises Provided and Patient Education Provided   Home exercises include:SLR/LTR/SKTC, gentle ext in standing  Cardio program:she does pool 3-5 / week  Lifting education date: 8/20/24 Do's and Don'ts of lifting handout reviewed, practiced and provided.  Lumbar Roll: recommended  Fridge Magnet Discharge handout (date given):  Equipment at home/gym membership: [employer], pool, encouraged her to find the medx there    Education provided:   - cues w/exs   MedX performance  Precor ex performance    Written Home Exercises Provided: Patient instructed to cont prior HEP.  Exercises were reviewed and Kaleigh was able to demonstrate them prior to the end of the session.  Kaleigh demonstrated good  understanding of the education provided.     See EMR under Patient Instructions for exercises provided prior visit     Assessment   Diana arrives with minimal stiffness without c/o pain currently. Treatment continued with flexibility, strengthening and neuromuscular reeducation ex's.  She was progressed with increased reps for TrA activation + SLR and also added lateral side stepping along edge of plinth for additional hip  strengthening. She was able to perform ex's without c/o pain. Lumbar MedX Flexion ROM was increased to 51 degrees, resistance maintained at 48 ft/lbs and she completed 20 reps with a RPE = 3.5/10. She completed peripheral strengthening ex's without c/o.(Leg extension not performed due to knee pain and chest press androw not performed due to (L) shoulder pain). Will continue per HB protocol and patient tolerance.        Patient is making progress towards established goals.  Pt will continue to benefit from skilled outpatient physical therapy to address the deficits stated in the impairment chart, provide pt/family education and to maximize pt's level of independence in the home and community environment.     Anticipated Barriers for therapy: right knee pain/OA   Pt's spiritual, cultural and educational needs considered and pt agreeable to plan of care and goals as stated below:     GOALS: Pt is in agreement with the following goals.   Short term goals:  6 weeks or 10 visits   - Pt will demonstrate increased lumbar MedX ROM by at least 3 degrees from the initial ROM value with improvements noted in functional ROM and ability to perform ADLs. met  - Pt will demonstrate increased MedX average isometric strength value by 20% from initial test resulting in improved ability to perform bending, lifting, and carrying activities safely, confidently. met  - Pt will report a reduction in worst pain score by 1-2 points for improved tolerance for household chores. Appropriate and Ongoing  - Pt able to perform HEP correctly with minimal cueing or supervision from therapist to encourage independent management of symptoms. Appropriate and Ongoing     Long term goals: 10 weeks or 20 visits   - Pt will demonstrate increased lumbar MedX ROM by at least 9 degrees from initial ROM value, resulting in improved ability to perform functional forward bending while standing and sitting. Appropriate and Ongoing  - Pt will demonstrate increased  MedX average isometric strength value by 35% from initial test resulting in improved ability to perform bending, lifting, and carrying activities safely and confidently. MET  - Pt to demonstrate ability to independently control and reduce their pain through posture positioning and mechanical movements throughout a typical day. Appropriate and Ongoing  - Pt will demonstrate reduced pain and improved functional outcomes as reported on the FOTO by reaching an intake score of >/= 58% functional ability in order to demonstrate subjective improvement in patient's condition. . Appropriate and Ongoing  - Pt will demonstrate independence with the HEP at discharge. Appropriate and Ongoing  - Pt will be able to complete meal prep without onset of back pain. Appropriate and Ongoing    Plan   Continue with established Plan of Care towards established PT goals.     Jani Carrizales, PTA  9/17/2024

## 2024-09-18 ENCOUNTER — OFFICE VISIT (OUTPATIENT)
Dept: INTERNAL MEDICINE | Facility: CLINIC | Age: 69
End: 2024-09-18
Payer: MEDICARE

## 2024-09-18 ENCOUNTER — LAB VISIT (OUTPATIENT)
Dept: LAB | Facility: HOSPITAL | Age: 69
End: 2024-09-18
Payer: MEDICARE

## 2024-09-18 ENCOUNTER — TELEPHONE (OUTPATIENT)
Dept: PAIN MEDICINE | Facility: CLINIC | Age: 69
End: 2024-09-18
Payer: MEDICARE

## 2024-09-18 VITALS
WEIGHT: 194.25 LBS | OXYGEN SATURATION: 98 % | DIASTOLIC BLOOD PRESSURE: 66 MMHG | TEMPERATURE: 97 F | HEART RATE: 69 BPM | RESPIRATION RATE: 14 BRPM | BODY MASS INDEX: 32.32 KG/M2 | SYSTOLIC BLOOD PRESSURE: 126 MMHG

## 2024-09-18 DIAGNOSIS — Z79.899 OTHER LONG TERM (CURRENT) DRUG THERAPY: ICD-10-CM

## 2024-09-18 DIAGNOSIS — D64.9 ANEMIA, UNSPECIFIED TYPE: Primary | ICD-10-CM

## 2024-09-18 DIAGNOSIS — J32.9 CHRONIC SINUSITIS, UNSPECIFIED LOCATION: ICD-10-CM

## 2024-09-18 DIAGNOSIS — I15.2 HYPERTENSION ASSOCIATED WITH DIABETES: ICD-10-CM

## 2024-09-18 DIAGNOSIS — E11.59 HYPERTENSION ASSOCIATED WITH DIABETES: ICD-10-CM

## 2024-09-18 DIAGNOSIS — D64.9 ANEMIA, UNSPECIFIED TYPE: ICD-10-CM

## 2024-09-18 LAB
BASOPHILS # BLD AUTO: 0.04 K/UL (ref 0–0.2)
BASOPHILS NFR BLD: 0.9 % (ref 0–1.9)
DIFFERENTIAL METHOD BLD: ABNORMAL
EOSINOPHIL # BLD AUTO: 0 K/UL (ref 0–0.5)
EOSINOPHIL NFR BLD: 0.9 % (ref 0–8)
ERYTHROCYTE [DISTWIDTH] IN BLOOD BY AUTOMATED COUNT: 13.2 % (ref 11.5–14.5)
HCT VFR BLD AUTO: 27.9 % (ref 37–48.5)
HGB BLD-MCNC: 8.3 G/DL (ref 12–16)
IMM GRANULOCYTES # BLD AUTO: 0.01 K/UL (ref 0–0.04)
IMM GRANULOCYTES NFR BLD AUTO: 0.2 % (ref 0–0.5)
IRON SERPL-MCNC: 151 UG/DL (ref 30–160)
LYMPHOCYTES # BLD AUTO: 0.9 K/UL (ref 1–4.8)
LYMPHOCYTES NFR BLD: 20 % (ref 18–48)
MCH RBC QN AUTO: 29 PG (ref 27–31)
MCHC RBC AUTO-ENTMCNC: 29.7 G/DL (ref 32–36)
MCV RBC AUTO: 98 FL (ref 82–98)
MONOCYTES # BLD AUTO: 0.2 K/UL (ref 0.3–1)
MONOCYTES NFR BLD: 5.3 % (ref 4–15)
NEUTROPHILS # BLD AUTO: 3.3 K/UL (ref 1.8–7.7)
NEUTROPHILS NFR BLD: 72.7 % (ref 38–73)
NRBC BLD-RTO: 0 /100 WBC
PLATELET # BLD AUTO: 153 K/UL (ref 150–450)
PMV BLD AUTO: 10.5 FL (ref 9.2–12.9)
RBC # BLD AUTO: 2.86 M/UL (ref 4–5.4)
SATURATED IRON: 29 % (ref 20–50)
TOTAL IRON BINDING CAPACITY: 519 UG/DL (ref 250–450)
TRANSFERRIN SERPL-MCNC: 351 MG/DL (ref 200–375)
VIT B12 SERPL-MCNC: >2000 PG/ML (ref 210–950)
WBC # BLD AUTO: 4.56 K/UL (ref 3.9–12.7)

## 2024-09-18 PROCEDURE — 3078F DIAST BP <80 MM HG: CPT | Mod: CPTII,S$GLB,, | Performed by: NURSE PRACTITIONER

## 2024-09-18 PROCEDURE — 3008F BODY MASS INDEX DOCD: CPT | Mod: CPTII,S$GLB,, | Performed by: NURSE PRACTITIONER

## 2024-09-18 PROCEDURE — 3060F POS MICROALBUMINURIA REV: CPT | Mod: CPTII,S$GLB,, | Performed by: NURSE PRACTITIONER

## 2024-09-18 PROCEDURE — 3074F SYST BP LT 130 MM HG: CPT | Mod: CPTII,S$GLB,, | Performed by: NURSE PRACTITIONER

## 2024-09-18 PROCEDURE — 99999 PR PBB SHADOW E&M-EST. PATIENT-LVL V: CPT | Mod: PBBFAC,,, | Performed by: NURSE PRACTITIONER

## 2024-09-18 PROCEDURE — 83921 ORGANIC ACID SINGLE QUANT: CPT | Performed by: NURSE PRACTITIONER

## 2024-09-18 PROCEDURE — 85025 COMPLETE CBC W/AUTO DIFF WBC: CPT | Performed by: NURSE PRACTITIONER

## 2024-09-18 PROCEDURE — 82607 VITAMIN B-12: CPT | Performed by: NURSE PRACTITIONER

## 2024-09-18 PROCEDURE — 3066F NEPHROPATHY DOC TX: CPT | Mod: CPTII,S$GLB,, | Performed by: NURSE PRACTITIONER

## 2024-09-18 PROCEDURE — 1159F MED LIST DOCD IN RCRD: CPT | Mod: CPTII,S$GLB,, | Performed by: NURSE PRACTITIONER

## 2024-09-18 PROCEDURE — 99214 OFFICE O/P EST MOD 30 MIN: CPT | Mod: S$GLB,,, | Performed by: NURSE PRACTITIONER

## 2024-09-18 PROCEDURE — 3044F HG A1C LEVEL LT 7.0%: CPT | Mod: CPTII,S$GLB,, | Performed by: NURSE PRACTITIONER

## 2024-09-18 PROCEDURE — 83540 ASSAY OF IRON: CPT | Performed by: NURSE PRACTITIONER

## 2024-09-18 PROCEDURE — 1160F RVW MEDS BY RX/DR IN RCRD: CPT | Mod: CPTII,S$GLB,, | Performed by: NURSE PRACTITIONER

## 2024-09-18 PROCEDURE — 82746 ASSAY OF FOLIC ACID SERUM: CPT | Performed by: NURSE PRACTITIONER

## 2024-09-18 NOTE — PROGRESS NOTES
Subjective:       Patient ID: Diana Montenegro is a 69 y.o. female.    Chief Complaint: Fatigue    History of Present Illness    CHIEF COMPLAINT:  Ms. Montenegro presents today for follow up on low blood levels and dizziness.    HEMATOLOGIC CONCERNS:  She reports recent lab results showing low hemoglobin (7) and hematocrit (26) levels. She experiences dizziness throughout the day, worse when standing up and moving. She also describes a spinning sensation when sitting on the toilet. She denies chest pain or significant headache. She reports fatigue and bruising easily, noting a large bruise on her arm and bruises all over her body, which she attributes to her Eliquis medication.    GASTROINTESTINAL:  She recalls one instance of bright red blood in her stool, which she attributes to potentially hard stools or a possible hemorrhoid. She denies rectal pain, vomiting of blood, nausea, or vomiting. A colonoscopy in December 2023 revealed a polyp and diverticulosis.     MEDICATIONS:  Current medications include Aspirin, Eliquis, Fish oil, Metformin, and Prilosec. She takes Xyzal at night for allergies. She denies taking any evening medications, stating she takes her blood pressure medications only in the morning.    VITAL SIGNS:  She reports monitoring blood pressure at home using a wrist device. Recent readings include 157/77 and 143/68. She denies consistently low blood pressure readings at home.    Home Cuff (131/70) Manual reading (126/66)    DIET AND HYDRATION:  She reports not drinking enough water. She consumes some iron-rich foods but dislikes spinach and acknowledges that her intake of iron-rich foods may be insufficient.    ENT SYMPTOMS:  She reports frequent throat clearing and a persistent clogged ear sensation, particularly in one ear. She denies ear pain.      ROS:  Constitutional: +fatigue, +dizziness, +lightheadedness  Head: -headaches  ENT: -ear pain  Cardiovascular: -chest pain  Gastrointestinal:  -nausea, -vomiting, +blood in stool         Review of patient's allergies indicates:   Allergen Reactions    Medrol [methylprednisolone] Palpitations       Medication List with Changes/Refills   Current Medications    ACYCLOVIR 5% (ZOVIRAX) 5 % OINTMENT    Apply topically 6 (six) times daily.    ALBUTEROL (VENTOLIN HFA) 90 MCG/ACTUATION INHALER    Inhale 2 puffs into the lungs every 6 (six) hours as needed for Wheezing. Rescue    ALBUTEROL-IPRATROPIUM 2.5MG-0.5MG/3ML (DUO-NEB) 0.5 MG-3 MG(2.5 MG BASE)/3 ML NEBULIZER SOLUTION    Take 3 mLs by nebulization every 6 (six) hours as needed for Wheezing. Rescue    APIXABAN (ELIQUIS) 5 MG TAB    Take 1 tablet (5 mg total) by mouth 2 (two) times daily.    ASPIRIN (ECOTRIN) 81 MG EC TABLET    Take 1 tablet (81 mg total) by mouth once daily.    B COMPLEX VITAMINS TABLET    Take 1 tablet by mouth once daily.    BLOOD PRESSURE TEST KIT-LARGE KIT    Use as directed    BLOOD SUGAR DIAGNOSTIC (FREESTYLE LITE STRIPS) STRP    USE 1 STRIP TO CHECK GLUCOSE TWICE DAILY    BLOOD-GLUCOSE METER MISC    One touch verio flex or tone touch verio reflect or freestyle freedom lite meter (all covered by insurance)    CALCIUM CITRATE/VITAMIN D3 (CALCIUM CITRATE + D ORAL)    Take 1 tablet by mouth every morning.    CETIRIZINE (ZYRTEC) 5 MG TABLET    Take 5 mg by mouth once daily.    CICLOPIROX (PENLAC) 8 % SOLN    Apply topically nightly.    CINNAMON BARK (CINNAMON ORAL)    Take by mouth 2 (two) times daily.    CYANOCOBALAMIN, VITAMIN B-12, 500 MCG SUBL    Place 1 tablet under the tongue once daily.    DICLOFENAC SODIUM (VOLTAREN) 1 % GEL    APPLY 2 GRAMS TOPICALLY THREE TIMES DAILY    DIPHENHYDRAMINE-ALUMINUM-MAGNESIUM HYDROXIDE-SIMETHICONE-LIDOCAINE VISCOUS HCL 2%    Swish and spit 15 mLs every 4 (four) hours as needed (sore throat).    DOCUSATE SODIUM (COLACE) 100 MG CAPSULE    Take 100 mg by mouth once daily.     EYLEA 2 MG/0.05 ML SYRG        FISH OIL-OMEGA-3 FATTY ACIDS 300-1,000 MG CAPSULE     Take 1 capsule by mouth once daily. Take 1 cap 2 times daily every other day    FLUTICASONE (FLONASE) 50 MCG/ACTUATION NASAL SPRAY    2 sprays (100 mcg total) by Each Nare route once daily.    GLIMEPIRIDE (AMARYL) 2 MG TABLET    Take 1 tablet (2 mg total) by mouth before breakfast. For diabetes control.    LANCETS MISC    Needs lancets for testing twice daily.  To go with meter covered by insurance.    LEVOTHYROXINE (SYNTHROID) 88 MCG TABLET    Take 1 tablet (88 mcg total) by mouth before breakfast.    LORAZEPAM (ATIVAN) 1 MG TABLET    Take 1 tablet (1 mg total) by mouth 2 (two) times daily. as needed for anxiety.    METFORMIN (GLUCOPHAGE) 1000 MG TABLET    TAKE 1 TABLET BY MOUTH TWICE DAILY WITH MEALS    METOPROLOL TARTRATE (LOPRESSOR) 25 MG TABLET    Take 1 tablet (25 mg total) by mouth once as needed (Palpitations). For palpitations    MULTIVITAMIN CAPSULE    Take 1 capsule by mouth once daily.    MUPIROCIN (BACTROBAN) 2 % OINTMENT    Apply to affected area 3 times daily    OMEPRAZOLE (PRILOSEC) 40 MG CAPSULE    Take 1 capsule (40 mg total) by mouth every morning.    PROMETHAZINE-DEXTROMETHORPHAN (PROMETHAZINE-DM) 6.25-15 MG/5 ML SYRP    Take one tsp po q 6 hrs prn cough    ROSUVASTATIN (CRESTOR) 40 MG TAB    Take 1 tablet by mouth once daily    RUTIN/HESP/BIOFLAV/C/HERB#196 (BIOFLEX ORAL)    Take 2 tablets by mouth once daily.    SEMAGLUTIDE (OZEMPIC) 2 MG/DOSE (8 MG/3 ML) PNIJ    Inject 2 mg into the skin every 7 days.    SENNA (SENNA) 8.6 MG TABLET    Take 2 tablets by mouth nightly as needed for Constipation.    TRAZODONE (DESYREL) 50 MG TABLET    TAKE 1 TABLET BY MOUTH NIGHTLY AS NEEDED FOR  INSOMNIA  (OKAY  TO  TAKE  2ND  TABLET  BY  MOUTH  IN  30  MINUTES  IF  STILL  AWAKE)    VALSARTAN-HYDROCHLOROTHIAZIDE (DIOVAN-HCT) 80-12.5 MG PER TABLET    Take 1 tablet by mouth once daily.    WALKER (ULTRA-LIGHT ROLLATOR) MISC    Use daily for assisted ambulation.     Objective:   /66 (BP Location: Right arm,  Patient Position: Sitting, BP Method: Medium (Manual))   Pulse 69   Temp 96.8 °F (36 °C) (Temporal)   Resp 14   Wt 88.1 kg (194 lb 3.6 oz)   LMP  (LMP Unknown)   SpO2 98%   BMI 32.32 kg/m²     Physical Exam  Vitals reviewed.   Constitutional:       Appearance: Normal appearance.   HENT:      Head: Normocephalic and atraumatic.      Right Ear: Tympanic membrane normal.      Left Ear: Tympanic membrane is bulging.      Mouth/Throat:      Pharynx: Oropharynx is clear.   Eyes:      Extraocular Movements: Extraocular movements intact.      Conjunctiva/sclera: Conjunctivae normal.   Cardiovascular:      Rate and Rhythm: Normal rate and regular rhythm.      Heart sounds: Normal heart sounds. No murmur heard.  Pulmonary:      Effort: Pulmonary effort is normal.      Breath sounds: Normal breath sounds. No wheezing.   Skin:     General: Skin is warm and dry.   Neurological:      Mental Status: She is alert and oriented to person, place, and time.      Cranial Nerves: No cranial nerve deficit (CN II - XII grossly intact).      Coordination: Rapid alternating movements normal.       I reviewed and independently interpreted the labs and imaging below:  Last Labs:  Glucose   Date Value Ref Range Status   07/30/2024 104 70 - 110 mg/dL Final   06/10/2024 110 70 - 110 mg/dL Final     BUN   Date Value Ref Range Status   07/30/2024 42 (H) 8 - 23 mg/dL Final   06/10/2024 37 (H) 8 - 23 mg/dL Final     Creatinine   Date Value Ref Range Status   07/30/2024 1.4 0.5 - 1.4 mg/dL Final   06/10/2024 1.4 0.5 - 1.4 mg/dL Final     Cholesterol   Date Value Ref Range Status   09/26/2023 130 120 - 199 mg/dL Final     Comment:     The National Cholesterol Education Program (NCEP) has set the  following guidelines (reference ranges) for Cholesterol:  Optimal.....................<200 mg/dL  Borderline High.............200-239 mg/dL  High........................> or = 240 mg/dL     09/14/2022 160 120 - 199 mg/dL Final     Comment:     The  National Cholesterol Education Program (NCEP) has set the  following guidelines (reference ranges) for Cholesterol:  Optimal.....................<200 mg/dL  Borderline High.............200-239 mg/dL  High........................> or = 240 mg/dL       Hemoglobin A1C   Date Value Ref Range Status   06/10/2024 6.5 (H) 4.0 - 5.6 % Final     Comment:     ADA Screening Guidelines:  5.7-6.4%  Consistent with prediabetes  >or=6.5%  Consistent with diabetes    High levels of fetal hemoglobin interfere with the HbA1C  assay. Heterozygous hemoglobin variants (HbS, HgC, etc)do  not significantly interfere with this assay.   However, presence of multiple variants may affect accuracy.     01/30/2024 7.6 (H) 4.0 - 5.6 % Final     Comment:     ADA Screening Guidelines:  5.7-6.4%  Consistent with prediabetes  >or=6.5%  Consistent with diabetes    High levels of fetal hemoglobin interfere with the HbA1C  assay. Heterozygous hemoglobin variants (HbS, HgC, etc)do  not significantly interfere with this assay.   However, presence of multiple variants may affect accuracy.       Hemoglobin   Date Value Ref Range Status   09/04/2024 7.8 (L) 12.0 - 16.0 g/dL Final   06/10/2024 10.8 (L) 12.0 - 16.0 g/dL Final     Hematocrit   Date Value Ref Range Status   09/04/2024 26.1 (L) 37.0 - 48.5 % Final   06/10/2024 35.4 (L) 37.0 - 48.5 % Final       Assessment and Plan:     1. Anemia, unspecified type    Assessed patient's anemia with H&H of 7/26  Considered potential causes of anemia including GI bleed, iron deficiency, and B12 deficiency  Evaluated risk factors: patient on Eliquis, aspirin, and fish oil  Noted metformin and Prilosec use may affect nutrient absorption  Recommend consuming iron-rich foods, such as steak.  May need to go to ER for blood transfusion if H&H levels have dropped further.    - CBC Auto Differential; Future  - Vitamin B12; Future  - IRON AND TIBC; Future  - METHYLMALONIC ACID, SERUM; Future  - FOLATE; Future  - Fecal  Immunochemical Test (iFOBT); Future    2. Other long term (current) drug therapy - Metformin and Prilosec    - Vitamin B12; Future  - FOLATE; Future    3. Chronic sinusitis, unspecified location    Evaluated for potential allergy-related symptoms affecting ear and balance  Explained the potential benefits of rotating antihistamines for allergy management.  Started Flonase nasal spray to help with ear fluid and congestion.    4. Hypertension associated with diabetes    Assessed dizziness symptoms, particularly upon standing  Checked blood pressure, comparing patient's home reading to in-office measurement  Ms. Montenegro to monitor blood pressure at home.  Recommend increasing hydration.  Contact the office if blood pressure drops too low.          This note was generated with the assistance of ambient listening technology. Verbal consent was obtained by the patient and accompanying visitor(s) for the recording of patient appointment to facilitate this note. I attest to having reviewed and edited the generated note for accuracy, though some syntax or spelling errors may persist. Please contact the author of this note for any clarification.

## 2024-09-18 NOTE — PROGRESS NOTES
"[facility]      Name: Diana Montenegro  Clinic Number: [id number]    Therapy Diagnosis:   Encounter Diagnoses   Name Primary?    Decreased strength of trunk and back Yes    Impaired mobility and ADLs      Physician: Rosalie Buchanan, *    Visit Date: 9/19/2024    Physician Orders: PT Eval and Treat  Medical Diagnosis from Referral: M54.50,G89.29 (ICD-10-CM) - Chronic right-sided low back pain without sciatica  Evaluation Date: 7/16/2024  Authorization Period Expiration: 12/31/2024  Plan of Care Expiration: 9/24/2024  Reassessment Due: 10/10/24  Visit # / Visits authorized: 18/20  MedX testing visit 2     Time In: 1:30pm  Time Out: 2:30pm  Total Billable Time: 55 mins      INSURANCE and OUTCOMES: Fee for Service with FOTO Outcomes 1/3     Precautions: standard, fall, history of stroke     Pattern of pain determined: 1 PEN    Subjective   Diana arrives reporting fatigue but no low back pain. Some pain this AM upon waking but resolved with some movement and rest.    Patient reports their pain to be 0/10 on a 0-10 scale with 0 being no pain and 10 being the worst pain imaginable.  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[employer], rollator  Gym Membership: yes, [employer] goes and does pool regularly    Pt goals: "try to get rid of this pain"     Objective       MOVEMENT LOSS - Lumbar    Norms ROM Loss Initial 9/10/24   Flexion Fingers touch toes, sacral angle >/= 70 deg, uniform spinal curvature, posterior weight shift  within functional limits functional   Extension ASIS surpasses toes, spine of scapulae surpasses heels, uniform spinal curve moderate loss Min loss   Side glide Right   minimal loss functional   Side glide Left   minimal loss functional "   Rotation Right PT observes contralateral shoulder minimal loss functional   Rotation Left PT observes contralateral shoulder minimal loss functional      Baseline IM Testing Results:   Date of testin24  ROM 0-39 deg   Max Peak Torque 85    Min Peak Torque 19   Flex/Ext Ratio 4.4:1   % below normative data 57       Mid IM Testing Results:   Date of testin/15/24  ROM 0-45deg   Max Peak Torque 105   Min Peak Torque 30   Flex/Ext Ratio 3.5:1   % below normative data 42   \\ 39%strength increase  Intake Outcome Measure for FOTO Lumbar Survey     Therapist reviewed FOTO scores for Diana Montenegro on 2024.   FOTO report - see Media section or FOTO account episode details.     Intake Score: 49% functional ability  Goal: 58%           Treatment    Kaleigh received the treatments listed below:      Kaleigh received neuromuscular education  to isolate and engage spinal stabilization musculature correctly for motor control and coordination to aid in function and posture for 10 minutes on the Medical [employer] Machine.  Patient performed MedX dynamic exercise with emphasis on spinal muscular control using pacer throughout  active range of motion. Therapist assisted patient in achieving optimal exertion for neural reeducation and endurance training by using the  Suresh Exertion Rating scale, by instructing the patient to aim for mid range of exertion, performing 15-20 repetitions, slowly, correctly,and safely.          2024     2:52 PM   [facility]   Visit Number 18   VAS Pain Rating 0   Time 5   Lumbar Stretches - Slouch Overcorrection 10   Extension in Standing 10   Flexion in Lying 10   Lumbar Weight 54 lbs   Repetitions 15   Rating of Perceived Exertion 3   Ice - Z Lie (in min.) 5          therapeutic exercises to develop strength, endurance, ROM, flexibility, posture, and core stabilization for 45 minutes including:    LTR, 10x  Piriformis stretch 2 x 20 sec   DKTC c/T-ball 10 ,5 sec  SLR c/ TrA  contracttion 12x + 2# - NP 2/2 time  PPT 10x   Bridge + BTB 20x  BKFO BTB x 15  SL clam with BTB x 15  SOC x 10   EIS w/towel x 10  seated thoracic extension stretch w 1/2 foam roll x 10  Paloff press 10# x 10 - NP 2/2 time  Lateral side stepping along edge of plinth with RTB at knees x 4 laps      Peripheral muscle strengthening which included 1 set of 15-20 repetitions at a slow, controlled 10-13 second per rep pace focused on strengthening supporting musculature for improved body mechanics and functional mobility.  Pt and therapist focused on proper form during treatment to ensure optimal strengthening of each targeted muscle group.  Machines were utilized including torso rotation, leg press, hip abd and hip add, leg ext (NP).  Leg curl, triceps, biceps, chest (NP) and row added visit 3    cold pack for 5 minutes to LB.    Home Exercises Provided and Patient Education Provided   Home exercises include:SLR/LTR/SKTC, gentle ext in standing  Cardio program:she does pool 3-5 / week  Lifting education date: 8/20/24 Do's and Don'ts of lifting handout reviewed, practiced and provided.  Lumbar Roll: recommended  Fridge Magnet Discharge handout (date given):  Equipment at home/gym membership: [employer], pool, encouraged her to find the medx there    Education provided:   - cues w/exs   MedX performance  Precor ex performance    Written Home Exercises Provided: Patient instructed to cont prior HEP.  Exercises were reviewed and Kaleigh was able to demonstrate them prior to the end of the session.  Kaleigh demonstrated good  understanding of the education provided.     See EMR under Patient Instructions for exercises provided prior visit     Assessment   Diana presents to 18th healthy back visit reporting ongoing trend of min to no back pain. Pt was able to complete peripheral strengthening ex's with appropriate muscular fatigue and without increased pain. MedX strengthening performed for 15 reps at 54 ft-lbs with RPE = 3/10. Covered  fridge magnet activity with pt to encourage further independence with HEP and building healthy habits around low back. Pt still deciding wellness vs d/c to gym and home plan.        Patient is making progress towards established goals.  Pt will continue to benefit from skilled outpatient physical therapy to address the deficits stated in the impairment chart, provide pt/family education and to maximize pt's level of independence in the home and community environment.     Anticipated Barriers for therapy: right knee pain/OA   Pt's spiritual, cultural and educational needs considered and pt agreeable to plan of care and goals as stated below:     GOALS: Pt is in agreement with the following goals.   Short term goals:  6 weeks or 10 visits   - Pt will demonstrate increased lumbar MedX ROM by at least 3 degrees from the initial ROM value with improvements noted in functional ROM and ability to perform ADLs. met  - Pt will demonstrate increased MedX average isometric strength value by 20% from initial test resulting in improved ability to perform bending, lifting, and carrying activities safely, confidently. met  - Pt will report a reduction in worst pain score by 1-2 points for improved tolerance for household chores. Appropriate and Ongoing  - Pt able to perform HEP correctly with minimal cueing or supervision from therapist to encourage independent management of symptoms. Appropriate and Ongoing     Long term goals: 10 weeks or 20 visits   - Pt will demonstrate increased lumbar MedX ROM by at least 9 degrees from initial ROM value, resulting in improved ability to perform functional forward bending while standing and sitting. Appropriate and Ongoing  - Pt will demonstrate increased MedX average isometric strength value by 35% from initial test resulting in improved ability to perform bending, lifting, and carrying activities safely and confidently. MET  - Pt to demonstrate ability to independently control and reduce  their pain through posture positioning and mechanical movements throughout a typical day. Appropriate and Ongoing  - Pt will demonstrate reduced pain and improved functional outcomes as reported on the FOTO by reaching an intake score of >/= 58% functional ability in order to demonstrate subjective improvement in patient's condition. . Appropriate and Ongoing  - Pt will demonstrate independence with the HEP at discharge. Appropriate and Ongoing  - Pt will be able to complete meal prep without onset of back pain. Appropriate and Ongoing    Plan   Continue with established Plan of Care towards established PT goals.     Memo Douglas, PTA  9/19/2024

## 2024-09-18 NOTE — TELEPHONE ENCOUNTER
----- Message from Madhu Zabala MA sent at 9/18/2024  2:28 PM CDT -----  Regarding: FW: pt advice  Contact: [phone]    ----- Message -----  From: Tomasa Reynolds  Sent: 9/18/2024  11:10 AM CDT  To: [facility] Staff  Subject: pt advice                                        Pt has questions about procedure next week. Pls call to discuss.

## 2024-09-19 ENCOUNTER — CLINICAL SUPPORT (OUTPATIENT)
Dept: REHABILITATION | Facility: HOSPITAL | Age: 69
End: 2024-09-19
Attending: PHYSICAL MEDICINE & REHABILITATION
Payer: MEDICARE

## 2024-09-19 DIAGNOSIS — Z74.09 IMPAIRED MOBILITY AND ADLS: ICD-10-CM

## 2024-09-19 DIAGNOSIS — R29.898 DECREASED STRENGTH OF TRUNK AND BACK: Primary | ICD-10-CM

## 2024-09-19 DIAGNOSIS — Z78.9 IMPAIRED MOBILITY AND ADLS: ICD-10-CM

## 2024-09-19 LAB — FOLATE SERPL-MCNC: >40 NG/ML (ref 4–24)

## 2024-09-19 PROCEDURE — 97112 NEUROMUSCULAR REEDUCATION: CPT | Mod: KX

## 2024-09-19 PROCEDURE — 97110 THERAPEUTIC EXERCISES: CPT | Mod: KX

## 2024-09-19 NOTE — PATIENT INSTRUCTIONS
"HEALTHY BACK TOOLS        KEEP YOUR SPINE FEELING FINE   HEALTHY HABITS   Do your "GO TO" stretches 2/day   Get a good night's REST   Watch your POSTURE in sitting/standing Drink PLENTY of water   Use a lumbar roll Eat LOTS of fruits & vegetables   GET UP often (walk and/or stretch) Manage your STRESS   Make your workplace IDEAL FOR YOU  Don't smoke   Lift correctly EXERCISE   Practice positive self talk-talk to yourself kindly like you would your best friend                         WHAT TO DO WHEN SYMPTOMS FLARE UP  Back and neck pain may occasionally flare up.  If you experience a flare   up, remember your tools. Be encouraged, by remembering that flare-ups will   usually pass.   My Tools:    ~Use your "Go To" Stretches/Positions   ~Keep Moving-pain usually gets better if you move  ~Z lie (with or without ice)  10 min several times a day until symptoms reduce  ~Slowly resume normal activities   ~Practice Deep Breathing and Relaxation techniques                                                 MY EXERCISE PLAN  GO TO STRETCHES  2/day (like brushing your teeth) STRENGTHENING  2-3 times/week CARDIO PROGRAM  75 min/week   Slouch Correction x10 Bridges with the band x15 Either:   Back bends with foam roller in chair x10 Clamshells with the band x15 each 5 times a week for 15 mins OR   Hip movement while sitting on inflated ball x2' Bent Knee Fall Out with band laying on back x10 each 3 times a week for 25 mins   Knees side to side x10 2 lbs ankle weights, exercise for legs     Gym 3-5 times per week        "

## 2024-09-20 ENCOUNTER — TELEPHONE (OUTPATIENT)
Dept: INTERNAL MEDICINE | Facility: CLINIC | Age: 69
End: 2024-09-20
Payer: MEDICARE

## 2024-09-20 NOTE — TELEPHONE ENCOUNTER
----- Message from Fidelina Hopper sent at 9/20/2024 12:43 PM CDT -----  Contact: Mary   [phone]  [email or web address]     Patient is calling for Medical Advice regarding:colon test - patient wants to know if the kit is picked up or mailed to her    How long has patient had these symptoms:rajani    Pharmacy name and phone#:[facility]    Patient wants a call back or thru [facility]:  [phone]    Comments:    Please advise patient replies from provider may take up to 48 hours.

## 2024-09-21 DIAGNOSIS — E78.5 HYPERLIPIDEMIA, UNSPECIFIED HYPERLIPIDEMIA TYPE: ICD-10-CM

## 2024-09-21 NOTE — TELEPHONE ENCOUNTER
Refill Routing Note   Medication(s) are not appropriate for processing by [facility] for the following reason(s):        Required labs outdated    [facility] action(s):  Defer     Requires labs : Yes             Appointments  past 12m or future 3m with PCP    Date Provider   Last Visit   6/17/2024 Rad Johnston MD   Next Visit   11/21/2024 Rad Johnston MD   ED visits in past 90 days: 0        Note composed:6:18 PM 09/21/2024

## 2024-09-21 NOTE — TELEPHONE ENCOUNTER
Care Due:                  Date            Visit Type   Department     Provider  --------------------------------------------------------------------------------                                EP -                              PRIMARY      [facility] INTERNAL  Last Visit: 06-      [facility]   MEDICINE       Radtarah Johnston                              EP -                              PRIMARY      [facility] INTERNAL  Next Visit: 11-      [facility] ([facility]   MEDICINE       Radkelle Johnston                                                            Last  Test          Frequency    Reason                     Performed    Due Date  --------------------------------------------------------------------------------    HBA1C.......  6 months...  glimepiride, metFORMIN,    06- 12-                             semaglutide..............    [employer] Embedded Care Due Messages. Reference number: [id number].   9/21/2024 7:01:43 AM CDT

## 2024-09-23 RX ORDER — ROSUVASTATIN CALCIUM 40 MG/1
TABLET, COATED ORAL
Qty: 90 TABLET | Refills: 0 | Status: SHIPPED | OUTPATIENT
Start: 2024-09-23

## 2024-09-23 NOTE — PRE-PROCEDURE INSTRUCTIONS
Patient reviewed on 9/23/2024.  Okay to proceed at [facility]. The following pre-procedure instructions and arrival time have been reviewed with patient via phone and sent to patient portal for review.  Patient verbalized an understanding.  Pt to be accompanied by her son day of procedure as responsible .      Dear Kaleigh ,     Please read over the following pre-procedure instructions in it's entirety as there is helpful information here to get you well prepared for your upcoming procedure.              You are scheduled for a procedure with Dr. Barrera on 9/25/2024.      Your scheduled arrival time is 10:30 am.  This arrival time is roughly 1 hour before your anticipated procedure time to allow sufficient time for pre-op..     Please wear comfortable clothes. You will be placed in a gown for your procedure.  Please do not wear a dress.  This procedure will take place at the [facility] at the corner of [facility] and [facility].  It is in the [place] [facility] next to [facility].  The address is:     [address] [place] [address].  [place] [id number]     After entering the building, you will proceed to the second floor where you can check in with registration. You should take any medications that you routinely take for blood pressure, heart medications, thyroid, cholesterol, etc.      The fasting restrictions are dependent on whether or not you are receiving sedation.  Sedation is not available for all procedures.      Your fasting instructions are as follow:  IV sedation. You should not eat for 8 hours and can only drink clear liquids (water or black coffee without cream/sugar) up until 2 hours before your scheduled time.  You CANNOT drive yourself and must have a .     If you are on blood thinners, you need to follow the anticoagulation instructions that had been discussed previously.  You should only stop the blood thinners if it was approved by your primary care physician or  your cardiologist.  In the event that you are not able to stop your blood thinners, a blood thinner was not listed on your medication list, or we were not able to get clearance from your cardiologist, then the procedure may have to be postponed/canceled.      IF you were told to stop your blood thinners, this is how long you should generally hold some of the more common ones.  Remember that stopping blood thinners is only necessary for certain procedures. If you are unsure of your instructions, please call us.   Aspirin - 5 days  Plavix/Clopidogrel - 7 days  Warfarin / Coumadin - 5 days  Eliquis - 3 days  Pradaxa/Dabigatran - 4 days  Xarelto/Rivaroxaban - 3 days  HOLD all non-insulin injections (shots) until after surgery (Ozempic, Mounjaro, Trulicity, Victoza, Byetta, Wegovy and Adlyxin) (Total of 7 days prior)        If you are a diabetic, do not take your medication if you will be fasting, but bring it with you. Please plan on being here for roughly 3 hours.     Please call us if you have been sick (running fever, having any flu-like symptoms) or have been taking ANTIBIOTICS in the past 2 weeks or had any outpatient procedures other than with us (colonoscopy, endoscopy, OBGYN, dental, etc.).     If you have been previously COVID positive, you will need to hold off on your procedure until you are symptom free for 10 days. If you did not have any symptoms, you can have your procedure 10 days from your positive test result.       On the morning of your procedure:  *HOLD ALL VITAMINS, MINERALS, HERBS (INCLUDING HERBAL TEAS) AND SUPPLEMENTS  *SHOWER WITH ANTIBACTERIAL SOAP (EX. DIAL) NIGHT BEFORE AND MORNING OF PROCEDURE  *DO NOT APPLY ANY LOTIONS, OILS, POWDERS, PERFUME/COLOGNE, OINTMENTS, GELS, CREAMS, MAKEUP OR DEODORANT TO YOUR SKIN MORNING OF PROCEDURE  *LEAVE JEWELRY AND ANY VALUABLES AT HOME  *WEAR LOOSE COMFORTABLE CLOTHING (PREFERABLY A BUTTON UP SHIRT)     Please reply to this message as receipt of  delivery.           Thank you,  [facility]  [place], LPN [facility]  Pre-Admit

## 2024-09-23 NOTE — PROGRESS NOTES
"[facility]      Name: Diana Montenegro  Clinic Number: [id number]    Therapy Diagnosis:   Encounter Diagnoses   Name Primary?    Decreased strength of trunk and back Yes    Impaired mobility and ADLs      Physician: Rosalie Buchanan, *    Visit Date: 9/23/2024    Physician Orders: PT Eval and Treat  Medical Diagnosis from Referral: M54.50,G89.29 (ICD-10-CM) - Chronic right-sided low back pain without sciatica  Evaluation Date: 7/16/2024  Authorization Period Expiration: 12/31/2024  Plan of Care Expiration: 10/24/24 (POC update sent by Memo Douglas PT on 9/24/24 with request to extend to 10/24/24)  Reassessment Due: 10/10/24  Visit # / Visits authorized: 19/20  MedX testing visit 2    Time In: 1:00pm  Time Out: 2:05pm  Total Billable Time: 60 mins    INSURANCE and OUTCOMES: Fee for Service with FOTO Outcomes 1/3     Precautions: standard, fall, history of stroke     Pattern of pain determined: 1 PEN    Subjective   Diana arrives with mimimal back pain. Due for nerve ablation tomorrow for right knee.    Patient reports their pain to be 0/10 on a 0-10 scale with 0 being no pain and 10 being the worst pain imaginable.  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[employer], rollator  Gym Membership: yes, [employer] goes and does pool regularly    Pt goals: "try to get rid of this pain"     Objective       MOVEMENT LOSS - Lumbar    Norms ROM Loss Initial 9/10/24   Flexion Fingers touch toes, sacral angle >/= 70 deg, uniform spinal curvature, posterior weight shift  within functional limits functional   Extension ASIS surpasses toes, spine of scapulae surpasses heels, uniform spinal curve moderate loss Min loss   Side glide Right   minimal loss functional "   Side glide Left   minimal loss functional   Rotation Right PT observes contralateral shoulder minimal loss functional   Rotation Left PT observes contralateral shoulder minimal loss functional      Baseline IM Testing Results:   Date of testin24  ROM 0-39 deg   Max Peak Torque 85    Min Peak Torque 19   Flex/Ext Ratio 4.4:1   % below normative data 57       Mid IM Testing Results:   Date of testin/15/24  ROM 0-45deg   Max Peak Torque 105   Min Peak Torque 30   Flex/Ext Ratio 3.5:1   % below normative data 42   \\ 39%strength increase  Intake Outcome Measure for FOTO Lumbar Survey     Therapist reviewed FOTO scores for Diana Montenegro on 2024.   FOTO report - see Media section or FOTO account episode details.     Intake Score: 49% functional ability  Goal: 58%           Treatment    Kaleigh received the treatments listed below:      Kaleigh received neuromuscular education  to isolate and engage spinal stabilization musculature correctly for motor control and coordination to aid in function and posture for 10 minutes on the Medical Medx Machine.  Patient performed MedX dynamic exercise with emphasis on spinal muscular control using pacer throughout  active range of motion. Therapist assisted patient in achieving optimal exertion for neural reeducation and endurance training by using the  Suresh Exertion Rating scale, by instructing the patient to aim for mid range of exertion, performing 15-20 repetitions, slowly, correctly,and safely.          2024     2:03 PM   [facility]   Visit Number 19   VAS Pain Rating 0   Time 5   Lumbar Stretches - Slouch Overcorrection 10   Extension in Standing 10   Flexion in Lying 10   Lumbar Weight 54 lbs   Repetitions 18   Rating of Perceived Exertion 4   Ice - Z Lie (in min.) 5              therapeutic exercises to develop strength, endurance, ROM, flexibility, posture, and core stabilization for 50 minutes including:    LTR, 10x  Piriformis stretch 2 x  "20 sec   SKTC x30" ea  SLR c/ TrA contraction 12x + 2#  PPT 10x - NP  Bridge + BTB 20x  BKFO BTB x 15  SL clam with BTB x 15  SOC x 10   EIS w/towel x 10  seated thoracic extension stretch w 1/2 foam roll x 10  Paloff press 10# x 10 ea  Lateral side stepping along edge of plinth with RTB at knees x 4 laps      Peripheral muscle strengthening which included 1 set of 15-20 repetitions at a slow, controlled 10-13 second per rep pace focused on strengthening supporting musculature for improved body mechanics and functional mobility.  Pt and therapist focused on proper form during treatment to ensure optimal strengthening of each targeted muscle group.  Machines were utilized including torso rotation, leg press, hip abd and hip add, leg ext (NP).  Leg curl, triceps, biceps, chest (NP) and row added visit 3    cold pack for 5 minutes to LB.    Home Exercises Provided and Patient Education Provided   Home exercises include:SLR/LTR/SKTC, gentle ext in standing  Cardio program:she does pool 3-5 / week  Lifting education date: 8/20/24 Do's and Don'ts of lifting handout reviewed, practiced and provided.  Lumbar Roll: recommended  Fridge Magnet Discharge handout (date given):  Equipment at home/gym membership: [employer], pool, encouraged her to find the medx there    Education provided:   - cues w/exs   MedX performance  Precor ex performance    Written Home Exercises Provided: Patient instructed to cont prior HEP.  Exercises were reviewed and Kaleigh was able to demonstrate them prior to the end of the session.  Kaleigh demonstrated good  understanding of the education provided.     See EMR under Patient Instructions for exercises provided prior visit     Assessment   Diana presents to 19th healthy back visit reporting no back pain. Pt was able to complete peripheral strengthening ex's with appropriate muscular fatigue and without increased pain. MedX strengthening performed for 18 reps at 54 ft-lbs with RPE = 4/10. Pt able to set her " own weights on resistance equipment, demonstrating increasing independence with gym self-management, and able to verbalize pacing guidelines for resistance exercise with mod cues.      Patient is making progress towards established goals.  Pt will continue to benefit from skilled outpatient physical therapy to address the deficits stated in the impairment chart, provide pt/family education and to maximize pt's level of independence in the home and community environment.     Anticipated Barriers for therapy: right knee pain/OA   Pt's spiritual, cultural and educational needs considered and pt agreeable to plan of care and goals as stated below:     GOALS: Pt is in agreement with the following goals.   Short term goals:  6 weeks or 10 visits   - Pt will demonstrate increased lumbar MedX ROM by at least 3 degrees from the initial ROM value with improvements noted in functional ROM and ability to perform ADLs. met  - Pt will demonstrate increased MedX average isometric strength value by 20% from initial test resulting in improved ability to perform bending, lifting, and carrying activities safely, confidently. met  - Pt will report a reduction in worst pain score by 1-2 points for improved tolerance for household chores. Appropriate and Ongoing  - Pt able to perform HEP correctly with minimal cueing or supervision from therapist to encourage independent management of symptoms. Appropriate and Ongoing     Long term goals: 10 weeks or 20 visits   - Pt will demonstrate increased lumbar MedX ROM by at least 9 degrees from initial ROM value, resulting in improved ability to perform functional forward bending while standing and sitting. Appropriate and Ongoing  - Pt will demonstrate increased MedX average isometric strength value by 35% from initial test resulting in improved ability to perform bending, lifting, and carrying activities safely and confidently. MET  - Pt to demonstrate ability to independently control and  reduce their pain through posture positioning and mechanical movements throughout a typical day. Appropriate and Ongoing  - Pt will demonstrate reduced pain and improved functional outcomes as reported on the FOTO by reaching an intake score of >/= 58% functional ability in order to demonstrate subjective improvement in patient's condition. . Appropriate and Ongoing  - Pt will demonstrate independence with the HEP at discharge. Appropriate and Ongoing  - Pt will be able to complete meal prep without onset of back pain. Appropriate and Ongoing    Plan   Continue with established Plan of Care towards established PT goals.     Memo Douglas, PTA  9/23/2024

## 2024-09-24 ENCOUNTER — CLINICAL SUPPORT (OUTPATIENT)
Dept: REHABILITATION | Facility: HOSPITAL | Age: 69
End: 2024-09-24
Attending: PHYSICAL MEDICINE & REHABILITATION
Payer: MEDICARE

## 2024-09-24 DIAGNOSIS — Z74.09 IMPAIRED MOBILITY AND ADLS: ICD-10-CM

## 2024-09-24 DIAGNOSIS — Z78.9 IMPAIRED MOBILITY AND ADLS: ICD-10-CM

## 2024-09-24 DIAGNOSIS — R29.898 DECREASED STRENGTH OF TRUNK AND BACK: Primary | ICD-10-CM

## 2024-09-24 LAB — METHYLMALONATE SERPL-SCNC: 0.32 UMOL/L

## 2024-09-24 PROCEDURE — 97112 NEUROMUSCULAR REEDUCATION: CPT | Mod: KX

## 2024-09-24 PROCEDURE — 97110 THERAPEUTIC EXERCISES: CPT | Mod: KX

## 2024-09-24 NOTE — PLAN OF CARE
Outpatient Therapy Updated Plan of Care     Visit Date: 2024    Name: Diana Montenegro  Clinic Number: [id number]    Therapy Diagnosis:   Encounter Diagnoses   Name Primary?    Decreased strength of trunk and back Yes    Impaired mobility and ADLs      Physician: Rosalie Buchanan, *    Physician Orders: PT Eval and Treat  Medical Diagnosis from Referral: M54.50,G89.29 (ICD-10-CM) - Chronic right-sided low back pain without sciatica  Evaluation Date: 2024  Authorization Period Expiration: 2024  Plan of Care Expiration: 10/24/24 (POC update sent by Memo Douglas PT on 24 with request to extend to 10/24/24)  Reassessment Due: 10/10/24  Visit # / Visits authorized:   MedX testing visit 2    Precautions: Standard    Subjective     Update: Patient reports decreased low back pain over past 8 visits, with pain at or below 1/10. Pt has ongoing knee pain unrelated to back pain that is her primary complaint at this time, though she is due for an ablation on 24.    Objective     Update:     MOVEMENT LOSS - Lumbar    Norms ROM Loss Initial 9/10/24   Flexion Fingers touch toes, sacral angle >/= 70 deg, uniform spinal curvature, posterior weight shift  within functional limits functional   Extension ASIS surpasses toes, spine of scapulae surpasses heels, uniform spinal curve moderate loss Min loss   Side glide Right   minimal loss functional   Side glide Left   minimal loss functional   Rotation Right PT observes contralateral shoulder minimal loss functional   Rotation Left PT observes contralateral shoulder minimal loss functional      Baseline IM Testing Results:   Date of testin24  ROM 0-39 deg   Max Peak Torque 85    Min Peak Torque 19   Flex/Ext Ratio 4.4:1   % below normative data 57       Mid IM Testing Results:   Date of testin/15/24  ROM 0-45deg   Max Peak Torque 105   Min Peak Torque 30   Flex/Ext Ratio 3.5:1   % below normative data 42   \\ 39%strength  increase  Intake Outcome Measure for FOTO Lumbar Survey     Therapist reviewed FOTO scores for Diana Montenegro on 2024.   FOTO report - see Media section or FOTO account episode details.     Intake Score: 49% functional ability  Goal: 58%           Assessment     Update: Pt has responded very well to treatment thus far, and has completed 19 of her 20 visits as a part of Healthy Back protocol. Pt is due for 1 more visit though her Plan of Care has  at this time due to summer travel and vacation hours that prevented sequential visits. She is close to achieving several long-term goals and would benefit from finishing HB protocol to ensure compliance with home plan and reduce likelihood of relapse.    Short term goals:  6 weeks or 10 visits   - Pt will demonstrate increased lumbar MedX ROM by at least 3 degrees from the initial ROM value with improvements noted in functional ROM and ability to perform ADLs. met  - Pt will demonstrate increased MedX average isometric strength value by 20% from initial test resulting in improved ability to perform bending, lifting, and carrying activities safely, confidently. met  - Pt will report a reduction in worst pain score by 1-2 points for improved tolerance for household chores. Appropriate and Ongoing  - Pt able to perform HEP correctly with minimal cueing or supervision from therapist to encourage independent management of symptoms. Appropriate and Ongoing     Long term goals: 10 weeks or 20 visits   - Pt will demonstrate increased lumbar MedX ROM by at least 9 degrees from initial ROM value, resulting in improved ability to perform functional forward bending while standing and sitting. Appropriate and Ongoing  - Pt will demonstrate increased MedX average isometric strength value by 35% from initial test resulting in improved ability to perform bending, lifting, and carrying activities safely and confidently. MET  - Pt to demonstrate ability to independently  "control and reduce their pain through posture positioning and mechanical movements throughout a typical day. Appropriate and Ongoing  - Pt will demonstrate reduced pain and improved functional outcomes as reported on the FOTO by reaching an intake score of >/= 58% functional ability in order to demonstrate subjective improvement in patient's condition. . Appropriate and Ongoing  - Pt will demonstrate independence with the HEP at discharge. Appropriate and Ongoing  - Pt will be able to complete meal prep without onset of back pain. Appropriate and Ongoing  Reasons for Recertification of Therapy:   Plan of care  and patient has only completed 19 of 20 Healthy Back visits.    Plan     Updated Certification Period: 2024 to 10/24/2024  Recommended Treatment Plan: 1 times per week for 1 weeks: Manual Therapy, Moist Heat/ Ice, Neuromuscular Re-ed, Patient Education, Therapeutic Activities, and Therapeutic Exercise  Other Recommendations:    Pt may be seen by PTA as part of the rehabilitation team.     Therapist: Memo Douglas, PT  2024    "I certify the need for these services furnished under this plan of treatment and while under my care."    ____________________________________  Physician/Referring Practitioner    _______________  Date of Signature    Memo Douglas, PT  2024      I CERTIFY THE NEED FOR THESE SERVICES FURNISHED UNDER THIS PLAN OF TREATMENT AND WHILE UNDER MY CARE    Physician's comments:        Physician's Signature: ___________________________________________________    "

## 2024-09-25 ENCOUNTER — HOSPITAL ENCOUNTER (OUTPATIENT)
Facility: HOSPITAL | Age: 69
Discharge: HOME | End: 2024-09-25
Attending: STUDENT IN AN ORGANIZED HEALTH CARE EDUCATION/TRAINING PROGRAM | Admitting: STUDENT IN AN ORGANIZED HEALTH CARE EDUCATION/TRAINING PROGRAM
Payer: MEDICARE

## 2024-09-25 VITALS
RESPIRATION RATE: 16 BRPM | TEMPERATURE: 98 F | DIASTOLIC BLOOD PRESSURE: 58 MMHG | SYSTOLIC BLOOD PRESSURE: 133 MMHG | HEART RATE: 64 BPM | OXYGEN SATURATION: 100 %

## 2024-09-25 DIAGNOSIS — G89.29 CHRONIC PAIN: ICD-10-CM

## 2024-09-25 DIAGNOSIS — M17.11 PRIMARY OSTEOARTHRITIS OF RIGHT KNEE: Primary | ICD-10-CM

## 2024-09-25 DIAGNOSIS — G89.29 CHRONIC PAIN OF RIGHT KNEE: ICD-10-CM

## 2024-09-25 DIAGNOSIS — M25.561 CHRONIC PAIN OF RIGHT KNEE: ICD-10-CM

## 2024-09-25 PROCEDURE — 25000003 PHARM REV CODE 250: Performed by: STUDENT IN AN ORGANIZED HEALTH CARE EDUCATION/TRAINING PROGRAM

## 2024-09-25 PROCEDURE — 63600175 PHARM REV CODE 636 W HCPCS: Performed by: STUDENT IN AN ORGANIZED HEALTH CARE EDUCATION/TRAINING PROGRAM

## 2024-09-25 PROCEDURE — 64624 DSTRJ NULYT AGT GNCLR NRV: CPT | Mod: RT,,, | Performed by: STUDENT IN AN ORGANIZED HEALTH CARE EDUCATION/TRAINING PROGRAM

## 2024-09-25 PROCEDURE — 82962 GLUCOSE BLOOD TEST: CPT | Performed by: STUDENT IN AN ORGANIZED HEALTH CARE EDUCATION/TRAINING PROGRAM

## 2024-09-25 PROCEDURE — 64624 DSTRJ NULYT AGT GNCLR NRV: CPT | Mod: RT | Performed by: STUDENT IN AN ORGANIZED HEALTH CARE EDUCATION/TRAINING PROGRAM

## 2024-09-25 PROCEDURE — 99152 MOD SED SAME PHYS/QHP 5/>YRS: CPT | Performed by: STUDENT IN AN ORGANIZED HEALTH CARE EDUCATION/TRAINING PROGRAM

## 2024-09-25 PROCEDURE — 99152 MOD SED SAME PHYS/QHP 5/>YRS: CPT | Mod: ,,, | Performed by: STUDENT IN AN ORGANIZED HEALTH CARE EDUCATION/TRAINING PROGRAM

## 2024-09-25 RX ORDER — SODIUM CHLORIDE 9 MG/ML
500 INJECTION, SOLUTION INTRAVENOUS CONTINUOUS
Status: DISCONTINUED | OUTPATIENT
Start: 2024-09-25 | End: 2024-09-25 | Stop reason: HOSPADM

## 2024-09-25 RX ORDER — FENTANYL CITRATE 50 UG/ML
INJECTION, SOLUTION INTRAMUSCULAR; INTRAVENOUS
Status: DISCONTINUED | OUTPATIENT
Start: 2024-09-25 | End: 2024-09-25 | Stop reason: HOSPADM

## 2024-09-25 RX ORDER — MIDAZOLAM HYDROCHLORIDE 1 MG/ML
INJECTION INTRAMUSCULAR; INTRAVENOUS
Status: DISCONTINUED | OUTPATIENT
Start: 2024-09-25 | End: 2024-09-25 | Stop reason: HOSPADM

## 2024-09-25 RX ORDER — LIDOCAINE HYDROCHLORIDE 10 MG/ML
1 INJECTION, SOLUTION EPIDURAL; INFILTRATION; INTRACAUDAL; PERINEURAL ONCE
Status: DISCONTINUED | OUTPATIENT
Start: 2024-09-25 | End: 2024-09-25 | Stop reason: HOSPADM

## 2024-09-25 RX ORDER — BUPIVACAINE HYDROCHLORIDE 2.5 MG/ML
INJECTION, SOLUTION EPIDURAL; INFILTRATION; INTRACAUDAL
Status: DISCONTINUED | OUTPATIENT
Start: 2024-09-25 | End: 2024-09-25 | Stop reason: HOSPADM

## 2024-09-25 RX ORDER — LIDOCAINE HYDROCHLORIDE 20 MG/ML
INJECTION, SOLUTION EPIDURAL; INFILTRATION; INTRACAUDAL; PERINEURAL
Status: DISCONTINUED | OUTPATIENT
Start: 2024-09-25 | End: 2024-09-25 | Stop reason: HOSPADM

## 2024-09-25 NOTE — OP NOTE
Therapeutic Genicular Nerve Radiofrequency Ablation under Fluoroscopy     The procedure, risks, benefits, and options were discussed with the patient. There are no contraindications to the procedure. The patent expressed understanding and agreed to the procedure. Informed written consent was obtained prior to the start of the procedure and can be found in the patient's chart.        PATIENT NAME: Diana Montenegro   MRN: [id number]     DATE OF PROCEDURE: 09/25/2024     PROCEDURE: Therapeutic Right Genicular Nerve Radiofrequency Ablation under Fluoroscopy    PRE-OP DIAGNOSIS: Chronic pain of right knee [M25.561, G89.29]    POST-OP DIAGNOSIS: Chronic pain of right knee [M25.561, G89.29]    PHYSICIAN: Perla Barrera DO    ASSISTANTS: Nino Jarvis MD  [facility] pain fellow    MEDICATIONS INJECTED:  Preservative-free Kenalog 40mg with 9cc of Bupivicaine 0.25%    LOCAL ANESTHETIC INJECTED:   Xylocaine 2%    SEDATION: Versed 2mg and Fentanyl 75mcg                                                                                                                                                                                     Conscious sedation ordered by M.D. Patient re-evaluation prior to administration of conscious sedation. No changes noted in patient's status from initial evaluation. The patient's vital signs were monitored by RN and patient remained hemodynamically stable throughout the procedure.    Event Time In   Sedation Start 1127   Sedation End 1141       ESTIMATED BLOOD LOSS:  None    COMPLICATIONS:  None     INTERVAL HISTORY: Patient has clinical findings of chronic knee pain. Patients has completed 2 previous diagnostic genicular nerve blocks with at least 80% relief for the expected duration of the local anesthetic utilized.     TECHNIQUE: Time-out was performed to identify the patient and procedure to be performed. With the patient laying in a supine position, the surgical area was prepped and draped in the  usual sterile fashion using ChloraPrep and fenestrated drape. Three target sites including the superior lateral genicular nerve where the lateral femoral shaft meets the epicondyle, the superior medial genicular nerve where the medial femoral shaft meets the epicondyle, and the inferior medial genicular nerve where the medial tibial shaft meets the epicondyle, were determined under fluoroscopic guidance. Skin anesthesia was achieved by injecting Lidocaine 2% over the injection sites. A 18 gauge, 100mm, 10mm active tip needle was then advanced under fluoroscopy in the AP and lateral views into the positions of the geniculate nerves at these levels. This was followed by motor testing at each of the nerves to ensure that there was no dorsiflexion of the foot. After negative aspiration for blood was confirmed, 1 mL of the lidocaine 2% listed above was injected slowly at each site. This was followed by thermal lesioning at 80 degrees celsius for 90 seconds at each site. That was followed by slowly injecting 3 mL of the medication mixture listed above at each site. The needles were removed and bleeding was nil. A sterile dressing was applied. No specimens collected. The patient tolerated the procedure well and did not have any procedure related motor deficit at the conclusion of the procedure.    The patient was monitored after the procedure in the recovery area. They were given post-procedure and discharge instructions to follow at home. The patient was discharged in a stable condition.    Nino Jarvis MD    I reviewed and edited the fellow's note. I conducted my own interview and physical examination. I agree with the findings. I was present and supervising all critical portions of the procedure.

## 2024-09-25 NOTE — DISCHARGE INSTRUCTIONS
Home Care Instructions Pain Management:    1.  DIET:    You may resume your normal diet today.    2.  BATHING:    You may shower with luke warm water.    3.  DRESSING:    You may remove your bandage today.    4.  ACTIVITY LEVEL:      You may resume your normal activities 24 hours after your procedure.    5.  MEDICATIONS:    You may resume your normal medications today.    6.  SPECIAL INSTRUCTIONS:    No heat to the injection site for 24 hours including bath or shower, heating pad, moist heat or hot tubs.    Use an ice pack to the injection site for any pain or discomfort.  Apply ice packs for 20 minute intervals as needed.    If you have received any sedatives by mouth today, you can not drive for 12 hours.    If you have received sedation through an IV, you can not drive for 24 hours.    PLEASE CALL YOUR DOCTOR FOR THE FOLLOWIN.  Redness or swelling around the injection site.  2.  Fever of 101 degrees.  3.  Drainage (pus) from the injection site.  4.  For any continuous bleeding (some dried blood over the incision is normal.)    FOR EMERGENCIES:    If any unusual problems or difficulties occur during clinic hours, call [phone] or dial [phone].    Follow up with with your physician in 2-3 weeks.

## 2024-09-25 NOTE — PLAN OF CARE
Pt in preop bay 24, VSS, and IV inserted. Pt denies any open wounds on body . Last used ozempic 9/15. Asa 9/25. Eliquis 9/24.MD notified and ok to continue. Pt otherwise ready to roll.  Procedural consents verified with pt.

## 2024-09-25 NOTE — H&P
HPI  Patient presenting for Procedure(s) (LRB):  Right genicular RFA (Right)     Patient on Anti-coagulation Yes    No health changes since previous encounter    Past Medical History:   Diagnosis Date    Arthritis     Atrial fibrillation, unspecified     hx of a fib prior to stroke.    Chronic back pain     Colon polyp     CVA (cerebral infarction) 07/16/2014    Degenerative disc disease     cervical; lumbar    Diabetes mellitus type II     Encounter for loop recorder at end of battery life 09/17/2018    Hyperlipidemia     Hypertension     Hypothyroidism     Mild nonproliferative diabetic retinopathy 08/12/2018    Obesity     Sleep apnea     Stroke 07/2014    Venous insufficiency (chronic) (peripheral)      Past Surgical History:   Procedure Laterality Date    BLOCK, NERVE, GENICULAR Right 8/21/2024    Procedure: Right diagnostic genicular block;  Surgeon: Perla Barrera DO;  Location: [facility];  Service: Pain Management;  Laterality: Right;  oral sed  20 mins  Elquis/ASA ok    COLONOSCOPY N/A 08/30/2018    Procedure: COLONOSCOPY;  Surgeon: William Clement MD;  Location: [facility] (4TH FLR);  Service: Endoscopy;  Laterality: N/A;  Tito/Dr Rad Johnston/OK to hold 2 days prior to colon/see telephone encounter dated 7/24/18/pt has loop recorder/svn    COLONOSCOPY N/A 12/6/2023    Procedure: COLONOSCOPY;  Surgeon: Vinny Youssef MD;  [facility]  Service: Colon and Rectal;  Laterality: N/A;  ref Green  eliquis   diabetic/ [facility] ozempic hold 7days prior    peg prep jm / loop recorder  ok to hold Eliquis 2 days per KHANH Rivera NP/MADISON Jones RN-GT  11/29-precall complete-pt verbalized understanding of holding Ozempic-MS    COLONOSCOPY W/ POLYPECTOMY      GASTRECTOMY      HERNIA REPAIR      REMOVAL OF IMPLANTABLE LOOP RECORDER N/A 09/17/2018    Procedure: REMOVAL, IMPLANTABLE LOOP RECORDER;  Surgeon: Thomas Randolph MD;  Location: [facility] CATH LAB;  Service: Cardiology;  Laterality: N/A;   TERESA, ILR removal (no reimplant), MDT, RN Inocencio, SK, 3 Prep *Reveal LINQ*    TONSILLECTOMY      TUBAL LIGATION       Review of patient's allergies indicates:   Allergen Reactions    Medrol [methylprednisolone] Palpitations      Current Facility-Administered Medications   Medication    0.9%  NaCl infusion    BUPivacaine (PF) 0.25% (2.5 mg/ml) injection    LIDOcaine (PF) 10 mg/ml (1%) injection 10 mg    LIDOcaine (PF) 20 mg/ml (2%) injection     Facility-Administered Medications Ordered in Other Encounters   Medication    0.9%  NaCl infusion       PMHx, PSHx, Allergies, Medications reviewed in epic    ROS negative except pain complaints in HPI    OBJECTIVE:    LMP  (LMP Unknown)   Breastfeeding No     PHYSICAL EXAMINATION:    GENERAL: Well appearing, in no acute distress, alert and oriented x3.  PSYCH:  Mood and affect appropriate.  SKIN: Skin color, texture, turgor normal, no rashes or lesions which will impact the procedure.  CV: RRR with palpation of the radial artery.  PULM: No evidence of respiratory difficulty, symmetric chest rise. Clear to auscultation.  NEURO: Cranial nerves grossly intact.    Plan:    Proceed with procedure as planned Procedure(s) (LRB):  Right genicular RFA (Right)    Nino Jarvis  09/25/2024

## 2024-09-25 NOTE — PROGRESS NOTES
"[facility] Treatment and Discharge Note     Name: Diana Montenegro  Clinic Number: [id number]    Therapy Diagnosis:   Encounter Diagnoses   Name Primary?    Decreased strength of trunk and back Yes    Impaired mobility and ADLs      Physician: Rosalie Buchanan, *    Visit Date: 9/26/2024    Physician Orders: PT Eval and Treat  Medical Diagnosis from Referral: M54.50,G89.29 (ICD-10-CM) - Chronic right-sided low back pain without sciatica  Evaluation Date: 7/16/2024  Authorization Period Expiration: 12/31/2024  Plan of Care Expiration: 10/24/24 (POC update sent by Memo Douglas PT on 9/24/24 with request to extend to 10/24/24)  Reassessment Due: 10/10/24  Visit # / Visits authorized: 20/20  MedX testing visit 2    Time In: 1:30pm  Time Out: 2:30pm  Total Billable Time: 55 mins      INSURANCE and OUTCOMES: Fee for Service with FOTO Outcomes 1/3     Precautions: standard, fall, history of stroke     Pattern of pain determined: 1 PEN    Subjective   Diana arrives with no pain, feeling good and ready for graduation from therapy.    Patient reports their pain to be 0/10 on a 0-10 scale with 0 being no pain and 10 being the worst pain imaginable.  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[employer], rollator  Gym Membership: yes, [employer] goes and does pool regularly    Pt goals: "try to get rid of this pain"     Objective       MOVEMENT LOSS - Lumbar    Norms ROM Loss Initial 9/10/24   Flexion Fingers touch toes, sacral angle >/= 70 deg, uniform spinal curvature, posterior weight shift  within functional limits functional   Extension ASIS surpasses toes, spine of scapulae surpasses heels, uniform spinal curve moderate loss Min loss   Side glide Right   minimal loss " functional   Side glide Left   minimal loss functional   Rotation Right PT observes contralateral shoulder minimal loss functional   Rotation Left PT observes contralateral shoulder minimal loss functional      Baseline IM Testing Results:   Date of testin24  ROM 0-39 deg   Max Peak Torque 85    Min Peak Torque 19   Flex/Ext Ratio 4.4:1   % below normative data 57       Mid IM Testing Results:   Date of testin/15/24  ROM 0-45deg   Max Peak Torque 105   Min Peak Torque 30   Flex/Ext Ratio 3.5:1   % below normative data 42   \\ 39%strength increase    Mid IM Testing Results:   Date of testin/15/24  ROM 0-51deg   Max Peak Torque 119   Min Peak Torque 70   Flex/Ext Ratio 1.7:1   % below normative data 42   \\ 142%strength increase from initial    Intake Outcome Measure for FOTO Lumbar Survey     Therapist reviewed FOTO scores for Diana Montenegro on 2024.   FOTO report - see Media section or FOTO account episode details.     Intake Score: 49% functional ability  Discharge Score: 60%  Goal: 58%           Treatment    Kaleigh received the treatments listed below:      Kaleigh received neuromuscular education  to isolate and engage spinal stabilization musculature correctly for motor control and coordination to aid in function and posture for 15 minutes on the Medical Medx Machine.  Patient performed MedX dynamic exercise with emphasis on spinal muscular control using pacer throughout  active range of motion. Therapist assisted patient in achieving optimal exertion for neural reeducation and endurance training by using the  Suresh Exertion Rating scale, by instructing the patient to aim for mid range of exertion, performing 15-20 repetitions, slowly, correctly,and safely.          2024     3:37 PM   [facility]   Visit Number 20   VAS Pain Rating 0   Time 5   Lumbar Stretches - Slouch Overcorrection 10   Extension in Standing 10   Flexion in Lying 10   Lumbar Flexion 51   Lumbar Extension 0  "  Lumbar Peak Torque 119 ft. lbs.   Min Torque 70   Test Percent Below Normative Data 2 %   Test Percent Gain in Strength from Initial  142 %   Ice - Z Lie (in min.) 5              therapeutic exercises to develop strength, endurance, ROM, flexibility, posture, and core stabilization for 40 minutes including:    LTR, 10x  Piriformis stretch 2 x 20 sec   SKTC x30" ea  SLR c/ TrA contraction 12x + 2#  PPT 10x - NP  Bridge + BTB 20x  BKFO BTB x 15  SL clam with BTB x 15  SOC x 10   EIS w/towel x 10  seated thoracic extension stretch w 1/2 foam roll x 10  Paloff press 10# x 10 ea  Lateral side stepping along edge of plinth with RTB at knees x 4 laps      Peripheral muscle strengthening which included 1 set of 15-20 repetitions at a slow, controlled 10-13 second per rep pace focused on strengthening supporting musculature for improved body mechanics and functional mobility.  Pt and therapist focused on proper form during treatment to ensure optimal strengthening of each targeted muscle group.  Machines were utilized including torso rotation, leg press, hip abd and hip add, leg ext (NP).  Leg curl, triceps, biceps, chest (NP) and row added visit 3    cold pack for 5 minutes to LB.    Home Exercises Provided and Patient Education Provided   Home exercises include:SLR/LTR/SKTC, gentle ext in standing  Cardio program:she does pool 3-5 / week  Lifting education date: 8/20/24 Do's and Don'ts of lifting handout reviewed, practiced and provided.  Lumbar Roll: recommended  Fridge Magnet Discharge handout (date given):  Equipment at home/gym membership: [facility], pool, encouraged her to find the medx there    Education provided:   - cues w/exs   MedX performance  Precor ex performance    Written Home Exercises Provided: Patient instructed to cont prior HEP.  Exercises were reviewed and Kaleigh was able to demonstrate them prior to the end of the session.  Kaleigh demonstrated good  understanding of the education provided.     See EMR under " Patient Instructions for exercises provided prior visit     Assessment   Diana presents to 20th healthy back visit reporting no pain. Pt performed final MedX testing and demonstrated significant improvements in range of motion, strength and motor control, with a flexion/extension ratio of 1.7:1, a 142% strength increase from initial testing, and results within 2% of normative values. She has accomplished all short and long term goals, and has completed HB protocol. She is independent with home plan and ready for discharge to self-management.    Pt will continue to benefit from skilled outpatient physical therapy to address the deficits stated in the impairment chart, provide pt/family education and to maximize pt's level of independence in the home and community environment.     Anticipated Barriers for therapy: right knee pain/OA   Pt's spiritual, cultural and educational needs considered and pt agreeable to plan of care and goals as stated below:     GOALS: Pt is in agreement with the following goals.   Short term goals:  6 weeks or 10 visits   - Pt will demonstrate increased lumbar MedX ROM by at least 3 degrees from the initial ROM value with improvements noted in functional ROM and ability to perform ADLs. met  - Pt will demonstrate increased MedX average isometric strength value by 20% from initial test resulting in improved ability to perform bending, lifting, and carrying activities safely, confidently. met  - Pt will report a reduction in worst pain score by 1-2 points for improved tolerance for household chores. MET  - Pt able to perform HEP correctly with minimal cueing or supervision from therapist to encourage independent management of symptoms. MET     Long term goals: 10 weeks or 20 visits   - Pt will demonstrate increased lumbar MedX ROM by at least 9 degrees from initial ROM value, resulting in improved ability to perform functional forward bending while standing and sitting. MET  - Pt will  demonstrate increased MedX average isometric strength value by 35% from initial test resulting in improved ability to perform bending, lifting, and carrying activities safely and confidently. MET  - Pt to demonstrate ability to independently control and reduce their pain through posture positioning and mechanical movements throughout a typical day. MET  - Pt will demonstrate reduced pain and improved functional outcomes as reported on the FOTO by reaching an intake score of >/= 58% functional ability in order to demonstrate subjective improvement in patient's condition. MET  - Pt will demonstrate independence with the HEP at discharge. MET  - Pt will be able to complete meal prep without onset of back pain. MET    Plan   Discharge to self management.    Memo Douglas, PTA  9/26/2024

## 2024-09-25 NOTE — DISCHARGE SUMMARY
Discharge Note  Short Stay      SUMMARY     Admit Date: 9/25/2024    Attending Physician: Perla Barrera      Discharge Physician: Perla Barrera      Discharge Date: 9/25/2024 11:43 AM    Procedure(s) (LRB):  Right genicular RFA (Right)    Final Diagnosis: Chronic pain of right knee [M25.561, G89.29]    Disposition: Home or self care    Patient Instructions:   Current Discharge Medication List        CONTINUE these medications which have NOT CHANGED    Details   apixaban (ELIQUIS) 5 mg Tab Take 1 tablet (5 mg total) by mouth 2 (two) times daily.  Qty: 180 tablet, Refills: 3    Associated Diagnoses: Paroxysmal atrial fibrillation      aspirin (ECOTRIN) 81 MG EC tablet Take 1 tablet (81 mg total) by mouth once daily.      b complex vitamins tablet Take 1 tablet by mouth once daily.      CALCIUM CITRATE/VITAMIN D3 (CALCIUM CITRATE + D ORAL) Take 1 tablet by mouth every morning.      cetirizine (ZYRTEC) 5 MG tablet Take 5 mg by mouth once daily.      cyanocobalamin, vitamin B-12, 500 mcg Subl Place 1 tablet under the tongue once daily.      docusate sodium (COLACE) 100 MG capsule Take 100 mg by mouth once daily.       fish oil-omega-3 fatty acids 300-1,000 mg capsule Take 1 capsule by mouth once daily. Take 1 cap 2 times daily every other day      glimepiride (AMARYL) 2 MG tablet Take 1 tablet (2 mg total) by mouth before breakfast. For diabetes control.  Qty: 90 tablet, Refills: 0      levothyroxine (SYNTHROID) 88 MCG tablet Take 1 tablet (88 mcg total) by mouth before breakfast.  Qty: 30 tablet, Refills: 6    Comments: New dosage as of 9/28/23.      LORazepam (ATIVAN) 1 MG tablet Take 1 tablet (1 mg total) by mouth 2 (two) times daily. as needed for anxiety.  Qty: 60 tablet, Refills: 0    Associated Diagnoses: Anxiety      metFORMIN (GLUCOPHAGE) 1000 MG tablet TAKE 1 TABLET BY MOUTH TWICE DAILY WITH MEALS  Qty: 180 tablet, Refills: 1      multivitamin capsule Take 1 capsule by mouth once daily.      omeprazole  (PRILOSEC) 40 MG capsule Take 1 capsule (40 mg total) by mouth every morning.  Qty: 90 capsule, Refills: 3    Associated Diagnoses: S/P laparoscopic sleeve gastrectomy      rosuvastatin (CRESTOR) 40 MG Tab Take 1 tablet by mouth once daily  Qty: 90 tablet, Refills: 0    Associated Diagnoses: Hyperlipidemia, unspecified hyperlipidemia type      RUTIN/HESP/BIOFLAV/C/HERB#196 (BIOFLEX ORAL) Take 2 tablets by mouth once daily.      senna (SENNA) 8.6 mg tablet Take 2 tablets by mouth nightly as needed for Constipation.  Qty: 100 tablet, Refills: 3      traZODone (DESYREL) 50 MG tablet TAKE 1 TABLET BY MOUTH NIGHTLY AS NEEDED FOR  INSOMNIA  (OKAY  TO  TAKE  2ND  TABLET  BY  MOUTH  IN  30  MINUTES  IF  STILL  AWAKE)  Qty: 180 tablet, Refills: 3    Associated Diagnoses: Insomnia, unspecified type      valsartan-hydrochlorothiazide (DIOVAN-HCT) 80-12.5 mg per tablet Take 1 tablet by mouth once daily.  Qty: 90 tablet, Refills: 3    Comments: .      acyclovir 5% (ZOVIRAX) 5 % ointment Apply topically 6 (six) times daily.  Qty: 5 g, Refills: 1    Associated Diagnoses: Herpes simplex      albuterol (VENTOLIN HFA) 90 mcg/actuation inhaler Inhale 2 puffs into the lungs every 6 (six) hours as needed for Wheezing. Rescue  Qty: 7 g, Refills: 3      albuterol-ipratropium 2.5mg-0.5mg/3mL (DUO-NEB) 0.5 mg-3 mg(2.5 mg base)/3 mL nebulizer solution Take 3 mLs by nebulization every 6 (six) hours as needed for Wheezing. Rescue  Qty: 3 vial, Refills: 3      blood pressure test kit-large Kit Use as directed  Qty: 1 each, Refills: 0      blood sugar diagnostic (FREESTYLE LITE STRIPS) Strp USE 1 STRIP TO CHECK GLUCOSE TWICE DAILY  Qty: 200 each, Refills: 3      blood-glucose meter Misc One touch verio flex or tone touch verio reflect or freestyle freedom lite meter (all covered by insurance)  Qty: 1 each, Refills: 0      ciclopirox (PENLAC) 8 % Soln Apply topically nightly.  Qty: 6.6 mL, Refills: 11      cinnamon bark (CINNAMON ORAL) Take by  mouth 2 (two) times daily.      diclofenac sodium (VOLTAREN) 1 % Gel APPLY 2 GRAMS TOPICALLY THREE TIMES DAILY  Qty: 200 g, Refills: 4      diphenhydrAMINE-aluminum-magnesium hydroxide-simethicone-LIDOcaine viscous HCl 2% Swish and spit 15 mLs every 4 (four) hours as needed (sore throat).  Qty: 120 each, Refills: 0    Associated Diagnoses: Sore throat      EYLEA 2 mg/0.05 mL Syrg       fluticasone (FLONASE) 50 mcg/actuation nasal spray 2 sprays (100 mcg total) by Each Nare route once daily.  Qty: 1 Bottle, Refills: 0      lancets Misc Needs lancets for testing twice daily.  To go with meter covered by insurance.  Qty: 200 each, Refills: 3      metoprolol tartrate (LOPRESSOR) 25 MG tablet Take 1 tablet (25 mg total) by mouth once as needed (Palpitations). For palpitations  Qty: 1 tablet, Refills: 3    Comments: .  Associated Diagnoses: Paroxysmal atrial fibrillation      mupirocin (BACTROBAN) 2 % ointment Apply to affected area 3 times daily  Qty: 22 g, Refills: 1    Associated Diagnoses: Cellulitis of left lower leg      promethazine-dextromethorphan (PROMETHAZINE-DM) 6.25-15 mg/5 mL Syrp Take one tsp po q 6 hrs prn cough  Qty: 180 mL, Refills: 0      semaglutide (OZEMPIC) 2 mg/dose (8 mg/3 mL) PnIj Inject 2 mg into the skin every 7 days.  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  Qty: 1 each, Refills: 0    Associated Diagnoses: Arthritis; Frequent falls; Impairment of balance                 Discharge Diagnosis: Chronic pain of right knee [M25.561, G89.29]  Condition on Discharge: Stable with no complications to procedure   Diet on Discharge: Same as before.  Activity: as per instruction sheet.  Discharge to: Home with a responsible adult.  Follow up: 2-4 weeks       Please call my office  or pager at [phone] if experienced any weakness or loss of sensation, fever > 101.5, pain uncontrolled with oral medications, persistent nausea/vomiting/or diarrhea, redness or drainage from the incisions, or any other worrisome concerns. If physician on call was not reached or could not communicate with our office for any reason please go to the nearest emergency department

## 2024-09-26 ENCOUNTER — CLINICAL SUPPORT (OUTPATIENT)
Dept: REHABILITATION | Facility: HOSPITAL | Age: 69
End: 2024-09-26
Attending: PHYSICAL MEDICINE & REHABILITATION
Payer: MEDICARE

## 2024-09-26 DIAGNOSIS — Z78.9 IMPAIRED MOBILITY AND ADLS: ICD-10-CM

## 2024-09-26 DIAGNOSIS — Z74.09 IMPAIRED MOBILITY AND ADLS: ICD-10-CM

## 2024-09-26 DIAGNOSIS — R29.898 DECREASED STRENGTH OF TRUNK AND BACK: Primary | ICD-10-CM

## 2024-09-26 LAB — POCT GLUCOSE: 158 MG/DL (ref 70–110)

## 2024-09-26 PROCEDURE — 97112 NEUROMUSCULAR REEDUCATION: CPT | Mod: KX

## 2024-09-26 PROCEDURE — 97110 THERAPEUTIC EXERCISES: CPT | Mod: KX

## 2024-09-27 ENCOUNTER — LAB VISIT (OUTPATIENT)
Dept: LAB | Facility: HOSPITAL | Age: 69
End: 2024-09-27
Attending: NURSE PRACTITIONER
Payer: MEDICARE

## 2024-09-27 ENCOUNTER — PATIENT MESSAGE (OUTPATIENT)
Dept: PAIN MEDICINE | Facility: CLINIC | Age: 69
End: 2024-09-27
Payer: MEDICARE

## 2024-09-27 DIAGNOSIS — F41.9 ANXIETY: ICD-10-CM

## 2024-09-27 DIAGNOSIS — D64.9 ANEMIA, UNSPECIFIED TYPE: ICD-10-CM

## 2024-09-27 PROCEDURE — 82274 ASSAY TEST FOR BLOOD FECAL: CPT | Performed by: NURSE PRACTITIONER

## 2024-09-27 RX ORDER — LORAZEPAM 1 MG/1
1 TABLET ORAL 2 TIMES DAILY
Qty: 60 TABLET | Refills: 0 | Status: SHIPPED | OUTPATIENT
Start: 2024-09-27

## 2024-09-27 NOTE — TELEPHONE ENCOUNTER
No care due was identified.  [facility] Embedded Care Due Messages. Reference number: [id number].   9/27/2024 10:25:20 AM CDT

## 2024-10-01 LAB — HEMOCCULT STL QL IA: NEGATIVE

## 2024-10-02 ENCOUNTER — OFFICE VISIT (OUTPATIENT)
Dept: OBSTETRICS AND GYNECOLOGY | Facility: CLINIC | Age: 69
End: 2024-10-02
Payer: MEDICARE

## 2024-10-02 VITALS
HEIGHT: 65 IN | WEIGHT: 195.56 LBS | SYSTOLIC BLOOD PRESSURE: 124 MMHG | DIASTOLIC BLOOD PRESSURE: 71 MMHG | BODY MASS INDEX: 32.58 KG/M2

## 2024-10-02 DIAGNOSIS — Z01.419 ENCOUNTER FOR GYNECOLOGICAL EXAMINATION: Primary | ICD-10-CM

## 2024-10-02 DIAGNOSIS — Z12.31 BREAST CANCER SCREENING BY MAMMOGRAM: ICD-10-CM

## 2024-10-02 DIAGNOSIS — Z12.4 ENCOUNTER FOR SCREENING FOR CERVICAL CANCER: ICD-10-CM

## 2024-10-02 DIAGNOSIS — Z11.51 ENCOUNTER FOR SCREENING FOR HUMAN PAPILLOMAVIRUS (HPV): ICD-10-CM

## 2024-10-02 PROCEDURE — 3066F NEPHROPATHY DOC TX: CPT | Mod: CPTII,S$GLB,, | Performed by: STUDENT IN AN ORGANIZED HEALTH CARE EDUCATION/TRAINING PROGRAM

## 2024-10-02 PROCEDURE — G0101 CA SCREEN;PELVIC/BREAST EXAM: HCPCS | Mod: S$GLB,,, | Performed by: STUDENT IN AN ORGANIZED HEALTH CARE EDUCATION/TRAINING PROGRAM

## 2024-10-02 PROCEDURE — 3044F HG A1C LEVEL LT 7.0%: CPT | Mod: CPTII,S$GLB,, | Performed by: STUDENT IN AN ORGANIZED HEALTH CARE EDUCATION/TRAINING PROGRAM

## 2024-10-02 PROCEDURE — 3288F FALL RISK ASSESSMENT DOCD: CPT | Mod: CPTII,S$GLB,, | Performed by: STUDENT IN AN ORGANIZED HEALTH CARE EDUCATION/TRAINING PROGRAM

## 2024-10-02 PROCEDURE — 1126F AMNT PAIN NOTED NONE PRSNT: CPT | Mod: CPTII,S$GLB,, | Performed by: STUDENT IN AN ORGANIZED HEALTH CARE EDUCATION/TRAINING PROGRAM

## 2024-10-02 PROCEDURE — 1159F MED LIST DOCD IN RCRD: CPT | Mod: CPTII,S$GLB,, | Performed by: STUDENT IN AN ORGANIZED HEALTH CARE EDUCATION/TRAINING PROGRAM

## 2024-10-02 PROCEDURE — 3078F DIAST BP <80 MM HG: CPT | Mod: CPTII,S$GLB,, | Performed by: STUDENT IN AN ORGANIZED HEALTH CARE EDUCATION/TRAINING PROGRAM

## 2024-10-02 PROCEDURE — 1160F RVW MEDS BY RX/DR IN RCRD: CPT | Mod: CPTII,S$GLB,, | Performed by: STUDENT IN AN ORGANIZED HEALTH CARE EDUCATION/TRAINING PROGRAM

## 2024-10-02 PROCEDURE — 88175 CYTOPATH C/V AUTO FLUID REDO: CPT | Performed by: STUDENT IN AN ORGANIZED HEALTH CARE EDUCATION/TRAINING PROGRAM

## 2024-10-02 PROCEDURE — 1101F PT FALLS ASSESS-DOCD LE1/YR: CPT | Mod: CPTII,S$GLB,, | Performed by: STUDENT IN AN ORGANIZED HEALTH CARE EDUCATION/TRAINING PROGRAM

## 2024-10-02 PROCEDURE — 3074F SYST BP LT 130 MM HG: CPT | Mod: CPTII,S$GLB,, | Performed by: STUDENT IN AN ORGANIZED HEALTH CARE EDUCATION/TRAINING PROGRAM

## 2024-10-02 PROCEDURE — 3060F POS MICROALBUMINURIA REV: CPT | Mod: CPTII,S$GLB,, | Performed by: STUDENT IN AN ORGANIZED HEALTH CARE EDUCATION/TRAINING PROGRAM

## 2024-10-02 PROCEDURE — 99999 PR PBB SHADOW E&M-EST. PATIENT-LVL IV: CPT | Mod: PBBFAC,,, | Performed by: STUDENT IN AN ORGANIZED HEALTH CARE EDUCATION/TRAINING PROGRAM

## 2024-10-02 PROCEDURE — 87624 HPV HI-RISK TYP POOLED RSLT: CPT | Performed by: STUDENT IN AN ORGANIZED HEALTH CARE EDUCATION/TRAINING PROGRAM

## 2024-10-02 PROCEDURE — 3008F BODY MASS INDEX DOCD: CPT | Mod: CPTII,S$GLB,, | Performed by: STUDENT IN AN ORGANIZED HEALTH CARE EDUCATION/TRAINING PROGRAM

## 2024-10-02 NOTE — PROGRESS NOTES
"  Chief Complaint: Well Woman Exam     HPI:      Diana Montenegro is a 69 y.o.  who presents for annual exam. No LMP recorded (lmp unknown). Patient is postmenopausal.   Today patient GYN complaints include: none.  Specifically, patient denies abnormal vaginal bleeding, discharge, pelvic pain. Occasional ***    Ms. Montenegro {Blank single:78416::"has never been sexually active","is not currently sexually active","is currently sexually active with multiple partners","is currently sexually active with a single female partner","is currently sexually active with a single male partner"}. She {Blank single:42223::"declines","would like"} STD screening today. She is currently using {[facility] Contraception List:12873} for contraception.    Previous Pap: NILM, HPV negative (2018)  Previous Mammogram: BiRads: 1 T-C Score: 3.7 (10/16/23)  Most Recent Dexa: WNL (2021), Repeat in   Most Recent Colonoscopy: Benign polyps and diverticulosis (2023), Repeat in     Past Medical History:   Diagnosis Date    Arthritis     Atrial fibrillation, unspecified     hx of a fib prior to stroke.    Chronic back pain     Colon polyp     CVA (cerebral infarction) 2014    Degenerative disc disease     cervical; lumbar    Diabetes mellitus type II     Encounter for loop recorder at end of battery life 2018    Hyperlipidemia     Hypertension     Hypothyroidism     Mild nonproliferative diabetic retinopathy 2018    Obesity     Sleep apnea     Stroke 2014    Venous insufficiency (chronic) (peripheral)        Past Surgical History:   Procedure Laterality Date    BLOCK, NERVE, GENICULAR Right 2024    Procedure: Right diagnostic genicular block;  Surgeon: Perla Barrera DO;  Location: [facility] PAIN MANAGEMENT;  Service: Pain Management;  Laterality: Right;  oral sed  20 mins  Elquis/ASA ok    COLONOSCOPY N/A 2018    Procedure: COLONOSCOPY;  Surgeon: William Clement MD;  Location: [facility] ([address] [place] [address]);  " Service: Endoscopy;  Laterality: N/A;  Tito/Dr Rad Johnston/OK to hold 2 days prior to colon/see telephone encounter dated 7/24/18/pt has loop recorder/svn    COLONOSCOPY N/A 12/6/2023    Procedure: COLONOSCOPY;  Surgeon: Vinny Youssef MD;  Location: [facility] ENDO (4TH FLR);  Service: Colon and Rectal;  Laterality: N/A;  ref Green  eliquis   diabetic/ WL ozempic hold 7days prior    peg prep jm / loop recorder  ok to hold Eliquis 2 days per J Miguel NP/L Robert RN-GT  11/29-precall complete-pt verbalized understanding of holding Ozempic-MS    COLONOSCOPY W/ POLYPECTOMY      GASTRECTOMY      HERNIA REPAIR      RADIOFREQUENCY ABLATION, NERVE, GENICULAR, KNEE Right 9/25/2024    Procedure: Right genicular RFA;  Surgeon: Perla Barrera DO;  [facility];  Service: Pain Management;  Laterality: Right;  40 mins - Ozempic 7 days ASA/ Eliquis ok    REMOVAL OF IMPLANTABLE LOOP RECORDER N/A 09/17/2018    Procedure: REMOVAL, IMPLANTABLE LOOP RECORDER;  Surgeon: Thomas Randolph MD;  Location: [facility] CATH LAB;  Service: Cardiology;  Laterality: N/A;  TERESA, ILR removal (no reimplant), MDT, RN Sedate, SK, 3 Prep *Reveal LINQ*    TONSILLECTOMY      TUBAL LIGATION         Social History     Socioeconomic History    Marital status: Single   Occupational History    Occupation: MOS      Employer: [employer]   Tobacco Use    Smoking status: Former     Current packs/day: 0.00     Average packs/day: 1 pack/day for 30.0 years (30.0 ttl pk-yrs)     Types: Cigarettes     Start date: 7/16/1984     Quit date: 7/16/2014     Years since quitting: 10.2    Smokeless tobacco: Never   Substance and Sexual Activity    Alcohol use: Not Currently    Drug use: No     Comment: thc at young age    Sexual activity: Not Currently     Partners: Male     Family History   Problem Relation Name Age of Onset    No Known Problems Mother      Heart disease Father      No Known Problems Sister      No Known Problems  "Sister      No Known Problems Maternal Grandmother      Diabetes Maternal Grandfather      Obesity Maternal Grandfather      Stroke Paternal Grandmother      No Known Problems Paternal Grandfather      Heart attack Neg Hx      Cirrhosis Neg Hx         Review of patient's allergies indicates:   Allergen Reactions    Medrol [methylprednisolone] Palpitations       OB History          4    Para   4    Term   2            AB        Living   2         SAB        IAB        Ectopic        Multiple        Live Births   2                 Physical Exam:      PHYSICAL EXAM:  /71 (Patient Position: Sitting)   Ht 5' 5" (1.651 m)   Wt 88.7 kg (195 lb 8.8 oz)   LMP  (LMP Unknown)   BMI 32.54 kg/m²   Body mass index is 32.54 kg/m².     APPEARANCE: Well nourished, well developed, in no acute distress.  PSYCH: Appropriate mood and affect.  SKIN: No acne or hirsutism  NECK: Neck symmetric without masses or thyromegaly  NODES: No inguinal, axillary, or supraclavicular lymph node enlargement  CHEST: Normal respiratory effort.  ABDOMEN: Soft.  No tenderness or masses.   BREASTS: Symmetrical, {Blank single:15322::"well healed surgical scars","no visible skin lesions"}. {Blank single:27922::"Implants symmetric without defects noted","Lumpy bumpy feel to bilateral breasts","No palpable masses"}. No nipple discharge bilaterally.  PELVIC: Normal external genitalia without lesions.  Normal hair distribution.  Adequate perineal body, normal urethral meatus.  Vagina {Blank single:95854::"moist and smooth","moist and well rugated"}. Without lesions. {WITH-WITHOUT:05629}discharge.  {Blank single:75884::"Normal appearing vaginal cuff","Cervix pink, without lesions, discharge or tenderness"}.  No significant cystocele or rectocele.  Bimanual exam shows {Blank single:55242::"no midline or adnexal masses","uterus to be normal size, regular, mobile and nontender.  Adnexa without masses or tenderness"}.      Assessment/Plan: "     Encounter for gynecological examination    Encounter for screening for cervical cancer  -     Liquid-Based Pap Smear, Screening    Encounter for screening for human papillomavirus (HPV)  -     HPV High Risk Genotypes, PCR    Breast cancer screening by mammogram  -     Mammo Digital Screening Bilat w/ Heath; Future; Expected date: 10/16/2024      - pelvic exam normal  - pap/hpv due and collected, if normal can discontinue  - MMG due and ordered  - colon ca screening and other preventative care with PCP  - RTC 1 yr or sooner prn      Counseling:     Patient was counseled today on current ASCCP pap guidelines, the recommendation for yearly physical exams, safe driving habits, breast self awareness and annual mammograms. She is to see her PCP for other health maintenance.       Use of the [facility] Patient Portal discussed and encouraged during today's visit.

## 2024-10-07 ENCOUNTER — HOSPITAL ENCOUNTER (OUTPATIENT)
Dept: VASCULAR SURGERY | Facility: CLINIC | Age: 69
Discharge: HOME | End: 2024-10-07
Attending: SURGERY
Payer: MEDICARE

## 2024-10-07 ENCOUNTER — OFFICE VISIT (OUTPATIENT)
Dept: VASCULAR SURGERY | Facility: CLINIC | Age: 69
End: 2024-10-07
Attending: SURGERY
Payer: MEDICARE

## 2024-10-07 VITALS
HEIGHT: 65 IN | TEMPERATURE: 98 F | SYSTOLIC BLOOD PRESSURE: 122 MMHG | DIASTOLIC BLOOD PRESSURE: 61 MMHG | WEIGHT: 196.19 LBS | HEART RATE: 73 BPM | BODY MASS INDEX: 32.69 KG/M2

## 2024-10-07 DIAGNOSIS — I65.21 ASYMPTOMATIC STENOSIS OF RIGHT CAROTID ARTERY: Primary | ICD-10-CM

## 2024-10-07 DIAGNOSIS — I65.23 BILATERAL CAROTID ARTERY STENOSIS: ICD-10-CM

## 2024-10-07 PROCEDURE — 3044F HG A1C LEVEL LT 7.0%: CPT | Mod: CPTII,S$GLB,, | Performed by: SURGERY

## 2024-10-07 PROCEDURE — 99213 OFFICE O/P EST LOW 20 MIN: CPT | Mod: S$GLB,,, | Performed by: SURGERY

## 2024-10-07 PROCEDURE — 3008F BODY MASS INDEX DOCD: CPT | Mod: CPTII,S$GLB,, | Performed by: SURGERY

## 2024-10-07 PROCEDURE — 1101F PT FALLS ASSESS-DOCD LE1/YR: CPT | Mod: CPTII,S$GLB,, | Performed by: SURGERY

## 2024-10-07 PROCEDURE — 1126F AMNT PAIN NOTED NONE PRSNT: CPT | Mod: CPTII,S$GLB,, | Performed by: SURGERY

## 2024-10-07 PROCEDURE — 1159F MED LIST DOCD IN RCRD: CPT | Mod: CPTII,S$GLB,, | Performed by: SURGERY

## 2024-10-07 PROCEDURE — 3078F DIAST BP <80 MM HG: CPT | Mod: CPTII,S$GLB,, | Performed by: SURGERY

## 2024-10-07 PROCEDURE — 93880 EXTRACRANIAL BILAT STUDY: CPT | Mod: S$GLB,,, | Performed by: SURGERY

## 2024-10-07 PROCEDURE — 3060F POS MICROALBUMINURIA REV: CPT | Mod: CPTII,S$GLB,, | Performed by: SURGERY

## 2024-10-07 PROCEDURE — 99999 PR PBB SHADOW E&M-EST. PATIENT-LVL IV: CPT | Mod: PBBFAC,,, | Performed by: SURGERY

## 2024-10-07 PROCEDURE — 3066F NEPHROPATHY DOC TX: CPT | Mod: CPTII,S$GLB,, | Performed by: SURGERY

## 2024-10-07 PROCEDURE — 3288F FALL RISK ASSESSMENT DOCD: CPT | Mod: CPTII,S$GLB,, | Performed by: SURGERY

## 2024-10-07 PROCEDURE — 3074F SYST BP LT 130 MM HG: CPT | Mod: CPTII,S$GLB,, | Performed by: SURGERY

## 2024-10-07 NOTE — PROGRESS NOTES
"VASCULAR SURGERY NOTE    Patient ID: Diana Montenegro is a 69 y.o. female.    I. HISTORY     Chief Complaint:  carotid stenosis    HPI: Diana Montenegro is a 69 y.o. female who is here today for established patient appointment. I last saw her in August 2022 and at that time I wrote the following:    "Since her last appt she has not had significant changes. She was able to go on a cruise to the [facility] recently. She did not end up getting her ventral hernia repair because she did not feel like it would help. She had COVID a couple of weeks ago but is feeling much better now. She has continued taking her aspirin and statin as instructed."    Interval history 10/7/24: No recent changes. Patient reports feeling well. No recent headaches, loss of vision, difficulty with speech, facial droop, weakness. No recent history of MI or CVA. Still taking statin and Eliquis.     Family History: Reviewed. No history of aneurysm. + diabetes, + heart disease    Past Medical History:   Diagnosis Date    Arthritis     Atrial fibrillation, unspecified     hx of a fib prior to stroke.    Chronic back pain     Colon polyp     CVA (cerebral infarction) 07/16/2014    Degenerative disc disease     cervical; lumbar    Diabetes mellitus type II     Encounter for loop recorder at end of battery life 09/17/2018    Hyperlipidemia     Hypertension     Hypothyroidism     Mild nonproliferative diabetic retinopathy 08/12/2018    Obesity     Sleep apnea     Stroke 07/2014    Venous insufficiency (chronic) (peripheral)         Past Surgical History:   Procedure Laterality Date    BLOCK, NERVE, GENICULAR Right 8/21/2024    Procedure: Right diagnostic genicular block;  Surgeon: Perla Barrera DO;  Location: [facility] PAIN MANAGEMENT;  Service: Pain Management;  Laterality: Right;  oral sed  20 mins  Elquis/ASA ok    COLONOSCOPY N/A 08/30/2018    Procedure: COLONOSCOPY;  Surgeon: William Clement MD;  Location: [facility] ENDO ([address] [place] [address]);  Service: " Endoscopy;  Laterality: N/A;  Tito/Dr Rad Johnston/OK to hold 2 days prior to colon/see telephone encounter dated 7/24/18/pt has loop recorder/svn    COLONOSCOPY N/A 12/6/2023    Procedure: COLONOSCOPY;  Surgeon: Vinny Youssef MD;  Location: [facility] ENDO (4TH FLR);  Service: Colon and Rectal;  Laterality: N/A;  ref Green  eliquis   diabetic/ WL ozempic hold 7days prior    peg prep jm / loop recorder  ok to hold Eliquis 2 days per J Miguel NP/L Robert RN-GT  11/29-precall complete-pt verbalized understanding of holding Ozempic-MS    COLONOSCOPY W/ POLYPECTOMY      GASTRECTOMY      HERNIA REPAIR      RADIOFREQUENCY ABLATION, NERVE, GENICULAR, KNEE Right 9/25/2024    Procedure: Right genicular RFA;  Surgeon: Perla Barrera DO;  Location: [facility] PAIN MANAGEMENT;  Service: Pain Management;  Laterality: Right;  40 mins - Ozempic 7 days ASA/ Eliquis ok    REMOVAL OF IMPLANTABLE LOOP RECORDER N/A 09/17/2018    Procedure: REMOVAL, IMPLANTABLE LOOP RECORDER;  Surgeon: Thomas Randolph MD;  Location: [facility] CATH LAB;  Service: Cardiology;  Laterality: N/A;  TERESA, ILR removal (no reimplant), MDT, RN Sedate, SK, 3 Prep *Reveal LINQ*    TONSILLECTOMY      TUBAL LIGATION         Social History     Tobacco Use   Smoking Status Former    Current packs/day: 0.00    Average packs/day: 1 pack/day for 30.0 years (30.0 ttl pk-yrs)    Types: Cigarettes    Start date: 7/16/1984    Quit date: 7/16/2014    Years since quitting: 10.2   Smokeless Tobacco Never        Review of Systems   Constitutional: Negative for weight loss.   HENT:  Negative for ear pain and nosebleeds.    Eyes:  Negative for discharge and pain.   Cardiovascular:  Negative for chest pain and palpitations.   Respiratory:  Negative for cough, shortness of breath and wheezing.    Endocrine: Negative for cold intolerance, heat intolerance and polyphagia.   Hematologic/Lymphatic: Negative for adenopathy. Does not bruise/bleed easily.   Skin:  Negative for itching and rash.    Musculoskeletal:  Negative for joint swelling and muscle cramps.   Gastrointestinal:  Negative for abdominal pain, diarrhea, nausea and vomiting.   Genitourinary:  Negative for dysuria and flank pain.   Neurological:  Negative for numbness and seizures.             II. PHYSICAL EXAM     Physical Exam  Vitals reviewed.   Constitutional:       General: She is not in acute distress.     Appearance: Normal appearance. She is obese. She is not ill-appearing or diaphoretic.   HENT:      Head: Normocephalic and atraumatic.   Eyes:      General: No scleral icterus.        Right eye: No discharge.         Left eye: No discharge.      Extraocular Movements: Extraocular movements intact.      Conjunctiva/sclera: Conjunctivae normal.   Cardiovascular:      Rate and Rhythm: Normal rate and regular rhythm.      Pulses: Normal pulses.   Pulmonary:      Effort: Pulmonary effort is normal. No respiratory distress.   Musculoskeletal:         General: Normal range of motion.      Cervical back: Normal range of motion.      Right lower leg: No edema.      Left lower leg: No edema.   Skin:     General: Skin is warm and dry.   Neurological:      General: No focal deficit present.      Mental Status: She is alert and oriented to person, place, and time.   Psychiatric:         Mood and Affect: Mood normal.         Behavior: Behavior normal.         III. ASSESSMENT & PLAN (MEDICAL DECISION MAKING)     1. Asymptomatic stenosis of right carotid artery    2. Bilateral carotid artery stenosis        Imaging Results: (I have personally reviewed all below images and provided my interpretation below)   Carotid Duplex 10/7/24:  R L   CCA PSV: 70 cm/s  ICA PSV: 226 cm/s  ICA EDV: 68 cm/s  Vert: 56  ICA/CCA ratio: 3.2  Impression: 60-79% right ICA stenosis CCA PSV: 75 cm/s  ICA PSV: 114 cm/s  ICA EDV: 38 cm/s  Vert: 80  ICA/CCA ratio: 1.6  Impression: 40-59% left ICA stenosis         Carotid Duplex 10/2/23:  R L   CCA PSV: 72cm/s  ICA PSV:  205cm/s  ICA EDV: 60cm/s  Vert: antegrade  ICA/CCA ratio: 2.9  Impression: 60-79%  CCA PSV: 69cm/s  ICA PSV: 131cm/s  ICA EDV: 34cm/s  Vert: antegade  ICA/CCA ratio: 1.9  Impression: 40-60% stenosis           Assessment/Diagnosis and Plan:  69 y.o. female with asymptomatic moderate right carotid stenosis. Recommend continued best medical therapy for carotid stenosis with aspirin and rosuvastatin with blood pressure control for goal BP less than 140/90 and A1C goal <7.0. Instructed her that it is critical to continue ASA 81mg through her upcoming surgery.    -Eliquis for a-fib  -continue home ASA 81 mg  -continue home rosuvastatin  -Continue current home BP meds for goal BP less than 140/90  -f/u 1 yr for surveillance carotid duplex      JUNIE Vega II, MD, [facility]  Vascular Surgeon  [facility]

## 2024-10-09 LAB
FINAL PATHOLOGIC DIAGNOSIS: NORMAL
Lab: NORMAL

## 2024-10-10 LAB
HPV HR 12 DNA SPEC QL NAA+PROBE: NEGATIVE
HPV16 AG SPEC QL: NEGATIVE
HPV18 DNA SPEC QL NAA+PROBE: NEGATIVE

## 2024-10-16 ENCOUNTER — HOSPITAL ENCOUNTER (OUTPATIENT)
Dept: RADIOLOGY | Facility: HOSPITAL | Age: 69
Discharge: HOME | End: 2024-10-16
Attending: STUDENT IN AN ORGANIZED HEALTH CARE EDUCATION/TRAINING PROGRAM
Payer: MEDICARE

## 2024-10-16 VITALS — HEIGHT: 65 IN | BODY MASS INDEX: 32.65 KG/M2 | WEIGHT: 196 LBS

## 2024-10-16 DIAGNOSIS — Z12.31 BREAST CANCER SCREENING BY MAMMOGRAM: ICD-10-CM

## 2024-10-18 ENCOUNTER — HOSPITAL ENCOUNTER (OUTPATIENT)
Dept: RADIOLOGY | Facility: HOSPITAL | Age: 69
Discharge: HOME | End: 2024-10-18
Attending: STUDENT IN AN ORGANIZED HEALTH CARE EDUCATION/TRAINING PROGRAM
Payer: MEDICARE

## 2024-10-18 ENCOUNTER — DOCUMENTATION ONLY (OUTPATIENT)
Dept: REHABILITATION | Facility: HOSPITAL | Age: 69
End: 2024-10-18
Attending: PHYSICAL MEDICINE & REHABILITATION
Payer: MEDICARE

## 2024-10-18 VITALS — BODY MASS INDEX: 32.65 KG/M2 | HEIGHT: 65 IN | WEIGHT: 196 LBS

## 2024-10-18 PROCEDURE — 77063 BREAST TOMOSYNTHESIS BI: CPT | Mod: TC

## 2024-10-18 PROCEDURE — 77067 SCR MAMMO BI INCL CAD: CPT | Mod: TC

## 2024-10-18 PROCEDURE — 77067 SCR MAMMO BI INCL CAD: CPT | Mod: 26,,, | Performed by: RADIOLOGY

## 2024-10-18 PROCEDURE — 77063 BREAST TOMOSYNTHESIS BI: CPT | Mod: 26,,, | Performed by: RADIOLOGY

## 2024-10-21 ENCOUNTER — CLINICAL SUPPORT (OUTPATIENT)
Dept: OPHTHALMOLOGY | Facility: CLINIC | Age: 69
End: 2024-10-21
Payer: MEDICARE

## 2024-10-21 ENCOUNTER — OFFICE VISIT (OUTPATIENT)
Dept: OPHTHALMOLOGY | Facility: CLINIC | Age: 69
End: 2024-10-21
Payer: MEDICARE

## 2024-10-21 DIAGNOSIS — E11.36 DIABETIC CATARACT OF BOTH EYES: ICD-10-CM

## 2024-10-21 DIAGNOSIS — H04.123 BILATERAL DRY EYES: ICD-10-CM

## 2024-10-21 DIAGNOSIS — E11.3393 TYPE 2 DIABETES MELLITUS WITH BOTH EYES AFFECTED BY MODERATE NONPROLIFERATIVE RETINOPATHY WITHOUT MACULAR EDEMA, WITHOUT LONG-TERM CURRENT USE OF INSULIN: Primary | ICD-10-CM

## 2024-10-21 PROCEDURE — 1159F MED LIST DOCD IN RCRD: CPT | Mod: CPTII,S$GLB,, | Performed by: OPHTHALMOLOGY

## 2024-10-21 PROCEDURE — G2211 COMPLEX E/M VISIT ADD ON: HCPCS | Mod: S$GLB,,, | Performed by: OPHTHALMOLOGY

## 2024-10-21 PROCEDURE — 3066F NEPHROPATHY DOC TX: CPT | Mod: CPTII,S$GLB,, | Performed by: OPHTHALMOLOGY

## 2024-10-21 PROCEDURE — 3044F HG A1C LEVEL LT 7.0%: CPT | Mod: CPTII,S$GLB,, | Performed by: OPHTHALMOLOGY

## 2024-10-21 PROCEDURE — 99999 PR PBB SHADOW E&M-EST. PATIENT-LVL III: CPT | Mod: PBBFAC,,, | Performed by: OPHTHALMOLOGY

## 2024-10-21 PROCEDURE — 2022F DILAT RTA XM EVC RTNOPTHY: CPT | Mod: CPTII,S$GLB,, | Performed by: OPHTHALMOLOGY

## 2024-10-21 PROCEDURE — 3060F POS MICROALBUMINURIA REV: CPT | Mod: CPTII,S$GLB,, | Performed by: OPHTHALMOLOGY

## 2024-10-21 PROCEDURE — 3288F FALL RISK ASSESSMENT DOCD: CPT | Mod: CPTII,S$GLB,, | Performed by: OPHTHALMOLOGY

## 2024-10-21 PROCEDURE — 1160F RVW MEDS BY RX/DR IN RCRD: CPT | Mod: CPTII,S$GLB,, | Performed by: OPHTHALMOLOGY

## 2024-10-21 PROCEDURE — 1126F AMNT PAIN NOTED NONE PRSNT: CPT | Mod: CPTII,S$GLB,, | Performed by: OPHTHALMOLOGY

## 2024-10-21 PROCEDURE — 92134 CPTRZ OPH DX IMG PST SGM RTA: CPT | Mod: S$GLB,,, | Performed by: OPHTHALMOLOGY

## 2024-10-21 PROCEDURE — 1101F PT FALLS ASSESS-DOCD LE1/YR: CPT | Mod: CPTII,S$GLB,, | Performed by: OPHTHALMOLOGY

## 2024-10-21 PROCEDURE — 99213 OFFICE O/P EST LOW 20 MIN: CPT | Mod: S$GLB,,, | Performed by: OPHTHALMOLOGY

## 2024-10-21 NOTE — PROGRESS NOTES
Health  Wellness Visit Note    Name: Diana Montenegro  Clinic Number: [id number]  Physician: Rosalie Buchanan, *  Diagnosis: No diagnosis found.  Past Medical History:   Diagnosis Date    Arthritis     Atrial fibrillation, unspecified     hx of a fib prior to stroke.    Chronic back pain     Colon polyp     CVA (cerebral infarction) 07/16/2014    Degenerative disc disease     cervical; lumbar    Diabetes mellitus type II     Encounter for loop recorder at end of battery life 09/17/2018    Hyperlipidemia     Hypertension     Hypothyroidism     Mild nonproliferative diabetic retinopathy 08/12/2018    Obesity     Sleep apnea     Stroke 07/2014    Venous insufficiency (chronic) (peripheral)      Visit Number: 21  Precautions: Standard, Fall Risk, History of Stroke      1st PT visit:  07/16/2024  Year of care end date:  July 2025  Mindbody plan: 1F  Patient level: NP    Time In: 1:30 PM  Time Out: 1:42 PM  Total Treatment Time: 72 Minutes    Wellness [employer] 2022  Handout on this week's wellness topic Self-Assessment provided  along with a discussion on what it means, the benefits, and suggestions for practice.  Reviewed last week's topic of n/a (patient did not attend Wellness last week).    Subjective:   Patient reports she has low back, shoulder and knee pain that she manages at home. She is a member at [employer] and goes to the gym at least two times weekly. She enjoys the water-aerobics class and using the machines. Patient stretches daily. She does not ice at home.    Objective:   Diana completed therapeutic stretches (EIL, SAIDA) and the following MedX exercise machines: core lumbar, torso rotation l/r, leg extension, leg curl, upright row, chest press, biceps curl, triceps extension, leg press    See exercise log in patient folder for rate of exertion and repetitions completed.       Fitness Machine Education Key:  E=education on equipment initiated and further follow up and education  needed  I=independent with  and exercise.  The patient:  Adjusts machines to his/her settings  Uses equipment levers, pins, weights safely  Maintains safe and correct posture while exercising  Moves through exercise with correct pace and control  Gets on and off equipment safely      Lumbar/Cervical Ext.  Torso Rotation  Leg Press    Leg Extension  Seated Leg Curl  Chest Press    Seated Row  Hip ADD  Hip ABD    Triceps Extension  Bicep Curl  Other:      [x] Indicates exercise has been taught for home  Lumbar/Cervical Ext. [] Torso Rotation [] Leg Press []   Leg Extension [] Seated Leg Curl [] Chest Press []   Seated Row [] Hip ADD [] Hip ABD []   Triceps Extension [] Bicep Curl [] Other:        Assessment:   Patient tolerated Patient tolerated MedX Core Lumbar Strength and all other peripheral exercises without an increase in symptoms. Patient warmed up on nustep for 5 minutes, stretched, and iced low back for 5 minutes after the workout.     Plan:  Continue with established plan of care towards wellness goals.     Health  : Ann-Marie Bey  10/21/2024

## 2024-10-21 NOTE — PROGRESS NOTES
Oct macula done ou, per Dr Ni./MA        There are no diagnoses linked to this encounter.     69 y.o. y/o here for screening for Diabetic Renopathy with non-dilated fundus photos per Rad Johnston MD

## 2024-10-21 NOTE — PROGRESS NOTES
Subjective:       Patient ID: Diana Montenegro is a 69 y.o. female      Chief Complaint   Patient presents with    Follow-up     DR/DME, cataract f/u as instructed     History of Present Illness  HPI     Follow-up     Additional comments: DR/DME, cataract f/u as instructed           Comments    2 mo DFE/OCTM OU     DLS 08/29/2024 by Dr. Ronny Ni MD    PT C/O blurred Va OD>OS but stable OU    -diplopia  --eye pain  --flashes/floaters  --headaches  --curtain/shadow/veils    Eye Meds: Refresh Tears OU prn    POHx:   1. Type II DM OU w/ Mod. NPDR w/o Macular Edema    2. Diabetic  Cataract OU     3. Bilateral GERARDO OU           Last edited by Ronny Ni MD on 10/21/2024  2:14 PM.        Imaging:    See report    Assessment/Plan:     1. Type 2 diabetes mellitus with both eyes affected by moderate nonproliferative retinopathy without macular edema, without long-term current use of insulin  No ME today.  Pt reports h/o Ey for ME OD, last one April 2024  Will prior auth Ey in case needs in future    Diabetic Retinopathy discussed in detail, all questions answered  Stressed importance of good BS/BP/Chol Control  RTC immediately PRN any vision changes, graciela blurry vision, missing vision, floaters, distortions, etc    - Posterior Segment OCT Retina-Both eyes  - Prior authorization Order    2. Diabetic cataract of both eyes  Vis sig OD>>OS  Refer for cataract eval    3. Bilateral dry eyes  Can continue ATs OU    Visit today included increased complexity associated with the care of the episodic problem DR, cataracts, dry eyes addressed and managing the longitudinal care of the patient due to the serious and/or complex managed problem(s) DR, cataracts, dry eyes.      Follow up in about 3 months (around 1/21/2025), or if symptoms worsen or fail to improve, for Comprehensive Examination, OCT Mac.

## 2024-10-22 ENCOUNTER — DOCUMENTATION ONLY (OUTPATIENT)
Dept: REHABILITATION | Facility: HOSPITAL | Age: 69
End: 2024-10-22
Payer: MEDICARE

## 2024-10-22 ENCOUNTER — PATIENT MESSAGE (OUTPATIENT)
Dept: PAIN MEDICINE | Facility: CLINIC | Age: 69
End: 2024-10-22
Payer: MEDICARE

## 2024-10-22 NOTE — PROGRESS NOTES
Health  Wellness Visit Note    Name: Diana Montenegro  Clinic Number: [id number]  Physician: No ref. provider found  Diagnosis: No diagnosis found.  Past Medical History:   Diagnosis Date    Arthritis     Atrial fibrillation, unspecified     hx of a fib prior to stroke.    Chronic back pain     Colon polyp     CVA (cerebral infarction) 07/16/2014    Degenerative disc disease     cervical; lumbar    Diabetes mellitus type II     Encounter for loop recorder at end of battery life 09/17/2018    Hyperlipidemia     Hypertension     Hypothyroidism     Mild nonproliferative diabetic retinopathy 08/12/2018    Obesity     Sleep apnea     Stroke 07/2014    Venous insufficiency (chronic) (peripheral)      Visit Number: 22  Precautions: Standard, Fall Risk, History of Stroke      1st PT visit:  07/16/2024  Year of care end date:  July 2025  Mindbody plan: 60---9 Months  Patient level: C    Time In: 12:30 PM  Time Out: 1:35 PM  Total Treatment Time: 65 Minutes    [email or web address] 2022  Handout on this week's wellness topic Boundaries provided along with a discussion on what it means, the benefits, and suggestions for practice.  Reviewed last week's topic of Self-Assessment.     Subjective:   Patient reports no complaints of low back pain today. She is having some shoulder, hip and knee pain that may be from the weather change. Over the weekend she relaxed at home and went to the gym on Saturday evening. She plans to go to the gym again this week and take a few of [employer] classes at [facility] [employer] [employer]. Patient stretches daily and only ices when need be.    Objective:   Diana completed therapeutic stretches (EIL, SAIDA) and the following MedX exercise machines: core lumbar, torso rotation l/r, leg extension, leg curl, upright row, chest press, biceps curl, triceps extension, leg press    See exercise log in patient folder for rate of exertion and repetitions completed.       Fitness Machine Education Key:  E=education on  equipment initiated and further follow up and education needed  I=independent with  and exercise.  The patient:  Adjusts machines to his/her settings  Uses equipment levers, pins, weights safely  Maintains safe and correct posture while exercising  Moves through exercise with correct pace and control  Gets on and off equipment safely      Lumbar/Cervical Ext.  Torso Rotation  Leg Press    Leg Extension  Seated Leg Curl  Chest Press    Seated Row  Hip ADD  Hip ABD    Triceps Extension  Bicep Curl  Other:      [x] Indicates exercise has been taught for home  Lumbar/Cervical Ext. [] Torso Rotation [] Leg Press []   Leg Extension [] Seated Leg Curl [] Chest Press []   Seated Row [] Hip ADD [] Hip ABD []   Triceps Extension [] Bicep Curl [] Other:        Assessment:   Patient tolerated Patient tolerated MedX Core Lumbar Strength and all other peripheral exercises without an increase in symptoms. Patient warmed up on nustep for 5 minutes, stretched, and iced low back for 5 minutes after the workout.     Plan:  Continue with established plan of care towards wellness goals.     Health  : Ann-Marie Bey  10/22/2024

## 2024-10-23 ENCOUNTER — OFFICE VISIT (OUTPATIENT)
Dept: PAIN MEDICINE | Facility: CLINIC | Age: 69
End: 2024-10-23
Payer: MEDICARE

## 2024-10-23 VITALS
BODY MASS INDEX: 32.27 KG/M2 | DIASTOLIC BLOOD PRESSURE: 65 MMHG | HEART RATE: 69 BPM | HEIGHT: 65 IN | SYSTOLIC BLOOD PRESSURE: 121 MMHG | WEIGHT: 193.69 LBS

## 2024-10-23 DIAGNOSIS — G89.29 CHRONIC PAIN OF RIGHT KNEE: ICD-10-CM

## 2024-10-23 DIAGNOSIS — M25.561 CHRONIC PAIN OF RIGHT KNEE: ICD-10-CM

## 2024-10-23 DIAGNOSIS — Z78.0 MENOPAUSE: ICD-10-CM

## 2024-10-23 DIAGNOSIS — M17.11 PRIMARY OSTEOARTHRITIS OF RIGHT KNEE: Primary | ICD-10-CM

## 2024-10-23 DIAGNOSIS — G89.4 CHRONIC PAIN SYNDROME: ICD-10-CM

## 2024-10-23 PROCEDURE — 99999 PR PBB SHADOW E&M-EST. PATIENT-LVL IV: CPT | Mod: PBBFAC,,, | Performed by: NURSE PRACTITIONER

## 2024-10-23 PROCEDURE — 3066F NEPHROPATHY DOC TX: CPT | Mod: CPTII,S$GLB,, | Performed by: NURSE PRACTITIONER

## 2024-10-23 PROCEDURE — 3078F DIAST BP <80 MM HG: CPT | Mod: CPTII,S$GLB,, | Performed by: NURSE PRACTITIONER

## 2024-10-23 PROCEDURE — 3074F SYST BP LT 130 MM HG: CPT | Mod: CPTII,S$GLB,, | Performed by: NURSE PRACTITIONER

## 2024-10-23 PROCEDURE — 3060F POS MICROALBUMINURIA REV: CPT | Mod: CPTII,S$GLB,, | Performed by: NURSE PRACTITIONER

## 2024-10-23 PROCEDURE — 3008F BODY MASS INDEX DOCD: CPT | Mod: CPTII,S$GLB,, | Performed by: NURSE PRACTITIONER

## 2024-10-23 PROCEDURE — 1159F MED LIST DOCD IN RCRD: CPT | Mod: CPTII,S$GLB,, | Performed by: NURSE PRACTITIONER

## 2024-10-23 PROCEDURE — 99214 OFFICE O/P EST MOD 30 MIN: CPT | Mod: S$GLB,,, | Performed by: NURSE PRACTITIONER

## 2024-10-23 PROCEDURE — 1160F RVW MEDS BY RX/DR IN RCRD: CPT | Mod: CPTII,S$GLB,, | Performed by: NURSE PRACTITIONER

## 2024-10-23 PROCEDURE — 3044F HG A1C LEVEL LT 7.0%: CPT | Mod: CPTII,S$GLB,, | Performed by: NURSE PRACTITIONER

## 2024-10-23 PROCEDURE — 1125F AMNT PAIN NOTED PAIN PRSNT: CPT | Mod: CPTII,S$GLB,, | Performed by: NURSE PRACTITIONER

## 2024-10-23 NOTE — PROGRESS NOTES
[facility] -  Patient Evaluation    Referred by: Dr. Rad Johnston   Reason for referral: * No diagnoses found *     CC:   Chief Complaint   Patient presents with    Follow-up     SP     Osteoarthritis    Knee Pain     Right > Left          10/23/2024     1:42 PM 8/2/2024     1:21 PM   Last 3 PDI Scores   Pain Disability Index (PDI) 14 63         Interval Update 10/23/2024: Patient return to clinic SP right genicular RFA procedure done on 09/25/2024 with 100% relief initially and now her relief is 80-85%.  She is reporting improvement in her functionality which allows her to perform her ADLs.  She denies any new pain denies profound weakness denies any recent falls or trauma.    Subjective 08/02/2024:   Diana Montenegro is a 69 y.o. female who presents complaining of right knee pain.  Imaging was significant for severe degenerative changes at the bilateral medial knee compartment and bilateral femoral patellar joint.  She has been evaluated by Orthopedics who are assessing her candidacy for surgery.  She was previously undergone Synvisc injections and steroid injections which have become less and less efficacious.  Currently taking Tylenol Arthritis and using Voltaren gel.  Unable to take oral NSAIDs due to concurrent anticoagulant therapy.  She was referred here for additional pain management options.    Initial Pain Assessment:  Location: right knee  Onset: years  Current Pain Score: 4/10  Daily Pain of Range: 5-6/10  Quality: Aching, Throbbing, and Shooting  Radiation: N/A  Worsened by: standing for more than a few minutes and walking for more than a few minutes  Improved by: rest     Patient denies night fever/night sweats, urinary incontinence, bowel incontinence, significant weight loss, significant motor weakness, and loss of sensations.      Previous Interventions:  -10/23/2024 right genicular RFA 90%  -8/21/2024 Diagnostic Right Genicular Nerve Block 80%  - intra-articular  injections with steroid and Synvisc, no longer effective    Previous Therapies:  PT/OT: yes   Chiropractor:   HEP:   Relevant Surgery: no     Previous Medications:   - Tylenol or NSAIDS:   - Muscle Relaxants:    - TCAs:   - SNRIs:   - Topicals:  Voltaren gel  - Anticonvulsants:    - Opioids:   - Adjuvants:     Current Pain Medications:  Voltaren gel    Review of Systems:  ROS    GENERAL:  No weight loss, malaise or fevers.  HEENT:   No recent changes in vision or hearing  NECK:  No difficulty with swallowing. No stridor.   RESPIRATORY:  Negative for cough, wheezing or shortness of breath, patient denies any recent URI.  CARDIOVASCULAR:  Negative for chest pain, leg swelling or palpitations.  GI:  Negative for abdominal discomfort, blood in stools or black stools or change in bowel habits.  MUSCULOSKELETAL:  See HPI.  SKIN:  Negative for lesions, rash, and itching.  PSYCH:  No mood disorder or recent psychosocial stressors.    HEMATOLOGY/LYMPHOLOGY:  Negative for prolonged bleeding, bruising easily or swollen nodes.  Patient is not currently taking any anti-coagulants  NEURO:   No history of headaches, syncope, paralysis, seizures or tremors.  All other reviewed and negative other than HPI.    History:  Current medications, allergies, medical history, surgical history,   family history, and social history were reviewed in the chart as marked.    Full Medication List:    Current Outpatient Medications:     acyclovir 5% (ZOVIRAX) 5 % ointment, Apply topically 6 (six) times daily., Disp: 5 g, Rfl: 1    albuterol (VENTOLIN HFA) 90 mcg/actuation inhaler, Inhale 2 puffs into the lungs every 6 (six) hours as needed for Wheezing. Rescue, Disp: 7 g, Rfl: 3    albuterol-ipratropium 2.5mg-0.5mg/3mL (DUO-NEB) 0.5 mg-3 mg(2.5 mg base)/3 mL nebulizer solution, Take 3 mLs by nebulization every 6 (six) hours as needed for Wheezing. Rescue (Patient taking differently: Take 3 mLs by nebulization every 6 (six) hours as needed for  Wheezing. Rescue prn), Disp: 3 vial, Rfl: 3    apixaban (ELIQUIS) 5 mg Tab, Take 1 tablet (5 mg total) by mouth 2 (two) times daily., Disp: 180 tablet, Rfl: 3    aspirin (ECOTRIN) 81 MG EC tablet, Take 1 tablet (81 mg total) by mouth once daily., Disp: , Rfl:     b complex vitamins tablet, Take 1 tablet by mouth once daily., Disp: , Rfl:     blood pressure test kit-large Kit, Use as directed, Disp: 1 each, Rfl: 0    blood sugar diagnostic (FREESTYLE LITE STRIPS) Strp, USE 1 STRIP TO CHECK GLUCOSE TWICE DAILY, Disp: 200 each, Rfl: 3    blood-glucose meter Misc, One touch verio flex or tone touch verio reflect or freestyle freedom lite meter (all covered by insurance), Disp: 1 each, Rfl: 0    CALCIUM CITRATE/VITAMIN D3 (CALCIUM CITRATE + D ORAL), Take 1 tablet by mouth every morning., Disp: , Rfl:     cetirizine (ZYRTEC) 5 MG tablet, Take 5 mg by mouth once daily., Disp: , Rfl:     ciclopirox (PENLAC) 8 % Soln, Apply topically nightly., Disp: 6.6 mL, Rfl: 11    cinnamon bark (CINNAMON ORAL), Take by mouth 2 (two) times daily., Disp: , Rfl:     cyanocobalamin, vitamin B-12, 500 mcg Subl, Place 1 tablet under the tongue once daily., Disp: , Rfl:     diclofenac sodium (VOLTAREN) 1 % Gel, APPLY 2 GRAMS TOPICALLY THREE TIMES DAILY, Disp: 200 g, Rfl: 4    diphenhydrAMINE-aluminum-magnesium hydroxide-simethicone-LIDOcaine viscous HCl 2%, Swish and spit 15 mLs every 4 (four) hours as needed (sore throat)., Disp: 120 each, Rfl: 0    docusate sodium (COLACE) 100 MG capsule, Take 100 mg by mouth once daily. , Disp: , Rfl:     EYLEA 2 mg/0.05 mL Syrg, , Disp: , Rfl:     fish oil-omega-3 fatty acids 300-1,000 mg capsule, Take 1 capsule by mouth once daily. Take 1 cap 2 times daily every other day, Disp: , Rfl:     fluticasone (FLONASE) 50 mcg/actuation nasal spray, 2 sprays (100 mcg total) by Each Nare route once daily., Disp: 1 Bottle, Rfl: 0    glimepiride (AMARYL) 2 MG tablet, Take 1 tablet (2 mg total) by mouth before  breakfast. For diabetes control., Disp: 90 tablet, Rfl: 0    lancets Misc, Needs lancets for testing twice daily.  To go with meter covered by insurance., Disp: 200 each, Rfl: 3    levothyroxine (SYNTHROID) 88 MCG tablet, Take 1 tablet (88 mcg total) by mouth before breakfast., Disp: 30 tablet, Rfl: 6    LORazepam (ATIVAN) 1 MG tablet, Take 1 tablet by mouth twice daily as needed for anxiety, Disp: 60 tablet, Rfl: 0    metFORMIN (GLUCOPHAGE) 1000 MG tablet, TAKE 1 TABLET BY MOUTH TWICE DAILY WITH MEALS, Disp: 180 tablet, Rfl: 1    metoprolol tartrate (LOPRESSOR) 25 MG tablet, Take 1 tablet (25 mg total) by mouth once as needed (Palpitations). For palpitations (Patient not taking: Reported on 9/18/2024), Disp: 1 tablet, Rfl: 3    multivitamin capsule, Take 1 capsule by mouth once daily., Disp: , Rfl:     mupirocin (BACTROBAN) 2 % ointment, Apply to affected area 3 times daily, Disp: 22 g, Rfl: 1    omeprazole (PRILOSEC) 40 MG capsule, Take 1 capsule (40 mg total) by mouth every morning., Disp: 90 capsule, Rfl: 3    promethazine-dextromethorphan (PROMETHAZINE-DM) 6.25-15 mg/5 mL Syrp, Take one tsp po q 6 hrs prn cough, Disp: 180 mL, Rfl: 0    rosuvastatin (CRESTOR) 40 MG Tab, Take 1 tablet by mouth once daily, Disp: 90 tablet, Rfl: 0    RUTIN/HESP/BIOFLAV/C/HERB#196 (BIOFLEX ORAL), Take 2 tablets by mouth once daily., Disp: , Rfl:     semaglutide (OZEMPIC) 2 mg/dose (8 mg/3 mL) PnIj, Inject 2 mg into the skin every 7 days., Disp: 9 mL, Rfl: 1    senna (SENNA) 8.6 mg tablet, Take 2 tablets by mouth nightly as needed for Constipation., Disp: 100 tablet, Rfl: 3    traZODone (DESYREL) 50 MG tablet, TAKE 1 TABLET BY MOUTH NIGHTLY AS NEEDED FOR  INSOMNIA  (OKAY  TO  TAKE  2ND  TABLET  BY  MOUTH  IN  30  MINUTES  IF  STILL  AWAKE), Disp: 180 tablet, Rfl: 3    valsartan-hydrochlorothiazide (DIOVAN-HCT) 80-12.5 mg per tablet, Take 1 tablet by mouth once daily., Disp: 90 tablet, Rfl: 3    walker (ULTRA-LIGHT ROLLATOR) Misc, Use  "daily for assisted ambulation., Disp: 1 each, Rfl: 0  No current facility-administered medications for this visit.    Facility-Administered Medications Ordered in Other Visits:     0.9%  NaCl infusion, , Intravenous, Continuous, Khadijah Rivera, NP, 1,000 mL at 09/17/18 0939     Allergies:  Medrol [methylprednisolone]     Medical History:   has a past medical history of Arthritis, Atrial fibrillation, unspecified, Chronic back pain, Colon polyp, CVA (cerebral infarction) (07/16/2014), Degenerative disc disease, Diabetes mellitus type II, Encounter for loop recorder at end of battery life (09/17/2018), Hyperlipidemia, Hypertension, Hypothyroidism, Mild nonproliferative diabetic retinopathy (08/12/2018), Obesity, Sleep apnea, Stroke (07/2014), and Venous insufficiency (chronic) (peripheral).    Surgical History:   has a past surgical history that includes Tubal ligation; Tonsillectomy; Hernia repair; Gastrectomy; Colonoscopy w/ polypectomy; Colonoscopy (N/A, 08/30/2018); Removal of implantable loop recorder (N/A, 09/17/2018); Colonoscopy (N/A, 12/6/2023); block, nerve, genicular (Right, 8/21/2024); and radiofrequency ablation, nerve, genicular, knee (Right, 9/25/2024).    Family History:  family history includes Diabetes in her maternal grandfather; Heart disease in her father; No Known Problems in her maternal grandmother, mother, paternal grandfather, sister, and sister; Obesity in her maternal grandfather; Stroke in her paternal grandmother.    Social History:   reports that she quit smoking about 10 years ago. Her smoking use included cigarettes. She started smoking about 40 years ago. She has a 30 pack-year smoking history. She has never used smokeless tobacco. She reports that she does not currently use alcohol. She reports that she does not use drugs.    Physical Exam:  Vitals:    10/23/24 1343   BP: 121/65   Pulse: 69   Weight: 87.9 kg (193 lb 10.8 oz)   Height: 5' 5" (1.651 m)   PainSc:   2 "         GENERAL: Well appearing, in no acute distress, alert and oriented x3.  PSYCH:  Mood and affect appropriate.  SKIN: Skin color, texture, turgor normal, no rashes or lesions.  HEAD/FACE:  Normocephalic, atraumatic. Cranial nerves grossly intact.  NECK: Normal ROM. Supple.   CV: RRR with palpation of the radial artery.  PULM: No evidence of respiratory difficulty, symmetric chest rise.  GI:  Soft and non-distended.  MSK:   + pain with palpation of the right knee joint lines.  Pain with passive range of motion.  No obvious deformities, edema, or skin discoloration.  No atrophy or tone abnormalities are noted.   NEURO: Bilateral upper and lower extremity coordination and strength is symmetric.  No loss of sensation is noted.  MENTAL STATUS: A x O x 3, good concentration, speech is fluent and goal directed  MOTOR: 5/5 in bilateral lower extremity muscle groups  GAIT:  Pain with ambulation.  Antalgic.  Ambulates unassisted.    Imaging 07/23/24:  X-ray Knee Ortho Bilateral with Flexion  Order: [id number]  Status: Final result       Visible to patient: Yes (not seen)       Next appt: 08/06/2024 at 02:00 PM in Outpatient Rehab (Henrietta Rodriguez, PT)       Dx: Acute pain of both knees    0 Result Notes  Details    Reading Physician Reading Date Result Priority   Yuki Mancilla MD  [phone] 7/23/2024 Routine     Narrative & Impression  EXAMINATION:  XR KNEE ORTHO BILAT WITH FLEXION     CLINICAL HISTORY:  Pain in right knee     TECHNIQUE:  AP standing of both knees, PA flexion standing views of both knees, and Merchant views of both knees were performed.  Lateral views of both knees were also performed.     COMPARISON:  03/02/2024     FINDINGS:  Right knee: Severe medial knee compartment joint space narrowing.  Large osteophyte at the medial and lateral knee margin.  No acute fracture, no osseous lesions.  Severe lateral femoral patellar joint space narrowing and large osteophyte at the femoral patellar joint.   No joint effusion.  Extensive vascular calcifications seen.  The soft tissues appear normal.     Left knee: Severe medial knee compartment joint space narrowing.  Large osteophyte at the medial knee margin.  No acute fracture, no osseous lesions.  Severe lateral femoral patellar joint space narrowing and large osteophyte at the femoral patellar joint.  Trace of joint fluid.  Extensive vascular calcification.  The soft tissues appear normal.     Impression:     Severe degenerative changes at the bilateral medial knee compartment and bilateral femoral patellar joint.        Electronically signed by:Yuki Mancilla MD  Date:                                            07/23/2024  Time:                                           16:08           Exam Ended: 07/23/24 14:54 CDT Last Resulted: 07/23/24 16:08 CDT             Labs:  BMP  Lab Results   Component Value Date     07/30/2024    K 4.8 07/30/2024     07/30/2024    CO2 20 (L) 07/30/2024    BUN 42 (H) 07/30/2024    CREATININE 1.4 07/30/2024    CALCIUM 10.0 07/30/2024    ANIONGAP 11 07/30/2024    EGFRNORACEVR 41.0 (A) 07/30/2024     Lab Results   Component Value Date    ALT 94 (H) 07/30/2024    AST 51 (H) 07/30/2024    ALKPHOS 81 07/30/2024    BILITOT 0.2 07/30/2024     Lab Results   Component Value Date    WBC 4.56 09/18/2024    HGB 8.3 (L) 09/18/2024    HCT 27.9 (L) 09/18/2024    MCV 98 09/18/2024     09/18/2024           Assessment:  Problem List Items Addressed This Visit    None        08/02/2024 - Diana Montenegro is a 69 y.o. female who  has a past medical history of Arthritis, Atrial fibrillation, unspecified, Chronic back pain, Colon polyp, CVA (cerebral infarction) (07/16/2014), Degenerative disc disease, Diabetes mellitus type II, Encounter for loop recorder at end of battery life (09/17/2018), Hyperlipidemia, Hypertension, Hypothyroidism, Mild nonproliferative diabetic retinopathy (08/12/2018), Obesity, Sleep apnea, Stroke  (07/2014), and Venous insufficiency (chronic) (peripheral).  By history and examination this patient has chronic chronic right knee pain secondary to severe degenerative joint disease.  Pathology is confirmed by imaging.  We discussed the underlying diagnoses and multiple treatment options including non-opioid medications, interventional procedures, physical therapy, and home exercise.  She was currently being assessed for candidacy for a knee replacement.  Previous injections with steroid and Synvisc have become less efficacious.  Would recommend genicular nerve blocks/RFA.  The risks and benefits of each treatment option were discussed and all questions were answered.      [birth year]15-bjll-old female with a history of chronic right knee pain secondary to severe degenerative joint disease.  Pathology has been confirmed upon review of imaging.  Provided a right genicular RFA that provided her 90 % relief overall and improvement in her functionality we discussed the underlying diagnosis of multiple treatment options including medications, interventional procedures continue physical therapy and home exercise.  Plan agreed upon below.  Of note I did review her left knee x-ray which also shows severe degenerative joint disease and tricompartmental narrowing secondary to osteoarthritis.    Treatment Plan:   Procedures:  None at this time.  PT/OT/HEP: I have stressed the importance of physical activity and a home exercise plan to help with pain and improve health.  She was completed physical therapy and is compliant with a home exercise regimen.  Medications:    - Tylenol and Voltaren gel topical p.r.n.   -  Reviewed and consistent with medication use as prescribed.  Imaging:  Imaging was reviewed and discussed with the patient.  Follow Up:  3-4 months or sooner if needed.    URBAN Norris  [facility]    Disclaimer: This note was partly generated using dictation software which may occasionally  result in transcription errors.

## 2024-10-30 ENCOUNTER — PATIENT MESSAGE (OUTPATIENT)
Dept: REHABILITATION | Facility: HOSPITAL | Age: 69
End: 2024-10-30

## 2024-10-30 ENCOUNTER — DOCUMENTATION ONLY (OUTPATIENT)
Dept: REHABILITATION | Facility: HOSPITAL | Age: 69
End: 2024-10-30
Payer: MEDICARE

## 2024-10-31 ENCOUNTER — PATIENT MESSAGE (OUTPATIENT)
Dept: REHABILITATION | Facility: HOSPITAL | Age: 69
End: 2024-10-31
Payer: MEDICARE

## 2024-11-02 DIAGNOSIS — I48.0 PAROXYSMAL ATRIAL FIBRILLATION: Chronic | ICD-10-CM

## 2024-11-04 ENCOUNTER — TELEPHONE (OUTPATIENT)
Dept: ADMINISTRATIVE | Facility: CLINIC | Age: 69
End: 2024-11-04
Payer: MEDICARE

## 2024-11-04 ENCOUNTER — PATIENT MESSAGE (OUTPATIENT)
Dept: ADMINISTRATIVE | Facility: CLINIC | Age: 69
End: 2024-11-04
Payer: MEDICARE

## 2024-11-04 RX ORDER — APIXABAN 5 MG/1
5 TABLET, FILM COATED ORAL 2 TIMES DAILY
Qty: 180 TABLET | Refills: 3 | Status: SHIPPED | OUTPATIENT
Start: 2024-11-04

## 2024-11-05 ENCOUNTER — OFFICE VISIT (OUTPATIENT)
Dept: OPHTHALMOLOGY | Facility: CLINIC | Age: 69
End: 2024-11-05
Payer: MEDICARE

## 2024-11-05 DIAGNOSIS — H25.12 NUCLEAR SCLEROTIC CATARACT OF LEFT EYE: ICD-10-CM

## 2024-11-05 DIAGNOSIS — E11.3311 TYPE 2 DIABETES MELLITUS WITH RIGHT EYE AFFECTED BY MODERATE NONPROLIFERATIVE RETINOPATHY AND MACULAR EDEMA, WITHOUT LONG-TERM CURRENT USE OF INSULIN: ICD-10-CM

## 2024-11-05 DIAGNOSIS — H25.11 NUCLEAR SCLEROTIC CATARACT OF RIGHT EYE: Primary | ICD-10-CM

## 2024-11-05 DIAGNOSIS — E11.3393 TYPE 2 DIABETES MELLITUS WITH BOTH EYES AFFECTED BY MODERATE NONPROLIFERATIVE RETINOPATHY WITHOUT MACULAR EDEMA, WITHOUT LONG-TERM CURRENT USE OF INSULIN: ICD-10-CM

## 2024-11-05 PROCEDURE — 92136 OPHTHALMIC BIOMETRY: CPT | Mod: RT,S$GLB,, | Performed by: OPHTHALMOLOGY

## 2024-11-05 PROCEDURE — 3288F FALL RISK ASSESSMENT DOCD: CPT | Mod: CPTII,S$GLB,, | Performed by: OPHTHALMOLOGY

## 2024-11-05 PROCEDURE — 3044F HG A1C LEVEL LT 7.0%: CPT | Mod: CPTII,S$GLB,, | Performed by: OPHTHALMOLOGY

## 2024-11-05 PROCEDURE — 1126F AMNT PAIN NOTED NONE PRSNT: CPT | Mod: CPTII,S$GLB,, | Performed by: OPHTHALMOLOGY

## 2024-11-05 PROCEDURE — 3066F NEPHROPATHY DOC TX: CPT | Mod: CPTII,S$GLB,, | Performed by: OPHTHALMOLOGY

## 2024-11-05 PROCEDURE — 99999 PR PBB SHADOW E&M-EST. PATIENT-LVL III: CPT | Mod: PBBFAC,,, | Performed by: OPHTHALMOLOGY

## 2024-11-05 PROCEDURE — 99214 OFFICE O/P EST MOD 30 MIN: CPT | Mod: S$GLB,,, | Performed by: OPHTHALMOLOGY

## 2024-11-05 PROCEDURE — 2022F DILAT RTA XM EVC RTNOPTHY: CPT | Mod: CPTII,S$GLB,, | Performed by: OPHTHALMOLOGY

## 2024-11-05 PROCEDURE — 92134 CPTRZ OPH DX IMG PST SGM RTA: CPT | Mod: S$GLB,,, | Performed by: OPHTHALMOLOGY

## 2024-11-05 PROCEDURE — 1159F MED LIST DOCD IN RCRD: CPT | Mod: CPTII,S$GLB,, | Performed by: OPHTHALMOLOGY

## 2024-11-05 PROCEDURE — 1101F PT FALLS ASSESS-DOCD LE1/YR: CPT | Mod: CPTII,S$GLB,, | Performed by: OPHTHALMOLOGY

## 2024-11-05 PROCEDURE — 3060F POS MICROALBUMINURIA REV: CPT | Mod: CPTII,S$GLB,, | Performed by: OPHTHALMOLOGY

## 2024-11-05 RX ORDER — PREDNISOLONE/MOXIFLOX/BROMFEN 1 %-0.5 %
1 SUSPENSION, DROPS(FINAL DOSAGE FORM)(ML) OPHTHALMIC (EYE) 3 TIMES DAILY
Qty: 5 ML | Refills: 3 | Status: SHIPPED | OUTPATIENT
Start: 2024-11-05

## 2024-11-05 RX ORDER — LEVOTHYROXINE SODIUM 88 UG/1
88 TABLET ORAL
Qty: 90 TABLET | Refills: 2 | Status: SHIPPED | OUTPATIENT
Start: 2024-11-05 | End: 2025-11-05

## 2024-11-05 NOTE — TELEPHONE ENCOUNTER
Care Due:                  Date            Visit Type   Department     Provider  --------------------------------------------------------------------------------                                EP -                              PRIMARY      [facility] INTERNAL  Last Visit: 06-      CARE ([facility])   MEDICINE       Radtarah Johnston                              EP -                              PRIMARY      [facility] INTERNAL  Next Visit: 11-      [facility] ([facility])   MEDICINE       Radkelle Johnston                                                            Last  Test          Frequency    Reason                     Performed    Due Date  --------------------------------------------------------------------------------    Lipid Panel.  12 months..  rosuvastatin.............  09- 09-    [employer] Embedded Care Due Messages. Reference number: [id number].   11/05/2024 12:00:49 PM CST

## 2024-11-05 NOTE — PROGRESS NOTES
HPI    Dr. Ni    NPDR OU  DM2-PATIENT CURRENTLY TAKING GLP-1 AGONIST: Semaglutide (Ozempic, Rybelsus   oral)   GERARDO OU  Cataracts OU    Patient here for cataract evaluation. Patient states switched care from   Dr. Melgar to Dr. Ni. Was told she may need cataract surgery. OD   vision has gotten blurrier. No glare complaints.  Last edited by Cathi Horta MA on 11/5/2024  2:20 PM.            Assessment /Plan     For exam results, see Encounter Report.    Nuclear sclerotic cataract of right eye  -     IOL Master - OD - Right Eye    Nuclear sclerotic cataract of left eye    Type 2 diabetes mellitus with both eyes affected by moderate nonproliferative retinopathy without macular edema, without long-term current use of insulin  -     Posterior Segment OCT Retina-Both eyes    Type 2 diabetes mellitus with right eye affected by moderate nonproliferative retinopathy and macular edema, without long-term current use of insulin  -     Posterior Segment OCT Retina-Both eyes      Visually significant nuclear sclerotic cataract    - Cataracts are interfering with activities of daily living, including night time driving.  - Pt desires cataract surgery for visual rehabilitation.   - Risks / benefits/ alternatives were discussed and patient agrees to proceed with surgery.   - IOL options discussed as well, according to patient's goals and concomitant ocular pathology.  - Target: plano.      DIBOO 21.5 OD    DIBOO 21.0  OS - when ready    Type 2 diabetes mellitus with both eyes affected by moderate nonproliferative retinopathy without macular edema, without long-term current use of insulin  No ME today.  Pt reports h/o Ey for ME OD, last one April 2024     F/up TERRENCE as scheduled.

## 2024-11-06 ENCOUNTER — OFFICE VISIT (OUTPATIENT)
Dept: INTERNAL MEDICINE | Facility: CLINIC | Age: 69
End: 2024-11-06
Payer: MEDICARE

## 2024-11-06 ENCOUNTER — TELEPHONE (OUTPATIENT)
Dept: ADMINISTRATIVE | Facility: CLINIC | Age: 69
End: 2024-11-06
Payer: MEDICARE

## 2024-11-06 VITALS
BODY MASS INDEX: 32.29 KG/M2 | HEIGHT: 65 IN | SYSTOLIC BLOOD PRESSURE: 123 MMHG | HEART RATE: 66 BPM | DIASTOLIC BLOOD PRESSURE: 70 MMHG | WEIGHT: 193.81 LBS

## 2024-11-06 DIAGNOSIS — I70.0 AORTIC CALCIFICATION: ICD-10-CM

## 2024-11-06 DIAGNOSIS — Z74.09 IMPAIRED MOBILITY AND ADLS: ICD-10-CM

## 2024-11-06 DIAGNOSIS — E66.811 CLASS 1 OBESITY DUE TO EXCESS CALORIES WITH SERIOUS COMORBIDITY AND BODY MASS INDEX (BMI) OF 32.0 TO 32.9 IN ADULT: ICD-10-CM

## 2024-11-06 DIAGNOSIS — J42 CHRONIC BRONCHITIS, UNSPECIFIED CHRONIC BRONCHITIS TYPE: ICD-10-CM

## 2024-11-06 DIAGNOSIS — Z78.9 IMPAIRED MOBILITY AND ADLS: ICD-10-CM

## 2024-11-06 DIAGNOSIS — E78.5 HYPERLIPIDEMIA ASSOCIATED WITH TYPE 2 DIABETES MELLITUS: ICD-10-CM

## 2024-11-06 DIAGNOSIS — E11.59 HYPERTENSION ASSOCIATED WITH DIABETES: ICD-10-CM

## 2024-11-06 DIAGNOSIS — E11.69 HYPERLIPIDEMIA ASSOCIATED WITH TYPE 2 DIABETES MELLITUS: ICD-10-CM

## 2024-11-06 DIAGNOSIS — E03.9 ACQUIRED HYPOTHYROIDISM: ICD-10-CM

## 2024-11-06 DIAGNOSIS — E11.3311 TYPE 2 DIABETES MELLITUS WITH RIGHT EYE AFFECTED BY MODERATE NONPROLIFERATIVE RETINOPATHY AND MACULAR EDEMA, WITHOUT LONG-TERM CURRENT USE OF INSULIN: ICD-10-CM

## 2024-11-06 DIAGNOSIS — E66.09 CLASS 1 OBESITY DUE TO EXCESS CALORIES WITH SERIOUS COMORBIDITY AND BODY MASS INDEX (BMI) OF 32.0 TO 32.9 IN ADULT: ICD-10-CM

## 2024-11-06 DIAGNOSIS — Z00.00 ENCOUNTER FOR PREVENTIVE HEALTH EXAMINATION: Primary | ICD-10-CM

## 2024-11-06 DIAGNOSIS — I48.0 PAROXYSMAL ATRIAL FIBRILLATION: Chronic | ICD-10-CM

## 2024-11-06 DIAGNOSIS — N18.32 STAGE 3B CHRONIC KIDNEY DISEASE: ICD-10-CM

## 2024-11-06 DIAGNOSIS — Z99.89 DEPENDENCE ON OTHER ENABLING MACHINES AND DEVICES: ICD-10-CM

## 2024-11-06 DIAGNOSIS — I15.2 HYPERTENSION ASSOCIATED WITH DIABETES: ICD-10-CM

## 2024-11-06 PROBLEM — N18.31 CHRONIC KIDNEY DISEASE, STAGE 3A: Status: RESOLVED | Noted: 2023-06-01 | Resolved: 2024-11-06

## 2024-11-06 NOTE — PROGRESS NOTES
The patient location is: [place]  The chief complaint leading to consultation is:  Medicare AWV    Visit type: audiovisual    Face to Face time with patient:  30 min  45  minutes of total time spent on the encounter, which includes face to face time and non-face to face time preparing to see the patient (eg, review of tests), Obtaining and/or reviewing separately obtained history, Documenting clinical information in the electronic or other health record, Independently interpreting results (not separately reported) and communicating results to the patient/family/caregiver, or Care coordination (not separately reported).         Each patient to whom he or she provides medical services by telemedicine is:  (1) informed of the relationship between the physician and patient and the respective role of any other health care provider with respect to management of the patient; and (2) notified that he or she may decline to receive medical services by telemedicine and may withdraw from such care at any time.    Notes:       Diana Montenegro presented for a  Medicare AWV and comprehensive Health Risk Assessment today. The following components were reviewed and updated:    Medical history  Family History  Social history  Allergies and Current Medications  Health Risk Assessment  Health Maintenance  Care Team         ** See Completed Assessments for Annual Wellness Visit within the encounter summary.**         The following assessments were completed:  Living Situation  CAGE  Depression Screening  Fall Risk Assessment (MACH 10)  Hearing Assessment(HHI)  Cognitive Function Screening  Nutrition Screening  ADL Screening  PAQ Screening  Has urine leakage ever interrupted your daily activites or sleep? Yes  Do you think you could use some help to better manage urine leakage?No     Opioid documentation:      Patient does not have a current opioid prescription.        Vitals:    11/06/24 1059   BP: 123/70   Pulse: 66   Weight: 87.9 kg  "(193 lb 12.6 oz)   Height: 5' 5" (1.651 m)     Body mass index is 32.25 kg/m².  Physical Exam  Constitutional:       General: She is not in acute distress.     Appearance: Normal appearance. She is obese. She is not ill-appearing, toxic-appearing or diaphoretic.   Pulmonary:      Effort: Pulmonary effort is normal.   Neurological:      Mental Status: She is alert and oriented to person, place, and time.   Psychiatric:         Mood and Affect: Mood normal.         Behavior: Behavior normal.               Diagnoses and health risks identified today and associated recommendations/orders:    1. Encounter for preventive health examination  Screenings performed, as noted above.  Personal preventative testing needs reviewed.      2. Stage 3b chronic kidney disease  Monitored, stable, last GFR 41, follow up with pcp    3. Dependence on other enabling machines and devices  Uses walker or cane at times, exercising in pool    4. Paroxysmal atrial fibrillation  Treated with asa, Eliquis, stable, cont tx    5. Hypertension associated with diabetes  Treated with metoprolol, Diovan, stable, cont tx    6. Hyperlipidemia associated with type 2 diabetes mellitus  Treated with Crestor, fish oil, stable, cont tx    7. Type 2 diabetes mellitus with right eye affected by moderate nonproliferative retinopathy and macular edema, without long-term current use of insulin  Treated with Ozempic, glimepride, Metformin, stable, cont tx, last Hga1c 6.5    8. Acquired hypothyroidism  Treated with levothyroxine, stable, cont tx    9. Class 1 obesity due to excess calories with serious comorbidity and body mass index (BMI) of 32.0 to 32.9 in adult  Monitored, stable, enc to continue exercising, heart healthy diet    10. Impaired mobility and ADLs  Uses cane and walker, exercises regularly    11. Aortic calcification  Monitored, stable, on a statin, cont tx    12. Chronic bronchitis, unspecified chronic bronchitis type  Treated with inhalers, stable " cont tx    Discussed vaccines      Provided Diana with a 5-10 year written screening schedule and personal prevention plan. Recommendations were developed using the USPSTF age appropriate recommendations. Education, counseling, and referrals were provided as needed. After Visit Summary printed and given to patient which includes a list of additional screenings\tests needed.    No follow-ups on file.    Edgar Coronel, NP  I offered to discuss advanced care planning, including how to pick a person who would make decisions for you if you were unable to make them for yourself, called a health care power of , and what kind of decisions you might make such as use of life sustaining treatments such as ventilators and tube feeding when faced with a life limiting illness recorded on a living will that they will need to know. (How you want to be cared for as you near the end of your natural life)     X Patient is interested in learning more about how to make advanced directives.  I provided them paperwork and offered to discuss this with them.   no

## 2024-11-06 NOTE — PATIENT INSTRUCTIONS
Counseling and Referral of Other Preventative  (Italic type indicates deductible and co-insurance are waived)    Patient Name: Diana Montenegro  Today's Date: 11/6/2024    Health Maintenance       Date Due Completion Date    Shingles Vaccine (1 of 2) Never done ---    Influenza Vaccine (1) 09/01/2024 9/28/2023    COVID-19 Vaccine (10 - 2024-25 season) 09/01/2024 2/5/2024    DEXA Scan 09/17/2024 9/17/2021    Lipid Panel 09/26/2024 9/26/2023    Hemoglobin A1c 12/10/2024 6/10/2024    Foot Exam 03/07/2025 3/7/2024    Override on 8/14/2019: Done    Override on 10/10/2017: Done    LDCT Lung Screen 06/07/2025 6/7/2024    Diabetes Urine Screening 06/10/2025 6/10/2024    Mammogram 10/18/2025 10/18/2024    High Dose Statin 11/05/2025 11/5/2024    Eye Exam 11/05/2025 11/5/2024    Override on 11/4/2020: Done    Override on 8/23/2019: Done    Override on 3/28/2017: Done    TETANUS VACCINE 11/06/2025 11/6/2015    Colorectal Cancer Screening 12/06/2026 9/27/2024        No orders of the defined types were placed in this encounter.    The following information is provided to all patients.  This information is to help you find resources for any of the problems found today that may be affecting your health:                  Living healthy guide: www.[facility].[place].gov      Understanding Diabetes: www.diabetes.org      Eating healthy: www.cdc.gov/healthyweight      CDC home safety checklist: www.cdc.gov/steadi/patient.html      Agency on Aging: www.goea.[place].gov      Alcoholics anonymous (AA): www.aa.org      Physical Activity: www.shanthi.nih.gov/mw6nqyd      Tobacco use: www.quitwithusla.org

## 2024-11-06 NOTE — TELEPHONE ENCOUNTER
Provider Staff:  Action required for this patient    Requires labs      Please see care gap opportunities below in Care Due Message.    Thanks!  [facility]     Appointments      Date Provider   Last Visit   6/17/2024 Rad Johnston MD   Next Visit   11/21/2024 Rad Johnston MD     Refill Decision Note   Diana Montenegro  is requesting a refill authorization.  Brief Assessment and Rationale for Refill:  Approve     Medication Therapy Plan:        Comments:     Note composed:7:46 PM 11/05/2024

## 2024-11-07 ENCOUNTER — DOCUMENTATION ONLY (OUTPATIENT)
Dept: REHABILITATION | Facility: HOSPITAL | Age: 69
End: 2024-11-07
Payer: MEDICARE

## 2024-11-07 NOTE — PROGRESS NOTES
Health  Wellness Visit Note    Name: Diana Montenegro  Clinic Number: [id number]  Physician: No ref. provider found  Diagnosis: No diagnosis found.  Past Medical History:   Diagnosis Date    Arthritis     Atrial fibrillation, unspecified     hx of a fib prior to stroke.    Chronic back pain     Chronic kidney disease, stage 3a 06/01/2023    Colon polyp     CVA (cerebral infarction) 07/16/2014    Degenerative disc disease     cervical; lumbar    Diabetes mellitus type II     Encounter for loop recorder at end of battery life 09/17/2018    Hyperlipidemia     Hypertension     Hypothyroidism     Mild nonproliferative diabetic retinopathy 08/12/2018    Obesity     Sleep apnea     Stroke 07/2014    Venous insufficiency (chronic) (peripheral)      Visit Number: 24  Precautions: Standard, Fall Risk, History of Stroke      1st PT visit:  07/16/2024  Year of care end date:  July 2025  Mindbody plan: 60---9 Months  Patient level: C    Time In: 1:30 PM  Time Out: 2:25 PM  Total Treatment Time: 55 Minutes    [employer] 2022  Handout on this week's wellness topic Sleep Requirements provided along with a discussion on what it means, the benefits, and suggestions for practice.  Reviewed last week's topic of Non-Violent Communication.     Subjective:   Patient reports no change from her last Wellness session. She has low back pain, knee pain and hip pain that she manages daily. She went to the gym Monday and Wednesday of this week. She did the machines and swam in the pool. Patient stretches daily and only ices when need be.    HC and patient discussed full range of motion on leg press before increasing weight.     Objective:   Diana completed therapeutic stretches (EIL, SAIDA) and the following MedX exercise machines: core lumbar, torso rotation l/r, leg extension, leg curl, upright row, chest press, biceps curl, triceps extension, leg press    See exercise log in patient folder for rate of exertion and repetitions  completed.       Fitness Machine Education Key:  E=education on equipment initiated and further follow up and education needed  I=independent with  and exercise.  The patient:  Adjusts machines to his/her settings  Uses equipment levers, pins, weights safely  Maintains safe and correct posture while exercising  Moves through exercise with correct pace and control  Gets on and off equipment safely      Lumbar/Cervical Ext. E Torso Rotation E Leg Press    Leg Extension  Seated Leg Curl  Chest Press    Seated Row  Hip ADD  Hip ABD    Triceps Extension  Bicep Curl  Other:      [x] Indicates exercise has been taught for home  Lumbar/Cervical Ext. [] Torso Rotation [] Leg Press []   Leg Extension [] Seated Leg Curl [] Chest Press []   Seated Row [] Hip ADD [] Hip ABD []   Triceps Extension [] Bicep Curl [] Other:        Assessment:   Patient tolerated Patient tolerated MedX Core Lumbar Strength and all other peripheral exercises without an increase in symptoms. Patient warmed up on nustep for 5 minutes, stretched, and iced low back for 5 minutes after the workout.     Plan:  Continue with established plan of care towards wellness goals.     Health  : Ann-Marie Bey  11/7/2024

## 2024-11-11 ENCOUNTER — TELEPHONE (OUTPATIENT)
Dept: OBSTETRICS AND GYNECOLOGY | Facility: CLINIC | Age: 69
End: 2024-11-11
Payer: MEDICARE

## 2024-11-12 ENCOUNTER — HOSPITAL ENCOUNTER (OUTPATIENT)
Dept: RADIOLOGY | Facility: HOSPITAL | Age: 69
Discharge: HOME | End: 2024-11-12
Attending: INTERNAL MEDICINE
Payer: MEDICARE

## 2024-11-12 ENCOUNTER — PATIENT MESSAGE (OUTPATIENT)
Dept: OPHTHALMOLOGY | Facility: CLINIC | Age: 69
End: 2024-11-12
Payer: MEDICARE

## 2024-11-12 ENCOUNTER — PATIENT MESSAGE (OUTPATIENT)
Dept: INTERNAL MEDICINE | Facility: CLINIC | Age: 69
End: 2024-11-12
Payer: MEDICARE

## 2024-11-12 DIAGNOSIS — Z78.0 MENOPAUSE: ICD-10-CM

## 2024-11-12 PROCEDURE — 77080 DXA BONE DENSITY AXIAL: CPT | Mod: TC

## 2024-11-14 ENCOUNTER — HOSPITAL ENCOUNTER (OUTPATIENT)
Dept: RADIOLOGY | Facility: HOSPITAL | Age: 69
Discharge: HOME | End: 2024-11-14
Payer: MEDICARE

## 2024-11-14 ENCOUNTER — DOCUMENTATION ONLY (OUTPATIENT)
Dept: REHABILITATION | Facility: HOSPITAL | Age: 69
End: 2024-11-14
Payer: MEDICARE

## 2024-11-14 ENCOUNTER — OFFICE VISIT (OUTPATIENT)
Dept: INTERNAL MEDICINE | Facility: CLINIC | Age: 69
End: 2024-11-14
Payer: MEDICARE

## 2024-11-14 VITALS
HEIGHT: 65 IN | WEIGHT: 191.81 LBS | DIASTOLIC BLOOD PRESSURE: 68 MMHG | BODY MASS INDEX: 31.96 KG/M2 | TEMPERATURE: 97 F | RESPIRATION RATE: 20 BRPM | SYSTOLIC BLOOD PRESSURE: 120 MMHG | OXYGEN SATURATION: 97 % | HEART RATE: 81 BPM

## 2024-11-14 DIAGNOSIS — M25.519 NECK AND SHOULDER PAIN: ICD-10-CM

## 2024-11-14 DIAGNOSIS — M54.2 NECK AND SHOULDER PAIN: Primary | ICD-10-CM

## 2024-11-14 DIAGNOSIS — M25.519 NECK AND SHOULDER PAIN: Primary | ICD-10-CM

## 2024-11-14 DIAGNOSIS — M54.2 NECK AND SHOULDER PAIN: ICD-10-CM

## 2024-11-14 PROCEDURE — 3008F BODY MASS INDEX DOCD: CPT | Mod: CPTII,GC,S$GLB,

## 2024-11-14 PROCEDURE — 72052 X-RAY EXAM NECK SPINE 6/>VWS: CPT | Mod: 26,,, | Performed by: RADIOLOGY

## 2024-11-14 PROCEDURE — 1101F PT FALLS ASSESS-DOCD LE1/YR: CPT | Mod: CPTII,GC,S$GLB,

## 2024-11-14 PROCEDURE — 72052 X-RAY EXAM NECK SPINE 6/>VWS: CPT | Mod: TC,PO

## 2024-11-14 PROCEDURE — 99213 OFFICE O/P EST LOW 20 MIN: CPT | Mod: GC,S$GLB,,

## 2024-11-14 PROCEDURE — 3051F HG A1C>EQUAL 7.0%<8.0%: CPT | Mod: CPTII,GC,S$GLB,

## 2024-11-14 PROCEDURE — 3060F POS MICROALBUMINURIA REV: CPT | Mod: CPTII,GC,S$GLB,

## 2024-11-14 PROCEDURE — 99999 PR PBB SHADOW E&M-EST. PATIENT-LVL V: CPT | Mod: PBBFAC,GC,,

## 2024-11-14 PROCEDURE — 1125F AMNT PAIN NOTED PAIN PRSNT: CPT | Mod: CPTII,GC,S$GLB,

## 2024-11-14 PROCEDURE — 3074F SYST BP LT 130 MM HG: CPT | Mod: CPTII,GC,S$GLB,

## 2024-11-14 PROCEDURE — 3066F NEPHROPATHY DOC TX: CPT | Mod: CPTII,GC,S$GLB,

## 2024-11-14 PROCEDURE — 3078F DIAST BP <80 MM HG: CPT | Mod: CPTII,GC,S$GLB,

## 2024-11-14 PROCEDURE — 3288F FALL RISK ASSESSMENT DOCD: CPT | Mod: CPTII,GC,S$GLB,

## 2024-11-14 PROCEDURE — 1159F MED LIST DOCD IN RCRD: CPT | Mod: CPTII,GC,S$GLB,

## 2024-11-14 NOTE — PROGRESS NOTES
Subjective     Chief Complaint: Neck and shoulder pain  x 5 days    History of Present Illness:  Ms. Diana Montenegro is a 69 y.o. female with hx of Degenerative disc disease, TIA and stroke, Chronic bronchitis, paroxysmal atrial fibrillation hyperlipidemia, hypertension, hypothyropidism, coronary artery disease, arthritis, chronic back pain, frequent falls.  She presents to the clinic with complaints of next neck and shoulder pain that began approximately 5 days ago. She describes the pain as dull aggravated by left-sided amputation of the left with is severely of 8/10, relieved by Iceyheat and warm compress.There is no hx suggestive of any  recent trauma but she recently started a new exercise routine and has had pain intermittently through the past 2 months, but following her exercise class 5 days ago, she woke up with this episode. As at today the intensity is about 6/10 at its worst .  There is no numbness, tingling or weakness in the arm or hand. No headaches, dizziness or visual changes.  This has not affected her daily activities and she has no weight loss or night sweats.  Ms. Montenegro smoked heavily with a 60 pack year history, and quit in 2014 following her stroke.   She is currently on Asprin, Eliquis,  Vit B12, Levothyroxine, Metformin, Rosuvastatin, Semaglutide, Omeprazole, Valsartan-HCT     Review of Systems   Constitutional:  Negative for chills, fever, malaise/fatigue and weight loss.   HENT:  Negative for hearing loss and tinnitus.    Respiratory:  Negative for cough.    Cardiovascular:  Negative for chest pain and palpitations.   Musculoskeletal:  Positive for myalgias and neck pain.   Neurological:  Negative for dizziness and headaches.       PAST HISTORY:     Past Medical History:   Diagnosis Date    Arthritis     Atrial fibrillation, unspecified     hx of a fib prior to stroke.    Chronic back pain     Chronic kidney disease, stage 3a 06/01/2023    Colon polyp     CVA (cerebral infarction)  07/16/2014    Degenerative disc disease     cervical; lumbar    Diabetes mellitus type II     Encounter for loop recorder at end of battery life 09/17/2018    Hyperlipidemia     Hypertension     Hypothyroidism     Mild nonproliferative diabetic retinopathy 08/12/2018    Obesity     Sleep apnea     Stroke 07/2014    Venous insufficiency (chronic) (peripheral)        Past Surgical History:   Procedure Laterality Date    BLOCK, NERVE, GENICULAR Right 8/21/2024    Procedure: Right diagnostic genicular block;  Surgeon: Perla Barrera DO;  Location: [facility] PAIN MANAGEMENT;  Service: Pain Management;  Laterality: Right;  oral sed  20 mins  Elquis/ASA ok    COLONOSCOPY N/A 08/30/2018    Procedure: COLONOSCOPY;  Surgeon: William Clement MD;  Location: [facility] ENDO (4TH FLR);  Service: Endoscopy;  Laterality: N/A;  Eliaileen/Dr Leroy Green/OK to hold 2 days prior to colon/see telephone encounter dated 7/24/18/pt has loop recorder/svn    COLONOSCOPY N/A 12/6/2023    Procedure: COLONOSCOPY;  Surgeon: Vinny Youssef MD;  Location: [facility] (4TH FLR);  Service: Colon and Rectal;  Laterality: N/A;  ref Green  eliquis   diabetic/ [facility] ozempic hold 7days prior    peg prep jm / loop recorder  ok to hold Eliquis 2 days per KHANH Rivera NP/MADISON Jones RN-GT  11/29-precall complete-pt verbalized understanding of holding Ozempic-MS    COLONOSCOPY W/ POLYPECTOMY      GASTRECTOMY      HERNIA REPAIR      RADIOFREQUENCY ABLATION, NERVE, GENICULAR, KNEE Right 9/25/2024    Procedure: Right genicular RFA;  Surgeon: Perla Barrera DO;  Location: [facility] PAIN MANAGEMENT;  Service: Pain Management;  Laterality: Right;  40 mins - Ozempic 7 days ASA/ Eliquis ok    REMOVAL OF IMPLANTABLE LOOP RECORDER N/A 09/17/2018    Procedure: REMOVAL, IMPLANTABLE LOOP RECORDER;  Surgeon: Thomas Randolph MD;  Location: [facility] CATH LAB;  Service: Cardiology;  Laterality: N/A;  TERESA, ILR removal (no reimplant), STACY, RN Sedate, SK, 3 Prep *Reveal LINQ*    TONSILLECTOMY       TUBAL LIGATION         Family History   Problem Relation Name Age of Onset    No Known Problems Mother      Heart disease Father      No Known Problems Sister      No Known Problems Sister      No Known Problems Maternal Grandmother      Diabetes Maternal Grandfather      Obesity Maternal Grandfather      Stroke Paternal Grandmother      No Known Problems Paternal Grandfather      Heart attack Neg Hx      Cirrhosis Neg Hx         Social History     Socioeconomic History    Marital status: Single   Occupational History    Occupation: MOS      Employer: [employer]   Tobacco Use    Smoking status: Former     Current packs/day: 0.00     Average packs/day: 1 pack/day for 30.0 years (30.0 ttl pk-yrs)     Types: Cigarettes     Start date: 7/16/1984     Quit date: 7/16/2014     Years since quitting: 10.3    Smokeless tobacco: Never   Substance and Sexual Activity    Alcohol use: Not Currently    Drug use: No     Comment: thc at young age    Sexual activity: Not Currently     Partners: Male     Social Drivers of Health     Financial Resource Strain: Low Risk  (11/6/2024)    Overall Financial Resource Strain (CARDIA)     Difficulty of Paying Living Expenses: Not very hard   Food Insecurity: No Food Insecurity (11/6/2024)    Hunger Vital Sign     Worried About Running Out of Food in the Last Year: Never true     Ran Out of Food in the Last Year: Never true   Transportation Needs: No Transportation Needs (11/6/2024)    PRAPARE - Transportation     Lack of Transportation (Medical): No     Lack of Transportation (Non-Medical): No   Physical Activity: Sufficiently Active (11/6/2024)    Exercise Vital Sign     Days of Exercise per Week: 5 days     Minutes of Exercise per Session: 90 min   Stress: Stress Concern Present (11/6/2024)    [place] [employer] - Occupational Stress Questionnaire     Feeling of Stress : To some extent   Housing Stability: Low Risk  (11/6/2024)    Housing  Stability Vital Sign     Unable to Pay for Housing in the Last Year: No     Homeless in the Last Year: No       MEDICATIONS & ALLERGIES:     Current Outpatient Medications on File Prior to Visit   Medication Sig    acyclovir 5% (ZOVIRAX) 5 % ointment Apply topically 6 (six) times daily.    albuterol (VENTOLIN HFA) 90 mcg/actuation inhaler Inhale 2 puffs into the lungs every 6 (six) hours as needed for Wheezing. Rescue    aspirin (ECOTRIN) 81 MG EC tablet Take 1 tablet (81 mg total) by mouth once daily.    b complex vitamins tablet Take 1 tablet by mouth once daily.    blood pressure test kit-large Kit Use as directed    blood sugar diagnostic (FREESTYLE LITE STRIPS) Strp USE 1 STRIP TO CHECK GLUCOSE TWICE DAILY    blood-glucose meter Misc One touch verio flex or tone touch verio reflect or freestyle freedom lite meter (all covered by insurance)    CALCIUM CITRATE/VITAMIN D3 (CALCIUM CITRATE + D ORAL) Take 1 tablet by mouth every morning.    cetirizine (ZYRTEC) 5 MG tablet Take 5 mg by mouth once daily.    ciclopirox (PENLAC) 8 % Soln Apply topically nightly.    cinnamon bark (CINNAMON ORAL) Take by mouth 2 (two) times daily.    cyanocobalamin, vitamin B-12, 500 mcg Subl Place 1 tablet under the tongue once daily.    diclofenac sodium (VOLTAREN) 1 % Gel APPLY 2 GRAMS TOPICALLY THREE TIMES DAILY    docusate sodium (COLACE) 100 MG capsule Take 100 mg by mouth once daily.     ELIQUIS 5 mg Tab Take 1 tablet by mouth twice daily    EYLEA 2 mg/0.05 mL Syrg     fish oil-omega-3 fatty acids 300-1,000 mg capsule Take 1 capsule by mouth once daily. Take 1 cap 2 times daily every other day    fluticasone (FLONASE) 50 mcg/actuation nasal spray 2 sprays (100 mcg total) by Each Nare route once daily.    glimepiride (AMARYL) 2 MG tablet Take 1 tablet (2 mg total) by mouth before breakfast. For diabetes control.    lancets Misc Needs lancets for testing twice daily.  To go with meter covered by insurance.    levothyroxine  (SYNTHROID) 88 MCG tablet Take 1 tablet (88 mcg total) by mouth before breakfast.    LORazepam (ATIVAN) 1 MG tablet Take 1 tablet by mouth twice daily as needed for anxiety    metFORMIN (GLUCOPHAGE) 1000 MG tablet TAKE 1 TABLET BY MOUTH TWICE DAILY WITH MEALS    multivitamin capsule Take 1 capsule by mouth once daily.    mupirocin (BACTROBAN) 2 % ointment Apply to affected area 3 times daily    omeprazole (PRILOSEC) 40 MG capsule Take 1 capsule (40 mg total) by mouth every morning.    prednisoLONE-moxiflox-bromfen 1-0.5-0.075 % DrpS Apply 1 drop to eye 3 (three) times daily.    promethazine-dextromethorphan (PROMETHAZINE-DM) 6.25-15 mg/5 mL Syrp Take one tsp po q 6 hrs prn cough    rosuvastatin (CRESTOR) 40 MG Tab Take 1 tablet by mouth once daily    RUTIN/HESP/BIOFLAV/C/HERB#196 (BIOFLEX ORAL) Take 2 tablets by mouth once daily.    semaglutide (OZEMPIC) 2 mg/dose (8 mg/3 mL) PnIj Inject 2 mg into the skin every 7 days.    senna (SENNA) 8.6 mg tablet Take 2 tablets by mouth nightly as needed for Constipation.    traZODone (DESYREL) 50 MG tablet TAKE 1 TABLET BY MOUTH NIGHTLY AS NEEDED FOR  INSOMNIA  (OKAY  TO  TAKE  2ND  TABLET  BY  MOUTH  IN  30  MINUTES  IF  STILL  AWAKE)    valsartan-hydrochlorothiazide (DIOVAN-HCT) 80-12.5 mg per tablet Take 1 tablet by mouth once daily.    walker (ULTRA-LIGHT ROLLATOR) Misc Use daily for assisted ambulation.    albuterol-ipratropium 2.5mg-0.5mg/3mL (DUO-NEB) 0.5 mg-3 mg(2.5 mg base)/3 mL nebulizer solution Take 3 mLs by nebulization every 6 (six) hours as needed for Wheezing. Rescue (Patient taking differently: Take 3 mLs by nebulization every 6 (six) hours as needed for Wheezing. Rescue prn)    metoprolol tartrate (LOPRESSOR) 25 MG tablet Take 1 tablet (25 mg total) by mouth once as needed (Palpitations). For palpitations (Patient not taking: Reported on 9/18/2024)     No current facility-administered medications on file prior to visit.       Review of patient's allergies  "indicates:   Allergen Reactions    Medrol [methylprednisolone] Palpitations       OBJECTIVE:     Vital Signs:  Vitals:    11/14/24 1419   BP: 120/68   BP Location: Left arm   Patient Position: Sitting   Pulse: 81   Resp: 20   Temp: 97 °F (36.1 °C)   TempSrc: Temporal   SpO2: 97%   Weight: 87 kg (191 lb 12.8 oz)   Height: 5' 5" (1.651 m)       Body mass index is 31.92 kg/m².     Physical Exam  Constitutional:       Appearance: She is obese.   HENT:      Head: Normocephalic.      Right Ear: External ear normal.      Left Ear: External ear normal.   Neck:     Cardiovascular:      Rate and Rhythm: Normal rate.   Abdominal:      Palpations: Abdomen is soft.   Musculoskeletal:      Right shoulder: No swelling, deformity, effusion, laceration or tenderness. Normal range of motion. Normal strength.      Left shoulder: Tenderness (Lateral inserion of the Trapezius muscle) present. No swelling, deformity, effusion or laceration. Normal range of motion. Normal strength.        Arms:       Cervical back: Neck supple. Tenderness present. No edema, erythema, rigidity or torticollis. Muscular tenderness present.   Lymphadenopathy:      Cervical: No cervical adenopathy.   Skin:     Coloration: Skin is not jaundiced or pale.   Neurological:      Mental Status: She is alert.      Sensory: No sensory deficit.      Motor: No weakness.   Psychiatric:         Mood and Affect: Mood normal.         Behavior: Behavior normal.         Laboratory  No results found for this or any previous visit (from the past 24 hours).    Diagnostic Results:      Health Maintenance Due   Topic Date Due    Shingles Vaccine (1 of 2) Never done    COVID-19 Vaccine (10 - 2024-25 season) 09/01/2024    DEXA Scan  09/17/2024    Lipid Panel  09/26/2024    Hemoglobin A1c  12/10/2024         ASSESSMENT & PLAN:   Patient has had intermittent shoulder pain through the past 2 months, but following her exercise class 5 days ago, she woke up 4 days ago with this episode. " As at 4 days ago, it was at a severity of 8/10, but is at 6/10 today. She got transient relief with Icyheat.      PLAN:  -  Cervical spine Xray- Rule out any bone disintegration and possible radiculopathy  -  Diclofenac+Lidocaine cream to be applied once daily and as needed for exercise sessions.    RTC to see Dr. Johnston in 1/52    Discussed with Dr. Donnelly - staff attestation to follow      Clif Lemus MD  Internal Medicine PGY-1  [facility]  [address] [place] [address]  [phone]

## 2024-11-14 NOTE — PROGRESS NOTES
Health  Wellness Visit Note    Name: Diana Montenegro  Clinic Number: [id number]  Physician: No ref. provider found  Diagnosis: No diagnosis found.  Past Medical History:   Diagnosis Date    Arthritis     Atrial fibrillation, unspecified     hx of a fib prior to stroke.    Chronic back pain     Chronic kidney disease, stage 3a 06/01/2023    Colon polyp     CVA (cerebral infarction) 07/16/2014    Degenerative disc disease     cervical; lumbar    Diabetes mellitus type II     Encounter for loop recorder at end of battery life 09/17/2018    Hyperlipidemia     Hypertension     Hypothyroidism     Mild nonproliferative diabetic retinopathy 08/12/2018    Obesity     Sleep apnea     Stroke 07/2014    Venous insufficiency (chronic) (peripheral)      Visit Number: 25  Precautions: Standard, Fall Risk, History of Stroke      1st PT visit:  07/16/2024  Year of care end date:  July 2025  Mindbody plan: 60---9 Months  Patient level: C    Time In: 12:30 PM  Time Out: 1:30 PM  Total Treatment Time: 60 Minutes    [employer] 2022  Handout on this week's wellness topic Sleep Positions provided along with a discussion on what it means, the benefits, and suggestions for practice.  Reviewed last week's topic of Sleep Requirements.     Subjective:   Patient reports to Wellness with a lot of neck pain. Patient's pain began on Sunday. She believes it stems from the [facility]. She noticed on Saturday after using the equipment, she felt a pain in her neck instantly. She has not been to the gym or pool since. She sees her MD following today's appointment. Patient iced and applied heat almost daily this week.     HC and patient discussed full range of motion on leg press before increasing weight.     Objective:   Diana completed therapeutic stretches (EIL, SAIDA) and the following MedX exercise machines: core lumbar, torso rotation l/r, leg extension, leg curl, upright row, chest press, biceps curl,  triceps extension, leg press    See exercise log in patient folder for rate of exertion and repetitions completed.       Fitness Machine Education Key:  E=education on equipment initiated and further follow up and education needed  I=independent with  and exercise.  The patient:  Adjusts machines to his/her settings  Uses equipment levers, pins, weights safely  Maintains safe and correct posture while exercising  Moves through exercise with correct pace and control  Gets on and off equipment safely      Lumbar/Cervical Ext. E Torso Rotation E Leg Press    Leg Extension  Seated Leg Curl  Chest Press    Seated Row  Hip ADD  Hip ABD    Triceps Extension  Bicep Curl  Other:      [x] Indicates exercise has been taught for home  Lumbar/Cervical Ext. [] Torso Rotation [] Leg Press []   Leg Extension [] Seated Leg Curl [] Chest Press []   Seated Row [] Hip ADD [] Hip ABD []   Triceps Extension [] Bicep Curl [] Other:        Assessment:   Patient tolerated Patient tolerated MedX Core Lumbar Strength and all other peripheral exercises without an increase in symptoms. Patient warmed up on nustep for 5 minutes, stretched, and iced low back for 5 minutes after the workout.     Plan:  Continue with established plan of care towards wellness goals.     Health  : Ann-Marie Bey  11/14/2024

## 2024-11-20 ENCOUNTER — TELEPHONE (OUTPATIENT)
Dept: INTERNAL MEDICINE | Facility: CLINIC | Age: 69
End: 2024-11-20
Payer: MEDICARE

## 2024-11-20 NOTE — TELEPHONE ENCOUNTER
----- Message from Clif Lemus sent at 11/19/2024 10:25 PM CST -----  Good day, Ms. Montenegro.  I hope this message meets you well. Your cervical Xray is in and showed some narrowed vertebral segments, which could  have been a factor in the symptoms you  are having. I would like to encourage conservative management with Physical therapy as we suggested, which you were open to and had a session in July. This will likely give symptomatic relief to your neck and shoulder region. The topical anti-inflammatory cream should helps as well. Kindly let me know if you have any questions.

## 2024-11-21 ENCOUNTER — OFFICE VISIT (OUTPATIENT)
Dept: INTERNAL MEDICINE | Facility: CLINIC | Age: 69
End: 2024-11-21
Payer: MEDICARE

## 2024-11-21 VITALS
DIASTOLIC BLOOD PRESSURE: 60 MMHG | WEIGHT: 191.81 LBS | HEART RATE: 64 BPM | SYSTOLIC BLOOD PRESSURE: 130 MMHG | HEIGHT: 65 IN | BODY MASS INDEX: 31.96 KG/M2 | TEMPERATURE: 97 F | RESPIRATION RATE: 16 BRPM

## 2024-11-21 DIAGNOSIS — R26.89 IMPAIRMENT OF BALANCE: ICD-10-CM

## 2024-11-21 DIAGNOSIS — N18.32 STAGE 3B CHRONIC KIDNEY DISEASE: ICD-10-CM

## 2024-11-21 DIAGNOSIS — I48.0 PAROXYSMAL ATRIAL FIBRILLATION: ICD-10-CM

## 2024-11-21 DIAGNOSIS — E11.40 TYPE 2 DIABETES MELLITUS WITH DIABETIC NEUROPATHY, WITHOUT LONG-TERM CURRENT USE OF INSULIN: Primary | ICD-10-CM

## 2024-11-21 DIAGNOSIS — E78.5 HYPERLIPIDEMIA ASSOCIATED WITH TYPE 2 DIABETES MELLITUS: ICD-10-CM

## 2024-11-21 DIAGNOSIS — E11.59 HYPERTENSION ASSOCIATED WITH DIABETES: ICD-10-CM

## 2024-11-21 DIAGNOSIS — Z79.01 CURRENT USE OF LONG TERM ANTICOAGULATION: ICD-10-CM

## 2024-11-21 DIAGNOSIS — M54.2 NECK PAIN: ICD-10-CM

## 2024-11-21 DIAGNOSIS — Z78.9 IMPAIRED MOBILITY AND ADLS: ICD-10-CM

## 2024-11-21 DIAGNOSIS — I15.2 HYPERTENSION ASSOCIATED WITH DIABETES: ICD-10-CM

## 2024-11-21 DIAGNOSIS — E11.69 HYPERLIPIDEMIA ASSOCIATED WITH TYPE 2 DIABETES MELLITUS: ICD-10-CM

## 2024-11-21 DIAGNOSIS — Z74.09 IMPAIRED MOBILITY AND ADLS: ICD-10-CM

## 2024-11-21 DIAGNOSIS — E03.9 ACQUIRED HYPOTHYROIDISM: ICD-10-CM

## 2024-11-21 PROCEDURE — 99999 PR PBB SHADOW E&M-EST. PATIENT-LVL V: CPT | Mod: PBBFAC,,, | Performed by: INTERNAL MEDICINE

## 2024-11-21 PROCEDURE — 1159F MED LIST DOCD IN RCRD: CPT | Mod: CPTII,S$GLB,, | Performed by: INTERNAL MEDICINE

## 2024-11-21 PROCEDURE — 99214 OFFICE O/P EST MOD 30 MIN: CPT | Mod: S$GLB,,, | Performed by: INTERNAL MEDICINE

## 2024-11-21 PROCEDURE — 3075F SYST BP GE 130 - 139MM HG: CPT | Mod: CPTII,S$GLB,, | Performed by: INTERNAL MEDICINE

## 2024-11-21 PROCEDURE — 3288F FALL RISK ASSESSMENT DOCD: CPT | Mod: CPTII,S$GLB,, | Performed by: INTERNAL MEDICINE

## 2024-11-21 PROCEDURE — 3060F POS MICROALBUMINURIA REV: CPT | Mod: CPTII,S$GLB,, | Performed by: INTERNAL MEDICINE

## 2024-11-21 PROCEDURE — 3051F HG A1C>EQUAL 7.0%<8.0%: CPT | Mod: CPTII,S$GLB,, | Performed by: INTERNAL MEDICINE

## 2024-11-21 PROCEDURE — 3066F NEPHROPATHY DOC TX: CPT | Mod: CPTII,S$GLB,, | Performed by: INTERNAL MEDICINE

## 2024-11-21 PROCEDURE — G2211 COMPLEX E/M VISIT ADD ON: HCPCS | Mod: S$GLB,,, | Performed by: INTERNAL MEDICINE

## 2024-11-21 PROCEDURE — 3008F BODY MASS INDEX DOCD: CPT | Mod: CPTII,S$GLB,, | Performed by: INTERNAL MEDICINE

## 2024-11-21 PROCEDURE — 1125F AMNT PAIN NOTED PAIN PRSNT: CPT | Mod: CPTII,S$GLB,, | Performed by: INTERNAL MEDICINE

## 2024-11-21 PROCEDURE — 1160F RVW MEDS BY RX/DR IN RCRD: CPT | Mod: CPTII,S$GLB,, | Performed by: INTERNAL MEDICINE

## 2024-11-21 PROCEDURE — 3078F DIAST BP <80 MM HG: CPT | Mod: CPTII,S$GLB,, | Performed by: INTERNAL MEDICINE

## 2024-11-21 PROCEDURE — 1101F PT FALLS ASSESS-DOCD LE1/YR: CPT | Mod: CPTII,S$GLB,, | Performed by: INTERNAL MEDICINE

## 2024-11-21 RX ORDER — TRAMADOL HYDROCHLORIDE 50 MG/1
50 TABLET ORAL EVERY 8 HOURS PRN
Qty: 40 TABLET | Refills: 1 | Status: SHIPPED | OUTPATIENT
Start: 2024-11-21

## 2024-11-21 RX ORDER — TIZANIDINE 4 MG/1
4 TABLET ORAL EVERY 8 HOURS
Qty: 40 TABLET | Refills: 1 | Status: SHIPPED | OUTPATIENT
Start: 2024-11-21

## 2024-11-21 RX ORDER — TRAMADOL HYDROCHLORIDE 50 MG/1
50 TABLET ORAL EVERY 8 HOURS PRN
Qty: 40 TABLET | Refills: 1 | Status: SHIPPED | OUTPATIENT
Start: 2024-11-21 | End: 2024-11-21 | Stop reason: SDUPTHER

## 2024-11-21 NOTE — PROGRESS NOTES
Subjective:       Patient ID: Diana Montenegro is a 69 y.o. female.    Chief Complaint: Diabetes Mellitus (5 mos wlabs. ) and Neck Pain (Pain at 10 today. Saw dr jara last week and had xrays.  Should she see specialist.  She is complaining a lot about pain during rooming process. )    History of Present Illness    Diana presents today for neck and shoulder pain and follow-up of type 2 diabetes, HTN, HLP, CKD, gait impairment..    NECK AND SHOULDER PAIN:  She reports worsening neck and shoulder pain bilaterally  for the past 2 days with limited range of motion in all directions. The pain radiates to her head, causing a headache that starts in the cervical-occipital area and becomes diffuse. She denies any recent accidents or falls. X-ray findings show chronic changes between C5-C7.  She has attempted to alleviate symptoms with heat, ice, and topical medications (Voltaren, Aspirin with Lidocaine, and Tiger Balm) with minimal relief. She denies experiencing dizziness.    DIABETES MANAGEMENT:  Recent blood sugar reading was 129. She acknowledges stress eating and its impact on blood sugar. She started Ozempic 2 mg a few weeks ago.    SLEEP ISSUES:  She reports difficulty maintaining sleep without trazodone. She falls asleep initially but wakes around 3-4 AM and cannot return to sleep until 6-7 AM. Trazodone allows her to sleep longer without interruption.    MOBILITY AND KNEE ISSUES:  She uses a cane for short distances and a walker for longer distances outside the home. She does not use assistive devices within her apartment due to its small size. She expresses a desire to attempt ambulation without assistive devices, though notes concern from others about fall risk. She is considering right knee replacement, reporting both knees are problematic, with the right being more severely affected. She has obtained approvals from the cardiologist for potential surgery but is uncertain about timing and does not appear  ready for immediate intervention.    CURRENT MEDICATIONS AND DIET:  She discontinued daily Senna as advised by the pharmacy and has increased intake of fruits and vegetables. She is attempting to increase water intake to address concerns about kidney function.      ROS:  Constitutional: -dizziness  Head: +headaches  Musculoskeletal: +joint pain, +neck pain  Psychiatric: +sleep difficulty              Physical Exam  Vitals and nursing note reviewed.   Constitutional:       General: She is not in acute distress.     Appearance: Normal appearance. She is well-developed.   HENT:      Head: Normocephalic and atraumatic.   Eyes:      General: No scleral icterus.     Extraocular Movements: Extraocular movements intact.      Conjunctiva/sclera: Conjunctivae normal.   Neck:      Thyroid: No thyromegaly.      Vascular: No JVD.   Cardiovascular:      Rate and Rhythm: Normal rate and regular rhythm.      Heart sounds: Normal heart sounds. No murmur heard.     No friction rub. No gallop.   Pulmonary:      Effort: Pulmonary effort is normal. No respiratory distress.      Breath sounds: Normal breath sounds. No wheezing or rales.   Abdominal:      General: Bowel sounds are normal.      Palpations: Abdomen is soft. There is no mass.      Tenderness: There is no abdominal tenderness.   Musculoskeletal:         General: Normal range of motion.      Cervical back: Rigidity and tenderness (trapezius muscles bilaterally.) present.   Lymphadenopathy:      Cervical: No cervical adenopathy.   Skin:     General: Skin is warm and dry.      Findings: No rash.      Comments: No foot lesions are present.   Neurological:      Mental Status: She is alert and oriented to person, place, and time.      Cranial Nerves: No cranial nerve deficit.      Comments: Sensory exam is intact in both feet on monofilament testing.   Psychiatric:         Mood and Affect: Mood normal.         Behavior: Behavior normal.       Protective Sensation (w/ 10 gram  monofilament):  Right: Intact  Left: Intact    Visual Inspection:  Hammer-toe deformity of right great toe.    Pedal Pulses:   Right: Present  Left: Present    Posterior Tibialis Pulses:   Right:Present  Left: Present      Lab Visit on 11/14/2024   Component Date Value Ref Range Status    Sodium 11/14/2024 141  136 - 145 mmol/L Final    Potassium 11/14/2024 4.4  3.5 - 5.1 mmol/L Final    Chloride 11/14/2024 106  95 - 110 mmol/L Final    CO2 11/14/2024 24  23 - 29 mmol/L Final    Glucose 11/14/2024 147 (H)  70 - 110 mg/dL Final    BUN 11/14/2024 29 (H)  8 - 23 mg/dL Final    Creatinine 11/14/2024 1.6 (H)  0.5 - 1.4 mg/dL Final    Calcium 11/14/2024 9.6  8.7 - 10.5 mg/dL Final    Total Protein 11/14/2024 6.9  6.0 - 8.4 g/dL Final    Albumin 11/14/2024 3.6  3.5 - 5.2 g/dL Final    Total Bilirubin 11/14/2024 0.2  0.1 - 1.0 mg/dL Final    Comment: For infants and newborns, interpretation of results should be based  on gestational age, weight and in agreement with clinical  observations.    Premature Infant recommended reference ranges:  Up to 24 hours.............<8.0 mg/dL  Up to 48 hours............<12.0 mg/dL  3-5 days..................<15.0 mg/dL  6-29 days.................<15.0 mg/dL      Alkaline Phosphatase 11/14/2024 93  40 - 150 U/L Final    AST 11/14/2024 33  10 - 40 U/L Final    ALT 11/14/2024 71 (H)  10 - 44 U/L Final    eGFR 11/14/2024 34.9 (A)  >60 mL/min/1.73 m^2 Final    Anion Gap 11/14/2024 11  8 - 16 mmol/L Final    Cholesterol 11/14/2024 135  120 - 199 mg/dL Final    Comment: The National Cholesterol Education Program (NCEP) has set the  following guidelines (reference ranges) for Cholesterol:  Optimal.....................<200 mg/dL  Borderline High.............200-239 mg/dL  High........................> or = 240 mg/dL      Triglycerides 11/14/2024 85  30 - 150 mg/dL Final    Comment: The National Cholesterol Education Program (NCEP) has set the  following guidelines (reference values) for  triglycerides:  Normal......................<150 mg/dL  Borderline High.............150-199 mg/dL  High........................200-499 mg/dL      HDL 11/14/2024 62  40 - 75 mg/dL Final    Comment: The National Cholesterol Education Program (NCEP) has set the  following guidelines (reference values) for HDL Cholesterol:  Low...............<40 mg/dL  Optimal...........>60 mg/dL      LDL Cholesterol 11/14/2024 56.0 (L)  63.0 - 159.0 mg/dL Final    Comment: The National Cholesterol Education Program (NCEP) has set the  following guidelines (reference values) for LDL Cholesterol:  Optimal.......................<130 mg/dL  Borderline High...............130-159 mg/dL  High..........................160-189 mg/dL  Very High.....................>190 mg/dL      HDL/Cholesterol Ratio 11/14/2024 45.9  20.0 - 50.0 % Final    Total Cholesterol/HDL Ratio 11/14/2024 2.2  2.0 - 5.0 Final    Non-HDL Cholesterol 11/14/2024 73  mg/dL Final    Comment: Risk category and Non-HDL cholesterol goals:  Coronary heart disease (CHD)or equivalent (10-year risk of CHD >20%):  Non-HDL cholesterol goal     <130 mg/dL  Two or more CHD risk factors and 10-year risk of CHD <= 20%:  Non-HDL cholesterol goal     <160 mg/dL  0 to 1 CHD risk factor:  Non-HDL cholesterol goal     <190 mg/dL      WBC 11/14/2024 5.11  3.90 - 12.70 K/uL Final    RBC 11/14/2024 3.71 (L)  4.00 - 5.40 M/uL Final    Hemoglobin 11/14/2024 10.5 (L)  12.0 - 16.0 g/dL Final    Hematocrit 11/14/2024 33.7 (L)  37.0 - 48.5 % Final    MCV 11/14/2024 91  82 - 98 fL Final    MCH 11/14/2024 28.3  27.0 - 31.0 pg Final    MCHC 11/14/2024 31.2 (L)  32.0 - 36.0 g/dL Final    RDW 11/14/2024 12.7  11.5 - 14.5 % Final    Platelets 11/14/2024 150  150 - 450 K/uL Final    MPV 11/14/2024 11.3  9.2 - 12.9 fL Final    Immature Granulocytes 11/14/2024 0.4  0.0 - 0.5 % Final    Gran # (ANC) 11/14/2024 3.3  1.8 - 7.7 K/uL Final    Immature Grans (Abs) 11/14/2024 0.02  0.00 - 0.04 K/uL Final    Comment:  Mild elevation in immature granulocytes is non specific and   can be seen in a variety of conditions including stress response,   acute inflammation, trauma and pregnancy. Correlation with other   laboratory and clinical findings is essential.      Lymph # 11/14/2024 1.3  1.0 - 4.8 K/uL Final    Mono # 11/14/2024 0.4  0.3 - 1.0 K/uL Final    Eos # 11/14/2024 0.1  0.0 - 0.5 K/uL Final    Baso # 11/14/2024 0.04  0.00 - 0.20 K/uL Final    nRBC 11/14/2024 0  0 /100 WBC Final    Gran % 11/14/2024 64.8  38.0 - 73.0 % Final    Lymph % 11/14/2024 25.0  18.0 - 48.0 % Final    Mono % 11/14/2024 7.6  4.0 - 15.0 % Final    Eosinophil % 11/14/2024 1.4  0.0 - 8.0 % Final    Basophil % 11/14/2024 0.8  0.0 - 1.9 % Final    Differential Method 11/14/2024 Automated   Final    Hemoglobin A1C 11/14/2024 7.3 (H)  4.0 - 5.6 % Final    Comment: ADA Screening Guidelines:  5.7-6.4%  Consistent with prediabetes  >or=6.5%  Consistent with diabetes    High levels of fetal hemoglobin interfere with the HbA1C  assay. Heterozygous hemoglobin variants (HbS, HgC, etc)do  not significantly interfere with this assay.   However, presence of multiple variants may affect accuracy.      Estimated Avg Glucose 11/14/2024 163 (H)  68 - 131 mg/dL Final   Office Visit on 10/02/2024   Component Date Value Ref Range Status    Final Pathologic Diagnosis 10/02/2024    Final                    Value:Specimen Adequacy  Satisfactory for interpretation. Endocervical component is present.    [place] Category  Negative for intraepithelial lesion or malignancy.  Atrophic smear.      Interp By JEFFREY Potts (ASCP), Signed on 10/09/2024 at 11:07    Disclaimer 10/02/2024    Final                    Value:The Pap smear is a screening test that aids in the detection of cervical cancer and cancer precursors. Both false positive and false negative results can occur. The test should be used at regular intervals, and positive results should be confirmed before    definitive therapy.  This liquid based specimen is processed using the , , or  [employer] Thin PrepPAP System. This specimen has been analyzed by the ThinPrep Imaging System ([employer]), an automated imaging and review system which assists the laboratory in   evaluating cells on ThinPrep PAP tests. Following automated imaging, selected fields from every slide are reviewed by a cytotechnologist and/or pathologist.     Screening was performed at [facility], [address], [place] [address].      HPV other High Risk types, PCR 10/02/2024 Negative  Negative Final    Comment: Other HPV genotypes include:   31,33,35,39,45,51,52,56,58,59,66 and 68.      HPV High Risk type 16, PCR 10/02/2024 Negative  Negative Final    HPV High Risk type 18, PCR 10/02/2024 Negative  Negative Final   Lab Visit on 09/27/2024   Component Date Value Ref Range Status    Fecal Immunochemical Test (iFOBT) 09/27/2024 Negative  Negative Final   Admission on 09/25/2024, Discharged on 09/25/2024   Component Date Value Ref Range Status    POCT Glucose 09/25/2024 158 (H)  70 - 110 mg/dL Final   Lab Visit on 09/18/2024   Component Date Value Ref Range Status    WBC 09/18/2024 4.56  3.90 - 12.70 K/uL Final    RBC 09/18/2024 2.86 (L)  4.00 - 5.40 M/uL Final    Hemoglobin 09/18/2024 8.3 (L)  12.0 - 16.0 g/dL Final    Hematocrit 09/18/2024 27.9 (L)  37.0 - 48.5 % Final    MCV 09/18/2024 98  82 - 98 fL Final    MCH 09/18/2024 29.0  27.0 - 31.0 pg Final    MCHC 09/18/2024 29.7 (L)  32.0 - 36.0 g/dL Final    RDW 09/18/2024 13.2  11.5 - 14.5 % Final    Platelets 09/18/2024 153  150 - 450 K/uL Final    MPV 09/18/2024 10.5  9.2 - 12.9 fL Final    Immature Granulocytes 09/18/2024 0.2  0.0 - 0.5 % Final    Gran # (ANC) 09/18/2024 3.3  1.8 - 7.7 K/uL Final    Immature Grans (Abs) 09/18/2024 0.01  0.00 - 0.04 K/uL Final    Comment: Mild elevation in immature granulocytes is non specific and   can be  seen in a variety of conditions including stress response,   acute inflammation, trauma and pregnancy. Correlation with other   laboratory and clinical findings is essential.      Lymph # 09/18/2024 0.9 (L)  1.0 - 4.8 K/uL Final    Mono # 09/18/2024 0.2 (L)  0.3 - 1.0 K/uL Final    Eos # 09/18/2024 0.0  0.0 - 0.5 K/uL Final    Baso # 09/18/2024 0.04  0.00 - 0.20 K/uL Final    nRBC 09/18/2024 0  0 /100 WBC Final    Gran % 09/18/2024 72.7  38.0 - 73.0 % Final    Lymph % 09/18/2024 20.0  18.0 - 48.0 % Final    Mono % 09/18/2024 5.3  4.0 - 15.0 % Final    Eosinophil % 09/18/2024 0.9  0.0 - 8.0 % Final    Basophil % 09/18/2024 0.9  0.0 - 1.9 % Final    Differential Method 09/18/2024 Automated   Final    Vitamin B-12 09/18/2024 >2000 (H)  210 - 950 pg/mL Final    Iron 09/18/2024 151  30 - 160 ug/dL Final    Transferrin 09/18/2024 351  200 - 375 mg/dL Final    TIBC 09/18/2024 519 (H)  250 - 450 ug/dL Final    Saturated Iron 09/18/2024 29  20 - 50 % Final    Methlymalonic Acid 09/18/2024 0.32  <0.40 umol/L Final    Comment: If applicable, any drug confirmation testing reported  here was developed and the performance characteristics  determined by [facility]. This   confirmation testing has not been cleared or approved  by the FDA. The laboratory is regulated under CLIA as  qualified to perform high-complexity testing. This test  is used for patient testing purposes. It should not be  regarded as investigational or for research.    Test performed at [facility],  [address], [place]  [address]     [phone]  Dorie Jarrett MD, PhD - Medical Director      Folate 09/18/2024 >40.0 (H)  4.0 - 24.0 ng/mL Final   Hospital Outpatient Visit on 09/04/2024   Component Date Value Ref Range Status    QRS Duration 09/04/2024 90  ms Final    [facility] QTC Calculation 09/04/2024 420  ms Final   Lab Visit on 09/04/2024   Component Date Value Ref Range Status    TSH 09/04/2024 0.812  0.400 - 4.000 uIU/mL  Final    WBC 09/04/2024 4.46  3.90 - 12.70 K/uL Final    RBC 09/04/2024 2.69 (L)  4.00 - 5.40 M/uL Final    Hemoglobin 09/04/2024 7.8 (L)  12.0 - 16.0 g/dL Final    Hematocrit 09/04/2024 26.1 (L)  37.0 - 48.5 % Final    MCV 09/04/2024 97  82 - 98 fL Final    MCH 09/04/2024 29.0  27.0 - 31.0 pg Final    MCHC 09/04/2024 29.9 (L)  32.0 - 36.0 g/dL Final    RDW 09/04/2024 13.5  11.5 - 14.5 % Final    Platelets 09/04/2024 157  150 - 450 K/uL Final    MPV 09/04/2024 9.7  9.2 - 12.9 fL Final    Immature Granulocytes 09/04/2024 0.2  0.0 - 0.5 % Final    Gran # (ANC) 09/04/2024 2.9  1.8 - 7.7 K/uL Final    Immature Grans (Abs) 09/04/2024 0.01  0.00 - 0.04 K/uL Final    Comment: Mild elevation in immature granulocytes is non specific and   can be seen in a variety of conditions including stress response,   acute inflammation, trauma and pregnancy. Correlation with other   laboratory and clinical findings is essential.      Lymph # 09/04/2024 1.0  1.0 - 4.8 K/uL Final    Mono # 09/04/2024 0.4  0.3 - 1.0 K/uL Final    Eos # 09/04/2024 0.1  0.0 - 0.5 K/uL Final    Baso # 09/04/2024 0.03  0.00 - 0.20 K/uL Final    nRBC 09/04/2024 0  0 /100 WBC Final    Gran % 09/04/2024 65.3  38.0 - 73.0 % Final    Lymph % 09/04/2024 23.3  18.0 - 48.0 % Final    Mono % 09/04/2024 8.7  4.0 - 15.0 % Final    Eosinophil % 09/04/2024 1.8  0.0 - 8.0 % Final    Basophil % 09/04/2024 0.7  0.0 - 1.9 % Final    Differential Method 09/04/2024 Automated   Final       Assessment & Plan:     Assessment & Plan     Assessed neck and shoulder pain, likely muscular with associated muscle spasm   Reviewed x-ray results showing mild degenerative joint disease and disc space narrowing between C5-C7   Considered patient's chronic kidney disease in treatment options, avoiding oral anti-inflammatories   Evaluated recent lab results, noting increase in HbA1c and decrease in kidney function (GFR)   Determined need for better blood sugar control before potential knee  replacement surgery    CERVICAL SPONDYLOSIS AND RADICULOPATHY:   Explained x-ray findings, including disc degeneration and its chronic nature.   Physical therapy ordered for neck.    CHRONIC KIDNEY DISEASE AND ANEMIA:   Discussed relationship between decreased kidney function and anemia.   Clarified EGFR values and their significance in assessing kidney function.   Maudina to increase water intake.   Referred to nephrology for kidney function evaluation.    TYPE 2 DIABETES MELLITUS:   Maudina to improve blood sugar control.   Increased Ozempic from 1 mg to 2 mg.    PAIN MANAGEMENT:   Started Tramadol for pain relief.   Started tizanidine (muscle relaxant).    LIFESTYLE MODIFICATIONS:   Recommend increasing vegetable consumption.   Maudina to reduce stress eating.    INSOMNIA:   Continued Trazodone as needed for sleep.    FOLLOW-UP:   Follow up with office in a real appointment.         No follow-ups on file.     Rad Johnston MD

## 2024-12-03 ENCOUNTER — DOCUMENTATION ONLY (OUTPATIENT)
Dept: REHABILITATION | Facility: HOSPITAL | Age: 69
End: 2024-12-03
Payer: MEDICARE

## 2024-12-03 ENCOUNTER — TELEPHONE (OUTPATIENT)
Dept: OPHTHALMOLOGY | Facility: CLINIC | Age: 69
End: 2024-12-03
Payer: MEDICARE

## 2024-12-03 NOTE — PROGRESS NOTES
Health  Wellness Visit Note    Name: Diana Montenegro  Clinic Number: [id number]  Physician: No ref. provider found  Diagnosis: No diagnosis found.  Past Medical History:   Diagnosis Date    Arthritis     Atrial fibrillation, unspecified     hx of a fib prior to stroke.    Chronic back pain     Chronic kidney disease, stage 3a 06/01/2023    Colon polyp     CVA (cerebral infarction) 07/16/2014    Degenerative disc disease     cervical; lumbar    Diabetes mellitus type II     Encounter for loop recorder at end of battery life 09/17/2018    Hyperlipidemia     Hypertension     Hypothyroidism     Mild nonproliferative diabetic retinopathy 08/12/2018    Obesity     Sleep apnea     Stroke 07/2014    Venous insufficiency (chronic) (peripheral)      Visit Number: 27  Precautions: Standard, Fall Risk, History of Stroke      1st PT visit:  07/16/2024  Year of care end date:  July 2025  Mindbody plan: 60---9 Months  Patient level: C    Time In: 1:30 PM  Time Out: 2:30 PM  Total Treatment Time: 60 Minutes    Wellness [employer] 2022  Handout on this week's wellness topic Stress provided along with a discussion on what it means, the benefits, and suggestions for practice.  Reviewed last week's topic of n/a (patient did not attend Wellness last week).      Subjective:   Patient reports no complaints of back pain. She has not been to Wellness in several weeks due to her neck. Patient reports after cleaning her bathroom one day, her neck pain became unbearable. She was not able to even move her neck while driving. She saw her MD and has been taking prescription meds to help subside her pain. She has not been to the gym or swimming since. She has a PT evaluation Thursday.     HC and patient discussed full range of motion on leg press before increasing weight.     Objective:   Diana completed therapeutic stretches (EIL, SAIDA) and the following MedX exercise machines: core lumbar, torso rotation l/r, leg extension, leg curl,  upright row, chest press, biceps curl, triceps extension, leg press    See exercise log in patient folder for rate of exertion and repetitions completed.       Fitness Machine Education Key:  E=education on equipment initiated and further follow up and education needed  I=independent with  and exercise.  The patient:  Adjusts machines to his/her settings  Uses equipment levers, pins, weights safely  Maintains safe and correct posture while exercising  Moves through exercise with correct pace and control  Gets on and off equipment safely      Lumbar/Cervical Ext. E Torso Rotation E Leg Press    Leg Extension  Seated Leg Curl  Chest Press    Seated Row  Hip ADD  Hip ABD    Triceps Extension  Bicep Curl  Other:      [x] Indicates exercise has been taught for home  Lumbar/Cervical Ext. [] Torso Rotation [] Leg Press []   Leg Extension [] Seated Leg Curl [] Chest Press []   Seated Row [] Hip ADD [] Hip ABD []   Triceps Extension [] Bicep Curl [] Other:        Assessment:   Patient tolerated Patient tolerated MedX Core Lumbar Strength and all other peripheral exercises without an increase in symptoms. Patient warmed up on nustep for 5 minutes, stretched, and iced low back for 5 minutes after the workout.     Plan:  Continue with established plan of care towards wellness goals.     Health  : Ann-Marie Bey  12/3/2024

## 2024-12-04 ENCOUNTER — PATIENT MESSAGE (OUTPATIENT)
Dept: INTERNAL MEDICINE | Facility: CLINIC | Age: 69
End: 2024-12-04
Payer: MEDICARE

## 2024-12-04 NOTE — H&P
History    Chief complaint:  Painless progressive vision loss    Present Ilness/Diagnosis: Nuclear sclerotic Cataract    Past Medical History:  has a past medical history of Arthritis, Atrial fibrillation, unspecified, Chronic back pain, Chronic kidney disease, stage 3a (06/01/2023), Colon polyp, CVA (cerebral infarction) (07/16/2014), Degenerative disc disease, Diabetes mellitus type II, Encounter for loop recorder at end of battery life (09/17/2018), Hyperlipidemia, Hypertension, Hypothyroidism, Mild nonproliferative diabetic retinopathy (08/12/2018), Obesity, Sleep apnea, Stroke (07/2014), and Venous insufficiency (chronic) (peripheral).    Family History/Social History: refer to chart    Allergies:   Review of patient's allergies indicates:   Allergen Reactions    Medrol [methylprednisolone] Palpitations       Current Medications: No current facility-administered medications for this encounter.    Current Outpatient Medications:     acyclovir 5% (ZOVIRAX) 5 % ointment, Apply topically 6 (six) times daily., Disp: 5 g, Rfl: 1    albuterol (VENTOLIN HFA) 90 mcg/actuation inhaler, Inhale 2 puffs into the lungs every 6 (six) hours as needed for Wheezing. Rescue, Disp: 7 g, Rfl: 3    albuterol-ipratropium 2.5mg-0.5mg/3mL (DUO-NEB) 0.5 mg-3 mg(2.5 mg base)/3 mL nebulizer solution, Take 3 mLs by nebulization every 6 (six) hours as needed for Wheezing. Rescue (Patient taking differently: Take 3 mLs by nebulization every 6 (six) hours as needed for Wheezing. Rescue prn), Disp: 3 vial, Rfl: 3    aspirin (ECOTRIN) 81 MG EC tablet, Take 1 tablet (81 mg total) by mouth once daily., Disp: , Rfl:     b complex vitamins tablet, Take 1 tablet by mouth once daily., Disp: , Rfl:     blood pressure test kit-large Kit, Use as directed, Disp: 1 each, Rfl: 0    blood sugar diagnostic (FREESTYLE LITE STRIPS) Strp, USE 1 STRIP TO CHECK GLUCOSE TWICE DAILY, Disp: 200 each, Rfl: 3    blood-glucose meter Misc, One touch verio flex or tone  touch verio reflect or freestyle freedom lite meter (all covered by insurance), Disp: 1 each, Rfl: 0    CALCIUM CITRATE/VITAMIN D3 (CALCIUM CITRATE + D ORAL), Take 1 tablet by mouth every morning., Disp: , Rfl:     cetirizine (ZYRTEC) 5 MG tablet, Take 5 mg by mouth once daily., Disp: , Rfl:     ciclopirox (PENLAC) 8 % Soln, Apply topically nightly., Disp: 6.6 mL, Rfl: 11    cinnamon bark (CINNAMON ORAL), Take by mouth 2 (two) times daily. (Patient not taking: Reported on 11/21/2024), Disp: , Rfl:     cyanocobalamin, vitamin B-12, 500 mcg Subl, Place 1 tablet under the tongue once daily., Disp: , Rfl:     diclofenac 0.15% LIDOcaine 2.25% prilocaine 2.25% topical cream, Apply topically once daily. Apply 1 or 2 pumps to painful area up to 5 times daily.  Maximum 10 pumps/day. (Patient not taking: Reported on 11/21/2024), Disp: 480 g, Rfl: 1    diclofenac sodium (VOLTAREN) 1 % Gel, APPLY 2 GRAMS TOPICALLY THREE TIMES DAILY, Disp: 200 g, Rfl: 4    docusate sodium (COLACE) 100 MG capsule, Take 100 mg by mouth once daily. , Disp: , Rfl:     ELIQUIS 5 mg Tab, Take 1 tablet by mouth twice daily, Disp: 180 tablet, Rfl: 3    EYLEA 2 mg/0.05 mL Syrg, , Disp: , Rfl:     fish oil-omega-3 fatty acids 300-1,000 mg capsule, Take 1 capsule by mouth once daily. Take 1 cap 2 times daily every other day, Disp: , Rfl:     fluticasone (FLONASE) 50 mcg/actuation nasal spray, 2 sprays (100 mcg total) by Each Nare route once daily., Disp: 1 Bottle, Rfl: 0    glimepiride (AMARYL) 2 MG tablet, Take 1 tablet (2 mg total) by mouth before breakfast. For diabetes control., Disp: 90 tablet, Rfl: 0    lancets Misc, Needs lancets for testing twice daily.  To go with meter covered by insurance., Disp: 200 each, Rfl: 3    levothyroxine (SYNTHROID) 88 MCG tablet, Take 1 tablet (88 mcg total) by mouth before breakfast., Disp: 90 tablet, Rfl: 2    LORazepam (ATIVAN) 1 MG tablet, Take 1 tablet by mouth twice daily as needed for anxiety, Disp: 60 tablet,  Rfl: 0    metFORMIN (GLUCOPHAGE) 1000 MG tablet, TAKE 1 TABLET BY MOUTH TWICE DAILY WITH MEALS, Disp: 180 tablet, Rfl: 1    metoprolol tartrate (LOPRESSOR) 25 MG tablet, Take 1 tablet (25 mg total) by mouth once as needed (Palpitations). For palpitations (Patient not taking: Reported on 9/18/2024), Disp: 1 tablet, Rfl: 3    multivitamin capsule, Take 1 capsule by mouth once daily., Disp: , Rfl:     mupirocin (BACTROBAN) 2 % ointment, Apply to affected area 3 times daily, Disp: 22 g, Rfl: 1    omeprazole (PRILOSEC) 40 MG capsule, Take 1 capsule (40 mg total) by mouth every morning., Disp: 90 capsule, Rfl: 3    prednisoLONE-moxiflox-bromfen 1-0.5-0.075 % DrpS, Apply 1 drop to eye 3 (three) times daily. (Patient not taking: Reported on 11/21/2024), Disp: 5 mL, Rfl: 3    promethazine-dextromethorphan (PROMETHAZINE-DM) 6.25-15 mg/5 mL Syrp, Take one tsp po q 6 hrs prn cough, Disp: 180 mL, Rfl: 0    rosuvastatin (CRESTOR) 40 MG Tab, Take 1 tablet by mouth once daily, Disp: 90 tablet, Rfl: 0    RUTIN/HESP/BIOFLAV/C/HERB#196 (BIOFLEX ORAL), Take 2 tablets by mouth once daily., Disp: , Rfl:     semaglutide (OZEMPIC) 2 mg/dose (8 mg/3 mL) PnIj, Inject 2 mg into the skin every 7 days., Disp: 9 mL, Rfl: 1    senna (SENNA) 8.6 mg tablet, Take 2 tablets by mouth nightly as needed for Constipation., Disp: 100 tablet, Rfl: 3    tiZANidine (ZANAFLEX) 4 MG tablet, Take 1 tablet (4 mg total) by mouth every 8 (eight) hours. Muscle spasm, Disp: 40 tablet, Rfl: 1    traMADoL (ULTRAM) 50 mg tablet, Take 1 tablet (50 mg total) by mouth every 8 (eight) hours as needed for Pain., Disp: 40 tablet, Rfl: 1    traZODone (DESYREL) 50 MG tablet, TAKE 1 TABLET BY MOUTH NIGHTLY AS NEEDED FOR  INSOMNIA  (OKAY  TO  TAKE  2ND  TABLET  BY  MOUTH  IN  30  MINUTES  IF  STILL  AWAKE), Disp: 180 tablet, Rfl: 3    valsartan-hydrochlorothiazide (DIOVAN-HCT) 80-12.5 mg per tablet, Take 1 tablet by mouth once daily., Disp: 90 tablet, Rfl: 3    ASA Score: II      Mallampati Score: II     Plan for Sedation: Moderate Sedation     Patient or Family History of Anesthesia problems : No    Physical Exam    BP: Vital signs stable  General: No apparent distress  HEENT: nuclear sclerotic cataract  Lungs: adequate respirations  Heart: + pulses  Abdomen: soft  Rectal/pelvic: deferred    Impression: Visually significant Cataract.    See previous clinic notes for surgical indications.    Plan: Phacoemulsification with implantation of Intraocular lens

## 2024-12-05 ENCOUNTER — CLINICAL SUPPORT (OUTPATIENT)
Dept: REHABILITATION | Facility: HOSPITAL | Age: 69
End: 2024-12-05
Attending: INTERNAL MEDICINE
Payer: MEDICARE

## 2024-12-05 ENCOUNTER — TELEPHONE (OUTPATIENT)
Dept: OPHTHALMOLOGY | Facility: CLINIC | Age: 69
End: 2024-12-05
Payer: MEDICARE

## 2024-12-05 DIAGNOSIS — R52 PAIN: Primary | Chronic | ICD-10-CM

## 2024-12-05 DIAGNOSIS — M54.2 NECK PAIN: ICD-10-CM

## 2024-12-05 PROCEDURE — 20560 NDL INSJ W/O NJX 1 OR 2 MUSC: CPT | Performed by: PHYSICAL THERAPIST

## 2024-12-05 PROCEDURE — 97110 THERAPEUTIC EXERCISES: CPT | Performed by: PHYSICAL THERAPIST

## 2024-12-05 PROCEDURE — 97161 PT EVAL LOW COMPLEX 20 MIN: CPT | Performed by: PHYSICAL THERAPIST

## 2024-12-05 NOTE — PLAN OF CARE
[facility]   Physical Therapy Initial Evaluation      Name: Diana Montenegro  Clinic Number: [id number]    Therapy Diagnosis:   Encounter Diagnoses   Name Primary?    Neck pain     Pain Yes        Physician: Rad Johnston MD    Physician Orders: PT Eval and Treat   Medical Diagnosis from Referral: M54.2 (ICD-10-CM) - Neck pain  Evaluation Date: 12/5/2024  Authorization Period Expiration: 11-21-25  Plan of Care Expiration: 1-30-25  Visit # / Visits authorized: 1/ 1   FOTO: 1/5    Precautions: Standard     Time In: 1:45 pm  Time Out: 2:30 pm  Total Appointment Time (timed & untimed codes): 45 minutes    Subjective     Date of onset: 3 wks ago  History of current condition - Kaleigh reports: B neck/upper trap pain began after cleaning walls at her home.  Pt c/o much stiffness/tightness in B UT and denies UE radicular sx.  Pt has no pain at present.  States she does pool exercises 4x/wk at [facility] and light wt-lifting.  States she completed Healthy Back program a few mo ago.  States having multiple health issues (see PMH).  Pt feels pain is limiting her ADL.       Past Medical History:   Diagnosis Date    Arthritis     Atrial fibrillation, unspecified     hx of a fib prior to stroke.    Chronic back pain     Chronic kidney disease, stage 3a 06/01/2023    Colon polyp     CVA (cerebral infarction) 07/16/2014    Degenerative disc disease     cervical; lumbar    Diabetes mellitus type II     Encounter for loop recorder at end of battery life 09/17/2018    Hyperlipidemia     Hypertension     Hypothyroidism     Mild nonproliferative diabetic retinopathy 08/12/2018    Obesity     Sleep apnea     Stroke 07/2014    Venous insufficiency (chronic) (peripheral)      Diana Montenegro  has a past surgical history that includes Tubal ligation; Tonsillectomy; Hernia repair; Gastrectomy; Colonoscopy w/ polypectomy; Colonoscopy (N/A, 08/30/2018); Removal of implantable loop recorder (N/A, 09/17/2018);  Colonoscopy (N/A, 12/6/2023); block, nerve, genicular (Right, 8/21/2024); and radiofrequency ablation, nerve, genicular, knee (Right, 9/25/2024).    Diana has a current medication list which includes the following prescription(s): acyclovir 5%, albuterol, albuterol-ipratropium, aspirin, b complex vitamins, blood pressure test kit-large, blood sugar diagnostic, blood-glucose meter, calcium citrate/vitamin d3, cetirizine, ciclopirox, cinnamon bark, cyanocobalamin (vitamin b-12), diclofenac sodium, diclofenac sodium, docusate sodium, eliquis, eylea, fish oil-omega-3 fatty acids, fluticasone propionate, glimepiride, lancets, levothyroxine, lorazepam, metformin, metoprolol tartrate, multivitamin, mupirocin, omeprazole, prednisolone-moxiflox-bromfen, promethazine-dextromethorphan, rosuvastatin, rutin/hesp/bioflav/c/[id number], ozempic, senna, tizanidine, tramadol, trazodone, and valsartan-hydrochlorothiazide.    Review of patient's allergies indicates:   Allergen Reactions    Medrol [methylprednisolone] Palpitations        Imaging: x-ray:  Mild DJD. The disc spaces are narrowed between C5 and the C7 vertebral segments. No significant encroachment of the neural foramina identified. There is a 2 mm a C5/C6 retrolisthesis. No fracture or dislocation. No bone destruction identified.    Prior Therapy: na  Social History:  lives alone  Occupation: na  Prior Level of Function: walking; pool exercises  Current Level of Function: ADL limited    Pain:  Current 0/10, worst 7/10, best 0/10   Location: bilateral neck/upper trap  Description: Tight and stiff  Aggravating Factors: Flexing and Lifting  Easing Factors: heating pad and rest    Pts goals: decrease pain with activity    Objective     Postural examination:  slumped shld and increased TH kyphosis     Functional assessment:   - walking:   independent             AROM:  50 deg flex, 40 ext, 20 B SB and 40 B rot     MMT:   gross strength is 4+/5 neck and 5/5 B UE    Tone:   normal    Flexibility testing:   tight B UT    Special tests:   neg C-compression and Spurling's    Palpation:   TTP B UT    Joint mobility: limited C-spine    Swelling:  none    Other:  sensation intact to light touch    CMS Impairment/Limitation/Restriction for FOTO neck Survey    Therapist reviewed FOTO scores for Diana Montenegro on 12/5/2024.   FOTO documents entered into [employer] - see Media section.           TREATMENT     Treatment Time In: 1:45 pm  Treatment Time Out: 2:30 pm  Total Treatment time separate from Evaluation time:  30 min    Treatment:  HP x 10 min  HEP (chin retractions, scap retractions and UT stretch)  Dry needling B UT with 2 30 mm needles ea after written consent given  Posture education    Home Exercises and Patient Education Provided    Education provided:   - correct posture    Written Home Exercises Provided: yes.  Exercises were reviewed and Kaleigh was able to demonstrate them prior to the end of the session.  Kaleigh demonstrated good  understanding of the education provided.     See EMR under Patient Instructions for exercises provided 12/5/2024.      Assessment     Diana is a 69 y.o. female referred to outpatient Physical Therapy with a medical diagnosis of M54.2 (ICD-10-CM) - Neck pain.  Pt presents with neck/UT pain with weakness, decreased ROM and POOR posture that limit ADL.  Tolerated DN well.    Pt prognosis is Good.   Pt will benefit from skilled outpatient Physical Therapy to address the deficits stated above and in the chart below, provide pt/family education, and to maximize pt's level of independence.     Plan of care discussed with patient: Yes  Pt's spiritual, cultural and educational needs considered and patient is agreeable to the plan of care and goals as stated below:     Anticipated Barriers for therapy: none    Medical Necessity is demonstrated by the following  History  Co-morbidities and personal factors that may impact the plan of care [] LOW: no personal  factors / co-morbidities  [x] MODERATE: 1-2 personal factors / co-morbidities  [] HIGH: 3+ personal factors / co-morbidities    Moderate / High Support Documentation:   Co-morbidities affecting plan of care: see PMH    Personal Factors:   no deficits     Examination  Body Structures and Functions, activity limitations and participation restrictions that may impact the plan of care [x] LOW: addressing 1-2 elements  [] MODERATE: 3+ elements  [] HIGH: 4+ elements (please support below)    Moderate / High Support Documentation: na     Clinical Presentation [x] LOW: stable  [] MODERATE: Evolving  [] HIGH: Unstable     Decision Making/ Complexity Score: low       Goals:  Short Term Goals: 2 weeks         1.   Independent with HEP        2.  Pt will report decreased pain level of < 50% from above measure with ADL    Long Term Goals:   GOALS:    6_   weeks. Pt agrees with goals set.  Independent with HEP.  Report decreased    neck/UT    pain  <   / =  3/10 with ADL such as dressing  Increased MMT  for  neck to 4+/5 to 5/5  with ADL such as house work  Increased arom  for  neck to WNL with functional activities such cleaning or self-care      Plan     Certification Period/Plan of care expiration: 12/5/2024 to 1-30-25.    Outpatient Physical Therapy 2 times weekly for 6 weeks to include the following interventions: Manual Therapy, Moist Heat/ Ice, Patient Education, Therapeutic Activities, and Therapeutic Exercise and dry needling.     Carlos Frazier, PT

## 2024-12-05 NOTE — TELEPHONE ENCOUNTER
Spoke with pt provided arrival time of 7:00 for sx on 12/9/24 with Dr. Singleton @ [place]. Pt has eyedrops and packet.

## 2024-12-06 ENCOUNTER — TELEPHONE (OUTPATIENT)
Dept: OPHTHALMOLOGY | Facility: CLINIC | Age: 69
End: 2024-12-06
Payer: MEDICARE

## 2024-12-06 NOTE — PRE-PROCEDURE INSTRUCTIONS
Unable to reach pt via phone.  Left voicemail with arrival time also informing pt of need for responsible  accompaniment and instructing pt to follow pre-procedure instructions provided via [facility] portal.  The following message was sent to pt's portal.        Dear Kaleigh     Below you will find basic pre-procedure instructions in preparation for your procedure on 12/9/24 with Dr. Singleton              You should receive your arrival time within 24-48 hours of your scheduled procedure date from the  at your surgeon's office.     -Nothing to eat or drink after midnight the night before your procedure until after your procedure, except AM meds with small sips of water.     - HOLD all oral Diabetic medications night before and morning of procedure  - HOLD all Insulin morning of procedure  - HOLD all Fluid pills morning of procedure  - HOLD all non-insulin shots until after surgery (Ozempic, Mounjaro, Trulicity, Victoza, Byetta, Wegovy and Adlyxin) (7 days prior)  - HOLD all vitamins, minerals and herbal supplements morning of procedure   - TAKE all B/P meds, EXCEPT those that contain a fluid pill (ex. Lasix, Hydroclorothiazide/HCTZ, Spirnolactone)  - USE inhalers as needed and bring AM of surgery  - USE EYE DROPS as directed  -TAKE blood thinner meds AM of surgery unless otherwise instructed by your provider to not take     - Shower and wash face with antibacterial soap (ex. Dial) for 3 mins PM prior and AM of surgery  - No powder, lotions, creams, oils, gels, ointments, makeup,  or jewelry    - Wear comfortable clothing (button up shirt)     (Patient is required to have a responsible ride to transport home, ride may not leave while patient is in surgery)     -- [facility], 2nd floor Surgery Center, located   @ [address] [place] [address]  2nd Floor Registration        If you have any questions or concerns please feel free to contact your surgeon's office.            Please reply to this message as receipt of delivery.     Catina, LPN [facility]  Pre-Admit - Anesthesia Dept

## 2024-12-06 NOTE — TELEPHONE ENCOUNTER
----- Message from Jacy sent at 12/6/2024  2:49 PM CST -----  Regarding: Pt Advice/ Surgery on 12/09/2024  Contact: REGINE SOLARES [[id number]]  CONSULT/ADVISORY    Name of Caller: REGINE SOLARES [[id number]]    Contact Preference:  [phone] (home)       Nature of Call: Pt is sched for surgery on 12/09/2024, calling to see if she needs to stop taking her Eloquis prior to her procedure.  Please call.

## 2024-12-06 NOTE — TELEPHONE ENCOUNTER
Advised patient she will receive a call from pre-op nursing regarding which medications to discontinue prior to her surgery.

## 2024-12-09 ENCOUNTER — HOSPITAL ENCOUNTER (OUTPATIENT)
Facility: HOSPITAL | Age: 69
Discharge: HOME | End: 2024-12-09
Attending: OPHTHALMOLOGY | Admitting: OPHTHALMOLOGY
Payer: MEDICARE

## 2024-12-09 VITALS
HEIGHT: 65 IN | HEART RATE: 59 BPM | DIASTOLIC BLOOD PRESSURE: 67 MMHG | SYSTOLIC BLOOD PRESSURE: 111 MMHG | BODY MASS INDEX: 31.99 KG/M2 | RESPIRATION RATE: 15 BRPM | OXYGEN SATURATION: 100 % | TEMPERATURE: 98 F | WEIGHT: 192 LBS

## 2024-12-09 DIAGNOSIS — H25.10 AGE-RELATED NUCLEAR CATARACT: ICD-10-CM

## 2024-12-09 DIAGNOSIS — H25.11 NUCLEAR SCLEROTIC CATARACT OF RIGHT EYE: Primary | ICD-10-CM

## 2024-12-09 LAB — POCT GLUCOSE: 159 MG/DL (ref 70–110)

## 2024-12-09 PROCEDURE — 63600175 PHARM REV CODE 636 W HCPCS: Performed by: OPHTHALMOLOGY

## 2024-12-09 PROCEDURE — 99152 MOD SED SAME PHYS/QHP 5/>YRS: CPT | Performed by: OPHTHALMOLOGY

## 2024-12-09 PROCEDURE — 25000003 PHARM REV CODE 250: Performed by: OPHTHALMOLOGY

## 2024-12-09 PROCEDURE — 71000015 HC POSTOP RECOV 1ST HR: Performed by: OPHTHALMOLOGY

## 2024-12-09 PROCEDURE — 36000706: Performed by: OPHTHALMOLOGY

## 2024-12-09 PROCEDURE — 82962 GLUCOSE BLOOD TEST: CPT | Performed by: OPHTHALMOLOGY

## 2024-12-09 PROCEDURE — 94761 N-INVAS EAR/PLS OXIMETRY MLT: CPT | Mod: 59

## 2024-12-09 PROCEDURE — 99152 MOD SED SAME PHYS/QHP 5/>YRS: CPT | Mod: ,,, | Performed by: OPHTHALMOLOGY

## 2024-12-09 PROCEDURE — 99900035 HC TECH TIME PER 15 MIN (STAT)

## 2024-12-09 PROCEDURE — 66982 XCAPSL CTRC RMVL CPLX WO ECP: CPT | Mod: RT,,, | Performed by: OPHTHALMOLOGY

## 2024-12-09 PROCEDURE — 27201423 OPTIME MED/SURG SUP & DEVICES STERILE SUPPLY: Performed by: OPHTHALMOLOGY

## 2024-12-09 PROCEDURE — V2632 POST CHMBR INTRAOCULAR LENS: HCPCS | Performed by: OPHTHALMOLOGY

## 2024-12-09 PROCEDURE — 36000707: Performed by: OPHTHALMOLOGY

## 2024-12-09 DEVICE — LENS EYHANCE +21.5D: Type: IMPLANTABLE DEVICE | Site: EYE | Status: FUNCTIONAL

## 2024-12-09 RX ORDER — FENTANYL CITRATE 50 UG/ML
25 INJECTION, SOLUTION INTRAMUSCULAR; INTRAVENOUS EVERY 5 MIN PRN
Status: DISCONTINUED | OUTPATIENT
Start: 2024-12-09 | End: 2024-12-09 | Stop reason: HOSPADM

## 2024-12-09 RX ORDER — LIDOCAINE HYDROCHLORIDE 10 MG/ML
INJECTION, SOLUTION EPIDURAL; INFILTRATION; INTRACAUDAL; PERINEURAL
Status: DISCONTINUED | OUTPATIENT
Start: 2024-12-09 | End: 2024-12-09 | Stop reason: HOSPADM

## 2024-12-09 RX ORDER — MIDAZOLAM HYDROCHLORIDE 1 MG/ML
2 INJECTION, SOLUTION INTRAMUSCULAR; INTRAVENOUS
Status: DISCONTINUED | OUTPATIENT
Start: 2024-12-09 | End: 2024-12-09 | Stop reason: HOSPADM

## 2024-12-09 RX ORDER — CYCLOP/TROP/PROPA/PHEN/KET/WAT 1-1-0.1%
1 DROPS (EA) OPHTHALMIC (EYE)
Status: DISCONTINUED | OUTPATIENT
Start: 2024-12-09 | End: 2024-12-09

## 2024-12-09 RX ORDER — TROPICAMIDE 10 MG/ML
1 SOLUTION/ DROPS OPHTHALMIC EVERY 5 MIN PRN
Status: COMPLETED | OUTPATIENT
Start: 2024-12-09 | End: 2024-12-09

## 2024-12-09 RX ORDER — ACETAMINOPHEN 325 MG/1
650 TABLET ORAL EVERY 4 HOURS PRN
Status: DISCONTINUED | OUTPATIENT
Start: 2024-12-09 | End: 2024-12-09 | Stop reason: HOSPADM

## 2024-12-09 RX ORDER — PROPARACAINE HYDROCHLORIDE 5 MG/ML
1 SOLUTION/ DROPS OPHTHALMIC
Status: DISCONTINUED | OUTPATIENT
Start: 2024-12-09 | End: 2024-12-09 | Stop reason: HOSPADM

## 2024-12-09 RX ORDER — LIDOCAINE HYDROCHLORIDE 40 MG/ML
INJECTION, SOLUTION RETROBULBAR
Status: DISCONTINUED | OUTPATIENT
Start: 2024-12-09 | End: 2024-12-09 | Stop reason: HOSPADM

## 2024-12-09 RX ORDER — PROPARACAINE HYDROCHLORIDE 5 MG/ML
SOLUTION/ DROPS OPHTHALMIC
Status: DISCONTINUED | OUTPATIENT
Start: 2024-12-09 | End: 2024-12-09 | Stop reason: HOSPADM

## 2024-12-09 RX ORDER — PREDNISOLONE ACETATE 10 MG/ML
SUSPENSION/ DROPS OPHTHALMIC
Status: DISCONTINUED | OUTPATIENT
Start: 2024-12-09 | End: 2024-12-09 | Stop reason: HOSPADM

## 2024-12-09 RX ORDER — TETRACAINE HYDROCHLORIDE 5 MG/ML
1 SOLUTION OPHTHALMIC EVERY 5 MIN PRN
Status: COMPLETED | OUTPATIENT
Start: 2024-12-09 | End: 2024-12-09

## 2024-12-09 RX ORDER — PHENYLEPHRINE HYDROCHLORIDE 100 MG/ML
1 SOLUTION/ DROPS OPHTHALMIC ONCE
Status: COMPLETED | OUTPATIENT
Start: 2024-12-09 | End: 2024-12-09

## 2024-12-09 RX ORDER — LIDOCAIN/PHENYLEPH/BSS NO.2/PF 1 %-1.5 %
SYRINGE (ML) INTRAOCULAR
Status: DISCONTINUED | OUTPATIENT
Start: 2024-12-09 | End: 2024-12-09 | Stop reason: HOSPADM

## 2024-12-09 RX ORDER — PHENYLEPHRINE HYDROCHLORIDE 25 MG/ML
1 SOLUTION/ DROPS OPHTHALMIC EVERY 5 MIN PRN
Status: COMPLETED | OUTPATIENT
Start: 2024-12-09 | End: 2024-12-09

## 2024-12-09 RX ORDER — MOXIFLOXACIN 5 MG/ML
1 SOLUTION/ DROPS OPHTHALMIC
Status: COMPLETED | OUTPATIENT
Start: 2024-12-09 | End: 2024-12-09

## 2024-12-09 RX ORDER — MOXIFLOXACIN 5 MG/ML
SOLUTION/ DROPS OPHTHALMIC
Status: DISCONTINUED | OUTPATIENT
Start: 2024-12-09 | End: 2024-12-09 | Stop reason: HOSPADM

## 2024-12-09 RX ADMIN — TROPICAMIDE 1 DROP: 10 SOLUTION/ DROPS OPHTHALMIC at 09:12

## 2024-12-09 RX ADMIN — TETRACAINE HYDROCHLORIDE 1 DROP: 5 SOLUTION OPHTHALMIC at 09:12

## 2024-12-09 RX ADMIN — MOXIFLOXACIN 1 DROP: 5 SOLUTION/ DROPS OPHTHALMIC at 10:12

## 2024-12-09 RX ADMIN — MIDAZOLAM HYDROCHLORIDE 2 MG: 1 INJECTION, SOLUTION INTRAMUSCULAR; INTRAVENOUS at 10:12

## 2024-12-09 RX ADMIN — PHENYLEPHRINE HYDROCHLORIDE 1 DROP: 25 SOLUTION/ DROPS OPHTHALMIC at 09:12

## 2024-12-09 RX ADMIN — MOXIFLOXACIN OPHTHALMIC 1 DROP: 5 SOLUTION/ DROPS OPHTHALMIC at 09:12

## 2024-12-09 RX ADMIN — PHENYLEPHRINE HYDROCHLORIDE 1 DROP: 100 SOLUTION/ DROPS OPHTHALMIC at 10:12

## 2024-12-09 NOTE — OP NOTE
DATE OF PROCEDURE: 12/09/2024    SURGEON: ALEX GOMES MD    PREOPERATIVE DIAGNOSIS:  1. Senile nuclear sclerotic cataract right eye. 2. 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 2. 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    IMPLANT:  DIB00 21.5    Anesthesia: Moderate sedation with topical Lidocaine.     Patient ID confirmed and re-evaluated the patient and anesthesia plan confirming it is suitable for the patient's condition and procedure.     COMPLICATIONS: none    ESTIMATED BLOOD LOSS: <1cc    SPECIMENS: none    INDICATIONS FOR PROCEDURE:   The patient has a history of painless progressive vision loss.  The patient has described difficulties with activities of daily living, which specifically include driving, which is secondary to cataract formation and progression. After we had a thorough discussion about risks, benefits, and alternatives to cataract surgery, the patient agreed to proceed with phacoemulsification and implantation of a lens in the right eye.  These risks include, but are not limited to, hemorrhage, pain, infection, need for additional surgery, need for glasses or contacts, loss of vision, or even loss of the eye.       PROCEDURE IN DETAIL:  The patient was met in the preop holding area.  Consent was confirmed to be signed.  The operative site was marked.  The patient was brought into the operating room by the anesthesia team and placed under monitored anesthesia care.  The right eye was prepped and draped in a sterile ophthalmic fashion.  A Nathan speculum was placed into the right eye.   A paracentesis site was made and 1% preservative-free lidocaine was injected into the anterior chamber.  Viscoelastic  material was injected into the anterior  chamber.  A keratome blade was used to make a clear corneal incision. The malyugin ring was inserted and positioned into place, assisting with pupillary dilation.  A cystotome was used to initiate the continuous curvilinear capsulorrhexis which was completed with Utrata forceps.  BSS on a almendarez cannula was used to perform hydrodissection.  The phacoemulsification tip was introduced into the eye and the nucleus was removed in a standard divide-and-conquer fashion.  Remaining cortical material was removed from the eye using irrigation-aspiration.  The capsular bag was filled with viscoelastic material and the intraocular lens was injected and positioned into place. The Malyugin ring was removed from the eye. Remaining viscoelastic material was removed from the eye using irrigation and aspiration.  The corneal wounds were hydrated.  The eye was filled to physiologic pressure. The wounds were found to be watertight. Drops of Vigamox and prednisilone were placed into the eye.  The eye was washed, dried, and shielded.  The patient tolerated the procedure well and knows to follow up with me tomorrow morning, sooner if needed.      Under my direct supervision, intravenous moderate sedation was administered during the course of this procedure, with continuous monitoring of hemodynamic parameters.  Monitoring of the patient's vital signs and respiratory status was provided by trained nursing staff during the entire course of the procedure and under my supervision and recorded in the patient's medical record.  The total time for sedation was 13 minutes.

## 2024-12-09 NOTE — DISCHARGE INSTRUCTIONS
Dr. Singleton     Cataract Post-Operative Instructions       Day of surgery:     -Resume drops THREE times daily into the operative eye.     -Do not rub your eye     -Wear protective sunglasses during the day.     -Resume moderate activity.     -Bathe/shower/wash face normally     -Do not apply makeup around the operative eye for 1 week.     -You should expect:     Blurry vision and halos for 24-48 hours     Dilated pupil for 24-48 hours     Scratchy feeling in the eye for 1-2 days     Curved shadow in your peripheral vision for 2-3 weeks     Occasional flickering of lights for up to 1 week     -If you experience severe pain or nausea, call Dr. Singleton or the on-call doctor at [phone].       Plan to see Dr. Singleton at:     [address]  [address]  2:30 p.m  **Most patients can drive the next morning.  If you do not feel comfortable driving, please arrange for transportation. **

## 2024-12-09 NOTE — DISCHARGE SUMMARY
[facility]  Discharge Note  Short Stay    Procedure(s) (LRB):  EXTRACTION, CATARACT, WITH IOL INSERTION (Right)  BRIEF DISCHARGE NOTE:    Date of discharge: 12/09/2024    Reason for hospitalization -  Cataract surgery     Final Diagnosis - Visually significant Cataract    Procedures and treatment provided - Status post phacoemulsification with placement of intraocular lens     Diet - Advance to regular as tolerated    Activity - as tolerated    Disposition at the end of the case - Good.    Discharge: to home    The patient tolerated the procedure well and knows to follow up with me tomorrow in the eye clinic, sooner if needed.    Patient and family instructions (as appropriate) - Given to patient on discharge    Brielle Singleton MD

## 2024-12-10 ENCOUNTER — OFFICE VISIT (OUTPATIENT)
Dept: OPHTHALMOLOGY | Facility: CLINIC | Age: 69
End: 2024-12-10
Payer: MEDICARE

## 2024-12-10 ENCOUNTER — CLINICAL SUPPORT (OUTPATIENT)
Dept: REHABILITATION | Facility: HOSPITAL | Age: 69
End: 2024-12-10
Payer: MEDICARE

## 2024-12-10 DIAGNOSIS — R52 PAIN: Primary | ICD-10-CM

## 2024-12-10 DIAGNOSIS — H25.12 NUCLEAR SCLEROSIS OF LEFT EYE: ICD-10-CM

## 2024-12-10 DIAGNOSIS — Z98.41 STATUS POST CATARACT EXTRACTION AND INSERTION OF INTRAOCULAR LENS, RIGHT: Primary | ICD-10-CM

## 2024-12-10 DIAGNOSIS — Z96.1 STATUS POST CATARACT EXTRACTION AND INSERTION OF INTRAOCULAR LENS, RIGHT: Primary | ICD-10-CM

## 2024-12-10 PROCEDURE — 97140 MANUAL THERAPY 1/> REGIONS: CPT | Mod: KX | Performed by: PHYSICAL THERAPIST

## 2024-12-10 PROCEDURE — 3066F NEPHROPATHY DOC TX: CPT | Mod: CPTII,S$GLB,, | Performed by: OPHTHALMOLOGY

## 2024-12-10 PROCEDURE — 97014 ELECTRIC STIMULATION THERAPY: CPT | Mod: KX | Performed by: PHYSICAL THERAPIST

## 2024-12-10 PROCEDURE — 1126F AMNT PAIN NOTED NONE PRSNT: CPT | Mod: CPTII,S$GLB,, | Performed by: OPHTHALMOLOGY

## 2024-12-10 PROCEDURE — 3060F POS MICROALBUMINURIA REV: CPT | Mod: CPTII,S$GLB,, | Performed by: OPHTHALMOLOGY

## 2024-12-10 PROCEDURE — 99024 POSTOP FOLLOW-UP VISIT: CPT | Mod: S$GLB,,, | Performed by: OPHTHALMOLOGY

## 2024-12-10 PROCEDURE — 1101F PT FALLS ASSESS-DOCD LE1/YR: CPT | Mod: CPTII,S$GLB,, | Performed by: OPHTHALMOLOGY

## 2024-12-10 PROCEDURE — 99999 PR PBB SHADOW E&M-EST. PATIENT-LVL III: CPT | Mod: PBBFAC,,, | Performed by: OPHTHALMOLOGY

## 2024-12-10 PROCEDURE — 1159F MED LIST DOCD IN RCRD: CPT | Mod: CPTII,S$GLB,, | Performed by: OPHTHALMOLOGY

## 2024-12-10 PROCEDURE — 20560 NDL INSJ W/O NJX 1 OR 2 MUSC: CPT | Mod: KX | Performed by: PHYSICAL THERAPIST

## 2024-12-10 PROCEDURE — 3288F FALL RISK ASSESSMENT DOCD: CPT | Mod: CPTII,S$GLB,, | Performed by: OPHTHALMOLOGY

## 2024-12-10 PROCEDURE — 3051F HG A1C>EQUAL 7.0%<8.0%: CPT | Mod: CPTII,S$GLB,, | Performed by: OPHTHALMOLOGY

## 2024-12-10 NOTE — PROGRESS NOTES
Physical Therapy Daily Treatment Note     Name: Diana Montenegro  Clinic Number: [id number]    Therapy Diagnosis:   Encounter Diagnosis   Name Primary?    Pain Yes     Physician: Rad Johnston MD    Visit Date: 12/10/2024  Physician Orders: PT Eval and Treat   Medical Diagnosis from Referral: M54.2 (ICD-10-CM) - Neck pain  Evaluation Date: 12/5/2024  Authorization Period Expiration: 11-21-25  Plan of Care Expiration: 1-30-25  Visit # / Visits authorized: 1/ 1   FOTO: 1/5    Time In: 1:00 pm  Time Out: 1:45 pm  Total Billable Time: 35 minutes    Precautions: Standard    Subjective     Pt reports: neck feels better.  Liked DN.  She was compliant with home exercise program.  Response to previous treatment: less pain  Functional change: na    Pain: 0/10  Location: bilateral upper traps    Objective     Mild TTP B UT.    Kaleigh received therapeutic exercises to develop ROM, flexibility, and posture for 5 minutes including:  HEP review  Posture education    Kaleigh received the following manual therapy techniques: Joint mobilizations, Manual traction, and Soft tissue Mobilization were applied to the: neck/UT B for 30 minutes, including:  Manual SO release/Ctxn/PROM  Dry needling B UT with 4 30 mm needles + Estim     Kaleigh received hot pack for 10 minutes to neck/UT.      Home Exercises Provided and Patient Education Provided     Education provided:   - correct posture    Written Home Exercises Provided: Patient instructed to cont prior HEP.  Exercises were reviewed and Kaleigh was able to demonstrate them prior to the end of the session.  Kaleigh demonstrated good  understanding of the education provided.     See EMR under Patient Instructions for exercises provided prior visit.    Assessment     Tolerated DN well.  Sx decreased.  Kaleigh Is progressing well towards her goals.   Pt prognosis is Good.     Pt will continue to benefit from skilled outpatient physical therapy to address the deficits listed in the problem list box on  initial evaluation, provide pt/family education and to maximize pt's level of independence in the home and community environment.     Pt's spiritual, cultural and educational needs considered and pt agreeable to plan of care and goals.    Anticipated barriers to physical therapy: none    Goals: Short Term Goals: 2 weeks         1.   Independent with HEP        2.  Pt will report decreased pain level of < 50% from above measure with ADL     Long Term Goals:   GOALS:    6_   weeks. Pt agrees with goals set.  Independent with HEP.  Report decreased    neck/UT    pain  <   / =  3/10 with ADL such as dressing  Increased MMT  for  neck to 4+/5 to 5/5  with ADL such as house work  Increased arom  for  neck to WNL with functional activities such cleaning or self-care       Plan     Continue PT per POC.    Carlos Frazier, PT

## 2024-12-10 NOTE — PROGRESS NOTES
HPI    Dr. Ni    12/09/24 Complex phacoemulsification with placement of intraocular lens,   right eye, with placement of a Malyugin ring.  NPDR OU  DM2-PATIENT CURRENTLY TAKING GLP-1 AGONIST: Semaglutide (Ozempic, Rybelsus   oral)   GERARDO OU  Cataracts OS    PMB TID OD    Patient is here today for 1 day phaco OD. Vision looks a little hazy. No   pain or discomfort.   Last edited by Dorothy Zamora on 12/10/2024  2:31 PM.            Assessment /Plan     For exam results, see Encounter Report.    Status post cataract extraction and insertion of intraocular lens, right    Nuclear sclerosis of left eye                       Slit Lamp Exam  L/L - normal  C/s - quiet  Cornea - clear  A/C - 1+ cell  Lens - PCIOL    POD #1 s/p phaco/IOL  - doing well  - continue the following drops:    vigamox or ocuflox TID x 1 wk then stop  Pred forte TID x  4 wks  Ketorolac TID until runs out    Versus:    Combination drop - 1 drop TID x total of 1 month    Appropriate precautions and post op medications reviewed.  Patient instructed to call or come in if symptoms of redness, decreased vision, or pain are experienced.    -f/up 1-2 wks, sooner PRN. Or 4 wks with [facility] for mrx if needed.

## 2024-12-12 ENCOUNTER — DOCUMENTATION ONLY (OUTPATIENT)
Dept: REHABILITATION | Facility: HOSPITAL | Age: 69
End: 2024-12-12
Payer: MEDICARE

## 2024-12-12 NOTE — PROGRESS NOTES
Health  Wellness Visit Note    Name: Diana Montenegro  Clinic Number: [id number]  Physician: No ref. provider found  Diagnosis: No diagnosis found.  Past Medical History:   Diagnosis Date    Arthritis     Atrial fibrillation, unspecified     hx of a fib prior to stroke.    Chronic back pain     Chronic kidney disease, stage 3a 06/01/2023    Colon polyp     CVA (cerebral infarction) 07/16/2014    Degenerative disc disease     cervical; lumbar    Diabetes mellitus type II     Encounter for loop recorder at end of battery life 09/17/2018    Hyperlipidemia     Hypertension     Hypothyroidism     Mild nonproliferative diabetic retinopathy 08/12/2018    Obesity     Sleep apnea     Stroke 07/2014    Venous insufficiency (chronic) (peripheral)      Visit Number: 28  Precautions: Standard, Fall Risk, History of Stroke      1st PT visit:  07/16/2024  Year of care end date:  July 2025  Mindbody plan: 60---9 Months  Patient level: C    Time In: 1:30 PM  Time Out: 2:20 PM  Total Treatment Time: 50 Minutes    Wellness [employer] 2022  Handout on this week's wellness topic Coping provided along with a discussion on what it means, the benefits, and suggestions for practice.  Reviewed last week's topic of Stress.     Subjective:   Patient reports she has a lot of hip pain today. She believes her hip pain stems from the weather change. She is also still having neck pain. She started physical therapy for her neck last week. She reports they are doing dry needling and cupping, which helping subside her pain. When she is home she takes her shower head and applies warm water to her neck and shoulders. Patient did not go to the gym yet. She had cataract surgery Monday, so she has yet to swim in the pool either. Patient has not iced at home.     HC and patient discussed full range of motion on leg press before increasing weight.     Objective:   Diana completed therapeutic stretches (EIL, SAIDA) and the following MedX exercise machines:  core lumbar, torso rotation l/r, leg extension, leg curl, upright row, chest press, biceps curl, triceps extension, leg press    See exercise log in patient folder for rate of exertion and repetitions completed.       Fitness Machine Education Key:  E=education on equipment initiated and further follow up and education needed  I=independent with  and exercise.  The patient:  Adjusts machines to his/her settings  Uses equipment levers, pins, weights safely  Maintains safe and correct posture while exercising  Moves through exercise with correct pace and control  Gets on and off equipment safely      Lumbar/Cervical Ext. E Torso Rotation E Leg Press    Leg Extension  Seated Leg Curl  Chest Press    Seated Row  Hip ADD  Hip ABD    Triceps Extension  Bicep Curl  Other:      [x] Indicates exercise has been taught for home  Lumbar/Cervical Ext. [] Torso Rotation [] Leg Press []   Leg Extension [] Seated Leg Curl [] Chest Press []   Seated Row [] Hip ADD [] Hip ABD []   Triceps Extension [] Bicep Curl [] Other:        Assessment:   Patient tolerated Patient tolerated MedX Core Lumbar Strength and all other peripheral exercises without an increase in symptoms. Patient warmed up on nustep for 5 minutes, stretched, and iced low back for 5 minutes after the workout.     Plan:  Continue with established plan of care towards wellness goals.     Health  : Ann-Marie Bey  12/12/2024

## 2024-12-13 ENCOUNTER — CLINICAL SUPPORT (OUTPATIENT)
Dept: REHABILITATION | Facility: HOSPITAL | Age: 69
End: 2024-12-13
Payer: MEDICARE

## 2024-12-13 DIAGNOSIS — M54.2 NECK PAIN: Primary | ICD-10-CM

## 2024-12-13 PROCEDURE — 97110 THERAPEUTIC EXERCISES: CPT | Mod: CQ

## 2024-12-13 PROCEDURE — 97140 MANUAL THERAPY 1/> REGIONS: CPT | Mod: CQ

## 2024-12-13 NOTE — PROGRESS NOTES
Physical Therapy Daily Treatment Note     Name: Diana Montenegro  Clinic Number: [id number]    Therapy Diagnosis:   Encounter Diagnosis   Name Primary?    Neck pain Yes       Physician: Rad Johnston MD    Visit Date: 12/13/2024  Physician Orders: PT Eval and Treat   Medical Diagnosis from Referral: M54.2 (ICD-10-CM) - Neck pain  Evaluation Date: 12/5/2024  Authorization Period Expiration: 11-21-25  Plan of Care Expiration: 1-30-25  Visit # / Visits authorized: 1/ 1 2/6  FOTO: 1/5    Time In: 1:45 pm  Time Out: 2:25 pm  Total Billable Time: 35 minutes    Precautions: Standard    Subjective     Pt reports: she feels that the needling did help.  She was compliant with home exercise program.  Response to previous treatment: less pain  Functional change: na    Pain: 0/10  Location: bilateral upper traps    Objective         Kaleigh received therapeutic exercises to develop ROM, flexibility, and posture for 20 minutes including:  HEP review  Posture education  Neck retractions  Scapular retractions  Upper trap stretch  Levator stretch  Shrugs     Kaleigh received the following manual therapy techniques: Cupping were applied to the: neck/UT B for 10 minutes.  Manual SO release/Ctxn/PROM NT  Dry needling B UT with 4 30 mm needles + Estim NT       Kaleigh received hot pack for 10 minutes to neck/UT.      Home Exercises Provided and Patient Education Provided     Education provided:   - correct posture    Written Home Exercises Provided: Patient instructed to cont prior HEP.  Exercises were reviewed and Kaleigh was able to demonstrate them prior to the end of the session.  Kaleigh demonstrated good  understanding of the education provided.     See EMR under Patient Instructions for exercises provided prior visit.    Assessment     Tolerated addition of exercises aimed at cervical paraspinals and periscapular strengthening and tissue extensibility.  Kaleigh Is progressing well towards her goals.   Pt prognosis is Good.     Pt will  continue to benefit from skilled outpatient physical therapy to address the deficits listed in the problem list box on initial evaluation, provide pt/family education and to maximize pt's level of independence in the home and community environment.     Pt's spiritual, cultural and educational needs considered and pt agreeable to plan of care and goals.    Anticipated barriers to physical therapy: none    Goals: Short Term Goals: 2 weeks         1.   Independent with HEP        2.  Pt will report decreased pain level of < 50% from above measure with ADL     Long Term Goals:   GOALS:    6_   weeks. Pt agrees with goals set.  Independent with HEP.  Report decreased    neck/UT    pain  <   / =  3/10 with ADL such as dressing  Increased MMT  for  neck to 4+/5 to 5/5  with ADL such as house work  Increased arom  for  neck to WNL with functional activities such cleaning or self-care       Plan     Continue PT per POC.    Kobi Trejo, PTA

## 2024-12-15 NOTE — TELEPHONE ENCOUNTER
No care due was identified.  Health [facility] Embedded Care Due Messages. Reference number: [id number].   12/14/2024 6:37:16 PM CST

## 2024-12-16 RX ORDER — ALBUTEROL SULFATE 90 UG/1
2 INHALANT RESPIRATORY (INHALATION) EVERY 6 HOURS PRN
Qty: 21.1 G | Refills: 3 | Status: SHIPPED | OUTPATIENT
Start: 2024-12-16

## 2024-12-16 NOTE — TELEPHONE ENCOUNTER
Refill Decision Note   Diana Montenegro  is requesting a refill authorization.  Brief Assessment and Rationale for Refill:  Approve     Medication Therapy Plan:         Alert overridden per protocol: Yes   Comments:     Note composed:9:46 AM 12/16/2024             Appointments     Last Visit   11/21/2024 Rad Johnston MD   Next Visit   3/25/2025 Rad Johnston MD

## 2024-12-17 ENCOUNTER — CLINICAL SUPPORT (OUTPATIENT)
Dept: REHABILITATION | Facility: HOSPITAL | Age: 69
End: 2024-12-17
Payer: MEDICARE

## 2024-12-17 DIAGNOSIS — R52 PAIN: Primary | ICD-10-CM

## 2024-12-17 PROCEDURE — 97140 MANUAL THERAPY 1/> REGIONS: CPT | Mod: KX | Performed by: PHYSICAL THERAPIST

## 2024-12-17 PROCEDURE — 20560 NDL INSJ W/O NJX 1 OR 2 MUSC: CPT | Mod: KX | Performed by: PHYSICAL THERAPIST

## 2024-12-17 PROCEDURE — 97014 ELECTRIC STIMULATION THERAPY: CPT | Mod: KX | Performed by: PHYSICAL THERAPIST

## 2024-12-17 NOTE — PROGRESS NOTES
Physical Therapy Daily Treatment Note     Name: Diana Montenegro  Clinic Number: [id number]    Therapy Diagnosis:   Encounter Diagnosis   Name Primary?    Pain Yes     Physician: Rad Johnston MD    Visit Date: 12/17/2024  Physician Orders: PT Eval and Treat   Medical Diagnosis from Referral: M54.2 (ICD-10-CM) - Neck pain  Evaluation Date: 12/5/2024  Authorization Period Expiration: 11-21-25  Plan of Care Expiration: 1-30-25  Visit # / Visits authorized: 1/ 1, 3/6  FOTO: 3/5    Time In: 1:45 pm  Time Out: 2:30 pm  Total Billable Time: 35 minutes    Precautions: Standard    Subjective     Pt reports: no neck pain or UE sx.  Only occasional pain.  She was compliant with home exercise program.  Response to previous treatment: less pain  Functional change: improved driving    Pain: 0/10  Location: bilateral upper traps    Objective     MIN TTP B UT. C-AROM is WNL.    Kaleigh received therapeutic exercises to develop ROM, flexibility, and posture for 5 minutes including:  HEP review  Posture education    Kaleigh received the following manual therapy techniques: Joint mobilizations, Manual traction, and Soft tissue Mobilization were applied to the: neck/UT B for 30 minutes, including:  Manual SO release/Ctxn/PROM  Dry needling B UT with 4 30 mm needles + Estim     Kaleigh received hot pack for 10 minutes to neck/UT.      Home Exercises Provided and Patient Education Provided     Education provided:   - correct posture    Written Home Exercises Provided: Patient instructed to cont prior HEP.  Exercises were reviewed and Kaleigh was able to demonstrate them prior to the end of the session.  Kaleigh demonstrated good  understanding of the education provided.     See EMR under Patient Instructions for exercises provided prior visit.    Assessment     Tolerated DN well.  No pain.  Progressing well.  Kaleigh Is progressing well towards her goals.   Pt prognosis is Good.     Pt will continue to benefit from skilled outpatient physical  therapy to address the deficits listed in the problem list box on initial evaluation, provide pt/family education and to maximize pt's level of independence in the home and community environment.     Pt's spiritual, cultural and educational needs considered and pt agreeable to plan of care and goals.    Anticipated barriers to physical therapy: none    Goals: Short Term Goals: 2 weeks         1.   Independent with HEP        2.  Pt will report decreased pain level of < 50% from above measure with ADL     Long Term Goals:   GOALS:    6_   weeks. Pt agrees with goals set.  Independent with HEP.  Report decreased    neck/UT    pain  <   / =  3/10 with ADL such as dressing  Increased MMT  for  neck to 4+/5 to 5/5  with ADL such as house work  Increased arom  for  neck to WNL with functional activities such cleaning or self-care       Plan     Continue PT per POC.    Carlos Frazier, PT

## 2024-12-19 ENCOUNTER — DOCUMENTATION ONLY (OUTPATIENT)
Dept: REHABILITATION | Facility: HOSPITAL | Age: 69
End: 2024-12-19
Payer: MEDICARE

## 2024-12-19 NOTE — PROGRESS NOTES
Health  Wellness Visit Note    Name: Diana Montenegro  Clinic Number: [id number]  Physician: No ref. provider found  Diagnosis: No diagnosis found.  Past Medical History:   Diagnosis Date    Arthritis     Atrial fibrillation, unspecified     hx of a fib prior to stroke.    Chronic back pain     Chronic kidney disease, stage 3a 06/01/2023    Colon polyp     CVA (cerebral infarction) 07/16/2014    Degenerative disc disease     cervical; lumbar    Diabetes mellitus type II     Encounter for loop recorder at end of battery life 09/17/2018    Hyperlipidemia     Hypertension     Hypothyroidism     Mild nonproliferative diabetic retinopathy 08/12/2018    Obesity     Sleep apnea     Stroke 07/2014    Venous insufficiency (chronic) (peripheral)      Visit Number: 28  Precautions: Standard, Fall Risk, History of Stroke      1st PT visit:  07/16/2024  Year of care end date:  July 2025  Mindbody plan: 60---9 Months  Patient level: C    Time In: 1:30 PM  Time Out: 2:30 PM  Total Treatment Time: 60 Minutes    Wellness [employer] 2022  Handout on this week's wellness topic Time Management provided along with a discussion on what it means, the benefits, and suggestions for practice.  Reviewed last week's topic of Coping.      Subjective:   Patient reports no complaints of back pain. She is starting to have some knee pain and difficulty with straightening her knee. She plans to follow-up with her MD again in a few months if pain progresses. She has been going consistently to physical therapy for her neck. She reports the dry needling has helped her the most. Patient plans to get back to the gym in the next two weeks once her optometrist clears her to get in the pool again. Patient continued to use the warm water on her back, neck and shoulder at home.    HC and patient discussed full range of motion on leg press before increasing weight.     Objective:   Diana completed therapeutic stretches (EIL, SAIDA) and the following MedX  exercise machines: core lumbar, torso rotation l/r, leg extension, leg curl, upright row, chest press, biceps curl, triceps extension, leg press    See exercise log in patient folder for rate of exertion and repetitions completed.       Fitness Machine Education Key:  E=education on equipment initiated and further follow up and education needed  I=independent with  and exercise.  The patient:  Adjusts machines to his/her settings  Uses equipment levers, pins, weights safely  Maintains safe and correct posture while exercising  Moves through exercise with correct pace and control  Gets on and off equipment safely      Lumbar/Cervical Ext. E Torso Rotation E Leg Press    Leg Extension  Seated Leg Curl  Chest Press    Seated Row  Hip ADD  Hip ABD    Triceps Extension  Bicep Curl  Other:      [x] Indicates exercise has been taught for home  Lumbar/Cervical Ext. [] Torso Rotation [] Leg Press []   Leg Extension [] Seated Leg Curl [] Chest Press []   Seated Row [] Hip ADD [] Hip ABD []   Triceps Extension [] Bicep Curl [] Other:        Assessment:   Patient tolerated Patient tolerated MedX Core Lumbar Strength and all other peripheral exercises without an increase in symptoms. Patient warmed up on nustep for 5 minutes, stretched, and iced low back for 5 minutes after the workout.     Plan:  Continue with established plan of care towards wellness goals.     Health  : Ann-Marie Bey  12/19/2024

## 2024-12-20 ENCOUNTER — PATIENT MESSAGE (OUTPATIENT)
Dept: OPHTHALMOLOGY | Facility: CLINIC | Age: 69
End: 2024-12-20

## 2024-12-20 ENCOUNTER — OFFICE VISIT (OUTPATIENT)
Dept: OPHTHALMOLOGY | Facility: CLINIC | Age: 69
End: 2024-12-20
Payer: MEDICARE

## 2024-12-20 ENCOUNTER — TELEPHONE (OUTPATIENT)
Dept: OPHTHALMOLOGY | Facility: CLINIC | Age: 69
End: 2024-12-20
Payer: MEDICARE

## 2024-12-20 DIAGNOSIS — Z96.1 STATUS POST CATARACT EXTRACTION AND INSERTION OF INTRAOCULAR LENS, RIGHT: Primary | ICD-10-CM

## 2024-12-20 DIAGNOSIS — H25.12 NUCLEAR SCLEROSIS OF LEFT EYE: ICD-10-CM

## 2024-12-20 DIAGNOSIS — Z98.41 STATUS POST CATARACT EXTRACTION AND INSERTION OF INTRAOCULAR LENS, RIGHT: Primary | ICD-10-CM

## 2024-12-20 PROCEDURE — 1101F PT FALLS ASSESS-DOCD LE1/YR: CPT | Mod: CPTII,S$GLB,, | Performed by: OPHTHALMOLOGY

## 2024-12-20 PROCEDURE — 3288F FALL RISK ASSESSMENT DOCD: CPT | Mod: CPTII,S$GLB,, | Performed by: OPHTHALMOLOGY

## 2024-12-20 PROCEDURE — 99024 POSTOP FOLLOW-UP VISIT: CPT | Mod: S$GLB,,, | Performed by: OPHTHALMOLOGY

## 2024-12-20 PROCEDURE — 1126F AMNT PAIN NOTED NONE PRSNT: CPT | Mod: CPTII,S$GLB,, | Performed by: OPHTHALMOLOGY

## 2024-12-20 PROCEDURE — 99999 PR PBB SHADOW E&M-EST. PATIENT-LVL III: CPT | Mod: PBBFAC,,, | Performed by: OPHTHALMOLOGY

## 2024-12-20 PROCEDURE — 3051F HG A1C>EQUAL 7.0%<8.0%: CPT | Mod: CPTII,S$GLB,, | Performed by: OPHTHALMOLOGY

## 2024-12-20 PROCEDURE — 3060F POS MICROALBUMINURIA REV: CPT | Mod: CPTII,S$GLB,, | Performed by: OPHTHALMOLOGY

## 2024-12-20 PROCEDURE — 3066F NEPHROPATHY DOC TX: CPT | Mod: CPTII,S$GLB,, | Performed by: OPHTHALMOLOGY

## 2024-12-20 PROCEDURE — 1159F MED LIST DOCD IN RCRD: CPT | Mod: CPTII,S$GLB,, | Performed by: OPHTHALMOLOGY

## 2024-12-20 NOTE — TELEPHONE ENCOUNTER
Spoke to pt and informed her that she can go back to her water aerobic exercise as long as she is wearing goggles to prevent water getting in here eye.       Can I return to the pool for aerobics and swimming.

## 2024-12-22 DIAGNOSIS — E78.5 HYPERLIPIDEMIA, UNSPECIFIED HYPERLIPIDEMIA TYPE: ICD-10-CM

## 2024-12-22 RX ORDER — ROSUVASTATIN CALCIUM 40 MG/1
TABLET, COATED ORAL
Qty: 90 TABLET | Refills: 3 | Status: SHIPPED | OUTPATIENT
Start: 2024-12-22

## 2024-12-22 NOTE — TELEPHONE ENCOUNTER
No care due was identified.  Health [facility] Embedded Care Due Messages. Reference number: [id number].   12/22/2024 8:01:45 AM CST

## 2024-12-23 NOTE — TELEPHONE ENCOUNTER
Refill Decision Note   Diana Montenegro  is requesting a refill authorization.  Brief Assessment and Rationale for Refill:  Approve     Medication Therapy Plan:        Comments:     Note composed:6:37 PM 12/22/2024

## 2024-12-24 NOTE — PROGRESS NOTES
HPI    Dr. Ni    12/09/24 Complex phacoemulsification with placement of intraocular lens,   right eye, with placement of a Malyugin ring.  NPDR OU  DM2-PATIENT CURRENTLY TAKING GLP-1 AGONIST: Semaglutide (Ozempic, Rybelsus   oral)   GERARDO OU  Cataracts OS    PMB TID OD    Patient is here today for 2 week post op of right eye.  Pt. States vision is doing good.  Pt. Denies pain or discomfort.  Last edited by Ivelisse Holland on 12/20/2024  3:22 PM.            Assessment /Plan     For exam results, see Encounter Report.    Status post cataract extraction and insertion of intraocular lens, right    Nuclear sclerosis of left eye            PO week #1 s/p phaco/IOL -    - doing well, no issues    Continue combo drops for a total of 1 month versus: d/c abx gtt, continue PF/ketorolocTID for total of 1 month    - f/up 3-4 wks for MRx, DFE with optom, sooner PRN.    Type 2 diabetes mellitus with both eyes affected by moderate nonproliferative retinopathy without macular edema, without long-term current use of insulin  No ME today.  Pt reports h/o Ey for ME OD, last one April 2024     F/up TERRENCE as scheduled.

## 2024-12-31 ENCOUNTER — DOCUMENTATION ONLY (OUTPATIENT)
Dept: REHABILITATION | Facility: HOSPITAL | Age: 69
End: 2024-12-31
Payer: MEDICARE

## 2024-12-31 NOTE — PROGRESS NOTES
Health  Wellness Visit Note    Name: Diana Montenegro  Clinic Number: [id number]  Physician: No ref. provider found  Diagnosis: No diagnosis found.  Past Medical History:   Diagnosis Date    Arthritis     Atrial fibrillation, unspecified     hx of a fib prior to stroke.    Chronic back pain     Chronic kidney disease, stage 3a 06/01/2023    Colon polyp     CVA (cerebral infarction) 07/16/2014    Degenerative disc disease     cervical; lumbar    Diabetes mellitus type II     Encounter for loop recorder at end of battery life 09/17/2018    Hyperlipidemia     Hypertension     Hypothyroidism     Mild nonproliferative diabetic retinopathy 08/12/2018    Obesity     Sleep apnea     Stroke 07/2014    Venous insufficiency (chronic) (peripheral)      Visit Number: 30  Precautions: Standard, Fall Risk, History of Stroke      1st PT visit:  07/16/2024  Year of care end date:  July 2025  Mindbody plan: 60---9 Months  Patient level: C    Time In: 1:30 PM  Time Out: 2:30 PM  Total Treatment Time: 60 Minutes    Wellness [employer] 2022  Handout on this week's wellness topic n/a (no weekly handout) provided along with a discussion on what it means, the benefits, and suggestions for practice.  Reviewed last week's topic of n/a (patient did not attend Wellness last week).     Subjective:   Patient reports no complaints of back pain. A few weeks ago she started having some knee pain and difficulty with straightening her knee. She plans to follow-up with her MD again in a few months if pain progresses. She has been going consistently to physical therapy for her neck. She reports the dry needling has helped her the most.She has one more visit before possible discharge.  Patient went to the gym and attended water aerobics class. HC and patient discussed a slow progression back to the weights. She does not ice at home.     HC and patient discussed full range of motion on leg press before increasing weight.     Objective:   Diana  completed therapeutic stretches (EIL, SAIDA) and the following MedX exercise machines: core lumbar, torso rotation l/r, leg extension, leg curl, upright row, chest press, biceps curl, triceps extension, leg press    See exercise log in patient folder for rate of exertion and repetitions completed.       Fitness Machine Education Key:  E=education on equipment initiated and further follow up and education needed  I=independent with  and exercise.  The patient:  Adjusts machines to his/her settings  Uses equipment levers, pins, weights safely  Maintains safe and correct posture while exercising  Moves through exercise with correct pace and control  Gets on and off equipment safely      Lumbar/Cervical Ext. E Torso Rotation E Leg Press    Leg Extension  Seated Leg Curl  Chest Press    Seated Row  Hip ADD  Hip ABD    Triceps Extension  Bicep Curl  Other:      [x] Indicates exercise has been taught for home  Lumbar/Cervical Ext. [] Torso Rotation [] Leg Press []   Leg Extension [] Seated Leg Curl [] Chest Press []   Seated Row [] Hip ADD [] Hip ABD []   Triceps Extension [] Bicep Curl [] Other:        Assessment:   Patient tolerated Patient tolerated MedX Core Lumbar Strength and all other peripheral exercises without an increase in symptoms. Patient warmed up on nustep for 5 minutes, stretched, and iced low back for 5 minutes after the workout.     Plan:  Continue with established plan of care towards wellness goals.     Health  : Ann-Marie Bey  12/31/2024

## 2025-01-02 ENCOUNTER — CLINICAL SUPPORT (OUTPATIENT)
Dept: REHABILITATION | Facility: HOSPITAL | Age: 70
End: 2025-01-02
Payer: MEDICARE

## 2025-01-02 ENCOUNTER — TELEPHONE (OUTPATIENT)
Dept: OPHTHALMOLOGY | Facility: CLINIC | Age: 70
End: 2025-01-02
Payer: MEDICARE

## 2025-01-02 DIAGNOSIS — H25.12 NUCLEAR SCLEROSIS OF LEFT EYE: Primary | ICD-10-CM

## 2025-01-02 DIAGNOSIS — R52 PAIN: Primary | ICD-10-CM

## 2025-01-02 PROCEDURE — 97014 ELECTRIC STIMULATION THERAPY: CPT | Performed by: PHYSICAL THERAPIST

## 2025-01-02 PROCEDURE — 20560 NDL INSJ W/O NJX 1 OR 2 MUSC: CPT | Performed by: PHYSICAL THERAPIST

## 2025-01-02 PROCEDURE — 97140 MANUAL THERAPY 1/> REGIONS: CPT | Performed by: PHYSICAL THERAPIST

## 2025-01-02 NOTE — PROGRESS NOTES
Physical Therapy Daily Treatment Note     Name: Diana Montenegro  Clinic Number: [id number]    Therapy Diagnosis:   Encounter Diagnosis   Name Primary?    Pain Yes     Physician: Rad Johnston MD    Visit Date: 1/2/2025  Physician Orders: PT Eval and Treat   Medical Diagnosis from Referral: M54.2 (ICD-10-CM) - Neck pain  Evaluation Date: 12/5/2024  Authorization Period Expiration: 11-21-25  Plan of Care Expiration: 1-30-25  Visit # / Visits authorized: 1/ 1, 4/6  FOTO: 4/5    Time In: 1:30 pm  Time Out: 2:20 pm  Total Billable Time: 35 minutes    Precautions: Standard    Subjective     Pt reports: denies neck pain or UE sx.    She was compliant with home exercise program.  Response to previous treatment: less pain  Functional change: improved driving    Pain: 0/10  Location: bilateral upper traps    Objective     MIN TTP B UT. C-AROM is WNL.  Gross strength is 4+/5 to 5/5 C-spine.    Kaleigh received therapeutic exercises to develop ROM, flexibility, and posture for 5 minutes including:  HEP review  Posture education    Kaleigh received the following manual therapy techniques: Joint mobilizations, Manual traction, and Soft tissue Mobilization were applied to the: neck/UT B for 30 minutes, including:  Manual SO release/Ctxn/PROM  Dry needling B UT with 4 30 mm needles + Estim     Kaleigh received hot pack for 15 minutes to neck/UT.      Home Exercises Provided and Patient Education Provided     Education provided:   - correct posture    Written Home Exercises Provided: Patient instructed to cont prior HEP.  Exercises were reviewed and Kaleigh was able to demonstrate them prior to the end of the session.  Kaleigh demonstrated good  understanding of the education provided.     See EMR under Patient Instructions for exercises provided prior visit.    Assessment     Tolerated DN well.  Progressing well.  Kaleigh Is progressing well towards her goals.   Pt prognosis is Good.     Pt will continue to benefit from skilled outpatient  physical therapy to address the deficits listed in the problem list box on initial evaluation, provide pt/family education and to maximize pt's level of independence in the home and community environment.     Pt's spiritual, cultural and educational needs considered and pt agreeable to plan of care and goals.    Anticipated barriers to physical therapy: none    Goals: Short Term Goals: 2 weeks         1.   Independent with HEP        2.  Pt will report decreased pain level of < 50% from above measure with ADL     Long Term Goals:   GOALS:    6_   weeks. Pt agrees with goals set.  Independent with HEP.  Report decreased    neck/UT    pain  <   / =  3/10 with ADL such as dressing  Increased MMT  for  neck to 4+/5 to 5/5  with ADL such as house work  Increased arom  for  neck to WNL with functional activities such cleaning or self-care       Plan     Continue PT per POC.    Carlos Frazier, PT

## 2025-01-03 ENCOUNTER — OFFICE VISIT (OUTPATIENT)
Dept: SLEEP MEDICINE | Facility: CLINIC | Age: 70
End: 2025-01-03
Attending: PSYCHIATRY & NEUROLOGY
Payer: MEDICARE

## 2025-01-03 VITALS
HEART RATE: 76 BPM | DIASTOLIC BLOOD PRESSURE: 60 MMHG | BODY MASS INDEX: 32.2 KG/M2 | SYSTOLIC BLOOD PRESSURE: 129 MMHG | WEIGHT: 193.5 LBS

## 2025-01-03 DIAGNOSIS — G47.33 OSA (OBSTRUCTIVE SLEEP APNEA): ICD-10-CM

## 2025-01-03 PROCEDURE — 99999 PR PBB SHADOW E&M-EST. PATIENT-LVL IV: CPT | Mod: PBBFAC,,, | Performed by: NURSE PRACTITIONER

## 2025-01-03 NOTE — PROGRESS NOTES
Cc: COURTNEY , new to me    She has remained adherent with apap 8-15cm qhs. Benefiting from therapy. Needs new machine, has medicare now. Sleeps alone. Sleep disruptions same with or w/o mask/few times. Less daytimes leepiness using pap       PSG/ SPLIT night study  In 4/24/15 showed significant COURTNEY with the AHI of 16.5/hour and SaO2 minimum of 86%.       /60 (BP Location: Left arm, Patient Position: Sitting)   Pulse 76   Wt 87.8 kg (193 lb 8 oz)   LMP  (LMP Unknown)   BMI 32.20 kg/m²     ASSESSMENT:    1. COURTNEY (obstructive sleep apnea).  Ongoing compliance. Eligible new machine. Benefits          PLAN:  [facility] NEW machine 8-15cm  NOSE mask this time

## 2025-01-07 ENCOUNTER — CLINICAL SUPPORT (OUTPATIENT)
Dept: REHABILITATION | Facility: HOSPITAL | Age: 70
End: 2025-01-07
Payer: MEDICARE

## 2025-01-07 DIAGNOSIS — R52 PAIN: Primary | ICD-10-CM

## 2025-01-07 PROCEDURE — 97014 ELECTRIC STIMULATION THERAPY: CPT | Performed by: PHYSICAL THERAPIST

## 2025-01-07 PROCEDURE — 20560 NDL INSJ W/O NJX 1 OR 2 MUSC: CPT | Performed by: PHYSICAL THERAPIST

## 2025-01-07 PROCEDURE — 97140 MANUAL THERAPY 1/> REGIONS: CPT | Performed by: PHYSICAL THERAPIST

## 2025-01-07 NOTE — PROGRESS NOTES
Physical Therapy Daily Treatment Note     Name: Diana Montenegro  Clinic Number: [id number]    Therapy Diagnosis:   Encounter Diagnosis   Name Primary?    Pain Yes     Physician: Rad Johnston MD    Visit Date: 1/7/2025  Physician Orders: PT Eval and Treat   Medical Diagnosis from Referral: M54.2 (ICD-10-CM) - Neck pain  Evaluation Date: 12/5/2024  Authorization Period Expiration: 11-21-25  Plan of Care Expiration: 1-30-25  Visit # / Visits authorized: 1/ 1, 2/12  FOTO: 5/5    Time In: 3:55 pm  Time Out: 4:40 pm  Total Billable Time: 35 minutes    Precautions: Standard    Subjective     Pt reports: denies neck pain or UE sx.    She was compliant with home exercise program.  Response to previous treatment: less pain  Functional change: improved driving    Pain: 0/10  Location: bilateral upper traps    Objective     MIN TTP B UT. C-AROM is WNL.  Gross strength is 4+/5 to 5/5 C-spine.    Kaleigh received therapeutic exercises to develop ROM, flexibility, and posture for 5 minutes including:  HEP review  Posture education    Kaleigh received the following manual therapy techniques: Joint mobilizations, Manual traction, and Soft tissue Mobilization were applied to the: neck/UT B for 30 minutes, including:  Manual SO release/Ctxn/PROM  Dry needling B UT with 4 30 mm needles + Estim     Kaleigh received hot pack for 15 minutes to neck/UT.      Home Exercises Provided and Patient Education Provided     Education provided:   - correct posture    Written Home Exercises Provided: Patient instructed to cont prior HEP.  Exercises were reviewed and Kaleigh was able to demonstrate them prior to the end of the session.  Kaleigh demonstrated good  understanding of the education provided.     See EMR under Patient Instructions for exercises provided prior visit.    Assessment     Tolerated DN well. No pain with manual stretching.  Kaleigh Is progressing well towards her goals.   Pt prognosis is Good.     Pt will continue to benefit from  skilled outpatient physical therapy to address the deficits listed in the problem list box on initial evaluation, provide pt/family education and to maximize pt's level of independence in the home and community environment.     Pt's spiritual, cultural and educational needs considered and pt agreeable to plan of care and goals.    Anticipated barriers to physical therapy: none    Goals: Short Term Goals: 2 weeks         1.   Independent with HEP        2.  Pt will report decreased pain level of < 50% from above measure with ADL     Long Term Goals:   GOALS:    6_   weeks. Pt agrees with goals set.  Independent with HEP.  Report decreased    neck/UT    pain  <   / =  3/10 with ADL such as dressing  Increased MMT  for  neck to 4+/5 to 5/5  with ADL such as house work  Increased arom  for  neck to WNL with functional activities such cleaning or self-care       Plan     Continue PT per POC.    Carlos Frazier, PT

## 2025-01-08 DIAGNOSIS — E11.3311 TYPE 2 DIABETES MELLITUS WITH RIGHT EYE AFFECTED BY MODERATE NONPROLIFERATIVE RETINOPATHY AND MACULAR EDEMA, WITHOUT LONG-TERM CURRENT USE OF INSULIN: ICD-10-CM

## 2025-01-08 RX ORDER — SEMAGLUTIDE 2.68 MG/ML
INJECTION, SOLUTION SUBCUTANEOUS
Qty: 9 ML | Refills: 1 | Status: SHIPPED | OUTPATIENT
Start: 2025-01-08

## 2025-01-08 NOTE — TELEPHONE ENCOUNTER
Refill Decision Note   Diana Montenegro  is requesting a refill authorization.  Brief Assessment and Rationale for Refill:  Approve     Medication Therapy Plan:         Comments:     Note composed:2:59 PM 01/08/2025             Appointments     Last Visit   11/21/2024 Rad Johnston MD   Next Visit   3/25/2025 Rad Johnston MD

## 2025-01-08 NOTE — TELEPHONE ENCOUNTER
No care due was identified.  Health [facility] Embedded Care Due Messages. Reference number: [id number].   1/08/2025 2:02:05 PM CST

## 2025-01-09 ENCOUNTER — DOCUMENTATION ONLY (OUTPATIENT)
Dept: REHABILITATION | Facility: HOSPITAL | Age: 70
End: 2025-01-09
Payer: MEDICARE

## 2025-01-09 NOTE — PROGRESS NOTES
Health  Wellness Visit Note    Name: Diana Montenegro  Clinic Number: [id number]  Physician: No ref. provider found  Diagnosis: No diagnosis found.  Past Medical History:   Diagnosis Date    Arthritis     Atrial fibrillation, unspecified     hx of a fib prior to stroke.    Chronic back pain     Chronic kidney disease, stage 3a 06/01/2023    Colon polyp     CVA (cerebral infarction) 07/16/2014    Degenerative disc disease     cervical; lumbar    Diabetes mellitus type II     Encounter for loop recorder at end of battery life 09/17/2018    Hyperlipidemia     Hypertension     Hypothyroidism     Mild nonproliferative diabetic retinopathy 08/12/2018    Obesity     Sleep apnea     Stroke 07/2014    Venous insufficiency (chronic) (peripheral)      Visit Number: 31  Precautions: Standard, Fall Risk, History of Stroke      1st PT visit:  07/16/2024  Year of care end date:  July 2025  Mindbody plan: 60---9 Months  Patient level: C    Time In: 1:30 PM  Time Out: 2:30 PM  Total Treatment Time: 60 Minutes    Wellness [employer] 2022  Handout on this week's wellness topic Gratitude provided along with a discussion on what it means, the benefits, and suggestions for practice.  Reviewed last week's topic of n/a (no weekly handout) .     Subjective:   Patient reports no complaints of back pain and her neck pain has been manageable since going to physical therapy. She is having a lot of problems with her right knee. HC and patient discussed following up with MD about her knee since her last steroid injection has worn off.This week she went to her water aerobics twice and rode the nustep at the gym for 15 minutes once. She plans to get back to the weights soon. Patient does not ice at home unless she deems necessary.     HC and patient discussed full range of motion on leg press before increasing weight.     Objective:   Diana completed therapeutic stretches (EIL, SAIDA) and the following MedX exercise machines: core lumbar, torso  rotation l/r, leg extension, leg curl, upright row, chest press, biceps curl, triceps extension, leg press    See exercise log in patient folder for rate of exertion and repetitions completed.       Fitness Machine Education Key:  E=education on equipment initiated and further follow up and education needed  I=independent with  and exercise.  The patient:  Adjusts machines to his/her settings  Uses equipment levers, pins, weights safely  Maintains safe and correct posture while exercising  Moves through exercise with correct pace and control  Gets on and off equipment safely      Lumbar/Cervical Ext. E Torso Rotation E Leg Press    Leg Extension  Seated Leg Curl  Chest Press    Seated Row  Hip ADD  Hip ABD    Triceps Extension  Bicep Curl  Other:      [x] Indicates exercise has been taught for home  Lumbar/Cervical Ext. [] Torso Rotation [] Leg Press []   Leg Extension [] Seated Leg Curl [] Chest Press []   Seated Row [] Hip ADD [] Hip ABD []   Triceps Extension [] Bicep Curl [] Other:        Assessment:   Patient tolerated Patient tolerated MedX Core Lumbar Strength and all other peripheral exercises without an increase in symptoms. Patient warmed up on nustep for 5 minutes, stretched, and iced low back for 5 minutes after the workout.     Plan:  Continue with established plan of care towards wellness goals.     Health  : Ann-Marie Bey  1/9/2025

## 2025-01-13 ENCOUNTER — CLINICAL SUPPORT (OUTPATIENT)
Dept: OPHTHALMOLOGY | Facility: CLINIC | Age: 70
End: 2025-01-13
Payer: MEDICARE

## 2025-01-13 ENCOUNTER — LAB VISIT (OUTPATIENT)
Dept: LAB | Facility: HOSPITAL | Age: 70
End: 2025-01-13
Payer: MEDICARE

## 2025-01-13 ENCOUNTER — OFFICE VISIT (OUTPATIENT)
Dept: OPHTHALMOLOGY | Facility: CLINIC | Age: 70
End: 2025-01-13
Payer: MEDICARE

## 2025-01-13 DIAGNOSIS — K76.0 FATTY LIVER: ICD-10-CM

## 2025-01-13 DIAGNOSIS — E11.3393 TYPE 2 DIABETES MELLITUS WITH BOTH EYES AFFECTED BY MODERATE NONPROLIFERATIVE RETINOPATHY WITHOUT MACULAR EDEMA, WITHOUT LONG-TERM CURRENT USE OF INSULIN: Primary | ICD-10-CM

## 2025-01-13 DIAGNOSIS — E11.36 DIABETIC CATARACT OF BOTH EYES: ICD-10-CM

## 2025-01-13 DIAGNOSIS — H04.123 BILATERAL DRY EYES: ICD-10-CM

## 2025-01-13 LAB
ALBUMIN SERPL BCP-MCNC: 3.6 G/DL (ref 3.5–5.2)
ALP SERPL-CCNC: 80 U/L (ref 40–150)
ALT SERPL W/O P-5'-P-CCNC: 65 U/L (ref 10–44)
AST SERPL-CCNC: 46 U/L (ref 10–40)
BILIRUB DIRECT SERPL-MCNC: 0.1 MG/DL (ref 0.1–0.3)
BILIRUB SERPL-MCNC: 0.2 MG/DL (ref 0.1–1)
PROT SERPL-MCNC: 6.5 G/DL (ref 6–8.4)

## 2025-01-13 PROCEDURE — 1159F MED LIST DOCD IN RCRD: CPT | Mod: CPTII,S$GLB,, | Performed by: OPHTHALMOLOGY

## 2025-01-13 PROCEDURE — 99999 PR PBB SHADOW E&M-EST. PATIENT-LVL III: CPT | Mod: PBBFAC,,, | Performed by: OPHTHALMOLOGY

## 2025-01-13 PROCEDURE — 36415 COLL VENOUS BLD VENIPUNCTURE: CPT | Performed by: NURSE PRACTITIONER

## 2025-01-13 PROCEDURE — 92134 CPTRZ OPH DX IMG PST SGM RTA: CPT | Mod: S$GLB,,, | Performed by: OPHTHALMOLOGY

## 2025-01-13 PROCEDURE — 2022F DILAT RTA XM EVC RTNOPTHY: CPT | Mod: CPTII,S$GLB,, | Performed by: OPHTHALMOLOGY

## 2025-01-13 PROCEDURE — 92014 COMPRE OPH EXAM EST PT 1/>: CPT | Mod: 24,S$GLB,, | Performed by: OPHTHALMOLOGY

## 2025-01-13 PROCEDURE — 3288F FALL RISK ASSESSMENT DOCD: CPT | Mod: CPTII,S$GLB,, | Performed by: OPHTHALMOLOGY

## 2025-01-13 PROCEDURE — 80076 HEPATIC FUNCTION PANEL: CPT | Performed by: NURSE PRACTITIONER

## 2025-01-13 PROCEDURE — 1101F PT FALLS ASSESS-DOCD LE1/YR: CPT | Mod: CPTII,S$GLB,, | Performed by: OPHTHALMOLOGY

## 2025-01-13 PROCEDURE — 1126F AMNT PAIN NOTED NONE PRSNT: CPT | Mod: CPTII,S$GLB,, | Performed by: OPHTHALMOLOGY

## 2025-01-14 ENCOUNTER — CLINICAL SUPPORT (OUTPATIENT)
Dept: REHABILITATION | Facility: HOSPITAL | Age: 70
End: 2025-01-14
Payer: MEDICARE

## 2025-01-14 DIAGNOSIS — R52 PAIN: Primary | ICD-10-CM

## 2025-01-14 PROCEDURE — 97140 MANUAL THERAPY 1/> REGIONS: CPT | Performed by: PHYSICAL THERAPIST

## 2025-01-14 PROCEDURE — 20560 NDL INSJ W/O NJX 1 OR 2 MUSC: CPT | Performed by: PHYSICAL THERAPIST

## 2025-01-14 PROCEDURE — 97014 ELECTRIC STIMULATION THERAPY: CPT | Performed by: PHYSICAL THERAPIST

## 2025-01-14 NOTE — PROGRESS NOTES
Physical Therapy Daily Treatment Note     Name: Diana Montenegro  Clinic Number: [id number]    Therapy Diagnosis:   Encounter Diagnosis   Name Primary?    Pain Yes     Physician: Rad Johnston MD    Visit Date: 1/14/2025  Physician Orders: PT Eval and Treat   Medical Diagnosis from Referral: M54.2 (ICD-10-CM) - Neck pain  Evaluation Date: 12/5/2024  Authorization Period Expiration: 11-21-25  Plan of Care Expiration: 1-30-25  Visit # / Visits authorized: 1/ 1, 3/12  FOTO: 5/5    Time In: 4:05 pm  Time Out: 4:50 pm  Total Billable Time: 35 minutes    Precautions: Standard    Subjective     Pt reports: neck feels better with no UE sx.    She was compliant with home exercise program.  Response to previous treatment: less pain  Functional change: improved driving    Pain: 0/10  Location: bilateral upper traps    Objective     MIN TTP B UT. C-AROM is WNL.  Gross strength is 4+/5 to 5/5 C-spine.    Kaleigh received therapeutic exercises to develop ROM, flexibility, and posture for 5 minutes including:  HEP review  Posture education    Kaleigh received the following manual therapy techniques: Joint mobilizations, Manual traction, and Soft tissue Mobilization were applied to the: neck/UT B for 30 minutes, including:  Manual SO release/Ctxn/PROM  Dry needling B UT with 4 30 mm needles + Estim     Kaleigh received hot pack for 10 minutes to neck/UT.      Home Exercises Provided and Patient Education Provided     Education provided:   - correct posture    Written Home Exercises Provided: Patient instructed to cont prior HEP.  Exercises were reviewed and Kaleigh was able to demonstrate them prior to the end of the session.  Kaleigh demonstrated good  understanding of the education provided.     See EMR under Patient Instructions for exercises provided prior visit.    Assessment     Tolerated DN well. Progressing well.  Kaleigh Is progressing well towards her goals.   Pt prognosis is Good.     Pt will continue to benefit from skilled  outpatient physical therapy to address the deficits listed in the problem list box on initial evaluation, provide pt/family education and to maximize pt's level of independence in the home and community environment.     Pt's spiritual, cultural and educational needs considered and pt agreeable to plan of care and goals.    Anticipated barriers to physical therapy: none    Goals: Short Term Goals: 2 weeks         1.   Independent with HEP        2.  Pt will report decreased pain level of < 50% from above measure with ADL     Long Term Goals:   GOALS:    6_   weeks. Pt agrees with goals set.  Independent with HEP.  Report decreased    neck/UT    pain  <   / =  3/10 with ADL such as dressing  Increased MMT  for  neck to 4+/5 to 5/5  with ADL such as house work  Increased arom  for  neck to WNL with functional activities such cleaning or self-care       Plan     Continue PT per POC.    Carlos Frazier, PT

## 2025-01-14 NOTE — PROGRESS NOTES
Subjective:       Patient ID: Diana Montenegro is a 69 y.o. female      Chief Complaint   Patient presents with    Follow-up     DR benavides as planned     History of Present Illness  HPI     Follow-up     Additional comments: DR benavides as planned           Comments    2 mo DFE/OCTM OU     DLS 08/29/2024 by Dr. Ronny Ni MD    Pt very happy with improved Va OD after CE/IOL.  Va OS stable.    --eye pain  --flashes/floaters    Eye Meds: Refresh Tears OU prn          Last edited by Ronny Ni MD on 1/14/2025  6:40 AM.        Imaging:    See report    Assessment/Plan:     1. Type 2 diabetes mellitus with both eyes affected by moderate nonproliferative retinopathy without macular edema, without long-term current use of insulin  No ME today.  Pt reports h/o Ey for ME OD, last one April 2024  Observe    Diabetic Retinopathy discussed in detail, all questions answered  Stressed importance of good BS/BP/Chol Control  RTC immediately PRN any vision changes, graciela blurry vision, missing vision, floaters, distortions, etc    - Posterior Segment OCT Retina-Both eyes  - Prior authorization Order      If stable next visit will space out f/u to 6 mo    2. Diabetic cataract of left eye  Doing well s/p CE/IOL OD  Has plan for OS.  May proceed from retinal standpoint  F/u Dr GODFREY    3. Bilateral dry eyes  Can continue ATs OU    Visit today included increased complexity associated with the care of the episodic problem DR, cataracts, dry eyes addressed and managing the longitudinal care of the patient due to the serious and/or complex managed problem(s) DR, cataracts, dry eyes.      Follow up in about 4 months (around 5/13/2025), or if symptoms worsen or fail to improve, for Comprehensive Examination (DILATE OU), OCT Mac.

## 2025-01-15 DIAGNOSIS — H25.12 NUCLEAR SCLEROTIC CATARACT OF LEFT EYE: Primary | ICD-10-CM

## 2025-01-15 RX ORDER — PREDNISOLONE/MOXIFLOX/BROMFEN 1 %-0.5 %
1 SUSPENSION, DROPS(FINAL DOSAGE FORM)(ML) OPHTHALMIC (EYE) 3 TIMES DAILY
Qty: 8 ML | Refills: 3 | Status: SHIPPED | OUTPATIENT
Start: 2025-01-15

## 2025-01-16 ENCOUNTER — DOCUMENTATION ONLY (OUTPATIENT)
Dept: REHABILITATION | Facility: HOSPITAL | Age: 70
End: 2025-01-16
Payer: MEDICARE

## 2025-01-17 ENCOUNTER — PROCEDURE VISIT (OUTPATIENT)
Dept: HEPATOLOGY | Facility: CLINIC | Age: 70
End: 2025-01-17
Payer: MEDICARE

## 2025-01-17 DIAGNOSIS — K76.0 FATTY LIVER: ICD-10-CM

## 2025-01-17 PROCEDURE — 91200 LIVER ELASTOGRAPHY: CPT | Mod: S$GLB,,, | Performed by: NURSE PRACTITIONER

## 2025-01-17 NOTE — PROCEDURES
FibroScan [place] (Vibration Controlled Transient Elastography)    Date/Time: 1/17/2025 1:00 PM    Performed by: Mary Hidalgo NP  Authorized by: Mary Hidalgo NP    Probe:  XL    Universal Protocol: Patient's identity, procedure and site were verified, confirmatory pause was performed.  Discussed procedure including risks and potential complications.  Questions answered.  Patient verbalizes understanding and wishes to proceed with VCTE.     Procedure: After providing explanations of the procedure, patient was placed in the supine position with right arm in maximum abduction to allow optimal exposure of right lateral abdomen.  Patient was briefly assessed, Testing was performed in the mid-axillary location, 50Hz Shear Wave pulses were applied and the resulting Shear Wave and Propagation Speed detected with a 3.5 MHz ultrasonic signal, using the FibroScan probe, Skin to liver capsule distance and liver parenchyma were accessed during the entire examination with the FibroScan probe, Patient was instructed to breathe normally and to abstain from sudden movements during the procedure, allowing for random measurements of liver stiffness. At least 10 Shear Waves were produced, Individual measurements of each Shear Wave were calculated.  Patient tolerated the procedure well with no complications.  Meets discharge criteria as was dismissed.  Rates pain 0 out of 10.  Patient will follow up with ordering provider to review results.    Findings  Median liver stiffness score:  3.9  CAP Reading: dB/m:  193    IQR/med %:  9  Interpretation  Fibrosis interpretation is based on medial liver stiffness - Kilopascal (kPa).    Fibrosis Stage:  F 0-1  Steatosis interpretation is based on controlled attenuation parameter - (dB/m).    Steatosis Grade:  <S1

## 2025-01-17 NOTE — PROGRESS NOTES
Health  Wellness Visit Note    Name: Diana Montenegro  Clinic Number: [id number]  Physician: No ref. provider found  Diagnosis: No diagnosis found.  Past Medical History:   Diagnosis Date    Arthritis     Atrial fibrillation, unspecified     hx of a fib prior to stroke.    Chronic back pain     Chronic kidney disease, stage 3a 06/01/2023    Colon polyp     CVA (cerebral infarction) 07/16/2014    Degenerative disc disease     cervical; lumbar    Diabetes mellitus type II     Encounter for loop recorder at end of battery life 09/17/2018    Hyperlipidemia     Hypertension     Hypothyroidism     Mild nonproliferative diabetic retinopathy 08/12/2018    Obesity     Sleep apnea     Stroke 07/2014    Venous insufficiency (chronic) (peripheral)      Visit Number: 32  Precautions: Standard, Fall Risk, History of Stroke      1st PT visit:  07/16/2024  Year of care end date:  July 2025  Mindbody plan: 60---9 Months  Patient level: C    Time In: 1:30 PM  Time Out: 2:27 PM  Total Treatment Time: 57 Minutes    Wellness [employer] 2022  Handout on this week's wellness topic [employer] provided along with a discussion on what it means, the benefits, and suggestions for practice.  Reviewed last week's topic of Gratitude.     Subjective:   Patient reports her back and have been doing well. She has no complaints of discomfort. She has not been to the gym this week but plans to swim later today. Patient does not ice at home unless she deems necessary.     HC and patient discussed full range of motion on leg press before increasing weight.     Objective:   Diana completed therapeutic stretches (EIL, SAIDA) and the following MedX exercise machines: core lumbar, torso rotation l/r, leg extension, leg curl, upright row, chest press, biceps curl, triceps extension, leg press    See exercise log in patient folder for rate of exertion and repetitions completed.       Fitness Machine Education Key:  E=education on equipment initiated and  further follow up and education needed  I=independent with  and exercise.  The patient:  Adjusts machines to his/her settings  Uses equipment levers, pins, weights safely  Maintains safe and correct posture while exercising  Moves through exercise with correct pace and control  Gets on and off equipment safely      Lumbar/Cervical Ext. E Torso Rotation E Leg Press    Leg Extension  Seated Leg Curl  Chest Press    Seated Row  Hip ADD  Hip ABD    Triceps Extension  Bicep Curl  Other:      [x] Indicates exercise has been taught for home  Lumbar/Cervical Ext. [] Torso Rotation [] Leg Press []   Leg Extension [] Seated Leg Curl [] Chest Press []   Seated Row [] Hip ADD [] Hip ABD []   Triceps Extension [] Bicep Curl [] Other:        Assessment:   Patient tolerated Patient tolerated MedX Core Lumbar Strength and all other peripheral exercises without an increase in symptoms. Patient warmed up on nustep for 5 minutes, stretched, and iced low back for 5 minutes after the workout.     Plan:  Continue with established plan of care towards wellness goals.     Health  : Ann-Marie Bey  1/17/2025

## 2025-01-21 ENCOUNTER — PATIENT MESSAGE (OUTPATIENT)
Dept: HEPATOLOGY | Facility: CLINIC | Age: 70
End: 2025-01-21

## 2025-01-21 ENCOUNTER — OFFICE VISIT (OUTPATIENT)
Dept: HEPATOLOGY | Facility: CLINIC | Age: 70
End: 2025-01-21
Payer: MEDICARE

## 2025-01-21 DIAGNOSIS — E11.59 HYPERTENSION ASSOCIATED WITH DIABETES: ICD-10-CM

## 2025-01-21 DIAGNOSIS — E11.69 HYPERLIPIDEMIA ASSOCIATED WITH TYPE 2 DIABETES MELLITUS: ICD-10-CM

## 2025-01-21 DIAGNOSIS — E66.811 CLASS 1 OBESITY DUE TO EXCESS CALORIES WITH SERIOUS COMORBIDITY AND BODY MASS INDEX (BMI) OF 32.0 TO 32.9 IN ADULT: ICD-10-CM

## 2025-01-21 DIAGNOSIS — I15.2 HYPERTENSION ASSOCIATED WITH DIABETES: ICD-10-CM

## 2025-01-21 DIAGNOSIS — R74.01 ELEVATION OF LEVELS OF LIVER TRANSAMINASE LEVELS: ICD-10-CM

## 2025-01-21 DIAGNOSIS — E78.5 HYPERLIPIDEMIA ASSOCIATED WITH TYPE 2 DIABETES MELLITUS: ICD-10-CM

## 2025-01-21 DIAGNOSIS — E66.09 CLASS 1 OBESITY DUE TO EXCESS CALORIES WITH SERIOUS COMORBIDITY AND BODY MASS INDEX (BMI) OF 32.0 TO 32.9 IN ADULT: ICD-10-CM

## 2025-01-21 DIAGNOSIS — R74.8 ELEVATED LIVER ENZYMES: Primary | ICD-10-CM

## 2025-01-21 NOTE — Clinical Note
Please contact pt to schedule F/u with me in 1 year with the following  -- fibroscan same day as appt  -- US and labs 1 week before appt  Thanks !

## 2025-01-21 NOTE — PROGRESS NOTES
[facility] Established Patient Visit    Reason for Visit:  elevated liver enzymes     PCP: Rad Johnston    HPI:  This is a 69 y.o. female with PMH noted below, here for follow up of above    Previous serologic w/u negative for  Conor's, alpha-1 antitrypsin deficiency, hemochromatosis, autoimmune etiology, and viral hepatitis     Prior serologic workup:   Lab Results   Component Value Date    SMOOTHMUSCAB Negative 1:40 10/02/2023    AMAIFA Negative 1:40 10/02/2023    IGGSERUM 830 10/02/2023    ANASCREEN Negative <1:80 10/02/2023    FERRITIN 35 10/02/2023    FESATURATED 29 09/18/2024    CERULOPLSM 26.0 10/02/2023    HEPBSAG Non-reactive 04/10/2023    HEPBSAG Non-reactive 04/10/2023    HEPCAB Non-reactive 04/10/2023    HEPCAB Non-reactive 04/10/2023    HEPAIGM Non-reactive 04/10/2023     Risk factors for fatty liver include obesity, HTN, HLD, DM    Liver fibrosis staging:  -- fibroscan 1/2024 noted F0, S2 (kPA 3.4, )  -- fibroscan 1/2025 noted F0, S0 (kPA 3.9, )      Interval HPI: Presents today alone via video visit. Previously seen by Thom GUTIÉRREZ, new to me today.  On Lipitor since 2012, changed to Crestor in 2015 - unlikely culprit of elevated liver enzymes  Previous fibroscan with mild fatty liver  Previous US without fatty liver     Labs done 1/2025 show elevated transaminase levels (ALT > AST, elevated since 2022)      Lab Results   Component Value Date    ALT 65 (H) 01/13/2025    AST 46 (H) 01/13/2025    ALKPHOS 80 01/13/2025    BILITOT 0.2 01/13/2025    ALBUMIN 3.6 01/13/2025    INR 1.0 09/10/2018     11/14/2024       Abd U/S done in 2023 showed normal liver     Denies family history of liver disease. Denies Alcohol consumption, see below  Social History     Substance and Sexual Activity   Alcohol Use Not Currently       Immunity to Hep A and B - will check with next labs       PMHX:  has a past medical history of Arthritis, Atrial fibrillation, unspecified, Chronic  back pain, Chronic kidney disease, stage 3a (06/01/2023), Colon polyp, CVA (cerebral infarction) (07/16/2014), Degenerative disc disease, Diabetes mellitus type II, Encounter for loop recorder at end of battery life (09/17/2018), Hyperlipidemia, Hypertension, Hypothyroidism, Mild nonproliferative diabetic retinopathy (08/12/2018), Obesity, Sleep apnea, Stroke (07/2014), and Venous insufficiency (chronic) (peripheral).    PSHX:  has a past surgical history that includes Tubal ligation; Tonsillectomy; Hernia repair; Gastrectomy; Colonoscopy w/ polypectomy; Colonoscopy (N/A, 08/30/2018); Removal of implantable loop recorder (N/A, 09/17/2018); Colonoscopy (N/A, 12/6/2023); block, nerve, genicular (Right, 8/21/2024); radiofrequency ablation, nerve, genicular, knee (Right, 9/25/2024); and Cataract extraction w/  intraocular lens implant (Right, 12/9/2024).    The patient's social and family histories were reviewed by me and updated in the appropriate section of the electronic medical record.    Review of patient's allergies indicates:   Allergen Reactions    Medrol [methylprednisolone] Palpitations       Current Outpatient Medications on File Prior to Visit   Medication Sig Dispense Refill    acyclovir 5% (ZOVIRAX) 5 % ointment Apply topically 6 (six) times daily. 5 g 1    albuterol (PROVENTIL/VENTOLIN HFA) 90 mcg/actuation inhaler INHALE 2 PUFFS BY MOUTH EVERY 6 HOURS AS NEEDED FOR WHEEZING 21.1 g 3    albuterol-ipratropium 2.5mg-0.5mg/3mL (DUO-NEB) 0.5 mg-3 mg(2.5 mg base)/3 mL nebulizer solution Take 3 mLs by nebulization every 6 (six) hours as needed for Wheezing. Rescue (Patient taking differently: Take 3 mLs by nebulization every 6 (six) hours as needed for Wheezing. Rescue prn) 3 vial 3    aspirin (ECOTRIN) 81 MG EC tablet Take 1 tablet (81 mg total) by mouth once daily.      b complex vitamins tablet Take 1 tablet by mouth once daily.      blood pressure test kit-large Kit Use as directed 1 each 0    blood sugar  diagnostic (FREESTYLE LITE STRIPS) Strp USE 1 STRIP TO CHECK GLUCOSE TWICE DAILY 200 each 3    blood-glucose meter Misc One touch verio flex or tone touch verio reflect or freestyle freedom lite meter (all covered by insurance) 1 each 0    CALCIUM CITRATE/VITAMIN D3 (CALCIUM CITRATE + D ORAL) Take 1 tablet by mouth every morning.      cetirizine (ZYRTEC) 5 MG tablet Take 5 mg by mouth once daily.      ciclopirox (PENLAC) 8 % Soln Apply topically nightly. 6.6 mL 11    cyanocobalamin, vitamin B-12, 500 mcg Subl Place 1 tablet under the tongue once daily.      diclofenac 0.15% LIDOcaine 2.25% prilocaine 2.25% topical cream Apply topically once daily. Apply 1 or 2 pumps to painful area up to 5 times daily.  Maximum 10 pumps/day. 480 g 1    diclofenac sodium (VOLTAREN) 1 % Gel APPLY 2 GRAMS TOPICALLY THREE TIMES DAILY 200 g 4    docusate sodium (COLACE) 100 MG capsule Take 100 mg by mouth once daily.       ELIQUIS 5 mg Tab Take 1 tablet by mouth twice daily 180 tablet 3    EYLEA 2 mg/0.05 mL Syrg       fish oil-omega-3 fatty acids 300-1,000 mg capsule Take 1 capsule by mouth once daily. Take 1 cap 2 times daily every other day      fluticasone (FLONASE) 50 mcg/actuation nasal spray 2 sprays (100 mcg total) by Each Nare route once daily. 1 Bottle 0    glimepiride (AMARYL) 2 MG tablet Take 1 tablet (2 mg total) by mouth before breakfast. For diabetes control. 90 tablet 0    lancets Misc Needs lancets for testing twice daily.  To go with meter covered by insurance. 200 each 3    levothyroxine (SYNTHROID) 88 MCG tablet Take 1 tablet (88 mcg total) by mouth before breakfast. 90 tablet 2    LORazepam (ATIVAN) 1 MG tablet Take 1 tablet by mouth twice daily as needed for anxiety 60 tablet 0    metFORMIN (GLUCOPHAGE) 1000 MG tablet TAKE 1 TABLET BY MOUTH TWICE DAILY WITH MEALS 180 tablet 1    metoprolol tartrate (LOPRESSOR) 25 MG tablet Take 1 tablet (25 mg total) by mouth once as needed (Palpitations). For palpitations 1 tablet  3    multivitamin capsule Take 1 capsule by mouth once daily.      mupirocin (BACTROBAN) 2 % ointment Apply to affected area 3 times daily 22 g 1    omeprazole (PRILOSEC) 40 MG capsule Take 1 capsule (40 mg total) by mouth every morning. 90 capsule 3    OZEMPIC 2 mg/dose (8 mg/3 mL) PnIj INJECT 2 MG SUBCUTANEOUSLY ONCE A WEEK 9 mL 1    prednisoLONE-moxiflox-bromfen 1-0.5-0.075 % DrpS Apply 1 drop to eye 3 (three) times daily. 5 mL 3    prednisoLONE-moxiflox-bromfen 1-0.5-0.075 % DrpS Apply 1 drop to eye 3 (three) times daily. 8 mL 3    promethazine-dextromethorphan (PROMETHAZINE-DM) 6.25-15 mg/5 mL Syrp Take one tsp po q 6 hrs prn cough 180 mL 0    rosuvastatin (CRESTOR) 40 MG Tab Take 1 tablet by mouth once daily 90 tablet 3    RUTIN/HESP/BIOFLAV/C/HERB#196 (BIOFLEX ORAL) Take 2 tablets by mouth once daily.      senna (SENNA) 8.6 mg tablet Take 2 tablets by mouth nightly as needed for Constipation. 100 tablet 3    tiZANidine (ZANAFLEX) 4 MG tablet Take 1 tablet (4 mg total) by mouth every 8 (eight) hours. Muscle spasm 40 tablet 1    traMADoL (ULTRAM) 50 mg tablet Take 1 tablet (50 mg total) by mouth every 8 (eight) hours as needed for Pain. 40 tablet 1    traZODone (DESYREL) 50 MG tablet TAKE 1 TABLET BY MOUTH NIGHTLY AS NEEDED FOR  INSOMNIA  (OKAY  TO  TAKE  2ND  TABLET  BY  MOUTH  IN  30  MINUTES  IF  STILL  AWAKE) 180 tablet 3    valsartan-hydrochlorothiazide (DIOVAN-HCT) 80-12.5 mg per tablet Take 1 tablet by mouth once daily. 90 tablet 3     No current facility-administered medications on file prior to visit.         ROS:   GENERAL: Denies fatigue  CARDIOVASCULAR: Denies edema  GI: Denies abdominal pain  SKIN: Denies rash, itching   NEURO: Denies confusion, memory loss, or mood changes    Objective Findings:    PHYSICAL EXAM:   Friendly  female, in no acute distress; alert and oriented to person, place and time  VITALS: LMP  (LMP Unknown)   EYES: Sclerae anicteric  GI: Soft, non-tender, non-distended. No  ascites.  EXTREMITIES:  No edema.  SKIN: Warm and dry. No jaundice. No telangectasias noted. No palmar erythema.  NEURO:  No asterixis.  PSYCH:  Thought and speech pattern appropriate. Behavior normal        EDUCATION:  See instructions discussed with patient in Instructions section of the After Visit Summary       ASSESSMENT & PLAN:  69 y.o. female with:  1. Elevated liver enzymes   -- Labs note elevated transaminase levels since 2022, unclear etiology. Does not seem to be medication related. No fatty liver on fibroscan, sero w/u negative  --- synthetic liver function WNL  --- Abd US done  showed normal liver - can repeat with RTC  --- previous serological work up : negative, can repeat some with RTC  --- Hep A and B immunity: see HPI  -- labs before RTC  Orders Placed This Encounter   Procedures    FibroScan Transplant Hepatology(Vibration Controlled Transient Elastography)    US Abdomen Complete    DAVEY Screen w/Reflex    IgG    Hepatic Function Panel    Anti-Smooth Muscle Antibody    Antimitochondrial Antibody    Hepatitis Panel, Acute    CK    Hepatitis A antibody, IgG    Hepatitis B Core Antibody, Total    Hepatitis B Surface Ab, Qualitative      -- fibroscan yearly as long as liver enzymes elevated           Follow up in about 1 year (around 1/21/2026). with labs, US and fibroscan before  Orders Placed This Encounter   Procedures    FibroScan Transplant Hepatology(Vibration Controlled Transient Elastography)    US Abdomen Complete    DAVEY Screen w/Reflex    IgG    Hepatic Function Panel    Anti-Smooth Muscle Antibody    Antimitochondrial Antibody    Hepatitis Panel, Acute    CK    Hepatitis A antibody, IgG    Hepatitis B Core Antibody, Total    Hepatitis B Surface Ab, Qualitative        Thank you for allowing me to participate in the care of URBAN Espinosa    I spent a total of 30 minutes on the day of the visit.This includes face to face time and non-face to face time preparing  to see the patient (eg, review of tests), obtaining and/or reviewing separately obtained history, documenting clinical information in the electronic or other health record, independently interpreting results and communicating results to the patient/family/caregiver, and coordinating care.       CC'ed note to:

## 2025-01-22 ENCOUNTER — PATIENT MESSAGE (OUTPATIENT)
Dept: REHABILITATION | Facility: HOSPITAL | Age: 70
End: 2025-01-22
Payer: MEDICARE

## 2025-01-24 ENCOUNTER — TELEPHONE (OUTPATIENT)
Dept: HEPATOLOGY | Facility: CLINIC | Age: 70
End: 2025-01-24
Payer: MEDICARE

## 2025-01-24 NOTE — TELEPHONE ENCOUNTER
All patient appointments have been scheduled. Appointment reminders have been sent to patient.    Alisa Sherman MA    ----- Message from Mary Hidalgo NP sent at 1/21/2025  3:06 PM CST -----  Please contact pt to schedule F/u with me in 1 year with the following   -- fibroscan same day as appt   -- US and labs 1 week before appt    Thanks !

## 2025-01-29 ENCOUNTER — CLINICAL SUPPORT (OUTPATIENT)
Dept: REHABILITATION | Facility: HOSPITAL | Age: 70
End: 2025-01-29
Payer: MEDICARE

## 2025-01-29 DIAGNOSIS — R52 PAIN: Primary | ICD-10-CM

## 2025-01-29 PROCEDURE — 97014 ELECTRIC STIMULATION THERAPY: CPT | Performed by: PHYSICAL THERAPIST

## 2025-01-29 PROCEDURE — 20560 NDL INSJ W/O NJX 1 OR 2 MUSC: CPT | Performed by: PHYSICAL THERAPIST

## 2025-01-29 PROCEDURE — 97140 MANUAL THERAPY 1/> REGIONS: CPT | Performed by: PHYSICAL THERAPIST

## 2025-01-29 NOTE — PROGRESS NOTES
Physical Therapy Daily Treatment Note     Name: Diana Lieberman Lan  Clinic Number: [id number]    Therapy Diagnosis:   Encounter Diagnosis   Name Primary?    Pain Yes     Physician: Rad Johnston MD    Visit Date: 1/29/2025  Physician Orders: PT Eval and Treat   Medical Diagnosis from Referral: M54.2 (ICD-10-CM) - Neck pain  Evaluation Date: 12/5/2024  Authorization Period Expiration: 11-21-25  Plan of Care Expiration: 1-30-25  Visit # / Visits authorized: 1/ 1, 4/12  FOTO: 5/5    Time In: 2:00 pm  Time Out: 2:50 pm  Total Billable Time: 35 minutes    Precautions: Standard    Subjective     Pt reports: neck feels better.  Denies UE radicular sx.    She was compliant with home exercise program.  Response to previous treatment: less pain  Functional change: improved driving    Pain: 0/10  Location: bilateral upper traps    Objective     MIN TTP B UT. C-AROM is WNL.  Gross strength is 4+/5 to 5/5 C-spine.    Kaleigh received therapeutic exercises to develop ROM, flexibility, and posture for 5 minutes including:  HEP review  Posture education    Kaleigh received the following manual therapy techniques: Joint mobilizations, Manual traction, and Soft tissue Mobilization were applied to the: neck/UT B for 30 minutes, including:  Manual SO release/Ctxn/PROM  Dry needling B UT with 4 30 mm needles + Estim     Kaleihg received hot pack for 15 minutes to neck/UT.      Home Exercises Provided and Patient Education Provided     Education provided:   - correct posture    Written Home Exercises Provided: Patient instructed to cont prior HEP.  Exercises were reviewed and Kaleigh was able to demonstrate them prior to the end of the session.  Kaleigh demonstrated good  understanding of the education provided.     See EMR under Patient Instructions for exercises provided prior visit.    Assessment     Tolerated DN well. Progressing well.  Kaleigh Is progressing well towards her goals.   Pt prognosis is Good.     Pt will continue to benefit from  skilled outpatient physical therapy to address the deficits listed in the problem list box on initial evaluation, provide pt/family education and to maximize pt's level of independence in the home and community environment.     Pt's spiritual, cultural and educational needs considered and pt agreeable to plan of care and goals.    Anticipated barriers to physical therapy: none    Goals: Short Term Goals: 2 weeks         1.   Independent with HEP        2.  Pt will report decreased pain level of < 50% from above measure with ADL     Long Term Goals:   GOALS:    6_   weeks. Pt agrees with goals set.  Independent with HEP.  Report decreased    neck/UT    pain  <   / =  3/10 with ADL such as dressing  Increased MMT  for  neck to 4+/5 to 5/5  with ADL such as house work  Increased arom  for  neck to WNL with functional activities such cleaning or self-care       Plan     Continue PT per POC.    Carlos Frazier, PT

## 2025-01-30 ENCOUNTER — DOCUMENTATION ONLY (OUTPATIENT)
Dept: REHABILITATION | Facility: HOSPITAL | Age: 70
End: 2025-01-30
Payer: MEDICARE

## 2025-01-30 NOTE — PROGRESS NOTES
Health  Wellness Visit Note    Name: Diana Montenegro  Clinic Number: [id number]  Physician: No ref. provider found  Diagnosis: No diagnosis found.  Past Medical History:   Diagnosis Date    Arthritis     Atrial fibrillation, unspecified     hx of a fib prior to stroke.    Chronic back pain     Chronic kidney disease, stage 3a 06/01/2023    Colon polyp     CVA (cerebral infarction) 07/16/2014    Degenerative disc disease     cervical; lumbar    Diabetes mellitus type II     Encounter for loop recorder at end of battery life 09/17/2018    Hyperlipidemia     Hypertension     Hypothyroidism     Mild nonproliferative diabetic retinopathy 08/12/2018    Obesity     Sleep apnea     Stroke 07/2014    Venous insufficiency (chronic) (peripheral)      Visit Number: [id number]  Precautions: Standard, Fall Risk, History of Stroke      1st PT visit:  07/16/2024  Year of care end date:  July 2025  Mindbody plan: 60---9 Months  Patient level: C    Time In: 1:30 PM  Time Out: 2:25 PM  Total Treatment Time: 55 Minutes    Wellness [place] 2022  Handout on this week's wellness topic Balanced Wellbeing was provided along with a discussion on what it means, the benefits, and suggestions for practice.  Reviewed last week's topic of NA-winter weather snow.    Subjective:   Patient reports her back and have been doing well. She has no complaints of discomfort. She has not been to the gym this week but plans to swim later today. Patient does not ice at home unless she deems necessary.     HC and patient discussed full range of motion on leg press before increasing weight.     Objective:   Diana completed therapeutic stretches (EIL, SAIDA) and the following MedX exercise machines: core lumbar, torso rotation l/r, leg extension, leg curl, upright row, chest press, biceps curl, triceps extension, leg press    See exercise log in patient folder for rate of exertion and repetitions completed.       Fitness Machine Education Key:  E=education on  equipment initiated and further follow up and education needed  I=independent with  and exercise.  The patient:  Adjusts machines to his/her settings  Uses equipment levers, pins, weights safely  Maintains safe and correct posture while exercising  Moves through exercise with correct pace and control  Gets on and off equipment safely      Lumbar/Cervical Ext. E Torso Rotation E Leg Press    Leg Extension  Seated Leg Curl  Chest Press    Seated Row  Hip ADD  Hip ABD    Triceps Extension  Bicep Curl  Other:      [x] Indicates exercise has been taught for home  Lumbar/Cervical Ext. [] Torso Rotation [] Leg Press []   Leg Extension [] Seated Leg Curl [] Chest Press []   Seated Row [] Hip ADD [] Hip ABD []   Triceps Extension [] Bicep Curl [] Other:        Assessment:   Patient tolerated Patient tolerated MedX Core Lumbar Strength and all other peripheral exercises without an increase in symptoms. Patient warmed up on nustep for 5 minutes, stretched, and iced low back for 5 minutes after the workout.     Plan:  Continue with established plan of care towards wellness goals.     Health  : Siria Pappas  1/30/2025

## 2025-02-04 ENCOUNTER — TELEPHONE (OUTPATIENT)
Dept: OPHTHALMOLOGY | Facility: CLINIC | Age: 70
End: 2025-02-04
Payer: MEDICARE

## 2025-02-05 ENCOUNTER — CLINICAL SUPPORT (OUTPATIENT)
Dept: REHABILITATION | Facility: HOSPITAL | Age: 70
End: 2025-02-05
Payer: MEDICARE

## 2025-02-05 DIAGNOSIS — R52 PAIN: Primary | ICD-10-CM

## 2025-02-05 PROCEDURE — 20560 NDL INSJ W/O NJX 1 OR 2 MUSC: CPT | Performed by: PHYSICAL THERAPIST

## 2025-02-05 PROCEDURE — 97140 MANUAL THERAPY 1/> REGIONS: CPT | Performed by: PHYSICAL THERAPIST

## 2025-02-05 PROCEDURE — 97014 ELECTRIC STIMULATION THERAPY: CPT | Performed by: PHYSICAL THERAPIST

## 2025-02-05 NOTE — PROGRESS NOTES
Physical Therapy Daily Treatment Note     Name: Diana Montenegro  Clinic Number: [id number]    Therapy Diagnosis:   Encounter Diagnosis   Name Primary?    Pain Yes     Physician: Rad Johnston MD    Visit Date: 2/5/2025  Physician Orders: PT Eval and Treat   Medical Diagnosis from Referral: M54.2 (ICD-10-CM) - Neck pain  Evaluation Date: 12/5/2024  Authorization Period Expiration: 11-21-25  Plan of Care Expiration: 1-30-25  Visit # / Visits authorized: 1/ 1, 5/12  FOTO: 5/5    Time In: 2:00 pm  Time Out: 2:50 pm  Total Billable Time: 35 minutes    Precautions: Standard    Subjective     Pt reports: neck feels OK.  Denies UE radicular sx.  Not feeling well today.  She was compliant with home exercise program.  Response to previous treatment: less pain  Functional change: improved driving    Pain: 0/10  Location: bilateral upper traps    Objective     MIN TTP B UT. C-AROM is WNL.  Gross strength is 4+/5 to 5/5 C-spine.    Kaleigh received therapeutic exercises to develop ROM, flexibility, and posture for 5 minutes including:  HEP review  Posture education    Kaleigh received the following manual therapy techniques: Joint mobilizations, Manual traction, and Soft tissue Mobilization were applied to the: neck/UT B for 30 minutes, including:  Manual SO release/Ctxn/PROM  Dry needling B UT with 4 30 mm needles + Estim     Kaleigh received hot pack for 15 minutes to neck/UT.      Home Exercises Provided and Patient Education Provided     Education provided:   - correct posture    Written Home Exercises Provided: Patient instructed to cont prior HEP.  Exercises were reviewed and Kaleigh was able to demonstrate them prior to the end of the session.  Kaleigh demonstrated good  understanding of the education provided.     See EMR under Patient Instructions for exercises provided prior visit.    Assessment     Tolerated DN well.   Kaleigh Is progressing well towards her goals.   Pt prognosis is Good.     Pt will continue to benefit from  skilled outpatient physical therapy to address the deficits listed in the problem list box on initial evaluation, provide pt/family education and to maximize pt's level of independence in the home and community environment.     Pt's spiritual, cultural and educational needs considered and pt agreeable to plan of care and goals.    Anticipated barriers to physical therapy: none    Goals: Short Term Goals: 2 weeks         1.   Independent with HEP        2.  Pt will report decreased pain level of < 50% from above measure with ADL     Long Term Goals:   GOALS:    6_   weeks. Pt agrees with goals set.  Independent with HEP.  Report decreased    neck/UT    pain  <   / =  3/10 with ADL such as dressing  Increased MMT  for  neck to 4+/5 to 5/5  with ADL such as house work  Increased arom  for  neck to WNL with functional activities such cleaning or self-care       Plan     Continue PT per POC.    Carlos Frazier, PT

## 2025-02-06 ENCOUNTER — HOSPITAL ENCOUNTER (OUTPATIENT)
Facility: HOSPITAL | Age: 70
Discharge: HOSPICE | End: 2025-02-07
Attending: EMERGENCY MEDICINE | Admitting: FAMILY MEDICINE
Payer: MEDICARE

## 2025-02-06 ENCOUNTER — TELEPHONE (OUTPATIENT)
Dept: OPHTHALMOLOGY | Facility: CLINIC | Age: 70
End: 2025-02-06
Payer: MEDICARE

## 2025-02-06 DIAGNOSIS — D64.9 ACUTE ANEMIA: Primary | ICD-10-CM

## 2025-02-06 DIAGNOSIS — R00.0 TACHYCARDIA: ICD-10-CM

## 2025-02-06 DIAGNOSIS — N18.9 CHRONIC KIDNEY DISEASE, UNSPECIFIED CKD STAGE: ICD-10-CM

## 2025-02-06 DIAGNOSIS — R07.9 CHEST PAIN: ICD-10-CM

## 2025-02-06 DIAGNOSIS — R79.89 ELEVATED LIVER FUNCTION TESTS: ICD-10-CM

## 2025-02-06 DIAGNOSIS — R53.1 GENERALIZED WEAKNESS: ICD-10-CM

## 2025-02-06 PROBLEM — N17.9 AKI (ACUTE KIDNEY INJURY): Status: ACTIVE | Noted: 2025-02-06

## 2025-02-06 PROBLEM — N18.32 STAGE 3B CHRONIC KIDNEY DISEASE: Status: RESOLVED | Noted: 2024-11-06 | Resolved: 2025-02-06

## 2025-02-06 LAB
ABO + RH BLD: NORMAL
ALBUMIN SERPL BCP-MCNC: 3.5 G/DL (ref 3.5–5.2)
ALP SERPL-CCNC: 87 U/L (ref 40–150)
ALT SERPL W/O P-5'-P-CCNC: 145 U/L (ref 10–44)
ANION GAP SERPL CALC-SCNC: 11 MMOL/L (ref 8–16)
AST SERPL-CCNC: 81 U/L (ref 10–40)
BASOPHILS # BLD AUTO: 0.02 K/UL (ref 0–0.2)
BASOPHILS NFR BLD: 0.4 % (ref 0–1.9)
BILIRUB SERPL-MCNC: 0.1 MG/DL (ref 0.1–1)
BLD GP AB SCN CELLS X3 SERPL QL: NORMAL
BLD PROD TYP BPU: NORMAL
BLOOD UNIT EXPIRATION DATE: NORMAL
BLOOD UNIT TYPE CODE: 5100
BLOOD UNIT TYPE: NORMAL
BUN SERPL-MCNC: 30 MG/DL (ref 8–23)
CALCIUM SERPL-MCNC: 8.9 MG/DL (ref 8.7–10.5)
CHLORIDE SERPL-SCNC: 108 MMOL/L (ref 95–110)
CO2 SERPL-SCNC: 20 MMOL/L (ref 23–29)
CODING SYSTEM: NORMAL
CREAT SERPL-MCNC: 1.9 MG/DL (ref 0.5–1.4)
CROSSMATCH INTERPRETATION: NORMAL
DIFFERENTIAL METHOD BLD: ABNORMAL
DISPENSE STATUS: NORMAL
EOSINOPHIL # BLD AUTO: 0 K/UL (ref 0–0.5)
EOSINOPHIL NFR BLD: 0.6 % (ref 0–8)
ERYTHROCYTE [DISTWIDTH] IN BLOOD BY AUTOMATED COUNT: 15.7 % (ref 11.5–14.5)
EST. GFR  (NO RACE VARIABLE): 28.4 ML/MIN/1.73 M^2
ESTIMATED AVG GLUCOSE: 114 MG/DL (ref 68–131)
FERRITIN SERPL-MCNC: 10 NG/ML (ref 20–300)
FOLATE SERPL-MCNC: 19.7 NG/ML (ref 4–24)
GLUCOSE SERPL-MCNC: 136 MG/DL (ref 70–110)
HAPTOGLOB SERPL-MCNC: 108 MG/DL (ref 30–250)
HBA1C MFR BLD: 5.6 % (ref 4–5.6)
HCT VFR BLD AUTO: 23.6 % (ref 37–48.5)
HCV AB SERPL QL IA: NORMAL
HGB BLD-MCNC: 6.6 G/DL (ref 12–16)
HIV 1+2 AB+HIV1 P24 AG SERPL QL IA: NORMAL
IMM GRANULOCYTES # BLD AUTO: 0.01 K/UL (ref 0–0.04)
IMM GRANULOCYTES NFR BLD AUTO: 0.2 % (ref 0–0.5)
IRON SERPL-MCNC: 145 UG/DL (ref 30–160)
LDH SERPL L TO P-CCNC: 205 U/L (ref 110–260)
LYMPHOCYTES # BLD AUTO: 1.2 K/UL (ref 1–4.8)
LYMPHOCYTES NFR BLD: 23.2 % (ref 18–48)
MCH RBC QN AUTO: 27 PG (ref 27–31)
MCHC RBC AUTO-ENTMCNC: 28 G/DL (ref 32–36)
MCV RBC AUTO: 97 FL (ref 82–98)
MONOCYTES # BLD AUTO: 0.5 K/UL (ref 0.3–1)
MONOCYTES NFR BLD: 9 % (ref 4–15)
NEUTROPHILS # BLD AUTO: 3.5 K/UL (ref 1.8–7.7)
NEUTROPHILS NFR BLD: 66.6 % (ref 38–73)
NRBC BLD-RTO: 0 /100 WBC
NUM UNITS TRANS PACKED RBC: NORMAL
PLATELET # BLD AUTO: 189 K/UL (ref 150–450)
PMV BLD AUTO: 9.5 FL (ref 9.2–12.9)
POCT GLUCOSE: 172 MG/DL (ref 70–110)
POTASSIUM SERPL-SCNC: 3.9 MMOL/L (ref 3.5–5.1)
PROT SERPL-MCNC: 6.5 G/DL (ref 6–8.4)
RBC # BLD AUTO: 2.44 M/UL (ref 4–5.4)
RETICS/RBC NFR AUTO: 6.4 % (ref 0.5–2.5)
SATURATED IRON: 26 % (ref 20–50)
SODIUM SERPL-SCNC: 139 MMOL/L (ref 136–145)
SPECIMEN OUTDATE: NORMAL
TOTAL IRON BINDING CAPACITY: 549 UG/DL (ref 250–450)
TRANSFERRIN SERPL-MCNC: 371 MG/DL (ref 200–375)
TSH SERPL DL<=0.005 MIU/L-ACNC: 0.79 UIU/ML (ref 0.4–4)
VIT B12 SERPL-MCNC: >2000 PG/ML (ref 210–950)
WBC # BLD AUTO: 5.31 K/UL (ref 3.9–12.7)

## 2025-02-06 PROCEDURE — G0378 HOSPITAL OBSERVATION PER HR: HCPCS

## 2025-02-06 PROCEDURE — 83615 LACTATE (LD) (LDH) ENZYME: CPT | Performed by: STUDENT IN AN ORGANIZED HEALTH CARE EDUCATION/TRAINING PROGRAM

## 2025-02-06 PROCEDURE — 86803 HEPATITIS C AB TEST: CPT | Performed by: PHYSICIAN ASSISTANT

## 2025-02-06 PROCEDURE — 82962 GLUCOSE BLOOD TEST: CPT

## 2025-02-06 PROCEDURE — 93005 ELECTROCARDIOGRAM TRACING: CPT | Performed by: INTERNAL MEDICINE

## 2025-02-06 PROCEDURE — 82728 ASSAY OF FERRITIN: CPT | Performed by: STUDENT IN AN ORGANIZED HEALTH CARE EDUCATION/TRAINING PROGRAM

## 2025-02-06 PROCEDURE — P9016 RBC LEUKOCYTES REDUCED: HCPCS | Performed by: PHYSICIAN ASSISTANT

## 2025-02-06 PROCEDURE — 83010 ASSAY OF HAPTOGLOBIN QUANT: CPT | Performed by: STUDENT IN AN ORGANIZED HEALTH CARE EDUCATION/TRAINING PROGRAM

## 2025-02-06 PROCEDURE — 99285 EMERGENCY DEPT VISIT HI MDM: CPT | Mod: 25

## 2025-02-06 PROCEDURE — 80053 COMPREHEN METABOLIC PANEL: CPT | Performed by: PHYSICIAN ASSISTANT

## 2025-02-06 PROCEDURE — 86901 BLOOD TYPING SEROLOGIC RH(D): CPT | Performed by: PHYSICIAN ASSISTANT

## 2025-02-06 PROCEDURE — 36430 TRANSFUSION BLD/BLD COMPNT: CPT

## 2025-02-06 PROCEDURE — 84466 ASSAY OF TRANSFERRIN: CPT | Performed by: STUDENT IN AN ORGANIZED HEALTH CARE EDUCATION/TRAINING PROGRAM

## 2025-02-06 PROCEDURE — 93010 ELECTROCARDIOGRAM REPORT: CPT | Mod: ,,, | Performed by: INTERNAL MEDICINE

## 2025-02-06 PROCEDURE — 85025 COMPLETE CBC W/AUTO DIFF WBC: CPT | Performed by: PHYSICIAN ASSISTANT

## 2025-02-06 PROCEDURE — 82746 ASSAY OF FOLIC ACID SERUM: CPT | Performed by: STUDENT IN AN ORGANIZED HEALTH CARE EDUCATION/TRAINING PROGRAM

## 2025-02-06 PROCEDURE — 87389 HIV-1 AG W/HIV-1&-2 AB AG IA: CPT | Performed by: PHYSICIAN ASSISTANT

## 2025-02-06 PROCEDURE — 85045 AUTOMATED RETICULOCYTE COUNT: CPT | Performed by: STUDENT IN AN ORGANIZED HEALTH CARE EDUCATION/TRAINING PROGRAM

## 2025-02-06 PROCEDURE — 83036 HEMOGLOBIN GLYCOSYLATED A1C: CPT | Performed by: PHYSICIAN ASSISTANT

## 2025-02-06 PROCEDURE — 84443 ASSAY THYROID STIM HORMONE: CPT | Performed by: PHYSICIAN ASSISTANT

## 2025-02-06 PROCEDURE — 86920 COMPATIBILITY TEST SPIN: CPT | Performed by: PHYSICIAN ASSISTANT

## 2025-02-06 PROCEDURE — 82607 VITAMIN B-12: CPT | Performed by: STUDENT IN AN ORGANIZED HEALTH CARE EDUCATION/TRAINING PROGRAM

## 2025-02-06 RX ORDER — NALOXONE HCL 0.4 MG/ML
0.02 VIAL (ML) INJECTION
Status: DISCONTINUED | OUTPATIENT
Start: 2025-02-06 | End: 2025-02-07 | Stop reason: HOSPADM

## 2025-02-06 RX ORDER — IBUPROFEN 200 MG
24 TABLET ORAL
Status: DISCONTINUED | OUTPATIENT
Start: 2025-02-06 | End: 2025-02-07 | Stop reason: HOSPADM

## 2025-02-06 RX ORDER — INSULIN ASPART 100 [IU]/ML
0-5 INJECTION, SOLUTION INTRAVENOUS; SUBCUTANEOUS
Status: DISCONTINUED | OUTPATIENT
Start: 2025-02-06 | End: 2025-02-07 | Stop reason: HOSPADM

## 2025-02-06 RX ORDER — ACETAMINOPHEN 325 MG/1
650 TABLET ORAL EVERY 8 HOURS PRN
Status: DISCONTINUED | OUTPATIENT
Start: 2025-02-06 | End: 2025-02-07 | Stop reason: HOSPADM

## 2025-02-06 RX ORDER — PANTOPRAZOLE SODIUM 40 MG/1
40 TABLET, DELAYED RELEASE ORAL DAILY
Status: DISCONTINUED | OUTPATIENT
Start: 2025-02-07 | End: 2025-02-07 | Stop reason: HOSPADM

## 2025-02-06 RX ORDER — CETIRIZINE HYDROCHLORIDE 5 MG/1
5 TABLET ORAL DAILY
Status: DISCONTINUED | OUTPATIENT
Start: 2025-02-07 | End: 2025-02-07 | Stop reason: HOSPADM

## 2025-02-06 RX ORDER — SODIUM CHLORIDE 0.9 % (FLUSH) 0.9 %
10 SYRINGE (ML) INJECTION EVERY 12 HOURS PRN
Status: DISCONTINUED | OUTPATIENT
Start: 2025-02-06 | End: 2025-02-07 | Stop reason: HOSPADM

## 2025-02-06 RX ORDER — POLYETHYLENE GLYCOL 3350 17 G/17G
17 POWDER, FOR SOLUTION ORAL 2 TIMES DAILY PRN
Status: DISCONTINUED | OUTPATIENT
Start: 2025-02-06 | End: 2025-02-07 | Stop reason: HOSPADM

## 2025-02-06 RX ORDER — FLUTICASONE PROPIONATE 50 MCG
2 SPRAY, SUSPENSION (ML) NASAL DAILY
Status: DISCONTINUED | OUTPATIENT
Start: 2025-02-07 | End: 2025-02-07 | Stop reason: HOSPADM

## 2025-02-06 RX ORDER — SODIUM CHLORIDE, SODIUM LACTATE, POTASSIUM CHLORIDE, CALCIUM CHLORIDE 600; 310; 30; 20 MG/100ML; MG/100ML; MG/100ML; MG/100ML
INJECTION, SOLUTION INTRAVENOUS CONTINUOUS
Status: DISCONTINUED | OUTPATIENT
Start: 2025-02-07 | End: 2025-02-07

## 2025-02-06 RX ORDER — IBUPROFEN 200 MG
16 TABLET ORAL
Status: DISCONTINUED | OUTPATIENT
Start: 2025-02-06 | End: 2025-02-07 | Stop reason: HOSPADM

## 2025-02-06 RX ORDER — ALUMINUM HYDROXIDE, MAGNESIUM HYDROXIDE, AND SIMETHICONE 1200; 120; 1200 MG/30ML; MG/30ML; MG/30ML
30 SUSPENSION ORAL 4 TIMES DAILY PRN
Status: DISCONTINUED | OUTPATIENT
Start: 2025-02-06 | End: 2025-02-07 | Stop reason: HOSPADM

## 2025-02-06 RX ORDER — TALC
6 POWDER (GRAM) TOPICAL NIGHTLY PRN
Status: DISCONTINUED | OUTPATIENT
Start: 2025-02-06 | End: 2025-02-07 | Stop reason: HOSPADM

## 2025-02-06 RX ORDER — HYDROCODONE BITARTRATE AND ACETAMINOPHEN 500; 5 MG/1; MG/1
TABLET ORAL
Status: DISCONTINUED | OUTPATIENT
Start: 2025-02-06 | End: 2025-02-07 | Stop reason: HOSPADM

## 2025-02-06 RX ORDER — LEVOTHYROXINE SODIUM 88 UG/1
88 TABLET ORAL
Status: DISCONTINUED | OUTPATIENT
Start: 2025-02-07 | End: 2025-02-07 | Stop reason: HOSPADM

## 2025-02-06 RX ORDER — ONDANSETRON HYDROCHLORIDE 2 MG/ML
4 INJECTION, SOLUTION INTRAVENOUS EVERY 8 HOURS PRN
Status: DISCONTINUED | OUTPATIENT
Start: 2025-02-06 | End: 2025-02-07 | Stop reason: HOSPADM

## 2025-02-06 RX ORDER — PROCHLORPERAZINE EDISYLATE 5 MG/ML
5 INJECTION INTRAMUSCULAR; INTRAVENOUS EVERY 6 HOURS PRN
Status: DISCONTINUED | OUTPATIENT
Start: 2025-02-06 | End: 2025-02-07 | Stop reason: HOSPADM

## 2025-02-06 RX ORDER — DOCUSATE SODIUM 100 MG/1
100 CAPSULE, LIQUID FILLED ORAL DAILY
Status: DISCONTINUED | OUTPATIENT
Start: 2025-02-07 | End: 2025-02-07 | Stop reason: HOSPADM

## 2025-02-06 RX ORDER — TRAZODONE HYDROCHLORIDE 50 MG/1
50 TABLET ORAL NIGHTLY PRN
Status: DISCONTINUED | OUTPATIENT
Start: 2025-02-06 | End: 2025-02-07 | Stop reason: HOSPADM

## 2025-02-06 RX ORDER — GLUCAGON 1 MG
1 KIT INJECTION
Status: DISCONTINUED | OUTPATIENT
Start: 2025-02-06 | End: 2025-02-07 | Stop reason: HOSPADM

## 2025-02-06 NOTE — FIRST PROVIDER EVALUATION
Medical screening examination initiated.  I have conducted a focused provider triage encounter, findings are as follows:    Brief history of present illness:  Notes fatigue, feeling woozy and weak due to low blood pressure. Lowest 90s systolic. Denies fevers. Notes intermittent palpitations. Denies new medication changes, states she stopped taking BP meds a few days ago due to low blood pressure.     There were no vitals filed for this visit.    Pertinent physical exam:  Well appearing and in no acute distress. Normal vitals.    Brief workup plan:  Labs, EKG    Preliminary workup initiated; this workup will be continued and followed by the physician or advanced practice provider that is assigned to the patient when roomed.

## 2025-02-06 NOTE — TELEPHONE ENCOUNTER
M  arrival time of 7:00 for sx on 2/12/25 with Dr. Singleton @ [facility]. Sent message to my chart

## 2025-02-07 VITALS
OXYGEN SATURATION: 99 % | RESPIRATION RATE: 18 BRPM | WEIGHT: 193.56 LBS | HEART RATE: 60 BPM | TEMPERATURE: 98 F | BODY MASS INDEX: 32.21 KG/M2 | DIASTOLIC BLOOD PRESSURE: 80 MMHG | SYSTOLIC BLOOD PRESSURE: 128 MMHG

## 2025-02-07 PROBLEM — D64.9 SYMPTOMATIC ANEMIA: Status: ACTIVE | Noted: 2025-02-07

## 2025-02-07 LAB
ALBUMIN SERPL BCP-MCNC: 3.1 G/DL (ref 3.5–5.2)
ALP SERPL-CCNC: 75 U/L (ref 40–150)
ALT SERPL W/O P-5'-P-CCNC: 112 U/L (ref 10–44)
ANION GAP SERPL CALC-SCNC: 8 MMOL/L (ref 8–16)
ANION GAP SERPL CALC-SCNC: 9 MMOL/L (ref 8–16)
AST SERPL-CCNC: 57 U/L (ref 10–40)
BASOPHILS # BLD AUTO: 0.02 K/UL (ref 0–0.2)
BASOPHILS # BLD AUTO: 0.02 K/UL (ref 0–0.2)
BASOPHILS NFR BLD: 0.4 % (ref 0–1.9)
BASOPHILS NFR BLD: 0.6 % (ref 0–1.9)
BILIRUB SERPL-MCNC: 1 MG/DL (ref 0.1–1)
BUN SERPL-MCNC: 26 MG/DL (ref 8–23)
BUN SERPL-MCNC: 30 MG/DL (ref 8–23)
CALCIUM SERPL-MCNC: 8.7 MG/DL (ref 8.7–10.5)
CALCIUM SERPL-MCNC: 9.2 MG/DL (ref 8.7–10.5)
CHLORIDE SERPL-SCNC: 107 MMOL/L (ref 95–110)
CHLORIDE SERPL-SCNC: 108 MMOL/L (ref 95–110)
CO2 SERPL-SCNC: 20 MMOL/L (ref 23–29)
CO2 SERPL-SCNC: 22 MMOL/L (ref 23–29)
CREAT SERPL-MCNC: 1.5 MG/DL (ref 0.5–1.4)
CREAT SERPL-MCNC: 1.7 MG/DL (ref 0.5–1.4)
DIFFERENTIAL METHOD BLD: ABNORMAL
DIFFERENTIAL METHOD BLD: ABNORMAL
EOSINOPHIL # BLD AUTO: 0.1 K/UL (ref 0–0.5)
EOSINOPHIL # BLD AUTO: 0.1 K/UL (ref 0–0.5)
EOSINOPHIL NFR BLD: 1.3 % (ref 0–8)
EOSINOPHIL NFR BLD: 1.5 % (ref 0–8)
ERYTHROCYTE [DISTWIDTH] IN BLOOD BY AUTOMATED COUNT: 18 % (ref 11.5–14.5)
ERYTHROCYTE [DISTWIDTH] IN BLOOD BY AUTOMATED COUNT: 18.5 % (ref 11.5–14.5)
EST. GFR  (NO RACE VARIABLE): 32.5 ML/MIN/1.73 M^2
EST. GFR  (NO RACE VARIABLE): 37.7 ML/MIN/1.73 M^2
GLUCOSE SERPL-MCNC: 134 MG/DL (ref 70–110)
GLUCOSE SERPL-MCNC: 164 MG/DL (ref 70–110)
HCT VFR BLD AUTO: 23.3 % (ref 37–48.5)
HCT VFR BLD AUTO: 25.5 % (ref 37–48.5)
HGB BLD-MCNC: 7 G/DL (ref 12–16)
HGB BLD-MCNC: 7.7 G/DL (ref 12–16)
IMM GRANULOCYTES # BLD AUTO: 0.01 K/UL (ref 0–0.04)
IMM GRANULOCYTES # BLD AUTO: 0.01 K/UL (ref 0–0.04)
IMM GRANULOCYTES NFR BLD AUTO: 0.2 % (ref 0–0.5)
IMM GRANULOCYTES NFR BLD AUTO: 0.3 % (ref 0–0.5)
LYMPHOCYTES # BLD AUTO: 1 K/UL (ref 1–4.8)
LYMPHOCYTES # BLD AUTO: 1 K/UL (ref 1–4.8)
LYMPHOCYTES NFR BLD: 20.7 % (ref 18–48)
LYMPHOCYTES NFR BLD: 28.1 % (ref 18–48)
MAGNESIUM SERPL-MCNC: 2.3 MG/DL (ref 1.6–2.6)
MCH RBC QN AUTO: 27.1 PG (ref 27–31)
MCH RBC QN AUTO: 27.2 PG (ref 27–31)
MCHC RBC AUTO-ENTMCNC: 30 G/DL (ref 32–36)
MCHC RBC AUTO-ENTMCNC: 30.2 G/DL (ref 32–36)
MCV RBC AUTO: 90 FL (ref 82–98)
MCV RBC AUTO: 90 FL (ref 82–98)
MONOCYTES # BLD AUTO: 0.3 K/UL (ref 0.3–1)
MONOCYTES # BLD AUTO: 0.4 K/UL (ref 0.3–1)
MONOCYTES NFR BLD: 8.8 % (ref 4–15)
MONOCYTES NFR BLD: 9.2 % (ref 4–15)
NEUTROPHILS # BLD AUTO: 2 K/UL (ref 1.8–7.7)
NEUTROPHILS # BLD AUTO: 3.2 K/UL (ref 1.8–7.7)
NEUTROPHILS NFR BLD: 60.3 % (ref 38–73)
NEUTROPHILS NFR BLD: 68.6 % (ref 38–73)
NRBC BLD-RTO: 0 /100 WBC
NRBC BLD-RTO: 0 /100 WBC
OHS QRS DURATION: 86 MS
OHS QRS DURATION: 90 MS
OHS QTC CALCULATION: 424 MS
OHS QTC CALCULATION: 432 MS
PHOSPHATE SERPL-MCNC: 2.9 MG/DL (ref 2.7–4.5)
PLATELET # BLD AUTO: 156 K/UL (ref 150–450)
PLATELET # BLD AUTO: 157 K/UL (ref 150–450)
PMV BLD AUTO: 9.5 FL (ref 9.2–12.9)
PMV BLD AUTO: 9.6 FL (ref 9.2–12.9)
POTASSIUM SERPL-SCNC: 3.7 MMOL/L (ref 3.5–5.1)
POTASSIUM SERPL-SCNC: 3.7 MMOL/L (ref 3.5–5.1)
PROT SERPL-MCNC: 5.7 G/DL (ref 6–8.4)
RBC # BLD AUTO: 2.58 M/UL (ref 4–5.4)
RBC # BLD AUTO: 2.83 M/UL (ref 4–5.4)
SODIUM SERPL-SCNC: 137 MMOL/L (ref 136–145)
SODIUM SERPL-SCNC: 137 MMOL/L (ref 136–145)
WBC # BLD AUTO: 3.38 K/UL (ref 3.9–12.7)
WBC # BLD AUTO: 4.68 K/UL (ref 3.9–12.7)

## 2025-02-07 PROCEDURE — 36430 TRANSFUSION BLD/BLD COMPNT: CPT

## 2025-02-07 PROCEDURE — 83735 ASSAY OF MAGNESIUM: CPT

## 2025-02-07 PROCEDURE — 80048 BASIC METABOLIC PNL TOTAL CA: CPT | Mod: XB | Performed by: STUDENT IN AN ORGANIZED HEALTH CARE EDUCATION/TRAINING PROGRAM

## 2025-02-07 PROCEDURE — 63600175 PHARM REV CODE 636 W HCPCS: Performed by: PHYSICIAN ASSISTANT

## 2025-02-07 PROCEDURE — 84100 ASSAY OF PHOSPHORUS: CPT

## 2025-02-07 PROCEDURE — 96361 HYDRATE IV INFUSION ADD-ON: CPT

## 2025-02-07 PROCEDURE — G0378 HOSPITAL OBSERVATION PER HR: HCPCS

## 2025-02-07 PROCEDURE — 25000242 PHARM REV CODE 250 ALT 637 W/ HCPCS

## 2025-02-07 PROCEDURE — 96360 HYDRATION IV INFUSION INIT: CPT

## 2025-02-07 PROCEDURE — 85025 COMPLETE CBC W/AUTO DIFF WBC: CPT

## 2025-02-07 PROCEDURE — 25000003 PHARM REV CODE 250

## 2025-02-07 PROCEDURE — 85025 COMPLETE CBC W/AUTO DIFF WBC: CPT | Mod: 91 | Performed by: STUDENT IN AN ORGANIZED HEALTH CARE EDUCATION/TRAINING PROGRAM

## 2025-02-07 PROCEDURE — 80053 COMPREHEN METABOLIC PANEL: CPT

## 2025-02-07 RX ADMIN — PANTOPRAZOLE SODIUM 40 MG: 40 TABLET, DELAYED RELEASE ORAL at 09:02

## 2025-02-07 RX ADMIN — SODIUM CHLORIDE, POTASSIUM CHLORIDE, SODIUM LACTATE AND CALCIUM CHLORIDE: 600; 310; 30; 20 INJECTION, SOLUTION INTRAVENOUS at 01:02

## 2025-02-07 RX ADMIN — FLUTICASONE PROPIONATE 100 MCG: 50 SPRAY, METERED NASAL at 09:02

## 2025-02-07 RX ADMIN — CETIRIZINE HYDROCHLORIDE 5 MG: 5 TABLET, FILM COATED ORAL at 09:02

## 2025-02-07 RX ADMIN — LEVOTHYROXINE SODIUM 88 MCG: 88 TABLET ORAL at 06:02

## 2025-02-07 RX ADMIN — Medication 6 MG: at 02:02

## 2025-02-07 RX ADMIN — TRAZODONE HYDROCHLORIDE 50 MG: 50 TABLET ORAL at 02:02

## 2025-02-07 RX ADMIN — DOCUSATE SODIUM 100 MG: 100 CAPSULE, LIQUID FILLED ORAL at 09:02

## 2025-02-07 NOTE — ASSESSMENT & PLAN NOTE
- holding eliquis in setting of symptomatic anemia  - resume eliquis in AM if H/H stable  - only taking MTP as needed at home, consider restarting if becomes tachycardic

## 2025-02-07 NOTE — ED NOTES
Patient identifiers verified and correct for  Ms Montenegro  C/C: reports low b/p, fatigue and weakness SEE NN  APPEARANCE: awake and alert in NAD. PAIN  10/10  SKIN: warm, dry and intact. No breakdown or bruising.  MUSCULOSKELETAL: Patient moving all extremities spontaneously, no obvious swelling or deformities noted. Ambulates independently with assist   RESPIRATORY: Denies shortness of breath.Respirations unlabored. Positive cough  CARDIAC: Denies CP, 2+ distal pulses; no peripheral edema  ABDOMEN: S/ND/NT, Denies nausea  : voids spontaneously, denies difficulty  Neurologic: AAO x 4; follows commands equal strength in all extremities; denies numbness/tingling. Denies dizziness Positive gen weakness, lightheaded ness

## 2025-02-07 NOTE — ASSESSMENT & PLAN NOTE
Stage 3b chronic kidney disease   JOSHUA is likely due to anemia and dehydration. Baseline creatinine is  1.4 . Most recent creatinine and eGFR are listed below.  Recent Labs     02/06/25  1842   CREATININE 1.9*   EGFRNORACEVR 28.4*      Plan  - Avoid nephrotoxins and renally dose meds for GFR listed above  - Monitor urine output, serial BMP, and adjust therapy as needed  - transfusion as above  - place on cIVFs overnight

## 2025-02-07 NOTE — PROGRESS NOTES
Pt departed the ED on foot. Pt stable and in no distress. All belongings gathered and lines pulled. AVS given to pt and pt gave full verbal consent/understanding of AVS.

## 2025-02-07 NOTE — HPI
Diana Montenegro is a 69 y.o. female with a PMHx arthritis, AFib on chronic anticoagulation, hypothyroidism, hypertension, hyperlipidemia, diabetes who presents to the emergency department with generalized weakness x2 weeks. Patient reports general fatigue beginning 2 weeks ago that is now worsening. She reports low blood pressures at home with her systolic in the 90s so she has been holding her anti-hypertensive medications since this past Saturday. She reports KING and palpitations after climbing 2 flights of stairs of her apartment and occasional lightheadedness. She reports blood in the stool with hard stools but only notices bright red blood on her toilet paper after wiping. No gross hematochezia. She last last had this 3 days ago. No family history of anemia, GIB or cancer that she is aware of. Reports no injury or acute blood loss. This past summer she reported she had low HBG on lab work but did not require transfusions and she was not symptomatic like she is now. Denies abdominal pain, N/V/D/C, indigestion, chest pain, sob, cough, recent falls, syncope or LE swelling.    In the ED, patient afebrile, VSS on room air. Labs significant for anemia with a Hgb 6.6, Hct 23.6. Cr 1.9 from baseline ~1.4. AST 81, . Transfusing 1U pRBCs.

## 2025-02-07 NOTE — ASSESSMENT & PLAN NOTE
Patient's FSGs are controlled on current medication regimen.  Last A1c reviewed-   Lab Results   Component Value Date    HGBA1C 5.6 02/06/2025     Most recent fingerstick glucose reviewed-   Recent Labs   Lab 02/06/25  1842   POCTGLUCOSE 172*     Current correctional scale  Low  Maintain anti-hyperglycemic dose as follows-   Antihyperglycemics (From admission, onward)      Start     Stop Route Frequency Ordered    02/06/25 2145  insulin aspart U-100 pen 0-5 Units         -- SubQ Before meals & nightly PRN 02/06/25 2048          Hold Oral hypoglycemics while patient is in the hospital.

## 2025-02-07 NOTE — DISCHARGE SUMMARY
[facility] Segura - Emergency Dept  [facility]  Discharge Summary      Patient Name: Diana Montenegro  MRN: [id number]  INOCENTE: [id number]  Patient Class: OP- Observation  Admission Date: 2/6/2025  Hospital Length of Stay: 0 days  Discharge Date and Time: 2/7/2025  2:39 PM  Attending Physician: Priscilla att. providers found   Discharging Provider: Norma Bose MD  Primary Care Provider: Rad Johnston MD  [facility] Team: [facility]  Norma Bose MD  Primary Care Team: [facility]    HPI:   Diana Montenegro is a 69 y.o. female with a PMHx arthritis, AFib on chronic anticoagulation, hypothyroidism, hypertension, hyperlipidemia, diabetes who presents to the emergency department with generalized weakness x2 weeks. Patient reports general fatigue beginning 2 weeks ago that is now worsening. She reports low blood pressures at home with her systolic in the 90s so she has been holding her anti-hypertensive medications since this past Saturday. She reports KING and palpitations after climbing 2 flights of stairs of her apartment and occasional lightheadedness. She reports blood in the stool with hard stools but only notices bright red blood on her toilet paper after wiping. No gross hematochezia. She last last had this 3 days ago. No family history of anemia, GIB or cancer that she is aware of. Reports no injury or acute blood loss. This past summer she reported she had low HBG on lab work but did not require transfusions and she was not symptomatic like she is now. Denies abdominal pain, N/V/D/C, indigestion, chest pain, sob, cough, recent falls, syncope or LE swelling.    In the ED, patient afebrile, VSS on room air. Labs significant for anemia with a Hgb 6.6, Hct 23.6. Cr 1.9 from baseline ~1.4. AST 81, . Transfusing 1U pRBCs.       * No surgery found *      Hospital Course:   Patient presented to ED for generalized weakness for 2 weeks.. she reports blood in stools with hard stools. Hb 6.6,  transfused 1 PRBC. Hb improved to 7.7. Patient had JOSHUA with Cr 1.9, improved to 1.5 after 1 PRBC transfusion and LR bolus. Patient reports symptomatic improvement in weakness. She denies any recent blood in stools and is feeling back to baseline. Patient is stable and ready for discharge. Outpatient GI and PCP apt requested in one week for monitoring. Psyllium husk and colace started for hard stools. Advised high fiber diet. Strict return precautions given to patient to return to ED if she develops weakness, low BP, blood in stools, abdominal pain/back pain, any significant changes from baseline. She acknowledges understanding. Patient is stable and now ready for discharge.      Goals of Care Treatment Preferences:  Code Status: Full Code         Consults:       Final Active Diagnoses:    Diagnosis Date Noted POA    PRINCIPAL PROBLEM:  Symptomatic anemia [D64.9] 02/07/2025 Yes    JOSHUA (acute kidney injury) [N17.9] 02/06/2025 Yes    Elevated liver enzymes [R74.8] 01/21/2025 Yes    Coronary artery disease involving native coronary artery of native heart without angina pectoris [I25.10] 08/07/2024 Yes    Hypertension associated with diabetes [E11.59, I15.2] 11/18/2020 Yes    Hyperlipidemia associated with type 2 diabetes mellitus [E11.69, E78.5] 11/18/2020 Yes    Chronic sinusitis [J32.9] 05/11/2017 Yes    Paroxysmal atrial fibrillation [I48.0] 08/25/2015 Yes     Chronic    Hypothyroidism [E03.9]  Yes    Class 1 obesity due to excess calories with serious comorbidity and body mass index (BMI) of 32.0 to 32.9 in adult [E66.811, E66.09, Z68.32]  Not Applicable    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    Specialty: Internal Medicine  Contact information:  [address] [facility] [place] [address]  [phone]               [place] - Gastroenterology Follow up.    Specialty: Gastroenterology  Contact information:  [address]  [address]  [facility] [address] [phone]  Additional information:  Please park in [address] C or D and use Madonna neumann. Take Medical Office Bldg elevators.    Please check-in for all clinic appointments on the 1st floor, at the desk, in the [facility] lobby before coming up to the clinic for your appointment.                         Patient Instructions:      Ambulatory referral/consult to Gastroenterology   Standing Status: Future   Referral Priority: Routine Referral Type: Consultation   Referral Reason: Specialty Services Required   Requested Specialty: Gastroenterology   Number of Visits Requested: 1     Ambulatory referral/consult to Internal Medicine   Standing Status: Future   Referral Priority: Routine Referral Type: Consultation   Referral Reason: Specialty Services Required   Requested Specialty: Internal Medicine   Number of Visits Requested: 1       Significant Diagnostic Studies: N/A    Pending Diagnostic Studies:       None           Medications:  Reconciled Home Medications:      Medication List        START taking these medications      psyllium packet  Commonly known as: HYDROCIL  Take 1 packet by mouth once daily. for 14 days            CHANGE how you take these medications      OZEMPIC 2 mg/dose (8 mg/3 mL) Pnij  Generic drug: semaglutide  INJECT 2 MG SUBCUTANEOUSLY ONCE A WEEK  What changed: See the new instructions.            CONTINUE taking these medications      albuterol 90 mcg/actuation inhaler  Commonly known as: PROVENTIL/VENTOLIN HFA  INHALE 2 PUFFS BY MOUTH EVERY 6 HOURS AS NEEDED FOR WHEEZING     aspirin 81 MG EC  tablet  Commonly known as: ECOTRIN  Take 1 tablet (81 mg total) by mouth once daily.     b complex vitamins tablet  Take 1 tablet by mouth once daily.     CALCIUM CITRATE + D ORAL  Take 1 tablet by mouth every morning.     cetirizine 5 MG tablet  Commonly known as: ZYRTEC  Take 5 mg by mouth once daily.     cyanocobalamin (vitamin B-12) 500 mcg Subl  Place 1 tablet under the tongue once daily.     docusate sodium 100 MG capsule  Commonly known as: COLACE  Take 100 mg by mouth once daily.     ELIQUIS 5 mg Tab  Generic drug: apixaban  Take 1 tablet by mouth twice daily     fish oil-omega-3 fatty acids 300-1,000 mg capsule  Take 1 capsule by mouth once daily.     levothyroxine 88 MCG tablet  Commonly known as: SYNTHROID  Take 1 tablet (88 mcg total) by mouth before breakfast.     metFORMIN 1000 MG tablet  Commonly known as: GLUCOPHAGE  TAKE 1 TABLET BY MOUTH TWICE DAILY WITH MEALS     multivitamin capsule  Take 1 capsule by mouth once daily.     omeprazole 40 MG capsule  Commonly known as: PRILOSEC  Take 1 capsule (40 mg total) by mouth every morning.     prednisoLONE-moxiflox-bromfen 1-0.5-0.075 % Drps  Apply 1 drop to eye 3 (three) times daily.     rosuvastatin 40 MG Tab  Commonly known as: CRESTOR  Take 1 tablet by mouth once daily     senna 8.6 mg tablet  Commonly known as: SENNA  Take 2 tablets by mouth nightly as needed for Constipation.     valsartan-hydrochlorothiazide 80-12.5 mg per tablet  Commonly known as: DIOVAN-HCT  Take 1 tablet by mouth once daily.            STOP taking these medications      diclofenac sodium 1 % Gel  Commonly known as: VOLTAREN              Indwelling Lines/Drains at time of discharge:   Lines/Drains/Airways       None                   Time spent on the discharge of patient: 40 minutes         Norma Bose MD  Department of Hospital Medicine  [facility] - Emergency Dept

## 2025-02-07 NOTE — ED PROVIDER NOTES
Encounter Date: 2/6/2025       History     Chief Complaint   Patient presents with    Hypotension     X 1 week of low BP at home, having SOB, weakness, and dizziness.     Patient is a 69-year-old female with history of arthritis, AFib on chronic anticoagulation, hypothyroidism, hypertension, hyperlipidemia, diabetes who presents to the emergency department with generalized weakness.  Patient reports over the last several days she has not been feeling herself.  Reports dyspnea on exertion.  Reports generalized weakness.  Reports she feels at times like she is going to pass out.  Reports she feels like her heart is racing when she tries to do the simplest test.  Denies any pain.  Reports she does see bright red blood sometimes when she poops but it is not consistent.  She reports she checks her blood pressure normally everyday and has been noticing it is low so she has not been taking her blood pressure medication.    The history is provided by the patient.     Review of patient's allergies indicates:   Allergen Reactions    Medrol [methylprednisolone] Palpitations     Past Medical History:   Diagnosis Date    Arthritis     Atrial fibrillation, unspecified     hx of a fib prior to stroke.    Chronic back pain     Chronic kidney disease, stage 3a 06/01/2023    Colon polyp     CVA (cerebral infarction) 07/16/2014    Degenerative disc disease     cervical; lumbar    Diabetes mellitus type II     Encounter for loop recorder at end of battery life 09/17/2018    Hyperlipidemia     Hypertension     Hypothyroidism     Mild nonproliferative diabetic retinopathy 08/12/2018    Obesity     Sleep apnea     Stroke 07/2014    Venous insufficiency (chronic) (peripheral)      Past Surgical History:   Procedure Laterality Date    BLOCK, NERVE, GENICULAR Right 8/21/2024    Procedure: Right diagnostic genicular block;  Surgeon: Perla Barrera DO;  Location: [facility] PAIN MANAGEMENT;  Service: Pain Management;  Laterality: Right;  oral  sed  20 mins  Elquis/ASA ok    CATARACT EXTRACTION W/  INTRAOCULAR LENS IMPLANT Right 12/9/2024    Procedure: EXTRACTION, CATARACT, WITH IOL INSERTION;  Surgeon: Brielle Singleton MD;  Location: [facility] OR;  Service: Ophthalmology;  Laterality: Right;    COLONOSCOPY N/A 08/30/2018    Procedure: COLONOSCOPY;  Surgeon: William Clement MD;  Location: [facility] ENDO (4TH FLR);  Service: Endoscopy;  Laterality: N/A;  Eliquis/Dr Leroy Green/OK to hold 2 days prior to colon/see telephone encounter dated 7/24/18/pt has loop recorder/svn    COLONOSCOPY N/A 12/6/2023    Procedure: COLONOSCOPY;  Surgeon: Vinny Youssef MD;  [facility]  Service: Colon and Rectal;  Laterality: N/A;  ref Green  eliquis   diabetic/ [facility] ozempic hold 7days prior    peg prep jm / loop recorder  ok to hold Eliquis 2 days per KHANH Rivera NP/MADISON Jones RN-GT  11/29-precall complete-pt verbalized understanding of holding Ozempic-MS    COLONOSCOPY W/ POLYPECTOMY      GASTRECTOMY      HERNIA REPAIR      RADIOFREQUENCY ABLATION, NERVE, GENICULAR, KNEE Right 9/25/2024    Procedure: Right genicular RFA;  Surgeon: Perla Barrera DO;  Location: [facility] PAIN MANAGEMENT;  Service: Pain Management;  Laterality: Right;  40 mins - Ozempic 7 days ASA/ Eliquis ok    REMOVAL OF IMPLANTABLE LOOP RECORDER N/A 09/17/2018    Procedure: REMOVAL, IMPLANTABLE LOOP RECORDER;  Surgeon: Thomas Randolph MD;  Location: [facility] CATH LAB;  Service: Cardiology;  Laterality: N/A;  TERESA, ILR removal (no reimplant), MDT, RN Sedate, SK, 3 Prep *Reveal LINQ*    TONSILLECTOMY      TUBAL LIGATION       Family History   Problem Relation Name Age of Onset    No Known Problems Mother      Heart disease Father      No Known Problems Sister      No Known Problems Sister      No Known Problems Maternal Grandmother      Diabetes Maternal Grandfather      Obesity Maternal Grandfather      Stroke Paternal Grandmother      No Known Problems Paternal Grandfather      Heart attack Neg Hx       Cirrhosis Neg Hx       Social History     Tobacco Use    Smoking status: Former     Current packs/day: 0.00     Average packs/day: 1 pack/day for 30.0 years (30.0 ttl pk-yrs)     Types: Cigarettes     Start date: 7/16/1984     Quit date: 7/16/2014     Years since quitting: 10.5    Smokeless tobacco: Never   Substance Use Topics    Alcohol use: Not Currently    Drug use: No     Comment: thc at young age     Review of Systems   Constitutional:  Positive for fatigue. Negative for activity change, appetite change, chills and fever.   HENT:  Negative for congestion, ear discharge, ear pain, nosebleeds, postnasal drip, sore throat and trouble swallowing.    Eyes:  Negative for photophobia and visual disturbance.   Respiratory:  Negative for cough and shortness of breath.    Cardiovascular:  Negative for chest pain.   Gastrointestinal:  Positive for blood in stool and constipation. Negative for abdominal pain, diarrhea, nausea and vomiting.   Genitourinary:  Negative for dysuria, flank pain and hematuria.   Musculoskeletal:  Negative for back pain, neck pain and neck stiffness.   Skin:  Negative for rash and wound.   Neurological:  Positive for weakness (generalized) and light-headedness. Negative for dizziness and headaches.       Physical Exam     Initial Vitals [02/06/25 1437]   BP Pulse Resp Temp SpO2   (!) 122/59 87 17 98.1 °F (36.7 °C) 99 %      MAP       --         Physical Exam    Nursing note and vitals reviewed.  Constitutional: She appears well-developed and well-nourished. She is not diaphoretic.  Non-toxic appearance. No distress.   HENT:   Head: Normocephalic.   Right Ear: Hearing and external ear normal.   Left Ear: Hearing and external ear normal.   Nose: Nose normal. Mouth/Throat: Uvula is midline and oropharynx is clear and moist. Mucous membranes are pale and dry. No oropharyngeal exudate.   Eyes: Pupils are equal, round, and reactive to light.   Pale conjunctiva   Neck:   Normal range of  motion.  Cardiovascular:  Normal rate and regular rhythm.           No lower extremity edema   Pulmonary/Chest: Breath sounds normal.   Abdominal: Abdomen is soft. Bowel sounds are normal. There is no abdominal tenderness.   Genitourinary:    Rectum normal.   Rectum:      Guaiac result negative.   Guaiac negative stool.    Genitourinary Comments: Hard brown guaiac negative stool in vault     Musculoskeletal:      Cervical back: Normal range of motion.     Neurological: She is alert and oriented to person, place, and time. She has normal strength. No cranial nerve deficit or sensory deficit. GCS score is 15. GCS eye subscore is 4. GCS verbal subscore is 5. GCS motor subscore is 6.   Skin: Skin is warm and dry. Capillary refill takes less than 2 seconds.   Psychiatric: She has a normal mood and affect.         ED Course   Procedures  Labs Reviewed   CBC W/ AUTO DIFFERENTIAL - Abnormal       Result Value    WBC 5.31      RBC 2.44 (*)     Hemoglobin 6.6 (*)     Hematocrit 23.6 (*)     MCV 97      MCH 27.0      MCHC 28.0 (*)     RDW 15.7 (*)     Platelets 189      MPV 9.5      Immature Granulocytes 0.2      Gran # (ANC) 3.5      Immature Grans (Abs) 0.01      Lymph # 1.2      Mono # 0.5      Eos # 0.0      Baso # 0.02      nRBC 0      Gran % 66.6      Lymph % 23.2      Mono % 9.0      Eosinophil % 0.6      Basophil % 0.4      Differential Method Automated     COMPREHENSIVE METABOLIC PANEL - Abnormal    Sodium 139      Potassium 3.9      Chloride 108      CO2 20 (*)     Glucose 136 (*)     BUN 30 (*)     Creatinine 1.9 (*)     Calcium 8.9      Total Protein 6.5      Albumin 3.5      Total Bilirubin 0.1      Alkaline Phosphatase 87      AST 81 (*)      (*)     eGFR 28.4 (*)     Anion Gap 11     POCT GLUCOSE - Abnormal    POCT Glucose 172 (*)    HEPATITIS C ANTIBODY    Hepatitis C Ab Non-reactive      Narrative:     Release to patient->Immediate   HIV 1 / 2 ANTIBODY    HIV 1/2 Ag/Ab Non-reactive      Narrative:      Release to patient->Immediate   TSH    TSH 0.786     HEMOGLOBIN A1C   HEMOGLOBIN A1C   IRON AND TIBC   FERRITIN   VITAMIN B12   FOLATE   RETICULOCYTES   LACTATE DEHYDROGENASE   HAPTOGLOBIN   TYPE & SCREEN   POCT GLUCOSE MONITORING CONTINUOUS   PREPARE RBC SOFT          Imaging Results    None          Medications   0.9%  NaCl infusion (for blood administration) (has no administration in time range)     Medical Decision Making  Urgent evaluation of a 69-year-old female who presents to the emergency department with generalized weakness.  Patient is afebrile, nontoxic-appearing, and hemodynamically stable.  Pale conjunctiva on exam.  I a.m. concerned for acute anemia.  Will obtain workup with labs and orthostatic vital signs.  Will check EKG.  Patient declines rectal exam.    7:23 PM  Patient's H&H is 6.6 and 23.6.  Explained importance of determining where the bleeding is coming from.  She expresses her understanding.    7:36 PM  Guaiac negative brown stool in vault.  Uncertain of cause.  Pretty significant drop from November of 2024 at 10.4 to now 6.6.  Further workup with blood transfusion will be warranted.  Patient meets observation criteria.  Will discuss with Hospital Medicine.    7:44 PM  Dr. Carrasco advised one unit transfused for now.  Pt will be admitted to Dr. Bose.    Amount and/or Complexity of Data Reviewed  Labs: ordered.    Risk  Prescription drug management.                                      Clinical Impression:  Final diagnoses:  [R53.1] Generalized weakness  [D64.9] Acute anemia (Primary)  [R79.89] Elevated liver function tests  [N18.9] Chronic kidney disease, unspecified CKD stage          ED Disposition Condition    Observation Stable                Jesica Vincent PA-C  02/06/25 1945

## 2025-02-07 NOTE — H&P
Berny kelly - Emergency Dept  [facility] Medicine  History & Physical    Patient Name: Diana Montenegro  MRN: [id number]  Patient Class: OP- Observation  Admission Date: 2/6/2025  Attending Physician: Norma Bose MD   Primary Care Provider: Rad Johnston MD         Patient information was obtained from patient, past medical records, and ER records.     Subjective:     Principal Problem:Symptomatic anemia    Chief Complaint:   Chief Complaint   Patient presents with    Hypotension     X 1 week of low BP at home, having SOB, weakness, and dizziness.        HPI: Diana Montenegro is a 69 y.o. female with a PMHx arthritis, AFib on chronic anticoagulation, hypothyroidism, hypertension, hyperlipidemia, diabetes who presents to the emergency department with generalized weakness x2 weeks. Patient reports general fatigue beginning 2 weeks ago that is now worsening. She reports low blood pressures at home with her systolic in the 90s so she has been holding her anti-hypertensive medications since this past Saturday. She reports KING and palpitations after climbing 2 flights of stairs of her apartment and occasional lightheadedness. She reports blood in the stool with hard stools but only notices bright red blood on her toilet paper after wiping. No gross hematochezia. She last last had this 3 days ago. No family history of anemia, GIB or cancer that she is aware of. Reports no injury or acute blood loss. This past summer she reported she had low HBG on lab work but did not require transfusions and she was not symptomatic like she is now. Denies abdominal pain, N/V/D/C, indigestion, chest pain, sob, cough, recent falls, syncope or LE swelling.    In the ED, patient afebrile, VSS on room air. Labs significant for anemia with a Hgb 6.6, Hct 23.6. Cr 1.9 from baseline ~1.4. AST 81, . Transfusing 1U pRBCs.       Past Medical History:   Diagnosis Date    Arthritis     Atrial fibrillation, unspecified     hx of a fib  prior to stroke.    Chronic back pain     Chronic kidney disease, stage 3a 06/01/2023    Colon polyp     CVA (cerebral infarction) 07/16/2014    Degenerative disc disease     cervical; lumbar    Diabetes mellitus type II     Encounter for loop recorder at end of battery life 09/17/2018    Hyperlipidemia     Hypertension     Hypothyroidism     Mild nonproliferative diabetic retinopathy 08/12/2018    Obesity     Sleep apnea     Stroke 07/2014    Venous insufficiency (chronic) (peripheral)        Past Surgical History:   Procedure Laterality Date    BLOCK, NERVE, GENICULAR Right 8/21/2024    Procedure: Right diagnostic genicular block;  Surgeon: Perla Barrera DO;  Location: [facility] PAIN MANAGEMENT;  Service: Pain Management;  Laterality: Right;  oral sed  20 mins  Elquis/ASA ok    CATARACT EXTRACTION W/  INTRAOCULAR LENS IMPLANT Right 12/9/2024    Procedure: EXTRACTION, CATARACT, WITH IOL INSERTION;  Surgeon: Brielle Singleton MD;  Location: [facility] OR;  Service: Ophthalmology;  Laterality: Right;    COLONOSCOPY N/A 08/30/2018    Procedure: COLONOSCOPY;  Surgeon: William Clement MD;  Location: [facility] (4TH FLR);  Service: Endoscopy;  Laterality: N/A;  Eliaileen/Dr Rad Johnston/OK to hold 2 days prior to colon/see telephone encounter dated 7/24/18/pt has loop recorder/svn    COLONOSCOPY N/A 12/6/2023    Procedure: COLONOSCOPY;  Surgeon: Vinny Youssef MD;  Location: [facility] (4TH FLR);  Service: Colon and Rectal;  Laterality: N/A;  ref Green  eliquis   diabetic/ [facility] ozempic hold 7days prior    peg prep jm / loop recorder  ok to hold Eliquis 2 days per KHANH Rivera NP/MADISON Jones RN-GT  11/29-precall complete-pt verbalized understanding of holding Ozempic-MS    COLONOSCOPY W/ POLYPECTOMY      GASTRECTOMY      HERNIA REPAIR      RADIOFREQUENCY ABLATION, NERVE, GENICULAR, KNEE Right 9/25/2024    Procedure: Right genicular RFA;  Surgeon: Perla Barrera DO;  Location: [facility] PAIN MANAGEMENT;  Service: Pain Management;   Laterality: Right;  40 mins - Ozempic 7 days ASA/ Eliquis ok    REMOVAL OF IMPLANTABLE LOOP RECORDER N/A 09/17/2018    Procedure: REMOVAL, IMPLANTABLE LOOP RECORDER;  Surgeon: Thomas Randolph MD;  Location: [facility] CATH LAB;  Service: Cardiology;  Laterality: N/A;  TERESA, ILR removal (no reimplant), MDT, RN Sedate, SK, 3 Prep *Reveal LINQ*    TONSILLECTOMY      TUBAL LIGATION         Review of patient's allergies indicates:   Allergen Reactions    Medrol [methylprednisolone] Palpitations       No current facility-administered medications on file prior to encounter.     Current Outpatient Medications on File Prior to Encounter   Medication Sig    aspirin (ECOTRIN) 81 MG EC tablet Take 1 tablet (81 mg total) by mouth once daily.    ELIQUIS 5 mg Tab Take 1 tablet by mouth twice daily    levothyroxine (SYNTHROID) 88 MCG tablet Take 1 tablet (88 mcg total) by mouth before breakfast.    metFORMIN (GLUCOPHAGE) 1000 MG tablet TAKE 1 TABLET BY MOUTH TWICE DAILY WITH MEALS    omeprazole (PRILOSEC) 40 MG capsule Take 1 capsule (40 mg total) by mouth every morning.    OZEMPIC 2 mg/dose (8 mg/3 mL) PnIj INJECT 2 MG SUBCUTANEOUSLY ONCE A WEEK    traZODone (DESYREL) 50 MG tablet TAKE 1 TABLET BY MOUTH NIGHTLY AS NEEDED FOR  INSOMNIA  (OKAY  TO  TAKE  2ND  TABLET  BY  MOUTH  IN  30  MINUTES  IF  STILL  AWAKE)    valsartan-hydrochlorothiazide (DIOVAN-HCT) 80-12.5 mg per tablet Take 1 tablet by mouth once daily.    acyclovir 5% (ZOVIRAX) 5 % ointment Apply topically 6 (six) times daily.    albuterol (PROVENTIL/VENTOLIN HFA) 90 mcg/actuation inhaler INHALE 2 PUFFS BY MOUTH EVERY 6 HOURS AS NEEDED FOR WHEEZING    albuterol-ipratropium 2.5mg-0.5mg/3mL (DUO-NEB) 0.5 mg-3 mg(2.5 mg base)/3 mL nebulizer solution Take 3 mLs by nebulization every 6 (six) hours as needed for Wheezing. Rescue (Patient taking differently: Take 3 mLs by nebulization every 6 (six) hours as needed for Wheezing. Rescue prn)    b complex vitamins tablet Take 1 tablet by  mouth once daily.    blood pressure test kit-large Kit Use as directed    blood sugar diagnostic (FREESTYLE LITE STRIPS) Strp USE 1 STRIP TO CHECK GLUCOSE TWICE DAILY    blood-glucose meter Misc One touch verio flex or tone touch verio reflect or freestyle freedom lite meter (all covered by insurance)    CALCIUM CITRATE/VITAMIN D3 (CALCIUM CITRATE + D ORAL) Take 1 tablet by mouth every morning.    cetirizine (ZYRTEC) 5 MG tablet Take 5 mg by mouth once daily.    ciclopirox (PENLAC) 8 % Soln Apply topically nightly.    cyanocobalamin, vitamin B-12, 500 mcg Subl Place 1 tablet under the tongue once daily.    diclofenac 0.15% LIDOcaine 2.25% prilocaine 2.25% topical cream Apply topically once daily. Apply 1 or 2 pumps to painful area up to 5 times daily.  Maximum 10 pumps/day.    diclofenac sodium (VOLTAREN) 1 % Gel APPLY 2 GRAMS TOPICALLY THREE TIMES DAILY    docusate sodium (COLACE) 100 MG capsule Take 100 mg by mouth once daily.     EYLEA 2 mg/0.05 mL Syrg     fish oil-omega-3 fatty acids 300-1,000 mg capsule Take 1 capsule by mouth once daily. Take 1 cap 2 times daily every other day    fluticasone (FLONASE) 50 mcg/actuation nasal spray 2 sprays (100 mcg total) by Each Nare route once daily.    glimepiride (AMARYL) 2 MG tablet Take 1 tablet (2 mg total) by mouth before breakfast. For diabetes control.    lancets Misc Needs lancets for testing twice daily.  To go with meter covered by insurance.    LORazepam (ATIVAN) 1 MG tablet Take 1 tablet by mouth twice daily as needed for anxiety    metoprolol tartrate (LOPRESSOR) 25 MG tablet Take 1 tablet (25 mg total) by mouth once as needed (Palpitations). For palpitations    multivitamin capsule Take 1 capsule by mouth once daily.    mupirocin (BACTROBAN) 2 % ointment Apply to affected area 3 times daily    prednisoLONE-moxiflox-bromfen 1-0.5-0.075 % DrpS Apply 1 drop to eye 3 (three) times daily.    prednisoLONE-moxiflox-bromfen 1-0.5-0.075 % DrpS Apply 1 drop to eye 3  (three) times daily.    promethazine-dextromethorphan (PROMETHAZINE-DM) 6.25-15 mg/5 mL Syrp Take one tsp po q 6 hrs prn cough    rosuvastatin (CRESTOR) 40 MG Tab Take 1 tablet by mouth once daily    RUTIN/HESP/BIOFLAV/C/HERB#196 (BIOFLEX ORAL) Take 2 tablets by mouth once daily.    senna (SENNA) 8.6 mg tablet Take 2 tablets by mouth nightly as needed for Constipation.    tiZANidine (ZANAFLEX) 4 MG tablet Take 1 tablet (4 mg total) by mouth every 8 (eight) hours. Muscle spasm    traMADoL (ULTRAM) 50 mg tablet Take 1 tablet (50 mg total) by mouth every 8 (eight) hours as needed for Pain.     Family History       Problem Relation (Age of Onset)    Diabetes Maternal Grandfather    Heart disease Father    No Known Problems Mother, Sister, Sister, Maternal Grandmother, Paternal Grandfather    Obesity Maternal Grandfather    Stroke Paternal Grandmother          Tobacco Use    Smoking status: Former     Current packs/day: 0.00     Average packs/day: 1 pack/day for 30.0 years (30.0 ttl pk-yrs)     Types: Cigarettes     Start date: 7/16/1984     Quit date: 7/16/2014     Years since quitting: 10.5    Smokeless tobacco: Never   Substance and Sexual Activity    Alcohol use: Not Currently    Drug use: No     Comment: thc at young age    Sexual activity: Not Currently     Partners: Male     Review of Systems   Constitutional:  Positive for fatigue. Negative for chills and fever.   Eyes:  Positive for visual disturbance (due to chronic cataracts).   Respiratory:  Positive for shortness of breath. Negative for apnea, chest tightness and wheezing.    Cardiovascular:  Positive for palpitations. Negative for chest pain and leg swelling.   Gastrointestinal:  Positive for blood in stool (bright red blood \after hard stool on toilet paper). Negative for abdominal pain, constipation, diarrhea, nausea and vomiting.   Genitourinary:  Negative for difficulty urinating, dysuria, flank pain, frequency, hematuria and vaginal bleeding.    Musculoskeletal:  Negative for arthralgias, back pain and myalgias.   Skin:  Positive for pallor.   Neurological:  Positive for weakness and light-headedness. Negative for dizziness, syncope and headaches.   Hematological:  Does not bruise/bleed easily.   Psychiatric/Behavioral:  Negative for sleep disturbance. The patient is not nervous/anxious.      Objective:     Vital Signs (Most Recent):  Temp: 98.8 °F (37.1 °C) (02/06/25 1651)  Pulse: 79 (02/06/25 1854)  Resp: 18 (02/06/25 1854)  BP: 134/65 (02/06/25 1854)  SpO2: 97 % (02/06/25 1854) Vital Signs (24h Range):  Temp:  [98.1 °F (36.7 °C)-98.8 °F (37.1 °C)] 98.8 °F (37.1 °C)  Pulse:  [69-87] 79  Resp:  [17-18] 18  SpO2:  [97 %-100 %] 97 %  BP: (117-134)/(59-80) 134/65     Weight: 87.8 kg (193 lb 9 oz)  Body mass index is 32.21 kg/m².     Physical Exam  Vitals and nursing note reviewed.   Constitutional:       General: She is not in acute distress.     Appearance: She is well-developed.   HENT:      Head: Normocephalic and atraumatic.      Mouth/Throat:      Pharynx: No oropharyngeal exudate.   Eyes:      General: No scleral icterus.     Conjunctiva/sclera: Conjunctivae normal.   Cardiovascular:      Rate and Rhythm: Normal rate and regular rhythm.      Heart sounds: Normal heart sounds.   Pulmonary:      Effort: Pulmonary effort is normal. No respiratory distress.      Breath sounds: Normal breath sounds. No wheezing.   Abdominal:      General: Bowel sounds are normal. There is no distension.      Palpations: Abdomen is soft.      Tenderness: There is no abdominal tenderness.   Musculoskeletal:         General: No tenderness. Normal range of motion.      Cervical back: Normal range of motion and neck supple.      Comments: Trace BLE edema   Lymphadenopathy:      Cervical: No cervical adenopathy.   Skin:     General: Skin is warm and dry.      Capillary Refill: Capillary refill takes less than 2 seconds.      Findings: No rash.   Neurological:      Mental Status:  She is alert and oriented to person, place, and time.      Cranial Nerves: No cranial nerve deficit.      Sensory: No sensory deficit.      Coordination: Coordination normal.   Psychiatric:         Behavior: Behavior normal.         Thought Content: Thought content normal.         Judgment: Judgment normal.                Significant Labs: All pertinent labs within the past 24 hours have been reviewed.  CBC:   Recent Labs   Lab 02/06/25  1842   WBC 5.31   HGB 6.6*   HCT 23.6*        CMP:   Recent Labs   Lab 02/06/25  1842      K 3.9      CO2 20*   *   BUN 30*   CREATININE 1.9*   CALCIUM 8.9   PROT 6.5   ALBUMIN 3.5   BILITOT 0.1   ALKPHOS 87   AST 81*   *   ANIONGAP 11       Significant Imaging: I have reviewed all pertinent imaging results/findings within the past 24 hours.  Assessment/Plan:     * Symptomatic anemia  Anemia is likely due to  unknown, no overt signs/symptoms of GIB . Most recent hemoglobin and hematocrit are listed below.  Recent Labs     02/06/25  1842   HGB 6.6*   HCT 23.6*     Plan  - Monitor serial CBC: Daily  - Transfuse PRBC if patient becomes hemodynamically unstable, symptomatic or H/H drops below 7/21.  - Patient has received 1 units of PRBCs on 2/6/24  - iron/anemia panel pending  - Hold blood thinners for now  - consider GI consult if no improvement in H/H in AM  - NPO MN as precaution     JOSHUA (acute kidney injury)  Stage 3b chronic kidney disease   JOSHUA is likely due to anemia and dehydration. Baseline creatinine is  1.4 . Most recent creatinine and eGFR are listed below.  Recent Labs     02/06/25  1842   CREATININE 1.9*   EGFRNORACEVR 28.4*      Plan  - Avoid nephrotoxins and renally dose meds for GFR listed above  - Monitor urine output, serial BMP, and adjust therapy as needed  - transfusion as above  - place on cIVFs overnight      Elevated liver enzymes  - AST 81  elevated   - chronic issue, follows with hepatology  - trend daily  CMP    Coronary artery disease involving native coronary artery of native heart without angina pectoris  - chronic, stable  - holding ASA in setting of anemia of unknown cause  - continue statin    Hypertension associated with diabetes  - holding BP meds in setting of hypotension and anemia    Hyperlipidemia associated with type 2 diabetes mellitus  - chronic, stable  - holding statin given slightly worsened elevated LFTs    Chronic sinusitis  - chronic stable  - continue Flonase, zyrtec and prn inhalers     Paroxysmal atrial fibrillation  - holding eliquis in setting of symptomatic anemia  - resume eliquis in AM if H/H stable  - only taking MTP as needed at home, consider restarting if becomes tachycardic    Class 1 obesity due to excess calories with serious comorbidity and body mass index (BMI) of 32.0 to 32.9 in adult  Body mass index is 32.21 kg/m². Morbid obesity complicates all aspects of disease management from diagnostic modalities to treatment. Weight loss encouraged and health benefits explained to patient.   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  Last A1c reviewed-   Lab Results   Component Value Date    HGBA1C 5.6 02/06/2025     Most recent fingerstick glucose reviewed-   Recent Labs   Lab 02/06/25  1842   POCTGLUCOSE 172*     Current correctional scale  Low  Maintain anti-hyperglycemic dose as follows-   Antihyperglycemics (From admission, onward)      Start     Stop Route Frequency Ordered    02/06/25 2145  insulin aspart U-100 pen 0-5 Units         -- SubQ Before meals & nightly PRN 02/06/25 2048          Hold Oral hypoglycemics while patient is in the hospital.      VTE Risk Mitigation (From admission, onward)           Ordered      Place DIONNE hose  Until discontinued         02/06/25 2048     Reason for no Mechanical VTE Prophylaxis  Once        Question:  Reasons:  Answer:  Risk of Bleeding    02/06/25 2048     IP VTE HIGH RISK PATIENT  Once         02/06/25 2048     Place sequential compression device  Until discontinued         02/06/25 2048                         On 02/07/2025, patient should be placed in hospital observation services under my care in collaboration with Dr. Carrasco.           Cassandra Fong PA-C  Department of Hospital Medicine  [facility] - Emergency Dept

## 2025-02-07 NOTE — SUBJECTIVE & OBJECTIVE
Past Medical History:   Diagnosis Date    Arthritis     Atrial fibrillation, unspecified     hx of a fib prior to stroke.    Chronic back pain     Chronic kidney disease, stage 3a 06/01/2023    Colon polyp     CVA (cerebral infarction) 07/16/2014    Degenerative disc disease     cervical; lumbar    Diabetes mellitus type II     Encounter for loop recorder at end of battery life 09/17/2018    Hyperlipidemia     Hypertension     Hypothyroidism     Mild nonproliferative diabetic retinopathy 08/12/2018    Obesity     Sleep apnea     Stroke 07/2014    Venous insufficiency (chronic) (peripheral)        Past Surgical History:   Procedure Laterality Date    BLOCK, NERVE, GENICULAR Right 8/21/2024    Procedure: Right diagnostic genicular block;  Surgeon: Perla Barrera DO;  Location: [facility] PAIN MANAGEMENT;  Service: Pain Management;  Laterality: Right;  oral sed  20 mins  Elquis/ASA ok    CATARACT EXTRACTION W/  INTRAOCULAR LENS IMPLANT Right 12/9/2024    Procedure: EXTRACTION, CATARACT, WITH IOL INSERTION;  Surgeon: Brielle Singleton MD;  Location: [facility] OR;  Service: Ophthalmology;  Laterality: Right;    COLONOSCOPY N/A 08/30/2018    Procedure: COLONOSCOPY;  Surgeon: William Clement MD;  Location: [facility] (4TH FLR);  Service: Endoscopy;  Laterality: N/A;  Eliaileen/Dr Rad Johnston/OK to hold 2 days prior to colon/see telephone encounter dated 7/24/18/pt has loop recorder/svn    COLONOSCOPY N/A 12/6/2023    Procedure: COLONOSCOPY;  Surgeon: Vinny Youssef MD;  Location: [facility] (4TH FLR);  Service: Colon and Rectal;  Laterality: N/A;  ref Green  eliquis   diabetic/ [facility] ozempic hold 7days prior    peg prep jm / loop recorder  ok to hold Eliquis 2 days per KHANH Rivera NP/MADISON Jones RN-GT  11/29-precall complete-pt verbalized understanding of holding Ozempic-MS    COLONOSCOPY W/ POLYPECTOMY      GASTRECTOMY      HERNIA REPAIR      RADIOFREQUENCY ABLATION, NERVE, GENICULAR, KNEE Right 9/25/2024    Procedure: Right  genicular RFA;  Surgeon: Perla Barrera DO;  Location: [facility] PAIN MANAGEMENT;  Service: Pain Management;  Laterality: Right;  40 mins - Ozempic 7 days ASA/ Eliquis ok    REMOVAL OF IMPLANTABLE LOOP RECORDER N/A 09/17/2018    Procedure: REMOVAL, IMPLANTABLE LOOP RECORDER;  Surgeon: Thomas Randolph MD;  Location: [facility] CATH LAB;  Service: Cardiology;  Laterality: N/A;  TERESA, ILR removal (no reimplant), MDT, RN Sedate, SK, 3 Prep *Reveal LINQ*    TONSILLECTOMY      TUBAL LIGATION         Review of patient's allergies indicates:   Allergen Reactions    Medrol [methylprednisolone] Palpitations       No current facility-administered medications on file prior to encounter.     Current Outpatient Medications on File Prior to Encounter   Medication Sig    aspirin (ECOTRIN) 81 MG EC tablet Take 1 tablet (81 mg total) by mouth once daily.    ELIQUIS 5 mg Tab Take 1 tablet by mouth twice daily    levothyroxine (SYNTHROID) 88 MCG tablet Take 1 tablet (88 mcg total) by mouth before breakfast.    metFORMIN (GLUCOPHAGE) 1000 MG tablet TAKE 1 TABLET BY MOUTH TWICE DAILY WITH MEALS    omeprazole (PRILOSEC) 40 MG capsule Take 1 capsule (40 mg total) by mouth every morning.    OZEMPIC 2 mg/dose (8 mg/3 mL) PnIj INJECT 2 MG SUBCUTANEOUSLY ONCE A WEEK    traZODone (DESYREL) 50 MG tablet TAKE 1 TABLET BY MOUTH NIGHTLY AS NEEDED FOR  INSOMNIA  (OKAY  TO  TAKE  2ND  TABLET  BY  MOUTH  IN  30  MINUTES  IF  STILL  AWAKE)    valsartan-hydrochlorothiazide (DIOVAN-HCT) 80-12.5 mg per tablet Take 1 tablet by mouth once daily.    acyclovir 5% (ZOVIRAX) 5 % ointment Apply topically 6 (six) times daily.    albuterol (PROVENTIL/VENTOLIN HFA) 90 mcg/actuation inhaler INHALE 2 PUFFS BY MOUTH EVERY 6 HOURS AS NEEDED FOR WHEEZING    albuterol-ipratropium 2.5mg-0.5mg/3mL (DUO-NEB) 0.5 mg-3 mg(2.5 mg base)/3 mL nebulizer solution Take 3 mLs by nebulization every 6 (six) hours as needed for Wheezing. Rescue (Patient taking differently: Take 3 mLs by  nebulization every 6 (six) hours as needed for Wheezing. Rescue prn)    b complex vitamins tablet Take 1 tablet by mouth once daily.    blood pressure test kit-large Kit Use as directed    blood sugar diagnostic (FREESTYLE LITE STRIPS) Strp USE 1 STRIP TO CHECK GLUCOSE TWICE DAILY    blood-glucose meter Misc One touch verio flex or tone touch verio reflect or freestyle freedom lite meter (all covered by insurance)    CALCIUM CITRATE/VITAMIN D3 (CALCIUM CITRATE + D ORAL) Take 1 tablet by mouth every morning.    cetirizine (ZYRTEC) 5 MG tablet Take 5 mg by mouth once daily.    ciclopirox (PENLAC) 8 % Soln Apply topically nightly.    cyanocobalamin, vitamin B-12, 500 mcg Subl Place 1 tablet under the tongue once daily.    diclofenac 0.15% LIDOcaine 2.25% prilocaine 2.25% topical cream Apply topically once daily. Apply 1 or 2 pumps to painful area up to 5 times daily.  Maximum 10 pumps/day.    diclofenac sodium (VOLTAREN) 1 % Gel APPLY 2 GRAMS TOPICALLY THREE TIMES DAILY    docusate sodium (COLACE) 100 MG capsule Take 100 mg by mouth once daily.     EYLEA 2 mg/0.05 mL Syrg     fish oil-omega-3 fatty acids 300-1,000 mg capsule Take 1 capsule by mouth once daily. Take 1 cap 2 times daily every other day    fluticasone (FLONASE) 50 mcg/actuation nasal spray 2 sprays (100 mcg total) by Each Nare route once daily.    glimepiride (AMARYL) 2 MG tablet Take 1 tablet (2 mg total) by mouth before breakfast. For diabetes control.    lancets Misc Needs lancets for testing twice daily.  To go with meter covered by insurance.    LORazepam (ATIVAN) 1 MG tablet Take 1 tablet by mouth twice daily as needed for anxiety    metoprolol tartrate (LOPRESSOR) 25 MG tablet Take 1 tablet (25 mg total) by mouth once as needed (Palpitations). For palpitations    multivitamin capsule Take 1 capsule by mouth once daily.    mupirocin (BACTROBAN) 2 % ointment Apply to affected area 3 times daily    prednisoLONE-moxiflox-bromfen 1-0.5-0.075 % DrpS  Apply 1 drop to eye 3 (three) times daily.    prednisoLONE-moxiflox-bromfen 1-0.5-0.075 % DrpS Apply 1 drop to eye 3 (three) times daily.    promethazine-dextromethorphan (PROMETHAZINE-DM) 6.25-15 mg/5 mL Syrp Take one tsp po q 6 hrs prn cough    rosuvastatin (CRESTOR) 40 MG Tab Take 1 tablet by mouth once daily    RUTIN/HESP/BIOFLAV/C/HERB#196 (BIOFLEX ORAL) Take 2 tablets by mouth once daily.    senna (SENNA) 8.6 mg tablet Take 2 tablets by mouth nightly as needed for Constipation.    tiZANidine (ZANAFLEX) 4 MG tablet Take 1 tablet (4 mg total) by mouth every 8 (eight) hours. Muscle spasm    traMADoL (ULTRAM) 50 mg tablet Take 1 tablet (50 mg total) by mouth every 8 (eight) hours as needed for Pain.     Family History       Problem Relation (Age of Onset)    Diabetes Maternal Grandfather    Heart disease Father    No Known Problems Mother, Sister, Sister, Maternal Grandmother, Paternal Grandfather    Obesity Maternal Grandfather    Stroke Paternal Grandmother          Tobacco Use    Smoking status: Former     Current packs/day: 0.00     Average packs/day: 1 pack/day for 30.0 years (30.0 ttl pk-yrs)     Types: Cigarettes     Start date: 7/16/1984     Quit date: 7/16/2014     Years since quitting: 10.5    Smokeless tobacco: Never   Substance and Sexual Activity    Alcohol use: Not Currently    Drug use: No     Comment: thc at young age    Sexual activity: Not Currently     Partners: Male     Review of Systems   Constitutional:  Positive for fatigue. Negative for chills and fever.   Eyes:  Positive for visual disturbance (due to chronic cataracts).   Respiratory:  Positive for shortness of breath. Negative for apnea, chest tightness and wheezing.    Cardiovascular:  Positive for palpitations. Negative for chest pain and leg swelling.   Gastrointestinal:  Positive for blood in stool (bright red blood \after hard stool on toilet paper). Negative for abdominal pain, constipation, diarrhea, nausea and vomiting.    Genitourinary:  Negative for difficulty urinating, dysuria, flank pain, frequency, hematuria and vaginal bleeding.   Musculoskeletal:  Negative for arthralgias, back pain and myalgias.   Skin:  Positive for pallor.   Neurological:  Positive for weakness and light-headedness. Negative for dizziness, syncope and headaches.   Hematological:  Does not bruise/bleed easily.   Psychiatric/Behavioral:  Negative for sleep disturbance. The patient is not nervous/anxious.      Objective:     Vital Signs (Most Recent):  Temp: 98.8 °F (37.1 °C) (02/06/25 1651)  Pulse: 79 (02/06/25 1854)  Resp: 18 (02/06/25 1854)  BP: 134/65 (02/06/25 1854)  SpO2: 97 % (02/06/25 1854) Vital Signs (24h Range):  Temp:  [98.1 °F (36.7 °C)-98.8 °F (37.1 °C)] 98.8 °F (37.1 °C)  Pulse:  [69-87] 79  Resp:  [17-18] 18  SpO2:  [97 %-100 %] 97 %  BP: (117-134)/(59-80) 134/65     Weight: 87.8 kg (193 lb 9 oz)  Body mass index is 32.21 kg/m².     Physical Exam  Vitals and nursing note reviewed.   Constitutional:       General: She is not in acute distress.     Appearance: She is well-developed.   HENT:      Head: Normocephalic and atraumatic.      Mouth/Throat:      Pharynx: No oropharyngeal exudate.   Eyes:      General: No scleral icterus.     Conjunctiva/sclera: Conjunctivae normal.   Cardiovascular:      Rate and Rhythm: Normal rate and regular rhythm.      Heart sounds: Normal heart sounds.   Pulmonary:      Effort: Pulmonary effort is normal. No respiratory distress.      Breath sounds: Normal breath sounds. No wheezing.   Abdominal:      General: Bowel sounds are normal. There is no distension.      Palpations: Abdomen is soft.      Tenderness: There is no abdominal tenderness.   Musculoskeletal:         General: No tenderness. Normal range of motion.      Cervical back: Normal range of motion and neck supple.      Comments: Trace BLE edema   Lymphadenopathy:      Cervical: No cervical adenopathy.   Skin:     General: Skin is warm and dry.       Capillary Refill: Capillary refill takes less than 2 seconds.      Findings: No rash.   Neurological:      Mental Status: She is alert and oriented to person, place, and time.      Cranial Nerves: No cranial nerve deficit.      Sensory: No sensory deficit.      Coordination: Coordination normal.   Psychiatric:         Behavior: Behavior normal.         Thought Content: Thought content normal.         Judgment: Judgment normal.                Significant Labs: All pertinent labs within the past 24 hours have been reviewed.  CBC:   Recent Labs   Lab 02/06/25  1842   WBC 5.31   HGB 6.6*   HCT 23.6*        CMP:   Recent Labs   Lab 02/06/25  1842      K 3.9      CO2 20*   *   BUN 30*   CREATININE 1.9*   CALCIUM 8.9   PROT 6.5   ALBUMIN 3.5   BILITOT 0.1   ALKPHOS 87   AST 81*   *   ANIONGAP 11       Significant Imaging: I have reviewed all pertinent imaging results/findings within the past 24 hours.

## 2025-02-07 NOTE — HOSPITAL COURSE
Patient presented to ED for generalized weakness for 2 weeks.. she reports blood in stools with hard stools. Hb 6.6, transfused 1 PRBC. Hb improved to 7.7. Patient had JOSHUA with Cr 1.9, improved to 1.5 after 1 PRBC transfusion and LR bolus. Patient reports symptomatic improvement in weakness. She denies any recent blood in stools and is feeling back to baseline. Patient is stable and ready for discharge. Outpatient GI and PCP apt requested in one week for monitoring. Psyllium husk and colace started for hard stools. Advised high fiber diet. Strict return precautions given to patient to return to ED if she develops weakness, low BP, blood in stools, abdominal pain/back pain, any significant changes from baseline. She acknowledges understanding. Patient is stable and now ready for discharge.

## 2025-02-07 NOTE — ASSESSMENT & PLAN NOTE
Body mass index is 32.21 kg/m². Morbid obesity complicates all aspects of disease management from diagnostic modalities to treatment. Weight loss encouraged and health benefits explained to patient.   - last ozempic injection last week

## 2025-02-07 NOTE — PHARMACY MED REC
"  Admission Medication History     The home medication history was taken by Taya Giordano.    You may go to "Admission" then "Reconcile Home Medications" tabs to review and/or act upon these items.     The home medication list has been updated by the Pharmacy department.   Please read ALL comments highlighted in yellow.   Please address this information as you see fit.    Feel free to contact us if you have any questions or require assistance.      The medications listed below were removed from the home medication list. Please reorder if appropriate:  Patient reports no longer taking the following medication(s):  Acyclovir 5% ointment  Albuterol-ipratropium 2.5-0.5 mg/3 ml nebu soln  Ciclopirox 8% soln  Diclofenac 0.15% lidocaine 2.25% prilocaine 2.25% topical cream  Eyela 2 mg/0.05 ml syrg  Fluticasone 50 mcg/actuation nasal spray  Glimepiride 2 mg  Lorazepam 1 mg  Metoprolol 25 mg  Mupirocin 2% ointment  Promethazine-dextromethorphan 6.25-15 mg/5 ml syrp  Bioflex oral  Tizanidine 4 mg  Tramadol 50 mg  Trazodone 50 mg     Medications listed below were obtained from: Patient/family and Analytic software- [employer]  Current Outpatient Medications on File Prior to Encounter   Medication Sig    albuterol (PROVENTIL/VENTOLIN HFA) 90 mcg/actuation inhaler INHALE 2 PUFFS BY MOUTH EVERY 6 HOURS AS NEEDED FOR WHEEZING    aspirin (ECOTRIN) 81 MG EC tablet Take 1 tablet (81 mg total) by mouth once daily.    b complex vitamins tablet Take 1 tablet by mouth once daily.    CALCIUM CITRATE/VITAMIN D3 (CALCIUM CITRATE + D ORAL) Take 1 tablet by mouth every morning.    cetirizine (ZYRTEC) 5 MG tablet Take 5 mg by mouth once daily.    cyanocobalamin, vitamin B-12, 500 mcg Subl Place 1 tablet under the tongue once daily.    diclofenac sodium (VOLTAREN) 1 % Gel APPLY 2 GRAMS TOPICALLY THREE TIMES DAILY    docusate sodium (COLACE) 100 MG capsule Take 100 mg by mouth once daily.     ELIQUIS 5 mg Tab Take 1 tablet by mouth twice daily    " fish oil-omega-3 fatty acids 300-1,000 mg capsule Take 1 capsule by mouth once daily.     levothyroxine (SYNTHROID) 88 MCG tablet Take 1 tablet (88 mcg total) by mouth before breakfast.    metFORMIN (GLUCOPHAGE) 1000 MG tablet TAKE 1 TABLET BY MOUTH TWICE DAILY WITH MEALS    multivitamin capsule Take 1 capsule by mouth once daily.    omeprazole (PRILOSEC) 40 MG capsule Take 1 capsule (40 mg total) by mouth every morning.    OZEMPIC 2 mg/dose (8 mg/3 mL) PnIj INJECT 2 MG SUBCUTANEOUSLY EVERY SUNDAY    rosuvastatin (CRESTOR) 40 MG Tab Take 1 tablet by mouth once daily    senna (SENNA) 8.6 mg tablet Take 2 tablets by mouth nightly as needed for Constipation.    valsartan-hydrochlorothiazide (DIOVAN-HCT) 80-12.5 mg per tablet Take 1 tablet by mouth once daily.    prednisoLONE-moxiflox-bromfen 1-0.5-0.075 % DrpS Apply 1 drop to eye 3 (three) times daily.  Hasn't started         Taya Giordano  EXT [id number]                  .

## 2025-02-07 NOTE — ASSESSMENT & PLAN NOTE
Anemia is likely due to  unknown, no overt signs/symptoms of GIB . Most recent hemoglobin and hematocrit are listed below.  Recent Labs     02/06/25  1842   HGB 6.6*   HCT 23.6*     Plan  - Monitor serial CBC: Daily  - Transfuse PRBC if patient becomes hemodynamically unstable, symptomatic or H/H drops below 7/21.  - Patient has received 1 units of PRBCs on 2/6/24  - iron/anemia panel pending  - Hold blood thinners for now  - consider GI consult if no improvement in H/H in AM  - NPO MN as precaution

## 2025-02-07 NOTE — DISCHARGE INSTRUCTIONS
PCP apt next Wednesday. Please follow up for repeat lab check and BP med titration.   GI apt requested.   Please resume Eliquis tomorrow morning. Stop taking Eliquis if you notice worsening blood in stools, weakness, low blood pressure.   Return to ED if you develop weakness, low BP, blood in stools, stomach pain/back pain, any significant changes from baseline.    Hold your blood pressure medications if your blood pressure is <120. Follow up with PCP to resume medications.

## 2025-02-08 NOTE — PLAN OF CARE
[facility] - Emergency Dept  Initial Discharge Assessment       Primary Care Provider: Rad Johnston MD    Admission Diagnosis: Acute anemia [D64.9]    Admission Date: 2/6/2025  Expected Discharge Date: 2/7/2025    Transition of Care Barriers: (P) None    Payor: BCBS [place] MEDICARE / Plan: [employer] [place] / Product Type: Medicare Advantage /     Extended Emergency Contact Information  Primary Emergency Contact: Adelfo Lieberman  Address: [address] [place] [address] [facility]  Home Phone: [phone]  Relation: Son  Secondary Emergency Contact: Rashida Montenegro   [facility]  Home Phone: [phone]  Relation: Daughter    Discharge Plan A: (P) Home  Discharge Plan B: (P) Home      [facility] [place] - [address] [facility] [address]  [address] [place] [address]  Phone: [phone] Fax: [phone]    [facility] - [place] - [address]  [facility].  [place] [address]  Phone: [phone] Fax: [phone]      Initial Assessment (most recent)       Adult Discharge Assessment - 02/07/25 1933          Discharge Assessment    Assessment Type Discharge Planning Assessment (P)      Confirmed/corrected address, phone number and insurance Yes (P)      Confirmed Demographics Correct on Facesheet (P)      Source of Information patient;health record (P)      People in Home alone (P)      Do you expect to return to your current living situation? Yes (P)      Prior to hospitilization cognitive status: Alert/Oriented (P)      Current cognitive status: Alert/Oriented (P)      Patient currently being followed by outpatient case management? No (P)      Do you currently have service(s) that help you manage your care at home? No (P)      Do you take prescription medications? Yes (P)      Do you have prescription coverage? Yes (P)      Do you have any problems affording any of your prescribed medications? No  (P)      Is the patient taking medications as prescribed? yes (P)      How do you get to doctors appointments? family or friend will provide;car, drives self (P)      Are you on dialysis? No (P)      Discharge Plan A Home (P)      Discharge Plan B Home (P)      DME Needed Upon Discharge  none (P)      Discharge Plan discussed with: Patient (P)      Transition of Care Barriers None (P)         Physical Activity    On average, how many days per week do you engage in moderate to strenuous exercise (like a brisk walk)? 0 days (P)         Financial Resource Strain    How hard is it for you to pay for the very basics like food, housing, medical care, and heating? Not very hard (P)         Housing Stability    In the last 12 months, was there a time when you were not able to pay the mortgage or rent on time? No (P)         Transportation Needs    Has the lack of transportation kept you from medical appointments, meetings, work or from getting things needed for daily living? No (P)         Food Insecurity    Within the past 12 months, you worried that your food would run out before you got the money to buy more. Never true (P)         Stress    Do you feel stress - tense, restless, nervous, or anxious, or unable to sleep at night because your mind is troubled all the time - these days? Only a little (P)         Social Isolation    How often do you feel lonely or isolated from those around you?  Rarely (P)         Alcohol Use    Q1: How often do you have a drink containing alcohol? Patient declined (P)         Health Literacy    How often do you need to have someone help you when you read instructions, pamphlets, or other written material from your doctor or pharmacy? Rarely (P)

## 2025-02-08 NOTE — PLAN OF CARE
Discharge planning    Care plan reviewed  Chart reviewed  Disposition-  discharge today  See PCP in 1 week as discussed  See GI clinic in a month also as discussed  AVS reviewed all questions answered  Eager to discharge

## 2025-02-10 ENCOUNTER — CLINICAL SUPPORT (OUTPATIENT)
Dept: REHABILITATION | Facility: HOSPITAL | Age: 70
End: 2025-02-10
Payer: MEDICARE

## 2025-02-10 DIAGNOSIS — R52 PAIN: Primary | ICD-10-CM

## 2025-02-10 PROCEDURE — 97140 MANUAL THERAPY 1/> REGIONS: CPT | Performed by: PHYSICAL THERAPIST

## 2025-02-10 PROCEDURE — 20560 NDL INSJ W/O NJX 1 OR 2 MUSC: CPT | Performed by: PHYSICAL THERAPIST

## 2025-02-10 PROCEDURE — 97014 ELECTRIC STIMULATION THERAPY: CPT | Performed by: PHYSICAL THERAPIST

## 2025-02-10 NOTE — PROGRESS NOTES
Physical Therapy Daily Treatment Note     Name: Diana Montenegro  Clinic Number: [id number]    Therapy Diagnosis:   Encounter Diagnosis   Name Primary?    Pain Yes     Physician: Rad Johnston MD    Visit Date: 2/10/2025  Physician Orders: PT Eval and Treat   Medical Diagnosis from Referral: M54.2 (ICD-10-CM) - Neck pain  Evaluation Date: 12/5/2024  Authorization Period Expiration: 11-21-25  Plan of Care Expiration: 1-30-25  Visit # / Visits authorized: 1/ 1, 6/12  FOTO: 5/5    Time In: 4:00 pm  Time Out: 4:45 pm  Total Billable Time: 35 minutes    Precautions: Standard    Subjective     Pt reports: neck feels OK.  Denies UE radicular sx.  Recently had blood transfusion and feels better.  She was compliant with home exercise program.  Response to previous treatment: less pain  Functional change: improved driving    Pain: 0/10  Location: bilateral upper traps    Objective     MIN TTP B UT. C-AROM is WNL.  Gross strength is 4+/5 to 5/5 C-spine.    Kaleigh received therapeutic exercises to develop ROM, flexibility, and posture for 5 minutes including:  HEP review  Posture education    aKleigh received the following manual therapy techniques: Joint mobilizations, Manual traction, and Soft tissue Mobilization were applied to the: neck/UT B for 30 minutes, including:  Manual SO release/Ctxn/PROM  Dry needling B UT with 4 30 mm needles + Estim     Kaleigh received hot pack for 10 minutes to neck/UT.      Home Exercises Provided and Patient Education Provided     Education provided:   - correct posture    Written Home Exercises Provided: Patient instructed to cont prior HEP.  Exercises were reviewed and Kaleigh was able to demonstrate them prior to the end of the session.  Kaleigh demonstrated good  understanding of the education provided.     See EMR under Patient Instructions for exercises provided prior visit.    Assessment     Tolerated DN well.  Feels better today after ill last week.  Kaleigh Is progressing well towards her  goals.   Pt prognosis is Good.     Pt will continue to benefit from skilled outpatient physical therapy to address the deficits listed in the problem list box on initial evaluation, provide pt/family education and to maximize pt's level of independence in the home and community environment.     Pt's spiritual, cultural and educational needs considered and pt agreeable to plan of care and goals.    Anticipated barriers to physical therapy: none    Goals: Short Term Goals: 2 weeks         1.   Independent with HEP        2.  Pt will report decreased pain level of < 50% from above measure with ADL     Long Term Goals:   GOALS:    6_   weeks. Pt agrees with goals set.  Independent with HEP.  Report decreased    neck/UT    pain  <   / =  3/10 with ADL such as dressing  Increased MMT  for  neck to 4+/5 to 5/5  with ADL such as house work  Increased arom  for  neck to WNL with functional activities such cleaning or self-care       Plan     Continue PT per POC.    Carlos Frazier, PT

## 2025-02-11 ENCOUNTER — PATIENT MESSAGE (OUTPATIENT)
Dept: HEPATOLOGY | Facility: CLINIC | Age: 70
End: 2025-02-11
Payer: MEDICARE

## 2025-02-11 ENCOUNTER — TELEPHONE (OUTPATIENT)
Dept: INTERNAL MEDICINE | Facility: CLINIC | Age: 70
End: 2025-02-11
Payer: MEDICARE

## 2025-02-11 ENCOUNTER — TELEPHONE (OUTPATIENT)
Dept: OPHTHALMOLOGY | Facility: CLINIC | Age: 70
End: 2025-02-11
Payer: MEDICARE

## 2025-02-11 DIAGNOSIS — R74.8 ELEVATED LIVER ENZYMES: Primary | ICD-10-CM

## 2025-02-11 NOTE — PRE-PROCEDURE INSTRUCTIONS
Unable to reach pt via phone.  Left voicemail with arrival time also informing pt of need for responsible  accompaniment and instructing pt to follow pre-procedure instructions provided via [facility] portal.  The following message was sent to pt's portal.      Dear Kaleigh     Below you will find basic pre-procedure instructions in preparation for your procedure on 2/12/25 with Dr. Singleton           You should receive your arrival time within 24-48 hours of your scheduled procedure date from the  at your surgeon's office.     -Nothing to eat or drink after midnight the night before your procedure until after your procedure, except AM meds with small sips of water.     - HOLD all oral Diabetic medications night before and morning of procedure  - HOLD all Insulin morning of procedure  - HOLD all Fluid pills morning of procedure  - HOLD all non-insulin shots until after surgery (Ozempic, Mounjaro, Trulicity, Victoza, Byetta, Wegovy and Adlyxin) (7 days prior)  - HOLD all vitamins, minerals and herbal supplements morning of procedure   - TAKE all B/P meds, EXCEPT those that contain a fluid pill (ex. Lasix, Hydroclorothiazide/HCTZ, Spirnolactone)  - USE inhalers as needed and bring AM of surgery  - USE EYE DROPS as directed  -TAKE blood thinner meds AM of surgery unless otherwise instructed by your provider to not take     - Shower and wash face with antibacterial soap (ex. Dial) for 3 mins PM prior and AM of surgery  - No powder, lotions, creams, oils, gels, ointments, makeup,  or jewelry    - Wear comfortable clothing (button up shirt)     (Patient is required to have a responsible ride to transport home, ride may not leave while patient is in surgery)     -- [facility], 2nd floor Surgery Center, located   @ [address] [place] [address]  2nd Floor Registration        If you have any questions or concerns please feel free to contact your surgeon's office.     In the event  that you are running late or need to reschedule on the day of your procedure, please contact the pre-op desk at [phone].          Please reply to this message as receipt of delivery.     [place], LPN [facility]  Pre-Admit - Anesthesia Dept

## 2025-02-11 NOTE — TELEPHONE ENCOUNTER
----- Message from Med Assistant Mcintyre sent at 2/10/2025  1:50 PM CST -----  Contact: [phone]@[email or web address]    ----- Message -----  From: Barbi Carcamo  Sent: 2/10/2025   1:45 PM CST  To: Preston Leroy A Staff    Patient needs a Hosp follow up appt with their PCP only.       When is the next available appointment: 3/13/25    Symptoms:  Hospital follow up     Discharge date: 2/7/25    Needs to be seen by:2/14/25    Would you prefer an answer via [facility]?: NO      Comments:  Please call patient [phone]

## 2025-02-11 NOTE — H&P
History    Chief complaint:  Painless progressive vision loss    Present Ilness/Diagnosis: Nuclear sclerotic Cataract    Past Medical History:  has a past medical history of Arthritis, Atrial fibrillation, unspecified, Chronic back pain, Chronic kidney disease, stage 3a (06/01/2023), Colon polyp, CVA (cerebral infarction) (07/16/2014), Degenerative disc disease, Diabetes mellitus type II, Encounter for loop recorder at end of battery life (09/17/2018), Hyperlipidemia, Hypertension, Hypothyroidism, Mild nonproliferative diabetic retinopathy (08/12/2018), Obesity, Sleep apnea, Stroke (07/2014), and Venous insufficiency (chronic) (peripheral).    Family History/Social History: refer to chart    Allergies:   Review of patient's allergies indicates:   Allergen Reactions    Medrol [methylprednisolone] Palpitations       Current Medications: No current facility-administered medications for this encounter.    Current Outpatient Medications:     albuterol (PROVENTIL/VENTOLIN HFA) 90 mcg/actuation inhaler, INHALE 2 PUFFS BY MOUTH EVERY 6 HOURS AS NEEDED FOR WHEEZING, Disp: 21.1 g, Rfl: 3    aspirin (ECOTRIN) 81 MG EC tablet, Take 1 tablet (81 mg total) by mouth once daily., Disp: , Rfl:     b complex vitamins tablet, Take 1 tablet by mouth once daily., Disp: , Rfl:     CALCIUM CITRATE/VITAMIN D3 (CALCIUM CITRATE + D ORAL), Take 1 tablet by mouth every morning., Disp: , Rfl:     cetirizine (ZYRTEC) 5 MG tablet, Take 5 mg by mouth once daily., Disp: , Rfl:     cyanocobalamin, vitamin B-12, 500 mcg Subl, Place 1 tablet under the tongue once daily., Disp: , Rfl:     docusate sodium (COLACE) 100 MG capsule, Take 100 mg by mouth once daily. , Disp: , Rfl:     ELIQUIS 5 mg Tab, Take 1 tablet by mouth twice daily, Disp: 180 tablet, Rfl: 3    fish oil-omega-3 fatty acids 300-1,000 mg capsule, Take 1 capsule by mouth once daily., Disp: , Rfl:     levothyroxine (SYNTHROID) 88 MCG tablet, Take 1 tablet (88 mcg total) by mouth before  breakfast., Disp: 90 tablet, Rfl: 2    metFORMIN (GLUCOPHAGE) 1000 MG tablet, TAKE 1 TABLET BY MOUTH TWICE DAILY WITH MEALS, Disp: 180 tablet, Rfl: 1    multivitamin capsule, Take 1 capsule by mouth once daily., Disp: , Rfl:     omeprazole (PRILOSEC) 40 MG capsule, Take 1 capsule (40 mg total) by mouth every morning., Disp: 90 capsule, Rfl: 3    OZEMPIC 2 mg/dose (8 mg/3 mL) PnIj, INJECT 2 MG SUBCUTANEOUSLY ONCE A WEEK (Patient taking differently: Inject 2 mg into the skin every Sunday.), Disp: 9 mL, Rfl: 1    prednisoLONE-moxiflox-bromfen 1-0.5-0.075 % DrpS, Apply 1 drop to eye 3 (three) times daily., Disp: 5 mL, Rfl: 3    psyllium (HYDROCIL) packet, Take 1 packet by mouth once daily. for 14 days, Disp: 14 packet, Rfl: 0    rosuvastatin (CRESTOR) 40 MG Tab, Take 1 tablet by mouth once daily, Disp: 90 tablet, Rfl: 3    senna (SENNA) 8.6 mg tablet, Take 2 tablets by mouth nightly as needed for Constipation., Disp: 100 tablet, Rfl: 3    valsartan-hydrochlorothiazide (DIOVAN-HCT) 80-12.5 mg per tablet, Take 1 tablet by mouth once daily., Disp: 90 tablet, Rfl: 3    ASA Score: II     Mallampati Score: II     Plan for Sedation: Moderate Sedation     Patient or Family History of Anesthesia problems : No    Physical Exam    BP: Vital signs stable  General: No apparent distress  HEENT: nuclear sclerotic cataract  Lungs: adequate respirations  Heart: + pulses  Abdomen: soft  Rectal/pelvic: deferred    Impression: Visually significant Cataract.    See previous clinic notes for surgical indications.    Plan: Phacoemulsification with implantation of Intraocular lens

## 2025-02-11 NOTE — TELEPHONE ENCOUNTER
[facility]   I + D with General Anesthesia Procedure Note    Date of Service: 6/22/2023     PROCEDURE:   Incision and Drainage with General Anesthesia    PREOPERATIVE DIAGNOSIS:  Periodontal Disease/Dental Decay    POSTOPERATIVE DIAGNOSIS:  Periodontal Disease/Dental Decay    SURGEON:  Norberto Hollis DDS.     ASSISTANT:  Cooper Stallworth    ANESTHESIA:   General Anesthesia with local anesthetic    ANESTHESIOLOGIST:   Anesthesiologist: Stephon Jarvis MD  Anesthesia Staff: Keila Anna     DESCRIPTION OF PROCEDURE:  Patient brought to OR #10, intubated, and placed under general anesthesia. Throat pack placed and oral cavity irrigated with Chlorhexidine Gluconate. Incision made with a 15 blade along left buccal mucosa. Blunt dissection done with 40ml of exudate. Irrigated copiously with saline. Extraction of tooth #14. Local anesthetic administered: 5.1cc of 2% Lidocaine 1:100,000 epinephrine. Oral cavity irrigated and throat pack removed. Suction of oropharynx. No complications. Patient extubated and brought to PACU.    SPECIMEN:  Left maxillary buccal space    COMPLICATIONS:  None    ESTIMATED BLOOD LOSS:  Minimal     Norberto Hollis DDS   I called to apologize dr simpson didn't have an opening  Soon.    She will keep appt tomorrow w dr gomez.  I gave directions to our clinic for that appt.    I told her to keep march appt.

## 2025-02-11 NOTE — TELEPHONE ENCOUNTER
Spoke with pt provided arrival time of 6:00 for sx on 2/12/25 with Dr. Singleton @ [facility]. Pt has eyedrops and packet.

## 2025-02-12 ENCOUNTER — OFFICE VISIT (OUTPATIENT)
Dept: INTERNAL MEDICINE | Facility: CLINIC | Age: 70
End: 2025-02-12
Payer: MEDICARE

## 2025-02-12 ENCOUNTER — HOSPITAL ENCOUNTER (OUTPATIENT)
Facility: HOSPITAL | Age: 70
Discharge: HOME | End: 2025-02-12
Attending: OPHTHALMOLOGY | Admitting: OPHTHALMOLOGY
Payer: MEDICARE

## 2025-02-12 VITALS
DIASTOLIC BLOOD PRESSURE: 59 MMHG | TEMPERATURE: 98 F | OXYGEN SATURATION: 100 % | BODY MASS INDEX: 31.65 KG/M2 | RESPIRATION RATE: 20 BRPM | HEART RATE: 64 BPM | SYSTOLIC BLOOD PRESSURE: 129 MMHG | WEIGHT: 190 LBS | HEIGHT: 65 IN

## 2025-02-12 VITALS
HEIGHT: 65 IN | OXYGEN SATURATION: 99 % | DIASTOLIC BLOOD PRESSURE: 62 MMHG | BODY MASS INDEX: 32.29 KG/M2 | HEART RATE: 76 BPM | WEIGHT: 193.81 LBS | TEMPERATURE: 97 F | SYSTOLIC BLOOD PRESSURE: 120 MMHG | RESPIRATION RATE: 18 BRPM

## 2025-02-12 DIAGNOSIS — K59.00 CONSTIPATION, UNSPECIFIED CONSTIPATION TYPE: ICD-10-CM

## 2025-02-12 DIAGNOSIS — N18.9 CHRONIC KIDNEY DISEASE, UNSPECIFIED CKD STAGE: ICD-10-CM

## 2025-02-12 DIAGNOSIS — D64.9 ANEMIA, UNSPECIFIED TYPE: Primary | ICD-10-CM

## 2025-02-12 DIAGNOSIS — H25.12 AGE-RELATED NUCLEAR CATARACT, LEFT: ICD-10-CM

## 2025-02-12 DIAGNOSIS — H25.12 NUCLEAR SCLEROTIC CATARACT OF LEFT EYE: Primary | ICD-10-CM

## 2025-02-12 LAB — POCT GLUCOSE: 185 MG/DL (ref 70–110)

## 2025-02-12 PROCEDURE — 94761 N-INVAS EAR/PLS OXIMETRY MLT: CPT

## 2025-02-12 PROCEDURE — 63600175 PHARM REV CODE 636 W HCPCS: Performed by: OPHTHALMOLOGY

## 2025-02-12 PROCEDURE — 36000707: Performed by: OPHTHALMOLOGY

## 2025-02-12 PROCEDURE — 36000706: Performed by: OPHTHALMOLOGY

## 2025-02-12 PROCEDURE — 82962 GLUCOSE BLOOD TEST: CPT | Performed by: OPHTHALMOLOGY

## 2025-02-12 PROCEDURE — 25000003 PHARM REV CODE 250: Performed by: OPHTHALMOLOGY

## 2025-02-12 PROCEDURE — 99900035 HC TECH TIME PER 15 MIN (STAT)

## 2025-02-12 PROCEDURE — 71000015 HC POSTOP RECOV 1ST HR: Performed by: OPHTHALMOLOGY

## 2025-02-12 PROCEDURE — 99999 PR PBB SHADOW E&M-EST. PATIENT-LVL III: CPT | Mod: PBBFAC,GC,,

## 2025-02-12 PROCEDURE — 99152 MOD SED SAME PHYS/QHP 5/>YRS: CPT | Performed by: OPHTHALMOLOGY

## 2025-02-12 PROCEDURE — 27201423 OPTIME MED/SURG SUP & DEVICES STERILE SUPPLY: Performed by: OPHTHALMOLOGY

## 2025-02-12 PROCEDURE — V2632 POST CHMBR INTRAOCULAR LENS: HCPCS | Performed by: OPHTHALMOLOGY

## 2025-02-12 DEVICE — LENS EYHANCE +21.0D: Type: IMPLANTABLE DEVICE | Site: EYE | Status: FUNCTIONAL

## 2025-02-12 RX ORDER — MOXIFLOXACIN 5 MG/ML
1 SOLUTION/ DROPS OPHTHALMIC
Status: DISCONTINUED | OUTPATIENT
Start: 2025-02-12 | End: 2025-02-12 | Stop reason: HOSPADM

## 2025-02-12 RX ORDER — FENTANYL CITRATE 50 UG/ML
25 INJECTION, SOLUTION INTRAMUSCULAR; INTRAVENOUS EVERY 5 MIN PRN
Status: DISCONTINUED | OUTPATIENT
Start: 2025-02-12 | End: 2025-02-12 | Stop reason: HOSPADM

## 2025-02-12 RX ORDER — POLYETHYLENE GLYCOL 3350 17 G/17G
17 POWDER, FOR SOLUTION ORAL 2 TIMES DAILY
Qty: 60 EACH | Refills: 3 | Status: SHIPPED | OUTPATIENT
Start: 2025-02-12 | End: 2025-06-12

## 2025-02-12 RX ORDER — CYCLOP/TROP/PROPA/PHEN/KET/WAT 1-1-0.1%
1 DROPS (EA) OPHTHALMIC (EYE)
Status: COMPLETED | OUTPATIENT
Start: 2025-02-12 | End: 2025-02-12

## 2025-02-12 RX ORDER — MOXIFLOXACIN 5 MG/ML
1 SOLUTION/ DROPS OPHTHALMIC
Status: COMPLETED | OUTPATIENT
Start: 2025-02-12 | End: 2025-02-12

## 2025-02-12 RX ORDER — ACETAMINOPHEN 325 MG/1
650 TABLET ORAL EVERY 4 HOURS PRN
Status: DISCONTINUED | OUTPATIENT
Start: 2025-02-12 | End: 2025-02-12 | Stop reason: HOSPADM

## 2025-02-12 RX ORDER — LIDOCAINE HYDROCHLORIDE 40 MG/ML
INJECTION, SOLUTION RETROBULBAR
Status: DISCONTINUED | OUTPATIENT
Start: 2025-02-12 | End: 2025-02-12 | Stop reason: HOSPADM

## 2025-02-12 RX ORDER — PROPARACAINE HYDROCHLORIDE 5 MG/ML
SOLUTION/ DROPS OPHTHALMIC
Status: DISCONTINUED | OUTPATIENT
Start: 2025-02-12 | End: 2025-02-12 | Stop reason: HOSPADM

## 2025-02-12 RX ORDER — TETRACAINE HYDROCHLORIDE 5 MG/ML
1 SOLUTION OPHTHALMIC EVERY 5 MIN PRN
Status: DISCONTINUED | OUTPATIENT
Start: 2025-02-12 | End: 2025-02-12 | Stop reason: HOSPADM

## 2025-02-12 RX ORDER — LIDOCAINE HYDROCHLORIDE 10 MG/ML
INJECTION, SOLUTION EPIDURAL; INFILTRATION; INTRACAUDAL; PERINEURAL
Status: DISCONTINUED | OUTPATIENT
Start: 2025-02-12 | End: 2025-02-12 | Stop reason: HOSPADM

## 2025-02-12 RX ORDER — PROPARACAINE HYDROCHLORIDE 5 MG/ML
1 SOLUTION/ DROPS OPHTHALMIC
Status: DISCONTINUED | OUTPATIENT
Start: 2025-02-12 | End: 2025-02-12 | Stop reason: HOSPADM

## 2025-02-12 RX ORDER — FERROUS SULFATE 325(65) MG
325 TABLET ORAL EVERY OTHER DAY
Qty: 45 TABLET | Refills: 0 | Status: SHIPPED | OUTPATIENT
Start: 2025-02-12 | End: 2025-05-13

## 2025-02-12 RX ORDER — MOXIFLOXACIN 5 MG/ML
SOLUTION/ DROPS OPHTHALMIC
Status: DISCONTINUED | OUTPATIENT
Start: 2025-02-12 | End: 2025-02-12 | Stop reason: HOSPADM

## 2025-02-12 RX ORDER — PREDNISOLONE ACETATE 10 MG/ML
SUSPENSION/ DROPS OPHTHALMIC
Status: DISCONTINUED | OUTPATIENT
Start: 2025-02-12 | End: 2025-02-12 | Stop reason: HOSPADM

## 2025-02-12 RX ORDER — MIDAZOLAM HYDROCHLORIDE 1 MG/ML
2 INJECTION, SOLUTION INTRAMUSCULAR; INTRAVENOUS
Status: COMPLETED | OUTPATIENT
Start: 2025-02-12 | End: 2025-02-12

## 2025-02-12 RX ADMIN — MOXIFLOXACIN OPHTHALMIC 1 DROP: 5 SOLUTION/ DROPS OPHTHALMIC at 06:02

## 2025-02-12 RX ADMIN — MIDAZOLAM HYDROCHLORIDE 2 MG: 1 INJECTION, SOLUTION INTRAMUSCULAR; INTRAVENOUS at 07:02

## 2025-02-12 RX ADMIN — MIDAZOLAM HYDROCHLORIDE 1 MG: 1 INJECTION, SOLUTION INTRAMUSCULAR; INTRAVENOUS at 07:02

## 2025-02-12 RX ADMIN — Medication 1 DROP: at 06:02

## 2025-02-12 RX ADMIN — MOXIFLOXACIN 1 DROP: 5 SOLUTION/ DROPS OPHTHALMIC at 07:02

## 2025-02-12 NOTE — DISCHARGE INSTRUCTIONS
Dr. Singleton     Cataract Post-Operative Instructions       Day of surgery:     -Resume drops THREE times daily into the operative eye.     -Do not rub your eye     -Wear protective sunglasses during the day.     -Resume moderate activity.     -Bathe/shower/wash face normally     -Do not apply makeup around the operative eye for 1 week.     -You should expect:     Blurry vision and halos for 24-48 hours     Dilated pupil for 24-48 hours     Scratchy feeling in the eye for 1-2 days     Curved shadow in your peripheral vision for 2-3 weeks     Occasional flickering of lights for up to 1 week     -If you experience severe pain or nausea, call Dr. Singleton or the on-call doctor at [phone].       Plan to see Dr. Singleton tomorrow at:     [facility] [address]     10TH FLOOR     [facility] [address]     **Most patients can drive the next morning.  If you do not feel comfortable driving, please arrange for transportation. **

## 2025-02-12 NOTE — PLAN OF CARE
Pt to [facility]. VS stable on transfer. Post operative drops instilled. Discharge instructions reviewed with patient. Verbalized understanding. Questions encouraged and answered. PIV removed before dc.

## 2025-02-12 NOTE — DISCHARGE SUMMARY
[facility]  Discharge Note  Short Stay    Procedure(s) (LRB):  PHACOEMULSIFICATION, CATARACT, WITH IOL INSERTION (Left)    BRIEF DISCHARGE NOTE:    Date of discharge: 02/12/2025    Reason for hospitalization -  Cataract surgery     Final Diagnosis - Visually significant Cataract    Procedures and treatment provided - Status post phacoemulsification with placement of intraocular lens     Diet - Advance to regular as tolerated    Activity - as tolerated    Disposition at the end of the case - Good.    Discharge: to home    The patient tolerated the procedure well and knows to follow up with me tomorrow in the eye clinic, sooner if needed.    Patient and family instructions (as appropriate) - Given to patient on discharge    Brielle Singleton MD

## 2025-02-12 NOTE — OP NOTE
DATE OF PROCEDURE: 02/12/2025    SURGEON: ALEX GOMES MD    PREOPERATIVE DIAGNOSIS:  1. Senile nuclear sclerotic cataract left eye. 2. Poor mydriasis secondary to floppy iris syndrome left eye    POSTOPERATIVE DIAGNOSIS: 1.Senile nuclear sclerotic cataract left eye. 2. Poor mydriasis secondary to floppy iris syndrome left eye    PROCEDURE PERFORMED:  Complex phacoemulsification with placement of intraocular lens, left eye, with placement of a Malyugin ring.    IMPLANT:  DIB00 21.0    Anesthesia: Moderate sedation with topical Lidocaine.     Patient ID confirmed and re-evaluated the patient and anesthesia plan confirming it is suitable for the patient's condition and procedure.     COMPLICATIONS: none    ESTIMATED BLOOD LOSS: <1cc    SPECIMENS: none    INDICATIONS FOR PROCEDURE:   The patient has a history of painless progressive vision loss.  The patient has described difficulties with activities of daily living, which specifically include driving, which is secondary to cataract formation and progression. After we had a thorough discussion about risks, benefits, and alternatives to cataract surgery, the patient agreed to proceed with phacoemulsification and implantation of a lens in the left eye.  These risks include, but are not limited to, hemorrhage, pain, infection, need for additional surgery, need for glasses or contacts, loss of vision, or even loss of the eye.      PROCEDURE IN DETAIL:  The patient was met in the preop holding area.  Consent was confirmed to be signed.  The operative site was marked.  The patient was brought into the operating room by the anesthesia team and placed under monitored anesthesia care.  The left eye was prepped and draped in a sterile ophthalmic fashion.  A Nathan speculum was placed into the left eye.   A paracentesis site was made and 1% preservative-free lidocaine was injected into the anterior chamber.  Viscoelastic  material was injected into the anterior chamber.  A  keratome blade was used to make a clear corneal incision. The malyugin ring was inserted and positioned into place, assisting with pupillary dilation.  A cystotome was used to initiate the continuous curvilinear capsulorrhexis which was completed with Utrata forceps.  BSS on a almendarez cannula was used to perform hydrodissection.  The phacoemulsification tip was introduced into the eye and the nucleus was removed in a standard divide-and-conquer fashion.  Remaining cortical material was removed from the eye using irrigation-aspiration.  The capsular bag was filled with viscoelastic material and the intraocular lens was injected and positioned into place. The Malyugin ring was removed from the eye. Remaining viscoelastic material was removed from the eye using irrigation and aspiration.  The corneal wounds were hydrated.  The eye was filled to physiologic pressure. The wounds were found to be watertight. Drops of Vigamox and prednisilone were placed into the eye.  The eye was washed, dried, and shielded.  The patient tolerated the procedure well and knows to follow up with me tomorrow morning, sooner if needed.      Under my direct supervision, intravenous moderate sedation was administered during the course of this procedure, with continuous monitoring of hemodynamic parameters.  Monitoring of the patient's vital signs and respiratory status was provided by trained nursing staff during the entire course of the procedure and under my supervision and recorded in the patient's medical record.  The total time for sedation was 13 minutes.

## 2025-02-12 NOTE — PROGRESS NOTES
Subjective     Chief Complaint: Hospital follow up (anemia and JOSHUA)    History of Present Illness:  Ms. Diana Montenegro is a 69 y.o. female with hx of TIA, afib on eliquism, HLD, HTN, CAD, and hypothyroidism who presents to clinic today for hospital follow up after being admitted for symptomatic anemia and an JOSHUA.     She was in the hospital from 2/6-2/7. She presented with generalized weakness and KING, Hgb was found to be 6.6. She was transfused 1 U pRBCs with appropriate response and Hgb of 7.7. Reports sx improvement after transfusion. She did reports seeing some blood on the paper when wiping but denied any BRBPR. She does have a known hx of diverticulosis, though. She also had an JOSHUA with Cr of 2 (baseline 1.4); with fluid resuscitation Cr improved to 1.5.    She reports that since returning home she has been doing well and has not seen any additional bleeding. She has not experienced any of the sx that ad brought her to the hospital.She is scheduled to see GI on 3/18 and is hoping to get in sooner. Also explained she is still trying to get some miralax as she does deal with constipation.     Denies any GIB, abdominal pain, N/V, lightheadedness/dizziness, KING, or generalized weakness.         Family and/or Caretaker present at visit?  no  Diagnostic tests reviewed/disposition:  CBC, CMP, iron panel, B12. folate  Disease/illness education: return precautions given and importance of bowel regimen discussed  Home health/community services discussion/referrals:  N/a  Establishment or re-establishment of referral orders for community resources:  N/a  Discussion with other health care providers:  will discuss with schedulers about trying to get into GI clinic sooner     Review of Systems   Constitutional:  Negative for chills and fever.   Eyes:  Negative for blurred vision and double vision.   Respiratory:  Negative for cough and shortness of breath.    Cardiovascular:  Negative for chest pain.    Gastrointestinal:  Positive for constipation. Negative for abdominal pain, blood in stool, diarrhea, heartburn, melena, nausea and vomiting.   Genitourinary:  Negative for dysuria.   Neurological:  Negative for dizziness, focal weakness, loss of consciousness and weakness.       PAST HISTORY:     Past Medical History:   Diagnosis Date    Arthritis     Atrial fibrillation, unspecified     hx of a fib prior to stroke.    Chronic back pain     Chronic kidney disease, stage 3a 06/01/2023    Colon polyp     CVA (cerebral infarction) 07/16/2014    Degenerative disc disease     cervical; lumbar    Diabetes mellitus type II     Encounter for loop recorder at end of battery life 09/17/2018    Hyperlipidemia     Hypertension     Hypothyroidism     Mild nonproliferative diabetic retinopathy 08/12/2018    Obesity     Sleep apnea     Stroke 07/2014    Venous insufficiency (chronic) (peripheral)        Past Surgical History:   Procedure Laterality Date    BLOCK, NERVE, GENICULAR Right 8/21/2024    Procedure: Right diagnostic genicular block;  Surgeon: Perla Barrera DO;  Location: [facility] PAIN MANAGEMENT;  Service: Pain Management;  Laterality: Right;  oral sed  20 mins  Elquis/ASA ok    CATARACT EXTRACTION W/  INTRAOCULAR LENS IMPLANT Right 12/9/2024    Procedure: EXTRACTION, CATARACT, WITH IOL INSERTION;  Surgeon: Brielle Singleton MD;  Location: [facility] OR;  Service: Ophthalmology;  Laterality: Right;    COLONOSCOPY N/A 08/30/2018    Procedure: COLONOSCOPY;  Surgeon: William Clement MD;  Location: [facility] (4TH FLR);  Service: Endoscopy;  Laterality: N/A;  Tito/Dr Rad Johnston/OK to hold 2 days prior to colon/see telephone encounter dated 7/24/18/pt has loop recorder/svn    COLONOSCOPY N/A 12/6/2023    Procedure: COLONOSCOPY;  Surgeon: Vinny Youssef MD;  Location: [facility] ENDO (4TH FLR);  Service: Colon and Rectal;  Laterality: N/A;  ref Green  eliquis   diabetic/ WL ozempic hold 7days prior    peg prep jm / loop  recorder  ok to hold Eliquis 2 days per KHANH Rivera NP/L Robert RN-GT  11/29-precall complete-pt verbalized understanding of holding Ozempic-MS    COLONOSCOPY W/ POLYPECTOMY      GASTRECTOMY      HERNIA REPAIR      RADIOFREQUENCY ABLATION, NERVE, GENICULAR, KNEE Right 9/25/2024    Procedure: Right genicular RFA;  Surgeon: Perla Barrera DO;  [facility];  Service: Pain Management;  Laterality: Right;  40 mins - Ozempic 7 days ASA/ Eliquis ok    REMOVAL OF IMPLANTABLE LOOP RECORDER N/A 09/17/2018    Procedure: REMOVAL, IMPLANTABLE LOOP RECORDER;  Surgeon: Thomas Randolph MD;  Location: [facility] CATH LAB;  Service: Cardiology;  Laterality: N/A;  TERESA, ILR removal (no reimplant), STACY, RN Sedate, SK, 3 Prep *Reveal LINQ*    TONSILLECTOMY      TUBAL LIGATION         Family History   Problem Relation Name Age of Onset    No Known Problems Mother      Heart disease Father      No Known Problems Sister      No Known Problems Sister      No Known Problems Maternal Grandmother      Diabetes Maternal Grandfather      Obesity Maternal Grandfather      Stroke Paternal Grandmother      No Known Problems Paternal Grandfather      Heart attack Neg Hx      Cirrhosis Neg Hx         Social History     Socioeconomic History    Marital status: Single   Occupational History    Occupation: MOS      Employer: [employer]   Tobacco Use    Smoking status: Former     Current packs/day: 0.00     Average packs/day: 1 pack/day for 30.0 years (30.0 ttl pk-yrs)     Types: Cigarettes     Start date: 7/16/1984     Quit date: 7/16/2014     Years since quitting: 10.5    Smokeless tobacco: Never   Substance and Sexual Activity    Alcohol use: Not Currently    Drug use: No     Comment: thc at young age    Sexual activity: Not Currently     Partners: Male     Social Drivers of Health     Financial Resource Strain: Low Risk  (2/7/2025)    Overall Financial Resource Strain (CARDIA)     Difficulty of Paying Living  Expenses: Not very hard   Food Insecurity: No Food Insecurity (2/7/2025)    Hunger Vital Sign     Worried About Running Out of Food in the Last Year: Never true     Ran Out of Food in the Last Year: Never true   Transportation Needs: No Transportation Needs (2/7/2025)    TRANSPORTATION NEEDS     Transportation : No   Physical Activity: Inactive (2/7/2025)    Exercise Vital Sign     Days of Exercise per Week: 0 days     Minutes of Exercise per Session: 90 min   Stress: No Stress Concern Present (2/7/2025)    Citizen of [place] of Occupational Health - Occupational Stress Questionnaire     Feeling of Stress : Only a little   Housing Stability: Unknown (2/7/2025)    Housing Stability Vital Sign     Unable to Pay for Housing in the Last Year: No     Homeless in the Last Year: No       MEDICATIONS & ALLERGIES:     Current Outpatient Medications on File Prior to Visit   Medication Sig    albuterol (PROVENTIL/VENTOLIN HFA) 90 mcg/actuation inhaler INHALE 2 PUFFS BY MOUTH EVERY 6 HOURS AS NEEDED FOR WHEEZING    aspirin (ECOTRIN) 81 MG EC tablet Take 1 tablet (81 mg total) by mouth once daily.    b complex vitamins tablet Take 1 tablet by mouth once daily.    CALCIUM CITRATE/VITAMIN D3 (CALCIUM CITRATE + D ORAL) Take 1 tablet by mouth every morning.    cetirizine (ZYRTEC) 5 MG tablet Take 5 mg by mouth once daily.    cyanocobalamin, vitamin B-12, 500 mcg Subl Place 1 tablet under the tongue once daily.    docusate sodium (COLACE) 100 MG capsule Take 100 mg by mouth once daily.     ELIQUIS 5 mg Tab Take 1 tablet by mouth twice daily    fish oil-omega-3 fatty acids 300-1,000 mg capsule Take 1 capsule by mouth once daily.    levothyroxine (SYNTHROID) 88 MCG tablet Take 1 tablet (88 mcg total) by mouth before breakfast.    metFORMIN (GLUCOPHAGE) 1000 MG tablet TAKE 1 TABLET BY MOUTH TWICE DAILY WITH MEALS    multivitamin capsule Take 1 capsule by mouth once daily.    omeprazole (PRILOSEC) 40 MG capsule Take 1 capsule (40 mg  total) by mouth every morning.    OZEMPIC 2 mg/dose (8 mg/3 mL) PnIj INJECT 2 MG SUBCUTANEOUSLY ONCE A WEEK (Patient taking differently: Inject 2 mg into the skin every Sunday.)    prednisoLONE-moxiflox-bromfen 1-0.5-0.075 % DrpS Apply 1 drop to eye 3 (three) times daily.    psyllium (HYDROCIL) packet Take 1 packet by mouth once daily. for 14 days    rosuvastatin (CRESTOR) 40 MG Tab Take 1 tablet by mouth once daily    senna (SENNA) 8.6 mg tablet Take 2 tablets by mouth nightly as needed for Constipation.    valsartan-hydrochlorothiazide (DIOVAN-HCT) 80-12.5 mg per tablet Take 1 tablet by mouth once daily.     Current Facility-Administered Medications on File Prior to Visit   Medication    [COMPLETED] midazolam injection 2 mg    [COMPLETED] moxifloxacin 0.5 % ophthalmic solution 1 drop    [COMPLETED] tropicamide-cyclopentolate-PHENYLephrine-ketorolac-proparacaine ophthalmic solution 1 drop    [DISCONTINUED] acetaminophen tablet 650 mg    [DISCONTINUED] balanced salt    [DISCONTINUED] fentaNYL 50 mcg/mL injection 25 mcg    [DISCONTINUED] LIDOcaine (PF) 10 mg/ml (1%) injection    [DISCONTINUED] LIDOcaine (PF) 40 mg/mL (4 %) injection    [DISCONTINUED] moxifloxacin 0.5 % ophthalmic solution 1 drop    [DISCONTINUED] moxifloxacin 0.5 % ophthalmic solution    [DISCONTINUED] prednisoLONE acetate 1 % ophthalmic suspension    [DISCONTINUED] proparacaine 0.5 % ophthalmic solution 1 drop    [DISCONTINUED] proparacaine 0.5 % ophthalmic solution    [DISCONTINUED] sod hyaluronate-sod chondroitin-sod hyaluronate intra-ocular kit    [DISCONTINUED] TETRAcaine HCl (PF) 0.5 % Drop 1 drop       Review of patient's allergies indicates:   Allergen Reactions    Medrol [methylprednisolone] Palpitations       OBJECTIVE:     Vital Signs:  Vitals:    02/12/25 1427   BP: 120/62   BP Location: Left arm   Patient Position: Sitting   Pulse: 76   Resp: 18   Temp: 97.2 °F (36.2 °C)   TempSrc: Temporal   SpO2: 99%   Weight: 87.9 kg (193 lb 12.6 oz)  "  Height: 5' 5" (1.651 m)       Body mass index is 32.25 kg/m².     Physical Exam  Constitutional:       General: She is not in acute distress.  HENT:      Head: Normocephalic and atraumatic.   Eyes:      General: No scleral icterus.  Cardiovascular:      Rate and Rhythm: Normal rate and regular rhythm.      Heart sounds: Normal heart sounds.   Pulmonary:      Effort: Pulmonary effort is normal.      Breath sounds: Normal breath sounds. No wheezing, rhonchi or rales.   Abdominal:      General: There is distension (reports this is chronic).      Palpations: Abdomen is soft. There is no mass.      Tenderness: There is no abdominal tenderness. There is no guarding or rebound.   Musculoskeletal:         General: No deformity.   Skin:     General: Skin is warm and dry.      Coloration: Skin is not pale.   Neurological:      Mental Status: She is alert and oriented to person, place, and time.   Psychiatric:         Mood and Affect: Mood normal.         Behavior: Behavior normal.         Laboratory  Recent Results (from the past 24 hours)   POCT glucose    Collection Time: 02/12/25  6:39 AM   Result Value Ref Range    POCT Glucose 185 (H) 70 - 110 mg/dL       Diagnostic Results:      Health Maintenance Due   Topic Date Due    Shingles Vaccine (1 of 2) Never done    COVID-19 Vaccine (10 - 2024-25 season) 09/01/2024         ASSESSMENT & PLAN:     Ms. Dinaa Montenegro is a 69 y.o. female who presents for follow up after being hospitalized with anemia and an JOSHUA.     1. Anemia, unspecified type  -     CBC W/ AUTO DIFFERENTIAL; Future; Expected date: 02/12/2025  --     ferrous sulfate (IRON) 325 mg (65 mg iron) Tab tablet; Take 1 tablet (325 mg total) by mouth every other day.  Dispense: 45 tablet; Refill: 0  -TIBC was elevated on recent iron panel. Recommending daily iron, but patient hesitant given hx of constipation. Agreed to EOD dosing. If unable to tolerate will discuss iron infusion. B12 and folate WNL.     2. " Chronic kidney disease, unspecified CKD stage  -     Ambulatory referral/consult to Internal Medicine  -     COMPREHENSIVE METABOLIC PANEL; Future; Expected date: 02/12/2025  -JOSHUA with Cr of 2 during recent admission, but improved to 1.5 prior to DC    3. Constipation, unspecified constipation type  -     polyethylene glycol (MIRALAX) 17 gram PwPk; Take 17 g by mouth 2 (two) times daily.  Dispense: 60 each; Refill: 3  -Discussed when she may need to up or down titrate based on level of constipation.        Post Hospital D/C Medications have been reconciled     RTC as needed    Discussed with Dr. Donnelly  - staff attestation to follow      Elton Mcfarland DO  Internal Medicine PGY-2  [facility]  [address] [place] [address]  [phone]

## 2025-02-12 NOTE — PROGRESS NOTES
I have evaluated this patient with resident Dr Mcfarland and I agree with his evaluation and plan.

## 2025-02-13 ENCOUNTER — OFFICE VISIT (OUTPATIENT)
Dept: OPTOMETRY | Facility: CLINIC | Age: 70
End: 2025-02-13
Payer: MEDICARE

## 2025-02-13 ENCOUNTER — DOCUMENTATION ONLY (OUTPATIENT)
Dept: REHABILITATION | Facility: HOSPITAL | Age: 70
End: 2025-02-13
Payer: MEDICARE

## 2025-02-13 DIAGNOSIS — Z98.42 STATUS POST CATARACT EXTRACTION AND INSERTION OF INTRAOCULAR LENS OF LEFT EYE: Primary | ICD-10-CM

## 2025-02-13 DIAGNOSIS — Z96.1 STATUS POST CATARACT EXTRACTION AND INSERTION OF INTRAOCULAR LENS OF LEFT EYE: Primary | ICD-10-CM

## 2025-02-13 PROCEDURE — 1101F PT FALLS ASSESS-DOCD LE1/YR: CPT | Mod: CPTII,S$GLB,, | Performed by: OPTOMETRIST

## 2025-02-13 PROCEDURE — 99999 PR PBB SHADOW E&M-EST. PATIENT-LVL III: CPT | Mod: PBBFAC,,, | Performed by: OPTOMETRIST

## 2025-02-13 PROCEDURE — 1159F MED LIST DOCD IN RCRD: CPT | Mod: CPTII,S$GLB,, | Performed by: OPTOMETRIST

## 2025-02-13 PROCEDURE — 3044F HG A1C LEVEL LT 7.0%: CPT | Mod: CPTII,S$GLB,, | Performed by: OPTOMETRIST

## 2025-02-13 PROCEDURE — 1126F AMNT PAIN NOTED NONE PRSNT: CPT | Mod: CPTII,S$GLB,, | Performed by: OPTOMETRIST

## 2025-02-13 PROCEDURE — 3288F FALL RISK ASSESSMENT DOCD: CPT | Mod: CPTII,S$GLB,, | Performed by: OPTOMETRIST

## 2025-02-13 PROCEDURE — 99024 POSTOP FOLLOW-UP VISIT: CPT | Mod: S$GLB,,, | Performed by: OPTOMETRIST

## 2025-02-13 RX ORDER — PREDNISOLONE/MOXIFLOX/BROMF/PF 1 %-0.5 %
DROPS OPHTHALMIC (EYE)
COMMUNITY
Start: 2025-02-04

## 2025-02-13 NOTE — PROGRESS NOTES
HPI    Patient is here today for POD 1 OS. Denies pain.  PMB TID OS   Last edited by Dorothy Winters, OD on 2/13/2025  4:57 PM.            Assessment /Plan     For exam results, see Encounter Report.    Status post cataract extraction and insertion of intraocular lens of left eye      Slit Lamp Exam  L/L - normal  C/s - quiet  Cornea - clear  A/C - 2+ cell, 1+ flare  Lens - PCIOL     POD #1 s/p phaco/IOL  - doing well  - continue the following drops:     vigamox or ocuflox TID x 1 wk then stop  Pred forte TID x  4 wks  Ketorolac TID until runs out     Versus:     Combination drop - 1 drop TID x total of 1 month     Appropriate precautions and post op medications reviewed.  Patient instructed to call or come in if symptoms of redness, decreased vision, or pain are experienced.     -f/up 1 month with me

## 2025-02-17 ENCOUNTER — TELEPHONE (OUTPATIENT)
Dept: GASTROENTEROLOGY | Facility: CLINIC | Age: 70
End: 2025-02-17
Payer: MEDICARE

## 2025-02-17 ENCOUNTER — CLINICAL SUPPORT (OUTPATIENT)
Dept: REHABILITATION | Facility: HOSPITAL | Age: 70
End: 2025-02-17
Payer: MEDICARE

## 2025-02-17 DIAGNOSIS — R52 PAIN: Primary | ICD-10-CM

## 2025-02-17 PROCEDURE — 20560 NDL INSJ W/O NJX 1 OR 2 MUSC: CPT | Performed by: PHYSICAL THERAPIST

## 2025-02-17 PROCEDURE — 97140 MANUAL THERAPY 1/> REGIONS: CPT | Mod: CG | Performed by: PHYSICAL THERAPIST

## 2025-02-17 PROCEDURE — 97014 ELECTRIC STIMULATION THERAPY: CPT | Performed by: PHYSICAL THERAPIST

## 2025-02-17 NOTE — PROGRESS NOTES
Physical Therapy Daily Treatment Note     Name: Diana Lieberman Lan  Clinic Number: [id number]    Therapy Diagnosis:   Encounter Diagnosis   Name Primary?    Pain Yes     Physician: Rad Johnston MD    Visit Date: 2/17/2025  Physician Orders: PT Eval and Treat   Medical Diagnosis from Referral: M54.2 (ICD-10-CM) - Neck pain  Evaluation Date: 12/5/2024  Authorization Period Expiration: 11-21-25  Plan of Care Expiration: 1-30-25  Visit # / Visits authorized: 1/ 1, 7/12  FOTO: 5/5    Time In: 2:05 pm  Time Out: 2:50 pm  Total Billable Time: 35 minutes    Precautions: Standard    Subjective     Pt reports: neck feels OK.  Denies UE radicular sx.    She was compliant with home exercise program.  Response to previous treatment: less pain  Functional change: improved driving    Pain: 0/10  Location: bilateral upper traps    Objective     MIN TTP B UT. C-AROM is WNL.  Gross strength is 4+/5 to 5/5 C-spine.    Kaleigh received therapeutic exercises to develop ROM, flexibility, and posture for 5 minutes including:  HEP review  Posture education    Kaleigh received the following manual therapy techniques: Joint mobilizations, Manual traction, and Soft tissue Mobilization were applied to the: neck/UT B for 30 minutes, including:  Manual SO release/Ctxn/PROM  Dry needling B UT with 4 30 mm needles + Estim     Kaleigh received hot pack for 10 minutes to neck/UT.      Home Exercises Provided and Patient Education Provided     Education provided:   - correct posture    Written Home Exercises Provided: Patient instructed to cont prior HEP.  Exercises were reviewed and Kaleigh was able to demonstrate them prior to the end of the session.  Kaleigh demonstrated good  understanding of the education provided.     See EMR under Patient Instructions for exercises provided prior visit.    Assessment     Tolerated DN well.  Progressing well.  Kaleigh Is progressing well towards her goals.   Pt prognosis is Good.     Pt will continue to benefit from  skilled outpatient physical therapy to address the deficits listed in the problem list box on initial evaluation, provide pt/family education and to maximize pt's level of independence in the home and community environment.     Pt's spiritual, cultural and educational needs considered and pt agreeable to plan of care and goals.    Anticipated barriers to physical therapy: none    Goals: Short Term Goals: 2 weeks         1.   Independent with HEP        2.  Pt will report decreased pain level of < 50% from above measure with ADL     Long Term Goals:   GOALS:    6_   weeks. Pt agrees with goals set.  Independent with HEP.  Report decreased    neck/UT    pain  <   / =  3/10 with ADL such as dressing  Increased MMT  for  neck to 4+/5 to 5/5  with ADL such as house work  Increased arom  for  neck to WNL with functional activities such cleaning or self-care       Plan     Continue PT per POC.    Carlos Frazier, PT

## 2025-02-17 NOTE — TELEPHONE ENCOUNTER
Attempted to contact patient to offer sooner appt, no answer, left VM to return our call.    ----- Message from Ellen sent at 2/13/2025  4:50 PM CST -----  Regarding: APPT  Contact: PT@ [phone]  Pt is calling to speak to someone in the office to r/s her appt that she is currently scheduled for;ON 3/18 no available appts in Epic. Please call to advise. PT@ [phone].     Patient's DX:     Additional Info:   NEEDS SOONER APPT

## 2025-02-17 NOTE — PROGRESS NOTES
Health  Wellness Visit Note    Name: Diana Montenegro  Clinic Number: [id number]  Physician: No ref. provider found  Diagnosis: No diagnosis found.  Past Medical History:   Diagnosis Date    Arthritis     Atrial fibrillation, unspecified     hx of a fib prior to stroke.    Chronic back pain     Chronic kidney disease, stage 3a 06/01/2023    Colon polyp     CVA (cerebral infarction) 07/16/2014    Degenerative disc disease     cervical; lumbar    Diabetes mellitus type II     Encounter for loop recorder at end of battery life 09/17/2018    Hyperlipidemia     Hypertension     Hypothyroidism     Mild nonproliferative diabetic retinopathy 08/12/2018    Obesity     Sleep apnea     Stroke 07/2014    Venous insufficiency (chronic) (peripheral)      Visit Number: [id number]  Precautions: Standard, Fall Risk, History of Stroke      1st PT visit:  07/16/2024  Year of care end date:  July 2025  Mindbody plan: 60---9 Months  Patient level: C    Time In: 1:30 PM  Time Out: 2:30 PM  Total Treatment Time: 60 Minutes    [employer] 20[employer]  Handout on this week's wellness topic Cardiovascular Exercise was provided along with a discussion on what it means, the benefits, and suggestions for practice.  Reviewed last week's topic of Muscular Strength    Subjective:   Patient reports to Wellness after an cataracts surgery yesterday. She will keep her weights at a minimum today until she is cleared from her MD. She has not been to the gym but plans to get back into the pool next week. She is still in physical therapy and maintains a daily stretch routine. Patient does not ice at home unless she deems necessary.     HC and patient discussed full range of motion on leg press before increasing weight.     Objective:   Diana completed therapeutic stretches (EIL, SAIDA) and the following MedX exercise machines: core lumbar, torso rotation l/r, leg extension, leg curl, upright row, chest press, biceps curl, triceps extension, leg press    See  exercise log in patient folder for rate of exertion and repetitions completed.       Fitness Machine Education Key:  E=education on equipment initiated and further follow up and education needed  I=independent with  and exercise.  The patient:  Adjusts machines to his/her settings  Uses equipment levers, pins, weights safely  Maintains safe and correct posture while exercising  Moves through exercise with correct pace and control  Gets on and off equipment safely      Lumbar/Cervical Ext. E Torso Rotation E Leg Press    Leg Extension  Seated Leg Curl  Chest Press    Seated Row  Hip ADD  Hip ABD    Triceps Extension  Bicep Curl  Other:      [x] Indicates exercise has been taught for home  Lumbar/Cervical Ext. [] Torso Rotation [] Leg Press []   Leg Extension [] Seated Leg Curl [] Chest Press []   Seated Row [] Hip ADD [] Hip ABD []   Triceps Extension [] Bicep Curl [] Other:        Assessment:   Patient tolerated Patient tolerated MedX Core Lumbar Strength and all other peripheral exercises without an increase in symptoms. Patient warmed up on nustep for 5 minutes, stretched, and iced low back for 5 minutes after the workout.     Plan:  Continue with established plan of care towards wellness goals.     Health  : Ann-Marie Bey  2/17/2025

## 2025-02-17 NOTE — TELEPHONE ENCOUNTER
Appt rescheduled to 02/18/2025.    ----- Message from Saray sent at 2/17/2025 10:00 AM CST -----  Contact: ESE Ortiz @[phone]  Patient is returning a phone call.Who left a message for the patient: --Lalita--Does patient know what this is regarding:  --needs a sooner appointment--Would you like a call back, or a response through your [facility] portal?:   --Call back--Comments: Please call to advise.

## 2025-02-18 ENCOUNTER — PATIENT MESSAGE (OUTPATIENT)
Dept: OBSTETRICS AND GYNECOLOGY | Facility: CLINIC | Age: 70
End: 2025-02-18
Payer: MEDICARE

## 2025-02-18 ENCOUNTER — OFFICE VISIT (OUTPATIENT)
Dept: GASTROENTEROLOGY | Facility: CLINIC | Age: 70
End: 2025-02-18
Payer: MEDICARE

## 2025-02-18 VITALS
BODY MASS INDEX: 32.4 KG/M2 | HEART RATE: 67 BPM | DIASTOLIC BLOOD PRESSURE: 58 MMHG | WEIGHT: 194.44 LBS | SYSTOLIC BLOOD PRESSURE: 117 MMHG | HEIGHT: 65 IN

## 2025-02-18 DIAGNOSIS — D50.9 IRON DEFICIENCY ANEMIA, UNSPECIFIED IRON DEFICIENCY ANEMIA TYPE: Primary | ICD-10-CM

## 2025-02-18 DIAGNOSIS — K59.09 CHRONIC CONSTIPATION: ICD-10-CM

## 2025-02-18 NOTE — PROGRESS NOTES
Subjective:       Patient ID: Diana Montenegro is a 69 y.o. female.    Chief Complaint: Anemia    70 y/o female with arthritis, AFib on chronic anticoagulation, hypothyroidism, hypertension, hyperlipidemia, diabetes, and sleeve gastrectomy (2015) presents to clinic for hospital follow up. Patient presented to ER at [facility] [place] [address] with c/o generalized weakness, fatigue and SOB on exertion.  Labs significant for anemia with a Hgb 6.6, Hct 23.6. Transfused 1u PRBC with appropriate increase in Hgb. Taking oral iron daily. Patient reports occasional hematochezia with BM. No melena. States she is constipated and taking stool softeners. Denies hx of PUD or chronic NSAID use. No abdominal pain, nausea, vomiting, weight loss, fever, or night sweats. Last colonoscopy in 12/2023 revealed diverticulosis in the sigmoid and one colon polyp removed.           Past Medical History:   Diagnosis Date    Arthritis     Atrial fibrillation, unspecified     hx of a fib prior to stroke.    Chronic back pain     Chronic kidney disease, stage 3a 06/01/2023    Colon polyp     CVA (cerebral infarction) 07/16/2014    Degenerative disc disease     cervical; lumbar    Diabetes mellitus type II     Encounter for loop recorder at end of battery life 09/17/2018    Hyperlipidemia     Hypertension     Hypothyroidism     Mild nonproliferative diabetic retinopathy 08/12/2018    Obesity     Sleep apnea     Stroke 07/2014    Venous insufficiency (chronic) (peripheral)        Past Surgical History:   Procedure Laterality Date    BLOCK, NERVE, GENICULAR Right 8/21/2024    Procedure: Right diagnostic genicular block;  Surgeon: Perla Barrera DO;  Location: [facility] PAIN MANAGEMENT;  Service: Pain Management;  Laterality: Right;  oral sed  20 mins  Elquis/ASA ok    CATARACT EXTRACTION W/  INTRAOCULAR LENS IMPLANT Right 12/9/2024    Procedure: EXTRACTION, CATARACT, WITH IOL INSERTION;  Surgeon: Brielle Singleton MD;  Location: [facility] OR;  Service:  Ophthalmology;  Laterality: Right;    COLONOSCOPY N/A 08/30/2018    Procedure: COLONOSCOPY;  Surgeon: William Clement MD;  Location: [facility] ENDO (4TH FLR);  Service: Endoscopy;  Laterality: N/A;  Eliaileen/Dr Leroy Green/OK to hold 2 days prior to colon/see telephone encounter dated 7/24/18/pt has loop recorder/svn    COLONOSCOPY N/A 12/6/2023    Procedure: COLONOSCOPY;  Surgeon: Vinny Youssef MD;  Location: [facility] ENDO (4TH FLR);  Service: Colon and Rectal;  Laterality: N/A;  ref Green  eliquis   diabetic/ WL ozempic hold 7days prior    peg prep jm / loop recorder  ok to hold Eliquis 2 days per KHANH Rivera NP/MADISON Jones RN-GT  11/29-precall complete-pt verbalized understanding of holding Ozempic-MS    COLONOSCOPY W/ POLYPECTOMY      GASTRECTOMY      HERNIA REPAIR      PHACOEMULSIFICATION, CATARACT, WITH IOL INSERTION Left 2/12/2025    Procedure: PHACOEMULSIFICATION, CATARACT, WITH IOL INSERTION;  Surgeon: Brielle Singleton MD;  Location: [facility] OR;  Service: Ophthalmology;  Laterality: Left;    RADIOFREQUENCY ABLATION, NERVE, GENICULAR, KNEE Right 9/25/2024    Procedure: Right genicular RFA;  Surgeon: Perla Barrera DO;  Location: [facility] PAIN MANAGEMENT;  Service: Pain Management;  Laterality: Right;  40 mins - Ozempic 7 days ASA/ Eliquis ok    REMOVAL OF IMPLANTABLE LOOP RECORDER N/A 09/17/2018    Procedure: REMOVAL, IMPLANTABLE LOOP RECORDER;  Surgeon: Thomas Randolph MD;  Location: [facility] CATH LAB;  Service: Cardiology;  Laterality: N/A;  TERESA, ILR removal (no reimplant), MDT, RN Sedate, SK, 3 Prep *Reveal LINQ*    TONSILLECTOMY      TUBAL LIGATION         Family History   Problem Relation Name Age of Onset    No Known Problems Mother      Heart disease Father      No Known Problems Sister      No Known Problems Sister      No Known Problems Maternal Grandmother      Diabetes Maternal Grandfather      Obesity Maternal Grandfather      Stroke Paternal Grandmother      No Known Problems Paternal Grandfather      Heart  attack Neg Hx      Cirrhosis Neg Hx         Social History     Socioeconomic History    Marital status: Single   Occupational History    Occupation: MOS      Employer: [employer]   Tobacco Use    Smoking status: Former     Current packs/day: 0.00     Average packs/day: 1 pack/day for 30.0 years (30.0 ttl pk-yrs)     Types: Cigarettes     Start date: 7/16/1984     Quit date: 7/16/2014     Years since quitting: 10.6    Smokeless tobacco: Never   Substance and Sexual Activity    Alcohol use: Not Currently    Drug use: No     Comment: thc at young age    Sexual activity: Not Currently     Partners: Male     Social Drivers of Health     Financial Resource Strain: Low Risk  (2/7/2025)    Overall Financial Resource Strain (CARDIA)     Difficulty of Paying Living Expenses: Not very hard   Food Insecurity: No Food Insecurity (2/7/2025)    Hunger Vital Sign     Worried About Running Out of Food in the Last Year: Never true     Ran Out of Food in the Last Year: Never true   Transportation Needs: No Transportation Needs (2/7/2025)    TRANSPORTATION NEEDS     Transportation : No   Physical Activity: Inactive (2/7/2025)    Exercise Vital Sign     Days of Exercise per Week: 0 days     Minutes of Exercise per Session: 90 min   Stress: No Stress Concern Present (2/7/2025)    [place] [employer] - Occupational Stress Questionnaire     Feeling of Stress : Only a little   Housing Stability: Unknown (2/7/2025)    Housing Stability Vital Sign     Unable to Pay for Housing in the Last Year: No     Homeless in the Last Year: No       Review of Systems   Constitutional:  Negative for appetite change and unexpected weight change.   HENT:  Negative for trouble swallowing.    Respiratory:  Negative for shortness of breath.    Cardiovascular:  Negative for chest pain.   Gastrointestinal:  Positive for blood in stool and constipation. Negative for abdominal pain, nausea, rectal pain and vomiting.  "  Hematological:  Negative for adenopathy. Does not bruise/bleed easily.   Psychiatric/Behavioral:  Negative for dysphoric mood.          Objective:     Vitals:    02/18/25 1417   BP: (!) 117/58   BP Location: Right arm   Patient Position: Sitting   Pulse: 67   Weight: 88.2 kg (194 lb 7.1 oz)   Height: 5' 5" (1.651 m)     Component      Latest Ref Rng 2/6/2025 2/7/2025 2/12/2025   WBC      3.90 - 12.70 K/uL 5.31  3.38 (L)  4.28    RBC      4.00 - 5.40 M/uL 2.44 (L)  2.83 (L)  2.83 (L)    Hemoglobin      12.0 - 16.0 g/dL 6.6 (L)  7.7 (L)  7.6 (L)    Hematocrit      37.0 - 48.5 % 23.6 (L)  25.5 (L)  26.5 (L)    MCV      82 - 98 fL 97  90  94    MCH      27.0 - 31.0 pg 27.0  27.2  26.9 (L)    MCHC      32.0 - 36.0 g/dL 28.0 (L)  30.2 (L)  28.7 (L)    RDW      11.5 - 14.5 % 15.7 (H)  18.5 (H)  16.7 (H)    Platelet Count      150 - 450 K/uL 189  157  182    MPV      9.2 - 12.9 fL 9.5  9.5  10.1    Immature Granulocytes      0.0 - 0.5 % 0.2  0.3  0.2    Gran # (ANC)      1.8 - 7.7 K/uL 3.5  2.0  2.9    Immature Grans (Abs)      0.00 - 0.04 K/uL 0.01  0.01  0.01    Lymph #      1.0 - 4.8 K/uL 1.2  1.0  0.9 (L)    Mono #      0.3 - 1.0 K/uL 0.5  0.3  0.3    Eos #      0.0 - 0.5 K/uL 0.0  0.1  0.1    Baso #      0.00 - 0.20 K/uL 0.02  0.02  0.03    nRBC      0 /100 WBC 0  0  0    Gran %      38.0 - 73.0 % 66.6  60.3  68.5    Lymph %      18.0 - 48.0 % 23.2  28.1  21.7    Mono %      4.0 - 15.0 % 9.0  9.2  7.0    Eos %      0.0 - 8.0 % 0.6  1.5  1.9    Basophil %      0.0 - 1.9 % 0.4  0.6  0.7    Differential Method Automated  Automated  Automated    Ferritin      20.0 - 300.0 ng/mL 10 (L)      Vitamin B12      210 - 950 pg/mL >2000 (H)      Folate      4.0 - 24.0 ng/mL 19.7              Physical Exam  Constitutional:       Appearance: Normal appearance.   HENT:      Head: Normocephalic.   Eyes:      Conjunctiva/sclera: Conjunctivae normal.   Pulmonary:      Effort: Pulmonary effort is normal. No respiratory distress. "   Musculoskeletal:         General: Normal range of motion.      Cervical back: Normal range of motion.   Skin:     General: Skin is warm and dry.   Neurological:      Mental Status: She is alert and oriented to person, place, and time.   Psychiatric:         Mood and Affect: Mood normal.         Behavior: Behavior normal.               Assessment:         ICD-10-CM ICD-9-CM   1. Iron deficiency anemia, unspecified iron deficiency anemia type  D50.9 280.9   2. Chronic constipation  K59.09 564.00       Plan:       Iron deficiency anemia, unspecified iron deficiency anemia type  -     EGD/colonoscopy to r/o GI source of anemia  -     Msg sent to endoscopy staff as patient request location closer to home    Chronic constipation  -     Fiber supplement and Miralax daily    Follow up if symptoms worsen or fail to improve.     Patient's Medications   New Prescriptions    No medications on file   Previous Medications    ALBUTEROL (PROVENTIL/VENTOLIN HFA) 90 MCG/ACTUATION INHALER    INHALE 2 PUFFS BY MOUTH EVERY 6 HOURS AS NEEDED FOR WHEEZING    ASPIRIN (ECOTRIN) 81 MG EC TABLET    Take 1 tablet (81 mg total) by mouth once daily.    B COMPLEX VITAMINS TABLET    Take 1 tablet by mouth once daily.    CALCIUM CITRATE/VITAMIN D3 (CALCIUM CITRATE + D ORAL)    Take 1 tablet by mouth every morning.    CETIRIZINE (ZYRTEC) 5 MG TABLET    Take 5 mg by mouth once daily.    CYANOCOBALAMIN, VITAMIN B-12, 500 MCG SUBL    Place 1 tablet under the tongue once daily.    DOCUSATE SODIUM (COLACE) 100 MG CAPSULE    Take 100 mg by mouth once daily.     ELIQUIS 5 MG TAB    Take 1 tablet by mouth twice daily    FERROUS SULFATE (IRON) 325 MG (65 MG IRON) TAB TABLET    Take 1 tablet (325 mg total) by mouth every other day.    FISH OIL-OMEGA-3 FATTY ACIDS 300-1,000 MG CAPSULE    Take 1 capsule by mouth once daily.    LEVOTHYROXINE (SYNTHROID) 88 MCG TABLET    Take 1 tablet (88 mcg total) by mouth before breakfast.    METFORMIN (GLUCOPHAGE) 1000 MG  TABLET    TAKE 1 TABLET BY MOUTH TWICE DAILY WITH MEALS    MULTIVITAMIN CAPSULE    Take 1 capsule by mouth once daily.    OMEPRAZOLE (PRILOSEC) 40 MG CAPSULE    Take 1 capsule (40 mg total) by mouth every morning.    OZEMPIC 2 MG/DOSE (8 MG/3 ML) PNIJ    INJECT 2 MG SUBCUTANEOUSLY ONCE A WEEK    POLYETHYLENE GLYCOL (MIRALAX) 17 GRAM PWPK    Take 17 g by mouth 2 (two) times daily.    PREDNISOLONE-MOXIFLOX-BROMFEN 1-0.5-0.075 % DRPS    Apply 1 drop to eye 3 (three) times daily.    PREDNISOLONE/MOXIFLOX/BROMF/PF (NFGMFMIUPII-SGIKGFUV-ZXNBN,PF,) 1-0.5-0.09 % DROP    Use as directed STARTING 2 DAYS PRIOR TO SURGERY insert 1 drop in surgical eye three times daily AND CONTINUE FOR 30 DAYS AFTER    PSYLLIUM (HYDROCIL) PACKET    Take 1 packet by mouth once daily. for 14 days    ROSUVASTATIN (CRESTOR) 40 MG TAB    Take 1 tablet by mouth once daily    SENNA (SENNA) 8.6 MG TABLET    Take 2 tablets by mouth nightly as needed for Constipation.    VALSARTAN-HYDROCHLOROTHIAZIDE (DIOVAN-HCT) 80-12.5 MG PER TABLET    Take 1 tablet by mouth once daily.   Modified Medications    No medications on file   Discontinued Medications    No medications on file

## 2025-02-19 ENCOUNTER — CLINICAL SUPPORT (OUTPATIENT)
Dept: REHABILITATION | Facility: HOSPITAL | Age: 70
End: 2025-02-19
Payer: MEDICARE

## 2025-02-19 DIAGNOSIS — R52 PAIN: Primary | ICD-10-CM

## 2025-02-19 PROCEDURE — 20560 NDL INSJ W/O NJX 1 OR 2 MUSC: CPT | Performed by: PHYSICAL THERAPIST

## 2025-02-19 PROCEDURE — 97014 ELECTRIC STIMULATION THERAPY: CPT | Performed by: PHYSICAL THERAPIST

## 2025-02-19 PROCEDURE — 97110 THERAPEUTIC EXERCISES: CPT | Performed by: PHYSICAL THERAPIST

## 2025-02-19 PROCEDURE — 97140 MANUAL THERAPY 1/> REGIONS: CPT | Performed by: PHYSICAL THERAPIST

## 2025-02-19 NOTE — PROGRESS NOTES
Physical Therapy Daily Treatment Note     Name: Diana Montenegro  Clinic Number: [id number]    Therapy Diagnosis:   Encounter Diagnosis   Name Primary?    Pain Yes     Physician: Rad Johnston MD    Visit Date: 2/19/2025  Physician Orders: PT Eval and Treat   Medical Diagnosis from Referral: M54.2 (ICD-10-CM) - Neck pain  Evaluation Date: 12/5/2024  Authorization Period Expiration: 11-21-25  Plan of Care Expiration: 1-30-25  Visit # / Visits authorized: 1/ 1, 8/12  FOTO: 5/5    Time In: 2:00 pm  Time Out: 2:55 pm  Total Billable Time: 45 minutes    Precautions: Standard    Subjective     Pt reports: neck feels pretty good.  Denies UE radicular sx.    She was compliant with home exercise program.  Response to previous treatment: less pain  Functional change: improved driving    Pain: 0/10  Location: bilateral upper traps    Objective     MIN TTP B UT. C-AROM is WNL.  Gross strength is 4+/5 to 5/5 C-spine.    Kaleigh received therapeutic exercises to develop ROM, flexibility, and posture for 15 minutes including:  HEP review  Posture education  RTB scap retractions  RTB Breuger's  RTB HABD  B UT stretch 3 x 30 sec hold    Kaleigh received the following manual therapy techniques: Joint mobilizations, Manual traction, and Soft tissue Mobilization were applied to the: neck/UT B for 30 minutes, including:  Manual SO release/Ctxn/PROM  Dry needling B UT with 4 30 mm needles + Estim     Kaleigh received hot pack for 10 minutes to neck/UT.      Home Exercises Provided and Patient Education Provided     Education provided:   - correct posture    Written Home Exercises Provided: Patient instructed to cont prior HEP.  Exercises were reviewed and Kaleigh was able to demonstrate them prior to the end of the session.  Kaleigh demonstrated good  understanding of the education provided.     See EMR under Patient Instructions for exercises provided prior visit.    Assessment     Tolerated DN well.  Progressing well.  Kaleigh Is progressing  well towards her goals.   Pt prognosis is Good.     Pt will continue to benefit from skilled outpatient physical therapy to address the deficits listed in the problem list box on initial evaluation, provide pt/family education and to maximize pt's level of independence in the home and community environment.     Pt's spiritual, cultural and educational needs considered and pt agreeable to plan of care and goals.    Anticipated barriers to physical therapy: none    Goals: Short Term Goals: 2 weeks         1.   Independent with HEP        2.  Pt will report decreased pain level of < 50% from above measure with ADL     Long Term Goals:   GOALS:    6_   weeks. Pt agrees with goals set.  Independent with HEP.  Report decreased    neck/UT    pain  <   / =  3/10 with ADL such as dressing  Increased MMT  for  neck to 4+/5 to 5/5  with ADL such as house work  Increased arom  for  neck to WNL with functional activities such cleaning or self-care       Plan     Continue PT per POC.    Carlos Frazier, PT

## 2025-02-19 NOTE — PROGRESS NOTES
Physical Therapy Daily Treatment Note     Name: Diana Montenegro  Clinic Number: [id number]    Therapy Diagnosis:   Encounter Diagnosis   Name Primary?    Pain Yes     Physician: Rad Johnston MD    Visit Date: 2/19/2025  Physician Orders: PT Eval and Treat   Medical Diagnosis from Referral: M54.2 (ICD-10-CM) - Neck pain  Evaluation Date: 12/5/2024  Authorization Period Expiration: 11-21-25  Plan of Care Expiration: 1-30-25  Visit # / Visits authorized: 1/ 1, 8/12  FOTO: 5/5    Time In: 2:00 pm  Time Out: 2:55 pm  Total Billable Time: 45 minutes    Precautions: Standard    Subjective     Pt reports: neck feels pretty good.  Denies UE radicular sx.    She was compliant with home exercise program.  Response to previous treatment: less pain  Functional change: improved driving    Pain: 0/10  Location: bilateral upper traps    Objective     MIN TTP B UT. C-AROM is WNL.  Gross strength is 4+/5 to 5/5 C-spine.    Kaleigh received therapeutic exercises to develop ROM, flexibility, and posture for 15 minutes including:  HEP review  Posture education  RTB scap retractions  RTB Breuger's  RTB HABD  B UT stretch 3 x 30 sec hold    Kaleigh received the following manual therapy techniques: Joint mobilizations, Manual traction, and Soft tissue Mobilization were applied to the: neck/UT B for 30 minutes, including:  Manual SO release/Ctxn/PROM  Dry needling B UT with 4 30 mm needles + Estim     Kaleigh received hot pack for 10 minutes to neck/UT.      Home Exercises Provided and Patient Education Provided     Education provided:   - correct posture    Written Home Exercises Provided: Patient instructed to cont prior HEP.  Exercises were reviewed and Kaleigh was able to demonstrate them prior to the end of the session.  Kaleigh demonstrated good  understanding of the education provided.     See EMR under Patient Instructions for exercises provided prior visit.    Assessment     Tolerated DN well.  Progressing well.  Kaleigh Is progressing  well towards her goals.   Pt prognosis is Good.     Pt will continue to benefit from skilled outpatient physical therapy to address the deficits listed in the problem list box on initial evaluation, provide pt/family education and to maximize pt's level of independence in the home and community environment.     Pt's spiritual, cultural and educational needs considered and pt agreeable to plan of care and goals.    Anticipated barriers to physical therapy: none    Goals: Short Term Goals: 2 weeks         1.   Independent with HEP        2.  Pt will report decreased pain level of < 50% from above measure with ADL     Long Term Goals:   GOALS:    6_   weeks. Pt agrees with goals set.  Independent with HEP.  Report decreased    neck/UT    pain  <   / =  3/10 with ADL such as dressing  Increased MMT  for  neck to 4+/5 to 5/5  with ADL such as house work  Increased arom  for  neck to WNL with functional activities such cleaning or self-care       Plan     Continue PT per POC.    Carlos Frazier, PT

## 2025-02-20 ENCOUNTER — DOCUMENTATION ONLY (OUTPATIENT)
Dept: REHABILITATION | Facility: HOSPITAL | Age: 70
End: 2025-02-20
Payer: MEDICARE

## 2025-02-20 NOTE — PROGRESS NOTES
Health  Wellness Visit Note    Name: Diana Montenegro  Clinic Number: [id number]  Physician: No ref. provider found  Diagnosis: No diagnosis found.  Past Medical History:   Diagnosis Date    Arthritis     Atrial fibrillation, unspecified     hx of a fib prior to stroke.    Chronic back pain     Chronic kidney disease, stage 3a 06/01/2023    Colon polyp     CVA (cerebral infarction) 07/16/2014    Degenerative disc disease     cervical; lumbar    Diabetes mellitus type II     Encounter for loop recorder at end of battery life 09/17/2018    Hyperlipidemia     Hypertension     Hypothyroidism     Mild nonproliferative diabetic retinopathy 08/12/2018    Obesity     Sleep apnea     Stroke 07/2014    Venous insufficiency (chronic) (peripheral)      Visit Number: [id number]  Precautions: Standard, Fall Risk, History of Stroke      1st PT visit:  07/16/2024  Year of care end date:  July 2025  Mindbody plan: 60---9 Months  Patient level: C    Time In: 1:30 PM  Time Out: 2:30 PM  Total Treatment Time: 60 Minutes    Wellness [employer] 2022  Handout on this week's wellness topic Flexibility was provided along with a discussion on what it means, the benefits, and suggestions for practice.  Reviewed last week's topic of Cardiovascular Exercise.     Subjective:   Patient reports no complaints of back pain. She has not been to the gym but plans to get back into the pool next week. She is still in physical therapy and maintains a daily stretch routine. Patient does not ice at home unless she deems necessary.     HC and patient discussed full range of motion on leg press before increasing weight.     Objective:   Diana completed therapeutic stretches (EIL, SAIDA) and the following MedX exercise machines: core lumbar, torso rotation l/r, leg extension, leg curl, upright row, chest press, biceps curl, triceps extension, leg press    See exercise log in patient folder for rate of exertion and repetitions completed.       Fitness Machine  Education Key:  E=education on equipment initiated and further follow up and education needed  I=independent with  and exercise.  The patient:  Adjusts machines to his/her settings  Uses equipment levers, pins, weights safely  Maintains safe and correct posture while exercising  Moves through exercise with correct pace and control  Gets on and off equipment safely      Lumbar/Cervical Ext. E Torso Rotation E Leg Press    Leg Extension  Seated Leg Curl  Chest Press    Seated Row  Hip ADD  Hip ABD    Triceps Extension  Bicep Curl  Other:      [x] Indicates exercise has been taught for home  Lumbar/Cervical Ext. [] Torso Rotation [] Leg Press []   Leg Extension [] Seated Leg Curl [] Chest Press []   Seated Row [] Hip ADD [] Hip ABD []   Triceps Extension [] Bicep Curl [] Other:        Assessment:   Patient tolerated Patient tolerated MedX Core Lumbar Strength and all other peripheral exercises without an increase in symptoms. Patient warmed up on nustep for 5 minutes, stretched, and iced low back for 5 minutes after the workout.     Plan:  Continue with established plan of care towards wellness goals.     Health  : Ann-Marie Bey  2/20/2025

## 2025-02-24 ENCOUNTER — CLINICAL SUPPORT (OUTPATIENT)
Dept: REHABILITATION | Facility: HOSPITAL | Age: 70
End: 2025-02-24
Payer: MEDICARE

## 2025-02-24 DIAGNOSIS — R52 PAIN: Primary | ICD-10-CM

## 2025-02-24 PROCEDURE — 20560 NDL INSJ W/O NJX 1 OR 2 MUSC: CPT | Performed by: PHYSICAL THERAPIST

## 2025-02-24 PROCEDURE — 97140 MANUAL THERAPY 1/> REGIONS: CPT | Performed by: PHYSICAL THERAPIST

## 2025-02-24 PROCEDURE — 97014 ELECTRIC STIMULATION THERAPY: CPT | Performed by: PHYSICAL THERAPIST

## 2025-02-24 PROCEDURE — 97110 THERAPEUTIC EXERCISES: CPT | Performed by: PHYSICAL THERAPIST

## 2025-02-24 NOTE — PROGRESS NOTES
Physical Therapy Daily Treatment Note     Name: Diana Montenegro  Clinic Number: [id number]    Therapy Diagnosis:   Encounter Diagnosis   Name Primary?    Pain Yes     Physician: Rad Johnston MD    Visit Date: 2/24/2025  Physician Orders: PT Eval and Treat   Medical Diagnosis from Referral: M54.2 (ICD-10-CM) - Neck pain  Evaluation Date: 12/5/2024  Authorization Period Expiration: 11-21-25  Plan of Care Expiration: 1-30-25  Visit # / Visits authorized: 1/ 1, 9/12  FOTO: 5/5    Time In: 2:00 pm  Time Out: 2:55 pm  Total Billable Time: 45 minutes    Precautions: Standard    Subjective     Pt reports: neck feels pretty good.  Denies UE radicular sx.    She was compliant with home exercise program.  Response to previous treatment: less pain  Functional change: improved driving    Pain: 0/10  Location: bilateral upper traps    Objective     MIN TTP B UT. C-AROM is WNL.  Gross strength is 4+/5 to 5/5 C-spine.    Kaleigh received therapeutic exercises to develop ROM, flexibility, and posture for 15 minutes including:  HEP review  Posture education  RTB scap retractions  RTB Breuger's  RTB HABD  B UT stretch 3 x 30 sec hold    Kaleigh received the following manual therapy techniques: Joint mobilizations, Manual traction, and Soft tissue Mobilization were applied to the: neck/UT B for 30 minutes, including:  Manual SO release/Ctxn/PROM  Dry needling B UT with 4 30 mm needles + Estim     Kaleigh received hot pack for 10 minutes to neck/UT.      Home Exercises Provided and Patient Education Provided     Education provided:   - correct posture    Written Home Exercises Provided: Patient instructed to cont prior HEP.  Exercises were reviewed and Kaleigh was able to demonstrate them prior to the end of the session.  Kaleigh demonstrated good  understanding of the education provided.     See EMR under Patient Instructions for exercises provided prior visit.    Assessment     Tolerated DN well.  Progressing well and with MIN pain.  ADL  improved.  Kaleigh Is progressing well towards her goals.   Pt prognosis is Good.     Pt will continue to benefit from skilled outpatient physical therapy to address the deficits listed in the problem list box on initial evaluation, provide pt/family education and to maximize pt's level of independence in the home and community environment.     Pt's spiritual, cultural and educational needs considered and pt agreeable to plan of care and goals.    Anticipated barriers to physical therapy: none    Goals: Short Term Goals: 2 weeks         1.   Independent with HEP        2.  Pt will report decreased pain level of < 50% from above measure with ADL     Long Term Goals:   GOALS:    6_   weeks. Pt agrees with goals set.  Independent with HEP.  Report decreased    neck/UT    pain  <   / =  3/10 with ADL such as dressing  Increased MMT  for  neck to 4+/5 to 5/5  with ADL such as house work  Increased arom  for  neck to WNL with functional activities such cleaning or self-care       Plan     Continue PT per POC.    Carlos Frazier, PT

## 2025-02-26 ENCOUNTER — CLINICAL SUPPORT (OUTPATIENT)
Dept: REHABILITATION | Facility: HOSPITAL | Age: 70
End: 2025-02-26
Payer: MEDICARE

## 2025-02-26 DIAGNOSIS — R52 PAIN: Primary | ICD-10-CM

## 2025-02-26 PROCEDURE — 97014 ELECTRIC STIMULATION THERAPY: CPT | Performed by: PHYSICAL THERAPIST

## 2025-02-26 PROCEDURE — 97140 MANUAL THERAPY 1/> REGIONS: CPT | Performed by: PHYSICAL THERAPIST

## 2025-02-26 PROCEDURE — 20560 NDL INSJ W/O NJX 1 OR 2 MUSC: CPT | Performed by: PHYSICAL THERAPIST

## 2025-02-26 PROCEDURE — 97110 THERAPEUTIC EXERCISES: CPT | Performed by: PHYSICAL THERAPIST

## 2025-02-26 NOTE — PROGRESS NOTES
Physical Therapy Daily Treatment Note     Name: Diana Montenegro  Clinic Number: [id number]    Therapy Diagnosis:   Encounter Diagnosis   Name Primary?    Pain Yes     Physician: Rad Johnston MD    Visit Date: 2/26/2025  Physician Orders: PT Eval and Treat   Medical Diagnosis from Referral: M54.2 (ICD-10-CM) - Neck pain  Evaluation Date: 12/5/2024  Authorization Period Expiration: 11-21-25  Plan of Care Expiration: 1-30-25  Visit # / Visits authorized: 1/ 1, 10/12  FOTO: 5/5    Time In: 2:00 pm  Time Out: 2:55 pm  Total Billable Time: 45 minutes    Precautions: Standard    Subjective     Pt reports: neck feels better overall with only occasional pain.  Denies UE radicular sx.    She was compliant with home exercise program.  Response to previous treatment: less pain  Functional change: improved ADL    Pain: 0/10  Location: bilateral upper traps    Objective     MIN TTP B UT. C-AROM is WNL.  Gross strength is 4+/5 to 5/5 C-spine.    Kaleigh received therapeutic exercises to develop ROM, flexibility, and posture for 15 minutes including:  HEP review  Posture education  RTB scap retractions  RTB Breuger's  RTB HABD  B UT stretch 3 x 30 sec hold    Kaleigh received the following manual therapy techniques: Joint mobilizations, Manual traction, and Soft tissue Mobilization were applied to the: neck/UT B for 30 minutes, including:  Manual SO release/Ctxn/PROM  Dry needling B UT with 4 30 mm needles + Estim     Kaleigh received hot pack for 10 minutes to neck/UT.      Home Exercises Provided and Patient Education Provided     Education provided:   - correct posture    Written Home Exercises Provided: Patient instructed to cont prior HEP.  Exercises were reviewed and Kaleigh was able to demonstrate them prior to the end of the session.  Kaleigh demonstrated good  understanding of the education provided.     See EMR under Patient Instructions for exercises provided prior visit.    Assessment     Tolerated DN well.  Progressing well  and with MIN pain.  ADL improved.  Kaleigh Is progressing well towards her goals.   Pt prognosis is Good.     Pt will continue to benefit from skilled outpatient physical therapy to address the deficits listed in the problem list box on initial evaluation, provide pt/family education and to maximize pt's level of independence in the home and community environment.     Pt's spiritual, cultural and educational needs considered and pt agreeable to plan of care and goals.    Anticipated barriers to physical therapy: none    Goals: Short Term Goals: 2 weeks         1.   Independent with HEP        2.  Pt will report decreased pain level of < 50% from above measure with ADL     Long Term Goals:   GOALS:    6_   weeks. Pt agrees with goals set.  Independent with HEP.  Report decreased    neck/UT    pain  <   / =  3/10 with ADL such as dressing  Increased MMT  for  neck to 4+/5 to 5/5  with ADL such as house work  Increased arom  for  neck to WNL with functional activities such cleaning or self-care       Plan     Continue PT per POC.    Carlos Frazier, PT

## 2025-02-27 ENCOUNTER — DOCUMENTATION ONLY (OUTPATIENT)
Dept: REHABILITATION | Facility: HOSPITAL | Age: 70
End: 2025-02-27
Payer: MEDICARE

## 2025-02-27 ENCOUNTER — LAB VISIT (OUTPATIENT)
Dept: LAB | Facility: HOSPITAL | Age: 70
End: 2025-02-27
Attending: INTERNAL MEDICINE
Payer: MEDICARE

## 2025-02-27 DIAGNOSIS — I15.2 HYPERTENSION ASSOCIATED WITH DIABETES: ICD-10-CM

## 2025-02-27 DIAGNOSIS — N18.32 STAGE 3B CHRONIC KIDNEY DISEASE: ICD-10-CM

## 2025-02-27 DIAGNOSIS — E11.59 HYPERTENSION ASSOCIATED WITH DIABETES: ICD-10-CM

## 2025-02-27 DIAGNOSIS — E11.40 TYPE 2 DIABETES MELLITUS WITH DIABETIC NEUROPATHY, WITHOUT LONG-TERM CURRENT USE OF INSULIN: ICD-10-CM

## 2025-02-27 LAB
ALBUMIN SERPL BCP-MCNC: 3.5 G/DL (ref 3.5–5.2)
ALP SERPL-CCNC: 80 U/L (ref 40–150)
ALT SERPL W/O P-5'-P-CCNC: 73 U/L (ref 10–44)
ANION GAP SERPL CALC-SCNC: 7 MMOL/L (ref 8–16)
AST SERPL-CCNC: 58 U/L (ref 10–40)
BASOPHILS # BLD AUTO: 0.04 K/UL (ref 0–0.2)
BASOPHILS NFR BLD: 1 % (ref 0–1.9)
BILIRUB SERPL-MCNC: 0.2 MG/DL (ref 0.1–1)
BUN SERPL-MCNC: 26 MG/DL (ref 8–23)
CALCIUM SERPL-MCNC: 9.3 MG/DL (ref 8.7–10.5)
CHLORIDE SERPL-SCNC: 111 MMOL/L (ref 95–110)
CO2 SERPL-SCNC: 24 MMOL/L (ref 23–29)
CREAT SERPL-MCNC: 1.1 MG/DL (ref 0.5–1.4)
DIFFERENTIAL METHOD BLD: ABNORMAL
EOSINOPHIL # BLD AUTO: 0.1 K/UL (ref 0–0.5)
EOSINOPHIL NFR BLD: 2.1 % (ref 0–8)
ERYTHROCYTE [DISTWIDTH] IN BLOOD BY AUTOMATED COUNT: 16.7 % (ref 11.5–14.5)
EST. GFR  (NO RACE VARIABLE): 54.7 ML/MIN/1.73 M^2
ESTIMATED AVG GLUCOSE: 117 MG/DL (ref 68–131)
GLUCOSE SERPL-MCNC: 85 MG/DL (ref 70–110)
HBA1C MFR BLD: 5.7 % (ref 4–5.6)
HCT VFR BLD AUTO: 28.5 % (ref 37–48.5)
HGB BLD-MCNC: 8.3 G/DL (ref 12–16)
IMM GRANULOCYTES # BLD AUTO: 0.01 K/UL (ref 0–0.04)
IMM GRANULOCYTES NFR BLD AUTO: 0.3 % (ref 0–0.5)
LYMPHOCYTES # BLD AUTO: 0.9 K/UL (ref 1–4.8)
LYMPHOCYTES NFR BLD: 24.1 % (ref 18–48)
MCH RBC QN AUTO: 27.2 PG (ref 27–31)
MCHC RBC AUTO-ENTMCNC: 29.1 G/DL (ref 32–36)
MCV RBC AUTO: 93 FL (ref 82–98)
MONOCYTES # BLD AUTO: 0.3 K/UL (ref 0.3–1)
MONOCYTES NFR BLD: 7.5 % (ref 4–15)
NEUTROPHILS # BLD AUTO: 2.5 K/UL (ref 1.8–7.7)
NEUTROPHILS NFR BLD: 65 % (ref 38–73)
NRBC BLD-RTO: 0 /100 WBC
PLATELET # BLD AUTO: 152 K/UL (ref 150–450)
PMV BLD AUTO: 10.4 FL (ref 9.2–12.9)
POTASSIUM SERPL-SCNC: 4.1 MMOL/L (ref 3.5–5.1)
PROT SERPL-MCNC: 6.2 G/DL (ref 6–8.4)
RBC # BLD AUTO: 3.05 M/UL (ref 4–5.4)
SODIUM SERPL-SCNC: 142 MMOL/L (ref 136–145)
WBC # BLD AUTO: 3.86 K/UL (ref 3.9–12.7)

## 2025-02-27 PROCEDURE — 80053 COMPREHEN METABOLIC PANEL: CPT | Performed by: INTERNAL MEDICINE

## 2025-02-27 PROCEDURE — 85025 COMPLETE CBC W/AUTO DIFF WBC: CPT | Performed by: INTERNAL MEDICINE

## 2025-02-27 PROCEDURE — 83036 HEMOGLOBIN GLYCOSYLATED A1C: CPT | Performed by: INTERNAL MEDICINE

## 2025-02-27 PROCEDURE — 36415 COLL VENOUS BLD VENIPUNCTURE: CPT | Performed by: INTERNAL MEDICINE

## 2025-02-28 NOTE — PROGRESS NOTES
Health  Wellness Visit Note    Name: Diana Montenegro  Clinic Number: [id number]  Physician: No ref. provider found  Diagnosis: No diagnosis found.  Past Medical History:   Diagnosis Date    Arthritis     Atrial fibrillation, unspecified     hx of a fib prior to stroke.    Chronic back pain     Chronic kidney disease, stage 3a 06/01/2023    Colon polyp     CVA (cerebral infarction) 07/16/2014    Degenerative disc disease     cervical; lumbar    Diabetes mellitus type II     Encounter for loop recorder at end of battery life 09/17/2018    Hyperlipidemia     Hypertension     Hypothyroidism     Mild nonproliferative diabetic retinopathy 08/12/2018    Obesity     Sleep apnea     Stroke 07/2014    Venous insufficiency (chronic) (peripheral)      Visit Number: 35  Precautions: Standard, Fall Risk, History of Stroke      1st PT visit:  07/16/2024  Year of care end date:  July 2025  Mindbody plan: 60---9 Months  Patient level: C    Time In: 1:30 PM  Time Out: 2:30 PM  Total Treatment Time: 60 Minutes    Wellness [employer] 2022  Handout on this week's wellness topic Balance was provided along with a discussion on what it means, the benefits, and suggestions for practice.  Reviewed last week's topic of Flexibility.     Subjective:   Patient reports no complaints of back pain. She has been swimming, stretching daily and going to physical therapy for her neck once a week. Patient does not ice at home unless she deems necessary.     HC and patient discussed full range of motion on leg press before increasing weight.     Objective:   Diana completed therapeutic stretches (EIL, SAIDA) and the following MedX exercise machines: core lumbar, torso rotation l/r, leg extension, leg curl, upright row, chest press, biceps curl, triceps extension, leg press    See exercise log in patient folder for rate of exertion and repetitions completed.       Fitness Machine Education Key:  E=education on equipment initiated and further follow up  and education needed  I=independent with  and exercise.  The patient:  Adjusts machines to his/her settings  Uses equipment levers, pins, weights safely  Maintains safe and correct posture while exercising  Moves through exercise with correct pace and control  Gets on and off equipment safely      Lumbar/Cervical Ext. E Torso Rotation E Leg Press E   Leg Extension  Seated Leg Curl  Chest Press    Seated Row  Hip ADD E Hip ABD E   Triceps Extension  Bicep Curl  Other:      [x] Indicates exercise has been taught for home  Lumbar/Cervical Ext. [] Torso Rotation [] Leg Press []   Leg Extension [] Seated Leg Curl [] Chest Press []   Seated Row [] Hip ADD [] Hip ABD []   Triceps Extension [] Bicep Curl [] Other:        Assessment:   Patient tolerated Patient tolerated MedX Core Lumbar Strength and all other peripheral exercises without an increase in symptoms. Patient warmed up on nustep for 5 minutes, stretched, and iced low back for 5 minutes after the workout.     Plan:  Continue with established plan of care towards wellness goals.     Health  : Ann-Marie Bey  2/28/2025

## 2025-03-03 ENCOUNTER — CLINICAL SUPPORT (OUTPATIENT)
Dept: REHABILITATION | Facility: HOSPITAL | Age: 70
End: 2025-03-03
Payer: MEDICARE

## 2025-03-03 DIAGNOSIS — R52 PAIN: Primary | ICD-10-CM

## 2025-03-03 PROCEDURE — 20560 NDL INSJ W/O NJX 1 OR 2 MUSC: CPT | Performed by: PHYSICAL THERAPIST

## 2025-03-03 PROCEDURE — 97014 ELECTRIC STIMULATION THERAPY: CPT | Performed by: PHYSICAL THERAPIST

## 2025-03-03 PROCEDURE — 97140 MANUAL THERAPY 1/> REGIONS: CPT | Performed by: PHYSICAL THERAPIST

## 2025-03-03 PROCEDURE — 97110 THERAPEUTIC EXERCISES: CPT | Performed by: PHYSICAL THERAPIST

## 2025-03-03 NOTE — PROGRESS NOTES
Physical Therapy Daily Treatment Note     Name: Diana Montenegro  Clinic Number: [id number]    Therapy Diagnosis:   Encounter Diagnosis   Name Primary?    Pain Yes     Physician: Rad Johnston MD    Visit Date: 3/3/2025  Physician Orders: PT Eval and Treat   Medical Diagnosis from Referral: M54.2 (ICD-10-CM) - Neck pain  Evaluation Date: 12/5/2024  Authorization Period Expiration: 11-21-25  Plan of Care Expiration: 1-30-25  Visit # / Visits authorized: 1/ 1, 11/12  FOTO: 5/5    Time In: 2:00 pm  Time Out: 2:50 pm  Total Billable Time: 40 minutes    Precautions: Standard    Subjective     Pt reports: neck feels better overall with only occasional pain.  Denies UE radicular sx.    She was compliant with home exercise program.  Response to previous treatment: less pain  Functional change: improved ADL    Pain: 0/10  Location: bilateral upper traps    Objective     MIN TTP B UT. C-AROM is WNL.  Gross strength is 4+/5 to 5/5 C-spine.    Kaleigh received therapeutic exercises to develop ROM, flexibility, and posture for 15 minutes including:  HEP review  Posture education  RTB scap retractions  RTB Breuger's  RTB HABD  B UT stretch 3 x 30 sec hold    Kaleigh received the following manual therapy techniques: Joint mobilizations, Manual traction, and Soft tissue Mobilization were applied to the: neck/UT B for 30 minutes, including:  Manual SO release/Ctxn/PROM  Dry needling B UT with 4 30 mm needles + Estim     Kaleigh received hot pack for 10 minutes to neck/UT.      Home Exercises Provided and Patient Education Provided     Education provided:   - correct posture    Written Home Exercises Provided: Patient instructed to cont prior HEP.  Exercises were reviewed and Kaleigh was able to demonstrate them prior to the end of the session.  Kaleigh demonstrated good  understanding of the education provided.     See EMR under Patient Instructions for exercises provided prior visit.    Assessment     Tolerated DN well.  Progressing well  without pain.    Kaleigh Is progressing well towards her goals.   Pt prognosis is Good.     Pt will continue to benefit from skilled outpatient physical therapy to address the deficits listed in the problem list box on initial evaluation, provide pt/family education and to maximize pt's level of independence in the home and community environment.     Pt's spiritual, cultural and educational needs considered and pt agreeable to plan of care and goals.    Anticipated barriers to physical therapy: none    Goals: Short Term Goals: 2 weeks         1.   Independent with HEP        2.  Pt will report decreased pain level of < 50% from above measure with ADL     Long Term Goals:   GOALS:    6_   weeks. Pt agrees with goals set.  Independent with HEP.  Report decreased    neck/UT    pain  <   / =  3/10 with ADL such as dressing  Increased MMT  for  neck to 4+/5 to 5/5  with ADL such as house work  Increased arom  for  neck to WNL with functional activities such cleaning or self-care       Plan     Continue PT per POC.    Carlos Frazier, PT

## 2025-03-05 ENCOUNTER — CLINICAL SUPPORT (OUTPATIENT)
Dept: REHABILITATION | Facility: HOSPITAL | Age: 70
End: 2025-03-05
Payer: MEDICARE

## 2025-03-05 DIAGNOSIS — R52 PAIN: Primary | ICD-10-CM

## 2025-03-05 PROCEDURE — 97110 THERAPEUTIC EXERCISES: CPT | Performed by: PHYSICAL THERAPIST

## 2025-03-05 PROCEDURE — 97140 MANUAL THERAPY 1/> REGIONS: CPT | Performed by: PHYSICAL THERAPIST

## 2025-03-05 PROCEDURE — 20560 NDL INSJ W/O NJX 1 OR 2 MUSC: CPT | Performed by: PHYSICAL THERAPIST

## 2025-03-05 PROCEDURE — 97014 ELECTRIC STIMULATION THERAPY: CPT | Performed by: PHYSICAL THERAPIST

## 2025-03-05 NOTE — PROGRESS NOTES
Physical Therapy Daily Treatment Note     Name: Diana Montenegro  Clinic Number: [id number]    Therapy Diagnosis:   Encounter Diagnosis   Name Primary?    Pain Yes     Physician: Rad Johnston MD    Visit Date: 3/5/2025  Physician Orders: PT Eval and Treat   Medical Diagnosis from Referral: M54.2 (ICD-10-CM) - Neck pain  Evaluation Date: 12/5/2024  Authorization Period Expiration: 11-21-25  Plan of Care Expiration: 1-30-25  Visit # / Visits authorized: 1/ 1, 12/12  FOTO: 5/5    Time In: 2:00 pm  Time Out: 2:50 pm  Total Billable Time: 40 minutes    Precautions: Standard    Subjective     Pt reports: neck feels pretty good with only occasional pain.  Denies UE radicular sx.    She was compliant with home exercise program.  Response to previous treatment: less pain  Functional change: improved ADL    Pain: 0/10  Location: bilateral upper traps    Objective     No TTP B UT. C-AROM is WNL.  Gross strength is 4+/5 to 5/5 C-spine.    Kaleigh received therapeutic exercises to develop ROM, flexibility, and posture for 15 minutes including:  HEP review  Posture education  RTB scap retractions  RTB Breuger's  RTB HABD  B UT stretch 3 x 30 sec hold    Kaleigh received the following manual therapy techniques: Joint mobilizations, Manual traction, and Soft tissue Mobilization were applied to the: neck/UT B for 30 minutes, including:  Manual SO release/Ctxn/PROM  Dry needling B UT with 4 30 mm needles + Estim     Kaleigh received hot pack for 10 minutes to neck/UT.      Home Exercises Provided and Patient Education Provided     Education provided:   - correct posture    Written Home Exercises Provided: Patient instructed to cont prior HEP.  Exercises were reviewed and Kaleigh was able to demonstrate them prior to the end of the session.  Kaleigh demonstrated good  understanding of the education provided.     See EMR under Patient Instructions for exercises provided prior visit.    Assessment     Tolerated DN well.  Functioning well  without pain.        Plan     D/C PT with a HEP secondary to meeting all goals.    Calros Frazier, PT

## 2025-03-06 ENCOUNTER — DOCUMENTATION ONLY (OUTPATIENT)
Dept: REHABILITATION | Facility: HOSPITAL | Age: 70
End: 2025-03-06
Payer: MEDICARE

## 2025-03-06 NOTE — PROGRESS NOTES
Health  Wellness Visit Note    Name: Diana Montenegro  Clinic Number: [id number]  Physician: No ref. provider found  Diagnosis: No diagnosis found.  Past Medical History:   Diagnosis Date    Arthritis     Atrial fibrillation, unspecified     hx of a fib prior to stroke.    Chronic back pain     Chronic kidney disease, stage 3a 06/01/2023    Colon polyp     CVA (cerebral infarction) 07/16/2014    Degenerative disc disease     cervical; lumbar    Diabetes mellitus type II     Encounter for loop recorder at end of battery life 09/17/2018    Hyperlipidemia     Hypertension     Hypothyroidism     Mild nonproliferative diabetic retinopathy 08/12/2018    Obesity     Sleep apnea     Stroke 07/2014    Venous insufficiency (chronic) (peripheral)      Visit Number: 36  Precautions: Standard, Fall Risk, History of Stroke      1st PT visit:  07/16/2024  Year of care end date:  July 2025  Mindbody plan: 60---9 Months  Patient level: C    Time In: 1:30 PM  Time Out: 2:30 PM  Total Treatment Time: 60 Minutes    Wellness [facility] [employer] 2022  Handout on this week's wellness topic Hydration was provided along with a discussion on what it means, the benefits, and suggestions for practice.  Reviewed last week's topic of Balance.     Subjective:   Patient reports no complaints of low back pain today. She has not been swimming or exercising at the gym due to the holiday. She is going to physical therapy for her neck once a week. Patient does not ice at home unless she deems necessary.     HC and patient discussed full range of motion on leg press before increasing weight.     Objective:   Diana completed therapeutic stretches (EIL, SAIDA) and the following MedX exercise machines: core lumbar, torso rotation l/r, leg extension, leg curl, upright row, chest press, biceps curl, triceps extension, leg press    See exercise log in patient folder for rate of exertion and repetitions completed.       Fitness Machine Education Key:  E=education on  equipment initiated and further follow up and education needed  I=independent with  and exercise.  The patient:  Adjusts machines to his/her settings  Uses equipment levers, pins, weights safely  Maintains safe and correct posture while exercising  Moves through exercise with correct pace and control  Gets on and off equipment safely      Lumbar/Cervical Ext. E Torso Rotation E Leg Press E   Leg Extension  Seated Leg Curl  Chest Press    Seated Row  Hip ADD E Hip ABD E   Triceps Extension  Bicep Curl  Other:      [x] Indicates exercise has been taught for home  Lumbar/Cervical Ext. [] Torso Rotation [] Leg Press []   Leg Extension [] Seated Leg Curl [] Chest Press []   Seated Row [] Hip ADD [] Hip ABD []   Triceps Extension [] Bicep Curl [] Other:        Assessment:   Patient tolerated Patient tolerated MedX Core Lumbar Strength and all other peripheral exercises without an increase in symptoms. Patient warmed up on nustep for 5 minutes, stretched, and iced low back for 5 minutes after the workout.     Plan:  Continue with established plan of care towards wellness goals.     Health  : Ann-Marie Bey  3/6/2025

## 2025-03-11 ENCOUNTER — OFFICE VISIT (OUTPATIENT)
Dept: NEPHROLOGY | Facility: CLINIC | Age: 70
End: 2025-03-11
Payer: MEDICARE

## 2025-03-11 VITALS
OXYGEN SATURATION: 98 % | BODY MASS INDEX: 32.21 KG/M2 | DIASTOLIC BLOOD PRESSURE: 71 MMHG | HEART RATE: 70 BPM | WEIGHT: 193.31 LBS | HEIGHT: 65 IN | SYSTOLIC BLOOD PRESSURE: 126 MMHG

## 2025-03-11 DIAGNOSIS — E11.3311 TYPE 2 DIABETES MELLITUS WITH RIGHT EYE AFFECTED BY MODERATE NONPROLIFERATIVE RETINOPATHY AND MACULAR EDEMA, WITHOUT LONG-TERM CURRENT USE OF INSULIN: ICD-10-CM

## 2025-03-11 DIAGNOSIS — E66.09 CLASS 1 OBESITY DUE TO EXCESS CALORIES WITH SERIOUS COMORBIDITY AND BODY MASS INDEX (BMI) OF 32.0 TO 32.9 IN ADULT: ICD-10-CM

## 2025-03-11 DIAGNOSIS — E66.811 CLASS 1 OBESITY DUE TO EXCESS CALORIES WITH SERIOUS COMORBIDITY AND BODY MASS INDEX (BMI) OF 32.0 TO 32.9 IN ADULT: ICD-10-CM

## 2025-03-11 DIAGNOSIS — N18.32 STAGE 3B CHRONIC KIDNEY DISEASE: Primary | ICD-10-CM

## 2025-03-11 DIAGNOSIS — N18.31 STAGE 3A CHRONIC KIDNEY DISEASE: ICD-10-CM

## 2025-03-11 PROCEDURE — 99999 PR PBB SHADOW E&M-EST. PATIENT-LVL IV: CPT | Mod: PBBFAC,,, | Performed by: INTERNAL MEDICINE

## 2025-03-11 RX ORDER — ACETAMINOPHEN AND PHENYLEPHRINE HCL 325; 5 MG/1; MG/1
10000 TABLET ORAL DAILY
COMMUNITY

## 2025-03-11 NOTE — PROGRESS NOTES
CHIEF COMPLAINT/HPI: Diana is a 69 y.o. woman with arthritis, AFib on chronic anticoagulation, hypothyroidism, CAD, TIA, hypertension, hyperlipidemia, diabetes, and sleeve gastrectomy (2015).   Referred here for evaluation and management of CKD. She is new to my care and to the clinic.   Denied NSAID's use,     Past Medical History:   Diagnosis Date    Arthritis     Atrial fibrillation, unspecified     hx of a fib prior to stroke.    Chronic back pain     Chronic kidney disease, stage 3a 06/01/2023    Colon polyp     CVA (cerebral infarction) 07/16/2014    Degenerative disc disease     cervical; lumbar    Diabetes mellitus type II     Encounter for loop recorder at end of battery life 09/17/2018    Hyperlipidemia     Hypertension     Hypothyroidism     Mild nonproliferative diabetic retinopathy 08/12/2018    Obesity     Sleep apnea     Stroke 07/2014    Venous insufficiency (chronic) (peripheral)        Diana reports that she quit smoking about 10 years ago. Her smoking use included cigarettes. She started smoking about 40 years ago. She has a 30 pack-year smoking history. She has never used smokeless tobacco. She reports that she does not currently use alcohol. She reports that she does not use drugs.    Family History   Problem Relation Name Age of Onset    No Known Problems Mother      Heart disease Father      No Known Problems Sister      No Known Problems Sister      No Known Problems Maternal Grandmother      Diabetes Maternal Grandfather      Obesity Maternal Grandfather      Stroke Paternal Grandmother      No Known Problems Paternal Grandfather      Heart attack Neg Hx      Cirrhosis Neg Hx         ROS:  Review of Systems   Constitutional:  Negative for activity change, appetite change, chills, fever and unexpected weight change.   HENT:  Negative for congestion, ear discharge, hearing loss, nosebleeds, sore throat and tinnitus.    Eyes:  Negative for photophobia, pain, redness and visual disturbance.  "  Respiratory:  Negative for cough, chest tightness, shortness of breath and wheezing.    Cardiovascular:  Negative for chest pain, palpitations and leg swelling.   Gastrointestinal:  Negative for abdominal distention, constipation, diarrhea, nausea and vomiting.   Endocrine: Negative for cold intolerance, heat intolerance, polydipsia and polyuria.   Genitourinary:  Negative for decreased urine volume, difficulty urinating, dysuria, flank pain and hematuria.   Musculoskeletal:  Negative for arthralgias, gait problem, joint swelling and neck stiffness.   Skin:  Negative for rash.   Neurological:  Negative for dizziness, tremors, weakness, numbness and headaches.   Psychiatric/Behavioral:  Negative for confusion and sleep disturbance.          PE:    Vitals:    03/11/25 1412   BP: 126/71   Pulse: 70   SpO2: 98%   Weight: 87.7 kg (193 lb 5.5 oz)   Height: 5' 5" (1.651 m)       Physical Exam  Constitutional:       General: She is not in acute distress.     Appearance: She is not diaphoretic.   HENT:      Head: Normocephalic and atraumatic.      Right Ear: External ear normal.      Left Ear: External ear normal.   Eyes:      General:         Right eye: No discharge.         Left eye: No discharge.      Conjunctiva/sclera: Conjunctivae normal.      Pupils: Pupils are equal, round, and reactive to light.   Neck:      Vascular: No JVD.   Cardiovascular:      Rate and Rhythm: Normal rate and regular rhythm.      Heart sounds: No murmur heard.     No friction rub. No gallop.   Pulmonary:      Effort: Pulmonary effort is normal. No respiratory distress.      Breath sounds: Normal breath sounds. No stridor. No wheezing or rales.   Chest:      Chest wall: No tenderness.   Abdominal:      Palpations: Abdomen is soft.      Tenderness: There is no abdominal tenderness. There is no guarding or rebound.   Musculoskeletal:         General: No tenderness.      Cervical back: Normal range of motion and neck supple.   Skin:     Findings: " "No erythema or rash.   Neurological:      Mental Status: She is alert and oriented to person, place, and time.         Physical Exam  Constitutional:       General: She is not in acute distress.     Appearance: She is not diaphoretic.   HENT:      Head: Normocephalic and atraumatic.      Right Ear: External ear normal.      Left Ear: External ear normal.   Eyes:      General:         Right eye: No discharge.         Left eye: No discharge.      Conjunctiva/sclera: Conjunctivae normal.      Pupils: Pupils are equal, round, and reactive to light.   Neck:      Vascular: No JVD.   Cardiovascular:      Rate and Rhythm: Normal rate and regular rhythm.      Heart sounds: No murmur heard.     No friction rub. No gallop.   Pulmonary:      Effort: Pulmonary effort is normal. No respiratory distress.      Breath sounds: Normal breath sounds. No stridor. No wheezing or rales.   Chest:      Chest wall: No tenderness.   Abdominal:      Palpations: Abdomen is soft.      Tenderness: There is no abdominal tenderness. There is no guarding or rebound.   Musculoskeletal:         General: No tenderness.      Cervical back: Normal range of motion and neck supple.   Skin:     Findings: No erythema or rash.   Neurological:      Mental Status: She is alert and oriented to person, place, and time.         Impression/Plan:    1. Stage 3b chronic kidney disease    2. 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 Stage 3a chronic kidney disease      # CKD3a: with recent JOSHUA with unclear etiology, DM, HTN, Obesity and ischemia with smoking.  -counseled on glycemic, BP control and on hydration and avoiding NSAID's   -no urine labs available, will order Urine w/u.  -counseled on wt loss.  -RP US    Lab Results   Component Value Date    CREATININE 1.1 02/27/2025     Protein Creatinine Ratios:    No results found for: "UTPCR"      Acid-Base: "   Lab Results   Component Value Date     02/27/2025    K 4.1 02/27/2025    CO2 24 02/27/2025     #HTN: Blood pressures acceptable     # CKD MBD: further w/u with next labs   Lab Results   Component Value Date    CALCIUM 9.3 02/27/2025    CAION 1.01 (L) 04/07/2016    PHOS 2.9 02/07/2025       # Anemia:   Lab Results   Component Value Date    HGB 8.3 (L) 02/27/2025      Would recommend iron iv.     # DM:  Last HbA1C   Lab Results   Component Value Date    HGBA1C 5.7 (H) 02/27/2025       # Lipid management:   Lab Results   Component Value Date    LDLCALC 56.0 (L) 11/14/2024     60 minutes of total time spent on the encounter, which includes face to face time and non-face to face time preparing to see the patient (eg, review of tests), Obtaining and/or reviewing separately obtained history, Documenting clinical information in the electronic or other health record, Independently interpreting results (not separately reported) and communicating results to the patient/family/caregiver, or Care coordination (not separately reported).    Visit today included increased complexity associated with the care of the episodic problem CKD, HTN, DM, Obesity addressed and managing the longitudinal care of the patient due to the serious and/or complex managed problem(s) .      # ESRD planing:  none     Follow up in 4-6 m with labs

## 2025-03-13 ENCOUNTER — DOCUMENTATION ONLY (OUTPATIENT)
Dept: REHABILITATION | Facility: HOSPITAL | Age: 70
End: 2025-03-13
Payer: MEDICARE

## 2025-03-13 NOTE — PROGRESS NOTES
Health  Wellness Visit Note    Name: Diana Montenegro  Clinic Number: [id number]  Physician: No ref. provider found  Diagnosis: No diagnosis found.  Past Medical History:   Diagnosis Date    Arthritis     Atrial fibrillation, unspecified     hx of a fib prior to stroke.    Chronic back pain     Chronic kidney disease, stage 3a 06/01/2023    Colon polyp     CVA (cerebral infarction) 07/16/2014    Degenerative disc disease     cervical; lumbar    Diabetes mellitus type II     Encounter for loop recorder at end of battery life 09/17/2018    Hyperlipidemia     Hypertension     Hypothyroidism     Mild nonproliferative diabetic retinopathy 08/12/2018    Obesity     Sleep apnea     Stroke 07/2014    Venous insufficiency (chronic) (peripheral)      Visit Number: 37  Precautions: Standard, Fall Risk, History of Stroke      1st PT visit:  07/16/2024  Year of care end date:  July 2025  Mindbody plan: 60---9 Months  Patient level: C    Time In: 1:30 PM  Time Out: 2:30 PM  Total Treatment Time: 60 Minutes    [employer] 2022  Handout on this week's wellness topic Food Journal was provided along with a discussion on what it means, the benefits, and suggestions for practice.  Reviewed last week's topic of Hydration.     Subjective:   Patient reports no complaints of low back pain today. Over the last weeks she went to the gym on her own once and swam at the pool a few times. She does her stretches daily. She is going to physical therapy for her neck once a week. Patient does not ice at home unless she deems necessary.     HC and patient discussed full range of motion on leg press before increasing weight.     Objective:   Diana completed therapeutic stretches (EIL, SAIDA) and the following MedX exercise machines: core lumbar, torso rotation l/r, leg extension, leg curl, upright row, chest press, biceps curl, triceps extension, leg press    See exercise log in patient folder for rate of exertion and repetitions  completed.       Fitness Machine Education Key:  E=education on equipment initiated and further follow up and education needed  I=independent with  and exercise.  The patient:  Adjusts machines to his/her settings  Uses equipment levers, pins, weights safely  Maintains safe and correct posture while exercising  Moves through exercise with correct pace and control  Gets on and off equipment safely      Lumbar/Cervical Ext. E Torso Rotation E Leg Press E   Leg Extension  Seated Leg Curl  Chest Press    Seated Row  Hip ADD E Hip ABD E   Triceps Extension  Bicep Curl  Other:      [x] Indicates exercise has been taught for home  Lumbar/Cervical Ext. [] Torso Rotation [] Leg Press []   Leg Extension [] Seated Leg Curl [] Chest Press []   Seated Row [] Hip ADD [] Hip ABD []   Triceps Extension [] Bicep Curl [] Other:        Assessment:   Patient tolerated Patient tolerated MedX Core Lumbar Strength and all other peripheral exercises without an increase in symptoms. Patient warmed up on nustep for 5 minutes, stretched, and iced low back for 5 minutes after the workout.     Plan:  Continue with established plan of care towards wellness goals.     Health  : Ann-Marie Bey  3/13/2025

## 2025-03-19 ENCOUNTER — TELEPHONE (OUTPATIENT)
Dept: ENDOSCOPY | Facility: HOSPITAL | Age: 70
End: 2025-03-19
Payer: MEDICARE

## 2025-03-19 DIAGNOSIS — D50.9 IRON DEFICIENCY ANEMIA, UNSPECIFIED IRON DEFICIENCY ANEMIA TYPE: Primary | ICD-10-CM

## 2025-03-19 RX ORDER — SODIUM, POTASSIUM,MAG SULFATES 17.5-3.13G
1 SOLUTION, RECONSTITUTED, ORAL ORAL DAILY
Qty: 1 KIT | Refills: 0 | Status: SHIPPED | OUTPATIENT
Start: 2025-03-19 | End: 2025-03-21

## 2025-03-19 NOTE — TELEPHONE ENCOUNTER
" Daysi Esquivel RN         Previous Messages       ----- Message -----  From: Dori Parham FNP  Sent: 2/18/2025   2:45 PM CST  To: [facility]    Procedure: EGD/Colonoscopy    Diagnosis: Iron deficiency anemia    Procedure Timing: Within 4 weeks (Urgent)    *If within 4 weeks selected, please rashmi as high priority*    *If greater than 12 weeks, please select "5-12 weeks" and delay sending until 3 months prior to requested date*    Location: Any Site    Additional Scheduling Information: Blood thinners and Diabetes    Prep Specifications:Standard prep    Is the patient taking a GLP-1 Agonist:yes    Have you attached a patient to this message: yes  "

## 2025-03-19 NOTE — TELEPHONE ENCOUNTER
Referral for procedure from [facility]      Spoke to pt to schedule procedure(s) Colonoscopy/EGD       Physician to perform procedure(s) Dr. ABELARDO Shelby  Date of Procedure (s) 4/15/25  Arrival Time 11:30 AM  Time of Procedure(s) 12:30 PM   Location of Procedure(s) [facility] [address] [facility]  Type of Rx Prep sent to patient: Suprep  Instructions provided to patient via [facility]    Patient was informed on the following information and verbalized understanding. Screening questionnaire reviewed with patient and complete. If procedure requires anesthesia, a responsible adult needs to be present to accompany the patient home, patient cannot drive after receiving anesthesia. Appointment details are tentative, especially check-in time. Patient will receive a prep-op call 7 days prior to confirm check-in time for procedure. If applicable the patient should contact their pharmacy to verify Rx for procedure prep is ready for pick-up. Patient was advised to call the scheduling department at [phone] if pharmacy states no Rx is available. Patient was advised to call the endoscopy scheduling department if any questions or concerns arise.      [facility]oscopy Scheduling Department

## 2025-03-20 ENCOUNTER — DOCUMENTATION ONLY (OUTPATIENT)
Dept: REHABILITATION | Facility: HOSPITAL | Age: 70
End: 2025-03-20
Payer: MEDICARE

## 2025-03-20 ENCOUNTER — OFFICE VISIT (OUTPATIENT)
Dept: OPTOMETRY | Facility: CLINIC | Age: 70
End: 2025-03-20
Payer: MEDICARE

## 2025-03-20 ENCOUNTER — TELEPHONE (OUTPATIENT)
Dept: ENDOSCOPY | Facility: HOSPITAL | Age: 70
End: 2025-03-20
Payer: MEDICARE

## 2025-03-20 DIAGNOSIS — Z98.42 STATUS POST CATARACT EXTRACTION OF BOTH EYES WITH INSERTION OF INTRAOCULAR LENS: Primary | ICD-10-CM

## 2025-03-20 DIAGNOSIS — Z96.1 STATUS POST CATARACT EXTRACTION OF BOTH EYES WITH INSERTION OF INTRAOCULAR LENS: Primary | ICD-10-CM

## 2025-03-20 DIAGNOSIS — Z98.41 STATUS POST CATARACT EXTRACTION OF BOTH EYES WITH INSERTION OF INTRAOCULAR LENS: Primary | ICD-10-CM

## 2025-03-20 PROCEDURE — 3066F NEPHROPATHY DOC TX: CPT | Mod: CPTII,S$GLB,, | Performed by: OPTOMETRIST

## 2025-03-20 PROCEDURE — 1126F AMNT PAIN NOTED NONE PRSNT: CPT | Mod: CPTII,S$GLB,, | Performed by: OPTOMETRIST

## 2025-03-20 PROCEDURE — 3044F HG A1C LEVEL LT 7.0%: CPT | Mod: CPTII,S$GLB,, | Performed by: OPTOMETRIST

## 2025-03-20 PROCEDURE — 3288F FALL RISK ASSESSMENT DOCD: CPT | Mod: CPTII,S$GLB,, | Performed by: OPTOMETRIST

## 2025-03-20 PROCEDURE — 1101F PT FALLS ASSESS-DOCD LE1/YR: CPT | Mod: CPTII,S$GLB,, | Performed by: OPTOMETRIST

## 2025-03-20 PROCEDURE — 99999 PR PBB SHADOW E&M-EST. PATIENT-LVL III: CPT | Mod: PBBFAC,,, | Performed by: OPTOMETRIST

## 2025-03-20 PROCEDURE — 1159F MED LIST DOCD IN RCRD: CPT | Mod: CPTII,S$GLB,, | Performed by: OPTOMETRIST

## 2025-03-20 PROCEDURE — 99024 POSTOP FOLLOW-UP VISIT: CPT | Mod: S$GLB,,, | Performed by: OPTOMETRIST

## 2025-03-20 PROCEDURE — 92015 DETERMINE REFRACTIVE STATE: CPT | Mod: S$GLB,,, | Performed by: OPTOMETRIST

## 2025-03-20 NOTE — TELEPHONE ENCOUNTER
Per current guideline recommendations, patient can be cleared to hold Eliquis for 2 days prior to procedure and may resume at MD discretion (per Dr Randolph).  Thanks

## 2025-03-20 NOTE — PROGRESS NOTES
Health  Wellness Visit Note    Name: Diana Montenegro  Clinic Number: [id number]  Physician: No ref. provider found  Diagnosis: No diagnosis found.  Past Medical History:   Diagnosis Date    Arthritis     Atrial fibrillation, unspecified     hx of a fib prior to stroke.    Chronic back pain     Chronic kidney disease, stage 3a 06/01/2023    Colon polyp     CVA (cerebral infarction) 07/16/2014    Degenerative disc disease     cervical; lumbar    Diabetes mellitus type II     Encounter for loop recorder at end of battery life 09/17/2018    Hyperlipidemia     Hypertension     Hypothyroidism     Mild nonproliferative diabetic retinopathy 08/12/2018    Obesity     Sleep apnea     Stroke 07/2014    Venous insufficiency (chronic) (peripheral)      Visit Number: 38  Precautions: Standard, Fall Risk, History of Stroke      1st PT visit:  07/16/2024  Year of care end date:  July 2025  Mindbody plan: 60---9 Months  Patient level: C    Time In: 1:30 PM  Time Out: 2:30 PM  Total Treatment Time: 60 Minutes    Wellness [employer] 2022  Handout on this week's wellness topic Portion Control was provided along with a discussion on what it means, the benefits, and suggestions for practice.  Reviewed last week's topic of Food Journal.     Subjective:   Patient reports no complaints of low back pain today. She went to the gym on Tuesday and swam at [employer] for exercise. Patient does not ice at home unless she deems necessary.     HC and patient discussed full range of motion on leg press before increasing weight.     Objective:   Diana completed therapeutic stretches (EIL, SAIDA) and the following MedX exercise machines: core lumbar, torso rotation l/r, leg extension, leg curl, upright row, chest press, biceps curl, triceps extension, leg press    See exercise log in patient folder for rate of exertion and repetitions completed.       Fitness Machine Education Key:  E=education on equipment initiated and further follow up and  education needed  I=independent with  and exercise.  The patient:  Adjusts machines to his/her settings  Uses equipment levers, pins, weights safely  Maintains safe and correct posture while exercising  Moves through exercise with correct pace and control  Gets on and off equipment safely      Lumbar/Cervical Ext. E Torso Rotation E Leg Press E   Leg Extension  Seated Leg Curl  Chest Press    Seated Row  Hip ADD E Hip ABD E   Triceps Extension  Bicep Curl  Other:      [x] Indicates exercise has been taught for home  Lumbar/Cervical Ext. [] Torso Rotation [] Leg Press []   Leg Extension [] Seated Leg Curl [] Chest Press []   Seated Row [] Hip ADD [] Hip ABD []   Triceps Extension [] Bicep Curl [] Other:        Assessment:   Patient tolerated Patient tolerated MedX Core Lumbar Strength and all other peripheral exercises without an increase in symptoms. Patient warmed up on nustep for 5 minutes, stretched, and iced low back for 5 minutes after the workout.     Plan:  Continue with established plan of care towards wellness goals.     Health  : Ann-Marie Bey  3/20/2025

## 2025-03-20 NOTE — PROGRESS NOTES
HPI    Pt is here today for 1 month po. Denies pain/discomfort.  DLS: 2/13/2025 Dr. Winters  (-)Flashes   (-)Floaters   (-)Diplopia   (-)Headaches   (-)Itching   (-)Tearing  (-)Burning  (-)Dryness   (+)Photophobia  (-)Glare   (-)Blurred VA  Past Eye Sx: Cataract with IOL OU  Eye Meds: Refresh PRN OU    Last edited by Dorothy Winters, OD on 3/20/2025  3:05 PM.            Assessment /Plan     For exam results, see Encounter Report.    Status post cataract extraction of both eyes with insertion of intraocular lens      Educated on today's WNL findings OU. Eyes well healed from cataract surgery OU. Pt OK to discontinue post operative medications.     Eyeglass Final Rx       Eyeglass Final Rx         Sphere Cylinder Axis Add    Right -1.00 +0.75 180 +2.50    Left -0.25 +0.50 170 +2.50      Type: PAL    Expiration Date: 3/20/2026                   Continue care with Dr. Ni as scheduled

## 2025-03-20 NOTE — PROGRESS NOTES
Chief Complaint   Patient presents with    Follow-up   .     HPI: Diana Montenegro is a 61 y.o. female who is referred by Dr. Toribio for evaluation of acute recurrent sinusitis. She describes difficulty breathing at night. There is bilateral nasal obstruction. She does use sinus rinses or nasal sprays- Flonase. She denies midface pain and pressure. She admits to rhinorrhea and postnasal drip. There is not maxillary tooth pain. She denies headaches. She has not had sinus or nasal surgery. There is no history of sinonasal trauma. She has been on 3 rounds of antibiotics including Levaquin x2 and Medrol dose pack.     Interval History: She feels like she is still congestion with some rhinorrhea but feels she has improved with her current medication regimen since last visit. She denies maxillary facial pain and tenderness. She denies headaches.  She has been using flonase and xyzal daily.          Past Medical History:   Diagnosis Date    Arthritis     Atrial fibrillation, unspecified     hx of a fib prior to stroke.    Chronic back pain     CVA (cerebral infarction) 7/16/14    Degenerative disc disease     cervical; lumbar    Diabetes mellitus type II     Hyperlipidemia     Hypertension     Hypothyroidism     Obesity     Stroke july 2014    Venous insufficiency (chronic) (peripheral)      Social History     Social History    Marital status: Single     Spouse name: N/A    Number of children: N/A    Years of education: N/A     Occupational History    MOS  [employer]     Social History Main Topics    Smoking status: Former Smoker     Packs/day: 1.00     Years: 30.00     Types: Cigarettes     Quit date: 7/16/2014    Smokeless tobacco: Never Used    Alcohol use 0.0 oz/week     0 Standard drinks or equivalent per week      Comment: rarely    Drug use: No      Comment: thc at young age    Sexual activity: Not Currently     Partners: Male     Other Topics Concern    Not on  file     Social History Narrative     Past Surgical History:   Procedure Laterality Date    GASTRECTOMY      HERNIA REPAIR      TONSILLECTOMY      TUBAL LIGATION       Family History   Problem Relation Age of Onset    No Known Problems Mother     Heart disease Father     Diabetes Maternal Grandfather     Obesity Maternal Grandfather     No Known Problems Sister     No Known Problems Sister     No Known Problems Maternal Grandmother     Stroke Paternal Grandmother     No Known Problems Paternal Grandfather     Heart attack Neg Hx            Review of Systems  General: negative for chills, fever or weight loss  Psychological: negative for mood changes or depression  Ophthalmic: negative for blurry vision, photophobia or eye pain  ENT: see HPI  Respiratory: no cough, shortness of breath, or wheezing  Cardiovascular: no chest pain or dyspnea on exertion  Gastrointestinal: no abdominal pain, change in bowel habits, or black/ bloody stools  Musculoskeletal: negative for gait disturbance or muscular weakness  Neurological: no syncope or seizures; no ataxia  Dermatological: negative for puritis,  rash and jaundice  Hematologic/lymphatic: no easy bruising, no new lumps or bumps      Physical Exam:    Vitals:    05/01/17 1138   BP: 125/61   Pulse: 61   Temp: 98.1 °F (36.7 °C)       Constitutional: Well appearing / communicating without difficutly.  NAD.  Eyes: EOM I Bilaterally  Head/Face: Normocephalic.  Negative paranasal sinus pressure/tenderness.  Salivary glands WNL.  House Brackmann I Bilaterally.    Right Ear: Auricle normal appearance. External Auditory Canal within normal limits no lesions or masses,TM w/o masses/lesions/perforations. TM mobility noted.   Left Ear: Auricle normal appearance. External Auditory Canal within normal limits no lesions or masses,TM w/o masses/lesions/perforations. TM mobility noted.  Nose: No gross nasal septal deviation. Inferior Turbinates 3+ bilaterally. No septal  perforation. No masses/lesions. External nasal skin appears normal without masses/lesions.  Oral Cavity: Gingiva/lips within normal limits.  Dentition/gingiva healthy appearing. Mucus membranes moist. Floor of mouth soft, no masses palpated. Oral Tongue mobile. Hard Palate appears normal.    Oropharynx: Base of tongue appears normal. No masses/lesions noted. Tonsillar fossa/pharyngeal wall without lesions. Posterior oropharynx WNL.  Soft palate without masses. Midline uvula.   Neck/Lymphatic: No LAD I-VI bilaterally.  No thyromegaly.  No masses noted on exam.    Mirror laryngoscopy/nasopharyngoscopy: Active gag reflex.  Unable to perform.    Neuro/Psychiatric: AOx3.  Normal mood and affect.   Cardiovascular: Normal carotid pulses bilaterally, no increasing jugular venous distention noted at cervical region bilaterally.    Respiratory: Normal respiratory effort, no stridor, no retractions noted.      CT scan findings were reviewed by me personally and discussed in detail with the patient.   Findings:  Compared with CT brain and MRI studies dating back 2014.  New sinusitis change with partial opacification ethmoid sinuses particularly posteriorly left.  New mucosal hypertrophy, air-fluid levels both sphenoid sinuses, new air-fluid level right maxillary sinus, hypertrophy maxillary sinus mucosa, appears in the interim.      Assessment:    ICD-10-CM ICD-9-CM    1. Chronic pansinusitis J32.4 473.8    2. Non-seasonal allergic rhinitis, unspecified allergic rhinitis trigger J30.89 477.8    3. Nasal obstruction J34.89 478.19      The primary encounter diagnosis was Chronic pansinusitis. Diagnoses of Non-seasonal allergic rhinitis, unspecified allergic rhinitis trigger and Nasal obstruction were also pertinent to this visit.      Plan:  We discussed the findings of chronic sinusitis on her CT scan. She feels she has made progress since last visit although her symptoms are not completely resolved. We discussed the options of  continued medical management versus surgical management including FESS/SMRT.  The patient would like to continue medical management with follow up in 1 month.  She will continue her current medication regimen and nasal saline irrigations.         Elin Walden MD     Patient underwent a normal spontaneous vaginal delivery. Post-partum course was uncomplicated. Pain is well controlled with PRN medication. She has no difficulty with ambulation, voiding, or PO intake. Lab values and vital signs are within normal limits prior to discharge.

## 2025-03-20 NOTE — TELEPHONE ENCOUNTER
----- Message from JAS Tavarez sent at 3/19/2025  4:17 PM CDT -----  Regarding: EGD/Colonoscopy BT on 4/15  The patient is currently under an internal cardiologist, velasquez Fields. Is patient okay to hold blood thinner Eliquis (apixaban) for their upcoming scheduled Colonoscopy/EGD on 4/15/25.

## 2025-03-20 NOTE — TELEPHONE ENCOUNTER
Dear KHANH Rivera NP,    Patient has a scheduled procedure Colonoscopy/EGD on 4/15/25 and is currently taking a blood thinner. In order to ensure patient safety, we would like to confirm that the patient can place their blood thinner medication on hold for the procedure. Can he/she discontinue Eliquis (apixaban) for a minimum of 2 days prior to the procedure?     Thank you for your prompt reply.    [facility] Endoscopy Scheduling

## 2025-03-24 DIAGNOSIS — Z00.00 ENCOUNTER FOR MEDICARE ANNUAL WELLNESS EXAM: ICD-10-CM

## 2025-03-25 ENCOUNTER — OFFICE VISIT (OUTPATIENT)
Dept: INTERNAL MEDICINE | Facility: CLINIC | Age: 70
End: 2025-03-25
Payer: MEDICARE

## 2025-03-25 VITALS
HEART RATE: 72 BPM | SYSTOLIC BLOOD PRESSURE: 118 MMHG | BODY MASS INDEX: 31.16 KG/M2 | RESPIRATION RATE: 16 BRPM | DIASTOLIC BLOOD PRESSURE: 60 MMHG | HEIGHT: 65 IN | TEMPERATURE: 97 F | OXYGEN SATURATION: 97 % | WEIGHT: 187 LBS

## 2025-03-25 DIAGNOSIS — E11.40 TYPE 2 DIABETES MELLITUS WITH DIABETIC NEUROPATHY, WITHOUT LONG-TERM CURRENT USE OF INSULIN: Primary | ICD-10-CM

## 2025-03-25 DIAGNOSIS — E11.59 HYPERTENSION ASSOCIATED WITH DIABETES: ICD-10-CM

## 2025-03-25 DIAGNOSIS — I48.0 PAROXYSMAL ATRIAL FIBRILLATION: Chronic | ICD-10-CM

## 2025-03-25 DIAGNOSIS — R29.6 FREQUENT FALLS: ICD-10-CM

## 2025-03-25 DIAGNOSIS — E11.69 HYPERLIPIDEMIA ASSOCIATED WITH TYPE 2 DIABETES MELLITUS: ICD-10-CM

## 2025-03-25 DIAGNOSIS — I15.2 HYPERTENSION ASSOCIATED WITH DIABETES: ICD-10-CM

## 2025-03-25 DIAGNOSIS — Z79.01 CURRENT USE OF LONG TERM ANTICOAGULATION: ICD-10-CM

## 2025-03-25 DIAGNOSIS — I87.2 VENOUS INSUFFICIENCY (CHRONIC) (PERIPHERAL): ICD-10-CM

## 2025-03-25 DIAGNOSIS — Z86.73 HX OF TIA (TRANSIENT ISCHEMIC ATTACK) AND STROKE: ICD-10-CM

## 2025-03-25 DIAGNOSIS — N18.9 CHRONIC KIDNEY DISEASE, UNSPECIFIED CKD STAGE: ICD-10-CM

## 2025-03-25 DIAGNOSIS — E78.5 HYPERLIPIDEMIA ASSOCIATED WITH TYPE 2 DIABETES MELLITUS: ICD-10-CM

## 2025-03-25 DIAGNOSIS — R26.89 IMPAIRMENT OF BALANCE: ICD-10-CM

## 2025-03-25 DIAGNOSIS — Z95.818 STATUS POST PLACEMENT OF IMPLANTABLE LOOP RECORDER: ICD-10-CM

## 2025-03-25 PROCEDURE — 99214 OFFICE O/P EST MOD 30 MIN: CPT | Mod: S$GLB,,, | Performed by: INTERNAL MEDICINE

## 2025-03-25 PROCEDURE — 3288F FALL RISK ASSESSMENT DOCD: CPT | Mod: CPTII,S$GLB,, | Performed by: INTERNAL MEDICINE

## 2025-03-25 PROCEDURE — 1101F PT FALLS ASSESS-DOCD LE1/YR: CPT | Mod: CPTII,S$GLB,, | Performed by: INTERNAL MEDICINE

## 2025-03-25 PROCEDURE — 3074F SYST BP LT 130 MM HG: CPT | Mod: CPTII,S$GLB,, | Performed by: INTERNAL MEDICINE

## 2025-03-25 PROCEDURE — 1126F AMNT PAIN NOTED NONE PRSNT: CPT | Mod: CPTII,S$GLB,, | Performed by: INTERNAL MEDICINE

## 2025-03-25 PROCEDURE — 1159F MED LIST DOCD IN RCRD: CPT | Mod: CPTII,S$GLB,, | Performed by: INTERNAL MEDICINE

## 2025-03-25 PROCEDURE — G2211 COMPLEX E/M VISIT ADD ON: HCPCS | Mod: S$GLB,,, | Performed by: INTERNAL MEDICINE

## 2025-03-25 PROCEDURE — 3044F HG A1C LEVEL LT 7.0%: CPT | Mod: CPTII,S$GLB,, | Performed by: INTERNAL MEDICINE

## 2025-03-25 PROCEDURE — 3078F DIAST BP <80 MM HG: CPT | Mod: CPTII,S$GLB,, | Performed by: INTERNAL MEDICINE

## 2025-03-25 PROCEDURE — 3066F NEPHROPATHY DOC TX: CPT | Mod: CPTII,S$GLB,, | Performed by: INTERNAL MEDICINE

## 2025-03-25 PROCEDURE — 99999 PR PBB SHADOW E&M-EST. PATIENT-LVL IV: CPT | Mod: PBBFAC,,, | Performed by: INTERNAL MEDICINE

## 2025-03-25 PROCEDURE — 3008F BODY MASS INDEX DOCD: CPT | Mod: CPTII,S$GLB,, | Performed by: INTERNAL MEDICINE

## 2025-03-25 RX ORDER — LEVOTHYROXINE SODIUM 88 UG/1
88 TABLET ORAL
Qty: 90 TABLET | Refills: 3 | Status: SHIPPED | OUTPATIENT
Start: 2025-03-25 | End: 2026-03-25

## 2025-03-25 NOTE — PROGRESS NOTES
Subjective:       Patient ID: Diana Montenegro is a 69 y.o. female.    Chief Complaint: Diabetes (4 mos w 3 mos labs.  Recent hosp stay February. )    History of Present Illness    Diana presents today for follow up of multiple medical conditions which include type 2 diabetes mellitus with diabetic neuropathy, hypertension, chronic kidney disease, paroxysmal atrial fibrillation, history of TIA and stroke, frequent falls due to impairment of balance, hyperlipidemia, long-term anticoagulation with Eliquis, obesity, chronic peripheral venous insufficiency with lower extremity edema.  The patient denies having any current symptoms of edema.  She has not experienced any exertional chest pain.  No PND or orthopnea is described.  She does not routinely monitor blood pressures but she is tolerating her blood pressure medication well without side effects.    HYPERTENSION:   Current blood pressure medications include valsartan-hydrochlorothiazide 80-12.5 mg once daily.    TYPE 2 DIABETES MELLITUS:  Current diabetes medications include Jardiance 10 mg daily, metformin 1000 mg twice daily, and Ozempic 2 mg subQ once a week.  The patient is taking rosuvastatin 40 mg daily for management of hyperlipidemia and for cardiovascular risk reduction.  She remains on levothyroxine 88 mcg every morning for management of hypothyroidism.     ANEMIA AND GI CONCERNS:  She has a history of anemia requiring transfusion this past February when hemoglobin dropped below 6. Hemoglobin has improved to approximately 8 at last check. She is scheduled for upper endoscopy and colonoscopy on April 15th to evaluate source of blood loss, with previous colonoscopy performed in December 2023. She reports occasional diarrhea which she attributes to medication and denies reflux symptoms.  Reflux symptoms have been controlled with intermittent use of Prilosec 40 mg once daily.    CARDIAC:  She experienced an episode of atrial fibrillation approximately  one week ago, detected by her smartwatch showing heart rate of 145 BPM while sitting. She denies symptoms during the episode, which resolved after getting up and walking to the kitchen. This was an isolated incident with no recurrence.    RESPIRATORY:  She reports a recent episode of chest congestion requiring multiple doses of albuterol for relief due to difficulty clearing her chest.    OCULAR:  She underwent bilateral cataract surgery, with right eye operated first due to significant cloudiness affecting vision. Last eye exam was performed on the March 20, 2025. She noted a red spot on the eye yesterday which has since resolved.    BALANCE:  She reports balance is not optimal but denies recent bad falls. Last fall occurred in February while exiting a hot tub during a cruise.    MEDICATIONS:  She has discontinued turmeric supplementation as advised due to concerns about potential effects on liver function. She acknowledges being informed about supplement impacts on liver health and lack of regulation in supplement production.      ROS:  Eyes: -eye redness  Respiratory: +difficulty breathing, +chest congestion  Cardiovascular: +palpitations, +lower extremity edema, +feelings of fast heart rate  Gastrointestinal: +diarrhea  Neurological: +balance issues              Physical Exam  Vitals and nursing note reviewed.   Constitutional:       General: She is not in acute distress.     Appearance: Normal appearance. She is well-developed.   HENT:      Head: Normocephalic and atraumatic.   Eyes:      General: No scleral icterus.     Extraocular Movements: Extraocular movements intact.      Conjunctiva/sclera: Conjunctivae normal.   Neck:      Thyroid: No thyromegaly.      Vascular: No JVD.   Cardiovascular:      Rate and Rhythm: Normal rate and regular rhythm.      Heart sounds: Normal heart sounds. No murmur heard.     No friction rub. No gallop.   Pulmonary:      Effort: Pulmonary effort is normal. No respiratory  distress.      Breath sounds: Normal breath sounds. No wheezing or rales.   Abdominal:      General: Bowel sounds are normal.      Palpations: Abdomen is soft. There is no mass.      Tenderness: There is no abdominal tenderness. There is no right CVA tenderness or left CVA tenderness.   Musculoskeletal:         General: No tenderness. Normal range of motion.      Cervical back: Normal range of motion and neck supple.      Right lower leg: No edema.      Left lower leg: No edema.   Lymphadenopathy:      Cervical: No cervical adenopathy.   Skin:     General: Skin is warm and dry.      Findings: No rash.      Comments: No foot lesions are present.   Neurological:      Mental Status: She is alert and oriented to person, place, and time.      Cranial Nerves: No cranial nerve deficit.      Comments: Sensory exam is intact in both feet on monofilament and vibration testing.   Psychiatric:         Mood and Affect: Mood normal.         Behavior: Behavior normal.         Protective Sensation (w/ 10 gram monofilament):  Right: Intact  Left: Intact    Visual Inspection:  Normal -  Bilateral    Pedal Pulses:   Right: Present  Left: Present    Posterior Tibialis Pulses:   Right:Present  Left: Present    Lab Visit on 02/27/2025   Component Date Value Ref Range Status    Sodium 02/27/2025 142  136 - 145 mmol/L Final    Potassium 02/27/2025 4.1  3.5 - 5.1 mmol/L Final    Chloride 02/27/2025 111 (H)  95 - 110 mmol/L Final    CO2 02/27/2025 24  23 - 29 mmol/L Final    Glucose 02/27/2025 85  70 - 110 mg/dL Final    BUN 02/27/2025 26 (H)  8 - 23 mg/dL Final    Creatinine 02/27/2025 1.1  0.5 - 1.4 mg/dL Final    Calcium 02/27/2025 9.3  8.7 - 10.5 mg/dL Final    Total Protein 02/27/2025 6.2  6.0 - 8.4 g/dL Final    Albumin 02/27/2025 3.5  3.5 - 5.2 g/dL Final    Total Bilirubin 02/27/2025 0.2  0.1 - 1.0 mg/dL Final    Comment: For infants and newborns, interpretation of results should be based  on gestational age, weight and in agreement  with clinical  observations.    Premature Infant recommended reference ranges:  Up to 24 hours.............<8.0 mg/dL  Up to 48 hours............<12.0 mg/dL  3-5 days..................<15.0 mg/dL  6-29 days.................<15.0 mg/dL      Alkaline Phosphatase 02/27/2025 80  40 - 150 U/L Final    AST 02/27/2025 58 (H)  10 - 40 U/L Final    ALT 02/27/2025 73 (H)  10 - 44 U/L Final    eGFR 02/27/2025 54.7 (A)  >60 mL/min/1.73 m^2 Final    Anion Gap 02/27/2025 7 (L)  8 - 16 mmol/L Final    WBC 02/27/2025 3.86 (L)  3.90 - 12.70 K/uL Final    RBC 02/27/2025 3.05 (L)  4.00 - 5.40 M/uL Final    Hemoglobin 02/27/2025 8.3 (L)  12.0 - 16.0 g/dL Final    Hematocrit 02/27/2025 28.5 (L)  37.0 - 48.5 % Final    MCV 02/27/2025 93  82 - 98 fL Final    MCH 02/27/2025 27.2  27.0 - 31.0 pg Final    MCHC 02/27/2025 29.1 (L)  32.0 - 36.0 g/dL Final    RDW 02/27/2025 16.7 (H)  11.5 - 14.5 % Final    Platelets 02/27/2025 152  150 - 450 K/uL Final    MPV 02/27/2025 10.4  9.2 - 12.9 fL Final    Immature Granulocytes 02/27/2025 0.3  0.0 - 0.5 % Final    Gran # (ANC) 02/27/2025 2.5  1.8 - 7.7 K/uL Final    Immature Grans (Abs) 02/27/2025 0.01  0.00 - 0.04 K/uL Final    Comment: Mild elevation in immature granulocytes is non specific and   can be seen in a variety of conditions including stress response,   acute inflammation, trauma and pregnancy. Correlation with other   laboratory and clinical findings is essential.      Lymph # 02/27/2025 0.9 (L)  1.0 - 4.8 K/uL Final    Mono # 02/27/2025 0.3  0.3 - 1.0 K/uL Final    Eos # 02/27/2025 0.1  0.0 - 0.5 K/uL Final    Baso # 02/27/2025 0.04  0.00 - 0.20 K/uL Final    nRBC 02/27/2025 0  0 /100 WBC Final    Gran % 02/27/2025 65.0  38.0 - 73.0 % Final    Lymph % 02/27/2025 24.1  18.0 - 48.0 % Final    Mono % 02/27/2025 7.5  4.0 - 15.0 % Final    Eosinophil % 02/27/2025 2.1  0.0 - 8.0 % Final    Basophil % 02/27/2025 1.0  0.0 - 1.9 % Final    Differential Method 02/27/2025 Automated   Final     Hemoglobin A1C 02/27/2025 5.7 (H)  4.0 - 5.6 % Final    Comment: ADA Screening Guidelines:  5.7-6.4%  Consistent with prediabetes  >or=6.5%  Consistent with diabetes    High levels of fetal hemoglobin interfere with the HbA1C  assay. Heterozygous hemoglobin variants (HbS, HgC, etc)do  not significantly interfere with this assay.   However, presence of multiple variants may affect accuracy.      Estimated Avg Glucose 02/27/2025 117  68 - 131 mg/dL Final   Lab Visit on 02/12/2025   Component Date Value Ref Range Status    WBC 02/12/2025 4.28  3.90 - 12.70 K/uL Final    RBC 02/12/2025 2.83 (L)  4.00 - 5.40 M/uL Final    Hemoglobin 02/12/2025 7.6 (L)  12.0 - 16.0 g/dL Final    Hematocrit 02/12/2025 26.5 (L)  37.0 - 48.5 % Final    MCV 02/12/2025 94  82 - 98 fL Final    MCH 02/12/2025 26.9 (L)  27.0 - 31.0 pg Final    MCHC 02/12/2025 28.7 (L)  32.0 - 36.0 g/dL Final    RDW 02/12/2025 16.7 (H)  11.5 - 14.5 % Final    Platelets 02/12/2025 182  150 - 450 K/uL Final    MPV 02/12/2025 10.1  9.2 - 12.9 fL Final    Immature Granulocytes 02/12/2025 0.2  0.0 - 0.5 % Final    Gran # (ANC) 02/12/2025 2.9  1.8 - 7.7 K/uL Final    Immature Grans (Abs) 02/12/2025 0.01  0.00 - 0.04 K/uL Final    Comment: Mild elevation in immature granulocytes is non specific and   can be seen in a variety of conditions including stress response,   acute inflammation, trauma and pregnancy. Correlation with other   laboratory and clinical findings is essential.      Lymph # 02/12/2025 0.9 (L)  1.0 - 4.8 K/uL Final    Mono # 02/12/2025 0.3  0.3 - 1.0 K/uL Final    Eos # 02/12/2025 0.1  0.0 - 0.5 K/uL Final    Baso # 02/12/2025 0.03  0.00 - 0.20 K/uL Final    nRBC 02/12/2025 0  0 /100 WBC Final    Gran % 02/12/2025 68.5  38.0 - 73.0 % Final    Lymph % 02/12/2025 21.7  18.0 - 48.0 % Final    Mono % 02/12/2025 7.0  4.0 - 15.0 % Final    Eosinophil % 02/12/2025 1.9  0.0 - 8.0 % Final    Basophil % 02/12/2025 0.7  0.0 - 1.9 % Final    Differential Method  02/12/2025 Automated   Final    Sodium 02/12/2025 138  136 - 145 mmol/L Final    Potassium 02/12/2025 4.7  3.5 - 5.1 mmol/L Final    Chloride 02/12/2025 107  95 - 110 mmol/L Final    CO2 02/12/2025 19 (L)  23 - 29 mmol/L Final    Glucose 02/12/2025 156 (H)  70 - 110 mg/dL Final    BUN 02/12/2025 25 (H)  8 - 23 mg/dL Final    Creatinine 02/12/2025 1.3  0.5 - 1.4 mg/dL Final    Calcium 02/12/2025 8.9  8.7 - 10.5 mg/dL Final    Total Protein 02/12/2025 6.0  6.0 - 8.4 g/dL Final    Albumin 02/12/2025 3.2 (L)  3.5 - 5.2 g/dL Final    Total Bilirubin 02/12/2025 0.2  0.1 - 1.0 mg/dL Final    Comment: For infants and newborns, interpretation of results should be based  on gestational age, weight and in agreement with clinical  observations.    Premature Infant recommended reference ranges:  Up to 24 hours.............<8.0 mg/dL  Up to 48 hours............<12.0 mg/dL  3-5 days..................<15.0 mg/dL  6-29 days.................<15.0 mg/dL      Alkaline Phosphatase 02/12/2025 77  40 - 150 U/L Final    AST 02/12/2025 46 (H)  10 - 40 U/L Final    ALT 02/12/2025 75 (H)  10 - 44 U/L Final    eGFR 02/12/2025 44.8 (A)  >60 mL/min/1.73 m^2 Final    Anion Gap 02/12/2025 12  8 - 16 mmol/L Final   Admission on 02/12/2025, Discharged on 02/12/2025   Component Date Value Ref Range Status    POCT Glucose 02/12/2025 185 (H)  70 - 110 mg/dL Final   Admission on 02/06/2025, Discharged on 02/07/2025   Component Date Value Ref Range Status    Hepatitis C Ab 02/06/2025 Non-reactive  Non-reactive Final    HIV 1/2 Ag/Ab 02/06/2025 Non-reactive  Non-reactive Final    WBC 02/06/2025 5.31  3.90 - 12.70 K/uL Final    RBC 02/06/2025 2.44 (L)  4.00 - 5.40 M/uL Final    Hemoglobin 02/06/2025 6.6 (L)  12.0 - 16.0 g/dL Final    Hematocrit 02/06/2025 23.6 (L)  37.0 - 48.5 % Final    MCV 02/06/2025 97  82 - 98 fL Final    MCH 02/06/2025 27.0  27.0 - 31.0 pg Final    MCHC 02/06/2025 28.0 (L)  32.0 - 36.0 g/dL Final    RDW 02/06/2025 15.7 (H)  11.5 -  14.5 % Final    Platelets 02/06/2025 189  150 - 450 K/uL Final    MPV 02/06/2025 9.5  9.2 - 12.9 fL Final    Immature Granulocytes 02/06/2025 0.2  0.0 - 0.5 % Final    Gran # (ANC) 02/06/2025 3.5  1.8 - 7.7 K/uL Final    Immature Grans (Abs) 02/06/2025 0.01  0.00 - 0.04 K/uL Final    Comment: Mild elevation in immature granulocytes is non specific and   can be seen in a variety of conditions including stress response,   acute inflammation, trauma and pregnancy. Correlation with other   laboratory and clinical findings is essential.      Lymph # 02/06/2025 1.2  1.0 - 4.8 K/uL Final    Mono # 02/06/2025 0.5  0.3 - 1.0 K/uL Final    Eos # 02/06/2025 0.0  0.0 - 0.5 K/uL Final    Baso # 02/06/2025 0.02  0.00 - 0.20 K/uL Final    nRBC 02/06/2025 0  0 /100 WBC Final    Gran % 02/06/2025 66.6  38.0 - 73.0 % Final    Lymph % 02/06/2025 23.2  18.0 - 48.0 % Final    Mono % 02/06/2025 9.0  4.0 - 15.0 % Final    Eosinophil % 02/06/2025 0.6  0.0 - 8.0 % Final    Basophil % 02/06/2025 0.4  0.0 - 1.9 % Final    Differential Method 02/06/2025 Automated   Final    Sodium 02/06/2025 139  136 - 145 mmol/L Final    Potassium 02/06/2025 3.9  3.5 - 5.1 mmol/L Final    Chloride 02/06/2025 108  95 - 110 mmol/L Final    CO2 02/06/2025 20 (L)  23 - 29 mmol/L Final    Glucose 02/06/2025 136 (H)  70 - 110 mg/dL Final    BUN 02/06/2025 30 (H)  8 - 23 mg/dL Final    Creatinine 02/06/2025 1.9 (H)  0.5 - 1.4 mg/dL Final    Calcium 02/06/2025 8.9  8.7 - 10.5 mg/dL Final    Total Protein 02/06/2025 6.5  6.0 - 8.4 g/dL Final    Albumin 02/06/2025 3.5  3.5 - 5.2 g/dL Final    Total Bilirubin 02/06/2025 0.1  0.1 - 1.0 mg/dL Final    Comment: For infants and newborns, interpretation of results should be based  on gestational age, weight and in agreement with clinical  observations.    Premature Infant recommended reference ranges:  Up to 24 hours.............<8.0 mg/dL  Up to 48 hours............<12.0 mg/dL  3-5 days..................<15.0 mg/dL  6-29  days.................<15.0 mg/dL      Alkaline Phosphatase 02/06/2025 87  40 - 150 U/L Final    AST 02/06/2025 81 (H)  10 - 40 U/L Final    ALT 02/06/2025 145 (H)  10 - 44 U/L Final    eGFR 02/06/2025 28.4 (A)  >60 mL/min/1.73 m^2 Final    Anion Gap 02/06/2025 11  8 - 16 mmol/L Final    QRS Duration 02/06/2025 90  ms Final    OHS QTC Calculation 02/06/2025 432  ms Final    TSH 02/06/2025 0.786  0.400 - 4.000 uIU/mL Final    POCT Glucose 02/06/2025 172 (H)  70 - 110 mg/dL Final    Group & Rh 02/06/2025 O POS   Final    Indirect Lion 02/06/2025 NEG   Final    Specimen Outdate 02/06/2025 02/09/2025 23:59   Final    Iron 02/06/2025 145  30 - 160 ug/dL Final    Transferrin 02/06/2025 371  200 - 375 mg/dL Final    TIBC 02/06/2025 549 (H)  250 - 450 ug/dL Final    Saturated Iron 02/06/2025 26  20 - 50 % Final    Ferritin 02/06/2025 10 (L)  20.0 - 300.0 ng/mL Final    Vitamin B-12 02/06/2025 >2000 (H)  210 - 950 pg/mL Final    Folate 02/06/2025 19.7  4.0 - 24.0 ng/mL Final    Retic 02/06/2025 6.4 (H)  0.5 - 2.5 % Final    LD 02/06/2025 205  110 - 260 U/L Final    Results are increased in hemolyzed samples.    Haptoglobin 02/06/2025 108  30 - 250 mg/dL Final    UNIT NUMBER 02/06/2025 [id number]   Final    Product Code 02/06/2025 [id number]   Final    DISPENSE STATUS 02/06/2025 TRANSFUSED   Final    CODING SYSTEM 02/06/2025 [id number]   Final    Unit Blood Type Code 02/06/2025 5100   Final    Unit Blood Type 02/06/2025 O POS   Final    Unit Expiration 02/06/2025 [id number]   Final    CROSSMATCH INTERPRETATION 02/06/2025 Compatible   Final    Hemoglobin A1C 02/06/2025 5.6  4.0 - 5.6 % Final    Comment: ADA Screening Guidelines:  5.7-6.4%  Consistent with prediabetes  >or=6.5%  Consistent with diabetes    High levels of fetal hemoglobin interfere with the HbA1C  assay. Heterozygous hemoglobin variants (HbS, HgC, etc)do  not significantly interfere with this assay.   However, presence of multiple variants may affect  accuracy.      Estimated Avg Glucose 02/06/2025 114  68 - 131 mg/dL Final    Sodium 02/07/2025 137  136 - 145 mmol/L Final    Potassium 02/07/2025 3.7  3.5 - 5.1 mmol/L Final    Chloride 02/07/2025 108  95 - 110 mmol/L Final    CO2 02/07/2025 20 (L)  23 - 29 mmol/L Final    Glucose 02/07/2025 164 (H)  70 - 110 mg/dL Final    BUN 02/07/2025 30 (H)  8 - 23 mg/dL Final    Creatinine 02/07/2025 1.7 (H)  0.5 - 1.4 mg/dL Final    Calcium 02/07/2025 8.7  8.7 - 10.5 mg/dL Final    Total Protein 02/07/2025 5.7 (L)  6.0 - 8.4 g/dL Final    Albumin 02/07/2025 3.1 (L)  3.5 - 5.2 g/dL Final    Total Bilirubin 02/07/2025 1.0  0.1 - 1.0 mg/dL Final    Comment: For infants and newborns, interpretation of results should be based  on gestational age, weight and in agreement with clinical  observations.    Premature Infant recommended reference ranges:  Up to 24 hours.............<8.0 mg/dL  Up to 48 hours............<12.0 mg/dL  3-5 days..................<15.0 mg/dL  6-29 days.................<15.0 mg/dL      Alkaline Phosphatase 02/07/2025 75  40 - 150 U/L Final    AST 02/07/2025 57 (H)  10 - 40 U/L Final    ALT 02/07/2025 112 (H)  10 - 44 U/L Final    eGFR 02/07/2025 32.5 (A)  >60 mL/min/1.73 m^2 Final    Anion Gap 02/07/2025 9  8 - 16 mmol/L Final    Magnesium 02/07/2025 2.3  1.6 - 2.6 mg/dL Final    Phosphorus 02/07/2025 2.9  2.7 - 4.5 mg/dL Final    WBC 02/07/2025 4.68  3.90 - 12.70 K/uL Final    RBC 02/07/2025 2.58 (L)  4.00 - 5.40 M/uL Final    Hemoglobin 02/07/2025 7.0 (L)  12.0 - 16.0 g/dL Final    Hematocrit 02/07/2025 23.3 (L)  37.0 - 48.5 % Final    MCV 02/07/2025 90  82 - 98 fL Final    MCH 02/07/2025 27.1  27.0 - 31.0 pg Final    MCHC 02/07/2025 30.0 (L)  32.0 - 36.0 g/dL Final    RDW 02/07/2025 18.0 (H)  11.5 - 14.5 % Final    Platelets 02/07/2025 156  150 - 450 K/uL Final    MPV 02/07/2025 9.6  9.2 - 12.9 fL Final    Immature Granulocytes 02/07/2025 0.2  0.0 - 0.5 % Final    Gran # (ANC) 02/07/2025 3.2  1.8 - 7.7 K/uL  Final    Immature Grans (Abs) 02/07/2025 0.01  0.00 - 0.04 K/uL Final    Comment: Mild elevation in immature granulocytes is non specific and   can be seen in a variety of conditions including stress response,   acute inflammation, trauma and pregnancy. Correlation with other   laboratory and clinical findings is essential.      Lymph # 02/07/2025 1.0  1.0 - 4.8 K/uL Final    Mono # 02/07/2025 0.4  0.3 - 1.0 K/uL Final    Eos # 02/07/2025 0.1  0.0 - 0.5 K/uL Final    Baso # 02/07/2025 0.02  0.00 - 0.20 K/uL Final    nRBC 02/07/2025 0  0 /100 WBC Final    Gran % 02/07/2025 68.6  38.0 - 73.0 % Final    Lymph % 02/07/2025 20.7  18.0 - 48.0 % Final    Mono % 02/07/2025 8.8  4.0 - 15.0 % Final    Eosinophil % 02/07/2025 1.3  0.0 - 8.0 % Final    Basophil % 02/07/2025 0.4  0.0 - 1.9 % Final    Differential Method 02/07/2025 Automated   Final    QRS Duration 02/06/2025 86  ms Final    OHS QTC Calculation 02/06/2025 424  ms Final    WBC 02/07/2025 3.38 (L)  3.90 - 12.70 K/uL Final    RBC 02/07/2025 2.83 (L)  4.00 - 5.40 M/uL Final    Hemoglobin 02/07/2025 7.7 (L)  12.0 - 16.0 g/dL Final    Hematocrit 02/07/2025 25.5 (L)  37.0 - 48.5 % Final    MCV 02/07/2025 90  82 - 98 fL Final    MCH 02/07/2025 27.2  27.0 - 31.0 pg Final    MCHC 02/07/2025 30.2 (L)  32.0 - 36.0 g/dL Final    RDW 02/07/2025 18.5 (H)  11.5 - 14.5 % Final    Platelets 02/07/2025 157  150 - 450 K/uL Final    MPV 02/07/2025 9.5  9.2 - 12.9 fL Final    Immature Granulocytes 02/07/2025 0.3  0.0 - 0.5 % Final    Gran # (ANC) 02/07/2025 2.0  1.8 - 7.7 K/uL Final    Immature Grans (Abs) 02/07/2025 0.01  0.00 - 0.04 K/uL Final    Comment: Mild elevation in immature granulocytes is non specific and   can be seen in a variety of conditions including stress response,   acute inflammation, trauma and pregnancy. Correlation with other   laboratory and clinical findings is essential.      Lymph # 02/07/2025 1.0  1.0 - 4.8 K/uL Final    Mono # 02/07/2025 0.3  0.3 - 1.0  K/uL Final    Eos # 02/07/2025 0.1  0.0 - 0.5 K/uL Final    Baso # 02/07/2025 0.02  0.00 - 0.20 K/uL Final    nRBC 02/07/2025 0  0 /100 WBC Final    Gran % 02/07/2025 60.3  38.0 - 73.0 % Final    Lymph % 02/07/2025 28.1  18.0 - 48.0 % Final    Mono % 02/07/2025 9.2  4.0 - 15.0 % Final    Eosinophil % 02/07/2025 1.5  0.0 - 8.0 % Final    Basophil % 02/07/2025 0.6  0.0 - 1.9 % Final    Differential Method 02/07/2025 Automated   Final    Sodium 02/07/2025 137  136 - 145 mmol/L Final    Potassium 02/07/2025 3.7  3.5 - 5.1 mmol/L Final    Chloride 02/07/2025 107  95 - 110 mmol/L Final    CO2 02/07/2025 22 (L)  23 - 29 mmol/L Final    Glucose 02/07/2025 134 (H)  70 - 110 mg/dL Final    BUN 02/07/2025 26 (H)  8 - 23 mg/dL Final    Creatinine 02/07/2025 1.5 (H)  0.5 - 1.4 mg/dL Final    Calcium 02/07/2025 9.2  8.7 - 10.5 mg/dL Final    Anion Gap 02/07/2025 8  8 - 16 mmol/L Final    eGFR 02/07/2025 37.7 (A)  >60 mL/min/1.73 m^2 Final   Lab Visit on 01/13/2025   Component Date Value Ref Range Status    Total Protein 01/13/2025 6.5  6.0 - 8.4 g/dL Final    Albumin 01/13/2025 3.6  3.5 - 5.2 g/dL Final    Total Bilirubin 01/13/2025 0.2  0.1 - 1.0 mg/dL Final    Comment: For infants and newborns, interpretation of results should be based  on gestational age, weight and in agreement with clinical  observations.    Premature Infant recommended reference ranges:  Up to 24 hours.............<8.0 mg/dL  Up to 48 hours............<12.0 mg/dL  3-5 days..................<15.0 mg/dL  6-29 days.................<15.0 mg/dL      Bilirubin, Direct 01/13/2025 0.1  0.1 - 0.3 mg/dL Final    AST 01/13/2025 46 (H)  10 - 40 U/L Final    ALT 01/13/2025 65 (H)  10 - 44 U/L Final    Alkaline Phosphatase 01/13/2025 80  40 - 150 U/L Final       Assessment & Plan:     Assessment & Plan     Reviewed recent colonoscopy and upper GI endoscopy scheduled for April 15th.   Anemia improved since last transfusion, with hemoglobin increasing from 6.6 to 8.3 and  hematocrit from 23 to 28.5.   Diabetes control good with A1c of 5.7, down from 7.3 in November.   Renal function improved with creatinine decreasing from 1.9 to 1.1 and GFR increasing from 28 to 54.   Liver function: ALT and AST levels have decreased but remain elevated.   Cardiovascular status: recent AFib episode reported by patient, detected by smartwatch.    CHRONIC BRONCHITIS, UNSPECIFIED CHRONIC BRONCHITIS TYPE:   Diana reports difficulty clearing chest and increased use of albuterol inhaler.   Physical exam revealed clear lungs with no wheezing.   Advised to continue using albuterol inhaler as needed for breathing difficulties.    ANEMIA AND IRON DEFICIENCY:   Hemoglobin levels improved from 6.6 to 8.3, with current hematocrit of 28.5, indicating recovery from blood loss.   Recommend continuation of iron supplements every other day.   Planned further investigation of blood loss cause.    GASTROINTESTINAL HEMORRHAGE:   Scheduled endoscopy and colonoscopy for April 15th to investigate source of blood loss.   Explained the difference between lower GI endoscopy (colonoscopy) and lower GI series (barium enema).    ATRIAL FIBRILLATION:   Diana experienced an asymptomatic atrial fibrillation episode with heart rate up to 145 bpm while at rest, detected by Fitbit.   Current heart rhythm check found it regular and quiet.   Mentioned metoprolol as a treatment option.   Instructed patient to continue monitoring heart rate with smartwatch for any recurrence.Patient has been followed by Cardiology and Electrophysiology.    DIARRHEA:   Diana reports occasional diarrhea, possibly related to medication.    TYPE 2 DIABETES MELLITUS:   Blood sugar levels running in the 90s and 100s.   HbA1c improved from 7.3 to 5.7, indicating good diabetes control.   Blood glucose level was 85 during a lab visit on February 27th.   Continued Jardiance 10 mg and Ozempic for diabetes management.   Prescribed Freestyle Lite test strips for  glucose monitoring.   Scheduled follow up in 6 months due to excellent A1c level.    UNSTEADINESS AND FALL RISK:   Diana reports balance is not 100% but no recent bad falls.   Last fall was in February during a cruise, caused by tripping over an unseen obstacle.   Checked feet and found good sensation.    CATARACT SURGERY FOLLOW-UP:   Diana had cataract surgery on both eyes, with the last exam on the 20th.   Advised to get glasses made into readers following surgery.    MEDICATION MANAGEMENT:   Continued use of Eliquis as an anticoagulant.    OTHER:   Explained potential liver inflammation caused by high doses of turmeric and other herbal supplements.   Discussed that dietary supplements are not regulated, making it difficult to know their true contents.   Diana to discontinue herbal supplements, including turmeric, to potentially improve liver enzyme levels.         Follow up in about 6 months (around 9/25/2025).     Rad Johnston MD

## 2025-03-27 ENCOUNTER — DOCUMENTATION ONLY (OUTPATIENT)
Dept: REHABILITATION | Facility: HOSPITAL | Age: 70
End: 2025-03-27
Payer: MEDICARE

## 2025-03-27 ENCOUNTER — HOSPITAL ENCOUNTER (OUTPATIENT)
Dept: RADIOLOGY | Facility: HOSPITAL | Age: 70
Discharge: HOME | End: 2025-03-27
Attending: INTERNAL MEDICINE
Payer: MEDICARE

## 2025-03-27 DIAGNOSIS — E11.3311 TYPE 2 DIABETES MELLITUS WITH RIGHT EYE AFFECTED BY MODERATE NONPROLIFERATIVE RETINOPATHY AND MACULAR EDEMA, WITHOUT LONG-TERM CURRENT USE OF INSULIN: ICD-10-CM

## 2025-03-27 DIAGNOSIS — N18.31 STAGE 3A CHRONIC KIDNEY DISEASE: ICD-10-CM

## 2025-03-27 DIAGNOSIS — N18.32 STAGE 3B CHRONIC KIDNEY DISEASE: ICD-10-CM

## 2025-03-27 PROCEDURE — 76770 US EXAM ABDO BACK WALL COMP: CPT | Mod: 26,,, | Performed by: RADIOLOGY

## 2025-03-27 PROCEDURE — 76770 US EXAM ABDO BACK WALL COMP: CPT | Mod: TC

## 2025-03-27 NOTE — PROGRESS NOTES
Health  Wellness Visit Note    Name: Diana Montenegro  Clinic Number: [id number]  Physician: No ref. provider found  Diagnosis: No diagnosis found.  Past Medical History:   Diagnosis Date    Arthritis     Atrial fibrillation, unspecified     hx of a fib prior to stroke.    Chronic back pain     Chronic kidney disease, stage 3a 06/01/2023    Colon polyp     CVA (cerebral infarction) 07/16/2014    Degenerative disc disease     cervical; lumbar    Diabetes mellitus type II     Encounter for loop recorder at end of battery life 09/17/2018    Hyperlipidemia     Hypertension     Hypothyroidism     Mild nonproliferative diabetic retinopathy 08/12/2018    Obesity     Sleep apnea     Stroke 07/2014    Venous insufficiency (chronic) (peripheral)      Visit Number: [id number]  Precautions: Standard, Fall Risk, History of Stroke      1st PT visit:  07/16/2024  Year of care end date:  July 2025  Mindbody plan: 60---9 Months  Patient level: C    Time In: 1:30 PM  Time Out: 2:30 PM  Total Treatment Time: 60 Minutes    Wellness [employer] 2022  Handout on this week's wellness topic Anti-Inflammatory Diet was provided along with a discussion on what it means, the benefits, and suggestions for practice.  Reviewed last week's topic of Portion Control.     Subjective:   Patient reports no complaints of low back pain. Since her last Wellness session, she has been to the gym and swam in the pool. She does her stretches daily. Patient does not ice at home unless she deems necessary.     HC and patient discussed full range of motion on leg press before increasing weight.     Objective:   Diana completed therapeutic stretches (EIL, SAIDA) and the following MedX exercise machines: core lumbar, torso rotation l/r, leg extension, leg curl, upright row, chest press, biceps curl, triceps extension, leg press    See exercise log in patient folder for rate of exertion and repetitions completed.       Fitness Machine Education Key:  E=education on  equipment initiated and further follow up and education needed  I=independent with  and exercise.  The patient:  Adjusts machines to his/her settings  Uses equipment levers, pins, weights safely  Maintains safe and correct posture while exercising  Moves through exercise with correct pace and control  Gets on and off equipment safely      Lumbar/Cervical Ext. E Torso Rotation E Leg Press E   Leg Extension  Seated Leg Curl  Chest Press    Seated Row  Hip ADD E Hip ABD E   Triceps Extension  Bicep Curl  Other:      [x] Indicates exercise has been taught for home  Lumbar/Cervical Ext. [] Torso Rotation [] Leg Press []   Leg Extension [] Seated Leg Curl [] Chest Press []   Seated Row [] Hip ADD [] Hip ABD []   Triceps Extension [] Bicep Curl [] Other:        Assessment:   Patient tolerated Patient tolerated MedX Core Lumbar Strength and all other peripheral exercises without an increase in symptoms. Patient warmed up on nustep for 5 minutes, stretched, and iced low back for 5 minutes after the workout.     Plan:  Continue with established plan of care towards wellness goals.     Health  : Ann-Marie Bey  3/27/2025

## 2025-04-01 ENCOUNTER — PATIENT MESSAGE (OUTPATIENT)
Dept: NEPHROLOGY | Facility: CLINIC | Age: 70
End: 2025-04-01
Payer: MEDICARE

## 2025-04-03 ENCOUNTER — LAB VISIT (OUTPATIENT)
Dept: LAB | Facility: HOSPITAL | Age: 70
End: 2025-04-03
Attending: INTERNAL MEDICINE
Payer: MEDICARE

## 2025-04-03 DIAGNOSIS — N18.32 STAGE 3B CHRONIC KIDNEY DISEASE: ICD-10-CM

## 2025-04-03 LAB
BACTERIA #/AREA URNS AUTO: ABNORMAL /HPF
BILIRUB UR QL STRIP.AUTO: NEGATIVE
CLARITY UR: CLEAR
COLOR UR AUTO: YELLOW
CREAT UR-MCNC: 37 MG/DL (ref 15–325)
GLUCOSE UR QL STRIP: ABNORMAL
HGB UR QL STRIP: NEGATIVE
HYALINE CASTS UR QL AUTO: 2 /LPF (ref 0–1)
KETONES UR QL STRIP: NEGATIVE
LEUKOCYTE ESTERASE UR QL STRIP: ABNORMAL
MICROSCOPIC COMMENT: ABNORMAL
NITRITE UR QL STRIP: NEGATIVE
PH UR STRIP: 5 [PH]
PROT UR QL STRIP: ABNORMAL
PROT UR-MCNC: 17 MG/DL
PROT/CREAT UR: 0.46 MG/G{CREAT}
RBC #/AREA URNS AUTO: 1 /HPF (ref 0–4)
SP GR UR STRIP: 1.01
UROBILINOGEN UR STRIP-ACNC: NEGATIVE EU/DL
WBC #/AREA URNS AUTO: 7 /HPF (ref 0–5)
WBC CLUMPS UR QL AUTO: ABNORMAL
YEAST UR QL AUTO: ABNORMAL /HPF

## 2025-04-03 PROCEDURE — 81001 URINALYSIS AUTO W/SCOPE: CPT

## 2025-04-03 PROCEDURE — 84156 ASSAY OF PROTEIN URINE: CPT

## 2025-04-07 ENCOUNTER — PATIENT MESSAGE (OUTPATIENT)
Dept: ENDOSCOPY | Facility: HOSPITAL | Age: 70
End: 2025-04-07
Payer: MEDICARE

## 2025-04-15 ENCOUNTER — ANESTHESIA (OUTPATIENT)
Dept: ENDOSCOPY | Facility: HOSPITAL | Age: 70
End: 2025-04-15
Payer: MEDICARE

## 2025-04-15 ENCOUNTER — HOSPITAL ENCOUNTER (OUTPATIENT)
Facility: HOSPITAL | Age: 70
Discharge: HOME | End: 2025-04-15
Attending: INTERNAL MEDICINE | Admitting: INTERNAL MEDICINE
Payer: MEDICARE

## 2025-04-15 ENCOUNTER — ANESTHESIA EVENT (OUTPATIENT)
Dept: ENDOSCOPY | Facility: HOSPITAL | Age: 70
End: 2025-04-15
Payer: MEDICARE

## 2025-04-15 VITALS
RESPIRATION RATE: 16 BRPM | HEIGHT: 63 IN | TEMPERATURE: 98 F | WEIGHT: 184.94 LBS | DIASTOLIC BLOOD PRESSURE: 59 MMHG | HEART RATE: 55 BPM | SYSTOLIC BLOOD PRESSURE: 132 MMHG | OXYGEN SATURATION: 98 % | BODY MASS INDEX: 32.77 KG/M2

## 2025-04-15 DIAGNOSIS — D50.9 IRON DEFICIENCY ANEMIA: ICD-10-CM

## 2025-04-15 DIAGNOSIS — D50.9 IRON DEFICIENCY ANEMIA, UNSPECIFIED IRON DEFICIENCY ANEMIA TYPE: ICD-10-CM

## 2025-04-15 LAB — POCT GLUCOSE: 93 MG/DL (ref 70–110)

## 2025-04-15 PROCEDURE — 88305 TISSUE EXAM BY PATHOLOGIST: CPT | Mod: TC,91 | Performed by: INTERNAL MEDICINE

## 2025-04-15 PROCEDURE — 45380 COLONOSCOPY AND BIOPSY: CPT | Mod: ,,, | Performed by: INTERNAL MEDICINE

## 2025-04-15 PROCEDURE — 37000008 HC ANESTHESIA 1ST 15 MINUTES: Performed by: INTERNAL MEDICINE

## 2025-04-15 PROCEDURE — 43239 EGD BIOPSY SINGLE/MULTIPLE: CPT | Mod: 51,,, | Performed by: INTERNAL MEDICINE

## 2025-04-15 PROCEDURE — 27201012 HC FORCEPS, HOT/COLD, DISP: Performed by: INTERNAL MEDICINE

## 2025-04-15 PROCEDURE — 43239 EGD BIOPSY SINGLE/MULTIPLE: CPT | Performed by: INTERNAL MEDICINE

## 2025-04-15 PROCEDURE — 63600175 PHARM REV CODE 636 W HCPCS: Performed by: NURSE ANESTHETIST, CERTIFIED REGISTERED

## 2025-04-15 PROCEDURE — 37000009 HC ANESTHESIA EA ADD 15 MINS: Performed by: INTERNAL MEDICINE

## 2025-04-15 PROCEDURE — 45380 COLONOSCOPY AND BIOPSY: CPT | Performed by: INTERNAL MEDICINE

## 2025-04-15 PROCEDURE — 25000003 PHARM REV CODE 250: Performed by: INTERNAL MEDICINE

## 2025-04-15 RX ORDER — PHENYLEPHRINE HYDROCHLORIDE 10 MG/ML
INJECTION INTRAVENOUS
Status: DISCONTINUED | OUTPATIENT
Start: 2025-04-15 | End: 2025-04-15

## 2025-04-15 RX ORDER — PROPOFOL 10 MG/ML
VIAL (ML) INTRAVENOUS
Status: DISCONTINUED | OUTPATIENT
Start: 2025-04-15 | End: 2025-04-15

## 2025-04-15 RX ORDER — SODIUM CHLORIDE 9 MG/ML
INJECTION, SOLUTION INTRAVENOUS CONTINUOUS
Status: DISCONTINUED | OUTPATIENT
Start: 2025-04-15 | End: 2025-04-15 | Stop reason: HOSPADM

## 2025-04-15 RX ORDER — LIDOCAINE HYDROCHLORIDE 20 MG/ML
INJECTION INTRAVENOUS
Status: DISCONTINUED | OUTPATIENT
Start: 2025-04-15 | End: 2025-04-15

## 2025-04-15 RX ADMIN — PROPOFOL 60 MG: 10 INJECTION, EMULSION INTRAVENOUS at 12:04

## 2025-04-15 RX ADMIN — PROPOFOL 150 MCG/KG/MIN: 10 INJECTION, EMULSION INTRAVENOUS at 12:04

## 2025-04-15 RX ADMIN — LIDOCAINE HYDROCHLORIDE 50 MG: 20 INJECTION INTRAVENOUS at 12:04

## 2025-04-15 RX ADMIN — PHENYLEPHRINE HYDROCHLORIDE 100 MCG: 10 INJECTION INTRAVENOUS at 01:04

## 2025-04-15 RX ADMIN — SODIUM CHLORIDE: 0.9 INJECTION, SOLUTION INTRAVENOUS at 12:04

## 2025-04-15 RX ADMIN — PROPOFOL 200 MCG/KG/MIN: 10 INJECTION, EMULSION INTRAVENOUS at 01:04

## 2025-04-15 NOTE — PROVATION PATIENT INSTRUCTIONS
Discharge Summary/Instructions after an Endoscopic Procedure  Patient Name: Diana Montenegro  Patient MRN: [id number]  Patient YOB: 1955  Tuesday, April 15, 2025  Addison Shelby MD  Dear patient,  As a result of recent federal legislation (The Federal Cures Act), you may   receive lab or pathology results from your procedure in your [facility]   account before your physician is able to contact you. Your physician or   their representative will relay the results to you with their   recommendations at their soonest availability.  Thank you,  RESTRICTIONS:  During your procedure today, you received medications for sedation.  These   medications may affect your judgment, balance and coordination.  Therefore,   for 24 hours, you have the following restrictions:   - DO NOT drive a car, operate machinery, make legal/financial decisions,   sign important papers or drink alcohol.    ACTIVITY:  Today: no heavy lifting, straining or running due to procedural   sedation/anesthesia.  The following day: return to full activity including work.  DIET:  Eat and drink normally unless instructed otherwise.     TREATMENT FOR COMMON SIDE EFFECTS:  - Mild abdominal pain, nausea, belching, bloating or excessive gas:  rest,   eat lightly and use a heating pad.  - Sore Throat: treat with throat lozenges and/or gargle with warm salt   water.  - Because air was used during the procedure, expelling large amounts of air   from your rectum or belching is normal.  - If a bowel prep was taken, you may not have a bowel movement for 1-3 days.    This is normal.  SYMPTOMS TO WATCH FOR AND REPORT TO YOUR PHYSICIAN:  1. Abdominal pain or bloating, other than gas cramps.  2. Chest pain.  3. Back pain.  4. Signs of infection such as: chills or fever occurring within 24 hours   after the procedure.  5. Rectal bleeding, which would show as bright red, maroon, or black stools.   (A tablespoon of blood from the rectum is not serious, especially  if   hemorrhoids are present.)  6. Vomiting.  7. Weakness or dizziness.  GO DIRECTLY TO THE NEAREST EMERGENCY ROOM IF YOU HAVE ANY OF THE FOLLOWING:      Difficulty breathing              Chills and/or fever over 101 F   Persistent vomiting and/or vomiting blood   Severe abdominal pain   Severe chest pain   Black, tarry stools   Bleeding- more than one tablespoon   Any other symptom or condition that you feel may need urgent attention  Your doctor recommends these additional instructions:  If any biopsies were taken, your doctors clinic will contact you in 1 to 2   weeks with any results.  - Discharge patient to home.   - Resume Eliquis (apixaban) at prior dose tomorrow.   - Discharge patient to home.   - Await pathology results.   - Telephone endoscopist for pathology results in 3 weeks.   - Return to referring physician and PA. Rad Johnston MD, MARLA Banks  - The findings and recommendations were discussed with the patient.  For questions, problems or results please call your physician - Addison Shelby MD at Work:  [phone].  [facility], EMERGENCY ROOM PHONE NUMBER: [phone]  IF A COMPLICATION OR EMERGENCY SITUATION ARISES AND YOU ARE UNABLE TO REACH   YOUR PHYSICIAN - GO DIRECTLY TO THE EMERGENCY ROOM.  Addison Shleby MD  4/15/2025 1:37:29 PM  This report has been verified and signed electronically.  Dear patient,  As a result of recent federal legislation (The Federal Cures Act), you may   receive lab or pathology results from your procedure in your [facility]   account before your physician is able to contact you. Your physician or   their representative will relay the results to you with their   recommendations at their soonest availability.  Thank you,  PROVATION

## 2025-04-15 NOTE — ANESTHESIA PREPROCEDURE EVALUATION
Procedures:      ESOPHAGOGASTRODUODENOSCOPY (EGD) (Abdomen) - ok to hold Eliquis 2 days per Dr Randolph-GT      COLONOSCOPY (Abdomen) - referral Erwin Walker pt. to hold Eliquis for 2 days pending approval. holding Ozempic for 8 days. Holding Jardiance for 3 days.  Suprep instr. to ortal PM CVA 2014. Afib saw Cardiology in Sept/ 2024 to return in 1 year.. EC  4/7 precall complete. will hold Ozempic, Jardiance, Eliquis. instructions resent to portal. 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, without long-term current use of insulin    Hypothyroidism    Chronic back pain    Venous insufficiency (chronic) (peripheral)    Arthritis    Degenerative disc disease    Class 1 obesity due to excess calories with serious comorbidity and body mass index (BMI) of 32.0 to 32.9 in adult    Hypoglycemia secondary to sulfonylurea    Left atrial enlargement    Back pain    Loss of coordination    Bilateral carotid artery disease    Paroxysmal atrial fibrillation    Mass    Stenosis of right carotid artery    Status post placement of implantable loop recorder    Current use of long term anticoagulation    Chronic bronchitis    Chronic sinusitis    Bradycardia    Screening for colon cancer    Hyperlipidemia associated with type 2 diabetes mellitus    Hypertension associated with diabetes    Hx of TIA (transient ischemic attack) and stroke    Ventral hernia    Frequent falls    Impairment of balance    Decreased strength of trunk and back    Impaired mobility and ADLs    Coronary artery disease involving native coronary artery of native heart without angina pectoris    Aortic calcification    Pain    Elevated liver enzymes    JOSHUA (acute kidney injury)    Symptomatic anemia      Past Surgical History:   Procedure Laterality Date    BLOCK, NERVE, GENICULAR Right 8/21/2024    Procedure: Right diagnostic genicular block;  Surgeon: Perla Barrera DO;  Location: [facility]IN [facility];  Service: Pain Management;  Laterality: Right;  oral sed  20 mins  Elquis/ASA ok    CATARACT EXTRACTION W/  INTRAOCULAR LENS IMPLANT Right 12/9/2024    Procedure: EXTRACTION, CATARACT, WITH IOL INSERTION;  Surgeon: Brielle Singleton MD;  Location: [facility] OR;  Service: Ophthalmology;  Laterality: Right;    COLONOSCOPY N/A 08/30/2018    Procedure: COLONOSCOPY;  Surgeon: William Clement MD;  Location: [facility] ENDO (4TH FLR);  Service: Endoscopy;  Laterality: N/A;  Eliaileen/Dr Leroy Green/OK to hold 2 days prior to colon/see telephone encounter dated 7/24/18/pt has loop recorder/svn    COLONOSCOPY N/A 12/6/2023    Procedure: COLONOSCOPY;  Surgeon: Vinny Youssef MD;  Location: [facility] ENDO (4TH FLR);  Service: Colon and Rectal;  Laterality: N/A;  ref Green  eliquis   diabetic/ [facility] ozempic hold 7days prior    peg prep jm / loop recorder  ok to hold Eliquis 2 days per KHANH Rivera NP/MADISON Jones RN-GT  11/29-precall complete-pt verbalized understanding of holding Ozempic-MS    COLONOSCOPY W/ POLYPECTOMY      GASTRECTOMY      HERNIA REPAIR      PHACOEMULSIFICATION, CATARACT, WITH IOL INSERTION Left 2/12/2025    Procedure: PHACOEMULSIFICATION, CATARACT, WITH IOL INSERTION;  Surgeon: Brielle Singleton MD;  Location: [facility] OR;  Service: Ophthalmology;  Laterality: Left;    RADIOFREQUENCY ABLATION, NERVE, GENICULAR, KNEE Right 9/25/2024    Procedure: Right genicular RFA;  Surgeon: Perla Barrera DO;  Location: [facility]IN MANA[facility];  Service: Pain Management;  Laterality: Right;  40 mins - Ozempic 7 days ASA/ Eliquis ok    REMOVAL OF IMPLANTABLE LOOP RECORDER N/A 09/17/2018    Procedure: REMOVAL, IMPLANTABLE LOOP RECORDER;  Surgeon: Thomas Randolph MD;  Location: [facility] CATH LAB;  Service: Cardiology;  Laterality:  N/A;  TERESA, ILR removal (no reimplant), MDT, RN Inocencio, SK, 3 Prep *Reveal LINQ*    TONSILLECTOMY      TUBAL LIGATION                                                                                                                    04/15/2025  Diana Montenegro is a 69 y.o., female.    Medication List with Changes/Refills   Current Medications    ALBUTEROL (PROVENTIL/VENTOLIN HFA) 90 MCG/ACTUATION INHALER    INHALE 2 PUFFS BY MOUTH EVERY 6 HOURS AS NEEDED FOR WHEEZING    APIXABAN (ELIQUIS) 5 MG TAB    Take 1 tablet (5 mg total) by mouth 2 (two) times daily.    ASPIRIN (ECOTRIN) 81 MG EC TABLET    Take 1 tablet (81 mg total) by mouth once daily.    B COMPLEX VITAMINS TABLET    Take 1 tablet by mouth once daily.    BIOTIN 10,000 MCG CAP    Take 10,000 mcg by mouth once daily.    BLOOD SUGAR DIAGNOSTIC (FREESTYLE LITE STRIPS) STRP    Test blood sugar once daily.    CALCIUM CITRATE/VITAMIN D3 (CALCIUM CITRATE + D ORAL)    Take 1 tablet by mouth every morning.    CETIRIZINE (ZYRTEC) 5 MG TABLET    Take 5 mg by mouth once daily.    CYANOCOBALAMIN, VITAMIN B-12, 500 MCG SUBL    Place 1 tablet under the tongue once daily.    DOCUSATE SODIUM (COLACE) 100 MG CAPSULE    Take 100 mg by mouth once daily.     ELIQUIS 5 MG TAB    Take 1 tablet by mouth twice daily    EMPAGLIFLOZIN (JARDIANCE) 10 MG TABLET    Take 1 tablet (10 mg total) by mouth once daily.    FERROUS SULFATE (IRON) 325 MG (65 MG IRON) TAB TABLET    Take 1 tablet (325 mg total) by mouth every other day.    FISH OIL-OMEGA-3 FATTY ACIDS 300-1,000 MG CAPSULE    Take 1 capsule by mouth once daily.    LEVOTHYROXINE (SYNTHROID) 88 MCG TABLET    Take 1 tablet (88 mcg total) by mouth before breakfast.    METFORMIN (GLUCOPHAGE) 1000 MG TABLET    TAKE 1 TABLET BY MOUTH TWICE DAILY WITH MEALS    MULTIVITAMIN CAPSULE    Take 1 capsule by mouth once daily.    OMEPRAZOLE (PRILOSEC) 40 MG CAPSULE    Take 1 capsule (40 mg total) by mouth every morning.    OZEMPIC 2 MG/DOSE  (8 MG/3 ML) PNIJ    INJECT 2 MG SUBCUTANEOUSLY ONCE A WEEK    POLYETHYLENE GLYCOL (MIRALAX) 17 GRAM PWPK    Take 17 g by mouth 2 (two) times daily.    ROSUVASTATIN (CRESTOR) 40 MG TAB    Take 1 tablet by mouth once daily    VALSARTAN-HYDROCHLOROTHIAZIDE (DIOVAN-HCT) 80-12.5 MG PER TABLET    Take 1 tablet by mouth once daily.        Pre-op Assessment    I have reviewed the Patient Summary Reports.     I have reviewed the Nursing Notes. I have reviewed the NPO Status.   I have reviewed the Medications.     Review of Systems  Anesthesia Hx:  No problems with previous Anesthesia             Denies Family Hx of Anesthesia complications.    Denies Personal Hx of Anesthesia complications.                    Social:  Former Smoker, No Alcohol Use       Hepatic/GI:  Bowel Prep.                       Physical Exam  General: Well nourished and Cooperative    Airway:  Mallampati: II   Mouth Opening: Normal  TM Distance: Normal  Tongue: Normal  Neck ROM: Normal ROM    Dental:  Dentures      Anesthesia Plan  Type of Anesthesia, risks & benefits discussed:    Anesthesia Type: Gen Natural Airway  Intra-op Monitoring Plan: Standard ASA Monitors  Informed Consent: Informed consent signed with the Patient and all parties understand the risks and agree with anesthesia plan.  All questions answered.   ASA Score: 3    Ready For Surgery From Anesthesia Perspective.     .

## 2025-04-15 NOTE — TRANSFER OF CARE
"Anesthesia Transfer of Care Note    Patient: Diana Montenegro    Procedure(s) Performed: Procedure(s) (LRB):  ESOPHAGOGASTRODUODENOSCOPY (EGD) (N/A)  COLONOSCOPY (N/A)    Patient location: [facility]    Anesthesia Type: general    Transport from OR: Transported from OR on 2-3 L/min O2 by NC with adequate spontaneous ventilation    Post pain: adequate analgesia    Post assessment: no apparent anesthetic complications    Post vital signs: stable    Level of consciousness: awake, alert and oriented    Nausea/Vomiting: no nausea/vomiting    Complications: none    Transfer of care protocol was followed    Last vitals: Visit Vitals  /65 (BP Location: Left arm, Patient Position: Lying)   Pulse (!) 55   Temp 36.5 °C (97.7 °F) (Temporal)   Resp 16   Ht 5' 3" (1.6 m)   Wt 83.9 kg (184 lb 15.5 oz)   LMP  (LMP Unknown)   SpO2 100%   Breastfeeding No   BMI 32.77 kg/m²     "

## 2025-04-15 NOTE — PROVATION PATIENT INSTRUCTIONS
Discharge Summary/Instructions after an Endoscopic Procedure  Patient Name: Diana Montenegro  Patient MRN: [id number]  Patient YOB: 1955  Tuesday, April 15, 2025  Addison Shelby MD  Dear patient,  As a result of recent federal legislation (The Federal Cures Act), you may   receive lab or pathology results from your procedure in your [facility]   account before your physician is able to contact you. Your physician or   their representative will relay the results to you with their   recommendations at their soonest availability.  Thank you,  RESTRICTIONS:  During your procedure today, you received medications for sedation.  These   medications may affect your judgment, balance and coordination.  Therefore,   for 24 hours, you have the following restrictions:   - DO NOT drive a car, operate machinery, make legal/financial decisions,   sign important papers or drink alcohol.    ACTIVITY:  Today: no heavy lifting, straining or running due to procedural   sedation/anesthesia.  The following day: return to full activity including work.  DIET:  Eat and drink normally unless instructed otherwise.     TREATMENT FOR COMMON SIDE EFFECTS:  - Mild abdominal pain, nausea, belching, bloating or excessive gas:  rest,   eat lightly and use a heating pad.  - Sore Throat: treat with throat lozenges and/or gargle with warm salt   water.  - Because air was used during the procedure, expelling large amounts of air   from your rectum or belching is normal.  - If a bowel prep was taken, you may not have a bowel movement for 1-3 days.    This is normal.  SYMPTOMS TO WATCH FOR AND REPORT TO YOUR PHYSICIAN:  1. Abdominal pain or bloating, other than gas cramps.  2. Chest pain.  3. Back pain.  4. Signs of infection such as: chills or fever occurring within 24 hours   after the procedure.  5. Rectal bleeding, which would show as bright red, maroon, or black stools.   (A tablespoon of blood from the rectum is not serious, especially  if   hemorrhoids are present.)  6. Vomiting.  7. Weakness or dizziness.  GO DIRECTLY TO THE NEAREST EMERGENCY ROOM IF YOU HAVE ANY OF THE FOLLOWING:      Difficulty breathing              Chills and/or fever over 101 F   Persistent vomiting and/or vomiting blood   Severe abdominal pain   Severe chest pain   Black, tarry stools   Bleeding- more than one tablespoon   Any other symptom or condition that you feel may need urgent attention  Your doctor recommends these additional instructions:  If any biopsies were taken, your doctors clinic will contact you in 1 to 2   weeks with any results.  - Discharge patient to home.   - Resume Eliquis (apixaban) at prior dose tomorrow.   - Return to referring physician.   - Repeat colonoscopy in 3 years for surveillance (history of an advance   colon adenoma removed by Dr. Youssef 12/6/2023)   - Return to physician assistant MARLA Banks  - The findings and recommendations were discussed with the patient.  For questions, problems or results please call your physician - Addison Shelby MD at Work:  [phone].  [facility], EMERGENCY ROOM PHONE NUMBER: [phone]  IF A COMPLICATION OR EMERGENCY SITUATION ARISES AND YOU ARE UNABLE TO REACH   YOUR PHYSICIAN - GO DIRECTLY TO THE EMERGENCY ROOM.  Addison Shelby MD  4/15/2025 1:33:06 PM  This report has been verified and signed electronically.  Dear patient,  As a result of recent federal legislation (The Federal Cures Act), you may   receive lab or pathology results from your procedure in your [facility]   account before your physician is able to contact you. Your physician or   their representative will relay the results to you with their   recommendations at their soonest availability.  Thank you,  PROVATION

## 2025-04-15 NOTE — ANESTHESIA POSTPROCEDURE EVALUATION
Anesthesia Post Evaluation    Patient: Diana Montenegro    Procedure(s) Performed: Procedure(s) (LRB):  ESOPHAGOGASTRODUODENOSCOPY (EGD) (N/A)  COLONOSCOPY (N/A)    Final Anesthesia Type: general      Patient location during evaluation: PACU  Patient participation: Yes- Able to Participate  Level of consciousness: awake and alert and oriented  Post-procedure vital signs: reviewed and stable  Pain management: adequate  Airway patency: patent    PONV status at discharge: No PONV  Anesthetic complications: no      Cardiovascular status: hemodynamically stable  Respiratory status: nasal cannula  Hydration status: euvolemic  Follow-up not needed.            Vitals Value Taken Time   /58 04/15/25 13:45   Temp 98 04/15/25 14:02   Pulse 51 04/15/25 13:45   Resp 16 04/15/25 13:45   SpO2 100 % 04/15/25 13:45         No case tracking events are documented in the log.      Pain/Roger Score: Roger Score: 10 (4/15/2025  1:45 PM)

## 2025-04-15 NOTE — H&P
Short Stay Endoscopy History and Physical    PCP - Rad Johnston MD    Procedure - EGD/Colonoscopy  ASA - per anesthesia  Mallampati - per anesthesia  History of Anesthesia problems - no  Family history Anesthesia problems -  no   Plan of anesthesia - MAC    HPI:  This is a 69 y.o. female here for evaluation of :     EGD - Fe Deficiency Anemia   Colon - Fe Deficiency Anemia     ROS:  Constitutional: No fevers, chills, No weight loss  CV: No chest pain  Pulm: No cough, No shortness of breath  Ophtho: No vision changes  GI: see HPI  Derm: No rash    Medical History:  has a past medical history of Arthritis, Atrial fibrillation, unspecified, Chronic back pain, Chronic kidney disease, stage 3a (06/01/2023), Colon polyp, CVA (cerebral infarction) (07/16/2014), Degenerative disc disease, Diabetes mellitus type II, Encounter for loop recorder at end of battery life (09/17/2018), Hyperlipidemia, Hypertension, Hypothyroidism, Mild nonproliferative diabetic retinopathy (08/12/2018), Obesity, Sleep apnea, Stroke (07/2014), and Venous insufficiency (chronic) (peripheral).    Surgical History:  has a past surgical history that includes Tubal ligation; Tonsillectomy; Hernia repair; Gastrectomy; Colonoscopy w/ polypectomy; Colonoscopy (N/A, 08/30/2018); Removal of implantable loop recorder (N/A, 09/17/2018); Colonoscopy (N/A, 12/6/2023); block, nerve, genicular (Right, 8/21/2024); radiofrequency ablation, nerve, genicular, knee (Right, 9/25/2024); Cataract extraction w/  intraocular lens implant (Right, 12/9/2024); and phacoemulsification, cataract, with iol insertion (Left, 2/12/2025).    Family History: family history includes Diabetes in her maternal grandfather; Heart disease in her father; No Known Problems in her maternal grandmother, mother, paternal grandfather, sister, and sister; Obesity in her maternal grandfather; Stroke in her paternal grandmother.. Otherwise no colon cancer, inflammatory bowel disease, or GI  malignancies.    Social History:  reports that she quit smoking about 10 years ago. Her smoking use included cigarettes. She started smoking about 40 years ago. She has a 30 pack-year smoking history. She has never used smokeless tobacco. She reports that she does not currently use alcohol. She reports that she does not use drugs.    Review of patient's allergies indicates:   Allergen Reactions    Medrol [methylprednisolone] Palpitations       Medications:   Prescriptions Prior to Admission[1]    Physical Exam:    Vital Signs: There were no vitals filed for this visit.    General Appearance: Well appearing in no acute distress  Eyes:    No scleral icterus  ENT: Neck supple, Lips, mucosa, and tongue normal; teeth and gums normal  Abdomen: Soft, non tender, non distended with normal bowel sounds. No hepatosplenomegaly, ascites, or mass.  Extremities: No edema  Skin: No rash    Labs:  Lab Results   Component Value Date    WBC 3.86 (L) 02/27/2025    HGB 8.3 (L) 02/27/2025    HCT 28.5 (L) 02/27/2025     02/27/2025    CHOL 135 11/14/2024    TRIG 85 11/14/2024    HDL 62 11/14/2024    ALT 73 (H) 02/27/2025    AST 58 (H) 02/27/2025     02/27/2025    K 4.1 02/27/2025     (H) 02/27/2025    CREATININE 1.1 02/27/2025    BUN 26 (H) 02/27/2025    CO2 24 02/27/2025    TSH 0.786 02/06/2025    INR 1.0 09/10/2018    GLUF 163 (H) 03/26/2004    HGBA1C 5.7 (H) 02/27/2025       I have explained the risks and benefits of endoscopy procedures to the patient including but not limited to bleeding, perforation, infection, and death.  The patient was asked if they understand and allowed to ask any further questions to their satisfaction.    Shayla Altamirano DO         [1]   Medications Prior to Admission   Medication Sig Dispense Refill Last Dose/Taking    albuterol (PROVENTIL/VENTOLIN HFA) 90 mcg/actuation inhaler INHALE 2 PUFFS BY MOUTH EVERY 6 HOURS AS NEEDED FOR WHEEZING 21.1 g 3     apixaban (ELIQUIS) 5 mg Tab Take 1  tablet (5 mg total) by mouth 2 (two) times daily. 180 tablet 3     aspirin (ECOTRIN) 81 MG EC tablet Take 1 tablet (81 mg total) by mouth once daily.       b complex vitamins tablet Take 1 tablet by mouth once daily.       biotin 10,000 mcg Cap Take 10,000 mcg by mouth once daily.       blood sugar diagnostic (FREESTYLE LITE STRIPS) Strp Test blood sugar once daily. 100 strip 3     CALCIUM CITRATE/VITAMIN D3 (CALCIUM CITRATE + D ORAL) Take 1 tablet by mouth every morning.       cetirizine (ZYRTEC) 5 MG tablet Take 5 mg by mouth once daily.       cyanocobalamin, vitamin B-12, 500 mcg Subl Place 1 tablet under the tongue once daily.       docusate sodium (COLACE) 100 MG capsule Take 100 mg by mouth once daily.        ELIQUIS 5 mg Tab Take 1 tablet by mouth twice daily 180 tablet 3     empagliflozin (JARDIANCE) 10 mg tablet Take 1 tablet (10 mg total) by mouth once daily. 90 tablet 3     ferrous sulfate (IRON) 325 mg (65 mg iron) Tab tablet Take 1 tablet (325 mg total) by mouth every other day. 45 tablet 0     fish oil-omega-3 fatty acids 300-1,000 mg capsule Take 1 capsule by mouth once daily.       levothyroxine (SYNTHROID) 88 MCG tablet Take 1 tablet (88 mcg total) by mouth before breakfast. 90 tablet 3     metFORMIN (GLUCOPHAGE) 1000 MG tablet TAKE 1 TABLET BY MOUTH TWICE DAILY WITH MEALS 180 tablet 1     multivitamin capsule Take 1 capsule by mouth once daily.       omeprazole (PRILOSEC) 40 MG capsule Take 1 capsule (40 mg total) by mouth every morning. 90 capsule 3     OZEMPIC 2 mg/dose (8 mg/3 mL) PnIj INJECT 2 MG SUBCUTANEOUSLY ONCE A WEEK 9 mL 1     polyethylene glycol (MIRALAX) 17 gram PwPk Take 17 g by mouth 2 (two) times daily. 60 each 3     rosuvastatin (CRESTOR) 40 MG Tab Take 1 tablet by mouth once daily 90 tablet 3     valsartan-hydrochlorothiazide (DIOVAN-HCT) 80-12.5 mg per tablet Take 1 tablet by mouth once daily. 90 tablet 3

## 2025-04-16 LAB
ESTROGEN SERPL-MCNC: NORMAL PG/ML
INSULIN SERPL-ACNC: NORMAL U[IU]/ML
LAB AP CLINICAL INFORMATION: NORMAL
LAB AP GROSS DESCRIPTION: NORMAL
LAB AP PERFORMING LOCATION(S): NORMAL
LAB AP REPORT FOOTNOTES: NORMAL

## 2025-04-17 ENCOUNTER — RESULTS FOLLOW-UP (OUTPATIENT)
Dept: GASTROENTEROLOGY | Facility: CLINIC | Age: 70
End: 2025-04-17

## 2025-04-17 ENCOUNTER — PATIENT MESSAGE (OUTPATIENT)
Dept: GASTROENTEROLOGY | Facility: CLINIC | Age: 70
End: 2025-04-17
Payer: MEDICARE

## 2025-04-17 NOTE — PROGRESS NOTES
Kaleigh your EGD colonoscopy pathology was benign.  No evidence of celiac sprue no evidence of H pylori.    Final Diagnosis   1. Duodenum, biopsy:   Duodenal mucosa with well-preserved villous architecture and no significant histopathologic abnormality.    No evidence of celiac disease.     2. Stomach, biopsy:   Antral and oxyntic mucosa with mild chronic inflammation.    No H.pylori identified on H&E stain slide.    No intestinal metaplasia or dysplasia.     3. Descending colon, polyp, biopsy:   Colonic mucosa with mild surface hyperplastic change.      Electronically signed by Vinny Ash Jr., MD on 4/16/2025

## 2025-05-02 DIAGNOSIS — Z98.84 S/P LAPAROSCOPIC SLEEVE GASTRECTOMY: ICD-10-CM

## 2025-05-02 DIAGNOSIS — E11.3311 TYPE 2 DIABETES MELLITUS WITH RIGHT EYE AFFECTED BY MODERATE NONPROLIFERATIVE RETINOPATHY AND MACULAR EDEMA, WITHOUT LONG-TERM CURRENT USE OF INSULIN: ICD-10-CM

## 2025-05-02 RX ORDER — OMEPRAZOLE 40 MG/1
40 CAPSULE, DELAYED RELEASE ORAL EVERY MORNING
Qty: 90 CAPSULE | Refills: 3 | Status: SHIPPED | OUTPATIENT
Start: 2025-05-02

## 2025-05-02 RX ORDER — VALSARTAN AND HYDROCHLOROTHIAZIDE 80; 12.5 MG/1; MG/1
1 TABLET, FILM COATED ORAL DAILY
Qty: 90 TABLET | Refills: 3 | Status: SHIPPED | OUTPATIENT
Start: 2025-05-02

## 2025-05-02 RX ORDER — SEMAGLUTIDE 2.68 MG/ML
INJECTION, SOLUTION SUBCUTANEOUS
Qty: 9 ML | Refills: 1 | Status: SHIPPED | OUTPATIENT
Start: 2025-05-02

## 2025-05-02 NOTE — TELEPHONE ENCOUNTER
Refill Decision Note   Diana Montenegro  is requesting a refill authorization.  Brief Assessment and Rationale for Refill:  Approve     Medication Therapy Plan:         Comments:     Note composed:11:05 AM 05/02/2025

## 2025-05-02 NOTE — TELEPHONE ENCOUNTER
No care due was identified.  [facility] Embedded Care Due Messages. Reference number: [id number].   5/02/2025 10:21:52 AM CDT

## 2025-05-06 DIAGNOSIS — D64.9 ANEMIA, UNSPECIFIED TYPE: ICD-10-CM

## 2025-05-06 NOTE — TELEPHONE ENCOUNTER
Refill Routing Note   Medication(s) are not appropriate for processing by [facility] for the following reason(s):        Outside of protocol  New or recently adjusted medication  No active prescription written by provider    [facility] action(s):  Route        Medication Therapy Plan: Previous ordered under different provider than PCP      Appointments  past 12m or future 3m with PCP    Date Provider   Last Visit   3/25/2025 Rad Johnston MD   Next Visit   Visit date not found Rad Johnston MD   ED visits in past 90 days: 0        Note composed:1:38 PM 05/06/2025

## 2025-05-07 RX ORDER — FERROUS SULFATE 325(65) MG
325 TABLET ORAL EVERY OTHER DAY
Qty: 45 TABLET | Refills: 0 | Status: SHIPPED | OUTPATIENT
Start: 2025-05-07 | End: 2025-08-05

## 2025-05-12 ENCOUNTER — OFFICE VISIT (OUTPATIENT)
Dept: OPHTHALMOLOGY | Facility: CLINIC | Age: 70
End: 2025-05-12
Payer: MEDICARE

## 2025-05-12 ENCOUNTER — CLINICAL SUPPORT (OUTPATIENT)
Dept: OPHTHALMOLOGY | Facility: CLINIC | Age: 70
End: 2025-05-12
Payer: MEDICARE

## 2025-05-12 DIAGNOSIS — E11.3393 TYPE 2 DIABETES MELLITUS WITH BOTH EYES AFFECTED BY MODERATE NONPROLIFERATIVE RETINOPATHY WITHOUT MACULAR EDEMA, WITHOUT LONG-TERM CURRENT USE OF INSULIN: Primary | ICD-10-CM

## 2025-05-12 PROCEDURE — 1101F PT FALLS ASSESS-DOCD LE1/YR: CPT | Mod: CPTII,S$GLB,, | Performed by: OPHTHALMOLOGY

## 2025-05-12 PROCEDURE — 3066F NEPHROPATHY DOC TX: CPT | Mod: CPTII,S$GLB,, | Performed by: OPHTHALMOLOGY

## 2025-05-12 PROCEDURE — 92014 COMPRE OPH EXAM EST PT 1/>: CPT | Mod: 24,S$GLB,, | Performed by: OPHTHALMOLOGY

## 2025-05-12 PROCEDURE — 3044F HG A1C LEVEL LT 7.0%: CPT | Mod: CPTII,S$GLB,, | Performed by: OPHTHALMOLOGY

## 2025-05-12 PROCEDURE — 1160F RVW MEDS BY RX/DR IN RCRD: CPT | Mod: CPTII,S$GLB,, | Performed by: OPHTHALMOLOGY

## 2025-05-12 PROCEDURE — 92134 CPTRZ OPH DX IMG PST SGM RTA: CPT | Mod: S$GLB,,, | Performed by: OPHTHALMOLOGY

## 2025-05-12 PROCEDURE — 1126F AMNT PAIN NOTED NONE PRSNT: CPT | Mod: CPTII,S$GLB,, | Performed by: OPHTHALMOLOGY

## 2025-05-12 PROCEDURE — 2022F DILAT RTA XM EVC RTNOPTHY: CPT | Mod: CPTII,S$GLB,, | Performed by: OPHTHALMOLOGY

## 2025-05-12 PROCEDURE — 1159F MED LIST DOCD IN RCRD: CPT | Mod: CPTII,S$GLB,, | Performed by: OPHTHALMOLOGY

## 2025-05-12 PROCEDURE — 99999 PR PBB SHADOW E&M-EST. PATIENT-LVL III: CPT | Mod: PBBFAC,,, | Performed by: OPHTHALMOLOGY

## 2025-05-12 PROCEDURE — 3288F FALL RISK ASSESSMENT DOCD: CPT | Mod: CPTII,S$GLB,, | Performed by: OPHTHALMOLOGY

## 2025-05-12 NOTE — PROGRESS NOTES
Subjective:       Patient ID: Diana Montenegro is a 69 y.o. female      Chief Complaint   Patient presents with    Follow-up     History of Present Illness  HPI    4 month DFE/OCT.     Pt states that she has been seeing flashes in OU peripheral every so   often. Started weeks ago and have now resolved.  No eye pain or   discomfort. No floaters. States that about 2 weeks ago, OD was really red   and looked like a blood vessel had popped.  Otherwise vision has been stable OU  Last edited by Ronny Ni MD on 5/12/2025  2:35 PM.        Imaging:    See report    Assessment/Plan:     1. Type 2 diabetes mellitus with both eyes affected by moderate nonproliferative retinopathy without macular edema, without long-term current use of insulin  No ME today.  Pt reports h/o Ey for ME OD, last one April 2024  Observe    Diabetic Retinopathy discussed in detail, all questions answered  Stressed importance of good BS/BP/Chol Control  RTC immediately PRN any vision changes, graciela blurry vision, missing vision, floaters, distortions, etc    - Posterior Segment OCT Retina-Both eyes  - Prior authorization Order      If stable next visit will space out f/u to 6 mo    2. Diabetic cataract of left eye  Doing well s/p CE/IOL OD  Has plan for OS.  May proceed from retinal standpoint  F/u Dr GODFREY    3. Bilateral dry eyes  Can continue ATs OU BID    4. Flashes OD  Have resolved.  No retinal breaks      Visit today included increased complexity associated with the care of the episodic problem DR, cataracts, dry eyes addressed and managing the longitudinal care of the patient due to the serious and/or complex managed problem(s) DR, cataracts, dry eyes.      Follow up in about 4 months (around 9/12/2025), or if symptoms worsen or fail to improve, for Comprehensive Examination (DILATE OU), OCT Mac.

## 2025-05-15 ENCOUNTER — DOCUMENTATION ONLY (OUTPATIENT)
Dept: REHABILITATION | Facility: HOSPITAL | Age: 70
End: 2025-05-15
Payer: MEDICARE

## 2025-05-15 NOTE — PROGRESS NOTES
Health  Wellness Visit Note    Name: Diana Montenegro  Clinic Number: [id number]  Physician: No ref. provider found  Diagnosis: No diagnosis found.  Past Medical History:   Diagnosis Date    Arthritis     Atrial fibrillation, unspecified     hx of a fib prior to stroke.    Cataract     Chronic back pain     Chronic kidney disease, stage 3a 06/01/2023    Colon polyp     CVA (cerebral infarction) 07/16/2014    Degenerative disc disease     cervical; lumbar    Diabetes mellitus type II     Encounter for loop recorder at end of battery life 09/17/2018    Hyperlipidemia     Hypertension     Hypothyroidism     Mild nonproliferative diabetic retinopathy 08/12/2018    Obesity     Sleep apnea     Stroke 07/2014    Venous insufficiency (chronic) (peripheral)      Visit Number: 40  Precautions: Standard, Fall Risk, History of Stroke      1st PT visit:  07/16/2024  Year of care end date:  July 2025  Mindbody plan: 60---9 Months  Patient level: C    Time In: 1:30 PM  Time Out: 2:30 PM  Total Treatment Time: 60 Minutes    Wellness [employer] 2022  Handout on this week's wellness topic Positive Thinking was provided along with a discussion on what it means, the benefits, and suggestions for practice.  Reviewed last week's topic of n/a (patient did not attend Wellness last week).     Subjective:   Patient reports to Wellness after missing over a month. She has no complaints of low back and states she hasn't had much issues since being away. She stayed busy with a lot of family and friends visiting her within the last month. She is returning to her normal routine this week. She stretches, swims and goes to the gym. Patient ice when necessary at home.    HC and patient discussed full range of motion on leg press before increasing weight.     Objective:   Diana completed therapeutic stretches (EIL, SAIDA) and the following MedX exercise machines: core lumbar, torso rotation l/r, leg extension, leg curl, upright row, chest press,  biceps curl, triceps extension, leg press    See exercise log in patient folder for rate of exertion and repetitions completed.       Fitness Machine Education Key:  E=education on equipment initiated and further follow up and education needed  I=independent with  and exercise.  The patient:  Adjusts machines to his/her settings  Uses equipment levers, pins, weights safely  Maintains safe and correct posture while exercising  Moves through exercise with correct pace and control  Gets on and off equipment safely      Lumbar/Cervical Ext. E Torso Rotation E Leg Press E   Leg Extension  Seated Leg Curl  Chest Press    Seated Row  Hip ADD E Hip ABD E   Triceps Extension  Bicep Curl  Other:      [x] Indicates exercise has been taught for home  Lumbar/Cervical Ext. [] Torso Rotation [] Leg Press []   Leg Extension [] Seated Leg Curl [] Chest Press []   Seated Row [] Hip ADD [] Hip ABD []   Triceps Extension [] Bicep Curl [] Other:        Assessment:   Patient tolerated Patient tolerated MedX Core Lumbar Strength and all other peripheral exercises without an increase in symptoms. Patient warmed up on nustep for 5 minutes, stretched, and iced low back for 5 minutes after the workout.     Plan:  Continue with established plan of care towards wellness goals.     Health  : Ann-Marie Bey  5/15/2025

## 2025-05-22 ENCOUNTER — DOCUMENTATION ONLY (OUTPATIENT)
Dept: REHABILITATION | Facility: HOSPITAL | Age: 70
End: 2025-05-22
Payer: MEDICARE

## 2025-05-22 NOTE — PROGRESS NOTES
Health  Wellness Visit Note    Name: Diana Montenegro  Clinic Number: [id number]  Physician: No ref. provider found  Diagnosis: No diagnosis found.  Past Medical History:   Diagnosis Date    Arthritis     Atrial fibrillation, unspecified     hx of a fib prior to stroke.    Cataract     Chronic back pain     Chronic kidney disease, stage 3a 06/01/2023    Colon polyp     CVA (cerebral infarction) 07/16/2014    Degenerative disc disease     cervical; lumbar    Diabetes mellitus type II     Encounter for loop recorder at end of battery life 09/17/2018    Hyperlipidemia     Hypertension     Hypothyroidism     Mild nonproliferative diabetic retinopathy 08/12/2018    Obesity     Sleep apnea     Stroke 07/2014    Venous insufficiency (chronic) (peripheral)      Visit Number: 41  Precautions: Standard, Fall Risk, History of Stroke      1st PT visit:  07/16/2024  Year of care end date:  July 2025  Mindbody plan: 60---9 Months  Patient level: C    Time In: 1:30 PM  Time Out: 2:30 PM  Total Treatment Time: 60 Minutes    Wellness [employer]  Handout on this week's wellness topic Memory was provided along with a discussion on what it means, the benefits, and suggestions for practice.  Reviewed last week's topic of Positive Thinking.     Subjective:   Patient reports no complaints of low back pain but does have some shoulder pain. She plans to see her MD if pain persists. Since her last Wellness session, she has been to the gym 4 times. She is getting back to her normal routine since family visited her this past month.Patient ice when necessary at home.    HC and patient discussed full range of motion on leg press before increasing weight.     Objective:   Diana completed therapeutic stretches (EIL, SAIDA) and the following MedX exercise machines: core lumbar, torso rotation l/r, leg extension, leg curl, upright row, chest press, biceps curl, triceps extension, leg press    See exercise log in patient folder for rate of  exertion and repetitions completed.       Fitness Machine Education Key:  E=education on equipment initiated and further follow up and education needed  I=independent with  and exercise.  The patient:  Adjusts machines to his/her settings  Uses equipment levers, pins, weights safely  Maintains safe and correct posture while exercising  Moves through exercise with correct pace and control  Gets on and off equipment safely      Lumbar/Cervical Ext. E Torso Rotation E Leg Press E   Leg Extension  Seated Leg Curl  Chest Press    Seated Row  Hip ADD E Hip ABD E   Triceps Extension  Bicep Curl  Other:      [x] Indicates exercise has been taught for home  Lumbar/Cervical Ext. [] Torso Rotation [] Leg Press []   Leg Extension [] Seated Leg Curl [] Chest Press []   Seated Row [] Hip ADD [] Hip ABD []   Triceps Extension [] Bicep Curl [] Other:        Assessment:   Patient tolerated Patient tolerated MedX Core Lumbar Strength and all other peripheral exercises without an increase in symptoms. Patient warmed up on nustep for 5 minutes, stretched, and iced low back for 5 minutes after the workout.     Plan:  Continue with established plan of care towards wellness goals.     Health  : Ann-Marie Bey  5/22/2025

## 2025-05-28 NOTE — PROGRESS NOTES
"Subjective:       Patient ID: Diana Montenegro is a 66 y.o. female.    Vitals:  height is 5' 5" (1.651 m) and weight is 90.3 kg (199 lb). Her temperature is 98.4 °F (36.9 °C). Her blood pressure is 134/68 and her pulse is 71. Her respiration is 16 and oxygen saturation is 98%.     Chief Complaint: Laceration (Lt leg)    Pt presents a left lower leg laceration since Sunday. Pt states a picture fell off her wall and cut her leg    Provider note starts below:  Patient presents with wound on her left lower leg.  She states on Sunday a piece of artwork on her wall fell and hit her ankle and cut it open.  She noticed it started turning red after she swam in a salt water pool the other day. She has been putting hydrogen peroxide and neosporin on it. She denies fever, chills, drainage, tachycardia, numbness or tingling.    Laceration   The incident occurred 5 to 7 days ago. The laceration is located on the left leg. The laceration is 1 cm in size. The laceration mechanism was a blunt object. The pain is at a severity of 3/10. The pain is mild. The pain has been constant since onset. She reports no foreign bodies present. Her tetanus status is out of date.       Constitution: Negative for chills and fever.   Cardiovascular: Negative for palpitations.   Skin: Positive for wound and erythema. Negative for bruising and abscess.   Neurological: Negative for numbness and tingling.       Objective:      Physical Exam   Constitutional: She is oriented to person, place, and time. She appears well-developed.   HENT:   Head: Normocephalic and atraumatic. Head is without abrasion, without contusion and without laceration.   Ears:   Right Ear: External ear normal.   Left Ear: External ear normal.   Nose: Nose normal.   Mouth/Throat: Oropharynx is clear and moist and mucous membranes are normal.   Eyes: Conjunctivae, EOM and lids are normal. Pupils are equal, round, and reactive to light.   Neck: Trachea normal and phonation normal. " Neck supple.   Cardiovascular: Normal rate, regular rhythm, normal heart sounds and normal pulses.   Pulses:       Dorsalis pedis pulses are 2+ on the right side and 2+ on the left side.   Pulmonary/Chest: Effort normal and breath sounds normal. No stridor. No respiratory distress.   Musculoskeletal: Normal range of motion.         General: Normal range of motion.   Neurological: She is alert and oriented to person, place, and time.   Skin: Skin is warm, dry, intact and no rash. erythema No abrasion, No burn, No bruising and No ecchymosis         Comments: Two healing wounds on lateral aspect of left leg with surrounding erythema; no drainage with light pressure; no visible abscess   Psychiatric: Her speech is normal and behavior is normal. Judgment and thought content normal.   Nursing note and vitals reviewed.            Assessment:       1. Cellulitis of left leg          Plan:     patient up-to-date on tetanus.  Patient endorses exposure to water since having the wound.  Will cover for possible skin nasir and water exposure as given patient's history of diabetes.  Signs of worsening infection discussed in ED precautions given.  Patient agreed with this plan.  All questions answered.  Patient discharged in no acute distress.    Cellulitis of left leg  -     cephALEXin (KEFLEX) 500 MG capsule; Take 1 capsule (500 mg total) by mouth 4 (four) times daily. for 5 days  Dispense: 20 capsule; Refill: 0  -     levoFLOXacin (LEVAQUIN) 750 MG tablet; Take 1 tablet (750 mg total) by mouth once daily. for 5 days  Dispense: 5 tablet; Refill: 0  -     mupirocin (BACTROBAN) 2 % ointment; Apply topically 2 (two) times daily. for 7 days  Dispense: 15 g; Refill: 0          Patient Instructions   PLEASE READ ALL DISCHARGE INSTRUCTIONS                                               Cellulitis   If your condition worsens or fails to improve we recommend that you receive another evaluation at the ER immediately or contact your PCP to  discuss your concerns or return here. You must understand that you've received an urgent care treatment only and that you may be released before all your medical problems are known or treated. You the patient will arrange for follow-up care as instructed.     Clean the wound twice a day and put the antibiotic ointment on it if prescribed.     If you were prescribed antibiotics, please take them to completion. If you are female and on BCP use additional methods to prevent pregnancy while on antibiotics and for one cycle after.     Tylenol or ibuprofen can also be used as directed for pain unless you have an allergy to them or medical condition such as stomach ulcers, kidney or liver disease or blood thinners etc for which you should not be taking these type of medications.     You should seek ER treatment if fever, increase pain, swelling or other signs of increasing infection.     Please follow up with your primary care doctor or specialist as needed.

## 2025-05-29 ENCOUNTER — DOCUMENTATION ONLY (OUTPATIENT)
Dept: REHABILITATION | Facility: HOSPITAL | Age: 70
End: 2025-05-29
Payer: MEDICARE

## 2025-06-05 ENCOUNTER — DOCUMENTATION ONLY (OUTPATIENT)
Dept: REHABILITATION | Facility: HOSPITAL | Age: 70
End: 2025-06-05
Payer: MEDICARE

## 2025-06-08 NOTE — PROGRESS NOTES
Pt arrives via walk in, approximately 24 weeks pregnant. Pt c/o N/V, back pain. Consent given by mother for treatment.    Subjective:      Patient ID: Diana Montenegro is a 65 y.o. female.    Chief Complaint: Diabetes Mellitus (6/16/2020 office visit with PCP-SHIRA Johnston ) and Nail Care    Diana is a 65 y.o. female who presents to the clinic for evaluation and treatment of high risk feet. Diana has a past medical history of Arthritis, Atrial fibrillation, unspecified, Chronic back pain, Colon polyp, CVA (cerebral infarction) (7/16/14), Degenerative disc disease, Diabetes mellitus type II, Encounter for loop recorder at end of battery life (9/17/2018), Hyperlipidemia, Hypertension, Hypothyroidism, Mild nonproliferative diabetic retinopathy (08/12/2018), Obesity, Sleep apnea, Stroke (july 2014), and Venous insufficiency (chronic) (peripheral). The patient's chief complaint is long, thick toenails. Pt is also complaining of itchy feet. This patient has documented high risk feet requiring routine maintenance secondary to diabetes mellitis and those secondary complications of diabetes, as mentioned..  .     PCP: Rad Johnston MD    Date Last Seen by PCP:  7/5/20    Current shoe gear:  Affected Foot: Casual shoes     Unaffected Foot: Casual shoes    Hemoglobin A1C   Date Value Ref Range Status   06/09/2020 6.8 (H) 4.0 - 5.6 % Final     Comment:     ADA Screening Guidelines:  5.7-6.4%  Consistent with prediabetes  >or=6.5%  Consistent with diabetes  High levels of fetal hemoglobin interfere with the HbA1C  assay. Heterozygous hemoglobin variants (HbS, HgC, etc)do  not significantly interfere with this assay.   However, presence of multiple variants may affect accuracy.     11/08/2019 6.8 (H) 4.0 - 5.6 % Final     Comment:     ADA Screening Guidelines:  5.7-6.4%  Consistent with prediabetes  >or=6.5%  Consistent with diabetes  High levels of fetal hemoglobin interfere with the HbA1C  assay. Heterozygous hemoglobin variants (HbS, HgC, etc)do  not significantly interfere with this assay.   However, presence of multiple variants may  affect accuracy.     08/30/2019 7.7 (H) 4.0 - 5.6 % Final     Comment:     ADA Screening Guidelines:  5.7-6.4%  Consistent with prediabetes  >or=6.5%  Consistent with diabetes  High levels of fetal hemoglobin interfere with the HbA1C  assay. Heterozygous hemoglobin variants (HbS, HgC, etc)do  not significantly interfere with this assay.   However, presence of multiple variants may affect accuracy.         Review of Systems   Constitution: Negative for decreased appetite, fever and malaise/fatigue.   HENT: Negative for congestion.    Cardiovascular: Negative for chest pain and leg swelling.   Respiratory: Negative for cough and shortness of breath.    Skin: Positive for dry skin, itching and nail changes. Negative for rash.   Musculoskeletal: Positive for arthritis and stiffness. Negative for joint pain, joint swelling and muscle weakness.   Gastrointestinal: Negative for bloating, abdominal pain, nausea and vomiting.   Neurological: Negative for headaches, numbness and weakness.   Psychiatric/Behavioral: Negative for altered mental status.             Past Medical History:   Diagnosis Date    Arthritis     Atrial fibrillation, unspecified     hx of a fib prior to stroke.    Chronic back pain     Colon polyp     CVA (cerebral infarction) 7/16/14    Degenerative disc disease     cervical; lumbar    Diabetes mellitus type II     Encounter for loop recorder at end of battery life 9/17/2018    Hyperlipidemia     Hypertension     Hypothyroidism     Mild nonproliferative diabetic retinopathy 08/12/2018    Obesity     Sleep apnea     Stroke july 2014    Venous insufficiency (chronic) (peripheral)        Past Surgical History:   Procedure Laterality Date    COLONOSCOPY N/A 8/30/2018    Procedure: COLONOSCOPY;  Surgeon: William Clement MD;  Location: [address]);  Service: Endoscopy;  Laterality: N/A;  Tito/Dr Rad Johnston/OK to hold 2 days prior to colon/see telephone encounter dated 7/24/18/pt has  loop recorder/svn    COLONOSCOPY W/ POLYPECTOMY      GASTRECTOMY      HERNIA REPAIR      REMOVAL OF IMPLANTABLE LOOP RECORDER N/A 2018    Procedure: REMOVAL, IMPLANTABLE LOOP RECORDER;  Surgeon: Thomas Randolph MD;  Location: [facility] CATH LAB;  Service: Cardiology;  Laterality: N/A;  TERESA, ILR removal (no reimplant), STACY, RN [place], SK, 3 Prep *Reveal LINQ*    TONSILLECTOMY      TUBAL LIGATION         Family History   Problem Relation Age of Onset    No Known Problems Mother     Heart disease Father     Diabetes Maternal Grandfather     Obesity Maternal Grandfather     No Known Problems Sister     No Known Problems Sister     No Known Problems Maternal Grandmother     Stroke Paternal Grandmother     No Known Problems Paternal Grandfather     Heart attack Neg Hx        Social History     Socioeconomic History    Marital status: Single     Spouse name: Not on file    Number of children: Not on file    Years of education: Not on file    Highest education level: Not on file   Occupational History    Occupation: MOS      Employer: [employer]   Social Needs    Financial resource strain: Not hard at all    Food insecurity     Worry: Never true     Inability: Never true    Transportation needs     Medical: No     Non-medical: No   Tobacco Use    Smoking status: Former Smoker     Packs/day: 1.00     Years: 30.00     Pack years: 30.00     Types: Cigarettes     Quit date: 2014     Years since quittin.2    Smokeless tobacco: Never Used   Substance and Sexual Activity    Alcohol use: Yes     Alcohol/week: 0.0 standard drinks     Frequency: Monthly or less     Drinks per session: 1 or 2     Binge frequency: Never     Comment: rarely    Drug use: No     Comment: thc at young age    Sexual activity: Not Currently     Partners: Male   Lifestyle    Physical activity     Days per week: 3 days     Minutes per session: 60 min    Stress: To some extent   Relationships     Social connections     Talks on phone: More than three times a week     Gets together: Three times a week     Attends [employer] service: Not on file     Active member of club or organization: No     Attends meetings of clubs or organizations: Not on file     Relationship status:    Other Topics Concern    Not on file   Social History Narrative    Not on file       Current Outpatient Medications   Medication Sig Dispense Refill    acyclovir 5% (ZOVIRAX) 5 % ointment Apply topically 6 (six) times daily. 5 g 1    albuterol (PROVENTIL/VENTOLIN HFA) 90 mcg/actuation inhaler Inhale 2 puffs into the lungs every 6 (six) hours as needed for Wheezing. Rescue 18 g 2    apixaban (ELIQUIS) 5 mg Tab Take 1 tablet (5 mg total) by mouth 2 (two) times daily. 180 tablet 3    aspirin (ECOTRIN) 81 MG EC tablet Take 1 tablet (81 mg total) by mouth once daily.      b complex vitamins tablet Take 1 tablet by mouth once daily.      blood pressure test kit-large Kit Use as directed 1 each 0    blood sugar diagnostic Strp Test blood sugar once daily 100 strip 3    CALCIUM CITRATE/VITAMIN D3 (CALCIUM CITRATE + D ORAL) Take 2 tablets by mouth 2 (two) times daily.      cetirizine (ZYRTEC) 5 MG tablet Take 5 mg by mouth once daily.      cinnamon bark (CINNAMON ORAL) Take by mouth 2 (two) times daily.      cyanocobalamin, vitamin B-12, 500 mcg Subl Place 1 tablet under the tongue once daily.      docusate sodium (COLACE) 100 MG capsule Take 100 mg by mouth once daily.       fish oil-omega-3 fatty acids 300-1,000 mg capsule Take by mouth 2 (two) times daily.      fluticasone (FLONASE) 50 mcg/actuation nasal spray 2 sprays (100 mcg total) by Each Nare route once daily. (Patient taking differently: 2 sprays by Each Nare route daily as needed. ) 1 Bottle 0    glimepiride (AMARYL) 4 MG tablet TAKE 1 TABLET BY MOUTH TWICE DAILY 180 tablet 3    lancets (ONE TOUCH DELICA LANCETS) 33 gauge Misc 1 lancet by Misc.(Non-Drug; Combo  Route) route 4 (four) times daily. 150 each 8    levothyroxine (SYNTHROID) 125 MCG tablet Take 1 tablet (125 mcg total) by mouth once daily. 90 tablet 3    LORazepam (ATIVAN) 1 MG tablet       metFORMIN (GLUCOPHAGE) 1000 MG tablet TAKE 1 TABLET BY MOUTH TWICE DAILY WITH MEALS 60 tablet 3    multivitamin capsule Take 1 capsule by mouth once daily.      omeprazole (PRILOSEC) 40 MG capsule Take 1 capsule (40 mg total) by mouth every morning. 90 capsule 3    rosuvastatin (CRESTOR) 40 MG Tab Take 1 tablet (40 mg total) by mouth once daily. 90 tablet 3    RUTIN/HESP/BIOFLAV/C/HERB#196 (BIOFLEX ORAL) Take 2 tablets by mouth once daily.      senna (SENNA) 8.6 mg tablet Take 2 tablets by mouth nightly as needed for Constipation. 100 tablet 3    traZODone (DESYREL) 50 MG tablet 1 pill PO PRN for insomnia at bedtime. OK to take 2nd pill in 30 minutes if still awake. 60 tablet 5    valsartan-hydrochlorothiazide (DIOVAN-HCT) 160-12.5 mg per tablet TAKE 1 TABLET BY MOUTH DAILY 30 tablet 6    walker (ULTRA-LIGHT ROLLATOR) Misc Use walker w/ seat daily as directed. 1 each 0    albuterol-ipratropium 2.5mg-0.5mg/3mL (DUO-NEB) 0.5 mg-3 mg(2.5 mg base)/3 mL nebulizer solution Take 3 mLs by nebulization every 6 (six) hours as needed for Wheezing. Rescue 3 vial 3    blood-glucose meter (ONETOUCH ULTRA SYSTEM KIT) kit Use as instructed 1 each 0    clotrimazole-betamethasone 1-0.05% (LOTRISONE) cream Apply topically 2 (two) times daily. 1 Tube 2    levocetirizine (XYZAL) 5 MG tablet Take 1 tablet (5 mg total) by mouth every evening. (Patient not taking: Reported on 1/20/2020) 30 tablet 11    metoprolol tartrate (LOPRESSOR) 25 MG tablet Take 1 tablet (25 mg total) by mouth once as needed. For palpitations 30 tablet 11     No current facility-administered medications for this visit.      Facility-Administered Medications Ordered in Other Visits   Medication Dose Route Frequency Provider Last Rate Last Dose    0.9%  NaCl  "infusion   Intravenous Continuous Khadijah Rivera NP   1,000 mL at 09/17/18 0939    ceFAZolin injection 2 g  2 g Intravenous On Call Procedure Khadijah Rivera NP           Review of patient's allergies indicates:   Allergen Reactions    Medrol [methylprednisolone] Palpitations       Vitals:    09/29/20 1314   BP: (!) 190/90   Pulse: 72   Weight: 103.2 kg (227 lb 8.2 oz)   Height: 5' 5" (1.651 m)   PainSc: 0-No pain       Objective:      Physical Exam  Vitals signs and nursing note reviewed.   Constitutional:       General: She is not in acute distress.     Appearance: She is well-developed. She is not diaphoretic.   Cardiovascular:      Pulses:           Dorsalis pedis pulses are 1+ on the right side and 1+ on the left side.        Posterior tibial pulses are 0 on the right side and 0 on the left side.      Comments: Pedal pulses diminished, stefany. Skin temperature is within normal limits. Toes are cool to touch and feet are warm proximally. Hair growth is diminished. Skin is normotrophic and with hyperpigmentation. Mild edema noted,stefany.  Musculoskeletal:         General: Deformity present. No tenderness.      Right foot: Decreased range of motion. Deformity present.      Left foot: Decreased range of motion. Deformity present.      Comments: Adequate joint range of motion without pain, limitation, nor crepitation to bilateral feet and ankle joints. Muscle strength is 5/5 in all groups bilaterally.    Rigid hammertoe 2 through 4, rigid mallet toe of the hallux bilaterally without pain.     Feet:      Right foot:      Protective Sensation: 10 sites tested. 6 sites sensed.      Skin integrity: Callus present. No ulcer, blister, skin breakdown, erythema, warmth or dry skin.      Left foot:      Protective Sensation: 10 sites tested. 6 sites sensed.      Skin integrity: No ulcer, blister, skin breakdown, erythema, warmth or dry skin.   Skin:     General: Skin is warm and dry.      Capillary Refill: Capillary " refill takes 2 to 3 seconds.      Coloration: Skin is not pale.      Findings: No bruising, ecchymosis, erythema, laceration, lesion, petechiae or rash.      Comments: Skin is warm and dry bilaterally, no acute SOI noted, bilaterally, appears stable.     Nails 1-5 stefany are elongated and thickened with dystrophic changes and discoloration noted    No open wounds or acute SOI noted, stefany, skin is stable.     Skin is mildly dry in moccasin distribution, bilaterally   Neurological:      Mental Status: She is alert and oriented to person, place, and time.      Sensory: Sensory deficit present.      Comments: [place] 5.07 monofilamant testing is diminished, vibratory sensation is diminished. Light touch within normal limits bilaterally.       Psychiatric:         Behavior: Behavior normal.         Thought Content: Thought content normal.         Judgment: Judgment normal.               Assessment:       Encounter Diagnoses   Name Primary?    Onychomycosis due to dermatophyte Yes    Diabetic polyneuropathy associated with type 2 diabetes mellitus     Type 2 diabetes mellitus without complication, without long-term current use of insulin     Tinea pedis of both feet          Plan:       Diana was seen today for diabetes mellitus and nail care.    Diagnoses and all orders for this visit:    Onychomycosis due to dermatophyte  -     Routine Foot Care    Diabetic polyneuropathy associated with type 2 diabetes mellitus  -     Routine Foot Care    Type 2 diabetes mellitus without complication, without long-term current use of insulin  -     Routine Foot Care    Tinea pedis of both feet    Other orders  -     clotrimazole-betamethasone 1-0.05% (LOTRISONE) cream; Apply topically 2 (two) times daily.      I counseled the patient on her conditions, their implications and medical management.    -Routine foot care per attached note    -Clotrimazole prescribed for tinea pedis. Educated pt on importance of keeping feet dry  to prevent spread of foot fungus.     -Shoe inspection. Diabetic Foot Education. Patient reminded of the importance of good nutrition and blood sugar control to help prevent podiatric complications of diabetes. Patient instructed on proper foot hygeine. We discussed wearing proper shoe gear, daily foot inspections, never walking without protective shoe gear, never putting sharp instruments to feet, routine podiatric nail visits      -Discussed general foot care:  Wear comfortable, proper fitting shoes. Wash feet daily. Dry well. After drying, apply moisturizer to feet (no lotion to webspaces). Inspect feet daily for skin breaks, blisters, swelling, or redness. Wear cotton socks (preferably white)  Change socks every day. Do NOT walk barefoot. Do NOT use heating pads or warm/hot water soaks.     -I discussed with the  patient  signs and symptoms of infection including redness, drainage, purulence, odor, pain, elevated BS, streaking, fever, chills, etc . Patient is to seek medical attention (ER or urgent care) if these symptoms occur       RTC in 6 mo, sooner PRN

## 2025-06-12 ENCOUNTER — DOCUMENTATION ONLY (OUTPATIENT)
Dept: REHABILITATION | Facility: HOSPITAL | Age: 70
End: 2025-06-12
Payer: MEDICARE

## 2025-06-12 NOTE — PROGRESS NOTES
Health  Wellness Visit Note    Name: Diana Montenegro  Clinic Number: [id number]  Physician: No ref. provider found  Diagnosis: No diagnosis found.  Past Medical History:   Diagnosis Date    Arthritis     Atrial fibrillation, unspecified     hx of a fib prior to stroke.    Cataract     Chronic back pain     Chronic kidney disease, stage 3a 06/01/2023    Colon polyp     CVA (cerebral infarction) 07/16/2014    Degenerative disc disease     cervical; lumbar    Diabetes mellitus type II     Encounter for loop recorder at end of battery life 09/17/2018    Hyperlipidemia     Hypertension     Hypothyroidism     Mild nonproliferative diabetic retinopathy 08/12/2018    Obesity     Sleep apnea     Stroke 07/2014    Venous insufficiency (chronic) (peripheral)      Visit Number: 44  Precautions: Standard, Fall Risk, History of Stroke      1st PT visit:  07/16/2024  Year of care end date:  July 2025  Mindbody plan: 60---9 Months  Patient level: C    Time In: 1:30 PM  Time Out: 2:30 PM  Total Treatment Time: 60 Minutes    Wellness [employer] 2022  Handout on this week's wellness Anxiety  was provided along with a discussion on what it means, the benefits, and suggestions for practice.  Reviewed last week's topic of Guilt vs Shame.     Subjective:   Patient reports no complaints of back pain but does have shoulder pain. She is seeing her MD next week to discuss POC since shoulder pain is continuing. She has been to the gym and swimming everyday. Patient ices when necessary at home.     HC and patient discussed full range of motion on leg press before increasing weight.     Objective:   Diana completed therapeutic stretches (EIL, SAIDA) and the following MedX exercise machines: core lumbar, torso rotation l/r, leg extension, leg curl, upright row, chest press, biceps curl, triceps extension, leg press    See exercise log in patient folder for rate of exertion and repetitions completed.       Fitness Machine Education  Key:  E=education on equipment initiated and further follow up and education needed  I=independent with  and exercise.  The patient:  Adjusts machines to his/her settings  Uses equipment levers, pins, weights safely  Maintains safe and correct posture while exercising  Moves through exercise with correct pace and control  Gets on and off equipment safely      Lumbar/Cervical Ext. E Torso Rotation E Leg Press E   Leg Extension  Seated Leg Curl  Chest Press    Seated Row  Hip ADD E Hip ABD E   Triceps Extension  Bicep Curl  Other:      [x] Indicates exercise has been taught for home  Lumbar/Cervical Ext. [] Torso Rotation [] Leg Press []   Leg Extension [] Seated Leg Curl [] Chest Press []   Seated Row [] Hip ADD [] Hip ABD []   Triceps Extension [] Bicep Curl [] Other:        Assessment:   Patient tolerated Patient tolerated MedX Core Lumbar Strength and all other peripheral exercises without an increase in symptoms. Patient warmed up on nustep for 5 minutes, stretched, and iced low back for 5 minutes after the workout.     Plan:  Continue with established plan of care towards wellness goals.     Health  : Ann-Marie Bey

## 2025-06-18 ENCOUNTER — TELEPHONE (OUTPATIENT)
Dept: INTERNAL MEDICINE | Facility: CLINIC | Age: 70
End: 2025-06-18
Payer: MEDICARE

## 2025-06-18 DIAGNOSIS — F17.210 SMOKING GREATER THAN 20 PACK YEARS: Primary | ICD-10-CM

## 2025-06-18 NOTE — TELEPHONE ENCOUNTER
Ct chest last done 6/7/24.    She is calling to reschedule next one.  Need order entered if ok.     Lov 3/25/25, nov for October not booked yet.   I need to call and get her scheduled.    Please let me know once order is in for ct.  Thanks damien

## 2025-06-18 NOTE — TELEPHONE ENCOUNTER
Copied from CRM #[id number]. Topic: General Inquiry - Patient Advice  >> Jun 18, 2025 12:07 PM Anshu wrote:  .1MEDICALADVICE     Patient is calling for Medical Advice regarding:pt is calling trying to see if she can get a ct low dose xray    Patient wants a call back or thru [facility], provide patient's call back phone number:Call back Kaleigh Lan [phone]    Comments:    Please advise patient replies from provider may take up to 48 hours.   alert

## 2025-06-26 ENCOUNTER — DOCUMENTATION ONLY (OUTPATIENT)
Dept: REHABILITATION | Facility: HOSPITAL | Age: 70
End: 2025-06-26
Payer: MEDICARE

## 2025-06-26 NOTE — PROGRESS NOTES
Health  Wellness Visit Note    Name: Diana Montenegro  Clinic Number: [id number]  Physician: No ref. provider found  Diagnosis: No diagnosis found.  Past Medical History:   Diagnosis Date    Arthritis     Atrial fibrillation, unspecified     hx of a fib prior to stroke.    Cataract     Chronic back pain     Chronic kidney disease, stage 3a 06/01/2023    Colon polyp     CVA (cerebral infarction) 07/16/2014    Degenerative disc disease     cervical; lumbar    Diabetes mellitus type II     Encounter for loop recorder at end of battery life 09/17/2018    Hyperlipidemia     Hypertension     Hypothyroidism     Mild nonproliferative diabetic retinopathy 08/12/2018    Obesity     Sleep apnea     Stroke 07/2014    Venous insufficiency (chronic) (peripheral)      Visit Number: 45  Precautions: Standard, Fall Risk, History of Stroke      1st PT visit:  07/16/2024  Year of care end date:  July 2025  Mindbody plan: 60---9 Months  Patient level: C    Time In: 1:30 PM  Time Out: 2:30 PM  Total Treatment Time: 60 Minutes    Wellness [employer] 2022  Handout on this week's wellness Emotional Expression was provided along with a discussion on what it means, the benefits, and suggestions for practice.  Reviewed last week's topic of n/a (patient did not attend Wellness last week).     Subjective:   Patient reports no complaints of back pain. She does still have some upper trap discomfort and inquired about dry needling. She has been to the gym and swimming everyday. Patient ices when necessary at home.     HC and patient discussed full range of motion on leg press before increasing weight.     Objective:   Diana completed therapeutic stretches (EIL, SAIDA) and the following MedX exercise machines: core lumbar, torso rotation l/r, leg extension, leg curl, upright row, chest press, biceps curl, triceps extension, leg press    See exercise log in patient folder for rate of exertion and repetitions completed.       Fitness Machine  Education Key:  E=education on equipment initiated and further follow up and education needed  I=independent with  and exercise.  The patient:  Adjusts machines to his/her settings  Uses equipment levers, pins, weights safely  Maintains safe and correct posture while exercising  Moves through exercise with correct pace and control  Gets on and off equipment safely      Lumbar/Cervical Ext. E Torso Rotation E Leg Press E   Leg Extension  Seated Leg Curl  Chest Press    Seated Row  Hip ADD E Hip ABD E   Triceps Extension  Bicep Curl  Other:      [x] Indicates exercise has been taught for home  Lumbar/Cervical Ext. [] Torso Rotation [] Leg Press []   Leg Extension [] Seated Leg Curl [] Chest Press []   Seated Row [] Hip ADD [] Hip ABD []   Triceps Extension [] Bicep Curl [] Other:        Assessment:   Patient tolerated Patient tolerated MedX Core Lumbar Strength and all other peripheral exercises without an increase in symptoms. Patient warmed up on nustep for 5 minutes, stretched, and iced low back for 5 minutes after the workout.     Plan:  Continue with established plan of care towards wellness goals.     Health  : Ann-Marie Bey

## 2025-07-01 ENCOUNTER — DOCUMENTATION ONLY (OUTPATIENT)
Dept: REHABILITATION | Facility: HOSPITAL | Age: 70
End: 2025-07-01
Payer: MEDICARE

## 2025-07-01 NOTE — PROGRESS NOTES
Health  Wellness Visit Note    Name: Diana Montenegro  Clinic Number: [id number]  Physician: No ref. provider found  Diagnosis: No diagnosis found.  Past Medical History:   Diagnosis Date    Arthritis     Atrial fibrillation, unspecified     hx of a fib prior to stroke.    Cataract     Chronic back pain     Chronic kidney disease, stage 3a 06/01/2023    Colon polyp     CVA (cerebral infarction) 07/16/2014    Degenerative disc disease     cervical; lumbar    Diabetes mellitus type II     Encounter for loop recorder at end of battery life 09/17/2018    Hyperlipidemia     Hypertension     Hypothyroidism     Mild nonproliferative diabetic retinopathy 08/12/2018    Obesity     Sleep apnea     Stroke 07/2014    Venous insufficiency (chronic) (peripheral)      Visit Number: 46  Precautions: Standard, Fall Risk, History of Stroke      1st PT visit:  07/16/2024  Year of care end date:  July 2025  Mindbody plan: 60---9 Months  Patient level: C    Time In: 1:30 PM  Time Out: 2:35 PM  Total Treatment Time: 60 Minutes    Wellness [employer] 2022  Handout on this week's wellness topic Workaholic was provided along with a discussion on what it means, the benefits, and suggestions for practice.  Reviewed last week's topic of Emotional Expression.     Subjective:   Patient reports no complaints of back pain today. She does still have some upper trap discomfort and tries to do her HEP to help subside the pain. She has been to the gym and swimming everyday. Patient ices when necessary at home. After today's session, patient has a massage scheduled.     HC and patient discussed full range of motion on leg press before increasing weight.     Objective:   Diana completed therapeutic stretches (EIL, SAIDA) and the following MedX exercise machines: core lumbar, torso rotation l/r, leg extension, leg curl, upright row, chest press, biceps curl, triceps extension, leg press    See exercise log in patient folder for rate of exertion and  repetitions completed.       Fitness Machine Education Key:  E=education on equipment initiated and further follow up and education needed  I=independent with  and exercise.  The patient:  Adjusts machines to his/her settings  Uses equipment levers, pins, weights safely  Maintains safe and correct posture while exercising  Moves through exercise with correct pace and control  Gets on and off equipment safely      Lumbar/Cervical Ext. E Torso Rotation E Leg Press E   Leg Extension  Seated Leg Curl  Chest Press    Seated Row  Hip ADD E Hip ABD E   Triceps Extension  Bicep Curl  Other:      [x] Indicates exercise has been taught for home  Lumbar/Cervical Ext. [] Torso Rotation [] Leg Press []   Leg Extension [] Seated Leg Curl [] Chest Press []   Seated Row [] Hip ADD [] Hip ABD []   Triceps Extension [] Bicep Curl [] Other:        Assessment:   Patient tolerated Patient tolerated MedX Core Lumbar Strength and all other peripheral exercises without an increase in symptoms. Patient warmed up on nustep for 5 minutes, stretched, and iced low back for 5 minutes after the workout.     Plan:  Continue with established plan of care towards wellness goals.     Health  : Ann-Marie Bey

## 2025-07-03 ENCOUNTER — HOSPITAL ENCOUNTER (OUTPATIENT)
Dept: RADIOLOGY | Facility: HOSPITAL | Age: 70
Discharge: HOME | End: 2025-07-03
Attending: INTERNAL MEDICINE
Payer: MEDICARE

## 2025-07-03 DIAGNOSIS — F17.210 SMOKING GREATER THAN 20 PACK YEARS: ICD-10-CM

## 2025-07-03 PROCEDURE — 71271 CT THORAX LUNG CANCER SCR C-: CPT | Mod: TC

## 2025-07-10 ENCOUNTER — DOCUMENTATION ONLY (OUTPATIENT)
Dept: REHABILITATION | Facility: HOSPITAL | Age: 70
End: 2025-07-10
Payer: MEDICARE

## 2025-07-10 NOTE — PROGRESS NOTES
Health  Wellness Visit Note    Name: Diana Montenegro  Clinic Number: [id number]  Physician: No ref. provider found  Diagnosis: No diagnosis found.  Past Medical History:   Diagnosis Date    Arthritis     Atrial fibrillation, unspecified     hx of a fib prior to stroke.    Cataract     Chronic back pain     Chronic kidney disease, stage 3a 06/01/2023    Colon polyp     CVA (cerebral infarction) 07/16/2014    Degenerative disc disease     cervical; lumbar    Diabetes mellitus type II     Encounter for loop recorder at end of battery life 09/17/2018    Hyperlipidemia     Hypertension     Hypothyroidism     Mild nonproliferative diabetic retinopathy 08/12/2018    Obesity     Sleep apnea     Stroke 07/2014    Venous insufficiency (chronic) (peripheral)      Visit Number: 48  Precautions: Standard, Fall Risk, History of Stroke      1st PT visit:  07/16/2024  Year of care end date:  July 2025  Mindbody plan: 60---9 Months  Patient level: C    Time In: 1:30 PM  Time Out: 2:25 PM  Total Treatment Time: 55 Minutes    Wellness [employer] 2022  Handout on this week's wellness topic Play was provided along with a discussion on what it means, the benefits, and suggestions for practice.  Reviewed last week's topic of Workaholic.      Subjective:   Patient reports she has no low back pain. Her shoulder and neck discomfort is mild and improves the more she moves. Patient stretches daily. For exercise she has been utilizing [employer]. Patient does not ice at home. She applies heat to her neck when necessary.     HC and patient discussed full range of motion on leg press before increasing weight.     Objective:   Diana completed therapeutic stretches (EIL, SAIDA) and the following MedX exercise machines: core lumbar, torso rotation l/r, leg extension, leg curl, upright row, chest press, biceps curl, triceps extension, leg press    See exercise log in patient folder for rate of exertion and repetitions completed.       Fitness  Machine Education Key:  E=education on equipment initiated and further follow up and education needed  I=independent with  and exercise.  The patient:  Adjusts machines to his/her settings  Uses equipment levers, pins, weights safely  Maintains safe and correct posture while exercising  Moves through exercise with correct pace and control  Gets on and off equipment safely      Lumbar/Cervical Ext. E Torso Rotation E Leg Press E   Leg Extension E Seated Leg Curl E Chest Press X   Seated Row E Hip ADD E Hip ABD E   Triceps Extension X Bicep Curl X Other:      [x] Indicates exercise has been taught for home  Lumbar/Cervical Ext. [] Torso Rotation [] Leg Press []   Leg Extension [] Seated Leg Curl [] Chest Press []   Seated Row [] Hip ADD [] Hip ABD []   Triceps Extension [] Bicep Curl [] Other:        Assessment:   Patient tolerated Patient tolerated MedX Core Lumbar Strength and all other peripheral exercises without an increase in symptoms. Patient warmed up on nustep for 5 minutes, stretched, and iced low back for 5 minutes after the workout.     Plan:  Continue with established plan of care towards wellness goals.     Health  : Ann-Marie Bey

## 2025-07-17 ENCOUNTER — DOCUMENTATION ONLY (OUTPATIENT)
Dept: REHABILITATION | Facility: HOSPITAL | Age: 70
End: 2025-07-17
Payer: MEDICARE

## 2025-07-18 NOTE — PROGRESS NOTES
Health  Wellness Visit Note    Name: Diana Montenegro  Clinic Number: [id number]  Physician: No ref. provider found  Diagnosis: No diagnosis found.  Past Medical History:   Diagnosis Date    Arthritis     Atrial fibrillation, unspecified     hx of a fib prior to stroke.    Cataract     Chronic back pain     Chronic kidney disease, stage 3a 06/01/2023    Colon polyp     CVA (cerebral infarction) 07/16/2014    Degenerative disc disease     cervical; lumbar    Diabetes mellitus type II     Encounter for loop recorder at end of battery life 09/17/2018    Hyperlipidemia     Hypertension     Hypothyroidism     Mild nonproliferative diabetic retinopathy 08/12/2018    Obesity     Sleep apnea     Stroke 07/2014    Venous insufficiency (chronic) (peripheral)      Visit Number: 49  Precautions: Standard, Fall Risk, History of Stroke      1st PT visit:  07/16/2024  Year of care end date:  July 2025  Mindbody plan: 60---9 Months  Patient level: C    Time In: 1:30 PM  Time Out: 2:30 PM  Total Treatment Time: 60 Minutes    Wellness [employer] 2022  Handout on this week's wellness topic Laughter Therapy was provided along with a discussion on what it means, the benefits, and suggestions for practice.  Reviewed last week's topic of Play.       Subjective:   Patient reports no complaints of low back pain. She sees a lot of improvement in her shoulder and neck discomfort and pain management. Patient stretches daily. For exercise she has been utilizing [employer]. Patient does not ice at home. She applies heat to her neck when necessary but nothing for the back. Patient plans to take a one month break from Wellness following today's session. She will return at the end of August.      HC and patient discussed full range of motion on leg press before increasing weight.     Objective:   Diana completed therapeutic stretches (EIL, SAIDA) and the following MedX exercise machines: core lumbar, torso rotation l/r, leg extension, leg  curl, upright row, chest press, biceps curl, triceps extension, leg press    See exercise log in patient folder for rate of exertion and repetitions completed.       Fitness Machine Education Key:  E=education on equipment initiated and further follow up and education needed  I=independent with  and exercise.  The patient:  Adjusts machines to his/her settings  Uses equipment levers, pins, weights safely  Maintains safe and correct posture while exercising  Moves through exercise with correct pace and control  Gets on and off equipment safely      Lumbar/Cervical Ext. E Torso Rotation E Leg Press E   Leg Extension E Seated Leg Curl E Chest Press X   Seated Row E Hip ADD E Hip ABD E   Triceps Extension X Bicep Curl X Other:      [x] Indicates exercise has been taught for home  Lumbar/Cervical Ext. [] Torso Rotation [] Leg Press []   Leg Extension [] Seated Leg Curl [] Chest Press []   Seated Row [] Hip ADD [] Hip ABD []   Triceps Extension [] Bicep Curl [] Other:        Assessment:   Patient tolerated Patient tolerated MedX Core Lumbar Strength and all other peripheral exercises without an increase in symptoms. Patient warmed up on nustep for 5 minutes, stretched, and iced low back for 5 minutes after the workout.     Plan:  Continue with established plan of care towards wellness goals.     Health  : Ann-Marie Bey

## 2025-07-24 ENCOUNTER — PATIENT MESSAGE (OUTPATIENT)
Dept: INTERNAL MEDICINE | Facility: CLINIC | Age: 70
End: 2025-07-24
Payer: MEDICARE

## 2025-08-01 DIAGNOSIS — D64.9 ANEMIA, UNSPECIFIED TYPE: ICD-10-CM

## 2025-08-01 RX ORDER — FERROUS SULFATE 325(65) MG
TABLET ORAL EVERY OTHER DAY
Qty: 45 TABLET | Refills: 0 | Status: SHIPPED | OUTPATIENT
Start: 2025-08-01

## 2025-08-02 ENCOUNTER — PATIENT MESSAGE (OUTPATIENT)
Dept: INTERNAL MEDICINE | Facility: CLINIC | Age: 70
End: 2025-08-02
Payer: MEDICARE

## 2025-08-02 DIAGNOSIS — G47.00 INSOMNIA, UNSPECIFIED TYPE: ICD-10-CM

## 2025-08-04 ENCOUNTER — TELEPHONE (OUTPATIENT)
Dept: INTERNAL MEDICINE | Facility: CLINIC | Age: 70
End: 2025-08-04
Payer: MEDICARE

## 2025-08-04 DIAGNOSIS — I15.2 HYPERTENSION ASSOCIATED WITH DIABETES: Primary | ICD-10-CM

## 2025-08-04 DIAGNOSIS — E11.59 HYPERTENSION ASSOCIATED WITH DIABETES: Primary | ICD-10-CM

## 2025-08-04 DIAGNOSIS — E11.40 TYPE 2 DIABETES MELLITUS WITH DIABETIC NEUROPATHY, WITHOUT LONG-TERM CURRENT USE OF INSULIN: ICD-10-CM

## 2025-08-04 DIAGNOSIS — E03.9 ACQUIRED HYPOTHYROIDISM: ICD-10-CM

## 2025-08-04 NOTE — TELEPHONE ENCOUNTER
Copied from CRM #[id number]. Topic: Appointments - Amb Referral  >> Aug 4, 2025  4:32 PM Barb wrote:  Caller is requesting to schedule their Lab appointment prior to annual appointment.  Order is not listed in EPIC.  Please enter order and contact patient to schedule.    Name of Caller:nishant Valenzuela Date and Time of Labs:9/16/25 @     Date of EPP Appointment:9/23/25    Where would they like the lab performed?[facility]    Would the patient rather a call back or a response via [facility]? Call and portal    Best Call Back Number:[phone]    Additional Information:

## 2025-08-04 NOTE — TELEPHONE ENCOUNTER
Hospital staff discontinued trazodone 50mg 2/7/25    She is asking for refill, ok?     Lov 3/25/25

## 2025-08-05 RX ORDER — TRAZODONE HYDROCHLORIDE 50 MG/1
50 TABLET ORAL NIGHTLY PRN
Qty: 180 TABLET | Refills: 0 | Status: SHIPPED | OUTPATIENT
Start: 2025-08-05

## 2025-08-05 NOTE — TELEPHONE ENCOUNTER
Care Due:                  Date            Visit Type   Department     Provider  --------------------------------------------------------------------------------                                EP -                              PRIMARY      [facility] INTERNAL  Last Visit: 03-      [facility] ([facility])   MEDICINE       Radtarah Johnston                              EP -                              PRIMARY      [facility] INTERNAL  Next Visit: 09-      [facility] ([facility])   MEDICINE       Rad A Preston                                                            Last  Test          Frequency    Reason                     Performed    Due Date  --------------------------------------------------------------------------------    HBA1C.......  6 months...  OZEMPIC, empagliflozin,    02- 08-                             metFORMIN................    [employer] Embedded Care Due Messages. Reference number: [id number].   8/05/2025 4:42:01 PM CDT

## 2025-08-21 ENCOUNTER — DOCUMENTATION ONLY (OUTPATIENT)
Dept: REHABILITATION | Facility: HOSPITAL | Age: 70
End: 2025-08-21
Payer: MEDICARE

## 2025-08-21 ENCOUNTER — LAB VISIT (OUTPATIENT)
Dept: LAB | Facility: HOSPITAL | Age: 70
End: 2025-08-21
Attending: NURSE PRACTITIONER
Payer: MEDICARE

## 2025-08-21 DIAGNOSIS — I48.0 PAROXYSMAL ATRIAL FIBRILLATION: Primary | ICD-10-CM

## 2025-08-21 DIAGNOSIS — R74.8 ELEVATED LIVER ENZYMES: ICD-10-CM

## 2025-08-21 LAB
ALBUMIN SERPL BCP-MCNC: 3.4 G/DL (ref 3.5–5.2)
ALP SERPL-CCNC: 75 UNIT/L (ref 40–150)
ALT SERPL W/O P-5'-P-CCNC: 120 UNIT/L (ref 0–55)
AST SERPL-CCNC: 94 UNIT/L (ref 0–50)
BILIRUB DIRECT SERPL-MCNC: 0.1 MG/DL (ref 0.1–0.3)
BILIRUB SERPL-MCNC: 0.2 MG/DL (ref 0.1–1)
PROT SERPL-MCNC: 6.2 GM/DL (ref 6–8.4)

## 2025-08-21 PROCEDURE — 36415 COLL VENOUS BLD VENIPUNCTURE: CPT

## 2025-08-21 PROCEDURE — 82040 ASSAY OF SERUM ALBUMIN: CPT

## 2025-08-27 ENCOUNTER — TELEPHONE (OUTPATIENT)
Dept: HEPATOLOGY | Facility: CLINIC | Age: 70
End: 2025-08-27
Payer: MEDICARE

## 2025-08-27 ENCOUNTER — OFFICE VISIT (OUTPATIENT)
Dept: INTERNAL MEDICINE | Facility: CLINIC | Age: 70
End: 2025-08-27
Payer: MEDICARE

## 2025-08-27 VITALS
RESPIRATION RATE: 18 BRPM | BODY MASS INDEX: 31.95 KG/M2 | HEART RATE: 85 BPM | DIASTOLIC BLOOD PRESSURE: 52 MMHG | HEIGHT: 63 IN | OXYGEN SATURATION: 99 % | TEMPERATURE: 97 F | WEIGHT: 180.31 LBS | SYSTOLIC BLOOD PRESSURE: 118 MMHG

## 2025-08-27 DIAGNOSIS — R10.9 ABDOMINAL PAIN, UNSPECIFIED ABDOMINAL LOCATION: ICD-10-CM

## 2025-08-27 DIAGNOSIS — R53.1 WEAKNESS: ICD-10-CM

## 2025-08-27 DIAGNOSIS — R74.8 ELEVATED LIVER ENZYMES: ICD-10-CM

## 2025-08-27 DIAGNOSIS — R19.7 DIARRHEA, UNSPECIFIED TYPE: Primary | ICD-10-CM

## 2025-08-27 PROCEDURE — 3008F BODY MASS INDEX DOCD: CPT | Mod: CPTII,S$GLB,, | Performed by: NURSE PRACTITIONER

## 2025-08-27 PROCEDURE — 3288F FALL RISK ASSESSMENT DOCD: CPT | Mod: CPTII,S$GLB,, | Performed by: NURSE PRACTITIONER

## 2025-08-27 PROCEDURE — 99999 PR PBB SHADOW E&M-EST. PATIENT-LVL V: CPT | Mod: PBBFAC,,, | Performed by: NURSE PRACTITIONER

## 2025-08-27 PROCEDURE — 1126F AMNT PAIN NOTED NONE PRSNT: CPT | Mod: CPTII,S$GLB,, | Performed by: NURSE PRACTITIONER

## 2025-08-27 PROCEDURE — 3078F DIAST BP <80 MM HG: CPT | Mod: CPTII,S$GLB,, | Performed by: NURSE PRACTITIONER

## 2025-08-27 PROCEDURE — 3066F NEPHROPATHY DOC TX: CPT | Mod: CPTII,S$GLB,, | Performed by: NURSE PRACTITIONER

## 2025-08-27 PROCEDURE — 1101F PT FALLS ASSESS-DOCD LE1/YR: CPT | Mod: CPTII,S$GLB,, | Performed by: NURSE PRACTITIONER

## 2025-08-27 PROCEDURE — 3074F SYST BP LT 130 MM HG: CPT | Mod: CPTII,S$GLB,, | Performed by: NURSE PRACTITIONER

## 2025-08-27 PROCEDURE — 1159F MED LIST DOCD IN RCRD: CPT | Mod: CPTII,S$GLB,, | Performed by: NURSE PRACTITIONER

## 2025-08-27 PROCEDURE — 99213 OFFICE O/P EST LOW 20 MIN: CPT | Mod: S$GLB,,, | Performed by: NURSE PRACTITIONER

## 2025-08-27 PROCEDURE — 1160F RVW MEDS BY RX/DR IN RCRD: CPT | Mod: CPTII,S$GLB,, | Performed by: NURSE PRACTITIONER

## 2025-08-27 PROCEDURE — 3044F HG A1C LEVEL LT 7.0%: CPT | Mod: CPTII,S$GLB,, | Performed by: NURSE PRACTITIONER

## 2025-08-27 RX ORDER — METFORMIN HYDROCHLORIDE 1000 MG/1
1000 TABLET ORAL 2 TIMES DAILY WITH MEALS
Qty: 180 TABLET | Refills: 0 | Status: ON HOLD | OUTPATIENT
Start: 2025-08-27

## 2025-08-28 ENCOUNTER — HOSPITAL ENCOUNTER (INPATIENT)
Facility: HOSPITAL | Age: 70
LOS: 3 days | Discharge: HOME | DRG: 377 | End: 2025-08-31
Attending: STUDENT IN AN ORGANIZED HEALTH CARE EDUCATION/TRAINING PROGRAM | Admitting: STUDENT IN AN ORGANIZED HEALTH CARE EDUCATION/TRAINING PROGRAM
Payer: MEDICARE

## 2025-08-28 ENCOUNTER — TELEPHONE (OUTPATIENT)
Dept: INTERNAL MEDICINE | Facility: CLINIC | Age: 70
End: 2025-08-28
Payer: MEDICARE

## 2025-08-28 ENCOUNTER — TELEPHONE (OUTPATIENT)
Dept: FAMILY MEDICINE | Facility: CLINIC | Age: 70
End: 2025-08-28
Payer: MEDICARE

## 2025-08-28 DIAGNOSIS — D64.9 LOW HEMOGLOBIN: Primary | ICD-10-CM

## 2025-08-28 DIAGNOSIS — E87.20 LACTIC ACID INCREASED: ICD-10-CM

## 2025-08-28 DIAGNOSIS — D62 ACUTE BLOOD LOSS ANEMIA: ICD-10-CM

## 2025-08-28 DIAGNOSIS — K92.2 GASTROINTESTINAL HEMORRHAGE, UNSPECIFIED GASTROINTESTINAL HEMORRHAGE TYPE: ICD-10-CM

## 2025-08-28 PROBLEM — Z12.11 SCREENING FOR COLON CANCER: Status: RESOLVED | Noted: 2018-08-30 | Resolved: 2025-08-28

## 2025-08-28 PROBLEM — K59.00 CONSTIPATION: Status: ACTIVE | Noted: 2025-08-28

## 2025-08-28 PROBLEM — R79.89 ELEVATED LACTIC ACID LEVEL: Status: ACTIVE | Noted: 2025-08-28

## 2025-08-28 PROBLEM — E11.9 TYPE 2 DIABETES MELLITUS: Status: ACTIVE | Noted: 2025-08-28

## 2025-08-28 PROBLEM — R19.7 DIARRHEA: Status: ACTIVE | Noted: 2025-08-28

## 2025-08-28 PROBLEM — N39.0 UTI (URINARY TRACT INFECTION): Status: ACTIVE | Noted: 2025-08-28

## 2025-08-28 PROBLEM — D69.6 THROMBOCYTOPENIA: Status: ACTIVE | Noted: 2025-08-28

## 2025-08-28 LAB
ABO + RH BLD: NORMAL
ABO + RH BLD: NORMAL
ABSOLUTE EOSINOPHIL (OHS): 0.05 K/UL
ABSOLUTE MONOCYTE (OHS): 0.36 K/UL (ref 0.3–1)
ABSOLUTE NEUTROPHIL COUNT (OHS): 3.81 K/UL (ref 1.8–7.7)
ALBUMIN SERPL BCP-MCNC: 3.5 G/DL (ref 3.5–5.2)
ALLENS TEST: ABNORMAL
ALP SERPL-CCNC: 75 UNIT/L (ref 40–150)
ALT SERPL W/O P-5'-P-CCNC: 262 UNIT/L (ref 0–55)
AMORPH CRY UR QL COMP ASSIST: ABNORMAL
ANION GAP (OHS): 13 MMOL/L (ref 8–16)
AST SERPL-CCNC: 190 UNIT/L (ref 0–50)
BACTERIA #/AREA URNS AUTO: ABNORMAL /HPF
BASOPHILS # BLD AUTO: 0.03 K/UL
BASOPHILS NFR BLD AUTO: 0.6 %
BILIRUB SERPL-MCNC: 0.2 MG/DL (ref 0.1–1)
BILIRUB UR QL STRIP.AUTO: NEGATIVE
BIPAP: 0
BLD PROD TYP BPU: NORMAL
BLD PROD TYP BPU: NORMAL
BLOOD UNIT EXPIRATION DATE: NORMAL
BLOOD UNIT EXPIRATION DATE: NORMAL
BLOOD UNIT TYPE CODE: 5100
BLOOD UNIT TYPE CODE: 5100
BUN SERPL-MCNC: 37 MG/DL (ref 8–23)
CALCIUM SERPL-MCNC: 8.4 MG/DL (ref 8.7–10.5)
CHLORIDE SERPL-SCNC: 110 MMOL/L (ref 95–110)
CLARITY UR: ABNORMAL
CO2 SERPL-SCNC: 15 MMOL/L (ref 23–29)
COLOR UR AUTO: YELLOW
CREAT SERPL-MCNC: 2.2 MG/DL (ref 0.5–1.4)
CROSSMATCH INTERPRETATION: NORMAL
CROSSMATCH INTERPRETATION: NORMAL
DISPENSE STATUS: NORMAL
DISPENSE STATUS: NORMAL
EAG (OHS): 105 MG/DL (ref 68–131)
ERYTHROCYTE [DISTWIDTH] IN BLOOD BY AUTOMATED COUNT: 14.5 % (ref 11.5–14.5)
GFR SERPLBLD CREATININE-BSD FMLA CKD-EPI: 24 ML/MIN/1.73/M2
GLUCOSE SERPL-MCNC: 97 MG/DL (ref 70–110)
GLUCOSE UR QL STRIP: ABNORMAL
HBA1C MFR BLD: 5.3 % (ref 4–5.6)
HCT VFR BLD AUTO: 17.4 % (ref 37–48.5)
HCV AB SERPL QL IA: NORMAL
HGB BLD-MCNC: 5.4 GM/DL (ref 12–16)
HGB UR QL STRIP: ABNORMAL
HIV 1+2 AB+HIV1 P24 AG SERPL QL IA: NORMAL
HYALINE CASTS UR QL AUTO: 0 /LPF (ref 0–1)
IMM GRANULOCYTES # BLD AUTO: 0.04 K/UL (ref 0–0.04)
IMM GRANULOCYTES NFR BLD AUTO: 0.8 % (ref 0–0.5)
INDIRECT COOMBS: NORMAL
KETONES UR QL STRIP: NEGATIVE
LACTATE SERPL-SCNC: 2 MMOL/L (ref 0.5–2.2)
LACTATE SERPL-SCNC: 4.6 MMOL/L (ref 0.5–2.2)
LDH SERPL L TO P-CCNC: 2 MMOL/L (ref 0.5–2.2)
LDH SERPL L TO P-CCNC: 5.49 MMOL/L (ref 0.5–2.2)
LEUKOCYTE ESTERASE UR QL STRIP: ABNORMAL
LIPASE SERPL-CCNC: 77 U/L (ref 4–60)
LYMPHOCYTES # BLD AUTO: 0.76 K/UL (ref 1–4.8)
MCH RBC QN AUTO: 29.5 PG (ref 27–31)
MCHC RBC AUTO-ENTMCNC: 31 G/DL (ref 32–36)
MCV RBC AUTO: 95 FL (ref 82–98)
MICROSCOPIC COMMENT: ABNORMAL
NITRITE UR QL STRIP: NEGATIVE
NUCLEATED RBC (/100WBC) (OHS): 0 /100 WBC
OHS QRS DURATION: 94 MS
OHS QTC CALCULATION: 420 MS
PCO2 BLDA: 30.4 MMHG (ref 35–45)
PH SMN: 7.43 [PH] (ref 7.35–7.45)
PH UR STRIP: 6 [PH]
PLATELET # BLD AUTO: 133 K/UL (ref 150–450)
PMV BLD AUTO: 9.7 FL (ref 9.2–12.9)
PO2 BLDA: 49.3 MMHG (ref 40–60)
POC BASE DEFICIT: -3.3 MMOL/L
POC HCO3: 21.6 MMOL/L (ref 24–28)
POC PERFORMED BY: ABNORMAL
POCT GLUCOSE: 163 MG/DL (ref 70–110)
POTASSIUM SERPL-SCNC: 3.5 MMOL/L (ref 3.5–5.1)
PROT SERPL-MCNC: 5.9 GM/DL (ref 6–8.4)
PROT UR QL STRIP: ABNORMAL
RBC # BLD AUTO: 1.83 M/UL (ref 4–5.4)
RBC #/AREA URNS AUTO: 2 /HPF (ref 0–4)
RELATIVE EOSINOPHIL (OHS): 1 %
RELATIVE LYMPHOCYTE (OHS): 15 % (ref 18–48)
RELATIVE MONOCYTE (OHS): 7.1 % (ref 4–15)
RELATIVE NEUTROPHIL (OHS): 75.5 % (ref 38–73)
RH BLD: NORMAL
SAMPLE: ABNORMAL
SITE: ABNORMAL
SODIUM SERPL-SCNC: 138 MMOL/L (ref 136–145)
SP GR UR STRIP: 1.01
SPECIMEN OUTDATE: NORMAL
SPECIMEN SOURCE: ABNORMAL
SQUAMOUS #/AREA URNS AUTO: 1 /HPF
UNIT NUMBER: NORMAL
UNIT NUMBER: NORMAL
UROBILINOGEN UR STRIP-ACNC: NEGATIVE EU/DL
WBC # BLD AUTO: 5.05 K/UL (ref 3.9–12.7)
WBC #/AREA URNS AUTO: 11 /HPF (ref 0–5)
YEAST UR QL AUTO: ABNORMAL /HPF

## 2025-08-28 PROCEDURE — 81001 URINALYSIS AUTO W/SCOPE: CPT

## 2025-08-28 PROCEDURE — 87389 HIV-1 AG W/HIV-1&-2 AB AG IA: CPT | Performed by: PHYSICIAN ASSISTANT

## 2025-08-28 PROCEDURE — 86920 COMPATIBILITY TEST SPIN: CPT

## 2025-08-28 PROCEDURE — 20600001 HC STEP DOWN PRIVATE ROOM

## 2025-08-28 PROCEDURE — 63600175 PHARM REV CODE 636 W HCPCS: Performed by: STUDENT IN AN ORGANIZED HEALTH CARE EDUCATION/TRAINING PROGRAM

## 2025-08-28 PROCEDURE — 36430 TRANSFUSION BLD/BLD COMPNT: CPT

## 2025-08-28 PROCEDURE — 96375 TX/PRO/DX INJ NEW DRUG ADDON: CPT

## 2025-08-28 PROCEDURE — 83605 ASSAY OF LACTIC ACID: CPT

## 2025-08-28 PROCEDURE — 86901 BLOOD TYPING SEROLOGIC RH(D): CPT | Performed by: NURSE PRACTITIONER

## 2025-08-28 PROCEDURE — 82803 BLOOD GASES ANY COMBINATION: CPT

## 2025-08-28 PROCEDURE — 27000207 HC ISOLATION

## 2025-08-28 PROCEDURE — 87086 URINE CULTURE/COLONY COUNT: CPT

## 2025-08-28 PROCEDURE — 87040 BLOOD CULTURE FOR BACTERIA: CPT

## 2025-08-28 PROCEDURE — 83036 HEMOGLOBIN GLYCOSYLATED A1C: CPT

## 2025-08-28 PROCEDURE — 25500020 PHARM REV CODE 255: Performed by: STUDENT IN AN ORGANIZED HEALTH CARE EDUCATION/TRAINING PROGRAM

## 2025-08-28 PROCEDURE — P9016 RBC LEUKOCYTES REDUCED: HCPCS

## 2025-08-28 PROCEDURE — 99900035 HC TECH TIME PER 15 MIN (STAT)

## 2025-08-28 PROCEDURE — 30233N1 TRANSFUSION OF NONAUTOLOGOUS RED BLOOD CELLS INTO PERIPHERAL VEIN, PERCUTANEOUS APPROACH: ICD-10-PCS

## 2025-08-28 PROCEDURE — 99285 EMERGENCY DEPT VISIT HI MDM: CPT | Mod: 25

## 2025-08-28 PROCEDURE — 36415 COLL VENOUS BLD VENIPUNCTURE: CPT | Performed by: STUDENT IN AN ORGANIZED HEALTH CARE EDUCATION/TRAINING PROGRAM

## 2025-08-28 PROCEDURE — 25000003 PHARM REV CODE 250

## 2025-08-28 PROCEDURE — 93005 ELECTROCARDIOGRAM TRACING: CPT

## 2025-08-28 PROCEDURE — 85025 COMPLETE CBC W/AUTO DIFF WBC: CPT | Performed by: STUDENT IN AN ORGANIZED HEALTH CARE EDUCATION/TRAINING PROGRAM

## 2025-08-28 PROCEDURE — 25000003 PHARM REV CODE 250: Performed by: STUDENT IN AN ORGANIZED HEALTH CARE EDUCATION/TRAINING PROGRAM

## 2025-08-28 PROCEDURE — 93010 ELECTROCARDIOGRAM REPORT: CPT | Mod: ,,, | Performed by: INTERNAL MEDICINE

## 2025-08-28 PROCEDURE — 94761 N-INVAS EAR/PLS OXIMETRY MLT: CPT | Mod: XB

## 2025-08-28 PROCEDURE — 96374 THER/PROPH/DIAG INJ IV PUSH: CPT | Mod: XS

## 2025-08-28 PROCEDURE — 83690 ASSAY OF LIPASE: CPT | Performed by: STUDENT IN AN ORGANIZED HEALTH CARE EDUCATION/TRAINING PROGRAM

## 2025-08-28 PROCEDURE — 96361 HYDRATE IV INFUSION ADD-ON: CPT

## 2025-08-28 PROCEDURE — 82040 ASSAY OF SERUM ALBUMIN: CPT | Performed by: NURSE PRACTITIONER

## 2025-08-28 PROCEDURE — 63600175 PHARM REV CODE 636 W HCPCS

## 2025-08-28 PROCEDURE — 85025 COMPLETE CBC W/AUTO DIFF WBC: CPT | Performed by: NURSE PRACTITIONER

## 2025-08-28 PROCEDURE — 86803 HEPATITIS C AB TEST: CPT | Performed by: PHYSICIAN ASSISTANT

## 2025-08-28 RX ORDER — GLUCAGON 1 MG
1 KIT INJECTION
Status: DISCONTINUED | OUTPATIENT
Start: 2025-08-28 | End: 2025-08-31 | Stop reason: HOSPADM

## 2025-08-28 RX ORDER — ONDANSETRON HYDROCHLORIDE 2 MG/ML
4 INJECTION, SOLUTION INTRAVENOUS EVERY 12 HOURS PRN
Status: DISCONTINUED | OUTPATIENT
Start: 2025-08-28 | End: 2025-08-31 | Stop reason: HOSPADM

## 2025-08-28 RX ORDER — AMOXICILLIN 250 MG
1 CAPSULE ORAL 2 TIMES DAILY
Status: DISCONTINUED | OUTPATIENT
Start: 2025-08-28 | End: 2025-08-28

## 2025-08-28 RX ORDER — IBUPROFEN 200 MG
16 TABLET ORAL
Status: DISCONTINUED | OUTPATIENT
Start: 2025-08-28 | End: 2025-08-31 | Stop reason: HOSPADM

## 2025-08-28 RX ORDER — HYDROCODONE BITARTRATE AND ACETAMINOPHEN 500; 5 MG/1; MG/1
TABLET ORAL
Status: DISCONTINUED | OUTPATIENT
Start: 2025-08-28 | End: 2025-08-31 | Stop reason: HOSPADM

## 2025-08-28 RX ORDER — ALBUTEROL SULFATE 90 UG/1
2 INHALANT RESPIRATORY (INHALATION) EVERY 6 HOURS PRN
Status: DISCONTINUED | OUTPATIENT
Start: 2025-08-28 | End: 2025-08-31 | Stop reason: HOSPADM

## 2025-08-28 RX ORDER — LEVOTHYROXINE SODIUM 88 UG/1
88 TABLET ORAL
Status: DISCONTINUED | OUTPATIENT
Start: 2025-08-29 | End: 2025-08-31 | Stop reason: HOSPADM

## 2025-08-28 RX ORDER — PANTOPRAZOLE SODIUM 40 MG/10ML
40 INJECTION, POWDER, LYOPHILIZED, FOR SOLUTION INTRAVENOUS 2 TIMES DAILY
Status: DISCONTINUED | OUTPATIENT
Start: 2025-08-29 | End: 2025-08-30

## 2025-08-28 RX ORDER — POLYETHYLENE GLYCOL 3350 17 G/17G
17 POWDER, FOR SOLUTION ORAL DAILY
Status: DISCONTINUED | OUTPATIENT
Start: 2025-08-29 | End: 2025-08-28

## 2025-08-28 RX ORDER — AMOXICILLIN 250 MG
2 CAPSULE ORAL 2 TIMES DAILY
Status: DISCONTINUED | OUTPATIENT
Start: 2025-08-28 | End: 2025-08-31 | Stop reason: HOSPADM

## 2025-08-28 RX ORDER — PANTOPRAZOLE SODIUM 40 MG/10ML
80 INJECTION, POWDER, LYOPHILIZED, FOR SOLUTION INTRAVENOUS
Status: COMPLETED | OUTPATIENT
Start: 2025-08-28 | End: 2025-08-28

## 2025-08-28 RX ORDER — IBUPROFEN 200 MG
24 TABLET ORAL
Status: DISCONTINUED | OUTPATIENT
Start: 2025-08-28 | End: 2025-08-31 | Stop reason: HOSPADM

## 2025-08-28 RX ORDER — POLYETHYLENE GLYCOL 3350 17 G/17G
17 POWDER, FOR SOLUTION ORAL DAILY
Status: DISCONTINUED | OUTPATIENT
Start: 2025-08-28 | End: 2025-08-30

## 2025-08-28 RX ORDER — INSULIN ASPART 100 [IU]/ML
0-5 INJECTION, SOLUTION INTRAVENOUS; SUBCUTANEOUS
Status: DISCONTINUED | OUTPATIENT
Start: 2025-08-28 | End: 2025-08-31 | Stop reason: HOSPADM

## 2025-08-28 RX ORDER — CEFTRIAXONE 1 G/1
1 INJECTION, POWDER, FOR SOLUTION INTRAMUSCULAR; INTRAVENOUS
Status: COMPLETED | OUTPATIENT
Start: 2025-08-28 | End: 2025-08-28

## 2025-08-28 RX ORDER — TALC
3 POWDER (GRAM) TOPICAL NIGHTLY PRN
Status: DISCONTINUED | OUTPATIENT
Start: 2025-08-28 | End: 2025-08-31 | Stop reason: HOSPADM

## 2025-08-28 RX ORDER — ACETAMINOPHEN 500 MG
1000 TABLET ORAL
Status: ACTIVE | OUTPATIENT
Start: 2025-08-28 | End: 2025-08-29

## 2025-08-28 RX ORDER — CEFTRIAXONE 1 G/1
1 INJECTION, POWDER, FOR SOLUTION INTRAMUSCULAR; INTRAVENOUS
Status: DISCONTINUED | OUTPATIENT
Start: 2025-08-29 | End: 2025-08-30

## 2025-08-28 RX ORDER — TRAZODONE HYDROCHLORIDE 50 MG/1
50 TABLET ORAL NIGHTLY PRN
Status: DISCONTINUED | OUTPATIENT
Start: 2025-08-28 | End: 2025-08-31 | Stop reason: HOSPADM

## 2025-08-28 RX ADMIN — SODIUM CHLORIDE 500 ML: 9 INJECTION, SOLUTION INTRAVENOUS at 07:08

## 2025-08-28 RX ADMIN — VANCOMYCIN HYDROCHLORIDE 1500 MG: 1.5 INJECTION, POWDER, LYOPHILIZED, FOR SOLUTION INTRAVENOUS at 07:08

## 2025-08-28 RX ADMIN — PANTOPRAZOLE SODIUM 80 MG: 40 INJECTION, POWDER, LYOPHILIZED, FOR SOLUTION INTRAVENOUS at 03:08

## 2025-08-28 RX ADMIN — SENNOSIDES, DOCUSATE SODIUM 2 TABLET: 50; 8.6 TABLET, FILM COATED ORAL at 09:08

## 2025-08-28 RX ADMIN — CEFTRIAXONE SODIUM 1 G: 1 INJECTION, POWDER, FOR SOLUTION INTRAMUSCULAR; INTRAVENOUS at 06:08

## 2025-08-28 RX ADMIN — Medication 3 MG: at 11:08

## 2025-08-28 RX ADMIN — SODIUM CHLORIDE, POTASSIUM CHLORIDE, SODIUM LACTATE AND CALCIUM CHLORIDE 500 ML: 600; 310; 30; 20 INJECTION, SOLUTION INTRAVENOUS at 01:08

## 2025-08-28 RX ADMIN — POLYETHYLENE GLYCOL 3350 17 G: 17 POWDER, FOR SOLUTION ORAL at 09:08

## 2025-08-28 RX ADMIN — TRAZODONE HYDROCHLORIDE 50 MG: 50 TABLET ORAL at 11:08

## 2025-08-28 RX ADMIN — IOHEXOL 100 ML: 350 INJECTION, SOLUTION INTRAVENOUS at 05:08

## 2025-08-29 ENCOUNTER — ANESTHESIA EVENT (OUTPATIENT)
Dept: ENDOSCOPY | Facility: HOSPITAL | Age: 70
End: 2025-08-29
Payer: MEDICARE

## 2025-08-29 ENCOUNTER — ANESTHESIA (OUTPATIENT)
Dept: ENDOSCOPY | Facility: HOSPITAL | Age: 70
End: 2025-08-29
Payer: MEDICARE

## 2025-08-29 PROBLEM — E11.9 TYPE 2 DIABETES MELLITUS: Chronic | Status: ACTIVE | Noted: 2025-08-28

## 2025-08-29 PROBLEM — D62 ACUTE BLOOD LOSS ANEMIA: Status: ACTIVE | Noted: 2025-08-29

## 2025-08-29 LAB
ABSOLUTE EOSINOPHIL (OHS): 0.07 K/UL
ABSOLUTE EOSINOPHIL (OHS): 0.08 K/UL
ABSOLUTE EOSINOPHIL (OHS): 0.09 K/UL
ABSOLUTE EOSINOPHIL (OHS): 0.09 K/UL
ABSOLUTE MONOCYTE (OHS): 0.33 K/UL (ref 0.3–1)
ABSOLUTE MONOCYTE (OHS): 0.35 K/UL (ref 0.3–1)
ABSOLUTE MONOCYTE (OHS): 0.37 K/UL (ref 0.3–1)
ABSOLUTE MONOCYTE (OHS): 0.42 K/UL (ref 0.3–1)
ABSOLUTE NEUTROPHIL COUNT (OHS): 3.18 K/UL (ref 1.8–7.7)
ABSOLUTE NEUTROPHIL COUNT (OHS): 3.24 K/UL (ref 1.8–7.7)
ABSOLUTE NEUTROPHIL COUNT (OHS): 3.65 K/UL (ref 1.8–7.7)
ABSOLUTE NEUTROPHIL COUNT (OHS): 4.08 K/UL (ref 1.8–7.7)
ALBUMIN SERPL BCP-MCNC: 3 G/DL (ref 3.5–5.2)
ALP SERPL-CCNC: 65 UNIT/L (ref 40–150)
ALT SERPL W/O P-5'-P-CCNC: 205 UNIT/L (ref 0–55)
ANION GAP (OHS): 10 MMOL/L (ref 8–16)
APTT PPP: 23.4 SECONDS (ref 21–32)
AST SERPL-CCNC: 135 UNIT/L (ref 0–50)
BACTERIA UR CULT: NORMAL
BASOPHILS # BLD AUTO: 0.02 K/UL
BASOPHILS # BLD AUTO: 0.02 K/UL
BASOPHILS # BLD AUTO: 0.04 K/UL
BASOPHILS # BLD AUTO: 0.05 K/UL
BASOPHILS NFR BLD AUTO: 0.4 %
BASOPHILS NFR BLD AUTO: 0.5 %
BASOPHILS NFR BLD AUTO: 0.9 %
BASOPHILS NFR BLD AUTO: 1.1 %
BILIRUB SERPL-MCNC: 0.9 MG/DL (ref 0.1–1)
BUN SERPL-MCNC: 30 MG/DL (ref 8–23)
CALCIUM SERPL-MCNC: 8.1 MG/DL (ref 8.7–10.5)
CHLORIDE SERPL-SCNC: 109 MMOL/L (ref 95–110)
CO2 SERPL-SCNC: 18 MMOL/L (ref 23–29)
CREAT SERPL-MCNC: 1.9 MG/DL (ref 0.5–1.4)
ERYTHROCYTE [DISTWIDTH] IN BLOOD BY AUTOMATED COUNT: 14.7 % (ref 11.5–14.5)
ERYTHROCYTE [DISTWIDTH] IN BLOOD BY AUTOMATED COUNT: 15 % (ref 11.5–14.5)
ERYTHROCYTE [DISTWIDTH] IN BLOOD BY AUTOMATED COUNT: 15.3 % (ref 11.5–14.5)
ERYTHROCYTE [DISTWIDTH] IN BLOOD BY AUTOMATED COUNT: 15.5 % (ref 11.5–14.5)
FOLATE SERPL-MCNC: 16.9 NG/ML (ref 4–24)
GFR SERPLBLD CREATININE-BSD FMLA CKD-EPI: 28 ML/MIN/1.73/M2
GLUCOSE SERPL-MCNC: 114 MG/DL (ref 70–110)
HAPTOGLOB SERPL-MCNC: 52 MG/DL (ref 30–250)
HCT VFR BLD AUTO: 22.2 % (ref 37–48.5)
HCT VFR BLD AUTO: 22.6 % (ref 37–48.5)
HCT VFR BLD AUTO: 23.2 % (ref 37–48.5)
HCT VFR BLD AUTO: 25.5 % (ref 37–48.5)
HGB BLD-MCNC: 7.2 GM/DL (ref 12–16)
HGB BLD-MCNC: 7.4 GM/DL (ref 12–16)
HGB BLD-MCNC: 7.4 GM/DL (ref 12–16)
HGB BLD-MCNC: 8.3 GM/DL (ref 12–16)
HOLD SPECIMEN: NORMAL
IMM GRANULOCYTES # BLD AUTO: 0.02 K/UL (ref 0–0.04)
IMM GRANULOCYTES # BLD AUTO: 0.03 K/UL (ref 0–0.04)
IMM GRANULOCYTES NFR BLD AUTO: 0.4 % (ref 0–0.5)
IMM GRANULOCYTES NFR BLD AUTO: 0.5 % (ref 0–0.5)
IMM GRANULOCYTES NFR BLD AUTO: 0.5 % (ref 0–0.5)
IMM GRANULOCYTES NFR BLD AUTO: 0.6 % (ref 0–0.5)
INR PPP: 1.1 (ref 0.8–1.2)
IRON SATN MFR SERPL: 94 % (ref 20–50)
IRON SERPL-MCNC: 325 UG/DL (ref 30–160)
LACTATE SERPL-SCNC: 2.2 MMOL/L (ref 0.5–2.2)
LYMPHOCYTES # BLD AUTO: 0.57 K/UL (ref 1–4.8)
LYMPHOCYTES # BLD AUTO: 0.6 K/UL (ref 1–4.8)
LYMPHOCYTES # BLD AUTO: 0.7 K/UL (ref 1–4.8)
LYMPHOCYTES # BLD AUTO: 0.76 K/UL (ref 1–4.8)
MAGNESIUM SERPL-MCNC: 1.7 MG/DL (ref 1.6–2.6)
MCH RBC QN AUTO: 29.2 PG (ref 27–31)
MCH RBC QN AUTO: 29.3 PG (ref 27–31)
MCH RBC QN AUTO: 29.4 PG (ref 27–31)
MCH RBC QN AUTO: 30 PG (ref 27–31)
MCHC RBC AUTO-ENTMCNC: 31.9 G/DL (ref 32–36)
MCHC RBC AUTO-ENTMCNC: 32.4 G/DL (ref 32–36)
MCHC RBC AUTO-ENTMCNC: 32.5 G/DL (ref 32–36)
MCHC RBC AUTO-ENTMCNC: 32.7 G/DL (ref 32–36)
MCV RBC AUTO: 89 FL (ref 82–98)
MCV RBC AUTO: 90 FL (ref 82–98)
MCV RBC AUTO: 90 FL (ref 82–98)
MCV RBC AUTO: 94 FL (ref 82–98)
NUCLEATED RBC (/100WBC) (OHS): 0 /100 WBC
NUCLEATED RBC (/100WBC) (OHS): 1 /100 WBC
PHOSPHATE SERPL-MCNC: 2.5 MG/DL (ref 2.7–4.5)
PLATELET # BLD AUTO: 110 K/UL (ref 150–450)
PLATELET # BLD AUTO: 114 K/UL (ref 150–450)
PLATELET # BLD AUTO: 116 K/UL (ref 150–450)
PLATELET # BLD AUTO: 80 K/UL (ref 150–450)
PLATELET BLD QL SMEAR: ABNORMAL
PMV BLD AUTO: 11 FL (ref 9.2–12.9)
PMV BLD AUTO: 9.4 FL (ref 9.2–12.9)
PMV BLD AUTO: 9.5 FL (ref 9.2–12.9)
PMV BLD AUTO: 9.9 FL (ref 9.2–12.9)
POCT GLUCOSE: 103 MG/DL (ref 70–110)
POCT GLUCOSE: 107 MG/DL (ref 70–110)
POCT GLUCOSE: 108 MG/DL (ref 70–110)
POCT GLUCOSE: 235 MG/DL (ref 70–110)
POTASSIUM SERPL-SCNC: 3.7 MMOL/L (ref 3.5–5.1)
PROT SERPL-MCNC: 5.2 GM/DL (ref 6–8.4)
PROTHROMBIN TIME: 11.8 SECONDS (ref 9–12.5)
RBC # BLD AUTO: 2.46 M/UL (ref 4–5.4)
RBC # BLD AUTO: 2.47 M/UL (ref 4–5.4)
RBC # BLD AUTO: 2.53 M/UL (ref 4–5.4)
RBC # BLD AUTO: 2.82 M/UL (ref 4–5.4)
RELATIVE EOSINOPHIL (OHS): 1.3 %
RELATIVE EOSINOPHIL (OHS): 1.8 %
RELATIVE EOSINOPHIL (OHS): 1.9 %
RELATIVE EOSINOPHIL (OHS): 2.1 %
RELATIVE LYMPHOCYTE (OHS): 12.6 % (ref 18–48)
RELATIVE LYMPHOCYTE (OHS): 13 % (ref 18–48)
RELATIVE LYMPHOCYTE (OHS): 14.3 % (ref 18–48)
RELATIVE LYMPHOCYTE (OHS): 16.2 % (ref 18–48)
RELATIVE MONOCYTE (OHS): 6.9 % (ref 4–15)
RELATIVE MONOCYTE (OHS): 7.4 % (ref 4–15)
RELATIVE MONOCYTE (OHS): 7.6 % (ref 4–15)
RELATIVE MONOCYTE (OHS): 9.6 % (ref 4–15)
RELATIVE NEUTROPHIL (OHS): 73.4 % (ref 38–73)
RELATIVE NEUTROPHIL (OHS): 73.9 % (ref 38–73)
RELATIVE NEUTROPHIL (OHS): 76.5 % (ref 38–73)
RELATIVE NEUTROPHIL (OHS): 76.6 % (ref 38–73)
SODIUM SERPL-SCNC: 137 MMOL/L (ref 136–145)
TIBC SERPL-MCNC: 346 UG/DL (ref 250–450)
TRANSFERRIN SERPL-MCNC: 234 MG/DL (ref 200–375)
VIT B12 SERPL-MCNC: >2000 PG/ML (ref 210–950)
WBC # BLD AUTO: 4.33 K/UL (ref 3.9–12.7)
WBC # BLD AUTO: 4.38 K/UL (ref 3.9–12.7)
WBC # BLD AUTO: 4.76 K/UL (ref 3.9–12.7)
WBC # BLD AUTO: 5.33 K/UL (ref 3.9–12.7)

## 2025-08-29 PROCEDURE — 94761 N-INVAS EAR/PLS OXIMETRY MLT: CPT

## 2025-08-29 PROCEDURE — 82746 ASSAY OF FOLIC ACID SERUM: CPT

## 2025-08-29 PROCEDURE — 44360 SMALL BOWEL ENDOSCOPY: CPT | Performed by: INTERNAL MEDICINE

## 2025-08-29 PROCEDURE — 44360 SMALL BOWEL ENDOSCOPY: CPT | Mod: ,,, | Performed by: INTERNAL MEDICINE

## 2025-08-29 PROCEDURE — 63600175 PHARM REV CODE 636 W HCPCS

## 2025-08-29 PROCEDURE — 85730 THROMBOPLASTIN TIME PARTIAL: CPT

## 2025-08-29 PROCEDURE — 82607 VITAMIN B-12: CPT

## 2025-08-29 PROCEDURE — 80053 COMPREHEN METABOLIC PANEL: CPT

## 2025-08-29 PROCEDURE — 84466 ASSAY OF TRANSFERRIN: CPT

## 2025-08-29 PROCEDURE — 37000008 HC ANESTHESIA 1ST 15 MINUTES: Performed by: INTERNAL MEDICINE

## 2025-08-29 PROCEDURE — 25000003 PHARM REV CODE 250

## 2025-08-29 PROCEDURE — 25000003 PHARM REV CODE 250: Performed by: STUDENT IN AN ORGANIZED HEALTH CARE EDUCATION/TRAINING PROGRAM

## 2025-08-29 PROCEDURE — 84100 ASSAY OF PHOSPHORUS: CPT

## 2025-08-29 PROCEDURE — 85610 PROTHROMBIN TIME: CPT

## 2025-08-29 PROCEDURE — 36415 COLL VENOUS BLD VENIPUNCTURE: CPT

## 2025-08-29 PROCEDURE — 83735 ASSAY OF MAGNESIUM: CPT

## 2025-08-29 PROCEDURE — 11000001 HC ACUTE MED/SURG PRIVATE ROOM

## 2025-08-29 PROCEDURE — 63600175 PHARM REV CODE 636 W HCPCS: Performed by: STUDENT IN AN ORGANIZED HEALTH CARE EDUCATION/TRAINING PROGRAM

## 2025-08-29 PROCEDURE — 99223 1ST HOSP IP/OBS HIGH 75: CPT | Mod: 25,GC,, | Performed by: INTERNAL MEDICINE

## 2025-08-29 PROCEDURE — 82962 GLUCOSE BLOOD TEST: CPT | Performed by: INTERNAL MEDICINE

## 2025-08-29 PROCEDURE — 37000009 HC ANESTHESIA EA ADD 15 MINS: Performed by: INTERNAL MEDICINE

## 2025-08-29 PROCEDURE — 83010 ASSAY OF HAPTOGLOBIN QUANT: CPT | Performed by: STUDENT IN AN ORGANIZED HEALTH CARE EDUCATION/TRAINING PROGRAM

## 2025-08-29 PROCEDURE — 0DJ08ZZ INSPECTION OF UPPER INTESTINAL TRACT, VIA NATURAL OR ARTIFICIAL OPENING ENDOSCOPIC: ICD-10-PCS | Performed by: INTERNAL MEDICINE

## 2025-08-29 PROCEDURE — 27000221 HC OXYGEN, UP TO 24 HOURS

## 2025-08-29 PROCEDURE — 85025 COMPLETE CBC W/AUTO DIFF WBC: CPT

## 2025-08-29 RX ORDER — OXYCODONE HYDROCHLORIDE 5 MG/1
5 TABLET ORAL
Status: DISCONTINUED | OUTPATIENT
Start: 2025-08-29 | End: 2025-08-31 | Stop reason: HOSPADM

## 2025-08-29 RX ORDER — DEXMEDETOMIDINE HYDROCHLORIDE 100 UG/ML
INJECTION, SOLUTION INTRAVENOUS
Status: DISCONTINUED | OUTPATIENT
Start: 2025-08-29 | End: 2025-08-29

## 2025-08-29 RX ORDER — HYDROMORPHONE HYDROCHLORIDE 1 MG/ML
0.2 INJECTION, SOLUTION INTRAMUSCULAR; INTRAVENOUS; SUBCUTANEOUS EVERY 5 MIN PRN
Status: DISCONTINUED | OUTPATIENT
Start: 2025-08-29 | End: 2025-08-31 | Stop reason: HOSPADM

## 2025-08-29 RX ORDER — LIDOCAINE HYDROCHLORIDE 20 MG/ML
INJECTION INTRAVENOUS
Status: DISCONTINUED | OUTPATIENT
Start: 2025-08-29 | End: 2025-08-29

## 2025-08-29 RX ORDER — ONDANSETRON HYDROCHLORIDE 2 MG/ML
4 INJECTION, SOLUTION INTRAVENOUS DAILY PRN
Status: DISCONTINUED | OUTPATIENT
Start: 2025-08-29 | End: 2025-08-31 | Stop reason: HOSPADM

## 2025-08-29 RX ORDER — PROPOFOL 10 MG/ML
VIAL (ML) INTRAVENOUS CONTINUOUS PRN
Status: DISCONTINUED | OUTPATIENT
Start: 2025-08-29 | End: 2025-08-29

## 2025-08-29 RX ORDER — FENTANYL CITRATE 50 UG/ML
25 INJECTION, SOLUTION INTRAMUSCULAR; INTRAVENOUS EVERY 5 MIN PRN
Status: DISCONTINUED | OUTPATIENT
Start: 2025-08-29 | End: 2025-08-30

## 2025-08-29 RX ORDER — PROPOFOL 10 MG/ML
VIAL (ML) INTRAVENOUS
Status: DISCONTINUED | OUTPATIENT
Start: 2025-08-29 | End: 2025-08-29

## 2025-08-29 RX ORDER — GLUCAGON 1 MG
1 KIT INJECTION
Status: DISCONTINUED | OUTPATIENT
Start: 2025-08-29 | End: 2025-08-31 | Stop reason: HOSPADM

## 2025-08-29 RX ORDER — HALOPERIDOL LACTATE 5 MG/ML
0.5 INJECTION, SOLUTION INTRAMUSCULAR EVERY 10 MIN PRN
Status: DISCONTINUED | OUTPATIENT
Start: 2025-08-29 | End: 2025-08-30

## 2025-08-29 RX ADMIN — SODIUM CHLORIDE: 0.9 INJECTION, SOLUTION INTRAVENOUS at 03:08

## 2025-08-29 RX ADMIN — LIDOCAINE HYDROCHLORIDE 80 MG: 20 INJECTION INTRAVENOUS at 03:08

## 2025-08-29 RX ADMIN — PANTOPRAZOLE SODIUM 40 MG: 40 INJECTION, POWDER, FOR SOLUTION INTRAVENOUS at 04:08

## 2025-08-29 RX ADMIN — SENNOSIDES, DOCUSATE SODIUM 2 TABLET: 50; 8.6 TABLET, FILM COATED ORAL at 11:08

## 2025-08-29 RX ADMIN — PROPOFOL 80 MG: 10 INJECTION, EMULSION INTRAVENOUS at 03:08

## 2025-08-29 RX ADMIN — GLYCOPYRROLATE 0.2 MG: 0.2 INJECTION, SOLUTION INTRAMUSCULAR; INTRAVENOUS at 03:08

## 2025-08-29 RX ADMIN — PROPOFOL 225 MCG/KG/MIN: 10 INJECTION, EMULSION INTRAVENOUS at 03:08

## 2025-08-29 RX ADMIN — LEVOTHYROXINE SODIUM 88 MCG: 88 TABLET ORAL at 04:08

## 2025-08-29 RX ADMIN — DEXMEDETOMIDINE 8 MCG: 100 INJECTION, SOLUTION, CONCENTRATE INTRAVENOUS at 03:08

## 2025-08-29 RX ADMIN — INSULIN ASPART 1 UNITS: 100 INJECTION, SOLUTION INTRAVENOUS; SUBCUTANEOUS at 11:08

## 2025-08-29 RX ADMIN — POLYETHYLENE GLYCOL 3350 17 G: 17 POWDER, FOR SOLUTION ORAL at 09:08

## 2025-08-29 RX ADMIN — CEFTRIAXONE SODIUM 1 G: 1 INJECTION, POWDER, FOR SOLUTION INTRAMUSCULAR; INTRAVENOUS at 09:08

## 2025-08-29 RX ADMIN — SENNOSIDES, DOCUSATE SODIUM 2 TABLET: 50; 8.6 TABLET, FILM COATED ORAL at 09:08

## 2025-08-29 RX ADMIN — Medication 3 MG: at 11:08

## 2025-08-29 RX ADMIN — PANTOPRAZOLE SODIUM 40 MG: 40 INJECTION, POWDER, FOR SOLUTION INTRAVENOUS at 11:08

## 2025-08-29 RX ADMIN — TRAZODONE HYDROCHLORIDE 50 MG: 50 TABLET ORAL at 11:08

## 2025-08-30 LAB
ABSOLUTE EOSINOPHIL (OHS): 0.1 K/UL
ABSOLUTE MONOCYTE (OHS): 0.41 K/UL (ref 0.3–1)
ABSOLUTE NEUTROPHIL COUNT (OHS): 3.6 K/UL (ref 1.8–7.7)
ALBUMIN SERPL BCP-MCNC: 3.1 G/DL (ref 3.5–5.2)
ALP SERPL-CCNC: 71 UNIT/L (ref 40–150)
ALT SERPL W/O P-5'-P-CCNC: 169 UNIT/L (ref 0–55)
ANION GAP (OHS): 11 MMOL/L (ref 8–16)
AST SERPL-CCNC: 80 UNIT/L (ref 0–50)
BASOPHILS # BLD AUTO: 0.02 K/UL
BASOPHILS NFR BLD AUTO: 0.4 %
BILIRUB SERPL-MCNC: 0.3 MG/DL (ref 0.1–1)
BUN SERPL-MCNC: 21 MG/DL (ref 8–23)
CALCIUM SERPL-MCNC: 8.5 MG/DL (ref 8.7–10.5)
CHLORIDE SERPL-SCNC: 109 MMOL/L (ref 95–110)
CO2 SERPL-SCNC: 19 MMOL/L (ref 23–29)
CREAT SERPL-MCNC: 1.8 MG/DL (ref 0.5–1.4)
ERYTHROCYTE [DISTWIDTH] IN BLOOD BY AUTOMATED COUNT: 15.7 % (ref 11.5–14.5)
GFR SERPLBLD CREATININE-BSD FMLA CKD-EPI: 30 ML/MIN/1.73/M2
GLUCOSE SERPL-MCNC: 145 MG/DL (ref 70–110)
HCT VFR BLD AUTO: 25.2 % (ref 37–48.5)
HGB BLD-MCNC: 7.9 GM/DL (ref 12–16)
IMM GRANULOCYTES # BLD AUTO: 0.02 K/UL (ref 0–0.04)
IMM GRANULOCYTES NFR BLD AUTO: 0.4 % (ref 0–0.5)
LYMPHOCYTES # BLD AUTO: 0.69 K/UL (ref 1–4.8)
MAGNESIUM SERPL-MCNC: 1.7 MG/DL (ref 1.6–2.6)
MCH RBC QN AUTO: 29 PG (ref 27–31)
MCHC RBC AUTO-ENTMCNC: 31.3 G/DL (ref 32–36)
MCV RBC AUTO: 93 FL (ref 82–98)
NUCLEATED RBC (/100WBC) (OHS): 0 /100 WBC
PHOSPHATE SERPL-MCNC: 2.7 MG/DL (ref 2.7–4.5)
PLATELET # BLD AUTO: 116 K/UL (ref 150–450)
PMV BLD AUTO: 9.9 FL (ref 9.2–12.9)
POCT GLUCOSE: 161 MG/DL (ref 70–110)
POCT GLUCOSE: 218 MG/DL (ref 70–110)
POCT GLUCOSE: 259 MG/DL (ref 70–110)
POTASSIUM SERPL-SCNC: 3.5 MMOL/L (ref 3.5–5.1)
PROT SERPL-MCNC: 5.5 GM/DL (ref 6–8.4)
RBC # BLD AUTO: 2.72 M/UL (ref 4–5.4)
RELATIVE EOSINOPHIL (OHS): 2.1 %
RELATIVE LYMPHOCYTE (OHS): 14.3 % (ref 18–48)
RELATIVE MONOCYTE (OHS): 8.5 % (ref 4–15)
RELATIVE NEUTROPHIL (OHS): 74.3 % (ref 38–73)
RETICS/RBC NFR AUTO: 5.6 % (ref 0.5–2.5)
SODIUM SERPL-SCNC: 139 MMOL/L (ref 136–145)
WBC # BLD AUTO: 4.84 K/UL (ref 3.9–12.7)

## 2025-08-30 PROCEDURE — 85045 AUTOMATED RETICULOCYTE COUNT: CPT

## 2025-08-30 PROCEDURE — 83735 ASSAY OF MAGNESIUM: CPT

## 2025-08-30 PROCEDURE — 82040 ASSAY OF SERUM ALBUMIN: CPT

## 2025-08-30 PROCEDURE — 11000001 HC ACUTE MED/SURG PRIVATE ROOM

## 2025-08-30 PROCEDURE — 84100 ASSAY OF PHOSPHORUS: CPT

## 2025-08-30 PROCEDURE — 25000003 PHARM REV CODE 250

## 2025-08-30 PROCEDURE — 36415 COLL VENOUS BLD VENIPUNCTURE: CPT

## 2025-08-30 PROCEDURE — 63600175 PHARM REV CODE 636 W HCPCS

## 2025-08-30 PROCEDURE — 85025 COMPLETE CBC W/AUTO DIFF WBC: CPT

## 2025-08-30 RX ORDER — BISACODYL 10 MG/1
10 SUPPOSITORY RECTAL DAILY
Status: DISCONTINUED | OUTPATIENT
Start: 2025-08-30 | End: 2025-08-31 | Stop reason: HOSPADM

## 2025-08-30 RX ORDER — POLYETHYLENE GLYCOL 3350 17 G/17G
17 POWDER, FOR SOLUTION ORAL 2 TIMES DAILY
Status: DISCONTINUED | OUTPATIENT
Start: 2025-08-30 | End: 2025-08-31 | Stop reason: HOSPADM

## 2025-08-30 RX ORDER — BISACODYL 10 MG/1
10 SUPPOSITORY RECTAL DAILY
Status: DISCONTINUED | OUTPATIENT
Start: 2025-08-31 | End: 2025-08-30

## 2025-08-30 RX ORDER — BISACODYL 10 MG/1
10 SUPPOSITORY RECTAL DAILY PRN
Status: DISCONTINUED | OUTPATIENT
Start: 2025-08-30 | End: 2025-08-30

## 2025-08-30 RX ADMIN — POLYETHYLENE GLYCOL 3350 17 G: 17 POWDER, FOR SOLUTION ORAL at 08:08

## 2025-08-30 RX ADMIN — SENNOSIDES, DOCUSATE SODIUM 2 TABLET: 50; 8.6 TABLET, FILM COATED ORAL at 08:08

## 2025-08-30 RX ADMIN — LEVOTHYROXINE SODIUM 88 MCG: 88 TABLET ORAL at 04:08

## 2025-08-30 RX ADMIN — Medication 3 MG: at 11:08

## 2025-08-30 RX ADMIN — BISACODYL 10 MG: 10 SUPPOSITORY RECTAL at 10:08

## 2025-08-30 RX ADMIN — PANTOPRAZOLE SODIUM 40 MG: 40 INJECTION, POWDER, FOR SOLUTION INTRAVENOUS at 08:08

## 2025-08-30 RX ADMIN — CEFTRIAXONE SODIUM 1 G: 1 INJECTION, POWDER, FOR SOLUTION INTRAMUSCULAR; INTRAVENOUS at 08:08

## 2025-08-30 RX ADMIN — INSULIN ASPART 1 UNITS: 100 INJECTION, SOLUTION INTRAVENOUS; SUBCUTANEOUS at 09:08

## 2025-08-30 RX ADMIN — TRAZODONE HYDROCHLORIDE 50 MG: 50 TABLET ORAL at 11:08

## 2025-08-31 VITALS
OXYGEN SATURATION: 100 % | BODY MASS INDEX: 30.56 KG/M2 | HEART RATE: 77 BPM | HEIGHT: 65 IN | RESPIRATION RATE: 16 BRPM | TEMPERATURE: 98 F | DIASTOLIC BLOOD PRESSURE: 63 MMHG | WEIGHT: 183.44 LBS | SYSTOLIC BLOOD PRESSURE: 111 MMHG

## 2025-08-31 LAB
ABSOLUTE EOSINOPHIL (OHS): 0.06 K/UL
ABSOLUTE MONOCYTE (OHS): 0.28 K/UL (ref 0.3–1)
ABSOLUTE NEUTROPHIL COUNT (OHS): 2.83 K/UL (ref 1.8–7.7)
ALBUMIN SERPL BCP-MCNC: 2.9 G/DL (ref 3.5–5.2)
ALP SERPL-CCNC: 69 UNIT/L (ref 40–150)
ALT SERPL W/O P-5'-P-CCNC: 129 UNIT/L (ref 0–55)
ANION GAP (OHS): 7 MMOL/L (ref 8–16)
AST SERPL-CCNC: 55 UNIT/L (ref 0–50)
BASOPHILS # BLD AUTO: 0.03 K/UL
BASOPHILS NFR BLD AUTO: 0.8 %
BILIRUB SERPL-MCNC: 0.2 MG/DL (ref 0.1–1)
BUN SERPL-MCNC: 19 MG/DL (ref 8–23)
CALCIUM SERPL-MCNC: 8.3 MG/DL (ref 8.7–10.5)
CHLORIDE SERPL-SCNC: 107 MMOL/L (ref 95–110)
CO2 SERPL-SCNC: 23 MMOL/L (ref 23–29)
CREAT SERPL-MCNC: 1.8 MG/DL (ref 0.5–1.4)
ERYTHROCYTE [DISTWIDTH] IN BLOOD BY AUTOMATED COUNT: 15.7 % (ref 11.5–14.5)
FERRITIN SERPL-MCNC: 79 NG/ML (ref 20–300)
GFR SERPLBLD CREATININE-BSD FMLA CKD-EPI: 30 ML/MIN/1.73/M2
GLUCOSE SERPL-MCNC: 192 MG/DL (ref 70–110)
HCT VFR BLD AUTO: 23.5 % (ref 37–48.5)
HGB BLD-MCNC: 7.6 GM/DL (ref 12–16)
HOLD SPECIMEN: NORMAL
IMM GRANULOCYTES # BLD AUTO: 0.01 K/UL (ref 0–0.04)
IMM GRANULOCYTES NFR BLD AUTO: 0.3 % (ref 0–0.5)
LYMPHOCYTES # BLD AUTO: 0.5 K/UL (ref 1–4.8)
MAGNESIUM SERPL-MCNC: 1.7 MG/DL (ref 1.6–2.6)
MCH RBC QN AUTO: 29.5 PG (ref 27–31)
MCHC RBC AUTO-ENTMCNC: 32.3 G/DL (ref 32–36)
MCV RBC AUTO: 91 FL (ref 82–98)
NUCLEATED RBC (/100WBC) (OHS): 0 /100 WBC
PHOSPHATE SERPL-MCNC: 2.6 MG/DL (ref 2.7–4.5)
PLATELET # BLD AUTO: 112 K/UL (ref 150–450)
PMV BLD AUTO: 9.7 FL (ref 9.2–12.9)
POCT GLUCOSE: 171 MG/DL (ref 70–110)
POCT GLUCOSE: 261 MG/DL (ref 70–110)
POTASSIUM SERPL-SCNC: 3.4 MMOL/L (ref 3.5–5.1)
PROT SERPL-MCNC: 5.2 GM/DL (ref 6–8.4)
RBC # BLD AUTO: 2.58 M/UL (ref 4–5.4)
RELATIVE EOSINOPHIL (OHS): 1.6 %
RELATIVE LYMPHOCYTE (OHS): 13.5 % (ref 18–48)
RELATIVE MONOCYTE (OHS): 7.5 % (ref 4–15)
RELATIVE NEUTROPHIL (OHS): 76.3 % (ref 38–73)
SODIUM SERPL-SCNC: 137 MMOL/L (ref 136–145)
WBC # BLD AUTO: 3.71 K/UL (ref 3.9–12.7)

## 2025-08-31 PROCEDURE — 85025 COMPLETE CBC W/AUTO DIFF WBC: CPT

## 2025-08-31 PROCEDURE — 25000003 PHARM REV CODE 250

## 2025-08-31 PROCEDURE — 83735 ASSAY OF MAGNESIUM: CPT

## 2025-08-31 PROCEDURE — 84100 ASSAY OF PHOSPHORUS: CPT

## 2025-08-31 PROCEDURE — 82728 ASSAY OF FERRITIN: CPT

## 2025-08-31 PROCEDURE — 80053 COMPREHEN METABOLIC PANEL: CPT

## 2025-08-31 PROCEDURE — 36415 COLL VENOUS BLD VENIPUNCTURE: CPT

## 2025-08-31 RX ORDER — SYRING-NEEDL,DISP,INSUL,0.3 ML 29 G X1/2"
296 SYRINGE, EMPTY DISPOSABLE MISCELLANEOUS
Status: COMPLETED | OUTPATIENT
Start: 2025-08-31 | End: 2025-08-31

## 2025-08-31 RX ORDER — CEFPODOXIME PROXETIL 100 MG/1
100 TABLET, FILM COATED ORAL DAILY
Qty: 4 TABLET | Refills: 0 | Status: SHIPPED | OUTPATIENT
Start: 2025-08-31 | End: 2025-08-31 | Stop reason: HOSPADM

## 2025-08-31 RX ORDER — PSEUDOEPHEDRINE/ACETAMINOPHEN 30MG-500MG
100 TABLET ORAL
Status: COMPLETED | OUTPATIENT
Start: 2025-08-31 | End: 2025-08-31

## 2025-08-31 RX ORDER — SODIUM CHLORIDE 0.9 G/100ML
500 IRRIGANT IRRIGATION
Status: COMPLETED | OUTPATIENT
Start: 2025-08-31 | End: 2025-08-31

## 2025-08-31 RX ADMIN — SODIUM CHLORIDE 500 ML: 0.9 IRRIGANT IRRIGATION at 02:08

## 2025-08-31 RX ADMIN — MAGNESIUM CITRATE 296 ML: 1.75 LIQUID ORAL at 02:08

## 2025-08-31 RX ADMIN — SENNOSIDES, DOCUSATE SODIUM 2 TABLET: 50; 8.6 TABLET, FILM COATED ORAL at 08:08

## 2025-08-31 RX ADMIN — POLYETHYLENE GLYCOL 3350 17 G: 17 POWDER, FOR SOLUTION ORAL at 08:08

## 2025-08-31 RX ADMIN — SODIUM CHLORIDE 500 ML: 0.9 INJECTION, SOLUTION INTRAVENOUS at 10:08

## 2025-08-31 RX ADMIN — Medication 100 ML: at 02:08

## 2025-08-31 RX ADMIN — BISACODYL 10 MG: 10 SUPPOSITORY RECTAL at 08:08

## 2025-08-31 RX ADMIN — INSULIN ASPART 3 UNITS: 100 INJECTION, SOLUTION INTRAVENOUS; SUBCUTANEOUS at 11:08

## 2025-08-31 RX ADMIN — LEVOTHYROXINE SODIUM 88 MCG: 88 TABLET ORAL at 05:08

## 2025-09-01 LAB — POCT GLUCOSE: 249 MG/DL (ref 70–110)

## 2025-09-02 ENCOUNTER — PATIENT MESSAGE (OUTPATIENT)
Dept: INTERNAL MEDICINE | Facility: CLINIC | Age: 70
End: 2025-09-02
Payer: MEDICARE

## 2025-09-02 DIAGNOSIS — I65.21 ASYMPTOMATIC STENOSIS OF RIGHT CAROTID ARTERY: Primary | ICD-10-CM

## 2025-09-02 LAB
BACTERIA BLD CULT: NORMAL
BACTERIA BLD CULT: NORMAL

## 2025-09-03 ENCOUNTER — HOSPITAL ENCOUNTER (OUTPATIENT)
Dept: CARDIOLOGY | Facility: CLINIC | Age: 70
Discharge: HOME | End: 2025-09-03
Payer: MEDICARE

## 2025-09-03 ENCOUNTER — OFFICE VISIT (OUTPATIENT)
Dept: ELECTROPHYSIOLOGY | Facility: CLINIC | Age: 70
End: 2025-09-03
Payer: MEDICARE

## 2025-09-03 VITALS
DIASTOLIC BLOOD PRESSURE: 61 MMHG | SYSTOLIC BLOOD PRESSURE: 108 MMHG | BODY MASS INDEX: 30.49 KG/M2 | HEIGHT: 65 IN | WEIGHT: 183 LBS | HEART RATE: 69 BPM

## 2025-09-03 DIAGNOSIS — E66.09 CLASS 1 OBESITY DUE TO EXCESS CALORIES WITH SERIOUS COMORBIDITY AND BODY MASS INDEX (BMI) OF 32.0 TO 32.9 IN ADULT: ICD-10-CM

## 2025-09-03 DIAGNOSIS — I15.2 HYPERTENSION ASSOCIATED WITH DIABETES: ICD-10-CM

## 2025-09-03 DIAGNOSIS — D64.9 ANEMIA, UNSPECIFIED TYPE: ICD-10-CM

## 2025-09-03 DIAGNOSIS — Z95.818 STATUS POST PLACEMENT OF IMPLANTABLE LOOP RECORDER: ICD-10-CM

## 2025-09-03 DIAGNOSIS — I48.0 PAROXYSMAL ATRIAL FIBRILLATION: ICD-10-CM

## 2025-09-03 DIAGNOSIS — I48.0 PAROXYSMAL ATRIAL FIBRILLATION: Primary | Chronic | ICD-10-CM

## 2025-09-03 DIAGNOSIS — E11.59 HYPERTENSION ASSOCIATED WITH DIABETES: ICD-10-CM

## 2025-09-03 DIAGNOSIS — E66.811 CLASS 1 OBESITY DUE TO EXCESS CALORIES WITH SERIOUS COMORBIDITY AND BODY MASS INDEX (BMI) OF 32.0 TO 32.9 IN ADULT: ICD-10-CM

## 2025-09-03 DIAGNOSIS — Z79.01 CURRENT USE OF LONG TERM ANTICOAGULATION: ICD-10-CM

## 2025-09-03 LAB
OHS QRS DURATION: 92 MS
OHS QTC CALCULATION: 426 MS

## 2025-09-03 PROCEDURE — 93010 ELECTROCARDIOGRAM REPORT: CPT | Mod: S$GLB,,, | Performed by: INTERNAL MEDICINE

## 2025-09-03 PROCEDURE — 99999 PR PBB SHADOW E&M-EST. PATIENT-LVL V: CPT | Mod: PBBFAC,,, | Performed by: NURSE PRACTITIONER

## (undated) DEVICE — Device

## (undated) DEVICE — DRAPE OPHTHALMIC 48X62 FEN

## (undated) DEVICE — SYR LUER LOCK 1CC

## (undated) DEVICE — EXPANDER PUPIL 7.0MM

## (undated) DEVICE — DUOVISC

## (undated) DEVICE — GLOVE BIOGEL ECLIPSE SZ 6.5

## (undated) DEVICE — DRESSING TRANS 2X2 TEGADERM

## (undated) DEVICE — SOL BETADINE 5%